# Patient Record
Sex: FEMALE | Race: BLACK OR AFRICAN AMERICAN | NOT HISPANIC OR LATINO
[De-identification: names, ages, dates, MRNs, and addresses within clinical notes are randomized per-mention and may not be internally consistent; named-entity substitution may affect disease eponyms.]

---

## 2017-04-10 ENCOUNTER — ASOB RESULT (OUTPATIENT)
Age: 27
End: 2017-04-10

## 2017-04-10 ENCOUNTER — APPOINTMENT (OUTPATIENT)
Dept: ANTEPARTUM | Facility: CLINIC | Age: 27
End: 2017-04-10

## 2017-04-10 PROBLEM — Z00.00 ENCOUNTER FOR PREVENTIVE HEALTH EXAMINATION: Status: ACTIVE | Noted: 2017-04-10

## 2017-05-22 ENCOUNTER — APPOINTMENT (OUTPATIENT)
Dept: ANTEPARTUM | Facility: CLINIC | Age: 27
End: 2017-05-22

## 2017-05-22 ENCOUNTER — ASOB RESULT (OUTPATIENT)
Age: 27
End: 2017-05-22

## 2017-05-24 ENCOUNTER — EMERGENCY (EMERGENCY)
Facility: HOSPITAL | Age: 27
LOS: 1 days | Discharge: HOME | End: 2017-05-24
Attending: EMERGENCY MEDICINE | Admitting: EMERGENCY MEDICINE
Payer: COMMERCIAL

## 2017-05-24 VITALS
OXYGEN SATURATION: 100 % | TEMPERATURE: 99 F | HEART RATE: 129 BPM | DIASTOLIC BLOOD PRESSURE: 64 MMHG | SYSTOLIC BLOOD PRESSURE: 112 MMHG | RESPIRATION RATE: 16 BRPM

## 2017-05-24 VITALS
OXYGEN SATURATION: 100 % | RESPIRATION RATE: 16 BRPM | DIASTOLIC BLOOD PRESSURE: 77 MMHG | HEART RATE: 106 BPM | SYSTOLIC BLOOD PRESSURE: 111 MMHG

## 2017-05-24 LAB
ALBUMIN SERPL ELPH-MCNC: 3.9 G/DL — SIGNIFICANT CHANGE UP (ref 3.3–5)
ALP SERPL-CCNC: 94 U/L — SIGNIFICANT CHANGE UP (ref 40–120)
ALT FLD-CCNC: 13 U/L — SIGNIFICANT CHANGE UP (ref 4–33)
APPEARANCE UR: SIGNIFICANT CHANGE UP
AST SERPL-CCNC: 17 U/L — SIGNIFICANT CHANGE UP (ref 4–32)
BACTERIA # UR AUTO: SIGNIFICANT CHANGE UP
BASOPHILS # BLD AUTO: 0.01 K/UL — SIGNIFICANT CHANGE UP (ref 0–0.2)
BASOPHILS NFR BLD AUTO: 0.1 % — SIGNIFICANT CHANGE UP (ref 0–2)
BILIRUB SERPL-MCNC: 0.7 MG/DL — SIGNIFICANT CHANGE UP (ref 0.2–1.2)
BILIRUB UR-MCNC: NEGATIVE — SIGNIFICANT CHANGE UP
BLOOD UR QL VISUAL: NEGATIVE — SIGNIFICANT CHANGE UP
BUN SERPL-MCNC: 5 MG/DL — LOW (ref 7–23)
CALCIUM SERPL-MCNC: 8.8 MG/DL — SIGNIFICANT CHANGE UP (ref 8.4–10.5)
CHLORIDE SERPL-SCNC: 105 MMOL/L — SIGNIFICANT CHANGE UP (ref 98–107)
CO2 SERPL-SCNC: 23 MMOL/L — SIGNIFICANT CHANGE UP (ref 22–31)
COLOR SPEC: YELLOW — SIGNIFICANT CHANGE UP
CREAT SERPL-MCNC: 0.61 MG/DL — SIGNIFICANT CHANGE UP (ref 0.5–1.3)
EOSINOPHIL # BLD AUTO: 0.26 K/UL — SIGNIFICANT CHANGE UP (ref 0–0.5)
EOSINOPHIL NFR BLD AUTO: 2.4 % — SIGNIFICANT CHANGE UP (ref 0–6)
GLUCOSE SERPL-MCNC: 82 MG/DL — SIGNIFICANT CHANGE UP (ref 70–99)
GLUCOSE UR-MCNC: NEGATIVE — SIGNIFICANT CHANGE UP
HCG SERPL-ACNC: 7688 MIU/ML — SIGNIFICANT CHANGE UP
HCT VFR BLD CALC: 30.4 % — LOW (ref 34.5–45)
HGB BLD-MCNC: 10.1 G/DL — LOW (ref 11.5–15.5)
IMM GRANULOCYTES NFR BLD AUTO: 0.6 % — SIGNIFICANT CHANGE UP (ref 0–1.5)
KETONES UR-MCNC: NEGATIVE — SIGNIFICANT CHANGE UP
LEUKOCYTE ESTERASE UR-ACNC: HIGH
LYMPHOCYTES # BLD AUTO: 1.16 K/UL — SIGNIFICANT CHANGE UP (ref 1–3.3)
LYMPHOCYTES # BLD AUTO: 10.7 % — LOW (ref 13–44)
MCHC RBC-ENTMCNC: 27.9 PG — SIGNIFICANT CHANGE UP (ref 27–34)
MCHC RBC-ENTMCNC: 33.2 % — SIGNIFICANT CHANGE UP (ref 32–36)
MCV RBC AUTO: 84 FL — SIGNIFICANT CHANGE UP (ref 80–100)
MONOCYTES # BLD AUTO: 1.13 K/UL — HIGH (ref 0–0.9)
MONOCYTES NFR BLD AUTO: 10.4 % — SIGNIFICANT CHANGE UP (ref 2–14)
MUCOUS THREADS # UR AUTO: SIGNIFICANT CHANGE UP
NEUTROPHILS # BLD AUTO: 8.2 K/UL — HIGH (ref 1.8–7.4)
NEUTROPHILS NFR BLD AUTO: 75.8 % — SIGNIFICANT CHANGE UP (ref 43–77)
NITRITE UR-MCNC: NEGATIVE — SIGNIFICANT CHANGE UP
PH UR: 7 — SIGNIFICANT CHANGE UP (ref 4.6–8)
PLATELET # BLD AUTO: 245 K/UL — SIGNIFICANT CHANGE UP (ref 150–400)
PMV BLD: 11.2 FL — SIGNIFICANT CHANGE UP (ref 7–13)
POTASSIUM SERPL-MCNC: 3.5 MMOL/L — SIGNIFICANT CHANGE UP (ref 3.5–5.3)
POTASSIUM SERPL-SCNC: 3.5 MMOL/L — SIGNIFICANT CHANGE UP (ref 3.5–5.3)
PROT SERPL-MCNC: 7.4 G/DL — SIGNIFICANT CHANGE UP (ref 6–8.3)
PROT UR-MCNC: 20 — SIGNIFICANT CHANGE UP
RBC # BLD: 3.62 M/UL — LOW (ref 3.8–5.2)
RBC # FLD: 13.8 % — SIGNIFICANT CHANGE UP (ref 10.3–14.5)
RBC CASTS # UR COMP ASSIST: SIGNIFICANT CHANGE UP (ref 0–?)
SODIUM SERPL-SCNC: 136 MMOL/L — SIGNIFICANT CHANGE UP (ref 135–145)
SP GR SPEC: 1.01 — SIGNIFICANT CHANGE UP (ref 1–1.03)
SQUAMOUS # UR AUTO: SIGNIFICANT CHANGE UP
UROBILINOGEN FLD QL: 1 E.U. — SIGNIFICANT CHANGE UP (ref 0.1–0.2)
WBC # BLD: 10.82 K/UL — HIGH (ref 3.8–10.5)
WBC # FLD AUTO: 10.82 K/UL — HIGH (ref 3.8–10.5)
WBC UR QL: SIGNIFICANT CHANGE UP (ref 0–?)

## 2017-05-24 PROCEDURE — 99284 EMERGENCY DEPT VISIT MOD MDM: CPT

## 2017-05-24 RX ORDER — NITROFURANTOIN MACROCRYSTAL 50 MG
100 CAPSULE ORAL
Qty: 0 | Refills: 0 | Status: DISCONTINUED | OUTPATIENT
Start: 2017-05-24 | End: 2017-05-24

## 2017-05-24 RX ORDER — SODIUM CHLORIDE 0.65 %
1 AEROSOL, SPRAY (ML) NASAL ONCE
Qty: 0 | Refills: 0 | Status: COMPLETED | OUTPATIENT
Start: 2017-05-24 | End: 2017-05-24

## 2017-05-24 RX ORDER — ACETAMINOPHEN 500 MG
650 TABLET ORAL ONCE
Qty: 0 | Refills: 0 | Status: COMPLETED | OUTPATIENT
Start: 2017-05-24 | End: 2017-05-24

## 2017-05-24 RX ORDER — AZITHROMYCIN 500 MG/1
1000 TABLET, FILM COATED ORAL ONCE
Qty: 0 | Refills: 0 | Status: COMPLETED | OUTPATIENT
Start: 2017-05-24 | End: 2017-05-24

## 2017-05-24 RX ORDER — SODIUM CHLORIDE 9 MG/ML
1000 INJECTION INTRAMUSCULAR; INTRAVENOUS; SUBCUTANEOUS ONCE
Qty: 0 | Refills: 0 | Status: COMPLETED | OUTPATIENT
Start: 2017-05-24 | End: 2017-05-24

## 2017-05-24 RX ADMIN — Medication 1 SPRAY(S): at 13:33

## 2017-05-24 RX ADMIN — Medication 650 MILLIGRAM(S): at 13:43

## 2017-05-24 RX ADMIN — AZITHROMYCIN 1000 MILLIGRAM(S): 500 TABLET, FILM COATED ORAL at 13:43

## 2017-05-24 RX ADMIN — SODIUM CHLORIDE 1000 MILLILITER(S): 9 INJECTION INTRAMUSCULAR; INTRAVENOUS; SUBCUTANEOUS at 13:43

## 2017-05-24 NOTE — ED ADULT NURSE NOTE - OBJECTIVE STATEMENT
Pt rec'd in 26 from intake, labs sent in intake, 20 weeks pregnant, c/o SOB x 4 days, denies chest pain, denies palpitations, noted tachycardic on cardiac monitor. Pt states she has a urinary infection as well. C/o abd cramping, denies vaginal bleeding, c/o subjective fever at home.

## 2017-05-24 NOTE — ED PROVIDER NOTE - MEDICAL DECISION MAKING DETAILS
sinus congestion, rhinorrhea, lowgrade fever, partner recently treated for gc/ct and pt requesting treatment  -ocean spray for congestion  -tylenol and fluids for tachycardia likely 2/2 viral uri   -azithromycin ppx in case of chlamydia.

## 2017-05-24 NOTE — ED PROVIDER NOTE - ATTENDING CONTRIBUTION TO CARE
26yF , 20wks pregnant hx asthma p/w sob, cough, tactile fever, rhinorrhea, sinus congestion since last night. Pt cannot breath through nose due to congestion but feels that sob may be more than just that related to congestion. No CP. SOB worse when lying flat. No recent hx prolonged immobilization. Pt also presents b/c partner recently seen in ED for white penile discharge and treated  for GC/CT. Pt requesting to also be tested for these infections. Exam: scant rhonchi light upper base, no kiya wheezing, transmitted upper resp sounds sat 100 percent; no CP; 2nd issue regarding r/o STD; checked significant others cultures at [facility]; no chlamydia culture sent GC negative. Would treat with azithromycin after GYN exam and culture. B2 agonist and IV hydration for URI

## 2017-05-24 NOTE — ED PROVIDER NOTE - PROGRESS NOTE DETAILS
. Called GYN service. They will inform pt's primary GYN doctor of her visit to ED and state that only a documented FHR is needed. Pt does not need to be seen by GYN in ED today. pt received call that both gc and ct tests are negative. On computer confirmed that gc test was negative.

## 2017-05-24 NOTE — ED PROVIDER NOTE - CARE PLAN
Principal Discharge DX:	Congestion of paranasal sinus  Instructions for follow-up, activity and diet:	The patient was given verbal and written discharge instructions. Specifically, instructions when to return to the ED and when to seek follow-up from their pcp was discussed. Any specialty follow-up was discussed, including how to make an appointment.  Instructions were discussed in simple, plain language and was understood by the patient. The patient understands that should their symptoms worsen or any new symptoms arise, they should return to the ED immediately for further evaluation.

## 2017-05-24 NOTE — ED PROVIDER NOTE - OBJECTIVE STATEMENT
26yF , 20wks pregnant hx asthma p/w sob, cough, tactile fever, rhinorrhea, sinus congestion since last night. Pt cannot breath through nose due to congestion but feels that sob may be more than just that related to congestion. No CP. SOB worse when lying flat. No recent hx prolonged immobilization. Pt also presents b/c partner recently seen in ED for white penile discharge and treated  for GC/CT. Pt requesting to also be tested for these infections.

## 2017-05-24 NOTE — ED ADULT NURSE NOTE - CHIEF COMPLAINT QUOTE
20 weeks pregnant with c/o SOB since Sunday, patient also states significant other treated for Urethritis and was told she needed to be treated also. (+) lower abdominal cramping.  Denies any vaginal bleeding. GIRMA: 10/13/17. Spoke with ROBIN Parrish in L&D and patient to be seen in ED.  PMH: asthma, sickle cell trait,

## 2017-05-24 NOTE — ED ADULT TRIAGE NOTE - CHIEF COMPLAINT QUOTE
20 weeks pregnant with c/o SOB since Sunday, patient also states significant other treated for Urethritis and was told she needed to be treated also. (+) lower abdominal cramping.  Denies any vaginal bleeding. GIRMA: 10/13/17.  PMH: asthma, sickle cell trait, 20 weeks pregnant with c/o SOB since Sunday, patient also states significant other treated for Urethritis and was told she needed to be treated also. (+) lower abdominal cramping.  Denies any vaginal bleeding. GIRMA: 10/13/17. Spoke with ROBIN Parrish in L&D and patient to be seen in ED.  PMH: asthma, sickle cell trait,

## 2017-05-25 LAB
C TRACH RRNA SPEC QL NAA+PROBE: SIGNIFICANT CHANGE UP
N GONORRHOEA RRNA SPEC QL NAA+PROBE: SIGNIFICANT CHANGE UP
SPECIMEN SOURCE: SIGNIFICANT CHANGE UP
SPECIMEN SOURCE: SIGNIFICANT CHANGE UP

## 2017-05-26 LAB — BACTERIA UR CULT: SIGNIFICANT CHANGE UP

## 2017-07-23 ENCOUNTER — EMERGENCY (EMERGENCY)
Facility: HOSPITAL | Age: 27
LOS: 1 days | Discharge: HOME | End: 2017-07-23
Admitting: EMERGENCY MEDICINE
Payer: MEDICAID

## 2017-07-23 VITALS
DIASTOLIC BLOOD PRESSURE: 63 MMHG | OXYGEN SATURATION: 94 % | SYSTOLIC BLOOD PRESSURE: 109 MMHG | HEART RATE: 50 BPM | RESPIRATION RATE: 16 BRPM | TEMPERATURE: 98 F

## 2017-07-23 VITALS
OXYGEN SATURATION: 100 % | RESPIRATION RATE: 16 BRPM | SYSTOLIC BLOOD PRESSURE: 110 MMHG | HEART RATE: 61 BPM | DIASTOLIC BLOOD PRESSURE: 69 MMHG

## 2017-07-23 LAB
APPEARANCE UR: SIGNIFICANT CHANGE UP
BACTERIA # UR AUTO: SIGNIFICANT CHANGE UP
BILIRUB UR-MCNC: NEGATIVE — SIGNIFICANT CHANGE UP
BLOOD UR QL VISUAL: NEGATIVE — SIGNIFICANT CHANGE UP
COLOR SPEC: YELLOW — SIGNIFICANT CHANGE UP
GLUCOSE UR-MCNC: NEGATIVE — SIGNIFICANT CHANGE UP
KETONES UR-MCNC: SIGNIFICANT CHANGE UP
LEUKOCYTE ESTERASE UR-ACNC: HIGH
MUCOUS THREADS # UR AUTO: SIGNIFICANT CHANGE UP
NITRITE UR-MCNC: NEGATIVE — SIGNIFICANT CHANGE UP
PH UR: 6 — SIGNIFICANT CHANGE UP (ref 4.6–8)
PROT UR-MCNC: 20 — SIGNIFICANT CHANGE UP
RBC CASTS # UR COMP ASSIST: SIGNIFICANT CHANGE UP (ref 0–?)
RENAL EPI CELLS # UR COMP ASSIST: <1 — SIGNIFICANT CHANGE UP
SP GR SPEC: 1.02 — SIGNIFICANT CHANGE UP (ref 1–1.03)
SQUAMOUS # UR AUTO: SIGNIFICANT CHANGE UP
TRANS CELLS #/AREA URNS HPF: 1 — SIGNIFICANT CHANGE UP
UROBILINOGEN FLD QL: 1 E.U. — SIGNIFICANT CHANGE UP (ref 0.1–0.2)
WBC UR QL: SIGNIFICANT CHANGE UP (ref 0–?)

## 2017-07-23 PROCEDURE — 99283 EMERGENCY DEPT VISIT LOW MDM: CPT

## 2017-07-23 RX ORDER — CEPHALEXIN 500 MG
500 CAPSULE ORAL ONCE
Qty: 0 | Refills: 0 | Status: COMPLETED | OUTPATIENT
Start: 2017-07-23 | End: 2017-07-23

## 2017-07-23 RX ORDER — CEPHALEXIN 500 MG
1 CAPSULE ORAL
Qty: 21 | Refills: 0 | OUTPATIENT
Start: 2017-07-23 | End: 2017-07-30

## 2017-07-23 RX ADMIN — Medication 500 MILLIGRAM(S): at 16:15

## 2017-07-23 NOTE — ED PROVIDER NOTE - MEDICAL DECISION MAKING DETAILS
vag itching/discharge in 2nd trimester, only bimanual exam performed, can visualize white clumpy dc, consistent with monilisis - will check ua, cx, test for gc/chlamydia, bedisde us for fetal hr, L&D called to protocol - since pt has no vag bleeding or discharge, also denies abd pain, no need to send upstairs.

## 2017-07-23 NOTE — ED PROVIDER NOTE - PLAN OF CARE
PLEASE MAKE SURE THAT YOU TAKE THE MEDICATIONS AS PRESCRIBED - YOU WILL BE TAKING THE ANTIBIOTICS AS WELL AS THE CREAM AS IT LOOKS LIKE YOU HAVE URINARY TRACT INFECTION. PLEASE FOLLOW UP WITH [facility] OB/GYN DOCTOR WITHIN NEXT 48HRS, PLEASE CALL US [phone] AND ASK FOR ADMINISTRATIVE PA (BETWEEN 9AM-3PM EVERY DAY) FOR TEST RESULTS of urine culture and other testes. RETURN TO ER FOR VAGINAL BLEEDING, ABDOMINAL PAIN, FEVER.

## 2017-07-23 NOTE — ED PROVIDER NOTE - CARE PLAN
Principal Discharge DX:	Vaginosis Principal Discharge DX:	Vaginosis  Instructions for follow-up, activity and diet:	PLEASE MAKE SURE THAT YOU TAKE THE MEDICATIONS AS PRESCRIBED - YOU WILL BE TAKING THE ANTIBIOTICS AS WELL AS THE CREAM AS IT LOOKS LIKE YOU HAVE URINARY TRACT INFECTION. PLEASE FOLLOW UP WITH [facility] OB/GYN DOCTOR WITHIN NEXT 48HRS, PLEASE CALL US [phone] AND ASK FOR ADMINISTRATIVE PA (BETWEEN 9AM-3PM EVERY DAY) FOR TEST RESULTS of urine culture and other testes. RETURN TO ER FOR VAGINAL BLEEDING, ABDOMINAL PAIN, FEVER. Principal Discharge DX:	Vaginosis  Instructions for follow-up, activity and diet:	PLEASE MAKE SURE THAT YOU TAKE THE MEDICATIONS AS PRESCRIBED - YOU WILL BE TAKING THE ANTIBIOTICS AS WELL AS THE CREAM AS IT LOOKS LIKE YOU HAVE URINARY TRACT INFECTION. PLEASE FOLLOW UP WITH [facility] OB/GYN DOCTOR WITHIN NEXT 48HRS, PLEASE CALL US [phone] AND ASK FOR ADMINISTRATIVE PA (BETWEEN 9AM-3PM EVERY DAY) FOR TEST RESULTS of urine culture and other testes. RETURN TO ER FOR VAGINAL BLEEDING, ABDOMINAL PAIN, FEVER. Principal Discharge DX:	Vaginosis  Instructions for follow-up, activity and diet:	PLEASE MAKE SURE THAT YOU TAKE THE MEDICATIONS AS PRESCRIBED - YOU WILL BE TAKING THE ANTIBIOTICS AS WELL AS THE CREAM AS IT LOOKS LIKE YOU HAVE URINARY TRACT INFECTION. PLEASE FOLLOW UP WITH [facility] OB/GYN DOCTOR WITHIN NEXT 48HRS, PLEASE CALL US [phone] AND ASK FOR ADMINISTRATIVE PA (BETWEEN 9AM-3PM EVERY DAY) FOR TEST RESULTS of urine culture and other testes. RETURN TO ER FOR VAGINAL BLEEDING, ABDOMINAL PAIN, FEVER.

## 2017-07-23 NOTE — ED PROVIDER NOTE - OBJECTIVE STATEMENT
Pt is 27 y/o female, G [id number], Pt is 27 y/o female, G [id number], 27+ weeks into gestation (LMP 1/11/2017) here for eval of vaginal itching and clumpy, white vaginal  discharge x 3 days. Pt denies any abd pain, vaginal bleeding, denies pelvic pain, nausea, vomiting, fever, cp, sob, denies dysuria, urinary urgency or frequency, denies hematuria, admits to feeling baby moving frequently. ob/gyn - [place], had multiple us for this pregnancy. sexually active in monogamous relationship, denies hx of STDs (tested negative in may of 2017); take prenatal vitamins as prescribed.

## 2017-07-23 NOTE — ED ADULT TRIAGE NOTE - CHIEF COMPLAINT QUOTE
Pt 28 weeks pregnant. c/o of vaginal itching. Pt has been taking Monistat. Denies any pregnancy issues.

## 2017-07-24 LAB
C TRACH RRNA SPEC QL NAA+PROBE: SIGNIFICANT CHANGE UP
N GONORRHOEA RRNA SPEC QL NAA+PROBE: SIGNIFICANT CHANGE UP
SPECIMEN SOURCE: SIGNIFICANT CHANGE UP

## 2017-07-25 LAB
BACTERIA UR CULT: SIGNIFICANT CHANGE UP
SPECIMEN SOURCE: SIGNIFICANT CHANGE UP

## 2017-07-25 NOTE — ED PROCEDURE NOTE - PROCEDURE ADDITIONAL DETAILS
fetal movement observed      est age 27w5d    fluid appears grossly adequate         cervical length 4.8cm

## 2017-10-20 ENCOUNTER — RESULT REVIEW (OUTPATIENT)
Age: 27
End: 2017-10-20

## 2017-10-21 ENCOUNTER — TRANSCRIPTION ENCOUNTER (OUTPATIENT)
Age: 27
End: 2017-10-21

## 2017-10-21 ENCOUNTER — INPATIENT (INPATIENT)
Facility: HOSPITAL | Age: 27
LOS: 2 days | Discharge: HOME | End: 2017-10-24
Attending: OBSTETRICS & GYNECOLOGY | Admitting: OBSTETRICS & GYNECOLOGY
Payer: COMMERCIAL

## 2017-10-21 VITALS
RESPIRATION RATE: 19 BRPM | HEART RATE: 96 BPM | TEMPERATURE: 98 F | DIASTOLIC BLOOD PRESSURE: 58 MMHG | SYSTOLIC BLOOD PRESSURE: 129 MMHG

## 2017-10-21 DIAGNOSIS — O26.899 OTHER SPECIFIED PREGNANCY RELATED CONDITIONS, UNSPECIFIED TRIMESTER: ICD-10-CM

## 2017-10-21 LAB
BASOPHILS # BLD AUTO: 0.02 K/UL — SIGNIFICANT CHANGE UP (ref 0–0.2)
BASOPHILS # BLD AUTO: 0.04 K/UL — SIGNIFICANT CHANGE UP (ref 0–0.2)
BASOPHILS NFR BLD AUTO: 0.2 % — SIGNIFICANT CHANGE UP (ref 0–2)
BASOPHILS NFR BLD AUTO: 0.3 % — SIGNIFICANT CHANGE UP (ref 0–2)
BLD GP AB SCN SERPL QL: NEGATIVE — SIGNIFICANT CHANGE UP
EOSINOPHIL # BLD AUTO: 0.04 K/UL — SIGNIFICANT CHANGE UP (ref 0–0.5)
EOSINOPHIL # BLD AUTO: 0.09 K/UL — SIGNIFICANT CHANGE UP (ref 0–0.5)
EOSINOPHIL NFR BLD AUTO: 0.3 % — SIGNIFICANT CHANGE UP (ref 0–6)
EOSINOPHIL NFR BLD AUTO: 0.7 % — SIGNIFICANT CHANGE UP (ref 0–6)
HCT VFR BLD CALC: 27.6 % — LOW (ref 34.5–45)
HCT VFR BLD CALC: 37.4 % — SIGNIFICANT CHANGE UP (ref 34.5–45)
HGB BLD-MCNC: 12.9 G/DL — SIGNIFICANT CHANGE UP (ref 11.5–15.5)
HGB BLD-MCNC: 9.7 G/DL — LOW (ref 11.5–15.5)
IMM GRANULOCYTES # BLD AUTO: 0.13 # — SIGNIFICANT CHANGE UP
IMM GRANULOCYTES # BLD AUTO: 0.25 # — SIGNIFICANT CHANGE UP
IMM GRANULOCYTES NFR BLD AUTO: 1 % — SIGNIFICANT CHANGE UP (ref 0–1.5)
IMM GRANULOCYTES NFR BLD AUTO: 1.9 % — HIGH (ref 0–1.5)
LYMPHOCYTES # BLD AUTO: 1.18 K/UL — SIGNIFICANT CHANGE UP (ref 1–3.3)
LYMPHOCYTES # BLD AUTO: 16.9 % — SIGNIFICANT CHANGE UP (ref 13–44)
LYMPHOCYTES # BLD AUTO: 2.21 K/UL — SIGNIFICANT CHANGE UP (ref 1–3.3)
LYMPHOCYTES # BLD AUTO: 9.3 % — LOW (ref 13–44)
MCHC RBC-ENTMCNC: 29.7 PG — SIGNIFICANT CHANGE UP (ref 27–34)
MCHC RBC-ENTMCNC: 30.6 PG — SIGNIFICANT CHANGE UP (ref 27–34)
MCHC RBC-ENTMCNC: 34.5 % — SIGNIFICANT CHANGE UP (ref 32–36)
MCHC RBC-ENTMCNC: 35.1 % — SIGNIFICANT CHANGE UP (ref 32–36)
MCV RBC AUTO: 86 FL — SIGNIFICANT CHANGE UP (ref 80–100)
MCV RBC AUTO: 87.1 FL — SIGNIFICANT CHANGE UP (ref 80–100)
MONOCYTES # BLD AUTO: 1.27 K/UL — HIGH (ref 0–0.9)
MONOCYTES # BLD AUTO: 1.4 K/UL — HIGH (ref 0–0.9)
MONOCYTES NFR BLD AUTO: 11.1 % — SIGNIFICANT CHANGE UP (ref 2–14)
MONOCYTES NFR BLD AUTO: 9.7 % — SIGNIFICANT CHANGE UP (ref 2–14)
NEUTROPHILS # BLD AUTO: 9.19 K/UL — HIGH (ref 1.8–7.4)
NEUTROPHILS # BLD AUTO: 9.89 K/UL — HIGH (ref 1.8–7.4)
NEUTROPHILS NFR BLD AUTO: 70.5 % — SIGNIFICANT CHANGE UP (ref 43–77)
NEUTROPHILS NFR BLD AUTO: 78.1 % — HIGH (ref 43–77)
NRBC # FLD: 0 — SIGNIFICANT CHANGE UP
NRBC # FLD: 0 — SIGNIFICANT CHANGE UP
PLATELET # BLD AUTO: 146 K/UL — LOW (ref 150–400)
PLATELET # BLD AUTO: 195 K/UL — SIGNIFICANT CHANGE UP (ref 150–400)
PMV BLD: 10.9 FL — SIGNIFICANT CHANGE UP (ref 7–13)
PMV BLD: 11.8 FL — SIGNIFICANT CHANGE UP (ref 7–13)
RBC # BLD: 3.17 M/UL — LOW (ref 3.8–5.2)
RBC # BLD: 4.35 M/UL — SIGNIFICANT CHANGE UP (ref 3.8–5.2)
RBC # FLD: 15.4 % — HIGH (ref 10.3–14.5)
RBC # FLD: 15.5 % — HIGH (ref 10.3–14.5)
RH IG SCN BLD-IMP: POSITIVE — SIGNIFICANT CHANGE UP
RH IG SCN BLD-IMP: POSITIVE — SIGNIFICANT CHANGE UP
WBC # BLD: 12.66 K/UL — HIGH (ref 3.8–10.5)
WBC # BLD: 13.05 K/UL — HIGH (ref 3.8–10.5)
WBC # FLD AUTO: 12.66 K/UL — HIGH (ref 3.8–10.5)
WBC # FLD AUTO: 13.05 K/UL — HIGH (ref 3.8–10.5)

## 2017-10-21 PROCEDURE — 59514 CESAREAN DELIVERY ONLY: CPT | Mod: U9,UB,GC

## 2017-10-21 PROCEDURE — 88307 TISSUE EXAM BY PATHOLOGIST: CPT | Mod: 26

## 2017-10-21 RX ORDER — OXYTOCIN 10 UNIT/ML
41.67 VIAL (ML) INJECTION
Qty: 20 | Refills: 0 | Status: DISCONTINUED | OUTPATIENT
Start: 2017-10-21 | End: 2017-10-21

## 2017-10-21 RX ORDER — CITRIC ACID/SODIUM CITRATE 300-500 MG
15 SOLUTION, ORAL ORAL EVERY 4 HOURS
Qty: 0 | Refills: 0 | Status: DISCONTINUED | OUTPATIENT
Start: 2017-10-21 | End: 2017-10-21

## 2017-10-21 RX ORDER — GLYCERIN ADULT
1 SUPPOSITORY, RECTAL RECTAL AT BEDTIME
Qty: 0 | Refills: 0 | Status: DISCONTINUED | OUTPATIENT
Start: 2017-10-21 | End: 2017-10-24

## 2017-10-21 RX ORDER — OXYCODONE HYDROCHLORIDE 5 MG/1
5 TABLET ORAL EVERY 4 HOURS
Qty: 0 | Refills: 0 | Status: DISCONTINUED | OUTPATIENT
Start: 2017-10-22 | End: 2017-10-24

## 2017-10-21 RX ORDER — HYDROMORPHONE HYDROCHLORIDE 2 MG/ML
0.5 INJECTION INTRAMUSCULAR; INTRAVENOUS; SUBCUTANEOUS
Qty: 0 | Refills: 0 | Status: DISCONTINUED | OUTPATIENT
Start: 2017-10-21 | End: 2017-10-23

## 2017-10-21 RX ORDER — FERROUS SULFATE 325(65) MG
325 TABLET ORAL DAILY
Qty: 0 | Refills: 0 | Status: DISCONTINUED | OUTPATIENT
Start: 2017-10-21 | End: 2017-10-21

## 2017-10-21 RX ORDER — OXYTOCIN 10 UNIT/ML
333.33 VIAL (ML) INJECTION
Qty: 20 | Refills: 0 | Status: DISCONTINUED | OUTPATIENT
Start: 2017-10-21 | End: 2017-10-21

## 2017-10-21 RX ORDER — SIMETHICONE 80 MG/1
80 TABLET, CHEWABLE ORAL EVERY 4 HOURS
Qty: 0 | Refills: 0 | Status: DISCONTINUED | OUTPATIENT
Start: 2017-10-21 | End: 2017-10-24

## 2017-10-21 RX ORDER — DIPHENHYDRAMINE HCL 50 MG
25 CAPSULE ORAL EVERY 6 HOURS
Qty: 0 | Refills: 0 | Status: DISCONTINUED | OUTPATIENT
Start: 2017-10-21 | End: 2017-10-24

## 2017-10-21 RX ORDER — OXYCODONE HYDROCHLORIDE 5 MG/1
10 TABLET ORAL
Qty: 0 | Refills: 0 | Status: DISCONTINUED | OUTPATIENT
Start: 2017-10-21 | End: 2017-10-23

## 2017-10-21 RX ORDER — DOCUSATE SODIUM 100 MG
100 CAPSULE ORAL
Qty: 0 | Refills: 0 | Status: DISCONTINUED | OUTPATIENT
Start: 2017-10-21 | End: 2017-10-24

## 2017-10-21 RX ORDER — HYDROMORPHONE HYDROCHLORIDE 2 MG/ML
1 INJECTION INTRAMUSCULAR; INTRAVENOUS; SUBCUTANEOUS
Qty: 0 | Refills: 0 | Status: DISCONTINUED | OUTPATIENT
Start: 2017-10-21 | End: 2017-10-23

## 2017-10-21 RX ORDER — GLYCERIN ADULT
1 SUPPOSITORY, RECTAL RECTAL AT BEDTIME
Qty: 0 | Refills: 0 | Status: DISCONTINUED | OUTPATIENT
Start: 2017-10-21 | End: 2017-10-21

## 2017-10-21 RX ORDER — METOCLOPRAMIDE HCL 10 MG
10 TABLET ORAL ONCE
Qty: 0 | Refills: 0 | Status: DISCONTINUED | OUTPATIENT
Start: 2017-10-21 | End: 2017-10-21

## 2017-10-21 RX ORDER — IBUPROFEN 200 MG
600 TABLET ORAL EVERY 6 HOURS
Qty: 0 | Refills: 0 | Status: DISCONTINUED | OUTPATIENT
Start: 2017-10-22 | End: 2017-10-23

## 2017-10-21 RX ORDER — DEXAMETHASONE 0.5 MG/5ML
4 ELIXIR ORAL EVERY 6 HOURS
Qty: 0 | Refills: 0 | Status: DISCONTINUED | OUTPATIENT
Start: 2017-10-21 | End: 2017-10-23

## 2017-10-21 RX ORDER — SODIUM CHLORIDE 9 MG/ML
1000 INJECTION, SOLUTION INTRAVENOUS
Qty: 0 | Refills: 0 | Status: DISCONTINUED | OUTPATIENT
Start: 2017-10-21 | End: 2017-10-21

## 2017-10-21 RX ORDER — OXYCODONE HYDROCHLORIDE 5 MG/1
5 TABLET ORAL EVERY 4 HOURS
Qty: 0 | Refills: 0 | Status: DISCONTINUED | OUTPATIENT
Start: 2017-10-21 | End: 2017-10-23

## 2017-10-21 RX ORDER — INFLUENZA VIRUS VACCINE 15; 15; 15; 15 UG/.5ML; UG/.5ML; UG/.5ML; UG/.5ML
0.5 SUSPENSION INTRAMUSCULAR ONCE
Qty: 0 | Refills: 0 | Status: COMPLETED | OUTPATIENT
Start: 2017-10-21 | End: 2017-10-21

## 2017-10-21 RX ORDER — TETANUS TOXOID, REDUCED DIPHTHERIA TOXOID AND ACELLULAR PERTUSSIS VACCINE, ADSORBED 5; 2.5; 8; 8; 2.5 [IU]/.5ML; [IU]/.5ML; UG/.5ML; UG/.5ML; UG/.5ML
0.5 SUSPENSION INTRAMUSCULAR ONCE
Qty: 0 | Refills: 0 | Status: DISCONTINUED | OUTPATIENT
Start: 2017-10-21 | End: 2017-10-21

## 2017-10-21 RX ORDER — OXYCODONE HYDROCHLORIDE 5 MG/1
5 TABLET ORAL
Qty: 0 | Refills: 0 | Status: DISCONTINUED | OUTPATIENT
Start: 2017-10-21 | End: 2017-10-23

## 2017-10-21 RX ORDER — DOCUSATE SODIUM 100 MG
100 CAPSULE ORAL
Qty: 0 | Refills: 0 | Status: DISCONTINUED | OUTPATIENT
Start: 2017-10-21 | End: 2017-10-21

## 2017-10-21 RX ORDER — SODIUM CHLORIDE 9 MG/ML
1000 INJECTION, SOLUTION INTRAVENOUS
Qty: 0 | Refills: 0 | Status: DISCONTINUED | OUTPATIENT
Start: 2017-10-21 | End: 2017-10-24

## 2017-10-21 RX ORDER — SODIUM CHLORIDE 9 MG/ML
1000 INJECTION, SOLUTION INTRAVENOUS ONCE
Qty: 0 | Refills: 0 | Status: DISCONTINUED | OUTPATIENT
Start: 2017-10-21 | End: 2017-10-21

## 2017-10-21 RX ORDER — NALOXONE HYDROCHLORIDE 4 MG/.1ML
0.1 SPRAY NASAL
Qty: 0 | Refills: 0 | Status: DISCONTINUED | OUTPATIENT
Start: 2017-10-21 | End: 2017-10-23

## 2017-10-21 RX ORDER — HEPARIN SODIUM 5000 [USP'U]/ML
5000 INJECTION INTRAVENOUS; SUBCUTANEOUS EVERY 12 HOURS
Qty: 0 | Refills: 0 | Status: DISCONTINUED | OUTPATIENT
Start: 2017-10-21 | End: 2017-10-24

## 2017-10-21 RX ORDER — LANOLIN
1 OINTMENT (GRAM) TOPICAL
Qty: 0 | Refills: 0 | Status: DISCONTINUED | OUTPATIENT
Start: 2017-10-21 | End: 2017-10-24

## 2017-10-21 RX ORDER — ACETAMINOPHEN 500 MG
975 TABLET ORAL EVERY 6 HOURS
Qty: 0 | Refills: 0 | Status: DISCONTINUED | OUTPATIENT
Start: 2017-10-21 | End: 2017-10-23

## 2017-10-21 RX ORDER — DIPHENHYDRAMINE HCL 50 MG
25 CAPSULE ORAL EVERY 6 HOURS
Qty: 0 | Refills: 0 | Status: DISCONTINUED | OUTPATIENT
Start: 2017-10-21 | End: 2017-10-21

## 2017-10-21 RX ORDER — FAMOTIDINE 10 MG/ML
20 INJECTION INTRAVENOUS ONCE
Qty: 0 | Refills: 0 | Status: COMPLETED | OUTPATIENT
Start: 2017-10-21 | End: 2017-10-21

## 2017-10-21 RX ORDER — TETANUS TOXOID, REDUCED DIPHTHERIA TOXOID AND ACELLULAR PERTUSSIS VACCINE, ADSORBED 5; 2.5; 8; 8; 2.5 [IU]/.5ML; [IU]/.5ML; UG/.5ML; UG/.5ML; UG/.5ML
0.5 SUSPENSION INTRAMUSCULAR ONCE
Qty: 0 | Refills: 0 | Status: COMPLETED | OUTPATIENT
Start: 2017-10-21

## 2017-10-21 RX ORDER — SIMETHICONE 80 MG/1
80 TABLET, CHEWABLE ORAL EVERY 4 HOURS
Qty: 0 | Refills: 0 | Status: DISCONTINUED | OUTPATIENT
Start: 2017-10-21 | End: 2017-10-21

## 2017-10-21 RX ORDER — IBUPROFEN 200 MG
600 TABLET ORAL EVERY 6 HOURS
Qty: 0 | Refills: 0 | Status: COMPLETED | OUTPATIENT
Start: 2017-10-21 | End: 2018-09-19

## 2017-10-21 RX ORDER — KETOROLAC TROMETHAMINE 30 MG/ML
30 SYRINGE (ML) INJECTION EVERY 6 HOURS
Qty: 0 | Refills: 0 | Status: DISCONTINUED | OUTPATIENT
Start: 2017-10-21 | End: 2017-10-23

## 2017-10-21 RX ORDER — HEPARIN SODIUM 5000 [USP'U]/ML
5000 INJECTION INTRAVENOUS; SUBCUTANEOUS EVERY 12 HOURS
Qty: 0 | Refills: 0 | Status: DISCONTINUED | OUTPATIENT
Start: 2017-10-21 | End: 2017-10-21

## 2017-10-21 RX ORDER — LANOLIN
1 OINTMENT (GRAM) TOPICAL
Qty: 0 | Refills: 0 | Status: DISCONTINUED | OUTPATIENT
Start: 2017-10-21 | End: 2017-10-21

## 2017-10-21 RX ORDER — OXYCODONE HYDROCHLORIDE 5 MG/1
5 TABLET ORAL
Qty: 0 | Refills: 0 | Status: DISCONTINUED | OUTPATIENT
Start: 2017-10-22 | End: 2017-10-24

## 2017-10-21 RX ORDER — KETOROLAC TROMETHAMINE 30 MG/ML
30 SYRINGE (ML) INJECTION EVERY 6 HOURS
Qty: 0 | Refills: 0 | Status: DISCONTINUED | OUTPATIENT
Start: 2017-10-22 | End: 2017-10-23

## 2017-10-21 RX ORDER — MORPHINE SULFATE 50 MG/1
0.2 CAPSULE, EXTENDED RELEASE ORAL ONCE
Qty: 0 | Refills: 0 | Status: DISCONTINUED | OUTPATIENT
Start: 2017-10-21 | End: 2017-10-23

## 2017-10-21 RX ORDER — DIPHENHYDRAMINE HCL 50 MG
12.5 CAPSULE ORAL EVERY 4 HOURS
Qty: 0 | Refills: 0 | Status: DISCONTINUED | OUTPATIENT
Start: 2017-10-21 | End: 2017-10-23

## 2017-10-21 RX ORDER — SODIUM CHLORIDE 9 MG/ML
1000 INJECTION, SOLUTION INTRAVENOUS ONCE
Qty: 0 | Refills: 0 | Status: COMPLETED | OUTPATIENT
Start: 2017-10-21 | End: 2017-10-21

## 2017-10-21 RX ORDER — ONDANSETRON 8 MG/1
4 TABLET, FILM COATED ORAL EVERY 6 HOURS
Qty: 0 | Refills: 0 | Status: DISCONTINUED | OUTPATIENT
Start: 2017-10-21 | End: 2017-10-23

## 2017-10-21 RX ORDER — ACETAMINOPHEN 500 MG
975 TABLET ORAL EVERY 6 HOURS
Qty: 0 | Refills: 0 | Status: DISCONTINUED | OUTPATIENT
Start: 2017-10-22 | End: 2017-10-24

## 2017-10-21 RX ADMIN — Medication 30 MILLIGRAM(S): at 21:10

## 2017-10-21 RX ADMIN — Medication 975 MILLIGRAM(S): at 21:10

## 2017-10-21 RX ADMIN — Medication 30 MILLIGRAM(S): at 11:35

## 2017-10-21 RX ADMIN — FAMOTIDINE 20 MILLIGRAM(S): 10 INJECTION INTRAVENOUS at 01:55

## 2017-10-21 RX ADMIN — ONDANSETRON 4 MILLIGRAM(S): 8 TABLET, FILM COATED ORAL at 11:35

## 2017-10-21 RX ADMIN — Medication 975 MILLIGRAM(S): at 21:40

## 2017-10-21 RX ADMIN — Medication 125 MILLIUNIT(S)/MIN: at 11:30

## 2017-10-21 RX ADMIN — HEPARIN SODIUM 5000 UNIT(S): 5000 INJECTION INTRAVENOUS; SUBCUTANEOUS at 21:10

## 2017-10-21 RX ADMIN — HEPARIN SODIUM 5000 UNIT(S): 5000 INJECTION INTRAVENOUS; SUBCUTANEOUS at 09:20

## 2017-10-21 RX ADMIN — Medication 30 MILLIGRAM(S): at 11:45

## 2017-10-21 RX ADMIN — SODIUM CHLORIDE 2000 MILLILITER(S): 9 INJECTION, SOLUTION INTRAVENOUS at 04:05

## 2017-10-21 RX ADMIN — Medication 30 MILLIGRAM(S): at 21:40

## 2017-10-21 RX ADMIN — Medication 15 MILLILITER(S): at 04:05

## 2017-10-21 RX ADMIN — Medication 125 MILLIUNIT(S)/MIN: at 05:37

## 2017-10-22 LAB — T PALLIDUM AB TITR SER: NEGATIVE — SIGNIFICANT CHANGE UP

## 2017-10-22 RX ORDER — IBUPROFEN 200 MG
600 TABLET ORAL EVERY 6 HOURS
Qty: 0 | Refills: 0 | Status: DISCONTINUED | OUTPATIENT
Start: 2017-10-22 | End: 2017-10-24

## 2017-10-22 RX ORDER — DOCUSATE SODIUM 100 MG
100 CAPSULE ORAL
Qty: 0 | Refills: 0 | Status: DISCONTINUED | OUTPATIENT
Start: 2017-10-22 | End: 2017-10-24

## 2017-10-22 RX ORDER — ZINC OXIDE 200 MG/G
1 OINTMENT TOPICAL THREE TIMES A DAY
Qty: 0 | Refills: 0 | Status: DISCONTINUED | OUTPATIENT
Start: 2017-10-22 | End: 2017-10-24

## 2017-10-22 RX ORDER — ASCORBIC ACID 60 MG
500 TABLET,CHEWABLE ORAL DAILY
Qty: 0 | Refills: 0 | Status: DISCONTINUED | OUTPATIENT
Start: 2017-10-22 | End: 2017-10-24

## 2017-10-22 RX ORDER — ZINC OXIDE 200 MG/G
1 OINTMENT TOPICAL THREE TIMES A DAY
Qty: 0 | Refills: 0 | Status: DISCONTINUED | OUTPATIENT
Start: 2017-10-22 | End: 2017-10-22

## 2017-10-22 RX ORDER — FERROUS SULFATE 325(65) MG
325 TABLET ORAL
Qty: 0 | Refills: 0 | Status: DISCONTINUED | OUTPATIENT
Start: 2017-10-22 | End: 2017-10-24

## 2017-10-22 RX ADMIN — Medication 600 MILLIGRAM(S): at 14:31

## 2017-10-22 RX ADMIN — Medication 600 MILLIGRAM(S): at 05:45

## 2017-10-22 RX ADMIN — Medication 600 MILLIGRAM(S): at 20:50

## 2017-10-22 RX ADMIN — Medication 975 MILLIGRAM(S): at 05:10

## 2017-10-22 RX ADMIN — Medication 600 MILLIGRAM(S): at 20:13

## 2017-10-22 RX ADMIN — ZINC OXIDE 1 APPLICATION(S): 200 OINTMENT TOPICAL at 16:32

## 2017-10-22 RX ADMIN — HEPARIN SODIUM 5000 UNIT(S): 5000 INJECTION INTRAVENOUS; SUBCUTANEOUS at 08:59

## 2017-10-22 RX ADMIN — ZINC OXIDE 1 APPLICATION(S): 200 OINTMENT TOPICAL at 21:37

## 2017-10-22 RX ADMIN — Medication 975 MILLIGRAM(S): at 14:30

## 2017-10-22 RX ADMIN — HEPARIN SODIUM 5000 UNIT(S): 5000 INJECTION INTRAVENOUS; SUBCUTANEOUS at 20:13

## 2017-10-22 RX ADMIN — SIMETHICONE 80 MILLIGRAM(S): 80 TABLET, CHEWABLE ORAL at 20:13

## 2017-10-22 RX ADMIN — Medication 975 MILLIGRAM(S): at 13:50

## 2017-10-22 RX ADMIN — Medication 975 MILLIGRAM(S): at 20:50

## 2017-10-22 RX ADMIN — OXYCODONE HYDROCHLORIDE 5 MILLIGRAM(S): 5 TABLET ORAL at 01:35

## 2017-10-22 RX ADMIN — Medication 1 TABLET(S): at 08:59

## 2017-10-22 RX ADMIN — Medication 325 MILLIGRAM(S): at 08:59

## 2017-10-22 RX ADMIN — Medication 100 MILLIGRAM(S): at 17:15

## 2017-10-22 RX ADMIN — Medication 975 MILLIGRAM(S): at 05:45

## 2017-10-22 RX ADMIN — Medication 100 MILLIGRAM(S): at 05:10

## 2017-10-22 RX ADMIN — OXYCODONE HYDROCHLORIDE 5 MILLIGRAM(S): 5 TABLET ORAL at 02:10

## 2017-10-22 RX ADMIN — Medication 500 MILLIGRAM(S): at 08:59

## 2017-10-22 RX ADMIN — Medication 600 MILLIGRAM(S): at 13:50

## 2017-10-22 RX ADMIN — Medication 975 MILLIGRAM(S): at 20:12

## 2017-10-22 RX ADMIN — SIMETHICONE 80 MILLIGRAM(S): 80 TABLET, CHEWABLE ORAL at 13:51

## 2017-10-22 RX ADMIN — Medication 600 MILLIGRAM(S): at 05:10

## 2017-10-22 NOTE — PROGRESS NOTE ADULT - PROBLEM SELECTOR PLAN 1
- Continue with po analgesia  - Increase ambulation  - Continue regular diet  - d/c IV fluids  - Check CBC  - D/c Burger  - Incision dressing removed  - Contraception: Patient thinks she would like nexplanon, however, desires this postpartum.     FARHEEN Philip, PGY-1

## 2017-10-22 NOTE — PROGRESS NOTE ADULT - SUBJECTIVE AND OBJECTIVE BOX
Postop Day  __1_ s/p   C- Section    THERAPY:  [x  ] Spinal morphine   [  ] Epidural morphine   [  ] IV PCA Hydromorphone 1 mg/ml      Sedation Score:	  [x  ] Alert	    [  ] Drowsy        [  ] Arousable	[  ] Asleep	[  ] Unresponsive    Side Effects:	  [ x ] None	     [  ] Nausea        [  ] Pruritus        [  ] Weakness   [  ] Numbness        ASSESSMENT/ PLAN   [   ] Discontinue         [  ] Continue  [ x ]Documentation and Verification of current medications     Comments:       Patient is doing well.  OOBAA. Tolerating clears.  Pain is tolerable.  No residual anesthetic issues or complications noted.

## 2017-10-22 NOTE — PROGRESS NOTE ADULT - SUBJECTIVE AND OBJECTIVE BOX
Patient seen and examined at bedside, no acute overnight events. No acute complaints, pain well controlled. Denies any HA, blurry vision, dizziness, CP/SOB, N/V. Patient is ambulating and tolerating regular diet. Has not yet passed flatus. Burger is still in place. Pt is breast and bottle feeding her baby.    Vital Signs Last 24 Hours  T(C): 36.6 (10-22-17 @ 06:54), Max: 37.5 (10-21-17 @ 11:03)  HR: 100 (10-22-17 @ 06:54) (52 - 100)  BP: 102/63 (10-22-17 @ 06:54) (93/60 - 117/74)  RR: 18 (10-22-17 @ 06:54) (18 - 19)  SpO2: 100% (10-22-17 @ 06:54) (97% - 100%)    I&O's Summary    21 Oct 2017 07:01  -  22 Oct 2017 07:00  --------------------------------------------------------  IN: 0 mL / OUT: 2160 mL / NET: -2160 mL        Physical Exam:  General: NAD  CV: RRR  Pulm: CTAB  Abdomen: Soft, non-tender, non-distended  Incision: Pfannenstiel incision CDI, subcuticular suture closure with steri strips   Pelvic: Lochia wnl; fundus firm and non-tender   Extremities: no calf tenderness noted on exam    Labs:    Blood Type: A Positive  Antibody Screen: --  RPR: Negative               9.7    12.66 )-----------( 146      ( 10-21 @ 18:16 )             27.6                12.9   13.05 )-----------( 195      ( 10-21 @ 01:50 )             37.4         MEDICATIONS  (STANDING):  acetaminophen   Tablet. 975 milliGRAM(s) Oral every 6 hours  acetaminophen   Tablet. 975 milliGRAM(s) Oral every 6 hours  ascorbic acid 500 milliGRAM(s) Oral daily  diphtheria/tetanus/pertussis (acellular) Vaccine (ADAcel) 0.5 milliLiter(s) IntraMuscular once  docusate sodium 100 milliGRAM(s) Oral two times a day  ferrous    sulfate 325 milliGRAM(s) Oral three times a day with meals  heparin  Injectable 5000 Unit(s) SubCutaneous every 12 hours  ibuprofen  Tablet 600 milliGRAM(s) Oral every 6 hours  ibuprofen  Tablet 600 milliGRAM(s) Oral every 6 hours  ketorolac   Injectable 30 milliGRAM(s) IV Push every 6 hours  ketorolac   Injectable 30 milliGRAM(s) IV Push every 6 hours  lactated ringers. 1000 milliLiter(s) (125 mL/Hr) IV Continuous <Continuous>  morphine PF Spinal 0.2 milliGRAM(s) IntraThecal. once  oxyCODONE    IR 5 milliGRAM(s) Oral every 3 hours  oxyCODONE    IR 5 milliGRAM(s) Oral every 3 hours  prenatal multivitamin 1 Tablet(s) Oral daily    MEDICATIONS  (PRN):  dexamethasone  Injectable 4 milliGRAM(s) IV Push every 6 hours PRN Nausea, IF ondansetron is ineffective after 30 - 60 minutes  diphenhydrAMINE   Capsule 25 milliGRAM(s) Oral every 6 hours PRN Itching  diphenhydrAMINE   Injectable 12.5 milliGRAM(s) IV Push every 4 hours PRN Pruritus  docusate sodium 100 milliGRAM(s) Oral two times a day PRN Stool Softening  glycerin Suppository - Adult 1 Suppository(s) Rectal at bedtime PRN Constipation  HYDROmorphone  Injectable 0.5 milliGRAM(s) IV Push every 10 minutes PRN Moderate Pain (4 - 6)  HYDROmorphone  Injectable 1 milliGRAM(s) IV Push every 3 hours PRN Severe Pain  lanolin Ointment 1 Application(s) Topical every 3 hours PRN Sore Nipples  naloxone Injectable 0.1 milliGRAM(s) IV Push every 3 minutes PRN For ANY of the following changes in patient status:  A. RR LESS THAN 10 breaths per minute, B. Oxygen saturation LESS THAN 90%, C. Sedation score of 6  ondansetron Injectable 4 milliGRAM(s) IV Push every 6 hours PRN Nausea  oxyCODONE    IR 5 milliGRAM(s) Oral every 3 hours PRN Mild Pain  oxyCODONE    IR 10 milliGRAM(s) Oral every 3 hours PRN Moderate Pain  oxyCODONE    IR 5 milliGRAM(s) Oral every 4 hours PRN Severe Pain (7 - 10)  oxyCODONE    IR 5 milliGRAM(s) Oral every 4 hours PRN Severe Pain (7 - 10)  simethicone 80 milliGRAM(s) Chew every 4 hours PRN Gas

## 2017-10-23 ENCOUNTER — TRANSCRIPTION ENCOUNTER (OUTPATIENT)
Age: 27
End: 2017-10-23

## 2017-10-23 RX ORDER — IBUPROFEN 200 MG
1 TABLET ORAL
Qty: 0 | Refills: 0 | COMMUNITY
Start: 2017-10-23

## 2017-10-23 RX ORDER — TETANUS TOXOID, REDUCED DIPHTHERIA TOXOID AND ACELLULAR PERTUSSIS VACCINE, ADSORBED 5; 2.5; 8; 8; 2.5 [IU]/.5ML; [IU]/.5ML; UG/.5ML; UG/.5ML; UG/.5ML
0.5 SUSPENSION INTRAMUSCULAR ONCE
Qty: 0 | Refills: 0 | Status: COMPLETED | OUTPATIENT
Start: 2017-10-23 | End: 2017-10-23

## 2017-10-23 RX ORDER — ACETAMINOPHEN 500 MG
3 TABLET ORAL
Qty: 0 | Refills: 0 | COMMUNITY
Start: 2017-10-23

## 2017-10-23 RX ADMIN — ZINC OXIDE 1 APPLICATION(S): 200 OINTMENT TOPICAL at 06:38

## 2017-10-23 RX ADMIN — Medication 975 MILLIGRAM(S): at 09:00

## 2017-10-23 RX ADMIN — TETANUS TOXOID, REDUCED DIPHTHERIA TOXOID AND ACELLULAR PERTUSSIS VACCINE, ADSORBED 0.5 MILLILITER(S): 5; 2.5; 8; 8; 2.5 SUSPENSION INTRAMUSCULAR at 20:30

## 2017-10-23 RX ADMIN — Medication 600 MILLIGRAM(S): at 08:59

## 2017-10-23 RX ADMIN — Medication 975 MILLIGRAM(S): at 20:30

## 2017-10-23 RX ADMIN — Medication 975 MILLIGRAM(S): at 02:35

## 2017-10-23 RX ADMIN — Medication 1 TABLET(S): at 08:59

## 2017-10-23 RX ADMIN — ZINC OXIDE 1 APPLICATION(S): 200 OINTMENT TOPICAL at 22:10

## 2017-10-23 RX ADMIN — Medication 600 MILLIGRAM(S): at 09:00

## 2017-10-23 RX ADMIN — ZINC OXIDE 1 APPLICATION(S): 200 OINTMENT TOPICAL at 15:20

## 2017-10-23 RX ADMIN — Medication 100 MILLIGRAM(S): at 06:00

## 2017-10-23 RX ADMIN — Medication 500 MILLIGRAM(S): at 08:59

## 2017-10-23 RX ADMIN — Medication 600 MILLIGRAM(S): at 03:10

## 2017-10-23 RX ADMIN — SIMETHICONE 80 MILLIGRAM(S): 80 TABLET, CHEWABLE ORAL at 06:00

## 2017-10-23 RX ADMIN — Medication 600 MILLIGRAM(S): at 02:35

## 2017-10-23 RX ADMIN — HEPARIN SODIUM 5000 UNIT(S): 5000 INJECTION INTRAVENOUS; SUBCUTANEOUS at 20:30

## 2017-10-23 RX ADMIN — Medication 975 MILLIGRAM(S): at 21:00

## 2017-10-23 RX ADMIN — HEPARIN SODIUM 5000 UNIT(S): 5000 INJECTION INTRAVENOUS; SUBCUTANEOUS at 09:00

## 2017-10-23 RX ADMIN — Medication 600 MILLIGRAM(S): at 20:30

## 2017-10-23 RX ADMIN — Medication 975 MILLIGRAM(S): at 08:59

## 2017-10-23 RX ADMIN — Medication 600 MILLIGRAM(S): at 21:00

## 2017-10-23 RX ADMIN — Medication 975 MILLIGRAM(S): at 03:10

## 2017-10-23 NOTE — DISCHARGE NOTE OB - PLAN OF CARE
Recovery Follow-up in 6 weeks at [facility] clinic. Please call for appointment: [phone]   Regular diet.  Resume normal activity as tolerated. Follow up at clinic in 6 weeks.  No heavy lifting, driving, or strenuous activity for 6 weeks.  Nothing per vagina such as tampons, intercourse, douches or tub baths for 6 weeks or until you see your doctor.  Call your doctor with any signs and symptoms of infection such as fever, chills, nausea or vomiting.  Call your doctor if you're unable to tolerate food, increase in vaginal bleeding or have difficulty urinating.  Call your doctor if you have pain that is not relieved by your prescribed medications.  Notify your doctor with any other concerns.

## 2017-10-23 NOTE — DISCHARGE NOTE OB - CARE PLAN
Principal Discharge DX:	 delivery delivered  Goal:	Recovery  Instructions for follow-up, activity and diet:	Follow-up in 6 weeks at NS clinic. Please call for appointment: [phone]   Regular diet.  Resume normal activity as tolerated. Follow up at clinic in 6 weeks.  No heavy lifting, driving, or strenuous activity for 6 weeks.  Nothing per vagina such as tampons, intercourse, douches or tub baths for 6 weeks or until you see your doctor.  Call your doctor with any signs and symptoms of infection such as fever, chills, nausea or vomiting.  Call your doctor if you're unable to tolerate food, increase in vaginal bleeding or have difficulty urinating.  Call your doctor if you have pain that is not relieved by your prescribed medications.  Notify your doctor with any other concerns.

## 2017-10-23 NOTE — DISCHARGE NOTE OB - MATERIALS PROVIDED
Tdap Vaccination (VIS Pub Date: 2012)/[facility] Hearing Screen Program/Shaken Baby Prevention Handout/Birth Certificate Instructions/MMR Vaccination (VIS Pub Date: 2012)/Guide to Postpartum Care/Vaccinations/[facility] [place] Screening Program/  Immunization Record/Breastfeeding Log

## 2017-10-23 NOTE — DISCHARGE NOTE OB - HOSPITAL COURSE
Patient had an uncomplicated  delivery on 10/21/17. Patient had uncomplicated psotpartum course and was discharged in stable condition with normal vital signs on 10/24/17. Patient declined contraception and will follow up in 4-6 weeks.

## 2017-10-23 NOTE — PROGRESS NOTE ADULT - PROBLEM SELECTOR PLAN 1
- Continue with po analgesia  - Increase ambulation  - Continue regular diet  - IV lock  - No labs  - Contraception: Patient thinks she would like nexplanon, patient counseled and offered prior to discharge. Patient declined and would like to follow up with her OBGYN.     FARHEEN Philip, PGY-1

## 2017-10-23 NOTE — PROGRESS NOTE ADULT - SUBJECTIVE AND OBJECTIVE BOX
Patient seen and examined at bedside, no acute overnight events. No acute complaints, pain well controlled. Denies any HA, blurry vision, lightheadedness, CP/SOB, N/V. Patient is ambulating, voiding spontaneously, passing flatus, and tolerating regular diet. Pt is bottle feeding her baby.    Vital Signs Last 24 Hours  T(C): 37 (10-23-17 @ 05:59), Max: 37 (10-23-17 @ 05:59)  HR: 89 (10-23-17 @ 05:59) (89 - 93)  BP: 101/68 (10-23-17 @ 05:59) (90/54 - 101/68)  RR: 17 (10-23-17 @ 05:59) (17 - 18)  SpO2: 98% (10-23-17 @ 05:59) (98% - 100%)    Physical Exam:  General: NAD  CV: RRR  Pulm: CTAB  Abdomen: Soft, non-tender, non-distended  Incision: Pfannenstiel incision CDI, subcuticular suture closure with steri strips  Pelvic: Lochia wnl; fundus firm and non-tender   Extremities: no calf tenderness noted on exam    Labs:    Blood Type: A Positive  Antibody Screen: --  RPR: Negative               9.7    12.66 )-----------( 146      ( 10-21 @ 18:16 )             27.6                12.9   13.05 )-----------( 195      ( 10-21 @ 01:50 )             37.4         MEDICATIONS  (STANDING):  acetaminophen   Tablet. 975 milliGRAM(s) Oral every 6 hours  ascorbic acid 500 milliGRAM(s) Oral daily  diphtheria/tetanus/pertussis (acellular) Vaccine (ADAcel) 0.5 milliLiter(s) IntraMuscular once  docusate sodium 100 milliGRAM(s) Oral two times a day  ferrous    sulfate 325 milliGRAM(s) Oral three times a day with meals  heparin  Injectable 5000 Unit(s) SubCutaneous every 12 hours  ibuprofen  Tablet 600 milliGRAM(s) Oral every 6 hours  ibuprofen  Tablet 600 milliGRAM(s) Oral every 6 hours  lactated ringers. 1000 milliLiter(s) (125 mL/Hr) IV Continuous <Continuous>  morphine PF Spinal 0.2 milliGRAM(s) IntraThecal. once  oxyCODONE    IR 5 milliGRAM(s) Oral every 3 hours  oxyCODONE    IR 5 milliGRAM(s) Oral every 3 hours  prenatal multivitamin 1 Tablet(s) Oral daily  zinc oxide 20% Ointment 1 Application(s) Topical three times a day    MEDICATIONS  (PRN):  dexamethasone  Injectable 4 milliGRAM(s) IV Push every 6 hours PRN Nausea, IF ondansetron is ineffective after 30 - 60 minutes  diphenhydrAMINE   Capsule 25 milliGRAM(s) Oral every 6 hours PRN Itching  diphenhydrAMINE   Injectable 12.5 milliGRAM(s) IV Push every 4 hours PRN Pruritus  docusate sodium 100 milliGRAM(s) Oral two times a day PRN Stool Softening  glycerin Suppository - Adult 1 Suppository(s) Rectal at bedtime PRN Constipation  HYDROmorphone  Injectable 0.5 milliGRAM(s) IV Push every 10 minutes PRN Moderate Pain (4 - 6)  HYDROmorphone  Injectable 1 milliGRAM(s) IV Push every 3 hours PRN Severe Pain  lanolin Ointment 1 Application(s) Topical every 3 hours PRN Sore Nipples  naloxone Injectable 0.1 milliGRAM(s) IV Push every 3 minutes PRN For ANY of the following changes in patient status:  A. RR LESS THAN 10 breaths per minute, B. Oxygen saturation LESS THAN 90%, C. Sedation score of 6  ondansetron Injectable 4 milliGRAM(s) IV Push every 6 hours PRN Nausea  oxyCODONE    IR 5 milliGRAM(s) Oral every 3 hours PRN Mild Pain  oxyCODONE    IR 10 milliGRAM(s) Oral every 3 hours PRN Moderate Pain  oxyCODONE    IR 5 milliGRAM(s) Oral every 4 hours PRN Severe Pain (7 - 10)  oxyCODONE    IR 5 milliGRAM(s) Oral every 4 hours PRN Severe Pain (7 - 10)  simethicone 80 milliGRAM(s) Chew every 4 hours PRN Gas

## 2017-10-23 NOTE — DISCHARGE NOTE OB - MEDICATION SUMMARY - MEDICATIONS TO TAKE
I will START or STAY ON the medications listed below when I get home from the hospital:    acetaminophen 325 mg oral tablet  -- 3 tab(s) by mouth every 6 hours, As Needed  -- Indication: For Pain    ibuprofen 600 mg oral tablet  -- 1 tab(s) by mouth every 6 hours, As Needed  -- Indication: For Pain    Prenatal Multivitamins with Folic Acid 1 mg oral tablet  -- 1 tab(s) by mouth once a day  -- Indication: For Vitamin

## 2017-10-23 NOTE — DISCHARGE NOTE OB - PATIENT PORTAL LINK FT
“You can access the [facility] Patient Portal, offered by [facility], by registering with the following website: [email or web address]”

## 2017-10-24 VITALS
OXYGEN SATURATION: 100 % | RESPIRATION RATE: 18 BRPM | HEART RATE: 80 BPM | DIASTOLIC BLOOD PRESSURE: 67 MMHG | TEMPERATURE: 99 F | SYSTOLIC BLOOD PRESSURE: 105 MMHG

## 2017-10-24 RX ADMIN — ZINC OXIDE 1 APPLICATION(S): 200 OINTMENT TOPICAL at 06:27

## 2017-10-24 RX ADMIN — Medication 975 MILLIGRAM(S): at 02:29

## 2017-10-24 RX ADMIN — Medication 975 MILLIGRAM(S): at 03:00

## 2017-10-24 RX ADMIN — Medication 600 MILLIGRAM(S): at 02:30

## 2017-10-24 RX ADMIN — Medication 600 MILLIGRAM(S): at 03:00

## 2017-10-24 NOTE — PROGRESS NOTE ADULT - PROBLEM SELECTOR PLAN 1
- Continue with po analgesia  - Increase ambulation  - Continue regular diet  - IV lock  - No labs  - Discharge planning. Patient counseled and declined contraception at this time. All questions have been answered and patient feels safe to go home and has adequate support.    FARHEEN Philip, PGY-1

## 2017-10-24 NOTE — PROGRESS NOTE ADULT - SUBJECTIVE AND OBJECTIVE BOX
Patient seen and examined at bedside, no acute overnight events. No acute complaints, pain well controlled. Denies any HA, blurry vision, lightheadedness, CP/SOB, N/V. Patient is ambulating, voiding spontaneously, passing flatus, and tolerating regular diet. Pt is bottle feeding her baby.    Vital Signs Last 24 Hours  T(C): 37.1 (10-24-17 @ 06:42), Max: 37.1 (10-23-17 @ 14:00)  HR: 80 (10-24-17 @ 06:42) (80 - 99)  BP: 105/67 (10-24-17 @ 06:42) (102/68 - 105/67)  RR: 18 (10-24-17 @ 06:42) (18 - 19)  SpO2: 100% (10-24-17 @ 06:42) (99% - 100%)    Physical Exam:  General: NAD  CV: RRR  Pulm: CTAB  Abdomen: Soft, non-tender, non-distended  Incision: Pfannenstiel incision CDI, subcuticular suture closure with steri strips   Pelvic: Lochia wnl; fundus firm and non-tender   Extremities: no calf tenderness noted on exam    Labs:    Blood Type: A Positive  Antibody Screen: --  RPR: Negative               9.7    12.66 )-----------( 146      ( 10-21 @ 18:16 )             27.6                12.9   13.05 )-----------( 195      ( 10-21 @ 01:50 )             37.4         MEDICATIONS  (STANDING):  acetaminophen   Tablet. 975 milliGRAM(s) Oral every 6 hours  ascorbic acid 500 milliGRAM(s) Oral daily  docusate sodium 100 milliGRAM(s) Oral two times a day  ferrous    sulfate 325 milliGRAM(s) Oral three times a day with meals  heparin  Injectable 5000 Unit(s) SubCutaneous every 12 hours  ibuprofen  Tablet 600 milliGRAM(s) Oral every 6 hours  lactated ringers. 1000 milliLiter(s) (125 mL/Hr) IV Continuous <Continuous>  oxyCODONE    IR 5 milliGRAM(s) Oral every 3 hours  prenatal multivitamin 1 Tablet(s) Oral daily  zinc oxide 20% Ointment 1 Application(s) Topical three times a day    MEDICATIONS  (PRN):  diphenhydrAMINE   Capsule 25 milliGRAM(s) Oral every 6 hours PRN Itching  docusate sodium 100 milliGRAM(s) Oral two times a day PRN Stool Softening  glycerin Suppository - Adult 1 Suppository(s) Rectal at bedtime PRN Constipation  lanolin Ointment 1 Application(s) Topical every 3 hours PRN Sore Nipples  oxyCODONE    IR 5 milliGRAM(s) Oral every 4 hours PRN Severe Pain (7 - 10)  simethicone 80 milliGRAM(s) Chew every 4 hours PRN Gas

## 2017-11-01 LAB — SURGICAL PATHOLOGY STUDY: SIGNIFICANT CHANGE UP

## 2017-11-09 PROCEDURE — 59514 CESAREAN DELIVERY ONLY: CPT | Mod: U9,UB,GC

## 2018-01-23 ENCOUNTER — INPATIENT (INPATIENT)
Facility: HOSPITAL | Age: 28
LOS: 1 days | Discharge: HOME | End: 2018-01-25
Attending: INTERNAL MEDICINE | Admitting: INTERNAL MEDICINE
Payer: MEDICAID

## 2018-01-23 VITALS
WEIGHT: 134.92 LBS | HEIGHT: 62 IN | HEART RATE: 90 BPM | RESPIRATION RATE: 20 BRPM | TEMPERATURE: 99 F | OXYGEN SATURATION: 98 % | DIASTOLIC BLOOD PRESSURE: 72 MMHG | SYSTOLIC BLOOD PRESSURE: 130 MMHG

## 2018-01-23 DIAGNOSIS — H49.9 UNSPECIFIED PARALYTIC STRABISMUS: ICD-10-CM

## 2018-01-23 LAB
ALBUMIN SERPL ELPH-MCNC: 4.2 G/DL — SIGNIFICANT CHANGE UP (ref 3.3–5)
ALP SERPL-CCNC: 124 U/L — HIGH (ref 40–120)
ALT FLD-CCNC: 23 U/L — SIGNIFICANT CHANGE UP (ref 12–78)
ANION GAP SERPL CALC-SCNC: 9 MMOL/L — SIGNIFICANT CHANGE UP (ref 5–17)
APTT BLD: 31.2 SEC — SIGNIFICANT CHANGE UP (ref 27.5–37.4)
AST SERPL-CCNC: 30 U/L — SIGNIFICANT CHANGE UP (ref 15–37)
BASOPHILS # BLD AUTO: 0.03 K/UL — SIGNIFICANT CHANGE UP (ref 0–0.2)
BASOPHILS NFR BLD AUTO: 0.7 % — SIGNIFICANT CHANGE UP (ref 0–2)
BILIRUB SERPL-MCNC: 1.4 MG/DL — HIGH (ref 0.2–1.2)
BUN SERPL-MCNC: 10 MG/DL — SIGNIFICANT CHANGE UP (ref 7–23)
CALCIUM SERPL-MCNC: 8.7 MG/DL — SIGNIFICANT CHANGE UP (ref 8.5–10.1)
CHLORIDE SERPL-SCNC: 105 MMOL/L — SIGNIFICANT CHANGE UP (ref 96–108)
CO2 SERPL-SCNC: 25 MMOL/L — SIGNIFICANT CHANGE UP (ref 22–31)
CREAT SERPL-MCNC: 0.74 MG/DL — SIGNIFICANT CHANGE UP (ref 0.5–1.3)
EOSINOPHIL # BLD AUTO: 0.11 K/UL — SIGNIFICANT CHANGE UP (ref 0–0.5)
EOSINOPHIL NFR BLD AUTO: 2.7 % — SIGNIFICANT CHANGE UP (ref 0–6)
GLUCOSE SERPL-MCNC: 77 MG/DL — SIGNIFICANT CHANGE UP (ref 70–99)
HCG SERPL-ACNC: <1 MIU/ML — SIGNIFICANT CHANGE UP
HCT VFR BLD CALC: 39.6 % — SIGNIFICANT CHANGE UP (ref 34.5–45)
HGB BLD-MCNC: 13.4 G/DL — SIGNIFICANT CHANGE UP (ref 11.5–15.5)
IMM GRANULOCYTES NFR BLD AUTO: 0.2 % — SIGNIFICANT CHANGE UP (ref 0–1.5)
INR BLD: 1.05 RATIO — SIGNIFICANT CHANGE UP (ref 0.88–1.16)
LYMPHOCYTES # BLD AUTO: 1.99 K/UL — SIGNIFICANT CHANGE UP (ref 1–3.3)
LYMPHOCYTES # BLD AUTO: 48.7 % — HIGH (ref 13–44)
MCHC RBC-ENTMCNC: 26.9 PG — LOW (ref 27–34)
MCHC RBC-ENTMCNC: 33.8 GM/DL — SIGNIFICANT CHANGE UP (ref 32–36)
MCV RBC AUTO: 79.4 FL — LOW (ref 80–100)
MONOCYTES # BLD AUTO: 0.39 K/UL — SIGNIFICANT CHANGE UP (ref 0–0.9)
MONOCYTES NFR BLD AUTO: 9.5 % — SIGNIFICANT CHANGE UP (ref 2–14)
NEUTROPHILS # BLD AUTO: 1.56 K/UL — LOW (ref 1.8–7.4)
NEUTROPHILS NFR BLD AUTO: 38.2 % — LOW (ref 43–77)
NRBC # BLD: 0 /100 WBCS — SIGNIFICANT CHANGE UP (ref 0–0)
PLATELET # BLD AUTO: 308 K/UL — SIGNIFICANT CHANGE UP (ref 150–400)
POTASSIUM SERPL-MCNC: 4.5 MMOL/L — SIGNIFICANT CHANGE UP (ref 3.5–5.3)
POTASSIUM SERPL-SCNC: 4.5 MMOL/L — SIGNIFICANT CHANGE UP (ref 3.5–5.3)
PROT SERPL-MCNC: 8.9 GM/DL — HIGH (ref 6–8.3)
PROTHROM AB SERPL-ACNC: 11.5 SEC — SIGNIFICANT CHANGE UP (ref 9.8–12.7)
RBC # BLD: 4.99 M/UL — SIGNIFICANT CHANGE UP (ref 3.8–5.2)
RBC # FLD: 14.7 % — HIGH (ref 10.3–14.5)
SODIUM SERPL-SCNC: 139 MMOL/L — SIGNIFICANT CHANGE UP (ref 135–145)
WBC # BLD: 4.09 K/UL — SIGNIFICANT CHANGE UP (ref 3.8–10.5)
WBC # FLD AUTO: 4.09 K/UL — SIGNIFICANT CHANGE UP (ref 3.8–10.5)

## 2018-01-23 PROCEDURE — 99223 1ST HOSP IP/OBS HIGH 75: CPT

## 2018-01-23 PROCEDURE — 70544 MR ANGIOGRAPHY HEAD W/O DYE: CPT | Mod: 26,59

## 2018-01-23 PROCEDURE — 70551 MRI BRAIN STEM W/O DYE: CPT | Mod: 26

## 2018-01-23 PROCEDURE — 99285 EMERGENCY DEPT VISIT HI MDM: CPT

## 2018-01-23 RX ADMIN — Medication 80 MILLIGRAM(S): at 19:37

## 2018-01-23 NOTE — H&P ADULT - NSHPLABSRESULTS_GEN_ALL_CORE
13.4   4.09  )-----------( 308      ( 23 Jan 2018 17:28 )             39.01-23    139  |  105  |  10  ----------------------------<  77  4.5   |  25  |  0.74    Ca    8.7      23 Jan 2018 17:28    TPro  8.9<H>  /  Alb  4.2  /  TBili  1.4<H>  /  DBili  x   /  AST  30  /  ALT  23  /  AlkPhos  124<H>  01-23  6 13.4   4.09  )-----------( 308      ( 23 Jan 2018 17:28 )             39.01-23    139  |  105  |  10  ----------------------------<  77  4.5   |  25  |  0.74    Ca    8.7      23 Jan 2018 17:28    TPro  8.9<H>  /  Alb  4.2  /  TBili  1.4<H>  /  DBili  x   /  AST  30  /  ALT  23  /  AlkPhos  124<H>  01-23  6    < from: MR Head No Cont (01.23.18 @ 15:03) >    IMPRESSION:      1)  unremarkable MRI study of the brain and orbits..  2)  sinuses are clear. No central skull base and cavernous sinus   abnormality suggested..

## 2018-01-23 NOTE — ED PROVIDER NOTE - OBJECTIVE STATEMENT
26yo female with no pertinent pmh, no OCP, psh: CS x 1, presents with 1 week of left eye ptosis, weakness and blurry vision. Pt reports redness improved, but complete ptosis since yesterday. No headache, eye pain. no h/o MS in family.     ROS: No fever/chills. No photophobia/eye pain/+changes in vision, No ear pain/sore throat/dysphagia, No chest pain/palpitations. No SOB/cough/stridor. No abdominal pain, N/V/D, no black/bloody bm. No dysuria/frequency/discharge, No headache. No Dizziness.  No rash.  No numbness/tingling/weakness.

## 2018-01-23 NOTE — ED PROVIDER NOTE - PHYSICAL EXAMINATION
Gen: Alert, Well appearing. NAD    Head: NC, AT,   ENT: Bilateral TM WNL, normal hearing, patent oropharynx without erythema/exudate, uvula midline  Neck: supple, no tenderness/meningismus/JVD   Pulm: Bilateral clear BS, normal resp effort, no wheeze/stridor/retractions  CV: RRR, no M/R/G, +dist pulses   Abd: soft, NT/ND, +BS, no guarding/rebound tenderness  Mskel: no edema/erythema/cyanosis   Skin: no rash   Neuro: AAOx3, no sensory/motor deficits,    left eye: held slightly lateral and downward, + palsy in moving to rt

## 2018-01-23 NOTE — ED PROVIDER NOTE - MEDICAL DECISION MAKING DETAILS
d/w dr. hill, aware, states for admission for IV steroids for possible cavenour sinus inflammation. dr cordova aware for admission.

## 2018-01-23 NOTE — H&P ADULT - HISTORY OF PRESENT ILLNESS
28yo female with no pertinent pmh, no OCP, psh: CS x 1, presents with 1 week of left eye ptosis, weakness and blurry vision. Pt reports redness improved, but complete ptosis since yesterday. No headache, eye pain. no h/o MS in family.

## 2018-01-23 NOTE — ED ADULT TRIAGE NOTE - CHIEF COMPLAINT QUOTE
pt states, " My eye was red last Monday , now my eyelid is droopy and closing down too, I see things blurry on and off"

## 2018-01-23 NOTE — H&P ADULT - ASSESSMENT
27f with no past medical history with ptosis       IMPROVE VTE Individual Risk Assessment        RISK                                                          Points  [  ] Previous VTE                                                3  [  ] Thrombophilia                                             2  [  ] Lower limb paralysis                                   2        (unable to hold up >15 seconds)    [  ] Current Cancer                                            2         (within 6 months)  [  ] Immobilization > 24 hrs                              1  [  ] ICU/CCU stay > 24 hours                            1  [  ] Age > 60                                                    1  IMPROVE VTE Score ____0_____

## 2018-01-24 LAB
HCT VFR BLD CALC: 38.8 % — SIGNIFICANT CHANGE UP (ref 34.5–45)
HGB BLD-MCNC: 13 G/DL — SIGNIFICANT CHANGE UP (ref 11.5–15.5)
MCHC RBC-ENTMCNC: 26.3 PG — LOW (ref 27–34)
MCHC RBC-ENTMCNC: 33.5 GM/DL — SIGNIFICANT CHANGE UP (ref 32–36)
MCV RBC AUTO: 78.5 FL — LOW (ref 80–100)
NRBC # BLD: 0 /100 WBCS — SIGNIFICANT CHANGE UP (ref 0–0)
PLATELET # BLD AUTO: 324 K/UL — SIGNIFICANT CHANGE UP (ref 150–400)
RBC # BLD: 4.94 M/UL — SIGNIFICANT CHANGE UP (ref 3.8–5.2)
RBC # FLD: 14.6 % — HIGH (ref 10.3–14.5)
WBC # BLD: 3.98 K/UL — SIGNIFICANT CHANGE UP (ref 3.8–10.5)
WBC # FLD AUTO: 3.98 K/UL — SIGNIFICANT CHANGE UP (ref 3.8–10.5)

## 2018-01-24 PROCEDURE — 99233 SBSQ HOSP IP/OBS HIGH 50: CPT

## 2018-01-24 RX ORDER — IBUPROFEN 200 MG
200 TABLET ORAL EVERY 8 HOURS
Qty: 0 | Refills: 0 | Status: DISCONTINUED | OUTPATIENT
Start: 2018-01-24 | End: 2018-01-25

## 2018-01-24 RX ADMIN — Medication 200 MILLIGRAM(S): at 14:57

## 2018-01-24 RX ADMIN — Medication 80 MILLIGRAM(S): at 19:43

## 2018-01-24 RX ADMIN — Medication 200 MILLIGRAM(S): at 15:50

## 2018-01-24 NOTE — CONSULT NOTE ADULT - SUBJECTIVE AND OBJECTIVE BOX
Subjective Complaints:  Historian:       Consult requested by ER doctor:                  Attending:     HPI:  26yo female with no pertinent pmh, no OCP, psh: CS x 1, presents with 1 week of left eye ptosis, weakness and blurry vision. Pt reports redness improved, but complete ptosis since yesterday. No headache, eye pain. no h/o MS in family. (23 Jan 2018 18:21)    ANTONIO POON    PAST MEDICAL & SURGICAL HISTORY:  Asthma  No significant past surgical history  27yFemale    MEDICATIONS  (STANDING):  predniSONE   Tablet 80 milliGRAM(s) Oral daily    MEDICATIONS  (PRN):  ibuprofen  Tablet 200 milliGRAM(s) Oral every 8 hours PRN cramps      Allergies    No Known Drug Allergies  shellfish (Anaphylaxis)    Intolerances      FAMILY HISTORY:  No pertinent family history in first degree relatives      REVIEW OF SYSTEMS:  General:  No wt loss, fevers, chills, night sweats  Eyes:  Good vision, no reported pain  ENT:  No sore throat, pain, runny nose, dysphagia  CV:  No pain, palpitatioins, hypo/hypertension  Resp:  No dyspnea, cough, tachypnea, wheezing  GI:  No pain, nausea, vomiting, diarrhea, constipatiion  :  No pain, bleeding, incontinence, nocturia  Muscle:  No pain, weakness  Breast:  No pain, abscess, mass, discharge  Neuro:  No weakness, tingling, memory problems  Psych:  No fatigue, insomnia, mood problems, depression  Endocrine:  No polyuria, polydypsia, cold/heat intolerance  Heme:  No petechiae, ecchymosis, easy bruisability  Skin:  No rash, tattoos, scars, edema      Vital Signs Last 24 Hrs  T(C): 37.2 (24 Jan 2018 17:41), Max: 37.4 (23 Jan 2018 23:27)  T(F): 98.9 (24 Jan 2018 17:41), Max: 99.4 (23 Jan 2018 23:27)  HR: 77 (24 Jan 2018 17:41) (59 - 79)  BP: 108/59 (24 Jan 2018 17:41) (99/58 - 120/54)  BP(mean): --  RR: 16 (24 Jan 2018 17:41) (16 - 17)  SpO2: 100% (24 Jan 2018 17:41) (97% - 100%)    GENERAL PHYSICAL EXAM:  General:  Appears stated age, well-groomed, well-nourished, no distress  HEENT:  NC/AT, patent nares w/ pink mucosa, OP clear w/o lesions, PERRL, EOMI, conjunctivae clear, no thyromegaly, nodules, adenopathy, no JVD  Chest:  Full & symmetric excursion, no increased effort, breath sounds clear  Cardiovascular:  Regular rhythm, S1, S2, no murmur/rub/S3/S4, no carotid/femoral/abdominal bruit, radial/pedal pulses 2+, no edema  Abdomen:  Soft, non-tender, non-distended, normoactive bowel sounds, no HSM  Extremities:  Gait & station:   Digits:   Nails:   Joints, Bones, Muscles:   ROM:   Stability:  Skin:  No rash/erythema/ecchymoses/petechiae/wounds/abscess/warm/dry  Musculoskeletal:  Full ROM in all joints w/o swelling/tenderness/effusion    NEUROLOGICAL EXAM:  HENT:  Normocephalic head; atraumatic head.  Neck supple.  ENT: normal looking.  Mental State:    Alert.  Fully oriented to person, place and date.  Coherent.  Speech clear and intact.  Cooperative.  Responds appropriately.    Cranial Nerves:  II-XII:   Pupils round and reactive to light and accommodation.  Extraocular movements full on the right left eye ptosis incomplete movements on the left for internal and external movements   Visual fields full (no homonymous hemianopsia).  Visual acuity wnl.  Facial symmetry intact.  Tongue midline.  Motor Functions:  Moves all extremities.  No pronator drift of UE.  Claps hand well.  Hand  intact bilaterally.  Ambulatory.    Sensory Functions:   Intact to touch and pinprick to face and extremities.    Reflexes:  Deep tendon reflexes normoactive to biceps, knees and ankles.  Babinski absent (present).  Cerebellar Testing:    Finger to nose intact.  Nystagmus absent.  Neurovascular: Carotid auscultation full without bruits.      LABS:                        13.0   3.98  )-----------( 324      ( 24 Jan 2018 07:21 )             38.8     01-23    139  |  105  |  10  ----------------------------<  77  4.5   |  25  |  0.74    Ca    8.7      23 Jan 2018 17:28    TPro  8.9<H>  /  Alb  4.2  /  TBili  1.4<H>  /  DBili  x   /  AST  30  /  ALT  23  /  AlkPhos  124<H>  01-23    PT/INR - ( 23 Jan 2018 17:28 )   PT: 11.5 sec;   INR: 1.05 ratio         PTT - ( 23 Jan 2018 17:28 )  PTT:31.2 sec        RADIOLOGY & ADDITIONAL STUDIES:    ibuprofen  Tablet: [Ordered as MOTRIN]  200 milliGRAM(s), Oral, every 8 hours, PRN for cramps, Stop After 2 Days  Provider's Contact #: [phone] (01-24 @ 14:10)      Assessment & Opinion: 27 y o woman with left optalmoparesis may have cavernous sinus inflamation cavernous sinus thrombosis was r/o by [facility] AND MRI ,SINCE PTOSIS PREDOMINATE CEREBRAL ANEURISM TO BE r/o     DX OPHTALMOPARESIS     PLAN CONTINUE STEROID CEREBRAL MRA       Medications:

## 2018-01-24 NOTE — PROGRESS NOTE ADULT - SUBJECTIVE AND OBJECTIVE BOX
Patient is a 27y old  Female who presents with a chief complaint of left eye ptosis (23 Jan 2018 18:21)      INTERVAL HPI/OVERNIGHT EVENTS: no events feels the same    MEDICATIONS  (STANDING):  predniSONE   Tablet 80 milliGRAM(s) Oral daily    MEDICATIONS  (PRN):  ibuprofen  Tablet 200 milliGRAM(s) Oral every 8 hours PRN cramps      Allergies    No Known Drug Allergies  shellfish (Anaphylaxis)    Intolerances        Vital Signs Last 24 Hrs  T(C): 36.7 (24 Jan 2018 12:30), Max: 37.4 (23 Jan 2018 23:27)  T(F): 98 (24 Jan 2018 12:30), Max: 99.4 (23 Jan 2018 23:27)  HR: 79 (24 Jan 2018 12:30) (59 - 79)  BP: 99/58 (24 Jan 2018 12:30) (99/58 - 120/54)  BP(mean): --  RR: 17 (24 Jan 2018 12:30) (16 - 17)  SpO2: 98% (24 Jan 2018 12:30) (97% - 99%)    PHYSICAL EXAM:  GENERAL: NAD, well-groomed, well-developed  HEAD:  Atraumatic, Normocephalic  EYES: EOMI, PERRLA, conjunctiva and sclera clear, ptosis of L eye  ENMT: No tonsillar erythema, exudates, or enlargement; Moist mucous membranes, Good dentition, No lesions  NECK: Supple, No JVD, Normal thyroid  NERVOUS SYSTEM:  Alert & Oriented X3, Good concentration; Motor Strength 5/5 B/L upper and lower extremities; DTRs 2+ intact and symmetric  CHEST/LUNG: Clear to percussion bilaterally; No rales, rhonchi, wheezing, or rubs  HEART: Regular rate and rhythm; No murmurs, rubs, or gallops  ABDOMEN: Soft, Nontender, Nondistended; Bowel sounds present  EXTREMITIES:  2+ Peripheral Pulses, No clubbing, cyanosis, or edema  LYMPH: No lymphadenopathy noted  SKIN: No rashes or lesions    LABS:                        13.0   3.98  )-----------( 324      ( 24 Jan 2018 07:21 )             38.8     01-23    139  |  105  |  10  ----------------------------<  77  4.5   |  25  |  0.74    Ca    8.7      23 Jan 2018 17:28    TPro  8.9<H>  /  Alb  4.2  /  TBili  1.4<H>  /  DBili  x   /  AST  30  /  ALT  23  /  AlkPhos  124<H>  01-23    PT/INR - ( 23 Jan 2018 17:28 )   PT: 11.5 sec;   INR: 1.05 ratio         PTT - ( 23 Jan 2018 17:28 )  PTT:31.2 sec    CAPILLARY BLOOD GLUCOSE          RADIOLOGY & ADDITIONAL TESTS:    Imaging Personally Reviewed:  [ ] YES  [ ] NO    Consultant(s) Notes Reviewed:  [ ] YES  [ ] NO    Care Discussed with Consultants/Other Providers [ ] YES  [ ] NO

## 2018-01-25 ENCOUNTER — TRANSCRIPTION ENCOUNTER (OUTPATIENT)
Age: 28
End: 2018-01-25

## 2018-01-25 VITALS
RESPIRATION RATE: 16 BRPM | HEART RATE: 81 BPM | SYSTOLIC BLOOD PRESSURE: 113 MMHG | TEMPERATURE: 97 F | OXYGEN SATURATION: 98 % | DIASTOLIC BLOOD PRESSURE: 68 MMHG

## 2018-01-25 PROCEDURE — 99239 HOSP IP/OBS DSCHRG MGMT >30: CPT

## 2018-01-25 PROCEDURE — 70544 MR ANGIOGRAPHY HEAD W/O DYE: CPT | Mod: 26

## 2018-01-25 NOTE — DISCHARGE NOTE ADULT - MEDICATION SUMMARY - MEDICATIONS TO TAKE
I will START or STAY ON the medications listed below when I get home from the hospital:    Deltasone 20 mg oral tablet  -- 2 tab(s) by mouth once a day   -- It is very important that you take or use this exactly as directed.  Do not skip doses or discontinue unless directed by your doctor.  Obtain medical advice before taking any non-prescription drugs as some may affect the action of this medication.  Take with food or milk.    -- Indication: For Ophthalmoplegia

## 2018-01-25 NOTE — DISCHARGE NOTE ADULT - CARE PLAN
Principal Discharge DX:	Ophthalmoplegia  Goal:	Follow up with neurologist for continued workup as outpt  Assessment and plan of treatment:	Follow up with neurologist  Secondary Diagnosis:	Asthma  Assessment and plan of treatment:	Continue home meds Principal Discharge DX:	Ophthalmoplegia  Goal:	Follow up with neurologist for continued workup as outpt  Assessment and plan of treatment:	Follow up with neurologist, prednisone  Secondary Diagnosis:	Asthma  Assessment and plan of treatment:	Continue home meds

## 2018-01-25 NOTE — DISCHARGE NOTE ADULT - PLAN OF CARE
Follow up with neurologist for continued workup as outpt Follow up with neurologist Continue home meds Follow up with neurologist, prednisone

## 2018-01-25 NOTE — DISCHARGE NOTE ADULT - HOSPITAL COURSE
27 Fwith ptosis     Pt stable for discharge home w/ outpt follow up with neurologist for continued workup.    Problem/Plan - 1:  ·  Problem: Ophthalmoplegia.  Plan: C/w Steroids  To be seen by  for discharge plan.

## 2018-01-25 NOTE — DISCHARGE NOTE ADULT - PATIENT PORTAL LINK FT
“You can access the [facility] Patient Portal, offered by [facility], by registering with the following website: [email or web address]”

## 2018-01-25 NOTE — DISCHARGE NOTE ADULT - CARE PROVIDER_API CALL
Dr. natividad cade,   Phone: (   )    -  Fax: (   )    -    Saji Matias), Neurology  [address] [place] [address]  Phone: [phone]  Fax: [phone]

## 2018-01-29 ENCOUNTER — OUTPATIENT (OUTPATIENT)
Dept: OUTPATIENT SERVICES | Facility: HOSPITAL | Age: 28
LOS: 1 days | Discharge: HOME | End: 2018-01-29

## 2018-01-29 DIAGNOSIS — H49.9 UNSPECIFIED PARALYTIC STRABISMUS: ICD-10-CM

## 2018-01-29 DIAGNOSIS — C73 MALIGNANT NEOPLASM OF THYROID GLAND: ICD-10-CM

## 2018-01-29 DIAGNOSIS — H02.402 UNSPECIFIED PTOSIS OF LEFT EYELID: ICD-10-CM

## 2018-01-29 DIAGNOSIS — J45.909 UNSPECIFIED ASTHMA, UNCOMPLICATED: ICD-10-CM

## 2018-01-29 DIAGNOSIS — Z91.013 ALLERGY TO SEAFOOD: ICD-10-CM

## 2018-01-29 LAB
T3 SERPL-MCNC: 67 NG/DL — LOW (ref 80–200)
T4 AB SER-ACNC: 7.1 UG/DL — SIGNIFICANT CHANGE UP (ref 4.6–12)
TSH SERPL-MCNC: 0.74 UU/ML — SIGNIFICANT CHANGE UP (ref 0.36–3.74)

## 2018-02-07 LAB — ACRM MODULATING ANTIBODY: 0.11 NMOL/L — HIGH

## 2018-02-14 NOTE — ED PROVIDER NOTE - CONSTITUTIONAL APPEARANCE HYGIENE, MLM
Azithromycin Counseling:  I discussed with the patient the risks of azithromycin including but not limited to GI upset, allergic reaction, drug rash, diarrhea, and yeast infections. Erythromycin Counseling:  I discussed with the patient the risks of erythromycin including but not limited to GI upset, allergic reaction, drug rash, diarrhea, increase in liver enzymes, and yeast infections. Spironolactone Counseling: Patient advised regarding risks of diarrhea, abdominal pain, hyperkalemia, birth defects (for female patients), liver toxicity and renal toxicity. The patient may need blood work to monitor liver and kidney function and potassium levels while on therapy. The patient verbalized understanding of the proper use and possible adverse effects of spironolactone.  All of the patient's questions and concerns were addressed. Spironolactone Pregnancy And Lactation Text: This medication can cause feminization of the male fetus and should be avoided during pregnancy. The active metabolite is also found in breast milk. Detail Level: Zone High Dose Vitamin A Counseling: Side effects reviewed, pt to contact office should one occur. Benzoyl Peroxide Pregnancy And Lactation Text: This medication is Pregnancy Category C. It is unknown if benzoyl peroxide is excreted in breast milk. Doxycycline Counseling:  Patient counseled regarding possible photosensitivity and increased risk for sunburn.  Patient instructed to avoid sunlight, if possible.  When exposed to sunlight, patients should wear protective clothing, sunglasses, and sunscreen.  The patient was instructed to call the office immediately if the following severe adverse effects occur:  hearing changes, easy bruising/bleeding, severe headache, or vision changes.  The patient verbalized understanding of the proper use and possible adverse effects of doxycycline.  All of the patient's questions and concerns were addressed. Dapsone Counseling: I discussed with the patient the risks of dapsone including but not limited to hemolytic anemia, agranulocytosis, rashes, methemoglobinemia, kidney failure, peripheral neuropathy, headaches, GI upset, and liver toxicity.  Patients who start dapsone require monitoring including baseline LFTs and weekly CBCs for the first month, then every month thereafter.  The patient verbalized understanding of the proper use and possible adverse effects of dapsone.  All of the patient's questions and concerns were addressed. Tetracycline Counseling: Patient counseled regarding possible photosensitivity and increased risk for sunburn.  Patient instructed to avoid sunlight, if possible.  When exposed to sunlight, patients should wear protective clothing, sunglasses, and sunscreen.  The patient was instructed to call the office immediately if the following severe adverse effects occur:  hearing changes, easy bruising/bleeding, severe headache, or vision changes.  The patient verbalized understanding of the proper use and possible adverse effects of tetracycline.  All of the patient's questions and concerns were addressed. Patient understands to avoid pregnancy while on therapy due to potential birth defects. Topical Retinoid Pregnancy And Lactation Text: This medication is Pregnancy Category C. It is unknown if this medication is excreted in breast milk. Topical Clindamycin Counseling: Patient counseled that this medication may cause skin irritation or allergic reactions.  In the event of skin irritation, the patient was advised to reduce the amount of the drug applied or use it less frequently.   The patient verbalized understanding of the proper use and possible adverse effects of clindamycin.  All of the patient's questions and concerns were addressed. well appearing Tazorac Pregnancy And Lactation Text: This medication is not safe during pregnancy. It is unknown if this medication is excreted in breast milk. Benzoyl Peroxide Counseling: Patient counseled that medicine may cause skin irritation and bleach clothing.  In the event of skin irritation, the patient was advised to reduce the amount of the drug applied or use it less frequently.   The patient verbalized understanding of the proper use and possible adverse effects of benzoyl peroxide.  All of the patient's questions and concerns were addressed. High Dose Vitamin A Pregnancy And Lactation Text: High dose vitamin A therapy is contraindicated during pregnancy and breast feeding. Topical Sulfur Applications Counseling: Topical Sulfur Counseling: Patient counseled that this medication may cause skin irritation or allergic reactions.  In the event of skin irritation, the patient was advised to reduce the amount of the drug applied or use it less frequently.   The patient verbalized understanding of the proper use and possible adverse effects of topical sulfur application.  All of the patient's questions and concerns were addressed. Topical Sulfur Applications Pregnancy And Lactation Text: This medication is Pregnancy Category C and has an unknown safety profile during pregnancy. It is unknown if this topical medication is excreted in breast milk. Doxycycline Pregnancy And Lactation Text: This medication is Pregnancy Category D and not consider safe during pregnancy. It is also excreted in breast milk but is considered safe for shorter treatment courses. Bactrim Pregnancy And Lactation Text: This medication is Pregnancy Category D and is known to cause fetal risk.  It is also excreted in breast milk. Birth Control Pills Counseling: Birth Control Pill Counseling: I discussed with the patient the potential side effects of OCPs including but not limited to increased risk of stroke, heart attack, thrombophlebitis, deep venous thrombosis, hepatic adenomas, breast changes, GI upset, headaches, and depression.  The patient verbalized understanding of the proper use and possible adverse effects of OCPs. All of the patient's questions and concerns were addressed. Use Enhanced Medication Counseling?: No Bactrim Counseling:  I discussed with the patient the risks of sulfa antibiotics including but not limited to GI upset, allergic reaction, drug rash, diarrhea, dizziness, photosensitivity, and yeast infections.  Rarely, more serious reactions can occur including but not limited to aplastic anemia, agranulocytosis, methemoglobinemia, blood dyscrasias, liver or kidney failure, lung infiltrates or desquamative/blistering drug rashes. Tazorac Counseling:  Patient advised that medication is irritating and drying.  Patient may need to apply sparingly and wash off after an hour before eventually leaving it on overnight.  The patient verbalized understanding of the proper use and possible adverse effects of tazorac.  All of the patient's questions and concerns were addressed. Birth Control Pills Pregnancy And Lactation Text: This medication should be avoided if pregnant and for the first 30 days post-partum. Azithromycin Pregnancy And Lactation Text: This medication is considered safe during pregnancy and is also secreted in breast milk. Minocycline Pregnancy And Lactation Text: This medication is Pregnancy Category D and not consider safe during pregnancy. It is also excreted in breast milk. Erythromycin Pregnancy And Lactation Text: This medication is Pregnancy Category B and is considered safe during pregnancy. It is also excreted in breast milk. Topical Clindamycin Pregnancy And Lactation Text: This medication is Pregnancy Category B and is considered safe during pregnancy. It is unknown if it is excreted in breast milk. Topical Retinoid counseling:  Patient advised to apply a pea-sized amount only at bedtime and wait 30 minutes after washing their face before applying.  If too drying, patient may add a non-comedogenic moisturizer. The patient verbalized understanding of the proper use and possible adverse effects of retinoids.  All of the patient's questions and concerns were addressed. Isotretinoin Counseling: Patient should get monthly blood tests, not donate blood, not drive at night if vision affected, not share medication, and not undergo elective surgery for 6 months after tx completed. Side effects reviewed, pt to contact office should one occur. Isotretinoin Pregnancy And Lactation Text: This medication is Pregnancy Category X and is considered extremely dangerous during pregnancy. It is unknown if it is excreted in breast milk. Minocycline Counseling: Patient advised regarding possible photosensitivity and discoloration of the teeth, skin, lips, tongue and gums.  Patient instructed to avoid sunlight, if possible.  When exposed to sunlight, patients should wear protective clothing, sunglasses, and sunscreen.  The patient was instructed to call the office immediately if the following severe adverse effects occur:  hearing changes, easy bruising/bleeding, severe headache, or vision changes.  The patient verbalized understanding of the proper use and possible adverse effects of minocycline.  All of the patient's questions and concerns were addressed. Dapsone Pregnancy And Lactation Text: This medication is Pregnancy Category C and is not considered safe during pregnancy or breast feeding.

## 2018-05-09 ENCOUNTER — INPATIENT (INPATIENT)
Facility: HOSPITAL | Age: 28
LOS: 14 days | Discharge: HOME | End: 2018-05-24
Attending: STUDENT IN AN ORGANIZED HEALTH CARE EDUCATION/TRAINING PROGRAM | Admitting: STUDENT IN AN ORGANIZED HEALTH CARE EDUCATION/TRAINING PROGRAM
Payer: MEDICAID

## 2018-05-09 VITALS — HEIGHT: 62 IN | WEIGHT: 175.93 LBS

## 2018-05-09 LAB
APTT BLD: 28.2 SEC — SIGNIFICANT CHANGE UP (ref 27.5–37.4)
BASOPHILS # BLD AUTO: 0.03 K/UL — SIGNIFICANT CHANGE UP (ref 0–0.2)
BASOPHILS NFR BLD AUTO: 0.3 % — SIGNIFICANT CHANGE UP (ref 0–2)
EOSINOPHIL # BLD AUTO: 0.25 K/UL — SIGNIFICANT CHANGE UP (ref 0–0.5)
EOSINOPHIL NFR BLD AUTO: 2.6 % — SIGNIFICANT CHANGE UP (ref 0–6)
GLUCOSE BLDC GLUCOMTR-MCNC: 111 MG/DL — HIGH (ref 70–99)
HCT VFR BLD CALC: 41 % — SIGNIFICANT CHANGE UP (ref 34.5–45)
HGB BLD-MCNC: 13.4 G/DL — SIGNIFICANT CHANGE UP (ref 11.5–15.5)
IMM GRANULOCYTES NFR BLD AUTO: 0.5 % — SIGNIFICANT CHANGE UP (ref 0–1.5)
INR BLD: 1.1 RATIO — SIGNIFICANT CHANGE UP (ref 0.88–1.16)
LYMPHOCYTES # BLD AUTO: 5.13 K/UL — HIGH (ref 1–3.3)
LYMPHOCYTES # BLD AUTO: 54.3 % — HIGH (ref 13–44)
MCHC RBC-ENTMCNC: 26.3 PG — LOW (ref 27–34)
MCHC RBC-ENTMCNC: 32.7 GM/DL — SIGNIFICANT CHANGE UP (ref 32–36)
MCV RBC AUTO: 80.6 FL — SIGNIFICANT CHANGE UP (ref 80–100)
MONOCYTES # BLD AUTO: 0.89 K/UL — SIGNIFICANT CHANGE UP (ref 0–0.9)
MONOCYTES NFR BLD AUTO: 9.4 % — SIGNIFICANT CHANGE UP (ref 2–14)
NEUTROPHILS # BLD AUTO: 3.09 K/UL — SIGNIFICANT CHANGE UP (ref 1.8–7.4)
NEUTROPHILS NFR BLD AUTO: 32.9 % — LOW (ref 43–77)
NRBC # BLD: 0 /100 WBCS — SIGNIFICANT CHANGE UP (ref 0–0)
PLATELET # BLD AUTO: 361 K/UL — SIGNIFICANT CHANGE UP (ref 150–400)
PROTHROM AB SERPL-ACNC: 12 SEC — SIGNIFICANT CHANGE UP (ref 9.8–12.7)
RBC # BLD: 5.09 M/UL — SIGNIFICANT CHANGE UP (ref 3.8–5.2)
RBC # FLD: 14.2 % — SIGNIFICANT CHANGE UP (ref 10.3–14.5)
WBC # BLD: 9.44 K/UL — SIGNIFICANT CHANGE UP (ref 3.8–10.5)
WBC # FLD AUTO: 9.44 K/UL — SIGNIFICANT CHANGE UP (ref 3.8–10.5)

## 2018-05-09 PROCEDURE — 99291 CRITICAL CARE FIRST HOUR: CPT | Mod: 25

## 2018-05-09 PROCEDURE — 31500 INSERT EMERGENCY AIRWAY: CPT

## 2018-05-09 RX ORDER — PROPOFOL 10 MG/ML
30 INJECTION, EMULSION INTRAVENOUS
Qty: 500 | Refills: 0 | Status: DISCONTINUED | OUTPATIENT
Start: 2018-05-09 | End: 2018-05-09

## 2018-05-09 RX ORDER — SODIUM CHLORIDE 9 MG/ML
1000 INJECTION INTRAMUSCULAR; INTRAVENOUS; SUBCUTANEOUS ONCE
Qty: 0 | Refills: 0 | Status: COMPLETED | OUTPATIENT
Start: 2018-05-09 | End: 2018-05-09

## 2018-05-09 RX ORDER — PROPOFOL 10 MG/ML
20 INJECTION, EMULSION INTRAVENOUS
Qty: 1000 | Refills: 0 | Status: DISCONTINUED | OUTPATIENT
Start: 2018-05-09 | End: 2018-05-09

## 2018-05-09 RX ORDER — PROPOFOL 10 MG/ML
30 INJECTION, EMULSION INTRAVENOUS
Qty: 1000 | Refills: 0 | Status: DISCONTINUED | OUTPATIENT
Start: 2018-05-09 | End: 2018-05-10

## 2018-05-09 RX ORDER — ETOMIDATE 2 MG/ML
20 INJECTION INTRAVENOUS ONCE
Qty: 0 | Refills: 0 | Status: COMPLETED | OUTPATIENT
Start: 2018-05-09 | End: 2018-05-09

## 2018-05-09 RX ORDER — VECURONIUM BROMIDE 20 MG/1
7 INJECTION, POWDER, FOR SOLUTION INTRAVENOUS ONCE
Qty: 0 | Refills: 0 | Status: COMPLETED | OUTPATIENT
Start: 2018-05-09 | End: 2018-05-09

## 2018-05-09 RX ORDER — SODIUM CHLORIDE 9 MG/ML
2000 INJECTION INTRAMUSCULAR; INTRAVENOUS; SUBCUTANEOUS ONCE
Qty: 0 | Refills: 0 | Status: COMPLETED | OUTPATIENT
Start: 2018-05-09 | End: 2018-05-09

## 2018-05-09 RX ADMIN — VECURONIUM BROMIDE 7 MILLIGRAM(S): 20 INJECTION, POWDER, FOR SOLUTION INTRAVENOUS at 22:56

## 2018-05-09 RX ADMIN — SODIUM CHLORIDE 1000 MILLILITER(S): 9 INJECTION INTRAMUSCULAR; INTRAVENOUS; SUBCUTANEOUS at 23:25

## 2018-05-09 RX ADMIN — PROPOFOL 14.36 MICROGRAM(S)/KG/MIN: 10 INJECTION, EMULSION INTRAVENOUS at 23:58

## 2018-05-09 RX ADMIN — ETOMIDATE 20 MILLIGRAM(S): 2 INJECTION INTRAVENOUS at 22:55

## 2018-05-09 RX ADMIN — SODIUM CHLORIDE 1000 MILLILITER(S): 9 INJECTION INTRAMUSCULAR; INTRAVENOUS; SUBCUTANEOUS at 23:57

## 2018-05-09 RX ADMIN — PROPOFOL 9.58 MICROGRAM(S)/KG/MIN: 10 INJECTION, EMULSION INTRAVENOUS at 23:26

## 2018-05-09 NOTE — ED ADULT NURSE NOTE - OBJECTIVE STATEMENT
Pt is 26 y/o female pmh Myasthenia Gravis presents to ED in respiratory distress Pt is 26 y/o female pmh Myasthenia Gravis presents to ED lethargic, fatigue, weakness in respiratory failure. Pt intubated.

## 2018-05-09 NOTE — ED PROVIDER NOTE - OBJECTIVE STATEMENT
Pertinent PMH/PSH/FHx/SHx and Review of Systems contained within:  27F hx of mysasthenia gravis on physostigmine pw respiratory failure. Patient developed sob which she told her father beginning approx 1 hr pta. patient was reportedly not having cough or cp. EMS arrived on the scene and found patient with decreased responsiveness. They began to bag but she did not tolerate so they switched to NRB.   Fh and Sh not otherwise contributory  ROS otherwise negative

## 2018-05-09 NOTE — ED PROVIDER NOTE - CARE PLAN
Principal Discharge DX:	Myasthenic crisis  Secondary Diagnosis:	Acute respiratory failure with hypoxia and hypercapnia

## 2018-05-09 NOTE — ED PROVIDER NOTE - PHYSICAL EXAMINATION
Gen: not responsives  Head: NC, AT   Eyes: PERRL, EOMI, normal lids/conjunctiva  ENT: normal hearing, patent oropharynx without erythema/exudate, uvula midline  Neck: supple, no tenderness, Trachea midline  Pulm: very shallow, rapid breaths   CV: RRR, no M/R/G, 2+ radial and dp pulses bl, no edema  Abd: soft, NT/ND, +BS, no hepatosplenomegaly  Mskel: extremities x4 with normal ROM and no joint effusions. no ctl spine ttp.   Skin: no rash, no bruising   Neuro: not responsive verbally but occasionally reaches up with both hands.

## 2018-05-09 NOTE — ED PROVIDER NOTE - MEDICAL DECISION MAKING DETAILS
patient pw respiratory failure, likely myasthenic crisis. intubated for airway protection. will transfer for plasmapheresis. patient pw respiratory failure, likely myasthenic crisis. intubated for airway protection. will transfer for plasmapheresis. I called to [facility] MICU - they do not have capacity to accept patient at this time. Will admit to ICU here and reassess for plasmapharesis in the morning.

## 2018-05-10 DIAGNOSIS — J96.01 ACUTE RESPIRATORY FAILURE WITH HYPOXIA: ICD-10-CM

## 2018-05-10 DIAGNOSIS — G70.01 MYASTHENIA GRAVIS WITH (ACUTE) EXACERBATION: ICD-10-CM

## 2018-05-10 LAB
ALBUMIN SERPL ELPH-MCNC: 3.3 G/DL — SIGNIFICANT CHANGE UP (ref 3.3–5)
ALBUMIN SERPL ELPH-MCNC: 4.2 G/DL — SIGNIFICANT CHANGE UP (ref 3.3–5)
ALP SERPL-CCNC: 117 U/L — SIGNIFICANT CHANGE UP (ref 40–120)
ALP SERPL-CCNC: 89 U/L — SIGNIFICANT CHANGE UP (ref 40–120)
ALT FLD-CCNC: 19 U/L — SIGNIFICANT CHANGE UP (ref 12–78)
ALT FLD-CCNC: 20 U/L — SIGNIFICANT CHANGE UP (ref 12–78)
ANION GAP SERPL CALC-SCNC: 6 MMOL/L — SIGNIFICANT CHANGE UP (ref 5–17)
ANION GAP SERPL CALC-SCNC: 7 MMOL/L — SIGNIFICANT CHANGE UP (ref 5–17)
APPEARANCE UR: CLEAR — SIGNIFICANT CHANGE UP
AST SERPL-CCNC: 15 U/L — SIGNIFICANT CHANGE UP (ref 15–37)
AST SERPL-CCNC: 15 U/L — SIGNIFICANT CHANGE UP (ref 15–37)
BASE EXCESS BLDA CALC-SCNC: -3.1 MMOL/L — LOW (ref -2–2)
BASE EXCESS BLDA CALC-SCNC: -3.4 MMOL/L — LOW (ref -2–2)
BASOPHILS # BLD AUTO: 0.01 K/UL — SIGNIFICANT CHANGE UP (ref 0–0.2)
BASOPHILS NFR BLD AUTO: 0.1 % — SIGNIFICANT CHANGE UP (ref 0–2)
BILIRUB SERPL-MCNC: 0.9 MG/DL — SIGNIFICANT CHANGE UP (ref 0.2–1.2)
BILIRUB SERPL-MCNC: 1.2 MG/DL — SIGNIFICANT CHANGE UP (ref 0.2–1.2)
BILIRUB UR-MCNC: NEGATIVE — SIGNIFICANT CHANGE UP
BLD GP AB SCN SERPL QL: SIGNIFICANT CHANGE UP
BLOOD GAS COMMENTS: SIGNIFICANT CHANGE UP
BLOOD GAS SOURCE: SIGNIFICANT CHANGE UP
BLOOD GAS SOURCE: SIGNIFICANT CHANGE UP
BUN SERPL-MCNC: 11 MG/DL — SIGNIFICANT CHANGE UP (ref 7–23)
BUN SERPL-MCNC: 7 MG/DL — SIGNIFICANT CHANGE UP (ref 7–23)
CALCIUM SERPL-MCNC: 7.4 MG/DL — LOW (ref 8.5–10.1)
CALCIUM SERPL-MCNC: 9.3 MG/DL — SIGNIFICANT CHANGE UP (ref 8.5–10.1)
CHLORIDE SERPL-SCNC: 108 MMOL/L — SIGNIFICANT CHANGE UP (ref 96–108)
CHLORIDE SERPL-SCNC: 115 MMOL/L — HIGH (ref 96–108)
CO2 SERPL-SCNC: 23 MMOL/L — SIGNIFICANT CHANGE UP (ref 22–31)
CO2 SERPL-SCNC: 27 MMOL/L — SIGNIFICANT CHANGE UP (ref 22–31)
COLOR SPEC: YELLOW — SIGNIFICANT CHANGE UP
CREAT SERPL-MCNC: 0.56 MG/DL — SIGNIFICANT CHANGE UP (ref 0.5–1.3)
CREAT SERPL-MCNC: 0.85 MG/DL — SIGNIFICANT CHANGE UP (ref 0.5–1.3)
DIFF PNL FLD: NEGATIVE — SIGNIFICANT CHANGE UP
EOSINOPHIL # BLD AUTO: 0.01 K/UL — SIGNIFICANT CHANGE UP (ref 0–0.5)
EOSINOPHIL NFR BLD AUTO: 0.1 % — SIGNIFICANT CHANGE UP (ref 0–6)
FLUAV SPEC QL CULT: NEGATIVE — SIGNIFICANT CHANGE UP
FLUBV AG SPEC QL IA: NEGATIVE — SIGNIFICANT CHANGE UP
GLUCOSE SERPL-MCNC: 104 MG/DL — HIGH (ref 70–99)
GLUCOSE SERPL-MCNC: 120 MG/DL — HIGH (ref 70–99)
GLUCOSE UR QL: NEGATIVE MG/DL — SIGNIFICANT CHANGE UP
HCG SERPL-ACNC: <1 MIU/ML — SIGNIFICANT CHANGE UP
HCO3 BLDA-SCNC: 22 MMOL/L — SIGNIFICANT CHANGE UP (ref 21–29)
HCO3 BLDA-SCNC: 24 MMOL/L — SIGNIFICANT CHANGE UP (ref 21–29)
HCT VFR BLD CALC: 33.2 % — LOW (ref 34.5–45)
HGB BLD-MCNC: 10.7 G/DL — LOW (ref 11.5–15.5)
HOROWITZ INDEX BLDA+IHG-RTO: 60 — SIGNIFICANT CHANGE UP
HOROWITZ INDEX BLDA+IHG-RTO: 60 — SIGNIFICANT CHANGE UP
IMM GRANULOCYTES NFR BLD AUTO: 0.1 % — SIGNIFICANT CHANGE UP (ref 0–1.5)
KETONES UR-MCNC: ABNORMAL
LEUKOCYTE ESTERASE UR-ACNC: NEGATIVE — SIGNIFICANT CHANGE UP
LYMPHOCYTES # BLD AUTO: 0.88 K/UL — LOW (ref 1–3.3)
LYMPHOCYTES # BLD AUTO: 11 % — LOW (ref 13–44)
MAGNESIUM SERPL-MCNC: 1.8 MG/DL — SIGNIFICANT CHANGE UP (ref 1.6–2.6)
MAGNESIUM SERPL-MCNC: 2.2 MG/DL — SIGNIFICANT CHANGE UP (ref 1.6–2.6)
MCHC RBC-ENTMCNC: 26.1 PG — LOW (ref 27–34)
MCHC RBC-ENTMCNC: 32.2 GM/DL — SIGNIFICANT CHANGE UP (ref 32–36)
MCV RBC AUTO: 81 FL — SIGNIFICANT CHANGE UP (ref 80–100)
MONOCYTES # BLD AUTO: 0.54 K/UL — SIGNIFICANT CHANGE UP (ref 0–0.9)
MONOCYTES NFR BLD AUTO: 6.8 % — SIGNIFICANT CHANGE UP (ref 2–14)
NEUTROPHILS # BLD AUTO: 6.53 K/UL — SIGNIFICANT CHANGE UP (ref 1.8–7.4)
NEUTROPHILS NFR BLD AUTO: 81.9 % — HIGH (ref 43–77)
NITRITE UR-MCNC: NEGATIVE — SIGNIFICANT CHANGE UP
NRBC # BLD: 0 /100 WBCS — SIGNIFICANT CHANGE UP (ref 0–0)
PCO2 BLDA: 43 MMHG — SIGNIFICANT CHANGE UP (ref 32–46)
PCO2 BLDA: 53 MMHG — HIGH (ref 32–46)
PH BLD: 7.27 — LOW (ref 7.35–7.45)
PH BLD: 7.33 — LOW (ref 7.35–7.45)
PH UR: 7 — SIGNIFICANT CHANGE UP (ref 5–8)
PHOSPHATE SERPL-MCNC: 3.4 MG/DL — SIGNIFICANT CHANGE UP (ref 2.5–4.5)
PLATELET # BLD AUTO: 272 K/UL — SIGNIFICANT CHANGE UP (ref 150–400)
PO2 BLDA: 193 MMHG — HIGH (ref 74–108)
PO2 BLDA: 343 MMHG — HIGH (ref 74–108)
POTASSIUM SERPL-MCNC: 3.2 MMOL/L — LOW (ref 3.5–5.3)
POTASSIUM SERPL-MCNC: 4.1 MMOL/L — SIGNIFICANT CHANGE UP (ref 3.5–5.3)
POTASSIUM SERPL-SCNC: 3.2 MMOL/L — LOW (ref 3.5–5.3)
POTASSIUM SERPL-SCNC: 4.1 MMOL/L — SIGNIFICANT CHANGE UP (ref 3.5–5.3)
PROCALCITONIN SERPL-MCNC: <0.05 NG/ML — SIGNIFICANT CHANGE UP (ref 0–0.04)
PROT SERPL-MCNC: 7 GM/DL — SIGNIFICANT CHANGE UP (ref 6–8.3)
PROT SERPL-MCNC: 8.9 GM/DL — HIGH (ref 6–8.3)
PROT UR-MCNC: NEGATIVE MG/DL — SIGNIFICANT CHANGE UP
RBC # BLD: 4.1 M/UL — SIGNIFICANT CHANGE UP (ref 3.8–5.2)
RBC # FLD: 14.2 % — SIGNIFICANT CHANGE UP (ref 10.3–14.5)
SAO2 % BLDA: 98 % — HIGH (ref 92–96)
SAO2 % BLDA: 99 % — HIGH (ref 92–96)
SODIUM SERPL-SCNC: 142 MMOL/L — SIGNIFICANT CHANGE UP (ref 135–145)
SODIUM SERPL-SCNC: 144 MMOL/L — SIGNIFICANT CHANGE UP (ref 135–145)
SP GR SPEC: 1 — LOW (ref 1.01–1.02)
UROBILINOGEN FLD QL: NEGATIVE MG/DL — SIGNIFICANT CHANGE UP
WBC # BLD: 7.98 K/UL — SIGNIFICANT CHANGE UP (ref 3.8–10.5)
WBC # FLD AUTO: 7.98 K/UL — SIGNIFICANT CHANGE UP (ref 3.8–10.5)

## 2018-05-10 PROCEDURE — 71275 CT ANGIOGRAPHY CHEST: CPT | Mod: 26

## 2018-05-10 PROCEDURE — 70450 CT HEAD/BRAIN W/O DYE: CPT | Mod: 26

## 2018-05-10 PROCEDURE — 71045 X-RAY EXAM CHEST 1 VIEW: CPT | Mod: 26

## 2018-05-10 RX ORDER — IMMUNE GLOBULIN,GAMMA(IGG) 5 %
65 VIAL (ML) INTRAVENOUS ONCE
Qty: 0 | Refills: 0 | Status: COMPLETED | OUTPATIENT
Start: 2018-05-10 | End: 2018-05-10

## 2018-05-10 RX ORDER — DEXMEDETOMIDINE HYDROCHLORIDE IN 0.9% SODIUM CHLORIDE 4 UG/ML
0.7 INJECTION INTRAVENOUS
Qty: 200 | Refills: 0 | Status: DISCONTINUED | OUTPATIENT
Start: 2018-05-10 | End: 2018-05-20

## 2018-05-10 RX ORDER — PROPOFOL 10 MG/ML
104.43 INJECTION, EMULSION INTRAVENOUS
Qty: 1000 | Refills: 0 | Status: DISCONTINUED | OUTPATIENT
Start: 2018-05-10 | End: 2018-05-10

## 2018-05-10 RX ORDER — SODIUM CHLORIDE 9 MG/ML
1000 INJECTION INTRAMUSCULAR; INTRAVENOUS; SUBCUTANEOUS ONCE
Qty: 0 | Refills: 0 | Status: COMPLETED | OUTPATIENT
Start: 2018-05-10 | End: 2018-05-10

## 2018-05-10 RX ORDER — PROPOFOL 10 MG/ML
5 INJECTION, EMULSION INTRAVENOUS
Qty: 1000 | Refills: 0 | Status: DISCONTINUED | OUTPATIENT
Start: 2018-05-10 | End: 2018-05-20

## 2018-05-10 RX ORDER — PANTOPRAZOLE SODIUM 20 MG/1
40 TABLET, DELAYED RELEASE ORAL DAILY
Qty: 0 | Refills: 0 | Status: DISCONTINUED | OUTPATIENT
Start: 2018-05-10 | End: 2018-05-12

## 2018-05-10 RX ORDER — HEPARIN SODIUM 5000 [USP'U]/ML
5000 INJECTION INTRAVENOUS; SUBCUTANEOUS EVERY 12 HOURS
Qty: 0 | Refills: 0 | Status: DISCONTINUED | OUTPATIENT
Start: 2018-05-10 | End: 2018-05-24

## 2018-05-10 RX ORDER — CHLORHEXIDINE GLUCONATE 213 G/1000ML
15 SOLUTION TOPICAL
Qty: 0 | Refills: 0 | Status: DISCONTINUED | OUTPATIENT
Start: 2018-05-10 | End: 2018-05-20

## 2018-05-10 RX ORDER — POTASSIUM CHLORIDE 20 MEQ
10 PACKET (EA) ORAL ONCE
Qty: 0 | Refills: 0 | Status: COMPLETED | OUTPATIENT
Start: 2018-05-10 | End: 2018-05-10

## 2018-05-10 RX ADMIN — Medication 162.5 GRAM(S): at 14:39

## 2018-05-10 RX ADMIN — DEXMEDETOMIDINE HYDROCHLORIDE IN 0.9% SODIUM CHLORIDE 13.96 MICROGRAM(S)/KG/HR: 4 INJECTION INTRAVENOUS at 03:18

## 2018-05-10 RX ADMIN — PROPOFOL 2.39 MICROGRAM(S)/KG/MIN: 10 INJECTION, EMULSION INTRAVENOUS at 07:01

## 2018-05-10 RX ADMIN — PANTOPRAZOLE SODIUM 40 MILLIGRAM(S): 20 TABLET, DELAYED RELEASE ORAL at 12:43

## 2018-05-10 RX ADMIN — PROPOFOL 2.39 MICROGRAM(S)/KG/MIN: 10 INJECTION, EMULSION INTRAVENOUS at 03:58

## 2018-05-10 RX ADMIN — SODIUM CHLORIDE 1000 MILLILITER(S): 9 INJECTION INTRAMUSCULAR; INTRAVENOUS; SUBCUTANEOUS at 03:16

## 2018-05-10 RX ADMIN — Medication 100 MILLIEQUIVALENT(S): at 01:22

## 2018-05-10 RX ADMIN — CHLORHEXIDINE GLUCONATE 15 MILLILITER(S): 213 SOLUTION TOPICAL at 17:35

## 2018-05-10 RX ADMIN — HEPARIN SODIUM 5000 UNIT(S): 5000 INJECTION INTRAVENOUS; SUBCUTANEOUS at 17:36

## 2018-05-10 RX ADMIN — CHLORHEXIDINE GLUCONATE 15 MILLILITER(S): 213 SOLUTION TOPICAL at 06:02

## 2018-05-10 RX ADMIN — SODIUM CHLORIDE 1000 MILLILITER(S): 9 INJECTION INTRAMUSCULAR; INTRAVENOUS; SUBCUTANEOUS at 05:12

## 2018-05-10 RX ADMIN — HEPARIN SODIUM 5000 UNIT(S): 5000 INJECTION INTRAVENOUS; SUBCUTANEOUS at 06:02

## 2018-05-10 NOTE — H&P ADULT - PROBLEM SELECTOR PLAN 2
1. CXR, ABG, make vent changes accordingly  2. daily sedation vacation  3. wean as tolerated  4. duonebs as needed  5. chest PT as needed

## 2018-05-10 NOTE — H&P ADULT - ATTENDING COMMENTS
Agree with above.  27F myasthenia gravis (never intubated / no H/O crisis) with worsening weakness / respiratory failure, intubated. ABG 7.27/53/343/24 BE -3  IBW 50kg, intubated 12/450/60%/+5, decrease VT to 350. RVP negative.  On exam profoundly weak, could not tolerate SBT.  Will give IVIg and continue to monitor.  F/U with neuro re: any additional recommendations.    Total attending critical care time spent: 40 minutes

## 2018-05-10 NOTE — H&P ADULT - NSHPPHYSICALEXAM_GEN_ALL_CORE
PHYSICAL EXAM:    GENERAL: NAD, well-groomed, well-developed  HEAD:  Atraumatic, Normocephalic  EYES: EOMI, PERRLA, conjunctiva and sclera clear  NECK: Supple, No JVD, Normal thyroid  NERVOUS SYSTEM:  intubated, sedated  CHEST/LUNG: + breath sounds bilaterally; No rales, rhonchi, wheezing  HEART: Regular rate and rhythm; No murmurs, rubs  ABDOMEN: Soft, Nondistended; Bowel sounds present  EXTREMITIES:  2+ Peripheral Pulses, No clubbing, cyanosis, or edema

## 2018-05-10 NOTE — H&P ADULT - PROBLEM SELECTOR PLAN 1
1. Admit to CCU under Dr. Domonique santiago/ hospitalist service for medicine  2. continue home meds 1. Admit to CCU under Dr. Domonique santiago/ hospitalist service for medicine  2. continue home meds  3. repeat labs, replace electrolytes as needed

## 2018-05-10 NOTE — H&P ADULT - ASSESSMENT
26 Y/O female w/ PMHx of mysasthenia gravis on physostigmine BIBEMS w/ SOB and weakness 26 Y/O female w/ PMHx of mysasthenia gravis on physostigmine BIBEMS w/ SOB and weakness, intubated in ED for hypoxic respiratory failure.

## 2018-05-10 NOTE — H&P ADULT - HISTORY OF PRESENT ILLNESS
26 Y/O female w/ PMHx of mysasthenia gravis on physostigmine BIBEMS w/ SOB and weakness. As per significant other, pt c/o increasing weakness over 2 weeks. Today, pt c/o SOB, and EMS was notified. EMS found the pt w/ decreased responsiveness, was bagged, and placed on non rebreather. Upon arrival to ED, pt was found unresponsive, shallow breathing, and was intubated by ED attending for hypoxic respiratory failure. ICU called for further evaluation.

## 2018-05-11 LAB
ALBUMIN SERPL ELPH-MCNC: 3 G/DL — LOW (ref 3.3–5)
ALP SERPL-CCNC: 87 U/L — SIGNIFICANT CHANGE UP (ref 40–120)
ALT FLD-CCNC: 18 U/L — SIGNIFICANT CHANGE UP (ref 12–78)
ANION GAP SERPL CALC-SCNC: 7 MMOL/L — SIGNIFICANT CHANGE UP (ref 5–17)
AST SERPL-CCNC: 15 U/L — SIGNIFICANT CHANGE UP (ref 15–37)
BASOPHILS # BLD AUTO: 0.01 K/UL — SIGNIFICANT CHANGE UP (ref 0–0.2)
BASOPHILS NFR BLD AUTO: 0.1 % — SIGNIFICANT CHANGE UP (ref 0–2)
BILIRUB SERPL-MCNC: 1.1 MG/DL — SIGNIFICANT CHANGE UP (ref 0.2–1.2)
BUN SERPL-MCNC: 6 MG/DL — LOW (ref 7–23)
CALCIUM SERPL-MCNC: 8.4 MG/DL — LOW (ref 8.5–10.1)
CHLORIDE SERPL-SCNC: 110 MMOL/L — HIGH (ref 96–108)
CO2 SERPL-SCNC: 24 MMOL/L — SIGNIFICANT CHANGE UP (ref 22–31)
CREAT SERPL-MCNC: 0.62 MG/DL — SIGNIFICANT CHANGE UP (ref 0.5–1.3)
CULTURE RESULTS: NO GROWTH — SIGNIFICANT CHANGE UP
EOSINOPHIL # BLD AUTO: 0.02 K/UL — SIGNIFICANT CHANGE UP (ref 0–0.5)
EOSINOPHIL NFR BLD AUTO: 0.3 % — SIGNIFICANT CHANGE UP (ref 0–6)
GLUCOSE BLDC GLUCOMTR-MCNC: 101 MG/DL — HIGH (ref 70–99)
GLUCOSE SERPL-MCNC: 68 MG/DL — LOW (ref 70–99)
HCT VFR BLD CALC: 34.2 % — LOW (ref 34.5–45)
HGB BLD-MCNC: 11 G/DL — LOW (ref 11.5–15.5)
IGA FLD-MCNC: 158 MG/DL — SIGNIFICANT CHANGE UP (ref 68–378)
IGG FLD-MCNC: 1520 MG/DL — SIGNIFICANT CHANGE UP (ref 694–1618)
IGM SERPL-MCNC: 128 MG/DL — SIGNIFICANT CHANGE UP (ref 40–230)
IMM GRANULOCYTES NFR BLD AUTO: 0.1 % — SIGNIFICANT CHANGE UP (ref 0–1.5)
KAPPA LC SER QL IFE: 1.52 MG/DL — SIGNIFICANT CHANGE UP (ref 0.33–1.94)
KAPPA/LAMBDA FREE LIGHT CHAIN RATIO, SERUM: 1.02 RATIO — SIGNIFICANT CHANGE UP (ref 0.26–1.65)
LAMBDA LC SER QL IFE: 1.49 MG/DL — SIGNIFICANT CHANGE UP (ref 0.57–2.63)
LYMPHOCYTES # BLD AUTO: 1.2 K/UL — SIGNIFICANT CHANGE UP (ref 1–3.3)
LYMPHOCYTES # BLD AUTO: 16.5 % — SIGNIFICANT CHANGE UP (ref 13–44)
MAGNESIUM SERPL-MCNC: 2 MG/DL — SIGNIFICANT CHANGE UP (ref 1.6–2.6)
MCHC RBC-ENTMCNC: 26.6 PG — LOW (ref 27–34)
MCHC RBC-ENTMCNC: 32.2 GM/DL — SIGNIFICANT CHANGE UP (ref 32–36)
MCV RBC AUTO: 82.8 FL — SIGNIFICANT CHANGE UP (ref 80–100)
MONOCYTES # BLD AUTO: 0.82 K/UL — SIGNIFICANT CHANGE UP (ref 0–0.9)
MONOCYTES NFR BLD AUTO: 11.2 % — SIGNIFICANT CHANGE UP (ref 2–14)
NEUTROPHILS # BLD AUTO: 5.23 K/UL — SIGNIFICANT CHANGE UP (ref 1.8–7.4)
NEUTROPHILS NFR BLD AUTO: 71.8 % — SIGNIFICANT CHANGE UP (ref 43–77)
NRBC # BLD: 0 /100 WBCS — SIGNIFICANT CHANGE UP (ref 0–0)
PHOSPHATE SERPL-MCNC: 3.2 MG/DL — SIGNIFICANT CHANGE UP (ref 2.5–4.5)
PLATELET # BLD AUTO: 249 K/UL — SIGNIFICANT CHANGE UP (ref 150–400)
POTASSIUM SERPL-MCNC: 3.4 MMOL/L — LOW (ref 3.5–5.3)
POTASSIUM SERPL-SCNC: 3.4 MMOL/L — LOW (ref 3.5–5.3)
PROT SERPL-MCNC: 8.9 GM/DL — HIGH (ref 6–8.3)
RBC # BLD: 4.13 M/UL — SIGNIFICANT CHANGE UP (ref 3.8–5.2)
RBC # FLD: 14.7 % — HIGH (ref 10.3–14.5)
SODIUM SERPL-SCNC: 141 MMOL/L — SIGNIFICANT CHANGE UP (ref 135–145)
SPECIMEN SOURCE: SIGNIFICANT CHANGE UP
WBC # BLD: 7.29 K/UL — SIGNIFICANT CHANGE UP (ref 3.8–10.5)
WBC # FLD AUTO: 7.29 K/UL — SIGNIFICANT CHANGE UP (ref 3.8–10.5)

## 2018-05-11 RX ORDER — ACETAMINOPHEN 500 MG
650 TABLET ORAL ONCE
Qty: 0 | Refills: 0 | Status: COMPLETED | OUTPATIENT
Start: 2018-05-11 | End: 2018-05-11

## 2018-05-11 RX ORDER — IMMUNE GLOBULIN,GAMMA(IGG) 5 %
65 VIAL (ML) INTRAVENOUS ONCE
Qty: 0 | Refills: 0 | Status: COMPLETED | OUTPATIENT
Start: 2018-05-11 | End: 2018-05-11

## 2018-05-11 RX ORDER — POTASSIUM CHLORIDE 20 MEQ
40 PACKET (EA) ORAL ONCE
Qty: 0 | Refills: 0 | Status: COMPLETED | OUTPATIENT
Start: 2018-05-11 | End: 2018-05-11

## 2018-05-11 RX ORDER — CHLORHEXIDINE GLUCONATE 213 G/1000ML
1 SOLUTION TOPICAL
Qty: 0 | Refills: 0 | Status: DISCONTINUED | OUTPATIENT
Start: 2018-05-11 | End: 2018-05-24

## 2018-05-11 RX ORDER — SODIUM CHLORIDE 9 MG/ML
1000 INJECTION, SOLUTION INTRAVENOUS
Qty: 0 | Refills: 0 | Status: DISCONTINUED | OUTPATIENT
Start: 2018-05-11 | End: 2018-05-12

## 2018-05-11 RX ADMIN — DEXMEDETOMIDINE HYDROCHLORIDE IN 0.9% SODIUM CHLORIDE 13.96 MICROGRAM(S)/KG/HR: 4 INJECTION INTRAVENOUS at 08:57

## 2018-05-11 RX ADMIN — HEPARIN SODIUM 5000 UNIT(S): 5000 INJECTION INTRAVENOUS; SUBCUTANEOUS at 17:35

## 2018-05-11 RX ADMIN — HEPARIN SODIUM 5000 UNIT(S): 5000 INJECTION INTRAVENOUS; SUBCUTANEOUS at 05:30

## 2018-05-11 RX ADMIN — DEXMEDETOMIDINE HYDROCHLORIDE IN 0.9% SODIUM CHLORIDE 13.96 MICROGRAM(S)/KG/HR: 4 INJECTION INTRAVENOUS at 17:35

## 2018-05-11 RX ADMIN — Medication 650 MILLIGRAM(S): at 05:30

## 2018-05-11 RX ADMIN — PANTOPRAZOLE SODIUM 40 MILLIGRAM(S): 20 TABLET, DELAYED RELEASE ORAL at 11:42

## 2018-05-11 RX ADMIN — CHLORHEXIDINE GLUCONATE 15 MILLILITER(S): 213 SOLUTION TOPICAL at 06:36

## 2018-05-11 RX ADMIN — Medication 40 MILLIEQUIVALENT(S): at 05:31

## 2018-05-11 RX ADMIN — SODIUM CHLORIDE 75 MILLILITER(S): 9 INJECTION, SOLUTION INTRAVENOUS at 22:14

## 2018-05-11 RX ADMIN — CHLORHEXIDINE GLUCONATE 15 MILLILITER(S): 213 SOLUTION TOPICAL at 17:36

## 2018-05-11 RX ADMIN — PROPOFOL 2.39 MICROGRAM(S)/KG/MIN: 10 INJECTION, EMULSION INTRAVENOUS at 08:57

## 2018-05-11 RX ADMIN — Medication 162.5 GRAM(S): at 10:43

## 2018-05-11 RX ADMIN — Medication 60 MILLIGRAM(S): at 05:31

## 2018-05-11 RX ADMIN — PROPOFOL 2.39 MICROGRAM(S)/KG/MIN: 10 INJECTION, EMULSION INTRAVENOUS at 05:31

## 2018-05-11 NOTE — DIETITIAN INITIAL EVALUATION ADULT. - PERTINENT LABORATORY DATA
05-11 Na141 mmol/L Glu 68 mg/dL<L> K+ 3.4 mmol/L<L> Cr  0.62 mg/dL BUN 6 mg/dL<L> Phos 3.2 mg/dL Alb 3.0 g/dL<L> PAB n/a

## 2018-05-11 NOTE — DIETITIAN INITIAL EVALUATION ADULT. - PROBLEM SELECTOR PLAN 1
1. Admit to CCU under Dr. Domonique santiago/ hospitalist service for medicine  2. continue home meds  3. repeat labs, replace electrolytes as needed

## 2018-05-11 NOTE — DIETITIAN INITIAL EVALUATION ADULT. - OTHER INFO
pt seen due to CCU admission. pt admit c myasthenic crisis; intubated in ED for resp failure. no other pmh. TF rx just put in. no family at bedside; unable to obtain diet, wt hx.

## 2018-05-11 NOTE — DIETITIAN INITIAL EVALUATION ADULT. - NS AS NUTRI INTERV ENTERAL NUTRITION
Concentration/Rate/Volume/Composition/recommend increasing Jevity 1.2 to goal rate of 60mL/hr to provide 1728 kcals, 80g pro

## 2018-05-11 NOTE — CONSULT NOTE ADULT - SUBJECTIVE AND OBJECTIVE BOX
Subjective Complaints:  Historian:       Consult requested by ER doctor:                  Attending:     HPI:  28 Y/O female w/ PMHx of mysasthenia gravis on physostigmine BIBEMS w/ SOB and weakness. As per significant other, pt c/o increasing weakness over 2 weeks. Today, pt c/o SOB, and EMS was notified. EMS found the pt w/ decreased responsiveness, was bagged, and placed on non rebreather. Upon arrival to ED, pt was found unresponsive, shallow breathing, and was intubated by ED attending for hypoxic respiratory failure. ICU called for further evaluation. (10 May 2018 03:15)Patient was last  seen in the office about one month ago c/o weakness and diplopia was advised to go to the ER for evaluation and possible admission but she refused .     JUAN PABLO POON    PAST MEDICAL & SURGICAL HISTORY:  27yFemale    MEDICATIONS  (STANDING):  chlorhexidine 0.12% Liquid 15 milliLiter(s) Swish and Spit two times a day  dexmedetomidine Infusion 0.7 MICROgram(s)/kG/Hr (13.965 mL/Hr) IV Continuous <Continuous>  heparin  Injectable 5000 Unit(s) SubCutaneous every 12 hours  pantoprazole  Injectable 40 milliGRAM(s) IV Push daily  predniSONE   Tablet 60 milliGRAM(s) Oral daily  propofol Infusion 5 MICROgram(s)/kG/Min (2.394 mL/Hr) IV Continuous <Continuous>    MEDICATIONS  (PRN):      Allergies    No Known Allergies    Intolerances      FAMILY HISTORY:      REVIEW OF SYSTEMS:  General:  No wt loss, fevers, chills, night sweats  Eyes:  Good vision, no reported pain  ENT:  No sore throat, pain, runny nose, dysphagia  CV:  No pain, palpitatioins, hypo/hypertension  Resp:  No dyspnea, cough, tachypnea, wheezing  GI:  No pain, nausea, vomiting, diarrhea, constipatiion  :  No pain, bleeding, incontinence, nocturia  Muscle:  No pain, weakness  Breast:  No pain, abscess, mass, discharge  Neuro:  No weakness, tingling, memory problems  Psych:  No fatigue, insomnia, mood problems, depression  Endocrine:  No polyuria, polydypsia, cold/heat intolerance  Heme:  No petechiae, ecchymosis, easy bruisability  Skin:  No rash, tattoos, scars, edema      Vital Signs Last 24 Hrs  T(C): 37.1 (11 May 2018 15:00), Max: 38 (11 May 2018 04:00)  T(F): 98.8 (11 May 2018 15:00), Max: 100.4 (11 May 2018 04:00)  HR: 56 (11 May 2018 16:00) (56 - 138)  BP: 106/65 (11 May 2018 16:00) (96/55 - 135/92)  BP(mean): 74 (11 May 2018 16:00) (63 - 100)  RR: 16 (11 May 2018 16:00) (14 - 20)  SpO2: 100% (11 May 2018 16:00) (97% - 100%)    GENERAL PHYSICAL EXAM:  General:  Appears stated age, well-groomed, well-nourished, no distress  HEENT:  NC/AT, patent nares w/ pink mucosa, OP clear w/o lesions, PERRL, EOMI, conjunctivae clear, no thyromegaly, nodules, adenopathy, no JVD  Chest:  Full & symmetric excursion, no increased effort, breath sounds clear  Cardiovascular:  Regular rhythm, S1, S2, no murmur/rub/S3/S4, no carotid/femoral/abdominal bruit, radial/pedal pulses 2+, no edema  Abdomen:  Soft, non-tender, non-distended, normoactive bowel sounds, no HSM  Extremities:  Gait & station:   Digits:   Nails:   Joints, Bones, Muscles:   ROM:   Stability:  Skin:  No rash/erythema/ecchymoses/petechiae/wounds/abscess/warm/dry  Musculoskeletal:  Full ROM in all joints w/o swelling/tenderness/effusion    NEUROLOGICAL EXAM:  HENT:  Normocephalic head; atraumatic head.  Neck supple.  ENT: normal looking.  Mental State:   lethargic but arousable able to follow commands   oriented to person, place  Coherent. intubated ,no speech production  Cooperative.  Responds appropriately.    Cranial Nerves:  II-XII:   Pupils round and reactive to light and accommodation.  Extraocular movements are limited ,left ptosis  Visual fields full (no homonymous hemianopsia).  Visual acuity wnl.  Facial symmetry and weak   Tongue midline.  Motor Functions:  Moves all extremities.  No pronator drift of UE.  Claps hand well.  Hand  intact but fatigued easily  motor strength is 3-4/5  Sensory Functions:   Intact to touch and pinprick to face and extremities.    Reflexes:  Deep tendon reflexes normoactive to biceps, knees and ankles. plantar responses are flexor   Cerebellar Testing:    Finger to nose intact.  Nystagmus absent.  Gait : difficulty ambulating due to generalized weakness    LABS:                        11.0   7.29  )-----------( 249      ( 11 May 2018 03:42 )             34.2         141  |  110<H>  |  6<L>  ----------------------------<  68<L>  3.4<L>   |  24  |  0.62    Ca    8.4<L>      11 May 2018 03:42  Phos  3.2       Mg     2.0         TPro  8.9<H>  /  Alb  3.0<L>  /  TBili  1.1  /  DBili  x   /  AST  15  /  ALT  18  /  AlkPhos  87      PT/INR - ( 09 May 2018 23:30 )   PT: 12.0 sec;   INR: 1.10 ratio         PTT - ( 09 May 2018 23:30 )  PTT:28.2 sec    Urinalysis Basic - ( 10 May 2018 01:53 )    Color: Yellow / Appearance: Clear / S.005 / pH: x  Gluc: x / Ketone: Trace  / Bili: Negative / Urobili: Negative mg/dL   Blood: x / Protein: Negative mg/dL / Nitrite: Negative   Leuk Esterase: Negative / RBC: x / WBC x   Sq Epi: x / Non Sq Epi: x / Bacteria: x        RADIOLOGY & ADDITIONAL STUDIES:    predniSONE  Solution:   60 milliGRAM(s), Oral via Nasogastric tube, daily  Indication: myasthenia gravis  Administration Instructions: Dispose unused medication in BLACK bin.  This is a Look-alike/Sound-alike Medication  Provider's Contact #: [phone] (05-10 @ 20:12)  predniSONE   Tablet: [Known as DELTASONE]  60 milliGRAM(s), Oral via Nasogastric tube, daily  Indication: myesthenis gravis  Administration Instructions: This is a Look-alike/Sound-alike Medication  Provider's Contact #: [phone] (05-10 @ 20:57)  Nucleated RBC: 03:00 ( @ 03:42)  acetaminophen    Suspension: [Ordered as TYLENOL Suspension]  650 milliGRAM(s), Oral, once, Stop After 1 Doses  Indication: temperature greater than 100  Administration Instructions: SHAKE WELL  MAX DAILY DOSE:  ADULT = 4,000 mG/Day  Provider's Contact #: [phone] ( @ 04:31)  potassium chloride   Powder: [Known as KLOR-CON POWDER]  40 milliEquivalent(s), Oral via Nasogastric tube, once, Stop After 1 Doses  Administration Instructions: Dissolve contents of 1 packet in at least 4 oz. of water or other beverage.  Provider's Contact #: [phone] ( @ 04:32)  Chart Check ( @ 07:14)  Complete Blood Count + Automated Diff: AM Sched. Collection: 12-May-2018 04:00 ( @ 08:29)  Comprehensive Metabolic Panel: AM Sched. Collection: 12-May-2018 04:00 ( @ 08:29)  Magnesium, Serum: AM Sched. Collection: 12-May-2018 04:00 ( @ 08:29)  Phosphorus Level, Serum: AM Sched. Collection: 12-May-2018 04:00 ( @ 08:29)  immune globulin gamma IVPB:   65 Gram(s), IV Intermittent, once, infuse over 4 Hour(s), Stop After 1 Doses  Special Instructions: 150cc/hr  Provider's Contact #: [phone] ( @ 10:08)  Diet, NPO with Tube Feed:   Tube Feeding Modality: Orogastric  Jevity 1.2 Sanju  Total Volume for 24 Hours (mL): 1200  Continuous  Starting Tube Feed Rate mL per Hour: 20  Increase Tube Feed Rate by (mL): 10     Every 8 hours  Until Goal Tube Feed Rate (mL per Hour): 50  Tube Feed Duration (in Hours): 24  Tube Feed Start Time: 11:00 ( @ 10:11)  Provider to RN:       may use NGT ( 10:11)  Diet, NPO with Tube Feed:   Tube Feeding Modality: Nasogastric  Jevity 1.2 Sanju  Total Volume for 24 Hours (mL): 1440  Continuous  Starting Tube Feed Rate mL per Hour: 30  Increase Tube Feed Rate by (mL): 10     Every 8 hours  Until Goal Tube Feed Rate (mL per Hour): 60  Tube Feed Duration (in Hours): 24  Tube Feed Start Time: 11:00 ( @ 10:54)  Xray Chest 1 View AP/PA.: AM   Indication: follow up  Transport: Portable,  w/ Ventilator  Provider's Contact #: [phone] ( @ 14:42)  Indwelling Urethral Catheter:     Connect To:  Straight Drainage/Gravity    Indication:  Urine Output Monitoring in Critically Ill ( @ 15:00)      Assessment & Opinion:27 y o woman with h/o myasthenia gravis is admitted for myasthenia crisis   DX Myasthenia Crisis   Recommendations :Continue prednisone,,IVIG TSH ,CT OF CHEST TO R/O THYMOMA   DVT prophylaxis as ordered.  Medications:  IVIG ,PREDNISONE ,MESTINON ,CONTINUE DVT PROPHYLAXIS

## 2018-05-11 NOTE — PHARMACY COMMUNICATION NOTE - COMMENTS
Spoke with MD regarding treatment dose of 1g/kg for 2 days, patient has already received 2 doses. MD cancelled future doses of IVIG and will reevaluate the patient.

## 2018-05-11 NOTE — DIETITIAN INITIAL EVALUATION ADULT. - ENERGY NEEDS
Height (cm): 157.48 (05-09)  Weight (kg): 65.2 (05-10)  BMI (kg/m2): 26.3 (05-10)  Ideal Body Weight: 50 kg +/- 10%  % Ideal Body Weight: 130%

## 2018-05-11 NOTE — PROGRESS NOTE ADULT - SUBJECTIVE AND OBJECTIVE BOX
INTERVAL HPI/OVERNIGHT EVENTS:      26 Y/O female w/ PMHx of mysasthenia gravis on physostigmine BIBEMS w/ SOB and weakness. As per significant other, pt c/o increasing weakness over 2 weeks. Today, pt c/o SOB, and EMS was notified. EMS found the pt w/ decreased responsiveness, was bagged, and placed on non rebreather. Upon arrival to ED, pt was found unresponsive, shallow breathing, and was intubated by ED attending for hypoxic respiratory failure. ICU called for further evaluation.     24 hour events:     CENTRAL LINE: [ ] YES [x ] NO  LOCATION:   DATE INSERTED:  REMOVE: [ ] YES [ ] NO  EXPLAIN:    SRIVASTAVA: [ ] YES [x ] NO    DATE INSERTED:  REMOVE:  [ ] YES [ ] NO  EXPLAIN:    A-LINE:  [ ] YES [ x] NO  LOCATION:   DATE INSERTED:  REMOVE:  [ ] YES [ ] NO  EXPLAIN:    REVIEW OF SYSTEMS: [x] Unable to obtain because:    ICU Vital Signs Last 24 Hrs  T(C): 37.1 (11 May 2018 15:00), Max: 38 (11 May 2018 04:00)  T(F): 98.8 (11 May 2018 15:00), Max: 100.4 (11 May 2018 04:00)  HR: 68 (11 May 2018 15:00) (65 - 138)  BP: 117/75 (11 May 2018 15:00) (96/55 - 135/92)  BP(mean): 84 (11 May 2018 15:00) (63 - 100)  ABP: --  ABP(mean): --  RR: 16 (11 May 2018 15:00) (14 - 20)  SpO2: 100% (11 May 2018 15:00) (97% - 100%)      ABG - ( 10 May 2018 08:56 )  pH, Arterial: x     pH, Blood: 7.33  /  pCO2: 43    /  pO2: 193   / HCO3: 22    / Base Excess: -3.1  /  SaO2: 98                  I&O's Detail    10 May 2018 07:01  -  11 May 2018 07:00  --------------------------------------------------------  IN:    propofol Infusion: 305 mL    propofol Infusion: 52.7 mL    Solution: 650 mL  Total IN: 1007.7 mL    OUT:    Indwelling Catheter - Urethral: 2835 mL  Total OUT: 2835 mL    Total NET: -1827.3 mL      11 May 2018 07:01  -  11 May 2018 16:06  --------------------------------------------------------  IN:    dexmedetomidine Infusion: 57.4 mL    Jevity: 120 mL    propofol Infusion: 29.4 mL    Solution: 650 mL  Total IN: 856.8 mL    OUT:    Indwelling Catheter - Urethral: 305 mL  Total OUT: 305 mL    Total NET: 551.8 mL          Mode: AC/ CMV (Assist Control/ Continuous Mandatory Ventilation)  RR (machine): 12  TV (machine): 350  FiO2: 30  PEEP: 5  ITime: 0.9  MAP: 8  PIP: 17    CAPILLARY BLOOD GLUCOSE          MEDICATIONS  NEURO  Meds: dexmedetomidine Infusion 0.7 MICROgram(s)/kG/Hr (13.965 mL/Hr) IV Continuous <Continuous>  propofol Infusion 5 MICROgram(s)/kG/Min (2.394 mL/Hr) IV Continuous <Continuous>    RESPIRATORY  ABG - ( 10 May 2018 08:56 )  pH: x     /  pCO2: 43    /  pO2: 193   / HCO3: 22    / Base Excess: -3.1  /  SaO2: 98      Lactate: x                Meds:   CARDIOVASCULAR  Meds:   GI/NUTRITION  Meds: pantoprazole  Injectable 40 milliGRAM(s) IV Push daily    GENITOURINARY  Meds:   HEMATOLOGIC  Meds: heparin  Injectable 5000 Unit(s) SubCutaneous every 12 hours    [x] VTE Prophylaxis  INFECTIOUS DISEASES  Meds:   ENDOCRINE  CAPILLARY BLOOD GLUCOSE        Meds: predniSONE   Tablet 60 milliGRAM(s) Oral daily    OTHER MEDICATIONS:  chlorhexidine 0.12% Liquid 15 milliLiter(s) Swish and Spit two times a day  :    PHYSICAL EXAM:    GENERAL: diffusely weak.    NECK: Supple, No JVD, Normal thyroid  CHEST/LUNG: Clear to auscultation bilaterally poor respiratory effort  HEART: Regular rate and rhythm; No murmurs, rubs, or gallops  ABDOMEN: Soft, Nontender, Nondistended; Bowel sounds present  EXTREMITIES:  2+ Peripheral Pulses, No clubbing, cyanosis, or edema  NERVOUS SYSTEM:  Alert & Oriented responds to commands bilaterally; diffusely weak.      LABS:                        11.0   7.29  )-----------( 249      ( 11 May 2018 03:42 )             34.2          141  |  110<H>  |  6<L>  ----------------------------<  68<L>  3.4<L>   |  24  |  0.62    Ca    8.4<L>      11 May 2018 03:42  Phos  3.2       Mg     2.0         TPro  8.9<H>  /  Alb  3.0<L>  /  TBili  1.1  /  DBili  x   /  AST  15  /  ALT  18  /  AlkPhos  87      PT/INR - ( 09 May 2018 23:30 )   PT: 12.0 sec;   INR: 1.10 ratio         PTT - ( 09 May 2018 23:30 )  PTT:28.2 sec  Urinalysis Basic - ( 10 May 2018 01:53 )    Color: Yellow / Appearance: Clear / S.005 / pH: x  Gluc: x / Ketone: Trace  / Bili: Negative / Urobili: Negative mg/dL   Blood: x / Protein: Negative mg/dL / Nitrite: Negative   Leuk Esterase: Negative / RBC: x / WBC x   Sq Epi: x / Non Sq Epi: x / Bacteria: x      Culture Results:   No growth to date. (05-10 @ 08:50)  Culture Results:   No growth (05-10 @ 08:37)      RADIOLOGY & ADDITIONAL STUDIES:    CULTURES

## 2018-05-12 LAB
ALBUMIN SERPL ELPH-MCNC: 2.7 G/DL — LOW (ref 3.3–5)
ALP SERPL-CCNC: 75 U/L — SIGNIFICANT CHANGE UP (ref 40–120)
ALT FLD-CCNC: 16 U/L — SIGNIFICANT CHANGE UP (ref 12–78)
ANION GAP SERPL CALC-SCNC: 5 MMOL/L — SIGNIFICANT CHANGE UP (ref 5–17)
AST SERPL-CCNC: 23 U/L — SIGNIFICANT CHANGE UP (ref 15–37)
BASOPHILS # BLD AUTO: 0.02 K/UL — SIGNIFICANT CHANGE UP (ref 0–0.2)
BASOPHILS NFR BLD AUTO: 0.3 % — SIGNIFICANT CHANGE UP (ref 0–2)
BILIRUB SERPL-MCNC: 1.2 MG/DL — SIGNIFICANT CHANGE UP (ref 0.2–1.2)
BUN SERPL-MCNC: 15 MG/DL — SIGNIFICANT CHANGE UP (ref 7–23)
CALCIUM SERPL-MCNC: 8.3 MG/DL — LOW (ref 8.5–10.1)
CHLORIDE SERPL-SCNC: 109 MMOL/L — HIGH (ref 96–108)
CO2 SERPL-SCNC: 27 MMOL/L — SIGNIFICANT CHANGE UP (ref 22–31)
CREAT SERPL-MCNC: 0.64 MG/DL — SIGNIFICANT CHANGE UP (ref 0.5–1.3)
EOSINOPHIL # BLD AUTO: 0.01 K/UL — SIGNIFICANT CHANGE UP (ref 0–0.5)
EOSINOPHIL NFR BLD AUTO: 0.1 % — SIGNIFICANT CHANGE UP (ref 0–6)
GLUCOSE SERPL-MCNC: 110 MG/DL — HIGH (ref 70–99)
HCT VFR BLD CALC: 29.4 % — LOW (ref 34.5–45)
HGB BLD-MCNC: 9.7 G/DL — LOW (ref 11.5–15.5)
IMM GRANULOCYTES NFR BLD AUTO: 0.3 % — SIGNIFICANT CHANGE UP (ref 0–1.5)
LYMPHOCYTES # BLD AUTO: 1.25 K/UL — SIGNIFICANT CHANGE UP (ref 1–3.3)
LYMPHOCYTES # BLD AUTO: 16.1 % — SIGNIFICANT CHANGE UP (ref 13–44)
MAGNESIUM SERPL-MCNC: 2.3 MG/DL — SIGNIFICANT CHANGE UP (ref 1.6–2.6)
MCHC RBC-ENTMCNC: 27.1 PG — SIGNIFICANT CHANGE UP (ref 27–34)
MCHC RBC-ENTMCNC: 33 GM/DL — SIGNIFICANT CHANGE UP (ref 32–36)
MCV RBC AUTO: 82.1 FL — SIGNIFICANT CHANGE UP (ref 80–100)
MONOCYTES # BLD AUTO: 0.75 K/UL — SIGNIFICANT CHANGE UP (ref 0–0.9)
MONOCYTES NFR BLD AUTO: 9.7 % — SIGNIFICANT CHANGE UP (ref 2–14)
NEUTROPHILS # BLD AUTO: 5.7 K/UL — SIGNIFICANT CHANGE UP (ref 1.8–7.4)
NEUTROPHILS NFR BLD AUTO: 73.5 % — SIGNIFICANT CHANGE UP (ref 43–77)
NRBC # BLD: 0 /100 WBCS — SIGNIFICANT CHANGE UP (ref 0–0)
PHOSPHATE SERPL-MCNC: 2.7 MG/DL — SIGNIFICANT CHANGE UP (ref 2.5–4.5)
PLATELET # BLD AUTO: 265 K/UL — SIGNIFICANT CHANGE UP (ref 150–400)
POTASSIUM SERPL-MCNC: 3.7 MMOL/L — SIGNIFICANT CHANGE UP (ref 3.5–5.3)
POTASSIUM SERPL-SCNC: 3.7 MMOL/L — SIGNIFICANT CHANGE UP (ref 3.5–5.3)
PROT SERPL-MCNC: 10 GM/DL — HIGH (ref 6–8.3)
RBC # BLD: 3.58 M/UL — LOW (ref 3.8–5.2)
RBC # FLD: 14.6 % — HIGH (ref 10.3–14.5)
SODIUM SERPL-SCNC: 141 MMOL/L — SIGNIFICANT CHANGE UP (ref 135–145)
WBC # BLD: 7.75 K/UL — SIGNIFICANT CHANGE UP (ref 3.8–10.5)
WBC # FLD AUTO: 7.75 K/UL — SIGNIFICANT CHANGE UP (ref 3.8–10.5)

## 2018-05-12 PROCEDURE — 99291 CRITICAL CARE FIRST HOUR: CPT

## 2018-05-12 PROCEDURE — 71045 X-RAY EXAM CHEST 1 VIEW: CPT | Mod: 26

## 2018-05-12 PROCEDURE — 71045 X-RAY EXAM CHEST 1 VIEW: CPT | Mod: 26,77

## 2018-05-12 RX ORDER — PYRIDOSTIGMINE BROMIDE 60 MG/5ML
60 SOLUTION ORAL EVERY 6 HOURS
Qty: 0 | Refills: 0 | Status: DISCONTINUED | OUTPATIENT
Start: 2018-05-12 | End: 2018-05-17

## 2018-05-12 RX ORDER — PANTOPRAZOLE SODIUM 20 MG/1
40 TABLET, DELAYED RELEASE ORAL DAILY
Qty: 0 | Refills: 0 | Status: DISCONTINUED | OUTPATIENT
Start: 2018-05-12 | End: 2018-05-21

## 2018-05-12 RX ORDER — IPRATROPIUM/ALBUTEROL SULFATE 18-103MCG
3 AEROSOL WITH ADAPTER (GRAM) INHALATION EVERY 4 HOURS
Qty: 0 | Refills: 0 | Status: DISCONTINUED | OUTPATIENT
Start: 2018-05-12 | End: 2018-05-19

## 2018-05-12 RX ORDER — ACETYLCYSTEINE 200 MG/ML
2 VIAL (ML) MISCELLANEOUS EVERY 8 HOURS
Qty: 0 | Refills: 0 | Status: DISCONTINUED | OUTPATIENT
Start: 2018-05-12 | End: 2018-05-16

## 2018-05-12 RX ORDER — IMMUNE GLOBULIN,GAMMA(IGG) 5 %
65 VIAL (ML) INTRAVENOUS ONCE
Qty: 0 | Refills: 0 | Status: DISCONTINUED | OUTPATIENT
Start: 2018-05-12 | End: 2018-05-12

## 2018-05-12 RX ADMIN — Medication 2 MILLILITER(S): at 14:54

## 2018-05-12 RX ADMIN — CHLORHEXIDINE GLUCONATE 15 MILLILITER(S): 213 SOLUTION TOPICAL at 06:37

## 2018-05-12 RX ADMIN — DEXMEDETOMIDINE HYDROCHLORIDE IN 0.9% SODIUM CHLORIDE 13.96 MICROGRAM(S)/KG/HR: 4 INJECTION INTRAVENOUS at 21:36

## 2018-05-12 RX ADMIN — Medication 3 MILLILITER(S): at 09:44

## 2018-05-12 RX ADMIN — PANTOPRAZOLE SODIUM 40 MILLIGRAM(S): 20 TABLET, DELAYED RELEASE ORAL at 12:38

## 2018-05-12 RX ADMIN — CHLORHEXIDINE GLUCONATE 1 APPLICATION(S): 213 SOLUTION TOPICAL at 06:37

## 2018-05-12 RX ADMIN — CHLORHEXIDINE GLUCONATE 15 MILLILITER(S): 213 SOLUTION TOPICAL at 17:37

## 2018-05-12 RX ADMIN — HEPARIN SODIUM 5000 UNIT(S): 5000 INJECTION INTRAVENOUS; SUBCUTANEOUS at 17:37

## 2018-05-12 RX ADMIN — Medication 60 MILLIGRAM(S): at 06:38

## 2018-05-12 RX ADMIN — Medication 2 MILLILITER(S): at 21:00

## 2018-05-12 RX ADMIN — DEXMEDETOMIDINE HYDROCHLORIDE IN 0.9% SODIUM CHLORIDE 13.96 MICROGRAM(S)/KG/HR: 4 INJECTION INTRAVENOUS at 07:00

## 2018-05-12 RX ADMIN — DEXMEDETOMIDINE HYDROCHLORIDE IN 0.9% SODIUM CHLORIDE 13.96 MICROGRAM(S)/KG/HR: 4 INJECTION INTRAVENOUS at 12:40

## 2018-05-12 RX ADMIN — Medication 3 MILLILITER(S): at 14:53

## 2018-05-12 RX ADMIN — HEPARIN SODIUM 5000 UNIT(S): 5000 INJECTION INTRAVENOUS; SUBCUTANEOUS at 06:38

## 2018-05-12 RX ADMIN — Medication 3 MILLILITER(S): at 17:44

## 2018-05-12 RX ADMIN — PYRIDOSTIGMINE BROMIDE 60 MILLIGRAM(S): 60 SOLUTION ORAL at 23:06

## 2018-05-12 RX ADMIN — PROPOFOL 2.39 MICROGRAM(S)/KG/MIN: 10 INJECTION, EMULSION INTRAVENOUS at 22:50

## 2018-05-12 RX ADMIN — Medication 3 MILLILITER(S): at 21:00

## 2018-05-12 NOTE — PROGRESS NOTE ADULT - SUBJECTIVE AND OBJECTIVE BOX
HPI:  Pt is a 26 yo BF with h/o myasthenia gravis on Physostigmine BIBEMS 2 to SOB and weakness. As per significant other, pt with increasing weakness over 2 weeks and EMS was called 2 to SOB. EMS found the pt with decreased responsiveness; pt was bagged, and placed on non rebreather. In the ER, pt was found unresponsive, hypoxic with shallow breathing; s/p intubation. This am pt was hypoxic and CXR revealed L lung atelectasis    ## Labs:  CBC:                        9.7    7.75  )-----------( 265      ( 12 May 2018 04:36 )             29.4     Chem:  -    141  |  109<H>  |  15  ----------------------------<  110<H>  3.7   |  27  |  0.64    Ca    8.3<L>      12 May 2018 04:36  Phos  2.7     05  Mg     2.3         TPro  10.0<H>  /  Alb  2.7<L>  /  TBili  1.2  /  DBili  x   /  AST  23  /  ALT  16  /  AlkPhos  75  05-12    Coags:          ## Imaging:    ## Medications:      acetylcysteine 20% Inhalation 2 milliLiter(s) Inhalation every 8 hours  ALBUTerol/ipratropium for Nebulization 3 milliLiter(s) Nebulizer every 4 hours    predniSONE   Tablet 60 milliGRAM(s) Oral daily    heparin  Injectable 5000 Unit(s) SubCutaneous every 12 hours    pantoprazole   Suspension 40 milliGRAM(s) Oral daily    dexmedetomidine Infusion 0.7 MICROgram(s)/kG/Hr IV Continuous <Continuous>  propofol Infusion 5 MICROgram(s)/kG/Min IV Continuous <Continuous>      ## Vitals:  T(C): 37.6 (18 @ 19:53), Max: 37.7 (18 @ 16:00)  HR: 118 (18 @ 19:00) (61 - 129)  BP: 112/74 (18 @ 19:00) (98/55 - 138/96)  BP(mean): 81 (18 @ 19:00) (65 - 105)  RR: 21 (18 @ 19:00) (12 - 31)  SpO2: 100% (18 @ 19:00) (88% - 100%)  Wt(kg): --  Vent: Mode: AC/ CMV (Assist Control/ Continuous Mandatory Ventilation), RR (machine): 12, RR (patient): 20, TV (machine): 350, FiO2: 50, PEEP: 5, PIP: 20  AB-11 @ 07:01  -   @ 07:00  --------------------------------------------------------  IN: 2349.7 mL / OUT: 975 mL / NET: 1374.7 mL     @ 07:01  -   @ 20:40  --------------------------------------------------------  IN: 740.2 mL / OUT: 395 mL / NET: 345.2 mL          ## P/E:  Gen: lying comfortably in bed in no apparent distress  Mouth: (+) ETT  Lungs: Rhonchi R>>L  Heart: RRR  Abd: Soft/+BS  Ext: No sign edema  Neuro: Awake, alert with weak grasp    CENTRAL LINE: [ ] YES [ ] NO  LOCATION:   DATE INSERTED:  REMOVE: [ ] YES [ ] NO      SRIVASTAVA: [ x] YES [ ] NO    DATE INSERTED:  REMOVE:  [ ] YES [ ] NO      A-LINE:  [ ] YES [ ] NO  LOCATION:   DATE INSERTED:  REMOVE:  [ ] YES [ ] NO  EXPLAIN:    CODE STATUS: [x ] full code  [ ] DNR  [ ] DNI  [ ] [facility]  Goals of care discussion: [ ] yes

## 2018-05-13 LAB
ANION GAP SERPL CALC-SCNC: 7 MMOL/L — SIGNIFICANT CHANGE UP (ref 5–17)
BUN SERPL-MCNC: 17 MG/DL — SIGNIFICANT CHANGE UP (ref 7–23)
CALCIUM SERPL-MCNC: 8.2 MG/DL — LOW (ref 8.5–10.1)
CHLORIDE SERPL-SCNC: 109 MMOL/L — HIGH (ref 96–108)
CO2 SERPL-SCNC: 27 MMOL/L — SIGNIFICANT CHANGE UP (ref 22–31)
CREAT SERPL-MCNC: 0.65 MG/DL — SIGNIFICANT CHANGE UP (ref 0.5–1.3)
GLUCOSE SERPL-MCNC: 119 MG/DL — HIGH (ref 70–99)
HCT VFR BLD CALC: 29.9 % — LOW (ref 34.5–45)
HGB BLD-MCNC: 9.8 G/DL — LOW (ref 11.5–15.5)
MAGNESIUM SERPL-MCNC: 2.4 MG/DL — SIGNIFICANT CHANGE UP (ref 1.6–2.6)
MCHC RBC-ENTMCNC: 26.9 PG — LOW (ref 27–34)
MCHC RBC-ENTMCNC: 32.8 GM/DL — SIGNIFICANT CHANGE UP (ref 32–36)
MCV RBC AUTO: 82.1 FL — SIGNIFICANT CHANGE UP (ref 80–100)
NRBC # BLD: 0 /100 WBCS — SIGNIFICANT CHANGE UP (ref 0–0)
PHOSPHATE SERPL-MCNC: 2.9 MG/DL — SIGNIFICANT CHANGE UP (ref 2.5–4.5)
PLATELET # BLD AUTO: 282 K/UL — SIGNIFICANT CHANGE UP (ref 150–400)
POTASSIUM SERPL-MCNC: 3.4 MMOL/L — LOW (ref 3.5–5.3)
POTASSIUM SERPL-SCNC: 3.4 MMOL/L — LOW (ref 3.5–5.3)
RBC # BLD: 3.64 M/UL — LOW (ref 3.8–5.2)
RBC # FLD: 14.3 % — SIGNIFICANT CHANGE UP (ref 10.3–14.5)
SODIUM SERPL-SCNC: 143 MMOL/L — SIGNIFICANT CHANGE UP (ref 135–145)
WBC # BLD: 9.6 K/UL — SIGNIFICANT CHANGE UP (ref 3.8–10.5)
WBC # FLD AUTO: 9.6 K/UL — SIGNIFICANT CHANGE UP (ref 3.8–10.5)

## 2018-05-13 PROCEDURE — 71045 X-RAY EXAM CHEST 1 VIEW: CPT | Mod: 26

## 2018-05-13 PROCEDURE — 99291 CRITICAL CARE FIRST HOUR: CPT

## 2018-05-13 RX ORDER — IMMUNE GLOBULIN,GAMMA(IGG) 5 %
65 VIAL (ML) INTRAVENOUS ONCE
Qty: 0 | Refills: 0 | Status: COMPLETED | OUTPATIENT
Start: 2018-05-13 | End: 2018-05-13

## 2018-05-13 RX ORDER — POTASSIUM CHLORIDE 20 MEQ
10 PACKET (EA) ORAL ONCE
Qty: 0 | Refills: 0 | Status: COMPLETED | OUTPATIENT
Start: 2018-05-13 | End: 2018-05-13

## 2018-05-13 RX ORDER — ACETAMINOPHEN 500 MG
650 TABLET ORAL EVERY 6 HOURS
Qty: 0 | Refills: 0 | Status: DISCONTINUED | OUTPATIENT
Start: 2018-05-13 | End: 2018-05-24

## 2018-05-13 RX ADMIN — PROPOFOL 2.39 MICROGRAM(S)/KG/MIN: 10 INJECTION, EMULSION INTRAVENOUS at 18:19

## 2018-05-13 RX ADMIN — Medication 2 MILLILITER(S): at 14:19

## 2018-05-13 RX ADMIN — Medication 3 MILLILITER(S): at 14:20

## 2018-05-13 RX ADMIN — Medication 650 MILLIGRAM(S): at 21:04

## 2018-05-13 RX ADMIN — Medication 2 MILLILITER(S): at 05:55

## 2018-05-13 RX ADMIN — PYRIDOSTIGMINE BROMIDE 60 MILLIGRAM(S): 60 SOLUTION ORAL at 11:56

## 2018-05-13 RX ADMIN — Medication 3 MILLILITER(S): at 10:27

## 2018-05-13 RX ADMIN — CHLORHEXIDINE GLUCONATE 15 MILLILITER(S): 213 SOLUTION TOPICAL at 06:12

## 2018-05-13 RX ADMIN — PROPOFOL 2.39 MICROGRAM(S)/KG/MIN: 10 INJECTION, EMULSION INTRAVENOUS at 10:20

## 2018-05-13 RX ADMIN — PYRIDOSTIGMINE BROMIDE 60 MILLIGRAM(S): 60 SOLUTION ORAL at 23:20

## 2018-05-13 RX ADMIN — Medication 3 MILLILITER(S): at 01:09

## 2018-05-13 RX ADMIN — PYRIDOSTIGMINE BROMIDE 60 MILLIGRAM(S): 60 SOLUTION ORAL at 06:09

## 2018-05-13 RX ADMIN — CHLORHEXIDINE GLUCONATE 1 APPLICATION(S): 213 SOLUTION TOPICAL at 06:09

## 2018-05-13 RX ADMIN — Medication 100 MILLIEQUIVALENT(S): at 06:22

## 2018-05-13 RX ADMIN — CHLORHEXIDINE GLUCONATE 15 MILLILITER(S): 213 SOLUTION TOPICAL at 17:37

## 2018-05-13 RX ADMIN — PYRIDOSTIGMINE BROMIDE 60 MILLIGRAM(S): 60 SOLUTION ORAL at 17:41

## 2018-05-13 RX ADMIN — HEPARIN SODIUM 5000 UNIT(S): 5000 INJECTION INTRAVENOUS; SUBCUTANEOUS at 17:35

## 2018-05-13 RX ADMIN — Medication 3 MILLILITER(S): at 05:55

## 2018-05-13 RX ADMIN — Medication 60 MILLIGRAM(S): at 06:08

## 2018-05-13 RX ADMIN — DEXMEDETOMIDINE HYDROCHLORIDE IN 0.9% SODIUM CHLORIDE 13.96 MICROGRAM(S)/KG/HR: 4 INJECTION INTRAVENOUS at 06:00

## 2018-05-13 RX ADMIN — PANTOPRAZOLE SODIUM 40 MILLIGRAM(S): 20 TABLET, DELAYED RELEASE ORAL at 11:56

## 2018-05-13 RX ADMIN — Medication 150 GRAM(S): at 20:20

## 2018-05-13 RX ADMIN — Medication 3 MILLILITER(S): at 17:30

## 2018-05-13 RX ADMIN — Medication 650 MILLIGRAM(S): at 21:47

## 2018-05-13 RX ADMIN — DEXMEDETOMIDINE HYDROCHLORIDE IN 0.9% SODIUM CHLORIDE 13.96 MICROGRAM(S)/KG/HR: 4 INJECTION INTRAVENOUS at 18:18

## 2018-05-13 RX ADMIN — HEPARIN SODIUM 5000 UNIT(S): 5000 INJECTION INTRAVENOUS; SUBCUTANEOUS at 06:08

## 2018-05-13 RX ADMIN — DEXMEDETOMIDINE HYDROCHLORIDE IN 0.9% SODIUM CHLORIDE 13.96 MICROGRAM(S)/KG/HR: 4 INJECTION INTRAVENOUS at 11:27

## 2018-05-13 NOTE — PROGRESS NOTE ADULT - SUBJECTIVE AND OBJECTIVE BOX
HPI:  Pt is a 28 yo BF with h/o myasthenia gravis on Physostigmine BIBEMS 2 to SOB and weakness. As per significant other, pt with increasing weakness over 2 weeks and EMS was called 2 to SOB. EMS found the pt with decreased responsiveness; pt was bagged, and placed on non rebreather. In the ER, pt was found unresponsive, hypoxic with shallow breathing; s/p intubation. Admitting dx: Hypoxic resp failure 2 to MG crisis.  am pt was hypoxic and CXR revealed L lung atelectasis; re-expanded (with CPT + Mucomyst) but not completely. Today pt is clearly stronger.      ## Labs:  CBC:                        9.8    9.60  )-----------( 282      ( 13 May 2018 04:15 )             29.9     Chem:      143  |  109<H>  |  17  ----------------------------<  119<H>  3.4<L>   |  27  |  0.65    Ca    8.2<L>      13 May 2018 04:15  Phos  2.9       Mg     2.4         TPro  10.0<H>  /  Alb  2.7<L>  /  TBili  1.2  /  DBili  x   /  AST  23  /  ALT  16  /  AlkPhos  75      Coags:          ## Imaging:    ## Medications:      acetylcysteine 20% Inhalation 2 milliLiter(s) Inhalation every 8 hours  ALBUTerol/ipratropium for Nebulization 3 milliLiter(s) Nebulizer every 4 hours    predniSONE   Tablet 60 milliGRAM(s) Oral daily    heparin  Injectable 5000 Unit(s) SubCutaneous every 12 hours    pantoprazole   Suspension 40 milliGRAM(s) Oral daily    dexmedetomidine Infusion 0.7 MICROgram(s)/kG/Hr IV Continuous <Continuous>  propofol Infusion 5 MICROgram(s)/kG/Min IV Continuous <Continuous>      ## Vitals:  T(C): 37.4 (18 @ 16:12), Max: 37.7 (18 @ 06:00)  HR: 87 (18 @ 16:00) (65 - 147)  BP: 93/59 (18 @ 16:00) (91/63 - 152/110)  BP(mean): 67 (18 @ 16:00) (66 - 119)  RR: 19 (18 @ 16:00) (12 - 25)  SpO2: 100% (18 @ 16:00) (94% - 100%)  Wt(kg): --  Vent: Mode: AC/ CMV (Assist Control/ Continuous Mandatory Ventilation), RR (machine): 12, RR (patient): 29, TV (machine): 350, FiO2: 40, PEEP: 5, PIP: 21  AB-12 @ 07:01  -   @ 07:00  --------------------------------------------------------  IN: 1822.4 mL / OUT: 775 mL / NET: 1047.4 mL     @ 07:01  -   @ 17:30  --------------------------------------------------------  IN: 685.5 mL / OUT: 245 mL / NET: 440.5 mL          ## P/E:  Gen: lying comfortably in bed in no apparent distress  Mouth: (+) ETT  Lungs: R CTA/ L BS decreased but pt lying toward L side  Heart: RRR  Abd: Soft/+BS  Ext: No sign edema  Neuro: Awake, alert with much strong grasp and lifting arms    CENTRAL LINE: [ ] YES [ ] NO  LOCATION:   DATE INSERTED:  REMOVE: [ ] YES [ ] NO      SRIVASTAVA: [ ] YES [ ] NO    DATE INSERTED:  REMOVE:  [ ] YES [ ] NO      A-LINE:  [ ] YES [ ] NO  LOCATION:   DATE INSERTED:  REMOVE:  [ ] YES [ ] NO  EXPLAIN:    CODE STATUS: [x ] full code  [ ] DNR  [ ] DNI  [ ] [facility]  Goals of care discussion: [ ] yes

## 2018-05-14 LAB
ANION GAP SERPL CALC-SCNC: 4 MMOL/L — LOW (ref 5–17)
APPEARANCE UR: CLEAR — SIGNIFICANT CHANGE UP
BILIRUB UR-MCNC: NEGATIVE — SIGNIFICANT CHANGE UP
BUN SERPL-MCNC: 20 MG/DL — SIGNIFICANT CHANGE UP (ref 7–23)
CALCIUM SERPL-MCNC: 8.1 MG/DL — LOW (ref 8.5–10.1)
CHLORIDE SERPL-SCNC: 110 MMOL/L — HIGH (ref 96–108)
CO2 SERPL-SCNC: 28 MMOL/L — SIGNIFICANT CHANGE UP (ref 22–31)
COLOR SPEC: YELLOW — SIGNIFICANT CHANGE UP
CREAT SERPL-MCNC: 0.78 MG/DL — SIGNIFICANT CHANGE UP (ref 0.5–1.3)
DIFF PNL FLD: NEGATIVE — SIGNIFICANT CHANGE UP
EPI CELLS # UR: SIGNIFICANT CHANGE UP
GLUCOSE SERPL-MCNC: 121 MG/DL — HIGH (ref 70–99)
GLUCOSE UR QL: NEGATIVE MG/DL — SIGNIFICANT CHANGE UP
HCT VFR BLD CALC: 26.4 % — LOW (ref 34.5–45)
HGB BLD-MCNC: 8.6 G/DL — LOW (ref 11.5–15.5)
KETONES UR-MCNC: NEGATIVE — SIGNIFICANT CHANGE UP
LEUKOCYTE ESTERASE UR-ACNC: ABNORMAL
MAGNESIUM SERPL-MCNC: 2.5 MG/DL — SIGNIFICANT CHANGE UP (ref 1.6–2.6)
MCHC RBC-ENTMCNC: 27.8 PG — SIGNIFICANT CHANGE UP (ref 27–34)
MCHC RBC-ENTMCNC: 32.6 GM/DL — SIGNIFICANT CHANGE UP (ref 32–36)
MCV RBC AUTO: 85.4 FL — SIGNIFICANT CHANGE UP (ref 80–100)
NITRITE UR-MCNC: NEGATIVE — SIGNIFICANT CHANGE UP
NRBC # BLD: 0 /100 WBCS — SIGNIFICANT CHANGE UP (ref 0–0)
PH UR: 6 — SIGNIFICANT CHANGE UP (ref 5–8)
PHOSPHATE SERPL-MCNC: 2.8 MG/DL — SIGNIFICANT CHANGE UP (ref 2.5–4.5)
PLATELET # BLD AUTO: 295 K/UL — SIGNIFICANT CHANGE UP (ref 150–400)
POTASSIUM SERPL-MCNC: 3.1 MMOL/L — LOW (ref 3.5–5.3)
POTASSIUM SERPL-SCNC: 3.1 MMOL/L — LOW (ref 3.5–5.3)
PROT UR-MCNC: 100 MG/DL
RBC # BLD: 3.09 M/UL — LOW (ref 3.8–5.2)
RBC # FLD: 14.6 % — HIGH (ref 10.3–14.5)
SODIUM SERPL-SCNC: 142 MMOL/L — SIGNIFICANT CHANGE UP (ref 135–145)
SP GR SPEC: 1.01 — SIGNIFICANT CHANGE UP (ref 1.01–1.02)
UROBILINOGEN FLD QL: 8 MG/DL
WBC # BLD: 13.81 K/UL — HIGH (ref 3.8–10.5)
WBC # FLD AUTO: 13.81 K/UL — HIGH (ref 3.8–10.5)
WBC UR QL: SIGNIFICANT CHANGE UP

## 2018-05-14 PROCEDURE — 71045 X-RAY EXAM CHEST 1 VIEW: CPT | Mod: 26

## 2018-05-14 PROCEDURE — 99291 CRITICAL CARE FIRST HOUR: CPT | Mod: 25

## 2018-05-14 PROCEDURE — 31624 DX BRONCHOSCOPE/LAVAGE: CPT

## 2018-05-14 RX ORDER — FENTANYL CITRATE 50 UG/ML
50 INJECTION INTRAVENOUS ONCE
Qty: 0 | Refills: 0 | Status: DISCONTINUED | OUTPATIENT
Start: 2018-05-14 | End: 2018-05-14

## 2018-05-14 RX ORDER — POTASSIUM CHLORIDE 20 MEQ
10 PACKET (EA) ORAL
Qty: 0 | Refills: 0 | Status: COMPLETED | OUTPATIENT
Start: 2018-05-14 | End: 2018-05-14

## 2018-05-14 RX ORDER — POTASSIUM CHLORIDE 20 MEQ
40 PACKET (EA) ORAL ONCE
Qty: 0 | Refills: 0 | Status: COMPLETED | OUTPATIENT
Start: 2018-05-14 | End: 2018-05-14

## 2018-05-14 RX ADMIN — PYRIDOSTIGMINE BROMIDE 60 MILLIGRAM(S): 60 SOLUTION ORAL at 05:31

## 2018-05-14 RX ADMIN — FENTANYL CITRATE 50 MICROGRAM(S): 50 INJECTION INTRAVENOUS at 18:05

## 2018-05-14 RX ADMIN — Medication 2 MILLILITER(S): at 05:20

## 2018-05-14 RX ADMIN — Medication 100 MILLIEQUIVALENT(S): at 07:47

## 2018-05-14 RX ADMIN — Medication 650 MILLIGRAM(S): at 13:30

## 2018-05-14 RX ADMIN — PROPOFOL 2.39 MICROGRAM(S)/KG/MIN: 10 INJECTION, EMULSION INTRAVENOUS at 15:57

## 2018-05-14 RX ADMIN — CHLORHEXIDINE GLUCONATE 1 APPLICATION(S): 213 SOLUTION TOPICAL at 05:35

## 2018-05-14 RX ADMIN — PROPOFOL 2.39 MICROGRAM(S)/KG/MIN: 10 INJECTION, EMULSION INTRAVENOUS at 11:05

## 2018-05-14 RX ADMIN — Medication 100 MILLIEQUIVALENT(S): at 10:23

## 2018-05-14 RX ADMIN — PYRIDOSTIGMINE BROMIDE 60 MILLIGRAM(S): 60 SOLUTION ORAL at 17:00

## 2018-05-14 RX ADMIN — Medication 100 MILLIEQUIVALENT(S): at 08:59

## 2018-05-14 RX ADMIN — Medication 3 MILLILITER(S): at 01:40

## 2018-05-14 RX ADMIN — PYRIDOSTIGMINE BROMIDE 60 MILLIGRAM(S): 60 SOLUTION ORAL at 11:47

## 2018-05-14 RX ADMIN — CHLORHEXIDINE GLUCONATE 15 MILLILITER(S): 213 SOLUTION TOPICAL at 05:33

## 2018-05-14 RX ADMIN — DEXMEDETOMIDINE HYDROCHLORIDE IN 0.9% SODIUM CHLORIDE 13.96 MICROGRAM(S)/KG/HR: 4 INJECTION INTRAVENOUS at 12:42

## 2018-05-14 RX ADMIN — Medication 3 MILLILITER(S): at 13:21

## 2018-05-14 RX ADMIN — Medication 3 MILLILITER(S): at 05:22

## 2018-05-14 RX ADMIN — Medication 2 MILLILITER(S): at 22:11

## 2018-05-14 RX ADMIN — PANTOPRAZOLE SODIUM 40 MILLIGRAM(S): 20 TABLET, DELAYED RELEASE ORAL at 11:47

## 2018-05-14 RX ADMIN — Medication 3 MILLILITER(S): at 09:07

## 2018-05-14 RX ADMIN — CHLORHEXIDINE GLUCONATE 15 MILLILITER(S): 213 SOLUTION TOPICAL at 17:00

## 2018-05-14 RX ADMIN — Medication 3 MILLILITER(S): at 17:18

## 2018-05-14 RX ADMIN — HEPARIN SODIUM 5000 UNIT(S): 5000 INJECTION INTRAVENOUS; SUBCUTANEOUS at 17:00

## 2018-05-14 RX ADMIN — Medication 40 MILLIEQUIVALENT(S): at 10:24

## 2018-05-14 RX ADMIN — Medication 650 MILLIGRAM(S): at 13:03

## 2018-05-14 RX ADMIN — FENTANYL CITRATE 50 MICROGRAM(S): 50 INJECTION INTRAVENOUS at 17:50

## 2018-05-14 RX ADMIN — Medication 60 MILLIGRAM(S): at 05:31

## 2018-05-14 RX ADMIN — Medication 2 MILLILITER(S): at 13:21

## 2018-05-14 RX ADMIN — HEPARIN SODIUM 5000 UNIT(S): 5000 INJECTION INTRAVENOUS; SUBCUTANEOUS at 05:30

## 2018-05-14 RX ADMIN — Medication 3 MILLILITER(S): at 22:11

## 2018-05-14 NOTE — PROGRESS NOTE ADULT - SUBJECTIVE AND OBJECTIVE BOX
HPI:  26 Y/O female PMHx of mysasthenia gravis on physostigmine BIBEMS w/ SOB and weakness. As per significant other, pt c/o increasing weakness over 2 weeks. EMS found the pt w/ decreased responsiveness, was bagged, and placed on non rebreather. Upon arrival to ED, pt was found unresponsive, shallow breathing, and was intubated by ED attending for hypoxic respiratory failure. 5/12 with hypoxia on mechanical ventilation. CXR with left hemithorax opacification from mucus plugging.     24 hr events:  became hypoxic on wean today  thick yellow secretions from ETT  able to follow commands on light sedation, moving all extremities  febrile  WBC slightly elevated   s/p IVIG 5/10, 5/11, 5/13    ## ROS:  [x] limited due to intubation  no chills  + fever  no pain  + menses  no SOB      ## Labs:  CBC:                        8.6    13.81 )-----------( 295      ( 14 May 2018 04:41 )             26.4     Chem:  05-14    142  |  110<H>  |  20  ----------------------------<  121<H>  3.1<L>   |  28  |  0.78    Ca    8.1<L>      14 May 2018 04:41  Phos  2.8     05-14  Mg     2.5     05-14    Culture - Blood (05.10.18 @ 18:26)    Specimen Source: .Blood Blood    Culture Results: No growth to date.    Culture - Blood (05.10.18 @ 08:50)    Specimen Source: .Blood Blood    Culture Results: No growth to date.    Culture - Urine (05.10.18 @ 08:37)    Specimen Source: .Urine Clean Catch (Midstream)    Culture Results: No growth      ## Imaging:  CXR < from: Xray Chest 1 View- PORTABLE-Urgent (05.14.18 @ 09:04) >  There is an atelectatic process involving the left lower lobe with some   mild loss of volume of the left chest.      ## Medications:  levoFLOXacin IVPB        acetylcysteine 20% Inhalation 2 milliLiter(s) Inhalation every 8 hours  ALBUTerol/ipratropium for Nebulization 3 milliLiter(s) Nebulizer every 4 hours    predniSONE   Tablet 60 milliGRAM(s) Oral daily    heparin  Injectable 5000 Unit(s) SubCutaneous every 12 hours    pantoprazole   Suspension 40 milliGRAM(s) Oral daily    acetaminophen    Suspension. 650 milliGRAM(s) Oral every 6 hours PRN  dexmedetomidine Infusion 0.7 MICROgram(s)/kG/Hr IV Continuous <Continuous>  propofol Infusion 5 MICROgram(s)/kG/Min IV Continuous <Continuous>      ## Vitals:  T(C): 38 (05-14-18 @ 15:51), Max: 38.1 (05-14-18 @ 12:30)  HR: 104 (05-14-18 @ 18:30) (78 - 137)  BP: 101/56 (05-14-18 @ 18:30) (99/63 - 145/102)  BP(mean): 67 (05-14-18 @ 18:30) (66 - 115)  RR: 30 (05-14-18 @ 18:30) (15 - 33)  SpO2: 97% (05-14-18 @ 18:30) (93% - 100%)  Vent: Mode: AC/ CMV (Assist Control/ Continuous Mandatory Ventilation), RR (machine): 12, RR (patient): 22, TV (machine): 350, FiO2: 40, PEEP: 5, PIP: 18        05-13 @ 07:01  -  05-14 @ 07:00  --------------------------------------------------------  IN: 2494.2 mL / OUT: 840 mL / NET: 1654.2 mL    05-14 @ 07:01  -  05-14 @ 19:33  --------------------------------------------------------  IN: 1313.1 mL / OUT: 150 mL / NET: 1163.1 mL        ## P/E:  Gen: lying comfortably in bed in no apparent distress  HEENT: left ptosis, copious oral secretions  Resp: scattered rhonchi bilaterally, no wheeze, no rhonchi  CVS: S1S2 RRR  Abd: soft NT/ND +BS  Ext: no c/c/e  Neuro: follows commands, writing on paper making needs known, extremity strength improving, still with left eye ptosis    CENTRAL LINE: [ ] YES [x] NO  LOCATION:   DATE INSERTED:  REMOVE: [ ] YES [ ] NO      SRIVASTAVA: [ ] YES [x] NO    DATE INSERTED:  REMOVE:  [ ] YES [ ] NO      A-LINE:  [ ] YES [x] NO  LOCATION:   DATE INSERTED:  REMOVE:  [ ] YES [ ] NO  EXPLAIN:    GLOBAL ISSUE/BEST PRACTICE:  Analgesia: n/a  Sedation: precedex, propofol  HOB elevation: yes  Stress ulcer prophylaxis: protonix  VTE prophylaxis: heparin sc  Oral Care: chlorhexidine   Glycemic control: n/a  Nutrition: jevity    CODE STATUS: [x] full code  [ ] DNR  [ ] DNI  [ ] [facility]  Goals of care discussion: [ ] yes

## 2018-05-14 NOTE — PROCEDURE NOTE - ADDITIONAL PROCEDURE DETAILS
Findings:  Bronchoscope inserted through ETT. ETT noted to be in good position. Airway evaluation revealed Sharp Jana. ISAURA and LLL evaluation revealed copious secretions noted in the left main bronchus.  BAL was obtained with 20cc normal saline flush. ISAURA and LLL were noted to have normal mucosa with thick secretions.  RUL, RML, RLL revealed some secretions in the RLL, otherwise normal mucosa was observed.  Bronchoscope then withdrawn from ETT. Minimal bleeding noted.  BAL samples were sent for culture and patient was started on antibiotics.      [facility] [place] [facility]    Lot [id number] REF [id number]    Specimens: BAL x 2 Findings:  Bronchoscope inserted through ETT. ETT noted to be in good position above devan. Airway evaluation revealed Sharp Devan. Airway examined to subsegmental levels. ISAURA and LLL evaluation showed copious thick secretions noted in the left main bronchus.  BAL was obtained with 20cc normal saline flush. ISAURA and LLL were noted to have normal mucosa with thick secretions.  RUL, RML, RLL revealed some secretions in the RLL, otherwise normal mucosa was observed.  Bronchoscope then withdrawn from ETT. Minimal bleeding noted.  BAL samples were sent for culture and patient was started on antibiotics.      [facility] [place]    Lot [id number] REF [id number]    Specimens: BAL x 2

## 2018-05-14 NOTE — PROGRESS NOTE ADULT - SUBJECTIVE AND OBJECTIVE BOX
Subjective Complaints:  Historian:  27 y o woman with h/o myasthenia gravis admitted for myasthenia crisis ,received ivig treatment with improvement     REVIEW OF SYSTEMS:  Eyes:  Good vision, no reported pain  ENT:  No sore throat, pain, runny nose, dysphagia  CV:  No pain, palpitatioins, hypo/hypertension  Resp:  No dyspnea, cough, tachypnea, wheezing  GI:  No pain, nausea, vomiting, diarrhea, constipatiion  Muscle:  No pain, weakness  Neuro:  No weakness, tingling, memory problems  Psych:  No fatigue, insomnia, mood problems, depression  Endocrine:  No polyuria, polydypsia, cold/heat intolerance    Vital Signs Last 24 Hrs  T(C): 38 (14 May 2018 07:07), Max: 38 (13 May 2018 19:59)  T(F): 100.4 (14 May 2018 07:07), Max: 100.4 (13 May 2018 19:59)  HR: 101 (14 May 2018 11:44) (75 - 141)  BP: 119/80 (14 May 2018 09:00) (91/63 - 145/102)  BP(mean): 88 (14 May 2018 09:00) (66 - 115)  RR: 21 (14 May 2018 09:00) (16 - 33)  SpO2: 100% (14 May 2018 11:44) (93% - 100%)    GENERAL PHYSICAL EXAM:  General:  Appears stated age, well-groomed, well-nourished, no distress  HEENT:  NC/AT, patent nares w/ pink mucosa, OP clear w/o lesions, PERRL, EOMI, conjunctivae clear, no thyromegaly, nodules, adenopathy, no JVD  Chest:  Full & symmetric excursion, no increased effort, breath sounds clear  Cardiovascular:  Regular rhythm, S1, S2, no murmur/rub/S3/S4, no carotid/femoral/abdominal bruit, radial/pedal pulses 2+, no edema  Abdomen:  Soft, non-tender, non-distended, normoactive bowel sounds, no HSM  Extremities:  Gait & station:   Digits:   Nails:   Joints, Bones, Muscles:    Skin:  No rash/erythema/ecchymoses/petechiae/wounds/abscess/warm/dry  Musculoskeletal: generalized weakness    NEUROLOGICAL EXAM:  HENT:  Normocephalic head; atraumatic head.  Neck supple.  ENT: normal looking.  Mental State:    lethargic ,intubated   oriented to person, place and  Coherent.no  Speech production but able to follow commands  Cooperative.  Responds appropriately.    Cranial Nerves:  II-XII:   Pupils round and reactive to light and accommodation.  Extraocular movements are limited  Visual fields full (no homonymous hemianopsia).  Visual acuity wnl.  Facial symmetry intact.  Tongue midline.Bifacial weakness  Motor Functions:  Motor strength is 4/5 bot motor strength declined on effort   Sensory Functions:   Intact to touch and pinprick to face and extremities.    Reflexes:  Deep tendon reflexes normoactive to biceps, knees and ankles.  Babinski absent (present).  Cerebellar Testing:    Finger to nose intact.  Nystagmus absent.  Neurovascular: Carotid auscultation full without bruits.      LABS:                        8.6    13.81 )-----------( 295      ( 14 May 2018 04:41 )             26.4     05-14    142  |  110<H>  |  20  ----------------------------<  121<H>  3.1<L>   |  28  |  0.78    Ca    8.1<L>      14 May 2018 04:41  Phos  2.8     -  Mg     2.5     -14          Urinalysis Basic - ( 14 May 2018 11:32 )    Color: Yellow / Appearance: Clear / S.015 / pH: x  Gluc: x / Ketone: Negative  / Bili: Negative / Urobili: 8 mg/dL   Blood: x / Protein: 100 mg/dL / Nitrite: Negative   Leuk Esterase: Trace / RBC: x / WBC 3-5   Sq Epi: x / Non Sq Epi: Few / Bacteria: x        RADIOLOGY & ADDITIONAL STUDIES:    immune globulin gamma IVPB:   65 Gram(s), IV Intermittent, once, infuse over 4.33 Hour(s), Stop After 1 Doses  Indication: mystina gravis  Special Instructions: 150cc/hr  Provider's Contact #: [phone] ( @ 18:37)  Basic Metabolic Panel: AM Sched. Collection: 14-May-2018 04:00 ( @ 19:15)  Magnesium, Serum: AM Sched. Collection: 14-May-2018 04:00 ( @ 19:15)  Phosphorus Level, Serum: AM Sched. Collection: 14-May-2018 04:00 ( @ 19:15)  Complete Blood Count: AM Sched. Collection: 14-May-2018 04:00 ( @ 19:15)  Chart Check ( @ 19:46)  acetaminophen    Suspension.: [Known as TYLENOL Suspension.]  650 milliGRAM(s), Oral, every 6 hours, PRN for Mild Pain (1 - 3)  Administration Instructions: SHAKE WELL  MAX DAILY DOSE:  ADULT = 4,000 mG/Day     (Calc Info: 650 milliGRAM(s)/DOSE x 1  = 650 milliGRAM(s)/Dose     (Daily Total is 2,600 milliGRAM(s)) ( @ 20:09)  potassium chloride  10 mEq/100 mL IVPB:   10 milliEquivalent(s) in IV Solution 100 milliLiter(s), IV Intermittent, every 1 hour, infuse over 60 Minute(s), Stop After 3 Doses  Provider's Contact #: [phone] ( @ 06:25)  Xray Chest 1 View- PORTABLE-Urgent: Urgent   Indication: sob  Transport: Portable,  w/ Ventilator  Exam Completed  Provider's Contact #: [phone] ( @ 08:45)  potassium chloride   Powder: [Known as KLOR-CON POWDER]  40 milliEquivalent(s), Oral, once, Stop After 1 Doses  Administration Instructions: Dissolve contents of 1 packet in at least 4 oz. of water or other beverage.  Provider's Contact #: [phone] ( @ 09:46)  Culture - Blood: Routine  Specimen Source: Blood ( @ 09:53)  Urinalysis: Routine ( @ 09:53)  Basic Metabolic Panel: AM Sched. Collection: 15-May-2018 04:00 ( @ 09:54)  Magnesium, Serum: AM Sched. Collection: 15-May-2018 04:00 ( @ 09:54)  Phosphorus Level, Serum: AM Sched. Collection: 15-May-2018 04:00 ( @ 09:54)  Complete Blood Count: AM Sched. Collection: 15-May-2018 04:00 ( @ 09:54)  Xray Chest 1 View- PORTABLE-Routine: AM   Indication: left hemiopacification  Transport: Portable,  w/ Ventilator  Provider's Contact #: [phone] ( @ 09:54)  Urine Microscopic-Add On (NC): 11:15 ( @ 11:34)  DX Myasthenia crisis     Recommendations: Plasmapheresis to be done by hematologist   Medications:

## 2018-05-15 LAB
ANION GAP SERPL CALC-SCNC: 6 MMOL/L — SIGNIFICANT CHANGE UP (ref 5–17)
BUN SERPL-MCNC: 17 MG/DL — SIGNIFICANT CHANGE UP (ref 7–23)
CALCIUM SERPL-MCNC: 8.9 MG/DL — SIGNIFICANT CHANGE UP (ref 8.5–10.1)
CHLORIDE SERPL-SCNC: 108 MMOL/L — SIGNIFICANT CHANGE UP (ref 96–108)
CO2 SERPL-SCNC: 30 MMOL/L — SIGNIFICANT CHANGE UP (ref 22–31)
CREAT SERPL-MCNC: 0.77 MG/DL — SIGNIFICANT CHANGE UP (ref 0.5–1.3)
CULTURE RESULTS: SIGNIFICANT CHANGE UP
CULTURE RESULTS: SIGNIFICANT CHANGE UP
GLUCOSE SERPL-MCNC: 93 MG/DL — SIGNIFICANT CHANGE UP (ref 70–99)
GRAM STN FLD: SIGNIFICANT CHANGE UP
GRAM STN FLD: SIGNIFICANT CHANGE UP
HCT VFR BLD CALC: 27.1 % — LOW (ref 34.5–45)
HGB BLD-MCNC: 8.5 G/DL — LOW (ref 11.5–15.5)
MAGNESIUM SERPL-MCNC: 2.9 MG/DL — HIGH (ref 1.6–2.6)
MCHC RBC-ENTMCNC: 26.6 PG — LOW (ref 27–34)
MCHC RBC-ENTMCNC: 31.4 GM/DL — LOW (ref 32–36)
MCV RBC AUTO: 84.7 FL — SIGNIFICANT CHANGE UP (ref 80–100)
NRBC # BLD: 0 /100 WBCS — SIGNIFICANT CHANGE UP (ref 0–0)
PHOSPHATE SERPL-MCNC: 3.2 MG/DL — SIGNIFICANT CHANGE UP (ref 2.5–4.5)
PLATELET # BLD AUTO: 274 K/UL — SIGNIFICANT CHANGE UP (ref 150–400)
POTASSIUM SERPL-MCNC: 4 MMOL/L — SIGNIFICANT CHANGE UP (ref 3.5–5.3)
POTASSIUM SERPL-SCNC: 4 MMOL/L — SIGNIFICANT CHANGE UP (ref 3.5–5.3)
RBC # BLD: 3.2 M/UL — LOW (ref 3.8–5.2)
RBC # FLD: 14.7 % — HIGH (ref 10.3–14.5)
SODIUM SERPL-SCNC: 144 MMOL/L — SIGNIFICANT CHANGE UP (ref 135–145)
SPECIMEN SOURCE: SIGNIFICANT CHANGE UP
WBC # BLD: 14.43 K/UL — HIGH (ref 3.8–10.5)
WBC # FLD AUTO: 14.43 K/UL — HIGH (ref 3.8–10.5)

## 2018-05-15 PROCEDURE — 71045 X-RAY EXAM CHEST 1 VIEW: CPT | Mod: 26

## 2018-05-15 PROCEDURE — 99291 CRITICAL CARE FIRST HOUR: CPT

## 2018-05-15 RX ADMIN — PYRIDOSTIGMINE BROMIDE 60 MILLIGRAM(S): 60 SOLUTION ORAL at 13:20

## 2018-05-15 RX ADMIN — Medication 3 MILLILITER(S): at 17:38

## 2018-05-15 RX ADMIN — PANTOPRAZOLE SODIUM 40 MILLIGRAM(S): 20 TABLET, DELAYED RELEASE ORAL at 13:20

## 2018-05-15 RX ADMIN — PYRIDOSTIGMINE BROMIDE 60 MILLIGRAM(S): 60 SOLUTION ORAL at 05:33

## 2018-05-15 RX ADMIN — PYRIDOSTIGMINE BROMIDE 60 MILLIGRAM(S): 60 SOLUTION ORAL at 18:52

## 2018-05-15 RX ADMIN — CHLORHEXIDINE GLUCONATE 15 MILLILITER(S): 213 SOLUTION TOPICAL at 18:51

## 2018-05-15 RX ADMIN — Medication 60 MILLIGRAM(S): at 05:31

## 2018-05-15 RX ADMIN — HEPARIN SODIUM 5000 UNIT(S): 5000 INJECTION INTRAVENOUS; SUBCUTANEOUS at 05:31

## 2018-05-15 RX ADMIN — CHLORHEXIDINE GLUCONATE 15 MILLILITER(S): 213 SOLUTION TOPICAL at 05:31

## 2018-05-15 RX ADMIN — Medication 3 MILLILITER(S): at 10:00

## 2018-05-15 RX ADMIN — HEPARIN SODIUM 5000 UNIT(S): 5000 INJECTION INTRAVENOUS; SUBCUTANEOUS at 18:51

## 2018-05-15 RX ADMIN — Medication 2 MILLILITER(S): at 21:58

## 2018-05-15 RX ADMIN — Medication 3 MILLILITER(S): at 13:02

## 2018-05-15 RX ADMIN — Medication 3 MILLILITER(S): at 21:58

## 2018-05-15 RX ADMIN — Medication 2 MILLILITER(S): at 13:02

## 2018-05-15 RX ADMIN — PYRIDOSTIGMINE BROMIDE 60 MILLIGRAM(S): 60 SOLUTION ORAL at 00:00

## 2018-05-15 RX ADMIN — CHLORHEXIDINE GLUCONATE 1 APPLICATION(S): 213 SOLUTION TOPICAL at 05:33

## 2018-05-15 RX ADMIN — Medication 3 MILLILITER(S): at 01:14

## 2018-05-15 RX ADMIN — Medication 3 MILLILITER(S): at 05:50

## 2018-05-15 RX ADMIN — Medication 2 MILLILITER(S): at 05:50

## 2018-05-15 NOTE — PROGRESS NOTE ADULT - SUBJECTIVE AND OBJECTIVE BOX
HPI:  28 Y/O female w/ PMHx of mysasthenia gravis on physostigmine BIBEMS w/ SOB and weakness. As per significant other, pt c/o increasing weakness over 2 weeks. Today, pt c/o SOB, and EMS was notified. EMS found the pt w/ decreased responsiveness, was bagged, and placed on non rebreather. Upon arrival to ED, pt was found unresponsive, shallow breathing, and was intubated by ED attending for hypoxic respiratory failure. ICU called for further evaluation. (10 May 2018 03:15)      24 hr events:      ## ROS:  [ ] unable to obtain  CONSTITUTIONAL: No fever, weight loss, or fatigue  EYES: No eye pain, visual disturbances, or discharge  ENMT:  No difficulty hearing, tinnitus, vertigo; No sinus or throat pain  NECK: No pain or stiffness  RESPIRATORY: No cough, wheezing, chills or hemoptysis; No shortness of breath  CARDIOVASCULAR: No chest pain, palpitations, dizziness, or leg swelling  GASTROINTESTINAL: No abdominal or epigastric pain. No nausea, vomiting, or hematemesis; No diarrhea or constipation. No melena or hematochezia.  GENITOURINARY: No dysuria, frequency, hematuria, or incontinence  NEUROLOGICAL: No headaches, memory loss, loss of strength, numbness, or tremors  SKIN: No itching, burning, rashes, or lesions   LYMPH NODES: No enlarged glands  ENDOCRINE: No heat or cold intolerance; No hair loss  MUSCULOSKELETAL: No joint pain or swelling; No muscle, back, or extremity pain  PSYCHIATRIC: No depression, anxiety, mood swings, or difficulty sleeping  HEME/LYMPH: No easy bruising, or bleeding gums  ALLERGY AND IMMUNOLOGIC: No hives or eczema    ## Labs:  CBC:                        8.5    14.43 )-----------( 274      ( 15 May 2018 06:19 )             27.1     Chem:  05-15    144  |  108  |  17  ----------------------------<  93  4.0   |  30  |  0.77    Ca    8.9      15 May 2018 06:19  Phos  3.2     05-15  Mg     2.9     05-15      Coags:          ## Imaging:    ## Medications:  levoFLOXacin IVPB      levoFLOXacin IVPB 750 milliGRAM(s) IV Intermittent every 24 hours      acetylcysteine 20% Inhalation 2 milliLiter(s) Inhalation every 8 hours  ALBUTerol/ipratropium for Nebulization 3 milliLiter(s) Nebulizer every 4 hours    predniSONE   Tablet 60 milliGRAM(s) Oral daily    heparin  Injectable 5000 Unit(s) SubCutaneous every 12 hours    pantoprazole   Suspension 40 milliGRAM(s) Oral daily    acetaminophen    Suspension. 650 milliGRAM(s) Oral every 6 hours PRN  dexmedetomidine Infusion 0.7 MICROgram(s)/kG/Hr IV Continuous <Continuous>  propofol Infusion 5 MICROgram(s)/kG/Min IV Continuous <Continuous>      ## Vitals:  T(C): 37.6 (05-15-18 @ 23:16), Max: 38.2 (05-15-18 @ 07:30)  HR: 119 (05-15-18 @ :00) (70 - 132)  BP: 118/77 (05-15-18 @ :00) (93/68 - 153/105)  BP(mean): 86 (05-15-18 @ :00) (72 - 124)  RR: 23 (05-15-18 @ :00) (14 - 37)  SpO2: 100% (05-15-18 @ :00) (90% - 100%)  Wt(kg): --  Vent: Mode: AC/ CMV (Assist Control/ Continuous Mandatory Ventilation), RR (machine): 12, RR (patient): 29, TV (machine): 350, FiO2: 50, PEEP: 5, PIP: 20  AB-14 @ 07:  -  05-15 @ 07:00  --------------------------------------------------------  IN: 2150.1 mL / OUT: 150 mL / NET: 2000.1 mL    05-15 @ 07:  -  05-15 @ 23:47  --------------------------------------------------------  IN: 1405.1 mL / OUT: 200 mL / NET: 1205.1 mL          ## P/E:  Gen: lying comfortably in bed in no apparent distress  HEENT: PERRL, EOMI  Resp: CTA B/L no c/r/w  CVS: S1S2 no m/r/g  Abd: soft NT/ND +BS  Ext: no c/c/e  Neuro: A&Ox3    CENTRAL LINE: [ ] YES [ ] NO  LOCATION:   DATE INSERTED:  REMOVE: [ ] YES [ ] NO      SRIVASTAVA: [ ] YES [ ] NO    DATE INSERTED:  REMOVE:  [ ] YES [ ] NO      A-LINE:  [ ] YES [ ] NO  LOCATION:   DATE INSERTED:  REMOVE:  [ ] YES [ ] NO  EXPLAIN:    GLOBAL ISSUE/BEST PRACTICE:  Analgesia:  Sedation:  HOB elevation: yes  Stress ulcer prophylaxis:  VTE prophylaxis:  Oral Care:  Glycemic control:  Nutrition:    CODE STATUS: [ ] full code  [ ] DNR  [ ] DNI  [ ] MOLST  Goals of care discussion: [ ] yes HPI:  26 Y/O female PMHx of mysasthenia gravis on physostigmine BIBEMS w/ SOB and weakness. As per significant other, pt c/o increasing weakness over 2 weeks. EMS found the pt w/ decreased responsiveness, was bagged, and placed on non rebreather. Upon arrival to ED, pt was found unresponsive, shallow breathing, and was intubated by ED attending for hypoxic respiratory failure. 5/12 with hypoxia on mechanical ventilation. CXR with left hemithorax opacification from mucus plugging.     24 hr events:  weaned today but became tachypneic with oxygen desaturation to the 80s  still with copious drooling and oral secretions  s/p bronch yesterday 5/14 for hypoxia with mucus plugging  still with left ptosis  motor strength overall improved s/p IvIG    ## ROS:  [x] limited due to intubation  no fever  no chills  no CP  no SOB  no abdominal pain    ## Labs:  CBC:                        8.5    14.43 )-----------( 274      ( 15 May 2018 06:19 )             27.1     Chem:  05-15    144  |  108  |  17  ----------------------------<  93  4.0   |  30  |  0.77    Ca    8.9      15 May 2018 06:19  Phos  3.2     05-15  Mg     2.9     05-15    Culture - Bronchial (05.15.18 @ 00:40)    Specimen Source: Bronch Wash Bronchoalveolar Lavage    Culture Results:  Numerous Staphylococcus aureus    Culture - Blood (05.14.18 @ 15:33)    Specimen Source: .Blood Blood    Culture Results: No growth to date.    Culture - Blood (05.10.18 @ 18:26)    Specimen Source: .Blood Blood    Culture Results: No growth at 5 days.      ## Imaging:  CXR < from: Xray Chest 1 View- PORTABLE-Routine (05.15.18 @ 08:38) >  trace left pleural effusion with associated atelectasis and/or pneumonia. Trace fluid in the right horizontal fissure.        ## Medications:  levoFLOXacin IVPB 750 milliGRAM(s) IV Intermittent every 24 hours      acetylcysteine 20% Inhalation 2 milliLiter(s) Inhalation every 8 hours  ALBUTerol/ipratropium for Nebulization 3 milliLiter(s) Nebulizer every 4 hours    predniSONE   Tablet 60 milliGRAM(s) Oral daily    heparin  Injectable 5000 Unit(s) SubCutaneous every 12 hours    pantoprazole   Suspension 40 milliGRAM(s) Oral daily    acetaminophen    Suspension. 650 milliGRAM(s) Oral every 6 hours PRN  dexmedetomidine Infusion 0.7 MICROgram(s)/kG/Hr IV Continuous <Continuous>  propofol Infusion 5 MICROgram(s)/kG/Min IV Continuous <Continuous>      ## Vitals:  T(C): 37.6 (05-15-18 @ 23:16), Max: 38.2 (05-15-18 @ 07:30)  HR: 119 (05-15-18 @ 23:00) (70 - 132)  BP: 118/77 (05-15-18 @ 23:00) (93/68 - 153/105)  BP(mean): 86 (05-15-18 @ 23:00) (72 - 124)  RR: 23 (05-15-18 @ 23:00) (14 - 37)  SpO2: 100% (05-15-18 @ 23:00) (90% - 100%)  Vent: Mode: AC/ CMV (Assist Control/ Continuous Mandatory Ventilation), RR (machine): 12, RR (patient): 29, TV (machine): 350, FiO2: 50, PEEP: 5, PIP: 20        05-14 @ 07:01  -  05-15 @ 07:00  --------------------------------------------------------  IN: 2150.1 mL / OUT: 150 mL / NET: 2000.1 mL    05-15 @ 07:01  -  05-15 @ 23:47  --------------------------------------------------------  IN: 1405.1 mL / OUT: 200 mL / NET: 1205.1 mL          ## P/E:  Gen: lying comfortably in bed in no apparent distress, responsive  HEENT: left ptosis  Resp: bilateral rhonchi  CVS: S1S2 RRR  Abd: soft NT/ND +BS  Ext: no c/c/e  Neuro: A&Ox3, writing on paper, moving all extremities, motor strength significantly improved 4-5/5 all extremities    CENTRAL LINE: [ ] YES [x] NO  LOCATION:   DATE INSERTED:  REMOVE: [ ] YES [ ] NO      SRIVASTAVA: [ ] YES [x] NO    DATE INSERTED:  REMOVE:  [ ] YES [ ] NO      A-LINE:  [ ] YES [x] NO  LOCATION:   DATE INSERTED:  REMOVE:  [ ] YES [ ] NO  EXPLAIN:    GLOBAL ISSUE/BEST PRACTICE:  Analgesia: n/a  Sedation: precedex  HOB elevation: yes  Stress ulcer prophylaxis: protonix  VTE prophylaxis: heparin sc  Oral Care: chlorhexidine   Glycemic control: n/a  Nutrition: jevity    CODE STATUS: [x] full code  [ ] DNR  [ ] DNI  [ ] MOLST  Goals of care discussion: [x] yes

## 2018-05-16 LAB
-  AMPICILLIN/SULBACTAM: SIGNIFICANT CHANGE UP
-  AMPICILLIN/SULBACTAM: SIGNIFICANT CHANGE UP
-  CEFAZOLIN: SIGNIFICANT CHANGE UP
-  CEFAZOLIN: SIGNIFICANT CHANGE UP
-  CIPROFLOXACIN: SIGNIFICANT CHANGE UP
-  CIPROFLOXACIN: SIGNIFICANT CHANGE UP
-  CLINDAMYCIN: SIGNIFICANT CHANGE UP
-  CLINDAMYCIN: SIGNIFICANT CHANGE UP
-  ERYTHROMYCIN: SIGNIFICANT CHANGE UP
-  ERYTHROMYCIN: SIGNIFICANT CHANGE UP
-  GENTAMICIN: SIGNIFICANT CHANGE UP
-  GENTAMICIN: SIGNIFICANT CHANGE UP
-  LEVOFLOXACIN: SIGNIFICANT CHANGE UP
-  LEVOFLOXACIN: SIGNIFICANT CHANGE UP
-  LINEZOLID: SIGNIFICANT CHANGE UP
-  LINEZOLID: SIGNIFICANT CHANGE UP
-  MOXIFLOXACIN(AEROBIC): SIGNIFICANT CHANGE UP
-  MOXIFLOXACIN(AEROBIC): SIGNIFICANT CHANGE UP
-  OXACILLIN: SIGNIFICANT CHANGE UP
-  OXACILLIN: SIGNIFICANT CHANGE UP
-  PENICILLIN: SIGNIFICANT CHANGE UP
-  PENICILLIN: SIGNIFICANT CHANGE UP
-  RIFAMPIN: SIGNIFICANT CHANGE UP
-  RIFAMPIN: SIGNIFICANT CHANGE UP
-  TETRACYCLINE: SIGNIFICANT CHANGE UP
-  TETRACYCLINE: SIGNIFICANT CHANGE UP
-  TRIMETHOPRIM/SULFAMETHOXAZOLE: SIGNIFICANT CHANGE UP
-  TRIMETHOPRIM/SULFAMETHOXAZOLE: SIGNIFICANT CHANGE UP
-  VANCOMYCIN: SIGNIFICANT CHANGE UP
-  VANCOMYCIN: SIGNIFICANT CHANGE UP
ANION GAP SERPL CALC-SCNC: 6 MMOL/L — SIGNIFICANT CHANGE UP (ref 5–17)
BUN SERPL-MCNC: 19 MG/DL — SIGNIFICANT CHANGE UP (ref 7–23)
CALCIUM SERPL-MCNC: 8.4 MG/DL — LOW (ref 8.5–10.1)
CHLORIDE SERPL-SCNC: 111 MMOL/L — HIGH (ref 96–108)
CO2 SERPL-SCNC: 29 MMOL/L — SIGNIFICANT CHANGE UP (ref 22–31)
CREAT SERPL-MCNC: 0.61 MG/DL — SIGNIFICANT CHANGE UP (ref 0.5–1.3)
CULTURE RESULTS: SIGNIFICANT CHANGE UP
FIBRINOGEN PPP-MCNC: 1007 MG/DL — HIGH (ref 310–510)
GLUCOSE SERPL-MCNC: 103 MG/DL — HIGH (ref 70–99)
GRAM STN FLD: SIGNIFICANT CHANGE UP
GRAM STN FLD: SIGNIFICANT CHANGE UP
HCT VFR BLD CALC: 24 % — LOW (ref 34.5–45)
HGB BLD-MCNC: 7.6 G/DL — LOW (ref 11.5–15.5)
MAGNESIUM SERPL-MCNC: 2.7 MG/DL — HIGH (ref 1.6–2.6)
MCHC RBC-ENTMCNC: 26.9 PG — LOW (ref 27–34)
MCHC RBC-ENTMCNC: 31.7 GM/DL — LOW (ref 32–36)
MCV RBC AUTO: 84.8 FL — SIGNIFICANT CHANGE UP (ref 80–100)
METHOD TYPE: SIGNIFICANT CHANGE UP
METHOD TYPE: SIGNIFICANT CHANGE UP
NRBC # BLD: 0 /100 WBCS — SIGNIFICANT CHANGE UP (ref 0–0)
ORGANISM # SPEC MICROSCOPIC CNT: SIGNIFICANT CHANGE UP
PHOSPHATE SERPL-MCNC: 4.2 MG/DL — SIGNIFICANT CHANGE UP (ref 2.5–4.5)
PLATELET # BLD AUTO: 323 K/UL — SIGNIFICANT CHANGE UP (ref 150–400)
POTASSIUM SERPL-MCNC: 3.7 MMOL/L — SIGNIFICANT CHANGE UP (ref 3.5–5.3)
POTASSIUM SERPL-SCNC: 3.7 MMOL/L — SIGNIFICANT CHANGE UP (ref 3.5–5.3)
RBC # BLD: 2.83 M/UL — LOW (ref 3.8–5.2)
RBC # FLD: 14.7 % — HIGH (ref 10.3–14.5)
SODIUM SERPL-SCNC: 146 MMOL/L — HIGH (ref 135–145)
SPECIMEN SOURCE: SIGNIFICANT CHANGE UP
SPECIMEN SOURCE: SIGNIFICANT CHANGE UP
WBC # BLD: 14.82 K/UL — HIGH (ref 3.8–10.5)
WBC # FLD AUTO: 14.82 K/UL — HIGH (ref 3.8–10.5)

## 2018-05-16 PROCEDURE — 36800 INSERTION OF CANNULA: CPT

## 2018-05-16 PROCEDURE — 99291 CRITICAL CARE FIRST HOUR: CPT | Mod: 25

## 2018-05-16 PROCEDURE — 71045 X-RAY EXAM CHEST 1 VIEW: CPT | Mod: 26

## 2018-05-16 RX ORDER — CALCIUM GLUCONATE 100 MG/ML
1 VIAL (ML) INTRAVENOUS
Qty: 0 | Refills: 0 | Status: DISCONTINUED | OUTPATIENT
Start: 2018-05-16 | End: 2018-05-16

## 2018-05-16 RX ORDER — DIPHENHYDRAMINE HCL 50 MG
50 CAPSULE ORAL ONCE
Qty: 0 | Refills: 0 | Status: DISCONTINUED | OUTPATIENT
Start: 2018-05-16 | End: 2018-05-17

## 2018-05-16 RX ORDER — HEPARIN SODIUM 5000 [USP'U]/ML
5000 INJECTION INTRAVENOUS; SUBCUTANEOUS ONCE
Qty: 0 | Refills: 0 | Status: DISCONTINUED | OUTPATIENT
Start: 2018-05-16 | End: 2018-05-24

## 2018-05-16 RX ORDER — FENTANYL CITRATE 50 UG/ML
100 INJECTION INTRAVENOUS ONCE
Qty: 0 | Refills: 0 | Status: DISCONTINUED | OUTPATIENT
Start: 2018-05-16 | End: 2018-05-16

## 2018-05-16 RX ORDER — SODIUM CHLORIDE 9 MG/ML
2 INJECTION INTRAMUSCULAR; INTRAVENOUS; SUBCUTANEOUS EVERY 8 HOURS
Qty: 0 | Refills: 0 | Status: DISCONTINUED | OUTPATIENT
Start: 2018-05-16 | End: 2018-05-21

## 2018-05-16 RX ORDER — CALCIUM GLUCONATE 100 MG/ML
1 VIAL (ML) INTRAVENOUS
Qty: 0 | Refills: 0 | Status: DISCONTINUED | OUTPATIENT
Start: 2018-05-16 | End: 2018-05-17

## 2018-05-16 RX ORDER — ALBUMIN HUMAN 25 %
3000 VIAL (ML) INTRAVENOUS ONCE
Qty: 0 | Refills: 0 | Status: COMPLETED | OUTPATIENT
Start: 2018-05-16 | End: 2018-05-16

## 2018-05-16 RX ADMIN — HEPARIN SODIUM 5000 UNIT(S): 5000 INJECTION INTRAVENOUS; SUBCUTANEOUS at 05:19

## 2018-05-16 RX ADMIN — PYRIDOSTIGMINE BROMIDE 60 MILLIGRAM(S): 60 SOLUTION ORAL at 11:43

## 2018-05-16 RX ADMIN — PYRIDOSTIGMINE BROMIDE 60 MILLIGRAM(S): 60 SOLUTION ORAL at 00:04

## 2018-05-16 RX ADMIN — FENTANYL CITRATE 100 MICROGRAM(S): 50 INJECTION INTRAVENOUS at 15:30

## 2018-05-16 RX ADMIN — CHLORHEXIDINE GLUCONATE 15 MILLILITER(S): 213 SOLUTION TOPICAL at 05:18

## 2018-05-16 RX ADMIN — Medication 3 MILLILITER(S): at 06:06

## 2018-05-16 RX ADMIN — PYRIDOSTIGMINE BROMIDE 60 MILLIGRAM(S): 60 SOLUTION ORAL at 05:19

## 2018-05-16 RX ADMIN — FENTANYL CITRATE 100 MICROGRAM(S): 50 INJECTION INTRAVENOUS at 15:45

## 2018-05-16 RX ADMIN — Medication 1500 MILLILITER(S): at 21:02

## 2018-05-16 RX ADMIN — CHLORHEXIDINE GLUCONATE 15 MILLILITER(S): 213 SOLUTION TOPICAL at 17:21

## 2018-05-16 RX ADMIN — PANTOPRAZOLE SODIUM 40 MILLIGRAM(S): 20 TABLET, DELAYED RELEASE ORAL at 11:43

## 2018-05-16 RX ADMIN — Medication 2 MILLILITER(S): at 06:08

## 2018-05-16 RX ADMIN — PYRIDOSTIGMINE BROMIDE 60 MILLIGRAM(S): 60 SOLUTION ORAL at 23:56

## 2018-05-16 RX ADMIN — Medication 3 MILLILITER(S): at 02:19

## 2018-05-16 RX ADMIN — Medication 60 MILLIGRAM(S): at 05:19

## 2018-05-16 RX ADMIN — PYRIDOSTIGMINE BROMIDE 60 MILLIGRAM(S): 60 SOLUTION ORAL at 19:41

## 2018-05-16 RX ADMIN — DEXMEDETOMIDINE HYDROCHLORIDE IN 0.9% SODIUM CHLORIDE 13.96 MICROGRAM(S)/KG/HR: 4 INJECTION INTRAVENOUS at 18:21

## 2018-05-16 RX ADMIN — Medication 3 MILLILITER(S): at 18:13

## 2018-05-16 RX ADMIN — PROPOFOL 2.39 MICROGRAM(S)/KG/MIN: 10 INJECTION, EMULSION INTRAVENOUS at 14:29

## 2018-05-16 RX ADMIN — DEXMEDETOMIDINE HYDROCHLORIDE IN 0.9% SODIUM CHLORIDE 13.96 MICROGRAM(S)/KG/HR: 4 INJECTION INTRAVENOUS at 12:00

## 2018-05-16 RX ADMIN — PROPOFOL 2.39 MICROGRAM(S)/KG/MIN: 10 INJECTION, EMULSION INTRAVENOUS at 00:04

## 2018-05-16 RX ADMIN — Medication 3 MILLILITER(S): at 13:47

## 2018-05-16 RX ADMIN — Medication 3 MILLILITER(S): at 21:41

## 2018-05-16 RX ADMIN — CHLORHEXIDINE GLUCONATE 1 APPLICATION(S): 213 SOLUTION TOPICAL at 05:20

## 2018-05-16 RX ADMIN — Medication 3 MILLILITER(S): at 10:45

## 2018-05-16 RX ADMIN — DEXMEDETOMIDINE HYDROCHLORIDE IN 0.9% SODIUM CHLORIDE 13.96 MICROGRAM(S)/KG/HR: 4 INJECTION INTRAVENOUS at 22:52

## 2018-05-16 RX ADMIN — SODIUM CHLORIDE 2 MILLILITER(S): 9 INJECTION INTRAMUSCULAR; INTRAVENOUS; SUBCUTANEOUS at 21:41

## 2018-05-16 RX ADMIN — HEPARIN SODIUM 5000 UNIT(S): 5000 INJECTION INTRAVENOUS; SUBCUTANEOUS at 17:21

## 2018-05-16 RX ADMIN — SODIUM CHLORIDE 2 MILLILITER(S): 9 INJECTION INTRAMUSCULAR; INTRAVENOUS; SUBCUTANEOUS at 13:47

## 2018-05-16 NOTE — PROCEDURE NOTE - NSPROCDETAILS_GEN_ALL_CORE
sterile dressing applied/ultrasound guidance/sterile technique, catheter placed/guidewire recovered/lumen(s) aspirated and flushed

## 2018-05-16 NOTE — PROGRESS NOTE ADULT - SUBJECTIVE AND OBJECTIVE BOX
Subjective Complaints:  Historian:     27 y o woman with h/o myasthenia gravis was admitted for myasthenia crisis ,intubated on a vent     REVIEW OF SYSTEMS:  Eyes:  Good vision, no reported pain  ENT:  No sore throat, pain, runny nose, dysphagia  CV:  No pain, palpitatioins, hypo/hypertension  Resp:  No dyspnea, cough, tachypnea, wheezing  GI:  No pain, nausea, vomiting, diarrhea, constipatiion  Muscle:  No pain, weakness  Neuro:  No weakness, tingling, memory problems  Psych:  No fatigue, insomnia, mood problems, depression  Endocrine:  No polyuria, polydypsia, cold/heat intolerance    Vital Signs Last 24 Hrs  T(C): 36.9 (16 May 2018 08:00), Max: 37.6 (15 May 2018 23:16)  T(F): 98.5 (16 May 2018 08:00), Max: 99.7 (15 May 2018 23:16)  HR: 93 (16 May 2018 09:44) (70 - 132)  BP: 109/66 (16 May 2018 09:00) (107/63 - 145/116)  BP(mean): 75 (16 May 2018 09:00) (72 - 124)  RR: 21 (16 May 2018 09:00) (15 - 31)  SpO2: 100% (16 May 2018 09:44) (94% - 100%)    GENERAL PHYSICAL EXAM:  General:  Appears stated age, well-groomed, well-nourished, no distress  HEENT:  NC/AT, patent nares w/ pink mucosa, OP clear w/o lesions, PERRL, EOMI, conjunctivae clear, no thyromegaly, nodules, adenopathy, no JVD  Chest:  Full & symmetric excursion, no increased effort, breath sounds clear  Cardiovascular:  Regular rhythm, S1, S2, no murmur/rub/S3/S4, no carotid/femoral/abdominal bruit, radial/pedal pulses 2+, no edema  Abdomen:  Soft, non-tender, non-distended, normoactive bowel sounds, no HSM  Extremities:  Gait & station:   Digits:   Nails:   Joints, Bones, Muscles:   ROM:   Stability:  Skin:  No rash/erythema/ecchymoses/petechiae/wounds/abscess/warm/dry  Musculoskeletal:  Full ROM in all joints w/o swelling/tenderness/effusion    NEUROLOGICAL EXAM:  HENT:  Normocephalic head; atraumatic head.  Neck supple.  ENT: normal looking.  Mental State:    lethargic but arousablr    oriented to person, place able to follow commands ,intubated ,no speech production    Cranial Nerves:  II-XII:   Pupils round and reactive to light and accommodation.  Extraocular movements full.  Visual fields full (no homonymous hemianopsia).  Visual acuity wnl.  Facial symmetry intact.  Tongue midline.  Motor Functions:  Motor strength is 4/5 ,fatigued on repetitive movements   Sensory Functions: unreliable    Reflexes:  Deep tendon reflexes normoactive to biceps, knees and ankles. plantar responses are flexor   Cerebellar Testing:    Finger to nose intact.  Nystagmus absent.  Gait :unable to stand      LABS:                        7.6    14.82 )-----------( 323      ( 16 May 2018 02:55 )             24.0     05-16    146<H>  |  111<H>  |  19  ----------------------------<  103<H>  3.7   |  29  |  0.61    Ca    8.4<L>      16 May 2018 02:55  Phos  4.2     -  Mg     2.7     -          Urinalysis Basic - ( 14 May 2018 11:32 )    Color: Yellow / Appearance: Clear / S.015 / pH: x  Gluc: x / Ketone: Negative  / Bili: Negative / Urobili: 8 mg/dL   Blood: x / Protein: 100 mg/dL / Nitrite: Negative   Leuk Esterase: Trace / RBC: x / WBC 3-5   Sq Epi: x / Non Sq Epi: Few / Bacteria: x        RADIOLOGY & ADDITIONAL STUDIES:    Consult- PT Evaluate and Treat:   *Reason for Consult - Must select at least one choice*     Neuro Event  Weight Bearing Restrictions: No  Additional Information: early mobilization (05-15 @ 17:22)  Basic Metabolic Panel: AM Sched. Collection: 16-May-2018 04:00 (05-15 @ 17:23)  Complete Blood Count: AM Sched. Collection: 16-May-2018 04:00 (05-15 @ 17:23)  Magnesium, Serum: AM Sched. Collection: 16-May-2018 04:00 (05-15 @ 17:23)  Phosphorus Level, Serum: AM Sched. Collection: 16-May-2018 04:00 (05-15 @ 17:23)  Provider to RN:       volume based TF (05-15 @ 19:01)  ICU Early Ambulation Protocol (05-15 @ 19:01)  Chart Check (05-15 @ 19:41)  Chart Check ( @ 07:15)  Vital Capacity:   Frequency: once ( @ 10:08)  DX Myasthenia crisis   PLAN : VITAL CAPACITY   PLASMAPHERESIS     Recommendations  DVT prophylaxis as ordered.  Medications:

## 2018-05-16 NOTE — PROGRESS NOTE ADULT - ATTENDING COMMENTS
27F PMH myasthenia gravis on Physostigmine presents for SOB and weakness or upper and lower extremities. In ER, pt  unresponsive and hypoxic with shallow breathing intubated and admitted to ICU for acute hypoxic resp failure, myasthenia crisis. Course with left lung atelectasias with hypoxia due to mucus plugging, fevers, post obstructive staph PNA.     1. PULM  - unable to extubate due to copious oral secretions and also with thick ETT secretions  - s/p therapeutic and diagnostic bronchoscopy 5/14  - BAL culture with staph aureus  - cont with levaquin for pneumonia, follow up BAL culture sensitivities   - cont to wean as tolerates  - cont chest PT  - cont duonebs, d/c mucomyst, start hypertonic saline q8 hours to help with airway clearance and mucus plugging from thick secretions  - vital capacity 800     2. NEURO  - MG crisis: cont with prednisone,   - per recommendations of neurology d/c pyridostigmine with excessive oral secretions  - s/p IVIG 5/10, 5/11, 5/13: motor strength improved  - neurology recommending plasmapheresis  - called [place] [facility] and plasmapheresis set up for tonight, shiley catheter inserted for plasmapheresis         3. ID  - pt with post obstructive pneumonia due to mucus plugging  - cont levaquin which should cover staph   - follow up BAL culture results  - blood cultures negative      4. GEN  - physical therapy for early mobilization       Critical Care Time: 40 min

## 2018-05-16 NOTE — PHARMACY COMMUNICATION NOTE - COMMENTS
Spoke with PA, 2 doses of heparin 500units are to be given during plasmapheresis. Spoke with PA, 2 doses of heparin 5000units are to be given during plasmapheresis.

## 2018-05-16 NOTE — PROCEDURE NOTE - NSINFORMCONSENT_GEN_A_CORE
Benefits, risks, and possible complications of procedure explained to patient/caregiver who verbalized understanding and gave written consent.
This was an emergent procedure.

## 2018-05-17 LAB
ANION GAP SERPL CALC-SCNC: 7 MMOL/L — SIGNIFICANT CHANGE UP (ref 5–17)
APTT BLD: 62.6 SEC — HIGH (ref 27.5–37.4)
BUN SERPL-MCNC: 17 MG/DL — SIGNIFICANT CHANGE UP (ref 7–23)
CALCIUM SERPL-MCNC: 7.8 MG/DL — LOW (ref 8.5–10.1)
CHLORIDE SERPL-SCNC: 116 MMOL/L — HIGH (ref 96–108)
CO2 SERPL-SCNC: 24 MMOL/L — SIGNIFICANT CHANGE UP (ref 22–31)
CREAT SERPL-MCNC: 0.5 MG/DL — SIGNIFICANT CHANGE UP (ref 0.5–1.3)
GLUCOSE SERPL-MCNC: 122 MG/DL — HIGH (ref 70–99)
HCT VFR BLD CALC: 26.5 % — LOW (ref 34.5–45)
HGB BLD-MCNC: 8.2 G/DL — LOW (ref 11.5–15.5)
INR BLD: 1.25 RATIO — HIGH (ref 0.88–1.16)
MAGNESIUM SERPL-MCNC: 2.3 MG/DL — SIGNIFICANT CHANGE UP (ref 1.6–2.6)
MCHC RBC-ENTMCNC: 26.4 PG — LOW (ref 27–34)
MCHC RBC-ENTMCNC: 30.9 GM/DL — LOW (ref 32–36)
MCV RBC AUTO: 85.2 FL — SIGNIFICANT CHANGE UP (ref 80–100)
NRBC # BLD: 0 /100 WBCS — SIGNIFICANT CHANGE UP (ref 0–0)
PHOSPHATE SERPL-MCNC: 3.1 MG/DL — SIGNIFICANT CHANGE UP (ref 2.5–4.5)
PLATELET # BLD AUTO: 255 K/UL — SIGNIFICANT CHANGE UP (ref 150–400)
POTASSIUM SERPL-MCNC: 3.7 MMOL/L — SIGNIFICANT CHANGE UP (ref 3.5–5.3)
POTASSIUM SERPL-SCNC: 3.7 MMOL/L — SIGNIFICANT CHANGE UP (ref 3.5–5.3)
PROTHROM AB SERPL-ACNC: 13.7 SEC — HIGH (ref 9.8–12.7)
RBC # BLD: 3.11 M/UL — LOW (ref 3.8–5.2)
RBC # FLD: 14.8 % — HIGH (ref 10.3–14.5)
SODIUM SERPL-SCNC: 147 MMOL/L — HIGH (ref 135–145)
WBC # BLD: 13.08 K/UL — HIGH (ref 3.8–10.5)
WBC # FLD AUTO: 13.08 K/UL — HIGH (ref 3.8–10.5)

## 2018-05-17 PROCEDURE — 99291 CRITICAL CARE FIRST HOUR: CPT

## 2018-05-17 PROCEDURE — 71045 X-RAY EXAM CHEST 1 VIEW: CPT | Mod: 26

## 2018-05-17 RX ORDER — PYRIDOSTIGMINE BROMIDE 60 MG/5ML
30 SOLUTION ORAL EVERY 6 HOURS
Qty: 0 | Refills: 0 | Status: DISCONTINUED | OUTPATIENT
Start: 2018-05-17 | End: 2018-05-24

## 2018-05-17 RX ORDER — ALBUMIN HUMAN 25 %
3000 VIAL (ML) INTRAVENOUS ONCE
Qty: 0 | Refills: 0 | Status: COMPLETED | OUTPATIENT
Start: 2018-05-18 | End: 2018-05-18

## 2018-05-17 RX ORDER — VANCOMYCIN HCL 1 G
1000 VIAL (EA) INTRAVENOUS EVERY 12 HOURS
Qty: 0 | Refills: 0 | Status: DISCONTINUED | OUTPATIENT
Start: 2018-05-17 | End: 2018-05-17

## 2018-05-17 RX ORDER — DIPHENHYDRAMINE HCL 50 MG
50 CAPSULE ORAL ONCE
Qty: 0 | Refills: 0 | Status: DISCONTINUED | OUTPATIENT
Start: 2018-05-18 | End: 2018-05-24

## 2018-05-17 RX ORDER — CALCIUM GLUCONATE 100 MG/ML
1 VIAL (ML) INTRAVENOUS
Qty: 0 | Refills: 0 | Status: COMPLETED | OUTPATIENT
Start: 2018-05-18 | End: 2018-05-23

## 2018-05-17 RX ORDER — HEPARIN SODIUM 5000 [USP'U]/ML
5000 INJECTION INTRAVENOUS; SUBCUTANEOUS ONCE
Qty: 0 | Refills: 0 | Status: DISCONTINUED | OUTPATIENT
Start: 2018-05-18 | End: 2018-05-24

## 2018-05-17 RX ORDER — HEPARIN SODIUM 5000 [USP'U]/ML
5000 INJECTION INTRAVENOUS; SUBCUTANEOUS ONCE
Qty: 0 | Refills: 0 | Status: COMPLETED | OUTPATIENT
Start: 2018-05-18 | End: 2018-05-18

## 2018-05-17 RX ADMIN — HEPARIN SODIUM 5000 UNIT(S): 5000 INJECTION INTRAVENOUS; SUBCUTANEOUS at 05:21

## 2018-05-17 RX ADMIN — PYRIDOSTIGMINE BROMIDE 60 MILLIGRAM(S): 60 SOLUTION ORAL at 05:21

## 2018-05-17 RX ADMIN — DEXMEDETOMIDINE HYDROCHLORIDE IN 0.9% SODIUM CHLORIDE 13.96 MICROGRAM(S)/KG/HR: 4 INJECTION INTRAVENOUS at 23:05

## 2018-05-17 RX ADMIN — HEPARIN SODIUM 5000 UNIT(S): 5000 INJECTION INTRAVENOUS; SUBCUTANEOUS at 18:25

## 2018-05-17 RX ADMIN — CHLORHEXIDINE GLUCONATE 15 MILLILITER(S): 213 SOLUTION TOPICAL at 18:22

## 2018-05-17 RX ADMIN — PANTOPRAZOLE SODIUM 40 MILLIGRAM(S): 20 TABLET, DELAYED RELEASE ORAL at 12:02

## 2018-05-17 RX ADMIN — SODIUM CHLORIDE 2 MILLILITER(S): 9 INJECTION INTRAMUSCULAR; INTRAVENOUS; SUBCUTANEOUS at 06:11

## 2018-05-17 RX ADMIN — Medication 3 MILLILITER(S): at 10:13

## 2018-05-17 RX ADMIN — Medication 3 MILLILITER(S): at 01:14

## 2018-05-17 RX ADMIN — SODIUM CHLORIDE 2 MILLILITER(S): 9 INJECTION INTRAMUSCULAR; INTRAVENOUS; SUBCUTANEOUS at 14:59

## 2018-05-17 RX ADMIN — Medication 60 MILLIGRAM(S): at 05:21

## 2018-05-17 RX ADMIN — SODIUM CHLORIDE 2 MILLILITER(S): 9 INJECTION INTRAMUSCULAR; INTRAVENOUS; SUBCUTANEOUS at 21:29

## 2018-05-17 RX ADMIN — PYRIDOSTIGMINE BROMIDE 60 MILLIGRAM(S): 60 SOLUTION ORAL at 12:00

## 2018-05-17 RX ADMIN — PYRIDOSTIGMINE BROMIDE 30 MILLIGRAM(S): 60 SOLUTION ORAL at 18:25

## 2018-05-17 RX ADMIN — Medication 3 MILLILITER(S): at 21:29

## 2018-05-17 RX ADMIN — Medication 3 MILLILITER(S): at 17:38

## 2018-05-17 RX ADMIN — Medication 650 MILLIGRAM(S): at 18:40

## 2018-05-17 RX ADMIN — CHLORHEXIDINE GLUCONATE 1 APPLICATION(S): 213 SOLUTION TOPICAL at 05:21

## 2018-05-17 RX ADMIN — Medication 650 MILLIGRAM(S): at 18:25

## 2018-05-17 RX ADMIN — CHLORHEXIDINE GLUCONATE 15 MILLILITER(S): 213 SOLUTION TOPICAL at 05:21

## 2018-05-17 RX ADMIN — Medication 3 MILLILITER(S): at 15:00

## 2018-05-17 RX ADMIN — PYRIDOSTIGMINE BROMIDE 30 MILLIGRAM(S): 60 SOLUTION ORAL at 23:04

## 2018-05-17 RX ADMIN — Medication 3 MILLILITER(S): at 06:11

## 2018-05-17 NOTE — PROGRESS NOTE ADULT - ATTENDING COMMENTS
Pt seen and examined on rounds with team 5/17:      27F [facility] myasthenia gravis on Physostigmine presents for SOB and weakness or upper and lower extremities. In ER, pt  unresponsive and hypoxic with shallow breathing intubated and admitted to ICU for acute hypoxic resp failure, myasthenia crisis. Course with left lung atelectasias with hypoxia due to mucus plugging, fevers, post obstructive MRSA and H. influenzae PNA.     1. PULM  - unable to extubate due to copious oral secretions and also with thick ETT secretions  - s/p therapeutic and diagnostic bronchoscopy 5/14  - BAL culture with MRSA and H flu sensitive to levaquin  - cont to wean as tolerates, pt does tire out after a few hours of wean  - cont chest PT  - cont duonebs, cont hypertonic saline q8 hours to help with airway clearance and mucus plugging from thick secretions  - pyridostigmine decreased to 30mg to help with copious oral secretions     2. NEURO  - MG crisis: cont with prednisone   - s/p IVIG 5/10, 5/11, 5/13: motor strength improved  - neurology recommending plasmapheresis  - s/p plasmapheresis 5/16  - plan for every other day treatment        3. ID  - pt with post obstructive pneumonia due to mucus plugging  - cont levaquin   - blood cultures negative      4. GEN  - physical therapy for early mobilization       Critical Care Time: 40 min

## 2018-05-17 NOTE — PROVIDER CONTACT NOTE (CRITICAL VALUE NOTIFICATION) - TEST AND RESULT REPORTED:
Broncheal lavage collected on 5/14/18.  Amended report, Numerous MRSA,  Numerous Hemophelia Influenza and normal resp sadie.  Second collection finalized, numerous MRSA, numerous hemophelia influenza and normal resp sadie.

## 2018-05-17 NOTE — PHARMACY COMMUNICATION NOTE - COMMENTS
spoke to MOLLY Underwood 5/17/18 530pm, verified albumin dose was administered yesterday 5/16/18 and next dose should be on 5/18/18 for every other day dosing

## 2018-05-17 NOTE — PROGRESS NOTE ADULT - SUBJECTIVE AND OBJECTIVE BOX
INTERVAL HPI/OVERNIGHT EVENTS:      26 Y/O female PMHx of mysasthenia gravis on physostigmine BIBEMS w/ SOB and weakness. As per significant other, pt c/o increasing weakness over 2 weeks. EMS found the pt w/ decreased responsiveness, was bagged, and placed on non rebreather. Upon arrival to ED, pt was found unresponsive, shallow breathing, and was intubated by ED attending for hypoxic respiratory failure. 5/12 with hypoxia on mechanical ventilation. CXR with left hemithorax opacification from mucus plugging.     24 hour events: Excessive oral secretions, lowered Physostigmine.  Plasmapharesis tomorrow.  Weans well however tires out after 4-5 hours.     CENTRAL LINE: [x ] YES [ ] NO  LOCATION: [facility] HD Catheter  DATE INSERTED: 5/16/18  REMOVE: [ ] YES [ ] NO  EXPLAIN:        REVIEW OF SYSTEMS:   CONSTITUTIONAL: No fever, weight loss, or fatigue  EYES: No eye pain, visual disturbances, or discharge  RESPIRATORY: + Cough + SOB No wheezing, chills or hemoptysis;   CARDIOVASCULAR: No chest pain, palpitations, dizziness, or leg swelling  GASTROINTESTINAL: No abdominal or epigastric pain. No nausea, vomiting, or hematemesis; No diarrhea or constipation. No melena or hematochezia.  NEUROLOGICAL: No headaches, memory loss, loss of strength, numbness, or tremors  SKIN: No itching, burning, rashes, or lesions     ICU Vital Signs Last 24 Hrs  T(C): 38.1 (17 May 2018 17:11), Max: 38.1 (17 May 2018 08:33)  T(F): 100.6 (17 May 2018 17:11), Max: 100.6 (17 May 2018 08:33)  HR: 88 (17 May 2018 19:00) (65 - 143)  BP: 95/57 (17 May 2018 19:00) (94/57 - 135/82)  BP(mean): 65 (17 May 2018 19:00) (60 - 100)  ABP: --  ABP(mean): --  RR: 21 (17 May 2018 19:00) (18 - 33)  SpO2: 100% (17 May 2018 19:00) (88% - 100%)          I&O's Detail    16 May 2018 07:01  -  17 May 2018 07:00  --------------------------------------------------------  IN:    dexmedetomidine Infusion: 212.8 mL    Jevity: 1440 mL    propofol Infusion: 320 mL  Total IN: 1972.8 mL    OUT:    Voided: 350 mL  Total OUT: 350 mL    Total NET: 1622.8 mL      17 May 2018 07:01  -  17 May 2018 19:21  --------------------------------------------------------  IN:    dexmedetomidine Infusion: 88.2 mL    Jevity: 540 mL    propofol Infusion: 140.4 mL  Total IN: 768.6 mL    OUT:  Total OUT: 0 mL    Total NET: 768.6 mL          Mode: AC/ CMV (Assist Control/ Continuous Mandatory Ventilation)  RR (machine): 12  TV (machine): 350  FiO2: 40  PEEP: 5  ITime: 0.9  MAP: 8  PIP: 22    CAPILLARY BLOOD GLUCOSE          MEDICATIONS  NEURO  Meds: acetaminophen    Suspension. 650 milliGRAM(s) Oral every 6 hours PRN Mild Pain (1 - 3)  dexmedetomidine Infusion 0.7 MICROgram(s)/kG/Hr (13.965 mL/Hr) IV Continuous <Continuous>  propofol Infusion 5 MICROgram(s)/kG/Min (2.394 mL/Hr) IV Continuous <Continuous>    RESPIRATORY    Meds: ALBUTerol/ipratropium for Nebulization 3 milliLiter(s) Nebulizer every 4 hours  sodium chloride 3%  Inhalation 2 milliLiter(s) Inhalation every 8 hours    CARDIOVASCULAR  Meds:   GI/NUTRITION  Meds: pantoprazole   Suspension 40 milliGRAM(s) Oral daily    GENITOURINARY  Meds: albumin human  5% IVPB 3000 milliLiter(s) IV Intermittent once  albumin human  5% IVPB 3000 milliLiter(s) IV Intermittent once    HEMATOLOGIC  Meds: heparin  Injectable 5000 Unit(s) IV Push once  heparin  Injectable 5000 Unit(s) IV Push once  heparin  Injectable 5000 Unit(s) SubCutaneous every 12 hours    [x] VTE Prophylaxis  INFECTIOUS DISEASES  Meds: levoFLOXacin IVPB      levoFLOXacin IVPB 750 milliGRAM(s) IV Intermittent every 24 hours    ENDOCRINE  CAPILLARY BLOOD GLUCOSE        Meds: predniSONE   Tablet 60 milliGRAM(s) Oral daily    OTHER MEDICATIONS:  chlorhexidine 0.12% Liquid 15 milliLiter(s) Swish and Spit two times a day  chlorhexidine 4% Liquid 1 Application(s) Topical <User Schedule>  pyridostigmine 30 milliGRAM(s) Oral every 6 hours  :    PHYSICAL EXAM:  CONSTITUTIONAL: + fatigue No fever, weight loss  HEENT: drooling, with increased secretions.  EYES: + L eye droop.  No eye pain, visual disturbances, or discharge  RESPIRATORY: No cough, wheezing, chills or hemoptysis; No shortness of breath  CARDIOVASCULAR: No chest pain, palpitations, dizziness, or leg swelling  GASTROINTESTINAL: No abdominal or epigastric pain. No nausea, vomiting, or hematemesis; No diarrhea or constipation. No melena or hematochezia.  NEUROLOGICAL: No headaches, memory loss, loss of strength, numbness, or tremors  SKIN: No itching, burning, rashes, or lesions     LABS:                        8.2    13.08 )-----------( 255      ( 17 May 2018 04:49 )             26.5      05-17    147<H>  |  116<H>  |  17  ----------------------------<  122<H>  3.7   |  24  |  0.50    Ca    7.8<L>      17 May 2018 04:49  Phos  3.1     05-17  Mg     2.3     05-17      PT/INR - ( 17 May 2018 04:49 )   PT: 13.7 sec;   INR: 1.25 ratio         PTT - ( 17 May 2018 04:49 )  PTT:62.6 sec    Culture Results:   Numerous Methicillin resistant Staphylococcus aureus  Numerous Haemophilus influenzae  Normal Respiratory Olga present (05-15 @ 00:40)  Culture Results:   Numerous Methicillin resistant Staphylococcus aureus  Numerous Haemophilus influenzae  Normal Respiratory Olga present (05-15 @ 00:40)  Culture Results:   Testing in progress (05-15 @ 00:40)  Culture Results:   Testing in progress (05-15 @ 00:40)      RADIOLOGY & ADDITIONAL STUDIES:    CULTURES

## 2018-05-17 NOTE — CHART NOTE - NSCHARTNOTEFT_GEN_A_CORE
Pt c myasthenia gravis, respiratory failure.  Pt intubated, respiratory status not improving.    Factors impacting intake: [ ] none [ ] nausea  [ ] vomiting [ ] diarrhea [ ] constipation  []chewing problems [ ] swallowing issues  [X ] other: intubated    Nutrition focused physical exam conducted:  Subcutaneous fat loss: [wnl ] Orbital fat pads region, [unable ]Buccal fat region, [wnl ]Triceps region,  [unable ]Ribs region.  Muscle wasting: [ wnl]Temples region, [wnl ]Clavicle region, [wnl ]Shoulder region, [unable ]Scapula region, [ wnl]Interosseous region,  [unable ]thigh region, [unable- pneumatic teds ]Calf region    Diet Prescription: Diet, NPO with Tube Feed:   Tube Feeding Modality: Nasogastric  Jevity 1.2 Sanju  Total Volume for 24 Hours (mL): 1440  Continuous  Starting Tube Feed Rate {mL per Hour}: 30  Increase Tube Feed Rate by (mL): 10     Every 8 hours  Until Goal Tube Feed Rate (mL per Hour): 60  Tube Feed Duration (in Hours): 24  Tube Feed Start Time: 11:00 (05-11-18 @ 10:54)    Intake: Tolerating Jevity 1.2 @60 ml/hr. 10-40 ml residuals noted.   60 ml/hr goal rate provides 1440 ml, 1728 kcal, 80 g protein    Current Weight: 61.6 kg (05-17), 65.2 kg (05-10) noted c 1+ generalized edema  % Weight Change 5.49% wt loss of 7.9# likely due to fluid loss    Pertinent Medications: MEDICATIONS  (STANDING):  albumin human  5% IVPB 3000 milliLiter(s) IV Intermittent once  ALBUTerol/ipratropium for Nebulization 3 milliLiter(s) Nebulizer every 4 hours  chlorhexidine 0.12% Liquid 15 milliLiter(s) Swish and Spit two times a day  chlorhexidine 4% Liquid 1 Application(s) Topical <User Schedule>  dexmedetomidine Infusion 0.7 MICROgram(s)/kG/Hr (13.965 mL/Hr) IV Continuous <Continuous>  diphenhydrAMINE   Injectable 50 milliGRAM(s) IV Push once  heparin  Injectable 5000 Unit(s) IV Push once  heparin  Injectable 5000 Unit(s) IV Push once  heparin  Injectable 5000 Unit(s) SubCutaneous every 12 hours  levoFLOXacin IVPB      levoFLOXacin IVPB 750 milliGRAM(s) IV Intermittent every 24 hours  pantoprazole   Suspension 40 milliGRAM(s) Oral daily  predniSONE   Tablet 60 milliGRAM(s) Oral daily  propofol Infusion 5 MICROgram(s)/kG/Min (2.394 mL/Hr) IV Continuous <Continuous>  pyridostigmine 60 milliGRAM(s) Oral every 6 hours  sodium chloride 3%  Inhalation 2 milliLiter(s) Inhalation every 8 hours    MEDICATIONS  (PRN):  acetaminophen    Suspension. 650 milliGRAM(s) Oral every 6 hours PRN Mild Pain (1 - 3)  calcium gluconate IVPB 1 Gram(s) IV Intermittent every 1 hour PRN Signs of Citrate Toxicity    Pertinent Labs: 05-17 Na147 mmol/L<H> Glu 122 mg/dL<H> K+ 3.7 mmol/L Cr  0.50 mg/dL BUN 17 mg/dL 05-17 Phos 3.1 mg/dL 05-12 Alb 2.7 g/dL<L>     CAPILLARY BLOOD GLUCOSE    Skin: WDL    Estimated Needs:   [X ] no change since previous assessment  [ ] recalculated:     Previous Nutrition Diagnosis:   [ ] Inadequate Energy Intake [X ]Inadequate enteral nutrition infusion [ ] Excessive Energy Intake   [ ] Underweight [ ] Increased Nutrient Needs [ ] Overweight/Obesity   [ ] Altered GI Function [ ] Unintended Weight Loss [ ] Food & Nutrition Related Knowledge Deficit [ ] Malnutrition     Nutrition Diagnosis is [ ] ongoing  [X ] resolved [ ] not applicable       New Nutrition Diagnosis: [X ] not applicable      Interventions:   Recommend  [ ] Change Diet To:  [ ] Nutrition Supplement  [ ] Nutrition Support  [ X] Other: Continue current plan of care    Monitoring and Evaluation:   [ ] PO intake [ x ] Tolerance to diet prescription [ x ] weights [ x ] labs[ x ] follow up per protocol  [ ] other:

## 2018-05-17 NOTE — PHYSICAL THERAPY INITIAL EVALUATION ADULT - ADDITIONAL COMMENTS
Pt lives in a house with positive stairs. Pt answered using yes or no questions for PLOF pt lives with 9 year old and 7 month old child

## 2018-05-17 NOTE — PHYSICAL THERAPY INITIAL EVALUATION ADULT - GAIT DEVIATIONS NOTED, PT EVAL
increased time in double stance/decreased juan/decreased weight-shifting ability/decreased step length/decreased stride length

## 2018-05-18 LAB
ANION GAP SERPL CALC-SCNC: 6 MMOL/L — SIGNIFICANT CHANGE UP (ref 5–17)
APTT BLD: 39.7 SEC — HIGH (ref 27.5–37.4)
BLD GP AB SCN SERPL QL: SIGNIFICANT CHANGE UP
BUN SERPL-MCNC: 16 MG/DL — SIGNIFICANT CHANGE UP (ref 7–23)
CALCIUM SERPL-MCNC: 8.6 MG/DL — SIGNIFICANT CHANGE UP (ref 8.5–10.1)
CHLORIDE SERPL-SCNC: 110 MMOL/L — HIGH (ref 96–108)
CO2 SERPL-SCNC: 28 MMOL/L — SIGNIFICANT CHANGE UP (ref 22–31)
CREAT SERPL-MCNC: 0.44 MG/DL — LOW (ref 0.5–1.3)
FIBRINOGEN PPP-MCNC: 616 MG/DL — HIGH (ref 310–510)
GLUCOSE SERPL-MCNC: 91 MG/DL — SIGNIFICANT CHANGE UP (ref 70–99)
HCT VFR BLD CALC: 26 % — LOW (ref 34.5–45)
HGB BLD-MCNC: 8.3 G/DL — LOW (ref 11.5–15.5)
INR BLD: 1.13 RATIO — SIGNIFICANT CHANGE UP (ref 0.88–1.16)
MAGNESIUM SERPL-MCNC: 2.5 MG/DL — SIGNIFICANT CHANGE UP (ref 1.6–2.6)
MCHC RBC-ENTMCNC: 26.9 PG — LOW (ref 27–34)
MCHC RBC-ENTMCNC: 31.9 GM/DL — LOW (ref 32–36)
MCV RBC AUTO: 84.4 FL — SIGNIFICANT CHANGE UP (ref 80–100)
NRBC # BLD: 0 /100 WBCS — SIGNIFICANT CHANGE UP (ref 0–0)
PHOSPHATE SERPL-MCNC: 3.4 MG/DL — SIGNIFICANT CHANGE UP (ref 2.5–4.5)
PLATELET # BLD AUTO: 269 K/UL — SIGNIFICANT CHANGE UP (ref 150–400)
POTASSIUM SERPL-MCNC: 3.5 MMOL/L — SIGNIFICANT CHANGE UP (ref 3.5–5.3)
POTASSIUM SERPL-SCNC: 3.5 MMOL/L — SIGNIFICANT CHANGE UP (ref 3.5–5.3)
PROTHROM AB SERPL-ACNC: 12.4 SEC — SIGNIFICANT CHANGE UP (ref 9.8–12.7)
RBC # BLD: 3.08 M/UL — LOW (ref 3.8–5.2)
RBC # FLD: 14.7 % — HIGH (ref 10.3–14.5)
SODIUM SERPL-SCNC: 144 MMOL/L — SIGNIFICANT CHANGE UP (ref 135–145)
WBC # BLD: 13.85 K/UL — HIGH (ref 3.8–10.5)
WBC # FLD AUTO: 13.85 K/UL — HIGH (ref 3.8–10.5)

## 2018-05-18 PROCEDURE — 99291 CRITICAL CARE FIRST HOUR: CPT

## 2018-05-18 RX ORDER — ALBUMIN HUMAN 25 %
3000 VIAL (ML) INTRAVENOUS ONCE
Qty: 0 | Refills: 0 | Status: COMPLETED | OUTPATIENT
Start: 2018-05-20 | End: 2018-05-20

## 2018-05-18 RX ADMIN — Medication 3 MILLILITER(S): at 01:15

## 2018-05-18 RX ADMIN — CHLORHEXIDINE GLUCONATE 15 MILLILITER(S): 213 SOLUTION TOPICAL at 17:59

## 2018-05-18 RX ADMIN — Medication 3 MILLILITER(S): at 14:38

## 2018-05-18 RX ADMIN — CHLORHEXIDINE GLUCONATE 1 APPLICATION(S): 213 SOLUTION TOPICAL at 05:28

## 2018-05-18 RX ADMIN — Medication 3 MILLILITER(S): at 21:18

## 2018-05-18 RX ADMIN — Medication 1500 MILLILITER(S): at 16:04

## 2018-05-18 RX ADMIN — SODIUM CHLORIDE 2 MILLILITER(S): 9 INJECTION INTRAMUSCULAR; INTRAVENOUS; SUBCUTANEOUS at 14:50

## 2018-05-18 RX ADMIN — SODIUM CHLORIDE 2 MILLILITER(S): 9 INJECTION INTRAMUSCULAR; INTRAVENOUS; SUBCUTANEOUS at 06:05

## 2018-05-18 RX ADMIN — DEXMEDETOMIDINE HYDROCHLORIDE IN 0.9% SODIUM CHLORIDE 13.96 MICROGRAM(S)/KG/HR: 4 INJECTION INTRAVENOUS at 18:03

## 2018-05-18 RX ADMIN — CHLORHEXIDINE GLUCONATE 15 MILLILITER(S): 213 SOLUTION TOPICAL at 05:27

## 2018-05-18 RX ADMIN — SODIUM CHLORIDE 2 MILLILITER(S): 9 INJECTION INTRAMUSCULAR; INTRAVENOUS; SUBCUTANEOUS at 21:18

## 2018-05-18 RX ADMIN — HEPARIN SODIUM 5000 UNIT(S): 5000 INJECTION INTRAVENOUS; SUBCUTANEOUS at 16:53

## 2018-05-18 RX ADMIN — HEPARIN SODIUM 5000 UNIT(S): 5000 INJECTION INTRAVENOUS; SUBCUTANEOUS at 18:03

## 2018-05-18 RX ADMIN — Medication 3 MILLILITER(S): at 17:10

## 2018-05-18 RX ADMIN — Medication 3 MILLILITER(S): at 06:04

## 2018-05-18 RX ADMIN — PANTOPRAZOLE SODIUM 40 MILLIGRAM(S): 20 TABLET, DELAYED RELEASE ORAL at 13:45

## 2018-05-18 RX ADMIN — PYRIDOSTIGMINE BROMIDE 30 MILLIGRAM(S): 60 SOLUTION ORAL at 13:45

## 2018-05-18 RX ADMIN — Medication 3 MILLILITER(S): at 10:31

## 2018-05-18 RX ADMIN — PYRIDOSTIGMINE BROMIDE 30 MILLIGRAM(S): 60 SOLUTION ORAL at 18:03

## 2018-05-18 RX ADMIN — HEPARIN SODIUM 5000 UNIT(S): 5000 INJECTION INTRAVENOUS; SUBCUTANEOUS at 05:27

## 2018-05-18 RX ADMIN — PYRIDOSTIGMINE BROMIDE 30 MILLIGRAM(S): 60 SOLUTION ORAL at 05:26

## 2018-05-18 RX ADMIN — Medication 200 GRAM(S): at 16:00

## 2018-05-18 RX ADMIN — Medication 60 MILLIGRAM(S): at 05:27

## 2018-05-18 NOTE — OCCUPATIONAL THERAPY INITIAL EVALUATION ADULT - PERTINENT HX OF CURRENT PROBLEM, REHAB EVAL
Patient admitted to [facility] due to Myasthenic crisis. Patient had x-ray of chest done on 5/10/18 which revealed  Increased reticular opacities in the right upper lobe and patchy opacities at left lung base compatible with atelectasis and/or pneumonia

## 2018-05-18 NOTE — OCCUPATIONAL THERAPY INITIAL EVALUATION ADULT - GENERAL OBSERVATIONS, REHAB EVAL
Pt encountered semi-supine in bed + telemetry, BP cuff RUE, IV lock, ETT to vent, continuous pulse ox, OGT (feed off), RN Hardik, PT Magdalena, & RT Mir present for entire session.

## 2018-05-18 NOTE — OCCUPATIONAL THERAPY INITIAL EVALUATION ADULT - PLANNED THERAPY INTERVENTIONS, OT EVAL
strengthening/ADL retraining/bed mobility training/transfer training/balance training/ROM/endurance training

## 2018-05-18 NOTE — OCCUPATIONAL THERAPY INITIAL EVALUATION ADULT - ADDITIONAL COMMENTS
According to EMR, Pt lives in a house with positive stairs. Pt answered using yes or no questions for PLOF pt lives with 9 year old and 7 month old child.

## 2018-05-18 NOTE — OCCUPATIONAL THERAPY INITIAL EVALUATION ADULT - LEVEL OF INDEPENDENCE: SIT/STAND, REHAB EVAL
[address] 
[facility] EMERGENCY DEPT 
[address] [id number] 
[phone] Work/School Note Date: 3/22/2017 To Whom It May concern: 
 
Rosi Cullen was seen and treated today in the emergency room by the following provider(s): 
Attending Provider: Larry Townsend DO Physician Assistant: JONE Field. Rosi Cullen may return to school on [phone]. Sincerely, JONE Field 
 
 
 
 independent

## 2018-05-18 NOTE — OCCUPATIONAL THERAPY INITIAL EVALUATION ADULT - TRANSFER SAFETY CONCERNS NOTED: BED/CHAIR, REHAB EVAL
decreased weight-shifting ability/losing balance/decreased balance during turns/decreased step length/decreased sequencing ability

## 2018-05-18 NOTE — PROGRESS NOTE ADULT - SUBJECTIVE AND OBJECTIVE BOX
Subjective Complaints:  Historian:       Consult requested by ER doctor:                  Attending:     HPI:  26 Y/O female w/ PMHx of mysasthenia gravis on physostigmine BIBEMS w/ SOB and weakness. As per significant other, pt c/o increasing weakness over 2 weeks. Today, pt c/o SOB, and EMS was notified. EMS found the pt w/ decreased responsiveness, was bagged, and placed on non rebreather. Upon arrival to ED, pt was found unresponsive, shallow breathing, and was intubated by ED attending for hypoxic respiratory failure. ICU called for further evaluation. (10 May 2018 03:15)    JUAN PABLO POON    PAST MEDICAL & SURGICAL HISTORY:  27yFemale    MEDICATIONS  (STANDING):  albumin human  5% IVPB 3000 milliLiter(s) IV Intermittent once  ALBUTerol/ipratropium for Nebulization 3 milliLiter(s) Nebulizer every 4 hours  chlorhexidine 0.12% Liquid 15 milliLiter(s) Swish and Spit two times a day  chlorhexidine 4% Liquid 1 Application(s) Topical <User Schedule>  dexmedetomidine Infusion 0.7 MICROgram(s)/kG/Hr (13.965 mL/Hr) IV Continuous <Continuous>  diphenhydrAMINE   Injectable 50 milliGRAM(s) IV Push once  heparin  Injectable 5000 Unit(s) IV Push once  heparin  Injectable 5000 Unit(s) IV Push once  heparin  Injectable 5000 Unit(s) IV Push once  heparin  Injectable 5000 Unit(s) IV Push once  heparin  Injectable 5000 Unit(s) SubCutaneous every 12 hours  levoFLOXacin IVPB      levoFLOXacin IVPB 750 milliGRAM(s) IV Intermittent every 24 hours  pantoprazole   Suspension 40 milliGRAM(s) Oral daily  predniSONE   Tablet 60 milliGRAM(s) Oral daily  propofol Infusion 5 MICROgram(s)/kG/Min (2.394 mL/Hr) IV Continuous <Continuous>  pyridostigmine 30 milliGRAM(s) Oral every 6 hours  sodium chloride 3%  Inhalation 2 milliLiter(s) Inhalation every 8 hours    MEDICATIONS  (PRN):  acetaminophen    Suspension. 650 milliGRAM(s) Oral every 6 hours PRN Mild Pain (1 - 3)  calcium gluconate IVPB 1 Gram(s) IV Intermittent every 1 hour PRN Signs of Citrate Toxicity      Allergies    No Known Allergies    Intolerances      FAMILY HISTORY:      REVIEW OF SYSTEMS:  General:  No wt loss, fevers, chills, night sweats  Eyes:  Good vision, no reported pain  ENT:  No sore throat, pain, runny nose, dysphagia  CV:  No pain, palpitatioins, hypo/hypertension  Resp:  No dyspnea, cough, tachypnea, wheezing  GI:  No pain, nausea, vomiting, diarrhea, constipatiion  :  No pain, bleeding, incontinence, nocturia  Muscle:  No pain, weakness  Breast:  No pain, abscess, mass, discharge  Neuro:  No weakness, tingling, memory problems  Psych:  No fatigue, insomnia, mood problems, depression  Endocrine:  No polyuria, polydypsia, cold/heat intolerance  Heme:  No petechiae, ecchymosis, easy bruisability  Skin:  No rash, tattoos, scars, edema      Vital Signs Last 24 Hrs  T(C): 37.7 (18 May 2018 07:47), Max: 38.1 (17 May 2018 16:00)  T(F): 99.8 (18 May 2018 07:47), Max: 100.6 (17 May 2018 16:00)  HR: 125 (18 May 2018 14:53) (75 - 143)  BP: 137/99 (18 May 2018 14:53) (84/52 - 137/99)  BP(mean): 79 (18 May 2018 13:00) (59 - 87)  RR: 29 (18 May 2018 14:53) (13 - 34)  SpO2: 97% (18 May 2018 14:53) (88% - 100%)    GENERAL PHYSICAL EXAM:  General:  Appears stated age, well-groomed, well-nourished, no distress  HEENT:  NC/AT, patent nares w/ pink mucosa, OP clear w/o lesions, PERRL, EOMI, conjunctivae clear, no thyromegaly, nodules, adenopathy, no JVD  Chest:  Full & symmetric excursion, no increased effort, breath sounds clear  Cardiovascular:  Regular rhythm, S1, S2, no murmur/rub/S3/S4, no carotid/femoral/abdominal bruit, radial/pedal pulses 2+, no edema  Abdomen:  Soft, non-tender, non-distended, normoactive bowel sounds, no HSM  Extremities:  Gait & station:   Digits:   Nails:   Joints, Bones, Muscles:   ROM:   Stability:  Skin:  No rash/erythema/ecchymoses/petechiae/wounds/abscess/warm/dry  Musculoskeletal:  Full ROM in all joints w/o swelling/tenderness/effusion    NEUROLOGICAL EXAM:  HENT:  Normocephalic head; atraumatic head.  Neck supple.  ENT: normal looking.  Mental State:    Alert.  Fully oriented to person, place and date.  Coherent.  Speech clear and intact.  Cooperative.  Responds appropriately.    Cranial Nerves:  II-XII:   Pupils round and reactive to light and accommodation.  Extraocular movements full.  Visual fields full (no homonymous hemianopsia).  Visual acuity wnl.  Facial symmetry intact.  Tongue midline.left facial weakmess   Motor Functions:  Moves with a strength of 3/5   Sensory Functions:  unreliable   Reflexes:  Deep tendon reflexes normoactive to biceps, knees and ankles.  Babinski absent (present).  Cerebellar Testing:    Finger to nose intact.  Nystagmus absent.  Gait: stand with assistance     LABS:                        8.3    13.85 )-----------( 269      ( 18 May 2018 04:14 )             26.0     05-18    144  |  110<H>  |  16  ----------------------------<  91  3.5   |  28  |  0.44<L>    Ca    8.6      18 May 2018 04:14  Phos  3.4     05-18  Mg     2.5     05-18      PT/INR - ( 18 May 2018 04:14 )   PT: 12.4 sec;   INR: 1.13 ratio         PTT - ( 18 May 2018 04:14 )  PTT:39.7 sec        RADIOLOGY & ADDITIONAL STUDIES:    albumin human  5% IVPB:   3000 milliLiter(s), IV Intermittent, once, infuse over 120 Minute(s), Stop After 1 Doses  Special Instructions: for plasmaphoresis  Provider's Contact #: [phone] (05-17 @ 17:04)  diphenhydrAMINE   Injectable: [Ordered as BENADRYL Injectable]  50 milliGRAM(s), IV Push, once, Stop After 1 Doses  Special Instructions: during plasmaphoresis  Administration Instructions: Max IV Push dose 50 milliGRAM(s)  IF IV PUSH, ADMINISTER 25 mG PER MIN  This is a Look-alike/Sound-alike Medication  Provider's Contact #: [phone] (05-17 @ 17:05)  calcium gluconate IVPB:   1 Gram(s) in dextrose 5% 100 milliLiter(s), IV Intermittent, every 1 hour, PRN for Signs of Citrate Toxicity, infuse over 30 Minute(s), Stop After 2 Doses  Special Instructions: 1 Gram = 4.65 milliEquivalents elemental calcium  Provider's Contact #: [phone] (05-17 @ 17:05)  heparin  Injectable:   5000 Unit(s), IV Push, once, Stop After 1 Doses  Special Instructions: for plasmapheresis  Provider's Contact #: [phone] (05-17 @ 17:06)  heparin  Injectable:   5000 Unit(s), IV Push, once, Stop After 1 Doses  Special Instructions: for plasmapheresis  Provider's Contact #: [phone] (05-17 @ 17:06)  Chart Check (05-17 @ 19:02)  Consult- OT Evaluate and Treat:   *Reason for Consult - Must select at least one choice*     Neuro Event  Weight Bearing Restrictions: No  Additional Information: Myasthenia Crisis (05-17 @ 19:20)  Activated Partial Thromboplastin Time:  Start Date:18-May-2018. AM Sched. Collection:18-May-2018 04:00 (05-17 @ 20:19)  Prothrombin Time and INR, Plasma:  Start Date:18-May-2018. AM Sched. Collection:18-May-2018 04:00 (05-17 @ 20:19)  Activated Partial Thromboplastin Time:  Start Date:18-May-2018. AM Sched. Collection:19-May-2018 04:00 (05-18 @ 08:17)  Fibrinogen Assay:  Start Date:18-May-2018. AM Sched. Collection:19-May-2018 04:00 (05-18 @ 08:17)  Basic Metabolic Panel: AM Sched. Collection: 19-May-2018 04:00 (05-18 @ 08:17)  Complete Blood Count: AM Sched. Collection: 19-May-2018 04:00 (05-18 @ 08:17)  Magnesium, Serum: AM Sched. Collection: 19-May-2018 04:00 (05-18 @ 08:17)  Phosphorus Level, Serum: AM Sched. Collection: 19-May-2018 04:00 (05-18 @ 08:17)  Activated Partial Thromboplastin Time:  Start Date:18-May-2018. AM Sched. Collection:19-May-2018 04:00 (05-18 @ 08:17)  Prothrombin Time and INR, Plasma:  Start Date:18-May-2018. AM Sched. Collection:19-May-2018 04:00 (05-18 @ 08:17)  Type + Screen: Routine (05-18 @ 13:00)  albumin human  5% IVPB:   3000 milliLiter(s), IV Intermittent, once, infuse over 120 Minute(s), Stop After 1 Doses  Indication: plasmaphoresis  Provider's Contact #: [phone] (05-18 @ 13:20)  Antibody Screen: 13:07 (05-18 @ 13:37)      DX Myasthenia crisis     Recommendations:  Fpr plasmapheresis   DVT prophylaxis as ordered.  Medications:  d/c mestinon

## 2018-05-18 NOTE — PROGRESS NOTE ADULT - SUBJECTIVE AND OBJECTIVE BOX
HPI:  28 Y/O female w/ PMHx of mysasthenia gravis on physostigmine BIBEMS w/ SOB and weakness. As per significant other, pt c/o increasing weakness over 2 weeks. Today, pt c/o SOB, and EMS was notified. EMS found the pt w/ decreased responsiveness, was bagged, and placed on non rebreather. Upon arrival to ED, pt was found unresponsive, shallow breathing, and was intubated by ED attending for hypoxic respiratory failure. ICU called for further evaluation. (10 May 2018 03:15)      24 hr events:      ## ROS:  [ ] unable to obtain  CONSTITUTIONAL: No fever, weight loss, or fatigue  EYES: No eye pain, visual disturbances, or discharge  ENMT:  No difficulty hearing, tinnitus, vertigo; No sinus or throat pain  NECK: No pain or stiffness  RESPIRATORY: No cough, wheezing, chills or hemoptysis; No shortness of breath  CARDIOVASCULAR: No chest pain, palpitations, dizziness, or leg swelling  GASTROINTESTINAL: No abdominal or epigastric pain. No nausea, vomiting, or hematemesis; No diarrhea or constipation. No melena or hematochezia.  GENITOURINARY: No dysuria, frequency, hematuria, or incontinence  NEUROLOGICAL: No headaches, memory loss, loss of strength, numbness, or tremors  SKIN: No itching, burning, rashes, or lesions   LYMPH NODES: No enlarged glands  ENDOCRINE: No heat or cold intolerance; No hair loss  MUSCULOSKELETAL: No joint pain or swelling; No muscle, back, or extremity pain  PSYCHIATRIC: No depression, anxiety, mood swings, or difficulty sleeping  HEME/LYMPH: No easy bruising, or bleeding gums  ALLERGY AND IMMUNOLOGIC: No hives or eczema    ## Labs:  CBC:                        8.3    13.85 )-----------( 269      ( 18 May 2018 04:14 )             26.0     Chem:  05-18    144  |  110<H>  |  16  ----------------------------<  91  3.5   |  28  |  0.44<L>    Ca    8.6      18 May 2018 04:14  Phos  3.4     05-18  Mg     2.5     05-18      Coags:  PT/INR - ( 18 May 2018 04:14 )   PT: 12.4 sec;   INR: 1.13 ratio         PTT - ( 18 May 2018 04:14 )  PTT:39.7 sec        ## Imaging:    ## Medications:  levoFLOXacin IVPB      levoFLOXacin IVPB 750 milliGRAM(s) IV Intermittent every 24 hours      ALBUTerol/ipratropium for Nebulization 3 milliLiter(s) Nebulizer every 4 hours  diphenhydrAMINE   Injectable 50 milliGRAM(s) IV Push once  sodium chloride 3%  Inhalation 2 milliLiter(s) Inhalation every 8 hours    predniSONE   Tablet 60 milliGRAM(s) Oral daily    heparin  Injectable 5000 Unit(s) IV Push once  heparin  Injectable 5000 Unit(s) IV Push once  heparin  Injectable 5000 Unit(s) IV Push once  heparin  Injectable 5000 Unit(s) SubCutaneous every 12 hours    pantoprazole   Suspension 40 milliGRAM(s) Oral daily    acetaminophen    Suspension. 650 milliGRAM(s) Oral every 6 hours PRN  dexmedetomidine Infusion 0.7 MICROgram(s)/kG/Hr IV Continuous <Continuous>  propofol Infusion 5 MICROgram(s)/kG/Min IV Continuous <Continuous>      ## Vitals:  T(C): 37.9 (18 @ 20:00), Max: 37.9 (18 @ 20:00)  HR: 110 (18 @ 21:18) (75 - 139)  BP: 93/59 (18 @ 21:00) (84/52 - 137/99)  BP(mean): 66 (18 @ 21:00) (48 - 107)  RR: 29 (18 @ 21:00) (14 - 34)  SpO2: 100% (18 @ 21:18) (95% - 100%)  Wt(kg): --  Vent: Mode: AC/ CMV (Assist Control/ Continuous Mandatory Ventilation), RR (machine): 12, RR (patient): 22, TV (machine): 350, FiO2: 40, PEEP: 5, PIP: 18  AB-17 @ 07:01  -   @ 07:00  --------------------------------------------------------  IN: 1993.7 mL / OUT: 650 mL / NET: 1343.7 mL     @ 07:01   @ 22:13  --------------------------------------------------------  IN: 1029.4 mL / OUT: 0 mL / NET: 1029.4 mL          ## P/E:  Gen: lying comfortably in bed in no apparent distress  HEENT: PERRL, EOMI  Resp: CTA B/L no c/r/w  CVS: S1S2 no m/r/g  Abd: soft NT/ND +BS  Ext: no c/c/e  Neuro: A&Ox3    CENTRAL LINE: [ ] YES [ ] NO  LOCATION:   DATE INSERTED:  REMOVE: [ ] YES [ ] NO      ATIF: [ ] YES [ ] NO    DATE INSERTED:  REMOVE:  [ ] YES [ ] NO      A-LINE:  [ ] YES [ ] NO  LOCATION:   DATE INSERTED:  REMOVE:  [ ] YES [ ] NO  EXPLAIN:    GLOBAL ISSUE/BEST PRACTICE:  Analgesia:  Sedation:  HOB elevation: yes  Stress ulcer prophylaxis:  VTE prophylaxis:  Oral Care:  Glycemic control:  Nutrition:    CODE STATUS: [ ] full code  [ ] DNR  [ ] DNI  [ ] [facility]  Goals of care discussion: [ ] yes HPI:  28 Y/O female PMHx of mysasthenia gravis on physostigmine BIBEMS w/ SOB and weakness. As per significant other, pt c/o increasing weakness over 2 weeks. EMS found the pt w/ decreased responsiveness, was bagged, and placed on non rebreather. Upon arrival to ED, pt was found unresponsive, shallow breathing, and was intubated by ED attending for hypoxic respiratory failure. 5/12 with hypoxia on mechanical ventilation. Course with left hemithorax opacification from mucus plugging.       24 hr events:  s/p 2nd plasmapheresis session today  participating in early mobilization, sat in chair today  still with copious oral secretions  tolerating wean better  asking to be extubated    ## ROS:  CONSTITUTIONAL: No fever, no chills  EYES: No visual disturbances, or discharge  ENMT:  No difficulty hearing, No sinus or throat pain  RESPIRATORY: No shortness of breath  CARDIOVASCULAR: No chest pain  GASTROINTESTINAL: No abdominal or epigastric pain.  NEUROLOGICAL: motor weakness improved, no HA  MUSCULOSKELETAL: No joint pain or swelling; No muscle, back, or extremity pain  PSYCHIATRIC: gets anxious at times      ## Labs:  CBC:                        8.3    13.85 )-----------( 269      ( 18 May 2018 04:14 )             26.0     Chem:  05-18    144  |  110<H>  |  16  ----------------------------<  91  3.5   |  28  |  0.44<L>    Ca    8.6      18 May 2018 04:14  Phos  3.4     05-18  Mg     2.5     05-18      Coags:  PT/INR - ( 18 May 2018 04:14 )   PT: 12.4 sec;   INR: 1.13 ratio    PTT - ( 18 May 2018 04:14 )  PTT:39.7 sec    Culture - Bronchial (05.15.18 @ 00:40)     Specimen Source: Bronch Wash Bronchoalveolar Lavage    Culture Results:     Numerous Methicillin resistant Staphylococcus aureus    Numerous Haemophilus influenzae      ## Imaging:  CXR < from: Xray Chest 1 View- PORTABLE-Routine (05.17.18 @ 08:03) >  Left lower lobe atelectasis      ## Medications:  levoFLOXacin IVPB 750 milliGRAM(s) IV Intermittent every 24 hours      ALBUTerol/ipratropium for Nebulization 3 milliLiter(s) Nebulizer every 4 hours  diphenhydrAMINE   Injectable 50 milliGRAM(s) IV Push once  sodium chloride 3%  Inhalation 2 milliLiter(s) Inhalation every 8 hours    predniSONE   Tablet 60 milliGRAM(s) Oral daily    heparin  Injectable 5000 Unit(s) IV Push once  heparin  Injectable 5000 Unit(s) IV Push once  heparin  Injectable 5000 Unit(s) IV Push once  heparin  Injectable 5000 Unit(s) SubCutaneous every 12 hours    pantoprazole   Suspension 40 milliGRAM(s) Oral daily    acetaminophen    Suspension. 650 milliGRAM(s) Oral every 6 hours PRN  dexmedetomidine Infusion 0.7 MICROgram(s)/kG/Hr IV Continuous <Continuous>  propofol Infusion 5 MICROgram(s)/kG/Min IV Continuous <Continuous>      ## Vitals:  T(C): 37.9 (05-18-18 @ 20:00), Max: 37.9 (05-18-18 @ 20:00)  HR: 110 (05-18-18 @ 21:18) (75 - 139)  BP: 93/59 (05-18-18 @ 21:00) (84/52 - 137/99)  BP(mean): 66 (05-18-18 @ 21:00) (48 - 107)  RR: 29 (05-18-18 @ 21:00) (14 - 34)  SpO2: 100% (05-18-18 @ 21:18) (95% - 100%)  Vent: Mode: AC/ CMV (Assist Control/ Continuous Mandatory Ventilation), RR (machine): 12, RR (patient): 22, TV (machine): 350, FiO2: 40, PEEP: 5, PIP: 18        05-17 @ 07:01  -  05-18 @ 07:00  --------------------------------------------------------  IN: 1993.7 mL / OUT: 650 mL / NET: 1343.7 mL    05-18 @ 07:01  -  05-18 @ 22:13  --------------------------------------------------------  IN: 1029.4 mL / OUT: 0 mL / NET: 1029.4 mL          ## P/E:  Gen: lying comfortably in bed in no apparent distress, awake and repsonsive  HEENT: left ptosis (pt states is chronic), ETT and OGT in place, still with oral secretions and drooling  Resp: CTA B/L no wheeze, no rhonchi  CVS: S1S2 RRR  Abd: soft NT/ND +BS  Ext: no c/c/e  Neuro: A&Ox3, motor 5/5 bilateral extremities, writing on paper    CENTRAL LINE: [x] YES [ ] NO  LOCATION: [facility]  DATE INSERTED: 5/16  REMOVE: [ ] YES [x] NO      ATIF: [ ] YES [x] NO    DATE INSERTED:  REMOVE:  [ ] YES [ ] NO      A-LINE:  [ ] YES [x] NO  LOCATION:   DATE INSERTED:  REMOVE:  [ ] YES [ ] NO  EXPLAIN:    GLOBAL ISSUE/BEST PRACTICE:  Analgesia: n/a  Sedation: precedex, propofol   HOB elevation: yes  Stress ulcer prophylaxis: protonix  VTE prophylaxis: heparin sc  Oral Care: chlorhexidine   Glycemic control: n/a  Nutrition: jevity    CODE STATUS: [x] full code  [ ] DNR  [ ] DNI  [ ] [facility]  Goals of care discussion: [ ] yes

## 2018-05-19 LAB
ANION GAP SERPL CALC-SCNC: 7 MMOL/L — SIGNIFICANT CHANGE UP (ref 5–17)
APTT BLD: 45.7 SEC — HIGH (ref 27.5–37.4)
BUN SERPL-MCNC: 14 MG/DL — SIGNIFICANT CHANGE UP (ref 7–23)
CALCIUM SERPL-MCNC: 8.2 MG/DL — LOW (ref 8.5–10.1)
CHLORIDE SERPL-SCNC: 113 MMOL/L — HIGH (ref 96–108)
CO2 SERPL-SCNC: 27 MMOL/L — SIGNIFICANT CHANGE UP (ref 22–31)
CREAT SERPL-MCNC: 0.44 MG/DL — LOW (ref 0.5–1.3)
CULTURE RESULTS: SIGNIFICANT CHANGE UP
FIBRINOGEN PPP-MCNC: 324 MG/DL — SIGNIFICANT CHANGE UP (ref 310–510)
GLUCOSE SERPL-MCNC: 113 MG/DL — HIGH (ref 70–99)
HCT VFR BLD CALC: 28.9 % — LOW (ref 34.5–45)
HGB BLD-MCNC: 9.2 G/DL — LOW (ref 11.5–15.5)
INR BLD: 1.25 RATIO — HIGH (ref 0.88–1.16)
MAGNESIUM SERPL-MCNC: 2.3 MG/DL — SIGNIFICANT CHANGE UP (ref 1.6–2.6)
MCHC RBC-ENTMCNC: 26.5 PG — LOW (ref 27–34)
MCHC RBC-ENTMCNC: 31.8 GM/DL — LOW (ref 32–36)
MCV RBC AUTO: 83.3 FL — SIGNIFICANT CHANGE UP (ref 80–100)
NRBC # BLD: 0 /100 WBCS — SIGNIFICANT CHANGE UP (ref 0–0)
PHOSPHATE SERPL-MCNC: 3.9 MG/DL — SIGNIFICANT CHANGE UP (ref 2.5–4.5)
PLATELET # BLD AUTO: 290 K/UL — SIGNIFICANT CHANGE UP (ref 150–400)
POTASSIUM SERPL-MCNC: 3.4 MMOL/L — LOW (ref 3.5–5.3)
POTASSIUM SERPL-SCNC: 3.4 MMOL/L — LOW (ref 3.5–5.3)
PROTHROM AB SERPL-ACNC: 13.7 SEC — HIGH (ref 9.8–12.7)
RBC # BLD: 3.47 M/UL — LOW (ref 3.8–5.2)
RBC # FLD: 14.6 % — HIGH (ref 10.3–14.5)
SODIUM SERPL-SCNC: 147 MMOL/L — HIGH (ref 135–145)
SPECIMEN SOURCE: SIGNIFICANT CHANGE UP
WBC # BLD: 20.07 K/UL — HIGH (ref 3.8–10.5)
WBC # FLD AUTO: 20.07 K/UL — HIGH (ref 3.8–10.5)

## 2018-05-19 PROCEDURE — 71045 X-RAY EXAM CHEST 1 VIEW: CPT | Mod: 26

## 2018-05-19 RX ORDER — ALBUTEROL 90 UG/1
2.5 AEROSOL, METERED ORAL EVERY 4 HOURS
Qty: 0 | Refills: 0 | Status: DISCONTINUED | OUTPATIENT
Start: 2018-05-19 | End: 2018-05-24

## 2018-05-19 RX ORDER — FENTANYL CITRATE 50 UG/ML
50 INJECTION INTRAVENOUS EVERY 4 HOURS
Qty: 0 | Refills: 0 | Status: DISCONTINUED | OUTPATIENT
Start: 2018-05-19 | End: 2018-05-24

## 2018-05-19 RX ORDER — IPRATROPIUM/ALBUTEROL SULFATE 18-103MCG
3 AEROSOL WITH ADAPTER (GRAM) INHALATION EVERY 8 HOURS
Qty: 0 | Refills: 0 | Status: DISCONTINUED | OUTPATIENT
Start: 2018-05-19 | End: 2018-05-21

## 2018-05-19 RX ORDER — POTASSIUM CHLORIDE 20 MEQ
20 PACKET (EA) ORAL ONCE
Qty: 0 | Refills: 0 | Status: COMPLETED | OUTPATIENT
Start: 2018-05-19 | End: 2018-05-19

## 2018-05-19 RX ORDER — ALBUTEROL 90 UG/1
2.5 AEROSOL, METERED ORAL EVERY 4 HOURS
Qty: 0 | Refills: 0 | Status: DISCONTINUED | OUTPATIENT
Start: 2018-05-19 | End: 2018-05-19

## 2018-05-19 RX ORDER — LINEZOLID 600 MG/300ML
600 INJECTION, SOLUTION INTRAVENOUS EVERY 12 HOURS
Qty: 0 | Refills: 0 | Status: DISCONTINUED | OUTPATIENT
Start: 2018-05-19 | End: 2018-05-21

## 2018-05-19 RX ADMIN — LINEZOLID 600 MILLIGRAM(S): 600 INJECTION, SOLUTION INTRAVENOUS at 17:14

## 2018-05-19 RX ADMIN — CHLORHEXIDINE GLUCONATE 1 APPLICATION(S): 213 SOLUTION TOPICAL at 06:07

## 2018-05-19 RX ADMIN — PYRIDOSTIGMINE BROMIDE 30 MILLIGRAM(S): 60 SOLUTION ORAL at 00:30

## 2018-05-19 RX ADMIN — Medication 3 MILLILITER(S): at 11:13

## 2018-05-19 RX ADMIN — PYRIDOSTIGMINE BROMIDE 30 MILLIGRAM(S): 60 SOLUTION ORAL at 17:14

## 2018-05-19 RX ADMIN — HEPARIN SODIUM 5000 UNIT(S): 5000 INJECTION INTRAVENOUS; SUBCUTANEOUS at 06:07

## 2018-05-19 RX ADMIN — PANTOPRAZOLE SODIUM 40 MILLIGRAM(S): 20 TABLET, DELAYED RELEASE ORAL at 11:44

## 2018-05-19 RX ADMIN — PYRIDOSTIGMINE BROMIDE 30 MILLIGRAM(S): 60 SOLUTION ORAL at 06:07

## 2018-05-19 RX ADMIN — SODIUM CHLORIDE 2 MILLILITER(S): 9 INJECTION INTRAMUSCULAR; INTRAVENOUS; SUBCUTANEOUS at 05:27

## 2018-05-19 RX ADMIN — PYRIDOSTIGMINE BROMIDE 30 MILLIGRAM(S): 60 SOLUTION ORAL at 23:05

## 2018-05-19 RX ADMIN — Medication 3 MILLILITER(S): at 05:27

## 2018-05-19 RX ADMIN — PYRIDOSTIGMINE BROMIDE 30 MILLIGRAM(S): 60 SOLUTION ORAL at 11:44

## 2018-05-19 RX ADMIN — SODIUM CHLORIDE 2 MILLILITER(S): 9 INJECTION INTRAMUSCULAR; INTRAVENOUS; SUBCUTANEOUS at 14:02

## 2018-05-19 RX ADMIN — HEPARIN SODIUM 5000 UNIT(S): 5000 INJECTION INTRAVENOUS; SUBCUTANEOUS at 17:14

## 2018-05-19 RX ADMIN — SODIUM CHLORIDE 2 MILLILITER(S): 9 INJECTION INTRAMUSCULAR; INTRAVENOUS; SUBCUTANEOUS at 22:16

## 2018-05-19 RX ADMIN — CHLORHEXIDINE GLUCONATE 15 MILLILITER(S): 213 SOLUTION TOPICAL at 06:07

## 2018-05-19 RX ADMIN — Medication 3 MILLILITER(S): at 21:24

## 2018-05-19 RX ADMIN — DEXMEDETOMIDINE HYDROCHLORIDE IN 0.9% SODIUM CHLORIDE 13.96 MICROGRAM(S)/KG/HR: 4 INJECTION INTRAVENOUS at 17:20

## 2018-05-19 RX ADMIN — Medication 20 MILLIEQUIVALENT(S): at 08:27

## 2018-05-19 RX ADMIN — Medication 60 MILLIGRAM(S): at 06:07

## 2018-05-19 RX ADMIN — Medication 3 MILLILITER(S): at 01:07

## 2018-05-19 RX ADMIN — Medication 3 MILLILITER(S): at 14:16

## 2018-05-19 RX ADMIN — FENTANYL CITRATE 50 MICROGRAM(S): 50 INJECTION INTRAVENOUS at 21:30

## 2018-05-19 RX ADMIN — CHLORHEXIDINE GLUCONATE 15 MILLILITER(S): 213 SOLUTION TOPICAL at 17:15

## 2018-05-19 RX ADMIN — FENTANYL CITRATE 50 MICROGRAM(S): 50 INJECTION INTRAVENOUS at 21:10

## 2018-05-19 NOTE — PROGRESS NOTE ADULT - SUBJECTIVE AND OBJECTIVE BOX
# CC: Progressive weakness x 2 weeks and Respiratory distress    ## HPI:  27F PMH mysasthenia gravis on physostigmine BIBEMS w/ SOB and weakness. As per significant other, pt c/o increasing weakness over 2 weeks. EMS found the pt w/ decreased responsiveness, was bagged, and placed on non rebreather. Upon arrival to ED, pt was found unresponsive, shallow breathing, and was intubated by ED attending for hypoxic respiratory failure. 5/12 with hypoxia on mechanical ventilation. Course with left hemithorax opacification from mucus plugging.     **O/N:**  Remains intubated, day 9  Pressure supporting well.    ## ROS:  No significant complaints.  Motions to throat when asked if anything hurts.    ## Labs:             9.2    20.07 )-----------( 290      ( 19 May 2018 04:12 )             28.9     147<H>  |  113<H>  |  14  ----------------------------<  113<H>  3.4<L>   |  27  |  0.44<L>    Ca    8.2<L>      19 May 2018 04:12  Phos  3.9     05-19  Mg     2.3     05-19    PT/INR - ( 19 May 2018 04:12 )   PT: 13.7 sec;   INR: 1.25 ratio    PTT - ( 19 May 2018 04:12 )  PTT:45.7 sec    Culture - Fungal, Bronchial (collected 15 May 2018 00:40):  Testing in progress  Culture - Fungal, Bronchial (collected 15 May 2018 00:40)  Testing in progress  Culture - Bronchial (collected 15 May 2018 00:40)    Numerous Methicillin resistant Staphylococcus aureus    Numerous Haemophilus influenzae    Normal Respiratory Olga present  Organism: Methicillin resistant Staphylococcus aureus (16 May 2018 21:15)      -  Ampicillin/Sulbactam: R <=8/4      -  Cefazolin: R <=4      -  Ciprofloxacin: S <=1      -  Clindamycin: S 0.5      -  Erythromycin: S 0.5      -  Gentamicin: S <=1      -  Levofloxacin: S <=0.5      -  Linezolid: S 4      -  Moxifloxacin(Aerobic): S <=0.5      -  Oxacillin: R >2      -  Penicillin: R >8      -  RIF- Rifampin: S <=1      -  Tetra/Doxy: S <=1      -  Trimethoprim/Sulfamethoxazole: S <=0.5/9.5      -  Vancomycin: S 2      Method Type: GAYLE    Culture - Bronchial (collected 15 May 2018 00:40)    Numerous Methicillin resistant Staphylococcus aureus    Numerous Haemophilus influenzae    Normal Respiratory Olga present  Organism: Methicillin resistant Staphylococcus aureus (16 May 2018 21:13)      -  Ampicillin/Sulbactam: R <=8/4      -  Cefazolin: R <=4      -  Ciprofloxacin: S <=1      -  Clindamycin: I 1      -  Erythromycin: S 0.5      -  Gentamicin: S <=1      -  Levofloxacin: S <=0.5      -  Linezolid: S 4      -  Moxifloxacin(Aerobic): S <=0.5      -  Oxacillin: R >2      -  Penicillin: R >8      -  RIF- Rifampin: S <=1      -  Tetra/Doxy: S <=1      -  Trimethoprim/Sulfamethoxazole: S <=0.5/9.5      -  Vancomycin: S 2      Method Type: GAYLE    Culture - Blood (collected 14 May 2018 15:33)  No growth at 5 days.  Culture - Blood (collected 10 May 2018 18:26)  No growth at 5 days.  Culture - Blood (collected 10 May 2018 08:50)  No growth at 5 days.  Culture - Urine (collected 10 May 2018 08:37)  No growth    ## Imaging:  <CXR>  Persistent left lower lobe atelectasis. Small atelectatic   infiltrate developing off right hilum.  </CXR>    ## Medications:  levoFLOXacin IVPB 750 milliGRAM(s) IV Intermittent every 24 hours  linezolid    Tablet 600 milliGRAM(s) Oral every 12 hours  predniSONE   Tablet 60 milliGRAM(s) Oral daily  pyridostigmine 30 milliGRAM(s) Oral every 6 hours    ALBUTerol    0.083% 2.5 milliGRAM(s) Nebulizer every 4 hours PRN  ALBUTerol/ipratropium for Nebulization 3 milliLiter(s) Nebulizer every 8 hours  diphenhydrAMINE   Injectable 50 milliGRAM(s) IV Push once  sodium chloride 3%  Inhalation 2 milliLiter(s) Inhalation every 8 hours    acetaminophen    Suspension. 650 milliGRAM(s) Oral every 6 hours PRN  dexmedetomidine Infusion 0.7 MICROgram(s)/kG/Hr IV Continuous <Continuous>  fentaNYL    Injectable 50 MICROGram(s) IV Push every 4 hours PRN  propofol Infusion 5 MICROgram(s)/kG/Min IV Continuous <Continuous>    heparin  Injectable 5000 Unit(s) IV Push once  heparin  Injectable 5000 Unit(s) SubCutaneous every 12 hours    pantoprazole   Suspension 40 milliGRAM(s) Oral daily    ## O/E:  T(C): 37.6 (19 May 2018 17:00), Max: 37.9 (18 May 2018 20:00)  HR: 82 (19 May 2018 17:00) (82 - 136)  BP: 105/62 (19 May 2018 17:00) (84/54 - 956/73)  BP(mean): 72 (19 May 2018 17:00) (48 - 78)  RR: 19 (19 May 2018 17:00) (17 - 32)  SpO2: 100% (19 May 2018 17:00) (95% - 100%)  IN: 1853.3 mL / OUT: ???? mL    Gen: lying comfortably in bed in no apparent distress, orotracheally intubated  HEENT: PERRL, EOMI  Resp: mechanical breath sounds B/L  CVS: S1S2 no m/r/g  Abd: soft NT/ND +BS  Ext: no c/c/e  Neuro: alert and responsive, moving all extremities against gravity, motions with hands to answer questions appropriately.    ## Daily Issues  - Analgesia: N/A  - Sedation: N/A  - HOB elevation: Y  - GI ppx (ulcers): PPI  - DVT ppx: Hep SC  - Nutrition: PO diet    ## Assessment / Plan:  27F with Myasthenia gravis / exacerbation c/b respiratory failure  - Remains intubated, still thick secretions    * Appear to be improving in quantity today    * Sputum positive MRSA and H. flu. c/w Levaquin, add linezolid.    * All blood cultures remain negative    * Hypersal and Duonebs Q8h ATC, albuterol Q4h PRN    * Pressure supporting well    * If patient has continued improvement in secretions and pressure supports well tomorrow can consider extubation  - c/w plasmapheresis, third run tomorrow    * c/w prednisone / pyridostigmine    * INR continues to be normal / mildly elevated    * Will give 2U FFP    * Continue to trend CBC / coags  - minimize sedation to Precedex. Can give fentanyl PRN throat pain / discomfort    ## Code status:  Goals of care discussion: [x] yes [ ] no  [x] full code  [ ] DNR  [ ] DNI  [ ] [facility]    Total attending critical care time spent: 40 minutes

## 2018-05-20 LAB
ANION GAP SERPL CALC-SCNC: 8 MMOL/L — SIGNIFICANT CHANGE UP (ref 5–17)
APTT BLD: 32.7 SEC — SIGNIFICANT CHANGE UP (ref 27.5–37.4)
BLD GP AB SCN SERPL QL: SIGNIFICANT CHANGE UP
BUN SERPL-MCNC: 15 MG/DL — SIGNIFICANT CHANGE UP (ref 7–23)
CALCIUM SERPL-MCNC: 8.9 MG/DL — SIGNIFICANT CHANGE UP (ref 8.5–10.1)
CHLORIDE SERPL-SCNC: 111 MMOL/L — HIGH (ref 96–108)
CO2 SERPL-SCNC: 26 MMOL/L — SIGNIFICANT CHANGE UP (ref 22–31)
CREAT SERPL-MCNC: 0.5 MG/DL — SIGNIFICANT CHANGE UP (ref 0.5–1.3)
FIBRINOGEN PPP-MCNC: 545 MG/DL — HIGH (ref 310–510)
GLUCOSE SERPL-MCNC: 122 MG/DL — HIGH (ref 70–99)
HCT VFR BLD CALC: 24.4 % — LOW (ref 34.5–45)
HGB BLD-MCNC: 7.9 G/DL — LOW (ref 11.5–15.5)
INR BLD: 1.16 RATIO — SIGNIFICANT CHANGE UP (ref 0.88–1.16)
MAGNESIUM SERPL-MCNC: 2.2 MG/DL — SIGNIFICANT CHANGE UP (ref 1.6–2.6)
MCHC RBC-ENTMCNC: 27.1 PG — SIGNIFICANT CHANGE UP (ref 27–34)
MCHC RBC-ENTMCNC: 32.4 GM/DL — SIGNIFICANT CHANGE UP (ref 32–36)
MCV RBC AUTO: 83.8 FL — SIGNIFICANT CHANGE UP (ref 80–100)
NRBC # BLD: 0 /100 WBCS — SIGNIFICANT CHANGE UP (ref 0–0)
PHOSPHATE SERPL-MCNC: 3.3 MG/DL — SIGNIFICANT CHANGE UP (ref 2.5–4.5)
PLATELET # BLD AUTO: 271 K/UL — SIGNIFICANT CHANGE UP (ref 150–400)
POTASSIUM SERPL-MCNC: 3.4 MMOL/L — LOW (ref 3.5–5.3)
POTASSIUM SERPL-SCNC: 3.4 MMOL/L — LOW (ref 3.5–5.3)
PROTHROM AB SERPL-ACNC: 12.7 SEC — SIGNIFICANT CHANGE UP (ref 9.8–12.7)
RBC # BLD: 2.91 M/UL — LOW (ref 3.8–5.2)
RBC # FLD: 14.6 % — HIGH (ref 10.3–14.5)
SODIUM SERPL-SCNC: 145 MMOL/L — SIGNIFICANT CHANGE UP (ref 135–145)
WBC # BLD: 17.92 K/UL — HIGH (ref 3.8–10.5)
WBC # FLD AUTO: 17.92 K/UL — HIGH (ref 3.8–10.5)

## 2018-05-20 PROCEDURE — 71045 X-RAY EXAM CHEST 1 VIEW: CPT | Mod: 26

## 2018-05-20 RX ORDER — POTASSIUM CHLORIDE 20 MEQ
40 PACKET (EA) ORAL ONCE
Qty: 0 | Refills: 0 | Status: COMPLETED | OUTPATIENT
Start: 2018-05-20 | End: 2018-05-20

## 2018-05-20 RX ORDER — ALBUMIN HUMAN 25 %
3000 VIAL (ML) INTRAVENOUS ONCE
Qty: 0 | Refills: 0 | Status: COMPLETED | OUTPATIENT
Start: 2018-05-22 | End: 2018-05-22

## 2018-05-20 RX ORDER — POTASSIUM CHLORIDE 20 MEQ
10 PACKET (EA) ORAL
Qty: 0 | Refills: 0 | Status: COMPLETED | OUTPATIENT
Start: 2018-05-20 | End: 2018-05-20

## 2018-05-20 RX ADMIN — PANTOPRAZOLE SODIUM 40 MILLIGRAM(S): 20 TABLET, DELAYED RELEASE ORAL at 11:28

## 2018-05-20 RX ADMIN — Medication 3 MILLILITER(S): at 05:50

## 2018-05-20 RX ADMIN — CHLORHEXIDINE GLUCONATE 15 MILLILITER(S): 213 SOLUTION TOPICAL at 05:21

## 2018-05-20 RX ADMIN — Medication 3 MILLILITER(S): at 21:32

## 2018-05-20 RX ADMIN — Medication 60 MILLIGRAM(S): at 05:22

## 2018-05-20 RX ADMIN — SODIUM CHLORIDE 2 MILLILITER(S): 9 INJECTION INTRAMUSCULAR; INTRAVENOUS; SUBCUTANEOUS at 05:51

## 2018-05-20 RX ADMIN — SODIUM CHLORIDE 2 MILLILITER(S): 9 INJECTION INTRAMUSCULAR; INTRAVENOUS; SUBCUTANEOUS at 21:32

## 2018-05-20 RX ADMIN — Medication 40 MILLIEQUIVALENT(S): at 05:27

## 2018-05-20 RX ADMIN — PYRIDOSTIGMINE BROMIDE 30 MILLIGRAM(S): 60 SOLUTION ORAL at 17:30

## 2018-05-20 RX ADMIN — HEPARIN SODIUM 5000 UNIT(S): 5000 INJECTION INTRAVENOUS; SUBCUTANEOUS at 17:30

## 2018-05-20 RX ADMIN — Medication 100 MILLIEQUIVALENT(S): at 05:27

## 2018-05-20 RX ADMIN — PYRIDOSTIGMINE BROMIDE 30 MILLIGRAM(S): 60 SOLUTION ORAL at 11:28

## 2018-05-20 RX ADMIN — Medication 100 MILLIEQUIVALENT(S): at 08:05

## 2018-05-20 RX ADMIN — Medication 650 MILLIGRAM(S): at 23:27

## 2018-05-20 RX ADMIN — LINEZOLID 600 MILLIGRAM(S): 600 INJECTION, SOLUTION INTRAVENOUS at 17:30

## 2018-05-20 RX ADMIN — Medication 3 MILLILITER(S): at 13:33

## 2018-05-20 RX ADMIN — SODIUM CHLORIDE 2 MILLILITER(S): 9 INJECTION INTRAMUSCULAR; INTRAVENOUS; SUBCUTANEOUS at 13:33

## 2018-05-20 RX ADMIN — PYRIDOSTIGMINE BROMIDE 30 MILLIGRAM(S): 60 SOLUTION ORAL at 23:29

## 2018-05-20 RX ADMIN — CHLORHEXIDINE GLUCONATE 1 APPLICATION(S): 213 SOLUTION TOPICAL at 05:22

## 2018-05-20 RX ADMIN — LINEZOLID 600 MILLIGRAM(S): 600 INJECTION, SOLUTION INTRAVENOUS at 06:33

## 2018-05-20 RX ADMIN — HEPARIN SODIUM 5000 UNIT(S): 5000 INJECTION INTRAVENOUS; SUBCUTANEOUS at 05:21

## 2018-05-20 RX ADMIN — Medication 1500 MILLILITER(S): at 11:33

## 2018-05-20 RX ADMIN — Medication 650 MILLIGRAM(S): at 22:53

## 2018-05-20 RX ADMIN — PYRIDOSTIGMINE BROMIDE 30 MILLIGRAM(S): 60 SOLUTION ORAL at 05:21

## 2018-05-20 RX ADMIN — DEXMEDETOMIDINE HYDROCHLORIDE IN 0.9% SODIUM CHLORIDE 13.96 MICROGRAM(S)/KG/HR: 4 INJECTION INTRAVENOUS at 08:08

## 2018-05-20 RX ADMIN — Medication 100 MILLIEQUIVALENT(S): at 06:33

## 2018-05-20 NOTE — PROGRESS NOTE ADULT - SUBJECTIVE AND OBJECTIVE BOX
# CC: Progressive weakness x 2 weeks and Respiratory distress    ## HPI:  27F PMH mysasthenia gravis on physostigmine BIBEMS w/ SOB and weakness. As per significant other, pt c/o increasing weakness over 2 weeks. EMS found the pt w/ decreased responsiveness, was bagged, and placed on non rebreather. Upon arrival to ED, pt was found unresponsive, shallow breathing, and was intubated by ED attending for hypoxic respiratory failure. 5/12 with hypoxia on mechanical ventilation. Course with left hemithorax opacification from mucus plugging.     **O/N:**  Remains intubated, day 10  Pressure supporting well.    ## ROS:  No significant complaints.  Motions to throat when asked if anything hurts.    ## Labs:             7.9    17.92 )-----------( 271      ( 20 May 2018 04:00 )             24.4     Chem:  05-20    145  |  111<H>  |  15  ----------------------------<  122<H>  3.4<L>   |  26  |  0.50    Ca    8.9      20 May 2018 04:00  Phos  3.3     05-20  Mg     2.2     05-20    Coags:  PT/INR - ( 20 May 2018 04:00 )   PT: 12.7 sec;   INR: 1.16 ratio    PTT - ( 20 May 2018 04:00 )  PTT:32.7 sec    Culture - Fungal, Bronchial (collected 15 May 2018 00:40):  Testing in progress  Culture - Fungal, Bronchial (collected 15 May 2018 00:40)  Testing in progress  Culture - Bronchial (collected 15 May 2018 00:40)    Numerous Methicillin resistant Staphylococcus aureus    Numerous Haemophilus influenzae    Normal Respiratory Olga present  Organism: Methicillin resistant Staphylococcus aureus (16 May 2018 21:15)      -  Ampicillin/Sulbactam: R <=8/4      -  Cefazolin: R <=4      -  Ciprofloxacin: S <=1      -  Clindamycin: S 0.5      -  Erythromycin: S 0.5      -  Gentamicin: S <=1      -  Levofloxacin: S <=0.5      -  Linezolid: S 4      -  Moxifloxacin(Aerobic): S <=0.5      -  Oxacillin: R >2      -  Penicillin: R >8      -  RIF- Rifampin: S <=1      -  Tetra/Doxy: S <=1      -  Trimethoprim/Sulfamethoxazole: S <=0.5/9.5      -  Vancomycin: S 2      Method Type: GAYLE  Culture - Bronchial (collected 15 May 2018 00:40)    Numerous Methicillin resistant Staphylococcus aureus    Numerous Haemophilus influenzae    Normal Respiratory Olga present  Organism: Methicillin resistant Staphylococcus aureus (16 May 2018 21:13)      -  Ampicillin/Sulbactam: R <=8/4      -  Cefazolin: R <=4      -  Ciprofloxacin: S <=1      -  Clindamycin: I 1      -  Erythromycin: S 0.5      -  Gentamicin: S <=1      -  Levofloxacin: S <=0.5      -  Linezolid: S 4      -  Moxifloxacin(Aerobic): S <=0.5      -  Oxacillin: R >2      -  Penicillin: R >8      -  RIF- Rifampin: S <=1      -  Tetra/Doxy: S <=1      -  Trimethoprim/Sulfamethoxazole: S <=0.5/9.5      -  Vancomycin: S 2      Method Type: GAYLE  Culture - Blood (collected 14 May 2018 15:33)  No growth at 5 days.  Culture - Blood (collected 10 May 2018 18:26)  No growth at 5 days.  Culture - Blood (collected 10 May 2018 08:50)  No growth at 5 days.  Culture - Urine (collected 10 May 2018 08:37)  No growth    ## Imaging:  <CXR>   Persistent left base findings with loss of volume of the left   chest. Small area of right lung atelectasis has cleared.  </CXR>    ## Medications:  levoFLOXacin IVPB 750 milliGRAM(s) IV Intermittent every 24 hours  linezolid    Tablet 600 milliGRAM(s) Oral every 12 hours    ALBUTerol    0.083% 2.5 milliGRAM(s) Nebulizer every 4 hours PRN  ALBUTerol/ipratropium for Nebulization 3 milliLiter(s) Nebulizer every 8 hours  diphenhydrAMINE   Injectable 50 milliGRAM(s) IV Push once  sodium chloride 3%  Inhalation 2 milliLiter(s) Inhalation every 8 hours  predniSONE   Tablet 60 milliGRAM(s) Oral daily  pyridostigmine 30 milliGRAM(s) Oral every 6 hours    acetaminophen    Suspension. 650 milliGRAM(s) Oral every 6 hours PRN  dexmedetomidine Infusion 0.7 MICROgram(s)/kG/Hr IV Continuous <Continuous>  fentaNYL    Injectable 50 MICROGram(s) IV Push every 4 hours PRN  propofol Infusion 5 MICROgram(s)/kG/Min IV Continuous <Continuous>    heparin  Injectable 5000 Unit(s) SubCutaneous every 12 hours  pantoprazole   Suspension 40 milliGRAM(s) Oral daily    ## O/E:  T(F): 99.8 (05-20-18 @ 12:00), Max: 99.8 (05-20-18 @ 12:00)  HR: 129 (05-20-18 @ 15:00) (81 - 138)  BP: 113/74 (05-20-18 @ 15:00) (96/59 - 140/76)  RR: 19 (05-20-18 @ 15:00) (12 - 29)  SpO2: 100% (05-20-18 @ 15:00) (96% - 100%)  IN: 2593 mL / OUT: 700 mL / NET: +1893 mL    Gen: lying comfortably in bed in no apparent distress, orotracheally intubated  HEENT: PERRL, EOMI, OS eyelid droop  Resp: mechanical breath sounds B/L  CVS: S1S2 no m/r/g  Abd: soft NT/ND +BS  Ext: no c/c/e  Neuro: alert and responsive, moving all extremities against gravity, motions with hands to answer questions appropriately.    ## Daily Issues  - Analgesia: N/A  - Sedation: N/A  - HOB elevation: Y  - GI ppx (ulcers): PPI  - DVT ppx: Hep SC  - Nutrition: PO diet    ## Assessment / Plan:  27F with Myasthenia gravis / exacerbation c/b respiratory failure  - Secretions much better today  - Tolerated SBT during AM  - completed plasmapheresis, INR baseline s/p 2U FFP  - Extubated after plasmapheresis with improvement in symptoms. Has cough post-extubation, but states she "feels so much better."  - Sputum positive MRSA and H. flu. c/w Levaquin, linezolid.    * All blood cultures remain negative    * Duonebs Q8h ATC, albuterol Q4h PRN    ## Code status:  Goals of care discussion: [x] yes [ ] no  [x] full code  [ ] DNR  [ ] DNI  [ ] [facility]    Total attending critical care time spent: 40 minutes

## 2018-05-21 LAB
ANION GAP SERPL CALC-SCNC: 8 MMOL/L — SIGNIFICANT CHANGE UP (ref 5–17)
APTT BLD: 45.7 SEC — HIGH (ref 27.5–37.4)
BUN SERPL-MCNC: 12 MG/DL — SIGNIFICANT CHANGE UP (ref 7–23)
CALCIUM SERPL-MCNC: 8.7 MG/DL — SIGNIFICANT CHANGE UP (ref 8.5–10.1)
CHLORIDE SERPL-SCNC: 107 MMOL/L — SIGNIFICANT CHANGE UP (ref 96–108)
CO2 SERPL-SCNC: 26 MMOL/L — SIGNIFICANT CHANGE UP (ref 22–31)
CREAT SERPL-MCNC: 0.49 MG/DL — LOW (ref 0.5–1.3)
GLUCOSE SERPL-MCNC: 89 MG/DL — SIGNIFICANT CHANGE UP (ref 70–99)
HCT VFR BLD CALC: 28.3 % — LOW (ref 34.5–45)
HGB BLD-MCNC: 9.3 G/DL — LOW (ref 11.5–15.5)
INR BLD: 1.25 RATIO — HIGH (ref 0.88–1.16)
MAGNESIUM SERPL-MCNC: 2.2 MG/DL — SIGNIFICANT CHANGE UP (ref 1.6–2.6)
MCHC RBC-ENTMCNC: 27.3 PG — SIGNIFICANT CHANGE UP (ref 27–34)
MCHC RBC-ENTMCNC: 32.9 GM/DL — SIGNIFICANT CHANGE UP (ref 32–36)
MCV RBC AUTO: 83 FL — SIGNIFICANT CHANGE UP (ref 80–100)
NRBC # BLD: 0 /100 WBCS — SIGNIFICANT CHANGE UP (ref 0–0)
PHOSPHATE SERPL-MCNC: 4.5 MG/DL — SIGNIFICANT CHANGE UP (ref 2.5–4.5)
PLATELET # BLD AUTO: 308 K/UL — SIGNIFICANT CHANGE UP (ref 150–400)
POTASSIUM SERPL-MCNC: 3.3 MMOL/L — LOW (ref 3.5–5.3)
POTASSIUM SERPL-SCNC: 3.3 MMOL/L — LOW (ref 3.5–5.3)
PROTHROM AB SERPL-ACNC: 13.7 SEC — HIGH (ref 9.8–12.7)
RBC # BLD: 3.41 M/UL — LOW (ref 3.8–5.2)
RBC # FLD: 14.8 % — HIGH (ref 10.3–14.5)
SODIUM SERPL-SCNC: 141 MMOL/L — SIGNIFICANT CHANGE UP (ref 135–145)
WBC # BLD: 24.63 K/UL — HIGH (ref 3.8–10.5)
WBC # FLD AUTO: 24.63 K/UL — HIGH (ref 3.8–10.5)

## 2018-05-21 PROCEDURE — 71045 X-RAY EXAM CHEST 1 VIEW: CPT | Mod: 26

## 2018-05-21 PROCEDURE — 99291 CRITICAL CARE FIRST HOUR: CPT

## 2018-05-21 RX ORDER — IPRATROPIUM BROMIDE 0.2 MG/ML
500 SOLUTION, NON-ORAL INHALATION EVERY 6 HOURS
Qty: 0 | Refills: 0 | Status: DISCONTINUED | OUTPATIENT
Start: 2018-05-21 | End: 2018-05-24

## 2018-05-21 RX ORDER — LINEZOLID 600 MG/300ML
600 INJECTION, SOLUTION INTRAVENOUS EVERY 12 HOURS
Qty: 0 | Refills: 0 | Status: DISCONTINUED | OUTPATIENT
Start: 2018-05-21 | End: 2018-05-24

## 2018-05-21 RX ORDER — POTASSIUM CHLORIDE 20 MEQ
40 PACKET (EA) ORAL EVERY 4 HOURS
Qty: 0 | Refills: 0 | Status: DISCONTINUED | OUTPATIENT
Start: 2018-05-21 | End: 2018-05-21

## 2018-05-21 RX ORDER — POTASSIUM CHLORIDE 20 MEQ
40 PACKET (EA) ORAL ONCE
Qty: 0 | Refills: 0 | Status: COMPLETED | OUTPATIENT
Start: 2018-05-21 | End: 2018-05-21

## 2018-05-21 RX ORDER — IPRATROPIUM BROMIDE 0.2 MG/ML
1 SOLUTION, NON-ORAL INHALATION EVERY 6 HOURS
Qty: 0 | Refills: 0 | Status: DISCONTINUED | OUTPATIENT
Start: 2018-05-21 | End: 2018-05-21

## 2018-05-21 RX ADMIN — PYRIDOSTIGMINE BROMIDE 30 MILLIGRAM(S): 60 SOLUTION ORAL at 23:51

## 2018-05-21 RX ADMIN — Medication 60 MILLIGRAM(S): at 09:20

## 2018-05-21 RX ADMIN — CHLORHEXIDINE GLUCONATE 1 APPLICATION(S): 213 SOLUTION TOPICAL at 12:48

## 2018-05-21 RX ADMIN — HEPARIN SODIUM 5000 UNIT(S): 5000 INJECTION INTRAVENOUS; SUBCUTANEOUS at 18:33

## 2018-05-21 RX ADMIN — LINEZOLID 600 MILLIGRAM(S): 600 INJECTION, SOLUTION INTRAVENOUS at 18:33

## 2018-05-21 RX ADMIN — Medication 40 MILLIEQUIVALENT(S): at 09:20

## 2018-05-21 RX ADMIN — Medication 3 MILLILITER(S): at 13:05

## 2018-05-21 RX ADMIN — PYRIDOSTIGMINE BROMIDE 30 MILLIGRAM(S): 60 SOLUTION ORAL at 06:38

## 2018-05-21 RX ADMIN — PYRIDOSTIGMINE BROMIDE 30 MILLIGRAM(S): 60 SOLUTION ORAL at 18:33

## 2018-05-21 RX ADMIN — HEPARIN SODIUM 5000 UNIT(S): 5000 INJECTION INTRAVENOUS; SUBCUTANEOUS at 06:39

## 2018-05-21 RX ADMIN — Medication 3 MILLILITER(S): at 21:31

## 2018-05-21 RX ADMIN — PYRIDOSTIGMINE BROMIDE 30 MILLIGRAM(S): 60 SOLUTION ORAL at 12:48

## 2018-05-21 RX ADMIN — SODIUM CHLORIDE 2 MILLILITER(S): 9 INJECTION INTRAMUSCULAR; INTRAVENOUS; SUBCUTANEOUS at 06:57

## 2018-05-21 RX ADMIN — Medication 3 MILLILITER(S): at 06:56

## 2018-05-21 RX ADMIN — LINEZOLID 600 MILLIGRAM(S): 600 INJECTION, SOLUTION INTRAVENOUS at 06:39

## 2018-05-21 NOTE — PROGRESS NOTE ADULT - SUBJECTIVE AND OBJECTIVE BOX
# CC: Progressive weakness x 2 weeks and Respiratory distress    ## HPI:  27F PMH mysasthenia gravis on physostigmine BIBEMS w/ SOB and weakness. As per significant other, pt c/o increasing weakness over 2 weeks. EMS found the pt w/ decreased responsiveness, was bagged, and placed on non rebreather. Upon arrival to ED, pt was found unresponsive, shallow breathing, and was intubated by ED attending for hypoxic respiratory failure. 5/12 with hypoxia on mechanical ventilation. Course with left hemithorax opacification from mucus plugging. Now extubated s/p 3rd tx of plasmapharesis    **O/N:**  extubated, doing well.      ## ROS:  CONSTITUTIONAL:  no weight loss or night sweats  HEENT:  Eyes:  left sided ptosis   ENT:  + sore throat and inner cheek pain  CARDIOVASCULAR:  No pressure, squeezing, tightness, heaviness or aching about the chest, neck, axilla or epigastrium.  RESPIRATORY:  No cough, shortness of breath, PND or orthopnea.  GASTROINTESTINAL:  No nausea, vomiting or diarrhea.  GENITOURINARY:  No dysuria, frequency or urgency.  MUSCULOSKELETAL:  No joint pain  SKIN:  No change in skin, hair or nails.  NEUROLOGIC:  No paresthesias, fasciculations, seizures or weakness.  PSYCHIATRIC:  No disorder of thought or mood.  HEMATOLOGICAL:  No easy bruising or bleeding.     ## Labs:             7.9    17.92 )-----------( 271      ( 20 May 2018 04:00 )             24.4     Chem:  Lab Results:  CBC  CBC Full  -  ( 21 May 2018 06:03 )  WBC Count : 24.63 K/uL  Hemoglobin : 9.3 g/dL  Hematocrit : 28.3 %  Platelet Count - Automated : 308 K/uL  Mean Cell Volume : 83.0 fl  Mean Cell Hemoglobin : 27.3 pg  Mean Cell Hemoglobin Concentration : 32.9 gm/dL  Auto Neutrophil # : x  Auto Lymphocyte # : x  Auto Monocyte # : x  Auto Eosinophil # : x  Auto Basophil # : x  Auto Neutrophil % : x  Auto Lymphocyte % : x  Auto Monocyte % : x  Auto Eosinophil % : x  Auto Basophil % : x    .		Differential:	[] Automated		[] Manual  Chemistry                        9.3    24.63 )-----------( 308      ( 21 May 2018 06:03 )             28.3     05-21    141  |  107  |  12  ----------------------------<  89  3.3<L>   |  26  |  0.49<L>    Ca    8.7      21 May 2018 06:33  Phos  4.5     05-21  Mg     2.2     05-21      PT/INR - ( 21 May 2018 09:59 )   PT: 13.7 sec;   INR: 1.25 ratio         PTT - ( 21 May 2018 09:59 )  PTT:45.7 sec        MICROBIOLOGY/CULTURES:  Culture Results:   Numerous Methicillin resistant Staphylococcus aureus  Numerous Haemophilus influenzae  Normal Respiratory Olga present (05-15 @ 00:40)  Culture Results:   Numerous Methicillin resistant Staphylococcus aureus  Numerous Haemophilus influenzae  Normal Respiratory Olga present (05-15 @ 00:40)  Culture Results:   Testing in progress (05-15 @ 00:40)  Culture Results:   Testing in progress (05-15 @ 00:40)  Culture Results:   No growth at 5 days. (05-14 @ 15:33)      RADIOLOGY RESULTS:      Culture - Fungal, Bronchial (collected 15 May 2018 00:40):  Testing in progress  Culture - Fungal, Bronchial (collected 15 May 2018 00:40)  Testing in progress  Culture - Bronchial (collected 15 May 2018 00:40)    Numerous Methicillin resistant Staphylococcus aureus    Numerous Haemophilus influenzae    Normal Respiratory Olga present  Organism: Methicillin resistant Staphylococcus aureus (16 May 2018 21:15)      -  Ampicillin/Sulbactam: R <=8/4      -  Cefazolin: R <=4      -  Ciprofloxacin: S <=1      -  Clindamycin: S 0.5      -  Erythromycin: S 0.5      -  Gentamicin: S <=1      -  Levofloxacin: S <=0.5      -  Linezolid: S 4      -  Moxifloxacin(Aerobic): S <=0.5      -  Oxacillin: R >2      -  Penicillin: R >8      -  RIF- Rifampin: S <=1      -  Tetra/Doxy: S <=1      -  Trimethoprim/Sulfamethoxazole: S <=0.5/9.5      -  Vancomycin: S 2      Method Type: GAYLE  Culture - Bronchial (collected 15 May 2018 00:40)    Numerous Methicillin resistant Staphylococcus aureus    Numerous Haemophilus influenzae    Normal Respiratory Olga present  Organism: Methicillin resistant Staphylococcus aureus (16 May 2018 21:13)      -  Ampicillin/Sulbactam: R <=8/4      -  Cefazolin: R <=4      -  Ciprofloxacin: S <=1      -  Clindamycin: I 1      -  Erythromycin: S 0.5      -  Gentamicin: S <=1      -  Levofloxacin: S <=0.5      -  Linezolid: S 4      -  Moxifloxacin(Aerobic): S <=0.5      -  Oxacillin: R >2      -  Penicillin: R >8      -  RIF- Rifampin: S <=1      -  Tetra/Doxy: S <=1      -  Trimethoprim/Sulfamethoxazole: S <=0.5/9.5      -  Vancomycin: S 2      Method Type: GAYLE  Culture - Blood (collected 14 May 2018 15:33)  No growth at 5 days.  Culture - Blood (collected 10 May 2018 18:26)  No growth at 5 days.  Culture - Blood (collected 10 May 2018 08:50)  No growth at 5 days.  Culture - Urine (collected 10 May 2018 08:37)  No growth    ## Imaging:    cxr - no interval change, line in place as read by me    ## Medications:  MEDICATIONS  (STANDING):  ALBUTerol/ipratropium for Nebulization 3 milliLiter(s) Nebulizer every 8 hours  chlorhexidine 4% Liquid 1 Application(s) Topical <User Schedule>  diphenhydrAMINE   Injectable 50 milliGRAM(s) IV Push once  heparin  Injectable 5000 Unit(s) IV Push once  heparin  Injectable 5000 Unit(s) IV Push once  heparin  Injectable 5000 Unit(s) IV Push once  heparin  Injectable 5000 Unit(s) SubCutaneous every 12 hours  levoFLOXacin IVPB      levoFLOXacin IVPB 750 milliGRAM(s) IV Intermittent every 24 hours  linezolid    Tablet 600 milliGRAM(s) Oral every 12 hours  pantoprazole   Suspension 40 milliGRAM(s) Oral daily  predniSONE   Tablet 60 milliGRAM(s) Oral daily  pyridostigmine 30 milliGRAM(s) Oral every 6 hours  sodium chloride 3%  Inhalation 2 milliLiter(s) Inhalation every 8 hours    MEDICATIONS  (PRN):  acetaminophen    Suspension. 650 milliGRAM(s) Oral every 6 hours PRN Mild Pain (1 - 3)  ALBUTerol    0.083% 2.5 milliGRAM(s) Nebulizer every 4 hours PRN Wheezing  calcium gluconate IVPB 1 Gram(s) IV Intermittent every 1 hour PRN Signs of Citrate Toxicity  fentaNYL    Injectable 50 MICROGram(s) IV Push every 4 hours PRN Moderate Pain (4 - 6)      ## O/E:  ICU Vital Signs Last 24 Hrs  T(C): 37.3 (21 May 2018 07:20), Max: 38.1 (20 May 2018 22:50)  T(F): 99.2 (21 May 2018 07:20), Max: 100.6 (20 May 2018 22:50)  HR: 115 (21 May 2018 09:43) (85 - 141)  BP: 119/69 (21 May 2018 09:00) (96/65 - 126/88)  BP(mean): 79 (21 May 2018 09:00) (67 - 94)  ABP: --  ABP(mean): --  RR: 24 (21 May 2018 09:43) (14 - 31)  SpO2: 99% (21 May 2018 09:43) (58% - 100%)  I&O's Summary    20 May 2018 07:01  -  21 May 2018 07:00  --------------------------------------------------------  IN: 1464.6 mL / OUT: 1350 mL / NET: 114.6 mL        Gen: lying comfortably in bed in no apparent distress, orotracheally intubated  HEENT: PERRL, EOMI, OS eyelid droop  Resp: CTAB  CVS: S1S2 no m/r/g  Abd: soft NT/ND +BS  Ext: no c/c/e  Neuro: alert and responsive, moving all extremities against gravity,aaox3    ## Daily Issues  - Analgesia: N/A  - Sedation: N/A  - HOB elevation: Y  - GI ppx (ulcers): PPI  - DVT ppx: Hep SC  - Nutrition: PO diet    ## Assessment / Plan:  27F with Myasthenia gravis / exacerbation c/b respiratory failure  - Secretions much better today  - completed plasmapheresis X 3, will undergo plasmapharesis tomorrow, INR baseline s/p 2U FFP  - Extubated after plasmapheresis with improvement in symptoms. sore throat after extubation to be expected  - Sputum positive MRSA and H. flu. c/w Levaquin, linezolid.    * All blood cultures remain negative    * Duonebs Q8h ATC, albuterol Q4h PRN    * WBC elevation likely in setting of steroids  ## Code status:  Goals of care discussion: [x] yes [ ] no  [x] full code  [ ] DNR  [ ] DNI  [ ] MOLST    Total attending critical care time spent: 35 minutes

## 2018-05-22 LAB
ANION GAP SERPL CALC-SCNC: 9 MMOL/L — SIGNIFICANT CHANGE UP (ref 5–17)
APTT BLD: 31.5 SEC — SIGNIFICANT CHANGE UP (ref 27.5–37.4)
BUN SERPL-MCNC: 10 MG/DL — SIGNIFICANT CHANGE UP (ref 7–23)
CALCIUM SERPL-MCNC: 8.7 MG/DL — SIGNIFICANT CHANGE UP (ref 8.5–10.1)
CHLORIDE SERPL-SCNC: 103 MMOL/L — SIGNIFICANT CHANGE UP (ref 96–108)
CO2 SERPL-SCNC: 26 MMOL/L — SIGNIFICANT CHANGE UP (ref 22–31)
CREAT SERPL-MCNC: 0.49 MG/DL — LOW (ref 0.5–1.3)
GLUCOSE SERPL-MCNC: 81 MG/DL — SIGNIFICANT CHANGE UP (ref 70–99)
HCT VFR BLD CALC: 27.9 % — LOW (ref 34.5–45)
HGB BLD-MCNC: 9.1 G/DL — LOW (ref 11.5–15.5)
INR BLD: 1.19 RATIO — HIGH (ref 0.88–1.16)
MAGNESIUM SERPL-MCNC: 2.2 MG/DL — SIGNIFICANT CHANGE UP (ref 1.6–2.6)
MCHC RBC-ENTMCNC: 26.8 PG — LOW (ref 27–34)
MCHC RBC-ENTMCNC: 32.6 GM/DL — SIGNIFICANT CHANGE UP (ref 32–36)
MCV RBC AUTO: 82.1 FL — SIGNIFICANT CHANGE UP (ref 80–100)
NRBC # BLD: 0 /100 WBCS — SIGNIFICANT CHANGE UP (ref 0–0)
PHOSPHATE SERPL-MCNC: 3.6 MG/DL — SIGNIFICANT CHANGE UP (ref 2.5–4.5)
PLATELET # BLD AUTO: 358 K/UL — SIGNIFICANT CHANGE UP (ref 150–400)
POTASSIUM SERPL-MCNC: 3.4 MMOL/L — LOW (ref 3.5–5.3)
POTASSIUM SERPL-SCNC: 3.4 MMOL/L — LOW (ref 3.5–5.3)
PROTHROM AB SERPL-ACNC: 13 SEC — HIGH (ref 9.8–12.7)
RBC # BLD: 3.4 M/UL — LOW (ref 3.8–5.2)
RBC # FLD: 14.9 % — HIGH (ref 10.3–14.5)
SODIUM SERPL-SCNC: 138 MMOL/L — SIGNIFICANT CHANGE UP (ref 135–145)
WBC # BLD: 25.01 K/UL — HIGH (ref 3.8–10.5)
WBC # FLD AUTO: 25.01 K/UL — HIGH (ref 3.8–10.5)

## 2018-05-22 PROCEDURE — 99291 CRITICAL CARE FIRST HOUR: CPT

## 2018-05-22 RX ORDER — HEPARIN SODIUM 5000 [USP'U]/ML
5000 INJECTION INTRAVENOUS; SUBCUTANEOUS ONCE
Qty: 0 | Refills: 0 | Status: COMPLETED | OUTPATIENT
Start: 2018-05-22 | End: 2018-05-22

## 2018-05-22 RX ORDER — POTASSIUM CHLORIDE 20 MEQ
40 PACKET (EA) ORAL ONCE
Qty: 0 | Refills: 0 | Status: COMPLETED | OUTPATIENT
Start: 2018-05-22 | End: 2018-05-22

## 2018-05-22 RX ORDER — POTASSIUM CHLORIDE 20 MEQ
10 PACKET (EA) ORAL ONCE
Qty: 0 | Refills: 0 | Status: COMPLETED | OUTPATIENT
Start: 2018-05-22 | End: 2018-05-22

## 2018-05-22 RX ORDER — POTASSIUM CHLORIDE 20 MEQ
10 PACKET (EA) ORAL ONCE
Qty: 0 | Refills: 0 | Status: DISCONTINUED | OUTPATIENT
Start: 2018-05-22 | End: 2018-05-22

## 2018-05-22 RX ADMIN — Medication 40 MILLIEQUIVALENT(S): at 12:17

## 2018-05-22 RX ADMIN — Medication 500 MICROGRAM(S): at 00:47

## 2018-05-22 RX ADMIN — Medication 500 MICROGRAM(S): at 06:04

## 2018-05-22 RX ADMIN — Medication 500 MICROGRAM(S): at 19:14

## 2018-05-22 RX ADMIN — LINEZOLID 600 MILLIGRAM(S): 600 INJECTION, SOLUTION INTRAVENOUS at 17:43

## 2018-05-22 RX ADMIN — CHLORHEXIDINE GLUCONATE 1 APPLICATION(S): 213 SOLUTION TOPICAL at 12:18

## 2018-05-22 RX ADMIN — PYRIDOSTIGMINE BROMIDE 30 MILLIGRAM(S): 60 SOLUTION ORAL at 12:18

## 2018-05-22 RX ADMIN — PYRIDOSTIGMINE BROMIDE 30 MILLIGRAM(S): 60 SOLUTION ORAL at 17:43

## 2018-05-22 RX ADMIN — HEPARIN SODIUM 5000 UNIT(S): 5000 INJECTION INTRAVENOUS; SUBCUTANEOUS at 14:10

## 2018-05-22 RX ADMIN — HEPARIN SODIUM 5000 UNIT(S): 5000 INJECTION INTRAVENOUS; SUBCUTANEOUS at 17:43

## 2018-05-22 RX ADMIN — Medication 60 MILLIGRAM(S): at 05:41

## 2018-05-22 RX ADMIN — Medication 100 MILLIEQUIVALENT(S): at 09:50

## 2018-05-22 RX ADMIN — PYRIDOSTIGMINE BROMIDE 30 MILLIGRAM(S): 60 SOLUTION ORAL at 23:32

## 2018-05-22 RX ADMIN — PYRIDOSTIGMINE BROMIDE 30 MILLIGRAM(S): 60 SOLUTION ORAL at 05:41

## 2018-05-22 RX ADMIN — HEPARIN SODIUM 5000 UNIT(S): 5000 INJECTION INTRAVENOUS; SUBCUTANEOUS at 05:41

## 2018-05-22 RX ADMIN — Medication 1500 MILLILITER(S): at 12:59

## 2018-05-22 RX ADMIN — LINEZOLID 600 MILLIGRAM(S): 600 INJECTION, SOLUTION INTRAVENOUS at 05:41

## 2018-05-22 RX ADMIN — Medication 500 MICROGRAM(S): at 11:30

## 2018-05-22 NOTE — PROGRESS NOTE ADULT - SUBJECTIVE AND OBJECTIVE BOX
HPI:  Pt is a 26 yo BF with h/o myasthenia gravis on Physostigmine BIBEMS 2 to SOB and weakness. As per significant other, pt with increasing weakness over 2 weeks and EMS was called 2 to SOB. EMS found the pt with decreased responsiveness; pt was bagged, and placed on non rebreather. In the ER, pt was found unresponsive, hypoxic with shallow breathing; s/p intubation. Admitting dx: Hypoxic resp failure 2 to MG crisis.  am pt was hypoxic and CXR revealed L lung atelectasis; re-expanded (with CPT + Mucomyst) but not completely. s/p bronch on 5/15 with (+) MRSA and H. influenzae. Pt started on plasma exchange and extubated . Today pt scheduled to receive 4th session of plasma exchange      ## Labs:  CBC:                        9.1    25.01 )-----------( 358      ( 22 May 2018 04:19 )             27.9     Chem:      138  |  103  |  10  ----------------------------<  81  3.4<L>   |  26  |  0.49<L>    Ca    8.7      22 May 2018 04:19  Phos  3.6     -  Mg     2.2     -      Coags:  PT/INR - ( 22 May 2018 04:19 )   PT: 13.0 sec;   INR: 1.19 ratio         PTT - ( 22 May 2018 04:19 )  PTT:31.5 sec        ## Imaging:    ## Medications:  levoFLOXacin IVPB      levoFLOXacin IVPB 750 milliGRAM(s) IV Intermittent every 24 hours  linezolid    Tablet 600 milliGRAM(s) Oral every 12 hours      ALBUTerol    0.083% 2.5 milliGRAM(s) Nebulizer every 4 hours PRN  diphenhydrAMINE   Injectable 50 milliGRAM(s) IV Push once  ipratropium    for Nebulization 500 MICROGram(s) Nebulizer every 6 hours    predniSONE   Tablet 60 milliGRAM(s) Oral daily    heparin  Injectable 5000 Unit(s) IV Push once  heparin  Injectable 5000 Unit(s) IV Push once  heparin  Injectable 5000 Unit(s) IV Push once  heparin  Injectable 5000 Unit(s) SubCutaneous every 12 hours      acetaminophen    Suspension. 650 milliGRAM(s) Oral every 6 hours PRN  fentaNYL    Injectable 50 MICROGram(s) IV Push every 4 hours PRN      ## Vitals:  T(C): 37.3 (18 @ 12:58), Max: 37.6 (18 @ 19:30)  HR: 120 (18 @ 12:00) (93 - 145)  BP: 96/69 (18 @ 12:00) (86/63 - 137/123)  BP(mean): 75 (18 @ 12:00) (68 - 126)  RR: 17 (18 @ 12:00) (17 - 28)  SpO2: 99% (18 @ 12:00) (95% - 100%)  Wt(kg): --  Vent:   AB-21 @ 07:01  -   @ 07:00  --------------------------------------------------------  IN: 150 mL / OUT: 0 mL / NET: 150 mL      ## P/E:  Gen: Pt in no apparent distress  Lungs: CTA  Heart: RRR  Abd: Soft/+BS  Ext: No edema  Neuro: Awake/alert/ ambulating    CENTRAL LINE: [ ] YES [ ] NO  LOCATION:   DATE INSERTED:  REMOVE: [ ] YES [ ] NO      SRIVASTAVA: [ ] YES [ ] NO    DATE INSERTED:  REMOVE:  [ ] YES [ ] NO      A-LINE:  [ ] YES [ ] NO  LOCATION:   DATE INSERTED:  REMOVE:  [ ] YES [ ] NO  EXPLAIN:    CODE STATUS: [x ] full code  [ ] DNR  [ ] DNI  [ ] [facility]  Goals of care discussion: [ ] yes

## 2018-05-23 LAB
ALBUMIN SERPL ELPH-MCNC: 3.8 G/DL — SIGNIFICANT CHANGE UP (ref 3.3–5)
ALP SERPL-CCNC: 28 U/L — LOW (ref 40–120)
ALT FLD-CCNC: 23 U/L — SIGNIFICANT CHANGE UP (ref 12–78)
ANION GAP SERPL CALC-SCNC: 8 MMOL/L — SIGNIFICANT CHANGE UP (ref 5–17)
ANISOCYTOSIS BLD QL: SLIGHT — SIGNIFICANT CHANGE UP
APTT BLD: 75.1 SEC — HIGH (ref 27.5–37.4)
AST SERPL-CCNC: 11 U/L — LOW (ref 15–37)
BASOPHILS # BLD AUTO: 0 K/UL — SIGNIFICANT CHANGE UP (ref 0–0.2)
BASOPHILS NFR BLD AUTO: 0 % — SIGNIFICANT CHANGE UP (ref 0–2)
BILIRUB SERPL-MCNC: 0.9 MG/DL — SIGNIFICANT CHANGE UP (ref 0.2–1.2)
BUN SERPL-MCNC: 9 MG/DL — SIGNIFICANT CHANGE UP (ref 7–23)
CALCIUM SERPL-MCNC: 7.3 MG/DL — LOW (ref 8.5–10.1)
CHLORIDE SERPL-SCNC: 110 MMOL/L — HIGH (ref 96–108)
CO2 SERPL-SCNC: 22 MMOL/L — SIGNIFICANT CHANGE UP (ref 22–31)
CREAT SERPL-MCNC: 0.47 MG/DL — LOW (ref 0.5–1.3)
DOHLE BOD BLD QL SMEAR: PRESENT — SIGNIFICANT CHANGE UP
EOSINOPHIL # BLD AUTO: 0 K/UL — SIGNIFICANT CHANGE UP (ref 0–0.5)
EOSINOPHIL NFR BLD AUTO: 0 % — SIGNIFICANT CHANGE UP (ref 0–6)
GLUCOSE SERPL-MCNC: 75 MG/DL — SIGNIFICANT CHANGE UP (ref 70–99)
HCT VFR BLD CALC: 26.3 % — LOW (ref 34.5–45)
HGB BLD-MCNC: 8.5 G/DL — LOW (ref 11.5–15.5)
HYPOCHROMIA BLD QL: SLIGHT — SIGNIFICANT CHANGE UP
INR BLD: 1.38 RATIO — HIGH (ref 0.88–1.16)
LYMPHOCYTES # BLD AUTO: 16 % — SIGNIFICANT CHANGE UP (ref 13–44)
LYMPHOCYTES # BLD AUTO: 4.04 K/UL — HIGH (ref 1–3.3)
MACROCYTES BLD QL: SLIGHT — SIGNIFICANT CHANGE UP
MAGNESIUM SERPL-MCNC: 1.9 MG/DL — SIGNIFICANT CHANGE UP (ref 1.6–2.6)
MANUAL SMEAR VERIFICATION: SIGNIFICANT CHANGE UP
MCHC RBC-ENTMCNC: 27.1 PG — SIGNIFICANT CHANGE UP (ref 27–34)
MCHC RBC-ENTMCNC: 32.3 GM/DL — SIGNIFICANT CHANGE UP (ref 32–36)
MCV RBC AUTO: 83.8 FL — SIGNIFICANT CHANGE UP (ref 80–100)
METAMYELOCYTES # FLD: 4 % — HIGH (ref 0–0)
MICROCYTES BLD QL: SLIGHT — SIGNIFICANT CHANGE UP
MONOCYTES # BLD AUTO: 1.26 K/UL — HIGH (ref 0–0.9)
MONOCYTES NFR BLD AUTO: 5 % — SIGNIFICANT CHANGE UP (ref 2–14)
NEUTROPHILS # BLD AUTO: 18.95 K/UL — HIGH (ref 1.8–7.4)
NEUTROPHILS NFR BLD AUTO: 75 % — SIGNIFICANT CHANGE UP (ref 43–77)
NRBC # BLD: 0 /100 — SIGNIFICANT CHANGE UP (ref 0–0)
NRBC # BLD: SIGNIFICANT CHANGE UP /100 WBCS (ref 0–0)
PHOSPHATE SERPL-MCNC: 2.8 MG/DL — SIGNIFICANT CHANGE UP (ref 2.5–4.5)
PLAT MORPH BLD: NORMAL — SIGNIFICANT CHANGE UP
PLATELET # BLD AUTO: 289 K/UL — SIGNIFICANT CHANGE UP (ref 150–400)
PLATELET CLUMP BLD QL SMEAR: ABNORMAL
POLYCHROMASIA BLD QL SMEAR: SIGNIFICANT CHANGE UP
POTASSIUM SERPL-MCNC: 3.4 MMOL/L — LOW (ref 3.5–5.3)
POTASSIUM SERPL-SCNC: 3.4 MMOL/L — LOW (ref 3.5–5.3)
PROT SERPL-MCNC: 5.2 GM/DL — LOW (ref 6–8.3)
PROTHROM AB SERPL-ACNC: 15.1 SEC — HIGH (ref 9.8–12.7)
RBC # BLD: 3.14 M/UL — LOW (ref 3.8–5.2)
RBC # FLD: 15.4 % — HIGH (ref 10.3–14.5)
RBC BLD AUTO: ABNORMAL
SMUDGE CELLS # BLD: PRESENT — SIGNIFICANT CHANGE UP
SODIUM SERPL-SCNC: 140 MMOL/L — SIGNIFICANT CHANGE UP (ref 135–145)
WBC # BLD: 25.26 K/UL — HIGH (ref 3.8–10.5)
WBC # FLD AUTO: 25.26 K/UL — HIGH (ref 3.8–10.5)

## 2018-05-23 PROCEDURE — 99233 SBSQ HOSP IP/OBS HIGH 50: CPT

## 2018-05-23 RX ORDER — HEPARIN SODIUM 5000 [USP'U]/ML
5000 INJECTION INTRAVENOUS; SUBCUTANEOUS ONCE
Qty: 0 | Refills: 0 | Status: COMPLETED | OUTPATIENT
Start: 2018-05-23 | End: 2018-05-24

## 2018-05-23 RX ORDER — POTASSIUM CHLORIDE 20 MEQ
10 PACKET (EA) ORAL
Qty: 0 | Refills: 0 | Status: DISCONTINUED | OUTPATIENT
Start: 2018-05-23 | End: 2018-05-23

## 2018-05-23 RX ORDER — ALBUMIN HUMAN 25 %
3000 VIAL (ML) INTRAVENOUS ONCE
Qty: 0 | Refills: 0 | Status: COMPLETED | OUTPATIENT
Start: 2018-05-23 | End: 2018-05-24

## 2018-05-23 RX ORDER — POTASSIUM CHLORIDE 20 MEQ
40 PACKET (EA) ORAL ONCE
Qty: 0 | Refills: 0 | Status: COMPLETED | OUTPATIENT
Start: 2018-05-23 | End: 2018-05-23

## 2018-05-23 RX ORDER — SODIUM CHLORIDE 9 MG/ML
1000 INJECTION INTRAMUSCULAR; INTRAVENOUS; SUBCUTANEOUS ONCE
Qty: 0 | Refills: 0 | Status: COMPLETED | OUTPATIENT
Start: 2018-05-23 | End: 2018-05-23

## 2018-05-23 RX ADMIN — PYRIDOSTIGMINE BROMIDE 30 MILLIGRAM(S): 60 SOLUTION ORAL at 11:42

## 2018-05-23 RX ADMIN — Medication 500 MICROGRAM(S): at 17:21

## 2018-05-23 RX ADMIN — LINEZOLID 600 MILLIGRAM(S): 600 INJECTION, SOLUTION INTRAVENOUS at 06:16

## 2018-05-23 RX ADMIN — PYRIDOSTIGMINE BROMIDE 30 MILLIGRAM(S): 60 SOLUTION ORAL at 18:00

## 2018-05-23 RX ADMIN — LINEZOLID 600 MILLIGRAM(S): 600 INJECTION, SOLUTION INTRAVENOUS at 18:00

## 2018-05-23 RX ADMIN — Medication 500 MICROGRAM(S): at 23:54

## 2018-05-23 RX ADMIN — Medication 500 MICROGRAM(S): at 05:44

## 2018-05-23 RX ADMIN — Medication 40 MILLIEQUIVALENT(S): at 08:21

## 2018-05-23 RX ADMIN — Medication 500 MICROGRAM(S): at 11:06

## 2018-05-23 RX ADMIN — SODIUM CHLORIDE 2000 MILLILITER(S): 9 INJECTION INTRAMUSCULAR; INTRAVENOUS; SUBCUTANEOUS at 00:17

## 2018-05-23 RX ADMIN — Medication 60 MILLIGRAM(S): at 06:16

## 2018-05-23 RX ADMIN — HEPARIN SODIUM 5000 UNIT(S): 5000 INJECTION INTRAVENOUS; SUBCUTANEOUS at 18:00

## 2018-05-23 RX ADMIN — Medication 500 MICROGRAM(S): at 00:42

## 2018-05-23 RX ADMIN — HEPARIN SODIUM 5000 UNIT(S): 5000 INJECTION INTRAVENOUS; SUBCUTANEOUS at 06:16

## 2018-05-23 RX ADMIN — PYRIDOSTIGMINE BROMIDE 30 MILLIGRAM(S): 60 SOLUTION ORAL at 06:16

## 2018-05-23 RX ADMIN — CHLORHEXIDINE GLUCONATE 1 APPLICATION(S): 213 SOLUTION TOPICAL at 11:41

## 2018-05-23 RX ADMIN — Medication 200 GRAM(S): at 17:59

## 2018-05-23 NOTE — PROGRESS NOTE ADULT - SUBJECTIVE AND OBJECTIVE BOX
HPI:  Pt is a 28 yo BF with h/o myasthenia gravis on Physostigmine BIBEMS 2 to SOB and weakness. As per significant other, pt with increasing weakness over 2 weeks and EMS was called 2 to SOB. EMS found the pt with decreased responsiveness; pt was bagged, and placed on non rebreather. In the ER, pt was found unresponsive, hypoxic with shallow breathing; s/p intubation. Admitting dx: Hypoxic resp failure 2 to MG crisis.  am pt was hypoxic and CXR revealed L lung atelectasis; re-expanded (with CPT + Mucomyst) but not completely. s/p bronch on 5/15 with (+) MRSA and H. influenzae. Pt started on plasma exchange and extubated . Pt scheduled to receive 5th session of plasma exchange on Thursday      24 hr events: Uneventful      ## Labs:  CBC:                        8.5    25.26 )-----------( 289      ( 23 May 2018 04:15 )             26.3     Chem:      140  |  110<H>  |  9   ----------------------------<  75  3.4<L>   |  22  |  0.47<L>    Ca    7.3<L>      23 May 2018 04:15  Phos  2.8       Mg     1.9         TPro  5.2<L>  /  Alb  3.8  /  TBili  0.9  /  DBili  x   /  AST  11<L>  /  ALT  23  /  AlkPhos  28<L>  23    Coags:  PT/INR - ( 23 May 2018 04:15 )   PT: 15.1 sec;   INR: 1.38 ratio         PTT - ( 23 May 2018 04:15 )  PTT:75.1 sec        ## Imaging:    ## Medications:  levoFLOXacin IVPB      levoFLOXacin IVPB 750 milliGRAM(s) IV Intermittent every 24 hours  linezolid    Tablet 600 milliGRAM(s) Oral every 12 hours      ALBUTerol    0.083% 2.5 milliGRAM(s) Nebulizer every 4 hours PRN  diphenhydrAMINE   Injectable 50 milliGRAM(s) IV Push once  ipratropium    for Nebulization 500 MICROGram(s) Nebulizer every 6 hours    predniSONE   Tablet 60 milliGRAM(s) Oral daily    heparin  Injectable 5000 Unit(s) IV Push once  heparin  Injectable 5000 Unit(s) IV Push once  heparin  Injectable 5000 Unit(s) IV Push once  heparin  Injectable 5000 Unit(s) SubCutaneous every 12 hours      acetaminophen    Suspension. 650 milliGRAM(s) Oral every 6 hours PRN  fentaNYL    Injectable 50 MICROGram(s) IV Push every 4 hours PRN      ## Vitals:  T(C): 37.6 (18 @ 11:00), Max: 37.6 (18 @ 11:00)  HR: 143 (18 @ 12:09) (92 - 143)  BP: 96/57 (18 @ 12:09) (80/46 - 121/106)  BP(mean): 66 (18 @ 12:09) (54 - 110)  RR: 26 (18 @ 12:09) (16 - 29)  SpO2: 94% (18 @ 11:32) (94% - 100%)  Wt(kg): --  Vent:   AB-22 @ 07:01  -   @ 07:00  --------------------------------------------------------  IN: 1150 mL / OUT: 300 mL / NET: 850 mL     @ 07:01  -   @ 12:40  --------------------------------------------------------  IN: 360 mL / OUT: 0 mL / NET: 360 mL      ## P/E:  Gen: Pt comfortable in no apparent distress  Neck: R IJ cath  Lungs: CTA  Heart: RRR  Abd: Soft/+BS  Ext: No edema  Neuro: L eye outward gaze/ L eyelid weakness      CENTRAL LINE: [x ] YES [] NO  LOCATION: R IJ cath   DATE INSERTED:  REMOVE: [ ] YES [ ] NO      SRIVASTAVA: [ ] YES [x] NO    DATE INSERTED:  REMOVE:  [ ] YES [ ] NO      A-LINE:  [ ] YES [x ] NO  LOCATION:   DATE INSERTED:  REMOVE:  [ ] YES [ ] NO  EXPLAIN:    CODE STATUS: [x ] full code  [ ] DNR  [ ] DNI  [ ] MOLST  Goals of care discussion: [ ] yes

## 2018-05-23 NOTE — PROGRESS NOTE ADULT - SUBJECTIVE AND OBJECTIVE BOX
Subjective Complaints:  Historian:    27 y o woman with h/o myasthenia gravis was admitted for myasthenia crisis she is extubated      REVIEW OF SYSTEMS:  Eyes:  Good vision, no reported pain  ENT:  No sore throat, pain, runny nose, dysphagia  CV:  No pain, palpitatioins, hypo/hypertension  Resp:  No dyspnea, cough, tachypnea, wheezing  GI:  No pain, nausea, vomiting, diarrhea, constipatiion  Muscle:  No pain, weakness  Neuro:  No weakness, tingling, memory problems  Psych:  No fatigue, insomnia, mood problems, depression  Endocrine:  No polyuria, polydypsia, cold/heat intolerance    Vital Signs Last 24 Hrs  T(C): 37.3 (23 May 2018 19:08), Max: 37.9 (23 May 2018 15:41)  T(F): 99.1 (23 May 2018 19:08), Max: 100.2 (23 May 2018 15:41)  HR: 141 (23 May 2018 19:00) (92 - 144)  BP: 110/66 (23 May 2018 19:00) (84/57 - 121/106)  BP(mean): 72 (23 May 2018 19:00) (56 - 110)  RR: 29 (23 May 2018 19:00) (16 - 29)  SpO2: 98% (23 May 2018 19:00) (94% - 100%)    GENERAL PHYSICAL EXAM:  General:  Appears stated age, well-groomed, well-nourished, no distress  HEENT:  NC/AT, patent nares w/ pink mucosa, OP clear w/o lesions, PERRL, EOMI, conjunctivae clear, no thyromegaly, nodules, adenopathy, no JVD  Chest:  Full & symmetric excursion, no increased effort, breath sounds clear  Cardiovascular:  Regular rhythm, S1, S2, no murmur/rub/S3/S4, no carotid/femoral/abdominal bruit, radial/pedal pulses 2+, no edema  Abdomen:  Soft, non-tender, non-distended, normoactive bowel sounds, no HSM  Extremities:  Gait & station:   Digits:   Nails:   Joints, Bones, Muscles:   ROM:   Stability:  Skin:  No rash/erythema/ecchymoses/petechiae/wounds/abscess/warm/dry  Musculoskeletal:  Full ROM in all joints w/o swelling/tenderness/effusion    NEUROLOGICAL EXAM:  HENT:  Normocephalic head; atraumatic head.  Neck supple.  ENT: normal looking.  Mental State:    Alert.  Fully oriented to person, place and date.  Coherent.  Speech clear and intact.  Cooperative.  Responds appropriately.  left eye ptosis   Cranial Nerves:  II-XII:   Pupils round and reactive to light and accommodation.  Extraocular movements full.  Visual fields full (no homonymous hemianopsia).  Visual acuity wnl.  Facial symmetry intact.  Tongue midline.  Motor Functions:  Motor strength is 4/5   Sensory Functions:   Intact to touch and pinprick to face and extremities.    Reflexes:  Deep tendon reflexes normoactive to biceps, knees and ankles.  Babinski absent (present).  Cerebellar Testing:    Finger to nose intact.  Nystagmus absent.  Difficulty standing     LABS:                        8.5    25.26 )-----------( 289      ( 23 May 2018 04:15 )             26.3     05-23    140  |  110<H>  |  9   ----------------------------<  75  3.4<L>   |  22  |  0.47<L>    Ca    7.3<L>      23 May 2018 04:15  Phos  2.8     05-23  Mg     1.9     05-23    TPro  5.2<L>  /  Alb  3.8  /  TBili  0.9  /  DBili  x   /  AST  11<L>  /  ALT  23  /  AlkPhos  28<L>  05-23    PT/INR - ( 23 May 2018 04:15 )   PT: 15.1 sec;   INR: 1.38 ratio         PTT - ( 23 May 2018 04:15 )  PTT:75.1 sec        RADIOLOGY & ADDITIONAL STUDIES:    sodium chloride 0.9% Bolus:   1000 milliLiter(s), IV Bolus, once, infuse over 30 Minute(s), Stop After 1 Doses  Provider's Contact #: [phone] (05-23 @ 00:09)  Nucleated RBC: 04:00 (05-23 @ 04:17)  Manual Differential: 04:00 (05-23 @ 06:34)  potassium chloride  10 mEq/100 mL IVPB:   10 milliEquivalent(s) in IV Solution 100 milliLiter(s), IV Intermittent, every 1 hour, infuse over 60 Minute(s), Stop After 3 Doses  Provider's Contact #: [phone] (05-23 @ 07:03)  Chart Check (05-23 @ 07:25)  potassium chloride    Tablet ER:   40 milliEquivalent(s), Oral, once, Stop After 1 Doses  Administration Instructions: swallow whole * don't crush/chew  Provider's Contact #: [phone] (05-23 @ 07:56)  Thyroid Stimulating Hormone, Serum: AM Sched. Collection: 24-May-2018 04:00 (05-23 @ 14:44)  Free Triiodothyronine, Serum: AM Sched. Collection: 24-May-2018 04:00 (05-23 @ 14:44)  Free Thyroxine, Serum: AM Sched. Collection: 24-May-2018 04:00 (05-23 @ 14:44)  albumin human  5% IVPB:   3000 milliLiter(s), IV Intermittent, once, infuse over 120 Minute(s), Stop After 1 Doses  Special Instructions: plasmaphoresis  Provider's Contact #: [phone] (05-23 @ 16:08)  heparin  Injectable:   5000 Unit(s), IV Push, once, Stop After 1 Doses  Provider's Contact #: [phone] (05-23 @ 16:08)  Activated Partial Thromboplastin Time:  Start Date:23-May-2018. AM Sched. Collection:24-May-2018 04:00 (05-23 @ 16:14)  Basic Metabolic Panel: AM Sched. Collection: 24-May-2018 04:00 (05-23 @ 16:14)  Complete Blood Count: AM Sched. Collection: 24-May-2018 04:00 (05-23 @ 16:14)  Magnesium, Serum: AM Sched. Collection: 24-May-2018 04:00 (05-23 @ 16:14)  Phosphorus Level, Serum: AM Sched. Collection: 24-May-2018 04:00 (05-23 @ 16:14)  Prothrombin Time and INR, Plasma:  Start Date:23-May-2018. AM Sched. Collection:24-May-2018 04:00 (05-23 @ 16:14)  DX Myasthenia crisis --improving     Recommendations:   Medications:  Start mestynon

## 2018-05-24 ENCOUNTER — TRANSCRIPTION ENCOUNTER (OUTPATIENT)
Age: 28
End: 2018-05-24

## 2018-05-24 VITALS — RESPIRATION RATE: 24 BRPM | HEART RATE: 110 BPM

## 2018-05-24 LAB
ANION GAP SERPL CALC-SCNC: 8 MMOL/L — SIGNIFICANT CHANGE UP (ref 5–17)
APTT BLD: 40.7 SEC — HIGH (ref 27.5–37.4)
BUN SERPL-MCNC: 7 MG/DL — SIGNIFICANT CHANGE UP (ref 7–23)
CALCIUM SERPL-MCNC: 8.1 MG/DL — LOW (ref 8.5–10.1)
CHLORIDE SERPL-SCNC: 105 MMOL/L — SIGNIFICANT CHANGE UP (ref 96–108)
CO2 SERPL-SCNC: 27 MMOL/L — SIGNIFICANT CHANGE UP (ref 22–31)
CREAT SERPL-MCNC: 0.74 MG/DL — SIGNIFICANT CHANGE UP (ref 0.5–1.3)
GLUCOSE SERPL-MCNC: 94 MG/DL — SIGNIFICANT CHANGE UP (ref 70–99)
HCT VFR BLD CALC: 25.6 % — LOW (ref 34.5–45)
HGB BLD-MCNC: 8.5 G/DL — LOW (ref 11.5–15.5)
INR BLD: 1.13 RATIO — SIGNIFICANT CHANGE UP (ref 0.88–1.16)
MAGNESIUM SERPL-MCNC: 2 MG/DL — SIGNIFICANT CHANGE UP (ref 1.6–2.6)
MCHC RBC-ENTMCNC: 27.4 PG — SIGNIFICANT CHANGE UP (ref 27–34)
MCHC RBC-ENTMCNC: 33.2 GM/DL — SIGNIFICANT CHANGE UP (ref 32–36)
MCV RBC AUTO: 82.6 FL — SIGNIFICANT CHANGE UP (ref 80–100)
NRBC # BLD: 0 /100 WBCS — SIGNIFICANT CHANGE UP (ref 0–0)
PHOSPHATE SERPL-MCNC: 3.6 MG/DL — SIGNIFICANT CHANGE UP (ref 2.5–4.5)
PLATELET # BLD AUTO: 351 K/UL — SIGNIFICANT CHANGE UP (ref 150–400)
POTASSIUM SERPL-MCNC: 3.5 MMOL/L — SIGNIFICANT CHANGE UP (ref 3.5–5.3)
POTASSIUM SERPL-SCNC: 3.5 MMOL/L — SIGNIFICANT CHANGE UP (ref 3.5–5.3)
PROTHROM AB SERPL-ACNC: 12.3 SEC — SIGNIFICANT CHANGE UP (ref 9.8–12.7)
RBC # BLD: 3.1 M/UL — LOW (ref 3.8–5.2)
RBC # FLD: 15.9 % — HIGH (ref 10.3–14.5)
SODIUM SERPL-SCNC: 140 MMOL/L — SIGNIFICANT CHANGE UP (ref 135–145)
T3FREE SERPL-MCNC: 3.07 PG/ML — SIGNIFICANT CHANGE UP (ref 1.8–4.6)
T4 FREE SERPL-MCNC: 1.8 NG/DL — SIGNIFICANT CHANGE UP (ref 0.9–1.8)
TSH SERPL-MCNC: 2.76 UIU/ML — SIGNIFICANT CHANGE UP (ref 0.36–3.74)
WBC # BLD: 20.68 K/UL — HIGH (ref 3.8–10.5)
WBC # FLD AUTO: 20.68 K/UL — HIGH (ref 3.8–10.5)

## 2018-05-24 PROCEDURE — 99232 SBSQ HOSP IP/OBS MODERATE 35: CPT

## 2018-05-24 RX ORDER — SODIUM CHLORIDE 9 MG/ML
500 INJECTION INTRAMUSCULAR; INTRAVENOUS; SUBCUTANEOUS ONCE
Qty: 0 | Refills: 0 | Status: COMPLETED | OUTPATIENT
Start: 2018-05-24 | End: 2018-05-24

## 2018-05-24 RX ORDER — PYRIDOSTIGMINE BROMIDE 60 MG/5ML
60 SOLUTION ORAL
Qty: 2520 | Refills: 0 | OUTPATIENT
Start: 2018-05-24 | End: 2018-06-06

## 2018-05-24 RX ORDER — PYRIDOSTIGMINE BROMIDE 60 MG/5ML
5 SOLUTION ORAL
Qty: 210 | Refills: 0 | OUTPATIENT
Start: 2018-05-24 | End: 2018-06-06

## 2018-05-24 RX ADMIN — Medication 60 MILLIGRAM(S): at 06:23

## 2018-05-24 RX ADMIN — Medication 1500 MILLILITER(S): at 17:26

## 2018-05-24 RX ADMIN — PYRIDOSTIGMINE BROMIDE 30 MILLIGRAM(S): 60 SOLUTION ORAL at 00:00

## 2018-05-24 RX ADMIN — CHLORHEXIDINE GLUCONATE 1 APPLICATION(S): 213 SOLUTION TOPICAL at 06:22

## 2018-05-24 RX ADMIN — LINEZOLID 600 MILLIGRAM(S): 600 INJECTION, SOLUTION INTRAVENOUS at 06:23

## 2018-05-24 RX ADMIN — SODIUM CHLORIDE 1000 MILLILITER(S): 9 INJECTION INTRAMUSCULAR; INTRAVENOUS; SUBCUTANEOUS at 15:50

## 2018-05-24 RX ADMIN — PYRIDOSTIGMINE BROMIDE 30 MILLIGRAM(S): 60 SOLUTION ORAL at 18:41

## 2018-05-24 RX ADMIN — PYRIDOSTIGMINE BROMIDE 30 MILLIGRAM(S): 60 SOLUTION ORAL at 06:23

## 2018-05-24 RX ADMIN — PYRIDOSTIGMINE BROMIDE 30 MILLIGRAM(S): 60 SOLUTION ORAL at 12:06

## 2018-05-24 RX ADMIN — Medication 500 MICROGRAM(S): at 05:12

## 2018-05-24 RX ADMIN — HEPARIN SODIUM 5000 UNIT(S): 5000 INJECTION INTRAVENOUS; SUBCUTANEOUS at 18:15

## 2018-05-24 RX ADMIN — HEPARIN SODIUM 5000 UNIT(S): 5000 INJECTION INTRAVENOUS; SUBCUTANEOUS at 06:22

## 2018-05-24 RX ADMIN — HEPARIN SODIUM 5000 UNIT(S): 5000 INJECTION INTRAVENOUS; SUBCUTANEOUS at 18:41

## 2018-05-24 NOTE — DISCHARGE NOTE ADULT - HOSPITAL COURSE
HPI:  Pt is a 28 yo BF with h/o myasthenia gravis on Physostigmine BIBEMS 2 to SOB and weakness. As per significant other, pt with increasing weakness over 2 weeks and EMS was called 2 to SOB. EMS found the pt with decreased responsiveness; pt was bagged, and placed on non rebreather. In the ER, pt was found unresponsive, hypoxic with shallow breathing; s/p intubation. Admitting dx: Hypoxic resp failure 2 to MG crisis. 5/12 am pt was hypoxic and CXR revealed L lung atelectasis; re-expanded (with CPT + Mucomyst) but not completely. Today pt is clearly stronger.

## 2018-05-24 NOTE — DISCHARGE NOTE ADULT - MEDICATION SUMMARY - MEDICATIONS TO TAKE
I will START or STAY ON the medications listed below when I get home from the hospital:    predniSONE 50 mg oral tablet  -- 1 tab(s) by mouth once a day   -- It is very important that you take or use this exactly as directed.  Do not skip doses or discontinue unless directed by your doctor.  Obtain medical advice before taking any non-prescription drugs as some may affect the action of this medication.  Take with food or milk.    -- Indication: For Myasthenic crisis    predniSONE 10 mg oral tablet  -- 1 tab(s) by mouth once a day   -- It is very important that you take or use this exactly as directed.  Do not skip doses or discontinue unless directed by your doctor.  Obtain medical advice before taking any non-prescription drugs as some may affect the action of this medication.  Take with food or milk.    -- Indication: For Myasthenic crisis    Mestinon 60 mg/5 mL oral syrup  -- 60 milliliter(s) by mouth every 8 hours   -- It is very important that you take or use this exactly as directed.  Do not skip doses or discontinue unless directed by your doctor.  Take with food or milk.    -- Indication: For Myasthenic crisis

## 2018-05-24 NOTE — DISCHARGE NOTE ADULT - PLAN OF CARE
s/p fall; multiple fx Pt d/c home, take medications as prescribed Pt understands treatment plan Pt to call PMD or return to hospital if symptoms reoccur Pt understands instructions Pt to follow up with neurologist Pt states that she understands and will call office

## 2018-05-24 NOTE — PROGRESS NOTE ADULT - SUBJECTIVE AND OBJECTIVE BOX
HPI:  Pt is a 26 yo BF with h/o myasthenia gravis on Physostigmine BIBEMS 2 to SOB and weakness. As per significant other, pt with increasing weakness over 2 weeks and EMS was called 2 to SOB. EMS found the pt with decreased responsiveness; pt was bagged, and placed on non rebreather. In the ER, pt was found unresponsive, hypoxic with shallow breathing; s/p intubation. Admitting dx: Hypoxic resp failure 2 to MG crisis.  am pt was hypoxic and CXR revealed L lung atelectasis; re-expanded (with CPT + Mucomyst) but not completely. s/p bronch on 5/15 with (+) MRSA and H. influenzae. Pt started on plasma exchange and extubated . Pt scheduled to receive 5th session of plasma exchange today      24 hr events: Uneventful      ## Labs:  CBC:                        8.5    20.68 )-----------( 351      ( 24 May 2018 03:55 )             25.6     Chem:  -    140  |  105  |  7   ----------------------------<  94  3.5   |  27  |  0.74    Ca    8.1<L>      24 May 2018 03:55  Phos  3.6     05-24  Mg     2.0     -24    TPro  5.2<L>  /  Alb  3.8  /  TBili  0.9  /  DBili  x   /  AST  11<L>  /  ALT  23  /  AlkPhos  28<L>  05-23    Coags:  PT/INR - ( 24 May 2018 03:55 )   PT: 12.3 sec;   INR: 1.13 ratio         PTT - ( 24 May 2018 03:55 )  PTT:40.7 sec        ## Imaging:    ## Medications:      diphenhydrAMINE   Injectable 50 milliGRAM(s) IV Push once    predniSONE   Tablet 60 milliGRAM(s) Oral daily    heparin  Injectable 5000 Unit(s) IV Push once  heparin  Injectable 5000 Unit(s) IV Push once  heparin  Injectable 5000 Unit(s) IV Push once  heparin  Injectable 5000 Unit(s) SubCutaneous every 12 hours  heparin  Injectable 5000 Unit(s) IV Push once      acetaminophen    Suspension. 650 milliGRAM(s) Oral every 6 hours PRN  fentaNYL    Injectable 50 MICROGram(s) IV Push every 4 hours PRN      ## Vitals:  T(C): 37.3 (18 @ 08:00), Max: 37.9 (18 @ 15:41)  HR: 120 (18 @ 12:08) (87 - 144)  BP: 96/61 (18 @ 12:08) (81/43 - 115/79)  BP(mean): 68 (18 @ 12:08) (51 - 85)  RR: 22 (18 @ 12:08) (16 - 33)  SpO2: 100% (18 @ 12:08) (95% - 100%)  Wt(kg): --  Vent:   AB-23 @ 07:01  -   @ 07:00  --------------------------------------------------------  IN: 360 mL / OUT: 300 mL / NET: 60 mL     @ 07:01  -   @ 13:08  --------------------------------------------------------  IN: 0 mL / OUT: 300 mL / NET: -300 mL          ## P/E:  Gen: Sitting comfortably in no apparent distress  Lungs: CTA  Heart: Tachy  Abd: Soft/+BS  Ext: No edema  Neuro: Slight L eyelid ptosis/ L eye lateral gaze    CENTRAL LINE: [ ] YES [x ] NO  LOCATION:   DATE INSERTED:  REMOVE: [ ] YES [ ] NO      SRIVASTAVA: [ ] YES [x ] NO    DATE INSERTED:  REMOVE:  [ ] YES [ ] NO      A-LINE:  [ ] YES [x ] NO  LOCATION:   DATE INSERTED:  REMOVE:  [ ] YES [ ] NO  EXPLAIN:    CODE STATUS: [x ] full code  [ ] DNR  [ ] DNI  [ ] MOLST  Goals of care discussion: [ ] yes

## 2018-05-24 NOTE — CHART NOTE - NSCHARTNOTEFT_GEN_A_CORE
Assessment: Pt with myasthenia gravis extubated 5/20. Pt resents with increased weakness & presents with poor po intake since diet advanced 5/20. Pt c/o sore gums & pain when chewing solid foods. Pt able to tolerate thin liquids & likes CJ & water & requests pudding daily. Pt reports no difficulty swallowing @ this time.  Last BM x 1(5/23)    Factors impacting intake: [ ] none [ ] nausea  [ ] vomiting [ ] diarrhea [ ] constipation  [x ]chewing problems [ ] swallowing issues  [ ] other:     Diet Prescription: Diet, Regular (05-21-18 @ 08:33)    Intake: po intake <25% meals x 3 days since diet initiated 5/21. Noted <50% nutritional needs x 5 days hospitalization    Current Weight: Wt=61.7kg(5/20); +1 gen edema, Wt=65.2kg(5/10)   % Weight Change sign wt loss 3.5kg(5%) x 10 days      Nutrition focused physical exam conducted; Subcutaneous fat loss: [ WNL] Orbital fat pads region, [WNL ]Buccal fat region, [WNL ]Triceps region,  [WNL ]Ribs region.  Muscle wasting: [WNL ]Temples region,   [WNL ]Clavicle region, [WNL ]Shoulder region, [unable ]Scapula region, [WNL ]Interosseous region,  [WNL ]thigh region, [WNL ]Calf region    Pertinent Medications: MEDICATIONS  (STANDING):  albumin human  5% IVPB 3000 milliLiter(s) IV Intermittent once  chlorhexidine 4% Liquid 1 Application(s) Topical <User Schedule>  diphenhydrAMINE   Injectable 50 milliGRAM(s) IV Push once  heparin  Injectable 5000 Unit(s) IV Push once  heparin  Injectable 5000 Unit(s) IV Push once  heparin  Injectable 5000 Unit(s) IV Push once  heparin  Injectable 5000 Unit(s) SubCutaneous every 12 hours  heparin  Injectable 5000 Unit(s) IV Push once  predniSONE   Tablet 60 milliGRAM(s) Oral daily  pyridostigmine 30 milliGRAM(s) Oral every 6 hours    MEDICATIONS  (PRN):  acetaminophen    Suspension. 650 milliGRAM(s) Oral every 6 hours PRN Mild Pain (1 - 3)  fentaNYL    Injectable 50 MICROGram(s) IV Push every 4 hours PRN Moderate Pain (4 - 6)    Pertinent Labs: 05-24 Na140 mmol/L Glu 94 mg/dL K+ 3.5 mmol/L Cr  0.74 mg/dL BUN 7 mg/dL 05-24 Phos 3.6 mg/dL 05-23 Alb 3.8 g/dL     CAPILLARY BLOOD GLUCOSE        Skin: intact     Estimated Needs:   [x ] no change since previous assessment  [ ] recalculated:     Previous Nutrition Diagnosis: [x] no nutrition dx  [ ] Inadequate Energy Intake [ ]Inadequate Oral Intake [ ] Excessive Energy Intake   [ ] Underweight [ ] Increased Nutrient Needs [ ] Overweight/Obesity   [ ] Altered GI Function [ ] Unintended Weight Loss [ ] Food & Nutrition Related Knowledge Deficit [ ] severe Malnutrition [ ] moderate malnutrition    Nutrition Diagnosis is [ ] ongoing  [ ] resolved  [ ] improved  [x ] not applicable     New Nutrition Diagnosis: [ ] Inadequate Energy Intake [ ]Inadequate Oral Intake [ ] Excessive Energy Intake   [ ] Underweight [ ] Increased Nutrient Needs [ ] Overweight/Obesity   [ ] Altered GI Function [ ] Unintended Weight Loss [ ] Food & Nutrition Related Knowledge Deficit [x ] Acute severe Malnutrition     Related to: inadequate energy & protein intake related to Myasthenia gravis with s/p hypoxia & extubated 5/20    As evidenced by:  sign wt loss 5% x 10 days & <50% nutritional needs x 5 days      Interventions:   Recommend  [ ] Continue:  [x ] Change Diet To: Soft diet  [x ] Nutrition Supplement: Ensure Enlive 2 x day(700kcal & 40gm protein) & provide pudding 2 x day  [ ] Nutrition Support:  [ ] Other:     Monitoring and Evaluation:   [x ] PO intake [ x ] Tolerance to diet prescription [ x ] weights [ x ] labs[ x ] follow up per protocol  [ ] other: Assessment: Pt with myasthenia gravis extubated 5/20. Pt resents with increased weakness & presents with poor po intake since diet advanced 5/20. Pt c/o sore gums & pain when chewing solid foods. Pt able to tolerate thin liquids & likes CJ & water & requests pudding daily. Pt reports no difficulty swallowing @ this time.  Last BM x 1(5/23)    Factors impacting intake: [ ] none [ ] nausea  [ ] vomiting [ ] diarrhea [ ] constipation  [x ]chewing problems [ ] swallowing issues  [ ] other:     Diet Prescription: Diet, Regular (05-21-18 @ 08:33)    Intake: po intake <25% meals x 3 days since diet initiated 5/21. Noted <50% nutritional needs x 5 days hospitalization    Current Weight: Wt=61.7kg(5/20); +1 gen edema, Wt=65.2kg(5/10)   % Weight Change   sign wt loss 3.5kg(5%) x 10 days      Nutrition focused physical exam conducted; Subcutaneous fat loss: [ WNL] Orbital fat pads region, [WNL ]Buccal fat region, [WNL ]Triceps region,  [WNL ]Ribs region.  Muscle wasting: [WNL ]Temples region,   [WNL ]Clavicle region, [WNL ]Shoulder region, [unable ]Scapula region, [WNL ]Interosseous region,  [WNL ]thigh region, [WNL ]Calf region    Pertinent Medications: MEDICATIONS  (STANDING):  albumin human  5% IVPB 3000 milliLiter(s) IV Intermittent once  chlorhexidine 4% Liquid 1 Application(s) Topical <User Schedule>  diphenhydrAMINE   Injectable 50 milliGRAM(s) IV Push once  heparin  Injectable 5000 Unit(s) IV Push once  heparin  Injectable 5000 Unit(s) IV Push once  heparin  Injectable 5000 Unit(s) IV Push once  heparin  Injectable 5000 Unit(s) SubCutaneous every 12 hours  heparin  Injectable 5000 Unit(s) IV Push once  predniSONE   Tablet 60 milliGRAM(s) Oral daily  pyridostigmine 30 milliGRAM(s) Oral every 6 hours    MEDICATIONS  (PRN):  acetaminophen    Suspension. 650 milliGRAM(s) Oral every 6 hours PRN Mild Pain (1 - 3)  fentaNYL    Injectable 50 MICROGram(s) IV Push every 4 hours PRN Moderate Pain (4 - 6)    Pertinent Labs: 05-24 Na140 mmol/L Glu 94 mg/dL K+ 3.5 mmol/L Cr  0.74 mg/dL BUN 7 mg/dL 05-24 Phos 3.6 mg/dL 05-23 Alb 3.8 g/dL     CAPILLARY BLOOD GLUCOSE        Skin: intact     Estimated Needs:   [x ] no change since previous assessment  [ ] recalculated:     Previous Nutrition Diagnosis: [x] no nutrition dx  [ ] Inadequate Energy Intake [ ]Inadequate Oral Intake [ ] Excessive Energy Intake   [ ] Underweight [ ] Increased Nutrient Needs [ ] Overweight/Obesity   [ ] Altered GI Function [ ] Unintended Weight Loss [ ] Food & Nutrition Related Knowledge Deficit [ ] severe Malnutrition [ ] moderate malnutrition    Nutrition Diagnosis is [ ] ongoing  [ ] resolved  [ ] improved  [x ] not applicable     New Nutrition Diagnosis: [ ] Inadequate Energy Intake [ ]Inadequate Oral Intake [ ] Excessive Energy Intake   [ ] Underweight [ ] Increased Nutrient Needs [ ] Overweight/Obesity   [ ] Altered GI Function [ ] Unintended Weight Loss [ ] Food & Nutrition Related Knowledge Deficit [x ] Acute severe Malnutrition     Related to: inadequate energy & protein intake related to Myasthenia gravis with s/p hypoxia & extubated 5/20    As evidenced by:  sign wt loss 5% x 10 days & <50% nutritional needs x 5 days      Interventions:   Recommend  [ ] Continue:  [x ] Change Diet To: Soft diet  [x ] Nutrition Supplement: Ensure Enlive 2 x day(700kcal & 40gm protein) & provide pudding 2 x day  [ ] Nutrition Support:  [ ] Other:     Monitoring and Evaluation:   [x ] PO intake [ x ] Tolerance to diet prescription [ x ] weights [ x ] labs[ x ] follow up per protocol  [ ] other: Assessment: Pt with myasthenia gravis extubated 5/20. Pt resents with increased weakness & presents with poor po intake since diet advanced 5/20. Pt c/o sore gums & pain when chewing solid foods. Pt able to tolerate thin liquids & likes CJ & water & requests pudding daily. Pt reports no difficulty swallowing @ this time.  Last BM x 1(5/23)    Factors impacting intake: [ ] none [ ] nausea  [ ] vomiting [ ] diarrhea [ ] constipation  [x ]chewing problems [ ] swallowing issues  [ ] other:     Diet Prescription: Diet, Regular (05-21-18 @ 08:33)    Intake: po intake <25% meals x 3 days since diet initiated 5/21. Noted <50% nutritional needs x 5 days hospitalization    Current Weight: Wt=61.7kg(5/20); +1 gen edema, Wt=65.2kg(5/10)   % Weight Change   sign wt loss 3.5kg(5%) x 10 days      Nutrition focused physical exam conducted; Subcutaneous fat loss: [ WNL] Orbital fat pads region, [WNL ]Buccal fat region, [WNL ]Triceps region,  [WNL ]Ribs region.  Muscle wasting: [WNL ]Temples region,   [WNL ]Clavicle region, [WNL ]Shoulder region, [unable ]Scapula region, [WNL ]Interosseous region,  [WNL ]thigh region, [WNL ]Calf region    Pertinent Medications: MEDICATIONS  (STANDING):  albumin human  5% IVPB 3000 milliLiter(s) IV Intermittent once  chlorhexidine 4% Liquid 1 Application(s) Topical <User Schedule>  diphenhydrAMINE   Injectable 50 milliGRAM(s) IV Push once  heparin  Injectable 5000 Unit(s) IV Push once  heparin  Injectable 5000 Unit(s) IV Push once  heparin  Injectable 5000 Unit(s) IV Push once  heparin  Injectable 5000 Unit(s) SubCutaneous every 12 hours  heparin  Injectable 5000 Unit(s) IV Push once  predniSONE   Tablet 60 milliGRAM(s) Oral daily  pyridostigmine 30 milliGRAM(s) Oral every 6 hours    MEDICATIONS  (PRN):  acetaminophen    Suspension. 650 milliGRAM(s) Oral every 6 hours PRN Mild Pain (1 - 3)  fentaNYL    Injectable 50 MICROGram(s) IV Push every 4 hours PRN Moderate Pain (4 - 6)    Pertinent Labs: 05-24 Na140 mmol/L Glu 94 mg/dL K+ 3.5 mmol/L Cr  0.74 mg/dL BUN 7 mg/dL 05-24 Phos 3.6 mg/dL 05-23 Alb 3.8 g/dL, WBC=20.68(5/24), H/H=3.10/8.5(5/24)     CAPILLARY BLOOD GLUCOSE        Skin: intact     Estimated Needs:   [x ] no change since previous assessment  [ ] recalculated:     Previous Nutrition Diagnosis: [x] no nutrition dx  [ ] Inadequate Energy Intake [ ]Inadequate Oral Intake [ ] Excessive Energy Intake   [ ] Underweight [ ] Increased Nutrient Needs [ ] Overweight/Obesity   [ ] Altered GI Function [ ] Unintended Weight Loss [ ] Food & Nutrition Related Knowledge Deficit [ ] severe Malnutrition [ ] moderate malnutrition    Nutrition Diagnosis is [ ] ongoing  [ ] resolved  [ ] improved  [x ] not applicable     New Nutrition Diagnosis: [ ] Inadequate Energy Intake [ ]Inadequate Oral Intake [ ] Excessive Energy Intake   [ ] Underweight [ ] Increased Nutrient Needs [ ] Overweight/Obesity   [ ] Altered GI Function [ ] Unintended Weight Loss [ ] Food & Nutrition Related Knowledge Deficit [x ] Acute severe Malnutrition     Related to: inadequate energy & protein intake related to Myasthenia gravis with s/p hypoxia & extubated 5/20    As evidenced by:  sign wt loss 5% x 10 days & <50% nutritional needs x 5 days      Interventions:   Recommend  [ ] Continue:  [x ] Change Diet To: Soft diet  [x ] Nutrition Supplement: Ensure Enlive 2 x day(700kcal & 40gm protein) & provide pudding 2 x day  [ ] Nutrition Support:  [ ] Other:     Monitoring and Evaluation:   [x ] PO intake [ x ] Tolerance to diet prescription [ x ] weights [ x ] labs[ x ] follow up per protocol  [ ] other:

## 2018-05-24 NOTE — DISCHARGE NOTE ADULT - CARE PLAN
Principal Discharge DX:	Myasthenic crisis  Goal:	Pt d/c home, take medications as prescribed  Assessment and plan of treatment:	Pt understands treatment plan  Goal:	Pt to call PMD or return to hospital if symptoms reoccur  Assessment and plan of treatment:	Pt understands instructions  Goal:	Pt to follow up with neurologist  Assessment and plan of treatment:	Pt states that she understands and will call office

## 2018-05-24 NOTE — CHART NOTE - NSCHARTNOTEFT_GEN_A_CORE
26 yo F with h/o myasthenia gravis on Physostigmine BIBEMS 2 to SOB and weakness. As per significant other, pt with increasing weakness over 2 weeks and EMS was called 2 to SOB. EMS found the pt with decreased responsiveness; pt was bagged, and placed on non rebreather. In the ER, pt was found unresponsive, hypoxic with shallow breathing; s/p intubation. Admitting dx: Hypoxic resp failure 2 to MG crisis. 5/12 am pt was hypoxic and CXR revealed L lung atelectasis; re-expanded (with CPT + Mucomyst) but not completely. s/p bronch on 5/15 with (+) MRSA and H. influenzae. Pt started on plasma exchange and extubated 5/20. Pt scheduled to receive 5th session of plasma exchange today. 28 yo F with h/o myasthenia gravis on Physostigmine BIBEMS 2 to SOB and weakness. As per significant other, pt with increasing weakness over 2 weeks and EMS was called 2 to SOB. EMS found the pt with decreased responsiveness; pt was bagged, and placed on non rebreather. In the ER, pt was found unresponsive, hypoxic with shallow breathing; s/p intubation. Admitting dx: Hypoxic resp failure 2 to MG crisis. 5/12 am pt was hypoxic and CXR revealed L lung atelectasis; re-expanded (with CPT + Mucomyst) but not completely. s/p bronch on 5/15 with (+) MRSA and H. influenzae. Pt started on plasma exchange and extubated 5/20. Pt scheduled to receive 5th session of plasma exchange today, will remove HD catheter. Pt HD stable, extubated since 5/20, VSS, pt can be admitted to the medical/surgical floor at this time.

## 2018-05-24 NOTE — DISCHARGE NOTE ADULT - MEDICATION SUMMARY - MEDICATIONS TO CHANGE
I will SWITCH the dose or number of times a day I take the medications listed below when I get home from the hospital:    Mestinon 60 mg/5 mL oral syrup  -- 60 milliliter(s) by mouth every 8 hours   -- It is very important that you take or use this exactly as directed.  Do not skip doses or discontinue unless directed by your doctor.  Take with food or milk.    predniSONE 50 mg oral tablet  -- 1 tab(s) by mouth once a day   -- It is very important that you take or use this exactly as directed.  Do not skip doses or discontinue unless directed by your doctor.  Obtain medical advice before taking any non-prescription drugs as some may affect the action of this medication.  Take with food or milk.    predniSONE 10 mg oral tablet  -- 1 tab(s) by mouth once a day   -- It is very important that you take or use this exactly as directed.  Do not skip doses or discontinue unless directed by your doctor.  Obtain medical advice before taking any non-prescription drugs as some may affect the action of this medication.  Take with food or milk.

## 2018-05-24 NOTE — DISCHARGE NOTE ADULT - PATIENT PORTAL LINK FT
You can access the [facility] Patient Portal, offered by [facility], by registering with the following website: [email or web address]

## 2018-05-24 NOTE — PROGRESS NOTE ADULT - PROVIDER SPECIALTY LIST ADULT
Critical Care
Neurology
Critical Care
Neurology
Critical Care

## 2018-05-24 NOTE — CHART NOTE - NSCHARTNOTEFT_GEN_A_CORE
Upon Nutritional Assessment by the Registered Dietitian your patient was determined to meet criteria / has evidence of the following diagnosis/diagnoses:          [ ]  Mild Protein Calorie Malnutrition        [ ]  Moderate Protein Calorie Malnutrition        [x ] Severe Protein Calorie Malnutrition        [ ] Unspecified Protein Calorie Malnutrition        [ ] Underweight / BMI <19        [ ] Morbid Obesity / BMI > 40      Findings as based on:  •  Comprehensive nutrition assessment and consultation  •  Calorie counts (nutrient intake analysis)  •  Food acceptance and intake status from observations by staff  •  Follow up  •  Patient education  •  Intervention secondary to interdisciplinary rounds  •   concerns      Treatment:    The following diet has been recommended:  Soft/Ensure Enlive 2 x day(700kcal & 40gm protein)    PROVIDER Section:     By signing this assessment you are acknowledging and agree with the diagnosis/diagnoses assigned by the Registered Dietitian    Comments:

## 2018-05-24 NOTE — PROGRESS NOTE ADULT - ASSESSMENT
27F PMH myasthenia gravis on Physostigmine presents for SOB and weakness or upper and lower extremities. In ER, pt  unresponsive and hypoxic with shallow breathing intubated and admitted to ICU for acute Hypoxic resp failure, myasthenia crisis. Course with left lung atelectasias with hypoxia due to mucus plugging, fevers, post obstructive PNA.     1. PULM  - failed wean today due to hypoxia  - s/p urgent therapeutic and diagnostic flexible bronchoscopy for hypoxia and mucus plugging: left main stem bronchus noted to be filled with thick mucus secretions which was suctioned.   - follow up BAL for culture results  - start levaquin for post obstructive pneumonia   - cont to wean as tolerates  - wean down FIO2 as tolerates to maintain O2 sat > 90%; fio2 temporarily increased to 60% during episode of hypoxia, now at 40% FIO2  - cont chest PT  - cont duonebs and mucomyst for airway clearance     2. NEURO  - MG crisis: cont with prednisone, pyridostigmine   - s/p IVIG 5/10, 5/11, 5/13  - will d/w neuro further plans for IVIG vs possible plasmapheresis     3. ID  - pt with post obstructive pneumonia due to mucus plugging  - start levaquin  - follow up BAL culture results  - follow up blood culture  - tylenol for fever    4. GEN  - physical therapy  - supplement hypokalemia    Critical Care Time: 45 min not including procedures
27F PMH myasthenia gravis on Physostigmine presents for SOB and weakness or upper and lower extremities. In ER, pt  unresponsive and hypoxic with shallow breathing intubated and admitted to ICU for acute hypoxic resp failure, myasthenia crisis. Course with left lung atelectasias with hypoxia due to mucus plugging, fevers, post obstructive staph PNA.     Neuro: Acute myasthenia crisis, received 4 rounds of IVIg, improved in extremities, however, respiratory status remains tenuous.  HD catheter placed for plasmapharesis (5/16/18).  Decrease physostigmine to 300mg.    CV: No acute issues  Pulm: failed wean 2/2 tachypnea, s/p therapeutica and diagnostic bronch on 5/14/18 notable for MRSA and haemophilus .  Continue with vent and weaning trials, early mobilization; continue with levaquin 750mg daily.  Continue duonebs and start hypersal.    GI: Feeds at goal.  Protonix  Dispo: Lovenox; Early mobilization; receiving Day 2 plasmapharesis tomorrow. Daily wean.      Maria Del Carmen PGY 6
27F PMH myasthenia gravis on Physostigmine presents for SOB and weakness or upper and lower extremities. In ER, pt  unresponsive and hypoxic with shallow breathing intubated and admitted to ICU for acute hypoxic resp failure, myasthenia crisis. Course with left lung atelectasias with hypoxia due to mucus plugging, fevers, post obstructive staph PNA.     Neuro: Acute myasthenia crisis, received 4 rounds of IVIg, improved in extremities, however, respiratory status remains tenuous.  HD catheter placed for plasmapharesis.  D/c pyridostigmine.    CV: No acute issues  Pulm: failed wean 2/2 tachypnea, s/p therapeutica and diagnostic bronch on 5/14/18 notable for staph aureus.  Continue with vent and weaning trials, early mobilization.  Continue duonebs and start hypersal.    GI: Feeds at goal.  Protonix  Dispo: Lovenox; Early mobilization; receiving plasmapharesis today.   Maria Del Carmen PGY 6
28 Y/O female w/ PMHx of mysasthenia gravis on physostigmine now intubated for hypoxic resp failure.    Myasthenia Crisis with hypoxic respiratory failure  - s/p 1 dose IVIG and prednisone; given second dose today.  - Intubated 12/450/60%/+5, decrease VT to 350. RVP negative; blood cultures negative.    - Remains profoundly weak requiring full vent support.  - C/w precedex/propofol for sedation.    - appreciate neuro recommendations.    - Cont to monitor cultures.   - HSQ and Protonix    JGong
Pt is a 26 yo BF with h/o myasthenia gravis on Physostigmine BIBEMS 2 to SOB and weakness. As per significant other, pt with increasing weakness over 2 weeks and EMS was called 2 to SOB. EMS found the pt with decreased responsiveness; pt was bagged, and placed on non rebreather. In the ER, pt was found unresponsive, hypoxic with shallow breathing; s/p intubation. Admitting dx: Hypoxic resp failure 2 to MG crisis. 5/12 am pt was hypoxic and CXR revealed L lung atelectasis; re-expanded (with CPT + Mucomyst) but not completely. Today pt is clearly stronger.    Resp: Cont pulm toilet + Mucomyst/ Weaned on CPAP + PS but then became tired  FEN: Cont enteral feeds and replace K  Neuro: F/u recommendations as per Neuro: For now cont Prednisone + Mestinon/ ? another dose of IVIG + plasma exchange    CCT: 35 min
Pt is a 26 yo BF with h/o myasthenia gravis on Physostigmine BIBEMS 2 to SOB and weakness. As per significant other, pt with increasing weakness over 2 weeks and EMS was called 2 to SOB. EMS found the pt with decreased responsiveness; pt was bagged, and placed on non rebreather. In the ER, pt was found unresponsive, hypoxic with shallow breathing; s/p intubation. Admitting dx: Hypoxic resp failure 2 to MG crisis. 5/12 am pt was hypoxic and CXR revealed L lung atelectasis; re-expanded (with CPT + Mucomyst) but not completely. s/p bronch on 5/15 with (+) MRSA and H. influenzae. Pt started on plasma exchange and extubated 5/20. Pt scheduled to receive 5th session of plasma exchange Thursday      Resp: RA  ID: Dc Abx after today's dose  CVS: Pt with episodic tachy  FEN: Po diet  Neuro: Cont Prednisone + Mestinon/ Last plasma exchange Thursday/ Neuro f/u prn  PT/OT  Social: Possible dc to home tomorrow post plasma exchange
Pt is a 26 yo BF with h/o myasthenia gravis on Physostigmine BIBEMS 2 to SOB and weakness. As per significant other, pt with increasing weakness over 2 weeks and EMS was called 2 to SOB. EMS found the pt with decreased responsiveness; pt was bagged, and placed on non rebreather. In the ER, pt was found unresponsive, hypoxic with shallow breathing; s/p intubation. Admitting dx: Hypoxic resp failure 2 to MG crisis. 5/12 am pt was hypoxic and CXR revealed L lung atelectasis; re-expanded (with CPT + Mucomyst) but not completely. s/p bronch on 5/15 with (+) MRSA and H. influenzae. Pt started on plasma exchange and extubated 5/20. Today pt scheduled to receive 4th session of plasma exchange      Resp: LINDA  ID: Will Review Abx and Cx  CVS: Pt with episodic tachy  FEN: Po diet  Neuro: Cont Prednisone + Mestinon/ Plasma exchange today and last one Thursday/ Neuro f/u prn  PT/OT    CCT: 31 min
Pt is a 28 yo BF with h/o myasthenia gravis on Physostigmine BIBEMS 2 to SOB and weakness. As per significant other, pt with increasing weakness over 2 weeks and EMS was called 2 to SOB. EMS found the pt with decreased responsiveness; pt was bagged, and placed on non rebreather. In the ER, pt was found unresponsive, hypoxic with shallow breathing; s/p intubation. Admitting dx: Hypoxic resp failure 2 to MG crisis. 5/12 am pt was hypoxic and CXR revealed L lung atelectasis; re-expanded (with CPT + Mucomyst) but not completely. s/p bronch on 5/15 with (+) MRSA and H. influenzae. Pt started on plasma exchange and extubated 5/20. Pt scheduled to receive 5th session of plasma exchange today      Resp: RA  ID: Off Abx  CVS: Pt with episodic tachy; TSH WNL  FEN: Po diet  Neuro: Cont Prednisone + Mestinon/ Last plasma exchange today/ Neuro f/u prn  PT/OT  Social: Possible dc to home today post plasma exchange
Pt is a 28 yo BF with h/o myasthenia gravis on Physostigmine BIBEMS 2 to SOB and weakness. As per significant other, pt with increasing weakness over 2 weeks and EMS was called 2 to SOB. EMS found the pt with decreased responsiveness; pt was bagged, and placed on non rebreather. In the ER, pt was found unresponsive, hypoxic with shallow breathing; s/p intubation. Admitting dx: Hypoxic resp failure 2 to MG crisis. This am pt was hypoxic and CXR revealed L lung atelectasis    Resp: CPT, Mucomyst and L sided suctioning; repeat CXR  FEN: Cont enteral feeds  Neuro: Current recommendations as per Neuro: IVIG + Prednisone + Mestinon/ Cont light sedation    CCT: 35 min
27F PMH myasthenia gravis on Physostigmine presents for SOB and weakness or upper and lower extremities. In ER, pt  unresponsive and hypoxic with shallow breathing intubated and admitted to ICU for acute hypoxic resp failure, myasthenia crisis. Course with left lung atelectasias with hypoxia due to mucus plugging, fevers, post obstructive MRSA and H. influenzae PNA.     1. PULM  - unable to extubate due to copious oral secretions and also with thick ETT secretions  - s/p therapeutic and diagnostic bronchoscopy 5/14  - BAL culture with MRSA and H flu sensitive to levaquin  - cont to wean as tolerates, pt improving with duration of wean  - cont chest PT  - cont duonebs, cont hypertonic saline q8 hours to help with airway clearance and mucus plugging from thick secretions  - NIF today -23, VC 800cc - 1L       2. NEURO  - MG crisis: cont with prednisone   - neuro recommending d/c pyridostigmine   - s/p IVIG 5/10, 5/11, 5/13: motor strength improved  - cont with plasmapheresis s/p session 5/16 and today 5/18  - plan for every other day treatment  over next 7 to 14 days pending response  - will follow up with neuro regarding steroid course      3. ID  - pt with post obstructive pneumonia due to mucus plugging  - cont levaquin for MRSA and H flu  - blood cultures negative      4. GEN  - physical therapy for early mobilization: OOB to chair   - OT eval      Critical Care Time: 40 min .
27F PMH myasthenia gravis on Physostigmine presents for SOB and weakness or upper and lower extremities. In ER, pt  unresponsive and hypoxic with shallow breathing intubated and admitted to ICU for acute hypoxic resp failure, myasthenia crisis. Course with left lung atelectasias with hypoxia due to mucus plugging, fevers, post obstructive staph PNA.     1. PULM  - failed wean today due to hypoxia and tachypnea  - s/p therapeutic and diagnostic bronchoscopy yesterday  - BAL culture with staph aureus  - cont with levaquin for pneumonia, follow up BAL culture sensitivities   - cont to wean as tolerates  - cont chest PT  - cont duonebs and mucomyst for airway clearance     2. NEURO  - MG crisis: cont with prednisone, pyridostigmine   - s/p IVIG 5/10, 5/11, 5/13: motor strength improving  - neurology follow up regarding MG treatment: ?plasmapharesis if motor strength seems to be improving     3. ID  - pt with post obstructive pneumonia due to mucus plugging  - cont levaquin which should cover staph as long as staph is sensitive  - follow up BAL culture results  - blood cultures negative  - tylenol for fever, fevers improving and breaking s/p bronch and airway clearance    4. GEN  - physical therapy for early mobilization       Critical Care Time: 35 min

## 2018-05-27 DIAGNOSIS — R06.02 SHORTNESS OF BREATH: ICD-10-CM

## 2018-05-27 DIAGNOSIS — J98.11 ATELECTASIS: ICD-10-CM

## 2018-05-27 DIAGNOSIS — T17.590A OTHER FOREIGN OBJECT IN BRONCHUS CAUSING ASPHYXIATION, INITIAL ENCOUNTER: ICD-10-CM

## 2018-05-27 DIAGNOSIS — G70.01 MYASTHENIA GRAVIS WITH (ACUTE) EXACERBATION: ICD-10-CM

## 2018-05-27 DIAGNOSIS — Z78.1 PHYSICAL RESTRAINT STATUS: ICD-10-CM

## 2018-05-27 DIAGNOSIS — J96.02 ACUTE RESPIRATORY FAILURE WITH HYPERCAPNIA: ICD-10-CM

## 2018-05-27 DIAGNOSIS — B96.3 HEMOPHILUS INFLUENZAE [H. INFLUENZAE] AS THE CAUSE OF DISEASES CLASSIFIED ELSEWHERE: ICD-10-CM

## 2018-05-27 DIAGNOSIS — E43 UNSPECIFIED SEVERE PROTEIN-CALORIE MALNUTRITION: ICD-10-CM

## 2018-05-27 DIAGNOSIS — J96.01 ACUTE RESPIRATORY FAILURE WITH HYPOXIA: ICD-10-CM

## 2018-05-27 DIAGNOSIS — B95.62 METHICILLIN RESISTANT STAPHYLOCOCCUS AUREUS INFECTION AS THE CAUSE OF DISEASES CLASSIFIED ELSEWHERE: ICD-10-CM

## 2018-06-13 LAB
CULTURE RESULTS: SIGNIFICANT CHANGE UP
CULTURE RESULTS: SIGNIFICANT CHANGE UP
SPECIMEN SOURCE: SIGNIFICANT CHANGE UP
SPECIMEN SOURCE: SIGNIFICANT CHANGE UP

## 2018-07-18 ENCOUNTER — INPATIENT (INPATIENT)
Facility: HOSPITAL | Age: 28
LOS: 8 days | Discharge: HOME | DRG: 56 | End: 2018-07-27
Attending: INTERNAL MEDICINE | Admitting: PSYCHIATRY & NEUROLOGY
Payer: COMMERCIAL

## 2018-07-18 VITALS
HEART RATE: 84 BPM | RESPIRATION RATE: 18 BRPM | OXYGEN SATURATION: 100 % | SYSTOLIC BLOOD PRESSURE: 124 MMHG | DIASTOLIC BLOOD PRESSURE: 83 MMHG | TEMPERATURE: 99 F

## 2018-07-18 DIAGNOSIS — G70.01 MYASTHENIA GRAVIS WITH (ACUTE) EXACERBATION: ICD-10-CM

## 2018-07-18 DIAGNOSIS — G70.00 MYASTHENIA GRAVIS WITHOUT (ACUTE) EXACERBATION: ICD-10-CM

## 2018-07-18 LAB
ALBUMIN SERPL ELPH-MCNC: 4.6 G/DL — SIGNIFICANT CHANGE UP (ref 3.3–5)
ALP SERPL-CCNC: 108 U/L — SIGNIFICANT CHANGE UP (ref 40–120)
ALT FLD-CCNC: 11 U/L — SIGNIFICANT CHANGE UP (ref 10–45)
ANION GAP SERPL CALC-SCNC: 11 MMOL/L — SIGNIFICANT CHANGE UP (ref 5–17)
AST SERPL-CCNC: 22 U/L — SIGNIFICANT CHANGE UP (ref 10–40)
BASOPHILS # BLD AUTO: 0.1 K/UL — SIGNIFICANT CHANGE UP (ref 0–0.2)
BASOPHILS NFR BLD AUTO: 1.5 % — SIGNIFICANT CHANGE UP (ref 0–2)
BILIRUB SERPL-MCNC: 1.1 MG/DL — SIGNIFICANT CHANGE UP (ref 0.2–1.2)
BUN SERPL-MCNC: 8 MG/DL — SIGNIFICANT CHANGE UP (ref 7–23)
CALCIUM SERPL-MCNC: 9.8 MG/DL — SIGNIFICANT CHANGE UP (ref 8.4–10.5)
CHLORIDE SERPL-SCNC: 105 MMOL/L — SIGNIFICANT CHANGE UP (ref 96–108)
CO2 SERPL-SCNC: 20 MMOL/L — LOW (ref 22–31)
CREAT SERPL-MCNC: 0.68 MG/DL — SIGNIFICANT CHANGE UP (ref 0.5–1.3)
EOSINOPHIL # BLD AUTO: 0.1 K/UL — SIGNIFICANT CHANGE UP (ref 0–0.5)
EOSINOPHIL NFR BLD AUTO: 1.9 % — SIGNIFICANT CHANGE UP (ref 0–6)
GAS PNL BLDV: SIGNIFICANT CHANGE UP
GLUCOSE SERPL-MCNC: 82 MG/DL — SIGNIFICANT CHANGE UP (ref 70–99)
HCT VFR BLD CALC: 35.4 % — SIGNIFICANT CHANGE UP (ref 34.5–45)
HGB BLD-MCNC: 11.7 G/DL — SIGNIFICANT CHANGE UP (ref 11.5–15.5)
LYMPHOCYTES # BLD AUTO: 3 K/UL — SIGNIFICANT CHANGE UP (ref 1–3.3)
LYMPHOCYTES # BLD AUTO: 41.4 % — SIGNIFICANT CHANGE UP (ref 13–44)
MCHC RBC-ENTMCNC: 24.5 PG — LOW (ref 27–34)
MCHC RBC-ENTMCNC: 33 GM/DL — SIGNIFICANT CHANGE UP (ref 32–36)
MCV RBC AUTO: 74.5 FL — LOW (ref 80–100)
MONOCYTES # BLD AUTO: 0.7 K/UL — SIGNIFICANT CHANGE UP (ref 0–0.9)
MONOCYTES NFR BLD AUTO: 10 % — SIGNIFICANT CHANGE UP (ref 2–14)
NEUTROPHILS # BLD AUTO: 3.3 K/UL — SIGNIFICANT CHANGE UP (ref 1.8–7.4)
NEUTROPHILS NFR BLD AUTO: 45.2 % — SIGNIFICANT CHANGE UP (ref 43–77)
PLATELET # BLD AUTO: 357 K/UL — SIGNIFICANT CHANGE UP (ref 150–400)
POTASSIUM SERPL-MCNC: 4.2 MMOL/L — SIGNIFICANT CHANGE UP (ref 3.5–5.3)
POTASSIUM SERPL-SCNC: 4.2 MMOL/L — SIGNIFICANT CHANGE UP (ref 3.5–5.3)
PROT SERPL-MCNC: 8.7 G/DL — HIGH (ref 6–8.3)
RBC # BLD: 4.75 M/UL — SIGNIFICANT CHANGE UP (ref 3.8–5.2)
RBC # FLD: 18 % — HIGH (ref 10.3–14.5)
SODIUM SERPL-SCNC: 136 MMOL/L — SIGNIFICANT CHANGE UP (ref 135–145)
WBC # BLD: 7.2 K/UL — SIGNIFICANT CHANGE UP (ref 3.8–10.5)
WBC # FLD AUTO: 7.2 K/UL — SIGNIFICANT CHANGE UP (ref 3.8–10.5)

## 2018-07-18 PROCEDURE — 99285 EMERGENCY DEPT VISIT HI MDM: CPT

## 2018-07-18 PROCEDURE — 99223 1ST HOSP IP/OBS HIGH 75: CPT | Mod: GC

## 2018-07-18 RX ORDER — ENOXAPARIN SODIUM 100 MG/ML
40 INJECTION SUBCUTANEOUS EVERY 24 HOURS
Qty: 0 | Refills: 0 | Status: DISCONTINUED | OUTPATIENT
Start: 2018-07-18 | End: 2018-07-27

## 2018-07-18 RX ORDER — IMMUNE GLOBULIN,GAMMA(IGG) 5 %
20 VIAL (ML) INTRAVENOUS DAILY
Qty: 0 | Refills: 0 | Status: DISCONTINUED | OUTPATIENT
Start: 2018-07-18 | End: 2018-07-19

## 2018-07-18 RX ORDER — ACETAMINOPHEN 500 MG
325 TABLET ORAL DAILY
Qty: 0 | Refills: 0 | Status: DISCONTINUED | OUTPATIENT
Start: 2018-07-18 | End: 2018-07-19

## 2018-07-18 RX ORDER — DIPHENHYDRAMINE HCL 50 MG
25 CAPSULE ORAL DAILY
Qty: 0 | Refills: 0 | Status: DISCONTINUED | OUTPATIENT
Start: 2018-07-18 | End: 2018-07-19

## 2018-07-18 RX ADMIN — Medication 325 MILLIGRAM(S): at 22:02

## 2018-07-18 RX ADMIN — ENOXAPARIN SODIUM 40 MILLIGRAM(S): 100 INJECTION SUBCUTANEOUS at 22:02

## 2018-07-18 RX ADMIN — Medication 25 MILLIGRAM(S): at 22:12

## 2018-07-18 NOTE — H&P ADULT - HISTORY OF PRESENT ILLNESS
26yo F with PMH MG (diagnosed 2/2018) presenting with worsening ptosis and weakness x 2 months. Pt reports she underwent 5 rounds of plasmapheresis in 3/2018 and continued PO pyridostigmine. Pt reports she felt well for about 1 month and symptoms have been gradually worsening. Symptoms include ptosis, extremity weakness x 4 extremities, and blurred vision. Pt reports she has been on life support in past prior to plasmapheresis treatment. Denies any difficulty breathing. Able to ambulate. Pt saw new neurologist today and sent to ED for IVIG treatment. Denies any CP, SOB, n/v, numbness/tingling.   Neuro Linda Sanchez

## 2018-07-18 NOTE — ED ADULT NURSE NOTE - OBJECTIVE STATEMENT
pt has hx of mg was on plasmapheresis in March and on po meds and felt better for 1 month on the po meds.  Pt has been increasingly getting more weakness generalized and eye drooping and blurry vison sent in for admission IVL placed and bloods sent Josh

## 2018-07-18 NOTE — ED PROVIDER NOTE - OBJECTIVE STATEMENT
28yo F with PMH MG (diagnosed 2/2018) presenting with worsening ptosis and weakness x 2 months. Pt reports she underwent 5 rounds of plasmapheresis in 3/2018 and continued PO pyridostigmine. Pt reports she felt well for about 1 month and symptoms have been gradually worsening. Symptoms include ptosis, extremity weakness x 4 extremities, and blurred vision. Pt reports she has been on life support in past prior to plasmapheresis treatment. Denies any difficulty breathing. Able to ambulate. Pt saw new neurologist today and sent to ED for IVIG treatment. Denies any CP, SOB, n/v, numbness/tingling.   Neuro Linda Sanchez

## 2018-07-18 NOTE — CONSULT NOTE ADULT - ASSESSMENT
28yo F with PMH MG (diagnosed 2/2018) presenting with worsening ptosis and weakness x 2 months. Pt reports she underwent 5 rounds of plasmapheresis in 3/2018 and continued PO pyridostigmine. Pt reports she felt well for about 1 month and symptoms have been gradually worsening. Symptoms include ptosis, extremity weakness x 4 extremities, and blurred vision. Pt reports she has been on life support in past prior to plasmapheresis treatment. Denies any difficulty breathing. Able to ambulate. Pt saw new neurologist today and sent to ED for IVIG treatment.    Neuro Linda Sanchez  Plan:  [] IVIG 2 g divided doses for 4 days

## 2018-07-18 NOTE — ED ADULT TRIAGE NOTE - CHIEF COMPLAINT QUOTE
Patient presents for IVIG with hx of myasthenia gravis. Patient reports weakness, eye drooping, double vision, bilateral lower extremity weakness, dysphagia, and blurred vision.

## 2018-07-18 NOTE — ED PROVIDER NOTE - ATTENDING CONTRIBUTION TO CARE
****ATTENDING**** 26yo f w recent diagnosis of Myasthenia Gravis Feb 2018 was recently treated w plasmapheresis now w persistent symptoms of weakness and ptosis and sent for IVIG. Denies any difficulty breathing at this time. Tolerating secretions. Denies any other recent illness or infection.  On exam, Patient is awake,alert,oriented x 3. Patient is well appearing and in no acute distress. B/L eye ptosis. Patient's chest is clear to ausculation, +s1s2. Abdomen is soft nd/nt +BS. Extremity with no swelling or calf tenderness. B/L UE and LE 4+/5.  NIF and VC -100/1.7L.   Check labs, neuro consult appreciated for admission for IVIG.

## 2018-07-18 NOTE — PATIENT PROFILE ADULT. - AS SC BRADEN FRICTION
Diarrhea, Adult  Diarrhea is frequent loose and watery bowel movements. Diarrhea can make you feel weak and cause you to become dehydrated. Dehydration can make you tired and thirsty, cause you to have a dry mouth, and decrease how often you urinate. Diarrhea typically lasts 2-3 days. However, it can last longer if it is a sign of something more serious. It is important to treat your diarrhea as told by your health care provider.  Follow these instructions at home:  Eating and drinking     Follow these recommendations as told by your health care provider:  · Take an oral rehydration solution (ORS). This is a drink that is sold at pharmacies and retail stores.  · Drink clear fluids, such as water, ice chips, diluted fruit juice, and low-calorie sports drinks.  · Eat bland, easy-to-digest foods in small amounts as you are able. These foods include bananas, applesauce, rice, lean meats, toast, and crackers.  · Avoid drinking fluids that contain a lot of sugar or caffeine, such as energy drinks, sports drinks, and soda.  · Avoid alcohol.  · Avoid spicy or fatty foods.  General instructions   · Drink enough fluid to keep your urine clear or pale yellow.  · Wash your hands often. If soap and water are not available, use hand .  · Make sure that all people in your household wash their hands well and often.  · Take over-the-counter and prescription medicines only as told by your health care provider.  · Rest at home while you recover.  · Watch your condition for any changes.  · Take a warm bath to relieve any burning or pain from frequent diarrhea episodes.  · Keep all follow-up visits as told by your health care provider. This is important.  Contact a health care provider if:  · You have a fever.  · Your diarrhea gets worse.  · You have new symptoms.  · You cannot keep fluids down.  · You feel light-headed or dizzy.  · You have a headache  · You have muscle cramps.  Get help right away if:  · You have chest  pain.  · You feel extremely weak or you faint.  · You have bloody or black stools or stools that look like tar.  · You have severe pain, cramping, or bloating in your abdomen.  · You have trouble breathing or you are breathing very quickly.  · Your heart is beating very quickly.  · Your skin feels cold and clammy.  · You feel confused.  · You have signs of dehydration, such as:  ¨ Dark urine, very little urine, or no urine.  ¨ Cracked lips.  ¨ Dry mouth.  ¨ Sunken eyes.  ¨ Sleepiness.  ¨ Weakness.  This information is not intended to replace advice given to you by your health care provider. Make sure you discuss any questions you have with your health care provider.  Document Released: 12/08/2003 Document Revised: 04/27/2017 Document Reviewed: 08/23/2016  [employer] Interactive Patient Education © 2017 [employer]      [place] Diet  A bland diet consists of foods that do not have a lot of fat or fiber. Foods without fat or fiber are easier for the body to digest. They are also less likely to irritate your mouth, throat, stomach, and other parts of your gastrointestinal tract. A bland diet is sometimes called a BRAT diet.  What is my plan?  Your health care provider or dietitian may recommend specific changes to your diet to prevent and treat your symptoms, such as:  · Eating small meals often.  · Cooking food until it is soft enough to chew easily.  · Chewing your food well.  · Drinking fluids slowly.  · Not eating foods that are very spicy, sour, or fatty.  · Not eating citrus fruits, such as oranges and grapefruit.  What do I need to know about this diet?  · Eat a variety of foods from the bland diet food list.  · Do not follow a bland diet longer than you have to.  · Ask your health care provider whether you should take vitamins.  What foods can I eat?  Grains     Hot cereals, such as cream of wheat. Bread, crackers, or tortillas made from refined white flour. Rice.  Vegetables   Canned or cooked vegetables. Mashed  or boiled potatoes.  Fruits   Bananas. Applesauce. Other types of cooked or canned fruit with the skin and seeds removed, such as canned peaches or pears.  Meats and Other Protein Sources   Scrambled eggs. Creamy peanut butter or other nut butters. Lean, well-cooked meats, such as chicken or fish. Tofu. Soups or broths.  Dairy   Low-fat dairy products, such as milk, cottage cheese, or yogurt.  Beverages   Water. Herbal tea. Apple juice.  Sweets and Desserts   Pudding. Custard. Fruit gelatin. Ice cream.  Fats and Oils   Mild salad dressings. Canola or olive oil.  The items listed above may not be a complete list of allowed foods or beverages. Contact your dietitian for more options.   What foods are not recommended?  Foods and ingredients that are often not recommended include:  · Spicy foods, such as hot sauce or salsa.  · Fried foods.  · Sour foods, such as pickled or fermented foods.  · Raw vegetables or fruits, especially citrus or berries.  · Caffeinated drinks.  · Alcohol.  · Strongly flavored seasonings or condiments.  The items listed above may not be a complete list of foods and beverages that are not allowed. Contact your dietitian for more information.   This information is not intended to replace advice given to you by your health care provider. Make sure you discuss any questions you have with your health care provider.  Document Released: 04/10/2017 Document Revised: 05/25/2017 Document Reviewed: 12/30/2015  [employer] Interactive Patient Education © 2017 [employer]     (3) no apparent problem

## 2018-07-18 NOTE — H&P ADULT - NSHPLABSRESULTS_GEN_ALL_CORE
Other:    07-18    136  |  105  |  8   ----------------------------<  82  4.2   |  20<L>  |  0.68    Ca    9.8      18 Jul 2018 18:43    TPro  8.7<H>  /  Alb  4.6  /  TBili  1.1  /  DBili  x   /  AST  22  /  ALT  11  /  AlkPhos  108  07-18                          11.7   7.2   )-----------( 357      ( 18 Jul 2018 18:43 )             35.4

## 2018-07-18 NOTE — H&P ADULT - NSHPREVIEWOFSYSTEMS_GEN_ALL_CORE
CONSTITUTIONAL:  No weight loss, fever, chills, weakness or fatigue.  HEENT:  Eyes:  No visual loss, blurred vision, double vision or yellow sclerae. Ears, Nose, Throat:  No hearing loss, sneezing, congestion, runny nose or sore throat.  CARDIOVASCULAR:  No chest pain, chest pressure or chest discomfort. No palpitations or edema.  GASTROINTESTINAL:  No anorexia, nausea, vomiting or diarrhea. No abdominal pain or blood.  NEUROLOGICAL: See HPI  MUSCULOSKELETAL:  No muscle, back pain, joint pain or stiffness.  PSYCHIATRIC:  No history of depression or anxiety.

## 2018-07-18 NOTE — CONSULT NOTE ADULT - SUBJECTIVE AND OBJECTIVE BOX
HPI:   28yo F with PMH MG (diagnosed 2/2018) presenting with worsening ptosis and weakness x 2 months. Pt reports she underwent 5 rounds of plasmapheresis in 3/2018 and continued PO pyridostigmine. Pt reports she felt well for about 1 month and symptoms have been gradually worsening. Symptoms include ptosis, extremity weakness x 4 extremities, and blurred vision. Pt reports she has been on life support in past prior to plasmapheresis treatment. Denies any difficulty breathing. Able to ambulate. Pt saw new neurologist today and sent to ED for IVIG treatment. Denies any CP, SOB, n/v, numbness/tingling.   Neuro [facility]    MEDICATIONS  (STANDING):    MEDICATIONS  (PRN):    PAST MEDICAL & SURGICAL HISTORY:  Myasthenia gravis    FAMILY HISTORY:    Allergies    Allergy Status Unknown    Intolerances    SHx - No smoking, No ETOH, No drug abuse    Review of Systems:  CONSTITUTIONAL:  No weight loss, fever, chills, weakness or fatigue.  HEENT:  Eyes:  No visual loss, blurred vision, double vision or yellow sclerae. Ears, Nose, Throat:  No hearing loss, sneezing, congestion, runny nose or sore throat.  CARDIOVASCULAR:  No chest pain, chest pressure or chest discomfort. No palpitations or edema.  GASTROINTESTINAL:  No anorexia, nausea, vomiting or diarrhea. No abdominal pain or blood.  NEUROLOGICAL: See HPI  MUSCULOSKELETAL:  No muscle, back pain, joint pain or stiffness.  PSYCHIATRIC:  No history of depression or anxiety.      Vital Signs Last 24 Hrs  T(C): 37.1 (18 Jul 2018 17:51), Max: 37.1 (18 Jul 2018 17:51)  T(F): 98.8 (18 Jul 2018 17:51), Max: 98.8 (18 Jul 2018 17:51)  HR: 84 (18 Jul 2018 17:51) (84 - 84)  BP: 124/83 (18 Jul 2018 17:51) (124/83 - 124/83)  BP(mean): --  RR: 18 (18 Jul 2018 17:51) (18 - 18)  SpO2: 100% (18 Jul 2018 17:51) (100% - 100%)    General Exam:   General appearance: No acute distress                   Neurological Exam:  Mental Status: Orientated to self, date and place.    No dysarthria, aphasia or neglect.      Cranial Nerves: BL Ptosis, EOMs limited BL, orthophoric in neutral gaze. Can count up to 20    Motor:   Tone: normal.                  Strength:     [] Upper extremity                      Delt       Bicep    Tricep                                                  R         5/5        5/5        5/5       5/5                                               L          5/5        5/5        5/5       5/5  [] Lower extremity                       HF          KE          KF        DF         PF                                               R        4+/5        5/5        5/5       5/5       5/5                                               L         4+/5        5/5       5/5       5/5        5/5  Pronator drift: none                 Dysmetria: BL NL FTN  No truncal ataxia.    Tremor: No resting, postural or action tremor.  No myoclonus.    Sensation: intact to light touch, pinprick, vibration and proprioception    Deep Tendon Reflexes:   Right 3+ : BC, TC, BRD   Left 3+ : BC, TC, BRD  Right 3+ Knee, 2+ ankle  Left 3+ Knee, 2+ ankle    Toes flexor bilaterally    Other:    07-18    136  |  105  |  8   ----------------------------<  82  4.2   |  20<L>  |  0.68    Ca    9.8      18 Jul 2018 18:43    TPro  8.7<H>  /  Alb  4.6  /  TBili  1.1  /  DBili  x   /  AST  22  /  ALT  11  /  AlkPhos  108  07-18                          11.7   7.2   )-----------( 357      ( 18 Jul 2018 18:43 )             35.4       Radiology    CT  MRI  EKG:  tele:  TTE:  EEG: HPI:   26yo F with PMH MG (diagnosed 2/2018) presenting with worsening ptosis and weakness x 2 months. Pt reports she underwent 5 rounds of plasmapheresis in 3/2018 and continued PO pyridostigmine. Pt reports she felt well for about 1 month and symptoms have been gradually worsening. Symptoms include ptosis, extremity weakness x 4 extremities, and blurred vision. Pt reports she has been on life support in past prior to plasmapheresis treatment. Denies any difficulty breathing. Able to ambulate. Pt saw new neurologist today and sent to ED for IVIG treatment. Denies any CP, SOB, n/v, numbness/tingling.   Neuro [facility]    MEDICATIONS  (STANDING):    MEDICATIONS  (PRN):    PAST MEDICAL & SURGICAL HISTORY:  Myasthenia gravis    FAMILY HISTORY:    Allergies    Allergy Status Unknown    Intolerances    SHx - No smoking, No ETOH, No drug abuse    Review of Systems:  CONSTITUTIONAL:  No weight loss, fever, chills, weakness or fatigue.  HEENT:  Eyes:  No visual loss, blurred vision, double vision or yellow sclerae. Ears, Nose, Throat:  No hearing loss, sneezing, congestion, runny nose or sore throat.  CARDIOVASCULAR:  No chest pain, chest pressure or chest discomfort. No palpitations or edema.  GASTROINTESTINAL:  No anorexia, nausea, vomiting or diarrhea. No abdominal pain or blood.  NEUROLOGICAL: See HPI  MUSCULOSKELETAL:  No muscle, back pain, joint pain or stiffness.  PSYCHIATRIC:  No history of depression or anxiety.      Vital Signs Last 24 Hrs  T(C): 37.1 (18 Jul 2018 17:51), Max: 37.1 (18 Jul 2018 17:51)  T(F): 98.8 (18 Jul 2018 17:51), Max: 98.8 (18 Jul 2018 17:51)  HR: 84 (18 Jul 2018 17:51) (84 - 84)  BP: 124/83 (18 Jul 2018 17:51) (124/83 - 124/83)  BP(mean): --  RR: 18 (18 Jul 2018 17:51) (18 - 18)  SpO2: 100% (18 Jul 2018 17:51) (100% - 100%)    General Exam:   General appearance: No acute distress                   Neurological Exam:  Mental Status: Orientated to self, date and place.    No dysarthria, aphasia or neglect.      Cranial Nerves: BL Ptosis, EOMs limited BL, orthophoric in neutral gaze. Can count up to 20    Motor:   Tone: normal.                  Strength:     [] Upper extremity                             Delt       Bicep    Tricep                                                  R         4-/5        4/5        4/5       5/5                                               L          4-/5        4/5        4/5       5/5  [] Lower extremity                             HF          KE          KF        DF         PF                                               R        4+/5        5/5        5/5       5/5       5/5                                               L         4+/5        5/5       5/5       5/5        5/5  Pronator drift: none                   Tremor: No resting, postural or action tremor.  No myoclonus.    Sensation: intact to light touch, pinprick, vibration and proprioception    Deep Tendon Reflexes:   Right 3+ : BC, TC, BRD   Left 3+ : BC, TC, BRD  Right 3+ Knee, 2+ ankle  Left 3+ Knee, 2+ ankle    Toes flexor bilaterally    Other:    07-18    136  |  105  |  8   ----------------------------<  82  4.2   |  20<L>  |  0.68    Ca    9.8      18 Jul 2018 18:43    TPro  8.7<H>  /  Alb  4.6  /  TBili  1.1  /  DBili  x   /  AST  22  /  ALT  11  /  AlkPhos  108  07-18                          11.7   7.2   )-----------( 357      ( 18 Jul 2018 18:43 )             35.4       Radiology    CT  MRI  EKG:  tele:  TTE:  EEG:

## 2018-07-18 NOTE — H&P ADULT - NSHPPHYSICALEXAM_GEN_ALL_CORE
General Exam:   General appearance: No acute distress                   Neurological Exam:  Mental Status: Orientated to self, date and place.    No dysarthria, aphasia or neglect.      Cranial Nerves: BL Ptosis, EOMs limited BL, orthophoric in neutral gaze. Can count up to 20    Motor:   Tone: normal.                  Strength:     [] Upper extremity                      Delt       Bicep    Tricep                                                  R         5/5        5/5        5/5       5/5                                               L          5/5        5/5        5/5       5/5  [] Lower extremity                       HF          KE          KF        DF         PF                                               R        4+/5        5/5        5/5       5/5       5/5                                               L         4+/5        5/5       5/5       5/5        5/5  Pronator drift: none                 Dysmetria: BL NL FTN  No truncal ataxia.    Tremor: No resting, postural or action tremor.  No myoclonus.    Sensation: intact to light touch, pinprick, vibration and proprioception    Deep Tendon Reflexes:   Right 3+ : BC, TC, BRD   Left 3+ : BC, TC, BRD  Right 3+ Knee, 2+ ankle  Left 3+ Knee, 2+ ankle    Toes flexor bilaterally

## 2018-07-19 LAB
ALBUMIN SERPL ELPH-MCNC: 4.1 G/DL — SIGNIFICANT CHANGE UP (ref 3.3–5)
ALP SERPL-CCNC: 98 U/L — SIGNIFICANT CHANGE UP (ref 40–120)
ALT FLD-CCNC: 10 U/L — SIGNIFICANT CHANGE UP (ref 10–45)
ANION GAP SERPL CALC-SCNC: 12 MMOL/L — SIGNIFICANT CHANGE UP (ref 5–17)
ANION GAP SERPL CALC-SCNC: 13 MMOL/L — SIGNIFICANT CHANGE UP (ref 5–17)
ANISOCYTOSIS BLD QL: SLIGHT — SIGNIFICANT CHANGE UP
APTT BLD: 30.2 SEC — SIGNIFICANT CHANGE UP (ref 27.5–37.4)
AST SERPL-CCNC: 16 U/L — SIGNIFICANT CHANGE UP (ref 10–40)
BASE EXCESS BLDV CALC-SCNC: -2.4 MMOL/L — LOW (ref -2–2)
BASOPHILS # BLD AUTO: 0.1 K/UL — SIGNIFICANT CHANGE UP (ref 0–0.2)
BASOPHILS NFR BLD AUTO: 1.1 % — SIGNIFICANT CHANGE UP (ref 0–2)
BILIRUB SERPL-MCNC: 1.2 MG/DL — SIGNIFICANT CHANGE UP (ref 0.2–1.2)
BUN SERPL-MCNC: 6 MG/DL — LOW (ref 7–23)
BUN SERPL-MCNC: 6 MG/DL — LOW (ref 7–23)
CA-I SERPL-SCNC: 1.22 MMOL/L — SIGNIFICANT CHANGE UP (ref 1.12–1.3)
CALCIUM SERPL-MCNC: 8.8 MG/DL — SIGNIFICANT CHANGE UP (ref 8.4–10.5)
CALCIUM SERPL-MCNC: 9 MG/DL — SIGNIFICANT CHANGE UP (ref 8.4–10.5)
CHLORIDE BLDV-SCNC: 111 MMOL/L — HIGH (ref 96–108)
CHLORIDE SERPL-SCNC: 102 MMOL/L — SIGNIFICANT CHANGE UP (ref 96–108)
CHLORIDE SERPL-SCNC: 103 MMOL/L — SIGNIFICANT CHANGE UP (ref 96–108)
CK MB CFR SERPL CALC: <1 NG/ML — SIGNIFICANT CHANGE UP (ref 0–3.8)
CK SERPL-CCNC: 48 U/L — SIGNIFICANT CHANGE UP (ref 25–170)
CO2 BLDV-SCNC: 25 MMOL/L — SIGNIFICANT CHANGE UP (ref 22–30)
CO2 SERPL-SCNC: 22 MMOL/L — SIGNIFICANT CHANGE UP (ref 22–31)
CO2 SERPL-SCNC: 22 MMOL/L — SIGNIFICANT CHANGE UP (ref 22–31)
CREAT SERPL-MCNC: 0.6 MG/DL — SIGNIFICANT CHANGE UP (ref 0.5–1.3)
CREAT SERPL-MCNC: 0.62 MG/DL — SIGNIFICANT CHANGE UP (ref 0.5–1.3)
EOSINOPHIL # BLD AUTO: 0 K/UL — SIGNIFICANT CHANGE UP (ref 0–0.5)
EOSINOPHIL NFR BLD AUTO: 0.6 % — SIGNIFICANT CHANGE UP (ref 0–6)
FIBRINOGEN PPP-MCNC: 299 MG/DL — LOW (ref 310–510)
GAS PNL BLDA: SIGNIFICANT CHANGE UP
GAS PNL BLDV: 137 MMOL/L — SIGNIFICANT CHANGE UP (ref 136–145)
GAS PNL BLDV: SIGNIFICANT CHANGE UP
GLUCOSE BLDV-MCNC: 79 MG/DL — SIGNIFICANT CHANGE UP (ref 70–99)
GLUCOSE SERPL-MCNC: 82 MG/DL — SIGNIFICANT CHANGE UP (ref 70–99)
GLUCOSE SERPL-MCNC: 87 MG/DL — SIGNIFICANT CHANGE UP (ref 70–99)
HCO3 BLDV-SCNC: 23 MMOL/L — SIGNIFICANT CHANGE UP (ref 21–29)
HCT VFR BLD CALC: 32.9 % — LOW (ref 34.5–45)
HCT VFR BLDA CALC: 32 % — LOW (ref 39–50)
HGB BLD CALC-MCNC: 10.2 G/DL — LOW (ref 11.5–15.5)
HGB BLD-MCNC: 10.9 G/DL — LOW (ref 11.5–15.5)
HYPOCHROMIA BLD QL: SLIGHT — SIGNIFICANT CHANGE UP
INR BLD: 1.14 RATIO — SIGNIFICANT CHANGE UP (ref 0.88–1.16)
INR BLD: 1.16 RATIO — SIGNIFICANT CHANGE UP (ref 0.88–1.16)
LACTATE BLDV-MCNC: 1.3 MMOL/L — SIGNIFICANT CHANGE UP (ref 0.7–2)
LYMPHOCYTES # BLD AUTO: 1.5 K/UL — SIGNIFICANT CHANGE UP (ref 1–3.3)
LYMPHOCYTES # BLD AUTO: 22.6 % — SIGNIFICANT CHANGE UP (ref 13–44)
MAGNESIUM SERPL-MCNC: 2 MG/DL — SIGNIFICANT CHANGE UP (ref 1.6–2.6)
MAGNESIUM SERPL-MCNC: 2 MG/DL — SIGNIFICANT CHANGE UP (ref 1.6–2.6)
MCHC RBC-ENTMCNC: 24.5 PG — LOW (ref 27–34)
MCHC RBC-ENTMCNC: 33.1 GM/DL — SIGNIFICANT CHANGE UP (ref 32–36)
MCV RBC AUTO: 73.9 FL — LOW (ref 80–100)
MICROCYTES BLD QL: SIGNIFICANT CHANGE UP
MONOCYTES # BLD AUTO: 0.4 K/UL — SIGNIFICANT CHANGE UP (ref 0–0.9)
MONOCYTES NFR BLD AUTO: 6.5 % — SIGNIFICANT CHANGE UP (ref 2–14)
NEUTROPHILS # BLD AUTO: 4.5 K/UL — SIGNIFICANT CHANGE UP (ref 1.8–7.4)
NEUTROPHILS NFR BLD AUTO: 69.2 % — SIGNIFICANT CHANGE UP (ref 43–77)
OTHER CELLS CSF MANUAL: 11 ML/DL — LOW (ref 18–22)
OVALOCYTES BLD QL SMEAR: SLIGHT — SIGNIFICANT CHANGE UP
PCO2 BLDV: 46 MMHG — SIGNIFICANT CHANGE UP (ref 35–50)
PH BLDV: 7.32 — LOW (ref 7.35–7.45)
PHOSPHATE SERPL-MCNC: 3.7 MG/DL — SIGNIFICANT CHANGE UP (ref 2.5–4.5)
PHOSPHATE SERPL-MCNC: 3.8 MG/DL — SIGNIFICANT CHANGE UP (ref 2.5–4.5)
PLAT MORPH BLD: NORMAL — SIGNIFICANT CHANGE UP
PLATELET # BLD AUTO: 119 K/UL — LOW (ref 150–400)
PO2 BLDV: 49 MMHG — HIGH (ref 25–45)
POIKILOCYTOSIS BLD QL AUTO: SLIGHT — SIGNIFICANT CHANGE UP
POTASSIUM BLDV-SCNC: 3.9 MMOL/L — SIGNIFICANT CHANGE UP (ref 3.5–5.3)
POTASSIUM SERPL-MCNC: 3.8 MMOL/L — SIGNIFICANT CHANGE UP (ref 3.5–5.3)
POTASSIUM SERPL-MCNC: 4 MMOL/L — SIGNIFICANT CHANGE UP (ref 3.5–5.3)
POTASSIUM SERPL-SCNC: 3.8 MMOL/L — SIGNIFICANT CHANGE UP (ref 3.5–5.3)
POTASSIUM SERPL-SCNC: 4 MMOL/L — SIGNIFICANT CHANGE UP (ref 3.5–5.3)
PROT SERPL-MCNC: 8 G/DL — SIGNIFICANT CHANGE UP (ref 6–8.3)
PROTHROM AB SERPL-ACNC: 12.3 SEC — SIGNIFICANT CHANGE UP (ref 9.8–12.7)
PROTHROM AB SERPL-ACNC: 12.6 SEC — SIGNIFICANT CHANGE UP (ref 9.8–12.7)
RBC # BLD: 4.46 M/UL — SIGNIFICANT CHANGE UP (ref 3.8–5.2)
RBC # FLD: 17.8 % — HIGH (ref 10.3–14.5)
RBC BLD AUTO: ABNORMAL
SAO2 % BLDV: 78 % — SIGNIFICANT CHANGE UP (ref 67–88)
SODIUM SERPL-SCNC: 136 MMOL/L — SIGNIFICANT CHANGE UP (ref 135–145)
SODIUM SERPL-SCNC: 138 MMOL/L — SIGNIFICANT CHANGE UP (ref 135–145)
TROPONIN T, HIGH SENSITIVITY RESULT: <6 NG/L — SIGNIFICANT CHANGE UP (ref 0–51)
WBC # BLD: 6.5 K/UL — SIGNIFICANT CHANGE UP (ref 3.8–10.5)
WBC # FLD AUTO: 6.5 K/UL — SIGNIFICANT CHANGE UP (ref 3.8–10.5)

## 2018-07-19 PROCEDURE — 99253 IP/OBS CNSLTJ NEW/EST LOW 45: CPT | Mod: 25

## 2018-07-19 PROCEDURE — 93010 ELECTROCARDIOGRAM REPORT: CPT

## 2018-07-19 PROCEDURE — 36514 APHERESIS PLASMA: CPT

## 2018-07-19 PROCEDURE — 99291 CRITICAL CARE FIRST HOUR: CPT | Mod: 25

## 2018-07-19 PROCEDURE — 36800 INSERTION OF CANNULA: CPT

## 2018-07-19 PROCEDURE — 71045 X-RAY EXAM CHEST 1 VIEW: CPT | Mod: 26

## 2018-07-19 PROCEDURE — 99233 SBSQ HOSP IP/OBS HIGH 50: CPT | Mod: GC

## 2018-07-19 RX ORDER — SODIUM CHLORIDE 9 MG/ML
1000 INJECTION, SOLUTION INTRAVENOUS
Qty: 0 | Refills: 0 | Status: DISCONTINUED | OUTPATIENT
Start: 2018-07-19 | End: 2018-07-20

## 2018-07-19 RX ORDER — SODIUM CHLORIDE 9 MG/ML
1000 INJECTION INTRAMUSCULAR; INTRAVENOUS; SUBCUTANEOUS
Qty: 0 | Refills: 0 | Status: DISCONTINUED | OUTPATIENT
Start: 2018-07-19 | End: 2018-07-19

## 2018-07-19 RX ADMIN — ENOXAPARIN SODIUM 40 MILLIGRAM(S): 100 INJECTION SUBCUTANEOUS at 21:27

## 2018-07-19 RX ADMIN — SODIUM CHLORIDE 100 MILLILITER(S): 9 INJECTION INTRAMUSCULAR; INTRAVENOUS; SUBCUTANEOUS at 02:34

## 2018-07-19 NOTE — PROVIDER CONTACT NOTE (CHANGE IN STATUS NOTIFICATION) - ACTION/TREATMENT ORDERED:
oxygen nasal canula placed,vs done,pt remain on continuous pox, pt will be transferred to micu for plasmaphoresis

## 2018-07-19 NOTE — CONSULT NOTE ADULT - SUBJECTIVE AND OBJECTIVE BOX
Patient is a 27y old  Female who presents with a chief complaint of MG (18 Jul 2018 19:29)      HPI:  28yo F with PMH MG (diagnosed 2/2018) presenting with worsening ptosis and weakness x 2 months. Pt reports she underwent 5 rounds of plasmapheresis in 3/2018 and continued PO pyridostigmine. Pt reports she felt well for about 1 month and symptoms have been gradually worsening. Symptoms include ptosis, extremity weakness x 4 extremities, and blurred vision. Pt reports she has been on life support in past prior to plasmapheresis treatment. Denies any difficulty breathing. Able to ambulate. Pt saw new neurologist today and sent to ED for IVIG treatment. Denies any CP, SOB, n/v, numbness/tingling.   Neuro Linda Laura (18 Jul 2018 19:29)      Patient was managed on neuro floor and given 1x IVIG overnight between 7/18-7/19. Patient was noted to be doing well this morning with NIF at -120 and VC ~ 1L. This afternoon, patient is reporting shortness of breath and increased work of breathing and transferred to MICU for further management.    PAST MEDICAL & SURGICAL HISTORY:  Myasthenia gravis    MEDICATIONS  (STANDING):  acetaminophen   Tablet. 325 milliGRAM(s) Oral daily  enoxaparin Injectable 40 milliGRAM(s) SubCutaneous every 24 hours  lactated ringers. 1000 milliLiter(s) (60 mL/Hr) IV Continuous <Continuous>    MEDICATIONS  (PRN):      Social History:  Single, Denies smoking, no ETHO,    Allergies: Shellfish      REVIEW OF SYSTEMS:    CONSTITUTIONAL: Generalized weakness, No fevers or chills  EYES/ENT: B/l ptosis and double vision; No swallowing difficulty, no speech difficulty   RESPIRATORY: worsening shortness of breath, No cough, wheezing, hemoptysis;   CARDIOVASCULAR: No chest pain or palpitations  GASTROINTESTINAL: No nausea, vomiting, diarrhea or pain abdomen.  MUSCULOSKELETAL: Full range of motion  NEUROLOGICAL: see HPI, No headache no numbness  HEMATOLOGY: No anemia, no bleeding  SKIN: No itching, burning, rashes, petechia, or lesions  All other review of systems is negative unless indicated above.    Vital Signs Last 24 Hrs  T(C): 37.4 (19 Jul 2018 17:00), Max: 37.8 (18 Jul 2018 23:29)  T(F): 99.4 (19 Jul 2018 17:00), Max: 100 (18 Jul 2018 23:29)  HR: 80 (19 Jul 2018 18:00) (59 - 92)  BP: 118/64 (19 Jul 2018 18:00) (98/64 - 118/64)  BP(mean): 86 (19 Jul 2018 18:00) (83 - 86)  RR: 20 (19 Jul 2018 18:00) (16 - 22)  SpO2: 100% (19 Jul 2018 18:00) (85% - 100%)      PHYSICAL EXAM:  General: young female, well developed and nourished  HEENT: marked ptosis; EOMI  Respiratory: b/l CTA, accessory muscle use, tachypnea  Cardiovascular: S1 S2 +,  tachycardic  Abdomen: soft, nontender, nondistended, bowel sounds +  Extremities: warm to touch, no pitting edema  Skin: No rash, no petechiae  Neurological: 5/5 strength in all extremities   Psychiatry: Appropriate affect                          11.7   7.2   )-----------( 357      ( 18 Jul 2018 18:43 )             35.4       Hematocrit: 35.4 % (07-18 @ 18:43)    07-18    136  |  105  |  8   ----------------------------<  82  4.2   |  20<L>  |  0.68    Ca    9.8      18 Jul 2018 18:43    TPro  8.7<H>  /  Alb  4.6  /  TBili  1.1  /  DBili  x   /  AST  22  /  ALT  11  /  AlkPhos  108  07-18

## 2018-07-19 NOTE — CONSULT NOTE ADULT - ASSESSMENT
27F with PMHX of myasthenia gravis presents with worsening  worsening ptosis and weakness x 2 months, admitted for MG exacerbation, unresponsive to IVIG, transferred to MICU for worsening SOB and initiation of plasmapheresis.     #Neuro:  Myasthenia gravis  - s/p 1x IVIG, now with worsening symptomology  - will place Shiley and consult blood bank for plasmapheresis likely x5 sessions)   - neuro recs appreciated  - AxOx3    # Resp;  Shortness of breath  - worsening SOB with tachypnea, likely in setting MG exacerbation  - VC decreased to .64L from 1L this morning  - c/w NC at this time, can escalate to BiPAP  - at this time, does not require intubation  - NIF/VC q4 hours    # CV:  - tachycardia likely in setting of increased work of breathing  - continue to monitor     #ID:  - no issues    # Renal:  - Cr WNL- no issues at this time    #GI:  - regular diet    #DVT ppx:  - lovenox subq

## 2018-07-19 NOTE — CONSULT NOTE ADULT - ATTENDING COMMENTS
27F Hx of Myasthenia Gravis on IVIG p/w worsening ptosis and generalized weakness x 2 months with VC =< 0.6 and NIF = ?  - Admit to ICU for for MG exacerbation, unresponsive to IVIG to initiate emergent plasmapheresis.   - Airway assessment and close cardiopulmonary monitoring   - Placement of Hemodialytic Catheter upon arrival with Plasmapheresis consent   - Neuro sign check per protocol   - NIF/VC check per shift and close monitor respiratory status for possible semielective intubation     Critical Care Time = 45 Min

## 2018-07-19 NOTE — PROGRESS NOTE ADULT - ASSESSMENT
26yo F with PMH MG (diagnosed 2/2018) presenting with worsening ptosis and weakness x 2 months. Pt reports she underwent 5 rounds of plasmapheresis in 3/2018 and continued PO pyridostigmine. Pt reports she felt well for about 1 month and symptoms have been gradually worsening. Symptoms include ptosis, extremity weakness x 4 extremities, and blurred vision. Pt reports she has been on life support in past prior to plasmapheresis treatment. Denies any difficulty breathing. Able to ambulate. Pt saw new neurologist today and sent to ED for IVIG treatment.    Neuro Linda Sanchez      Plan:  [] IVIG 2 g divided doses for 4 days  [] pretreat with benadryl and tylenol  [] NiF and VC q6hrs  [] neuro checks q4hrs  [] Call MICU if any sign of respiratory compromise. 26yo F with PMH MG (diagnosed 2/2018) presenting with worsening ptosis and weakness x 2 months. Pt reports she underwent 5 rounds of plasmapheresis in 3/2018 and continued PO pyridostigmine. Pt reports she felt well for about 1 month and symptoms have been gradually worsening. Symptoms include ptosis, extremity weakness x 4 extremities, and blurred vision. Pt reports she has been on life support in past prior to plasmapheresis treatment. Denies any difficulty breathing. Able to ambulate. Pt saw new neurologist today and sent to ED for IVIG treatment.    Neuro Linda Sanchez      Plan:  [] IVIG 2 g divided doses for 4 days  [] pretreat with benadryl and tylenol  [] NiF and VC q6hrs  [] neuro checks q4hrs    Addendum: Pt started to desat to the 80's , c/o SOB. On exam pt 26yo F with PMH MG (diagnosed 2/2018) presenting with worsening ptosis and weakness x 2 months. Pt reports she underwent 5 rounds of plasmapheresis in 3/2018 and continued PO pyridostigmine. Pt reports she felt well for about 1 month and symptoms have been gradually worsening. Symptoms include ptosis, extremity weakness x 4 extremities, and blurred vision. Pt reports she has been on life support in past prior to plasmapheresis treatment. Denies any difficulty breathing. Able to ambulate. Pt saw new neurologist today and sent to ED for IVIG treatment.    Neuro Linda Sanchez      Plan:  [] IVIG 2 g divided doses for 4 days  [] pretreat with benadryl and tylenol  [] NiF and VC q6hrs  [] neuro checks q4hrs    Addendum: around 1 pm Pt started to desat to the 80's , c/o SOB. On exam pt lethargic, neck flex 2/5, repeat vital capacity decreased to 650cc, NIF unable to be done. MICU consulted for rapid decline in respiratory function.   They agreed to transfer the patient. Blood bank also called for initiation of plasmapheresis.

## 2018-07-19 NOTE — CHART NOTE - NSCHARTNOTEFT_GEN_A_CORE
Right IJ shiley placed via sterile technique. Placement confirmed w/ chest xay and okay for use. · Procedure Name	Central Line Insertion  · Informed Consent	Benefits, risks, and possible complications of procedure explained to patient/caregiver who verbalized understanding and gave verbal consent.  · Procedure Performed By	Rosanne Guerra MD   · Attending Provider	Jackie TELLO  · Indications	Plasmepheresis  · Site Prep	chlorhexidine  · Attempted Anatomic Location	right  internal jugular  · Actual Anatomic Location	right  internal jugular  · Patient Position	Trendelenburg  · Procedural Sedation Used	No  · Local Anesthesia	1% lidocaine  · Procedure Details	guidewire recovered; lumen(s) aspirated and flushed; sterile dressing applied; sterile technique, catheter placed; ultrasound guidance  · Catheter Type	Dialysis  · Number of Attempts	1  · Post-Procedure Radiography	central line located in the superior vena cava, no pneumothorax  · Tolerance	Patient tolerated procedure well.  · Estimated Blood Loss	minimal  · Post-Procedure Care Guidelines	Care for catheter as per unit/ICU protocols    Placement confirmed w/ chest xay and okay for use.

## 2018-07-19 NOTE — CONSULT NOTE ADULT - SUBJECTIVE AND OBJECTIVE BOX
CHIEF COMPLAINT: weakness    HPI:  27F with PMHx of Myasthenia Gravis ( dx on 2018) presents with worsening worsening ptosis and weakness x 2 months, acutely worsening over the past few days. She had an exacerbation in March of 2018 at which time she was intubated and underwent 5 rounds of plasmapheresis. She was continued on PO pyridostigmine. Pt reports she felt well for about 1 month and symptoms have been gradually worsening. Symptoms include ptosis, extremity weakness x 4 extremities, and blurred vision. Pt saw yesterday and sent to ED for IVIG treatment. Denies any CP, SOB, n/v, numbness/tingling.     Patient managed on neuro floor and given 1x IVIG overnight between 7/18-7/19. Patient was noted to be doing well this morning with NIF at -120 and VC ~ 1L. This afternoon, patient is reporting shortness of breath and increased work of breathing, and MICU consulted for possible transfer. Patient placed on 2L NC.    PAST MEDICAL & SURGICAL HISTORY:  Myasthenia gravis    SOCIAL HISTORY:  Smoking: [ X] Never Smoked [ ] Former Smoker (__ packs x ___ years) [ ] Current Smoker  (__ packs x ___ years)  Substance Use: X[ ] Never Used [ ] Used ____  Allergies    Allergy Status Unknown    Intolerances        HOME MEDICATIONS:    REVIEW OF SYSTEMS:  Constitutional: [ ] negative [ ] fevers [ ] chills [ ] weight loss [ ] weight gain  HEENT: [ ] negative [ ] dry eyes [ ] eye irritation [ ] postnasal drip [ ] nasal congestion  CV: [ ] negative  [ ] chest pain [ ] orthopnea [ ] palpitations [ ] murmur  Resp: [ ] negative [ ] cough [ ] shortness of breath [ ] dyspnea [ ] wheezing [ ] sputum [ ] hemoptysis  GI: [ ] negative [ ] nausea [ ] vomiting [ ] diarrhea [ ] constipation [ ] abd pain [ ] dysphagia   : [ ] negative [ ] dysuria [ ] nocturia [ ] hematuria [ ] increased urinary frequency  Musculoskeletal: [ ] negative [ ] back pain [ ] myalgias [ ] arthralgias [ ] fracture  Skin: [ ] negative [ ] rash [ ] itch  Neurological: [ ] negative [ ] headache [ ] dizziness [ ] syncope [ ] weakness [ ] numbness  Psychiatric: [ ] negative [ ] anxiety [ ] depression  Endocrine: [ ] negative [ ] diabetes [ ] thyroid problem  Hematologic/Lymphatic: [ ] negative [ ] anemia [ ] bleeding problem  Allergic/Immunologic: [ ] negative [ ] itchy eyes [ ] nasal discharge [ ] hives [ ] angioedema  [ ] All other systems negative  [ ] Unable to assess ROS because ________    OBJECTIVE:  ICU Vital Signs Last 24 Hrs  T(C): 36.8 (19 Jul 2018 13:31), Max: 37.8 (18 Jul 2018 23:29)  T(F): 98.3 (19 Jul 2018 13:31), Max: 100 (18 Jul 2018 23:29)  HR: 92 (19 Jul 2018 13:31) (59 - 92)  BP: 110/76 (19 Jul 2018 13:31) (98/64 - 124/83)  BP(mean): --  ABP: --  ABP(mean): --  RR: 20 (19 Jul 2018 13:31) (16 - 20)  SpO2: 100% (19 Jul 2018 13:31) (85% - 100%)        07-18 @ 07:01  -  07-19 @ 07:00  --------------------------------------------------------  IN: 750 mL / OUT: 0 mL / NET: 750 mL      CAPILLARY BLOOD GLUCOSE          PHYSICAL EXAM:  General: young female, tachypneic, laying in bed  HEENT: marked ptosis; PERRL; nasal flaring  Respiratory:  clear to auscultation bilaterally; accessory muscle use  Cardiovascular: s1/s2; tachycardic, no murmurs/rubs  Abdomen: soft, nontender, nondistended  Extremities: warm to touch, no pitting edema  Skin: warm to touch  Neurological: 5/5 strength in upper extremity; 4+/5 strength in lower extremity   Psychiatry: normal affect and mood    LINES:     HOSPITAL MEDICATIONS:  enoxaparin Injectable 40 milliGRAM(s) SubCutaneous every 24 hours            acetaminophen   Tablet. 325 milliGRAM(s) Oral daily  diphenhydrAMINE   Capsule 25 milliGRAM(s) Oral daily          sodium chloride 0.9%. 1000 milliLiter(s) IV Continuous <Continuous>    immune globulin gamma IVPB 20 Gram(s) IV Intermittent daily          LABS:                        11.7   7.2   )-----------( 357      ( 18 Jul 2018 18:43 )             35.4     Hgb Trend: 11.7<--  07-18    136  |  105  |  8   ----------------------------<  82  4.2   |  20<L>  |  0.68    Ca    9.8      18 Jul 2018 18:43    TPro  8.7<H>  /  Alb  4.6  /  TBili  1.1  /  DBili  x   /  AST  22  /  ALT  11  /  AlkPhos  108  07-18    Creatinine Trend: 0.68<--        Venous Blood Gas:  07-18 @ 18:43  7.32/51/30/25/45  VBG Lactate: 1.8      MICROBIOLOGY:     RADIOLOGY:  [ ] Reviewed and interpreted by me    EKG:

## 2018-07-19 NOTE — PROGRESS NOTE ADULT - SUBJECTIVE AND OBJECTIVE BOX
Neurology Follow up note    Patient is a 27y old  Female who presents with a chief complaint of MG (18 Jul 2018 19:29)      Subjective:Interval History - Pt had some diplopia. Not c/o SOB at time of interview. Pt failed initial dysphagia screen.     Objective:   Vital Signs Last 24 Hrs  T(C): 37.4 (19 Jul 2018 15:54), Max: 37.8 (18 Jul 2018 23:29)  T(F): 99.4 (19 Jul 2018 15:54), Max: 100 (18 Jul 2018 23:29)  HR: 81 (19 Jul 2018 15:54) (59 - 92)  BP: 117/73 (19 Jul 2018 15:54) (98/64 - 124/83)  BP(mean): --  RR: 20 (19 Jul 2018 15:54) (16 - 20)  SpO2: 100% (19 Jul 2018 15:54) (85% - 100%)        General Exam:   General appearance: No acute distress                   Neurological Exam:  Mental Status: Orientated to self, date and place.  Attention intact.  No dysarthria, speech fluent. Follows simple and complex commands.    Cranial Nerves:  PERRL, significant ophthalmoplegia bilat, significant ptosis, VFF, no nystagmus.  No APD.    CN V1-3 intact to light touch.  Right facial weakness.  Hearing intact bilaterally.  Tongue midline.  Shoulder shrug intact bilaterally.    Motor: Normal tone.  deltoids 2/5, 3/5 hand grp bilat, 4-/5 bilat hip flexors, R dorsiflex 4/5, L dorsiflex 5/5             Coord: No dysmetria on finger-nose-finger     Tremor: No resting, postural or action tremor.  No myoclonus.    Sensation: intact to light touch, pinprick, vibration and proprioception    Deep Tendon Reflexes: 1+ bilateral biceps, triceps, brachioradialis, knee and ankle. No Babinski present.    Gait: deferred.      Other:    07-18    136  |  105  |  8   ----------------------------<  82  4.2   |  20<L>  |  0.68    Ca    9.8      18 Jul 2018 18:43    TPro  8.7<H>  /  Alb  4.6  /  TBili  1.1  /  DBili  x   /  AST  22  /  ALT  11  /  AlkPhos  108  07-18 07-18    136  |  105  |  8   ----------------------------<  82  4.2   |  20<L>  |  0.68    Ca    9.8      18 Jul 2018 18:43    TPro  8.7<H>  /  Alb  4.6  /  TBili  1.1  /  DBili  x   /  AST  22  /  ALT  11  /  AlkPhos  108 07-18    LIVER FUNCTIONS - ( 18 Jul 2018 18:43 )  Alb: 4.6 g/dL / Pro: 8.7 g/dL / ALK PHOS: 108 U/L / ALT: 11 U/L / AST: 22 U/L / GGT: x                                 11.7   7.2   )-----------( 357      ( 18 Jul 2018 18:43 )             35.4     Radiology          MEDICATIONS  (STANDING):  acetaminophen   Tablet. 325 milliGRAM(s) Oral daily  enoxaparin Injectable 40 milliGRAM(s) SubCutaneous every 24 hours  sodium chloride 0.9%. 1000 milliLiter(s) (100 mL/Hr) IV Continuous <Continuous>    MEDICATIONS  (PRN):

## 2018-07-19 NOTE — CONSULT NOTE ADULT - ASSESSMENT
27F with PMHX of myasthenia gravis presented with worsening weakness and ptosis x 2 months. Managed by neurology for MG symptoms, failed to respond to overnight IVIG and since this afternoon has worsening SOB and weakness. Currently on nasal canula O2. She was transferred to MICU for further management. The risks and benefits of TPE including but not limited to citrate toxicity, allergic reaction and blood pressure changes as well also using plasma as alternative replacement fluid and risks associated with it were explained to the patient. Consent was obtained. Agree with neurology to initiate TPE today using right IJ Shiley catheter. One plasma volume with 5% albumin as replacement fluid. Current plan is to further assess and administer additional 4 TPE-procedures, every other day.

## 2018-07-20 LAB
ALBUMIN SERPL ELPH-MCNC: 4.5 G/DL — SIGNIFICANT CHANGE UP (ref 3.3–5)
ALBUMIN SERPL ELPH-MCNC: 4.7 G/DL — SIGNIFICANT CHANGE UP (ref 3.3–5)
ALP SERPL-CCNC: 33 U/L — LOW (ref 40–120)
ALP SERPL-CCNC: 44 U/L — SIGNIFICANT CHANGE UP (ref 40–120)
ALT FLD-CCNC: 5 U/L — LOW (ref 10–45)
ALT FLD-CCNC: <5 U/L — LOW (ref 10–45)
ANION GAP SERPL CALC-SCNC: 11 MMOL/L — SIGNIFICANT CHANGE UP (ref 5–17)
ANION GAP SERPL CALC-SCNC: 12 MMOL/L — SIGNIFICANT CHANGE UP (ref 5–17)
APPEARANCE UR: CLEAR — SIGNIFICANT CHANGE UP
APTT BLD: 32.8 SEC — SIGNIFICANT CHANGE UP (ref 27.5–37.4)
APTT BLD: 48.5 SEC — HIGH (ref 27.5–37.4)
AST SERPL-CCNC: 10 U/L — SIGNIFICANT CHANGE UP (ref 10–40)
AST SERPL-CCNC: 9 U/L — LOW (ref 10–40)
BASE EXCESS BLDV CALC-SCNC: -0.7 MMOL/L — SIGNIFICANT CHANGE UP (ref -2–2)
BASE EXCESS BLDV CALC-SCNC: -3.5 MMOL/L — LOW (ref -2–2)
BASOPHILS # BLD AUTO: 0 K/UL — SIGNIFICANT CHANGE UP (ref 0–0.2)
BASOPHILS NFR BLD AUTO: 0.4 % — SIGNIFICANT CHANGE UP (ref 0–2)
BILIRUB SERPL-MCNC: 1.4 MG/DL — HIGH (ref 0.2–1.2)
BILIRUB SERPL-MCNC: 1.9 MG/DL — HIGH (ref 0.2–1.2)
BILIRUB UR-MCNC: NEGATIVE — SIGNIFICANT CHANGE UP
BUN SERPL-MCNC: 6 MG/DL — LOW (ref 7–23)
BUN SERPL-MCNC: 6 MG/DL — LOW (ref 7–23)
CA-I SERPL-SCNC: 1.09 MMOL/L — LOW (ref 1.12–1.3)
CALCIUM SERPL-MCNC: 8 MG/DL — LOW (ref 8.4–10.5)
CALCIUM SERPL-MCNC: 8.6 MG/DL — SIGNIFICANT CHANGE UP (ref 8.4–10.5)
CHLORIDE BLDV-SCNC: 110 MMOL/L — HIGH (ref 96–108)
CHLORIDE SERPL-SCNC: 103 MMOL/L — SIGNIFICANT CHANGE UP (ref 96–108)
CHLORIDE SERPL-SCNC: 107 MMOL/L — SIGNIFICANT CHANGE UP (ref 96–108)
CO2 BLDV-SCNC: 24 MMOL/L — SIGNIFICANT CHANGE UP (ref 22–30)
CO2 BLDV-SCNC: 27 MMOL/L — SIGNIFICANT CHANGE UP (ref 22–30)
CO2 SERPL-SCNC: 19 MMOL/L — LOW (ref 22–31)
CO2 SERPL-SCNC: 25 MMOL/L — SIGNIFICANT CHANGE UP (ref 22–31)
COLOR SPEC: SIGNIFICANT CHANGE UP
CREAT SERPL-MCNC: 0.57 MG/DL — SIGNIFICANT CHANGE UP (ref 0.5–1.3)
CREAT SERPL-MCNC: 0.67 MG/DL — SIGNIFICANT CHANGE UP (ref 0.5–1.3)
DIFF PNL FLD: NEGATIVE — SIGNIFICANT CHANGE UP
EOSINOPHIL # BLD AUTO: 0 K/UL — SIGNIFICANT CHANGE UP (ref 0–0.5)
EOSINOPHIL NFR BLD AUTO: 0.6 % — SIGNIFICANT CHANGE UP (ref 0–6)
GAS PNL BLDA: SIGNIFICANT CHANGE UP
GAS PNL BLDV: 133 MMOL/L — LOW (ref 136–145)
GAS PNL BLDV: SIGNIFICANT CHANGE UP
GLUCOSE BLDC GLUCOMTR-MCNC: 100 MG/DL — HIGH (ref 70–99)
GLUCOSE BLDC GLUCOMTR-MCNC: 197 MG/DL — HIGH (ref 70–99)
GLUCOSE BLDV-MCNC: 79 MG/DL — SIGNIFICANT CHANGE UP (ref 70–99)
GLUCOSE SERPL-MCNC: 123 MG/DL — HIGH (ref 70–99)
GLUCOSE SERPL-MCNC: 84 MG/DL — SIGNIFICANT CHANGE UP (ref 70–99)
GLUCOSE UR QL: NEGATIVE — SIGNIFICANT CHANGE UP
HCG SERPL-ACNC: <2 MIU/ML — SIGNIFICANT CHANGE UP
HCO3 BLDV-SCNC: 22 MMOL/L — SIGNIFICANT CHANGE UP (ref 21–29)
HCO3 BLDV-SCNC: 26 MMOL/L — SIGNIFICANT CHANGE UP (ref 21–29)
HCT VFR BLD CALC: 31.6 % — LOW (ref 34.5–45)
HCT VFR BLD CALC: 32.7 % — LOW (ref 34.5–45)
HCT VFR BLDA CALC: 32 % — LOW (ref 39–50)
HGB BLD CALC-MCNC: 10.2 G/DL — LOW (ref 11.5–15.5)
HGB BLD-MCNC: 10.5 G/DL — LOW (ref 11.5–15.5)
HGB BLD-MCNC: 10.9 G/DL — LOW (ref 11.5–15.5)
HOROWITZ INDEX BLDV+IHG-RTO: 36 — SIGNIFICANT CHANGE UP
HOROWITZ INDEX BLDV+IHG-RTO: SIGNIFICANT CHANGE UP
INR BLD: 1.3 RATIO — HIGH (ref 0.88–1.16)
INR BLD: 1.57 RATIO — HIGH (ref 0.88–1.16)
KETONES UR-MCNC: ABNORMAL
LACTATE BLDV-MCNC: 0.7 MMOL/L — SIGNIFICANT CHANGE UP (ref 0.7–2)
LEUKOCYTE ESTERASE UR-ACNC: NEGATIVE — SIGNIFICANT CHANGE UP
LYMPHOCYTES # BLD AUTO: 0.5 K/UL — LOW (ref 1–3.3)
LYMPHOCYTES # BLD AUTO: 6.6 % — LOW (ref 13–44)
MAGNESIUM SERPL-MCNC: 1.7 MG/DL — SIGNIFICANT CHANGE UP (ref 1.6–2.6)
MAGNESIUM SERPL-MCNC: 1.9 MG/DL — SIGNIFICANT CHANGE UP (ref 1.6–2.6)
MCHC RBC-ENTMCNC: 24.5 PG — LOW (ref 27–34)
MCHC RBC-ENTMCNC: 24.6 PG — LOW (ref 27–34)
MCHC RBC-ENTMCNC: 33.2 GM/DL — SIGNIFICANT CHANGE UP (ref 32–36)
MCHC RBC-ENTMCNC: 33.2 GM/DL — SIGNIFICANT CHANGE UP (ref 32–36)
MCV RBC AUTO: 73.8 FL — LOW (ref 80–100)
MCV RBC AUTO: 74.1 FL — LOW (ref 80–100)
MONOCYTES # BLD AUTO: 0.3 K/UL — SIGNIFICANT CHANGE UP (ref 0–0.9)
MONOCYTES NFR BLD AUTO: 3.7 % — SIGNIFICANT CHANGE UP (ref 2–14)
NEUTROPHILS # BLD AUTO: 7.3 K/UL — SIGNIFICANT CHANGE UP (ref 1.8–7.4)
NEUTROPHILS NFR BLD AUTO: 88.7 % — HIGH (ref 43–77)
NITRITE UR-MCNC: NEGATIVE — SIGNIFICANT CHANGE UP
OTHER CELLS CSF MANUAL: 14 ML/DL — LOW (ref 18–22)
PCO2 BLDV: 47 MMHG — SIGNIFICANT CHANGE UP (ref 35–50)
PCO2 BLDV: 53 MMHG — HIGH (ref 35–50)
PH BLDV: 7.3 — LOW (ref 7.35–7.45)
PH BLDV: 7.3 — LOW (ref 7.35–7.45)
PH UR: 8.5 — HIGH (ref 5–8)
PHOSPHATE SERPL-MCNC: 1.9 MG/DL — LOW (ref 2.5–4.5)
PHOSPHATE SERPL-MCNC: 4.4 MG/DL — SIGNIFICANT CHANGE UP (ref 2.5–4.5)
PLATELET # BLD AUTO: 259 K/UL — SIGNIFICANT CHANGE UP (ref 150–400)
PLATELET # BLD AUTO: 272 K/UL — SIGNIFICANT CHANGE UP (ref 150–400)
PO2 BLDV: 61 MMHG — HIGH (ref 25–45)
PO2 BLDV: 81 MMHG — HIGH (ref 25–45)
POTASSIUM BLDV-SCNC: 3.6 MMOL/L — SIGNIFICANT CHANGE UP (ref 3.5–5.3)
POTASSIUM SERPL-MCNC: 4 MMOL/L — SIGNIFICANT CHANGE UP (ref 3.5–5.3)
POTASSIUM SERPL-MCNC: 4 MMOL/L — SIGNIFICANT CHANGE UP (ref 3.5–5.3)
POTASSIUM SERPL-SCNC: 4 MMOL/L — SIGNIFICANT CHANGE UP (ref 3.5–5.3)
POTASSIUM SERPL-SCNC: 4 MMOL/L — SIGNIFICANT CHANGE UP (ref 3.5–5.3)
PROT SERPL-MCNC: 5.9 G/DL — LOW (ref 6–8.3)
PROT SERPL-MCNC: 6.3 G/DL — SIGNIFICANT CHANGE UP (ref 6–8.3)
PROT UR-MCNC: NEGATIVE — SIGNIFICANT CHANGE UP
PROTHROM AB SERPL-ACNC: 14.2 SEC — HIGH (ref 9.8–12.7)
PROTHROM AB SERPL-ACNC: 17.1 SEC — HIGH (ref 9.8–12.7)
RBC # BLD: 4.27 M/UL — SIGNIFICANT CHANGE UP (ref 3.8–5.2)
RBC # BLD: 4.43 M/UL — SIGNIFICANT CHANGE UP (ref 3.8–5.2)
RBC # FLD: 17.7 % — HIGH (ref 10.3–14.5)
RBC # FLD: 17.8 % — HIGH (ref 10.3–14.5)
SAO2 % BLDV: 89 % — HIGH (ref 67–88)
SAO2 % BLDV: 96 % — HIGH (ref 67–88)
SODIUM SERPL-SCNC: 137 MMOL/L — SIGNIFICANT CHANGE UP (ref 135–145)
SODIUM SERPL-SCNC: 140 MMOL/L — SIGNIFICANT CHANGE UP (ref 135–145)
SP GR SPEC: 1.01 — SIGNIFICANT CHANGE UP (ref 1.01–1.02)
UROBILINOGEN FLD QL: NEGATIVE — SIGNIFICANT CHANGE UP
WBC # BLD: 7.7 K/UL — SIGNIFICANT CHANGE UP (ref 3.8–10.5)
WBC # BLD: 8.2 K/UL — SIGNIFICANT CHANGE UP (ref 3.8–10.5)
WBC # FLD AUTO: 7.7 K/UL — SIGNIFICANT CHANGE UP (ref 3.8–10.5)
WBC # FLD AUTO: 8.2 K/UL — SIGNIFICANT CHANGE UP (ref 3.8–10.5)

## 2018-07-20 PROCEDURE — 71045 X-RAY EXAM CHEST 1 VIEW: CPT | Mod: 26

## 2018-07-20 PROCEDURE — 31500 INSERT EMERGENCY AIRWAY: CPT | Mod: GC

## 2018-07-20 PROCEDURE — 76604 US EXAM CHEST: CPT | Mod: 26,GC

## 2018-07-20 PROCEDURE — 99291 CRITICAL CARE FIRST HOUR: CPT | Mod: 25

## 2018-07-20 PROCEDURE — 93308 TTE F-UP OR LMTD: CPT | Mod: 26,GC

## 2018-07-20 PROCEDURE — 99233 SBSQ HOSP IP/OBS HIGH 50: CPT | Mod: GC

## 2018-07-20 PROCEDURE — 36556 INSERT NON-TUNNEL CV CATH: CPT | Mod: GC

## 2018-07-20 RX ORDER — FLUCONAZOLE 150 MG/1
150 TABLET ORAL ONCE
Qty: 0 | Refills: 0 | Status: COMPLETED | OUTPATIENT
Start: 2018-07-20 | End: 2018-07-20

## 2018-07-20 RX ORDER — SODIUM CHLORIDE 9 MG/ML
1000 INJECTION, SOLUTION INTRAVENOUS
Qty: 0 | Refills: 0 | Status: DISCONTINUED | OUTPATIENT
Start: 2018-07-20 | End: 2018-07-21

## 2018-07-20 RX ORDER — FENTANYL CITRATE 50 UG/ML
100 INJECTION INTRAVENOUS ONCE
Qty: 0 | Refills: 0 | Status: DISCONTINUED | OUTPATIENT
Start: 2018-07-20 | End: 2018-07-20

## 2018-07-20 RX ORDER — SODIUM CHLORIDE 9 MG/ML
500 INJECTION, SOLUTION INTRAVENOUS ONCE
Qty: 0 | Refills: 0 | Status: COMPLETED | OUTPATIENT
Start: 2018-07-20 | End: 2018-07-20

## 2018-07-20 RX ORDER — DEXMEDETOMIDINE HYDROCHLORIDE IN 0.9% SODIUM CHLORIDE 4 UG/ML
0.2 INJECTION INTRAVENOUS
Qty: 200 | Refills: 0 | Status: DISCONTINUED | OUTPATIENT
Start: 2018-07-20 | End: 2018-07-20

## 2018-07-20 RX ORDER — SODIUM CHLORIDE 9 MG/ML
1000 INJECTION, SOLUTION INTRAVENOUS ONCE
Qty: 0 | Refills: 0 | Status: COMPLETED | OUTPATIENT
Start: 2018-07-20 | End: 2018-07-20

## 2018-07-20 RX ORDER — PROPOFOL 10 MG/ML
10 INJECTION, EMULSION INTRAVENOUS ONCE
Qty: 0 | Refills: 0 | Status: COMPLETED | OUTPATIENT
Start: 2018-07-20 | End: 2018-07-20

## 2018-07-20 RX ORDER — FENTANYL CITRATE 50 UG/ML
50 INJECTION INTRAVENOUS ONCE
Qty: 0 | Refills: 0 | Status: DISCONTINUED | OUTPATIENT
Start: 2018-07-20 | End: 2018-07-20

## 2018-07-20 RX ORDER — PROPOFOL 10 MG/ML
10 INJECTION, EMULSION INTRAVENOUS
Qty: 1000 | Refills: 0 | Status: DISCONTINUED | OUTPATIENT
Start: 2018-07-20 | End: 2018-07-21

## 2018-07-20 RX ORDER — MAGNESIUM SULFATE 500 MG/ML
1 VIAL (ML) INJECTION ONCE
Qty: 0 | Refills: 0 | Status: COMPLETED | OUTPATIENT
Start: 2018-07-20 | End: 2018-07-20

## 2018-07-20 RX ORDER — INSULIN LISPRO 100/ML
VIAL (ML) SUBCUTANEOUS EVERY 4 HOURS
Qty: 0 | Refills: 0 | Status: DISCONTINUED | OUTPATIENT
Start: 2018-07-20 | End: 2018-07-22

## 2018-07-20 RX ORDER — POTASSIUM CHLORIDE 20 MEQ
40 PACKET (EA) ORAL ONCE
Qty: 0 | Refills: 0 | Status: COMPLETED | OUTPATIENT
Start: 2018-07-20 | End: 2018-07-20

## 2018-07-20 RX ORDER — MIDAZOLAM HYDROCHLORIDE 1 MG/ML
4 INJECTION, SOLUTION INTRAMUSCULAR; INTRAVENOUS ONCE
Qty: 0 | Refills: 0 | Status: DISCONTINUED | OUTPATIENT
Start: 2018-07-20 | End: 2018-07-20

## 2018-07-20 RX ADMIN — Medication 100 GRAM(S): at 03:37

## 2018-07-20 RX ADMIN — FENTANYL CITRATE 50 MICROGRAM(S): 50 INJECTION INTRAVENOUS at 06:50

## 2018-07-20 RX ADMIN — Medication 83.33 MILLIMOLE(S): at 17:03

## 2018-07-20 RX ADMIN — FENTANYL CITRATE 100 MICROGRAM(S): 50 INJECTION INTRAVENOUS at 05:10

## 2018-07-20 RX ADMIN — SODIUM CHLORIDE 1000 MILLILITER(S): 9 INJECTION, SOLUTION INTRAVENOUS at 10:36

## 2018-07-20 RX ADMIN — MIDAZOLAM HYDROCHLORIDE 4 MILLIGRAM(S): 1 INJECTION, SOLUTION INTRAMUSCULAR; INTRAVENOUS at 06:22

## 2018-07-20 RX ADMIN — ENOXAPARIN SODIUM 40 MILLIGRAM(S): 100 INJECTION SUBCUTANEOUS at 22:38

## 2018-07-20 RX ADMIN — Medication 40 MILLIEQUIVALENT(S): at 09:00

## 2018-07-20 RX ADMIN — FENTANYL CITRATE 100 MICROGRAM(S): 50 INJECTION INTRAVENOUS at 05:00

## 2018-07-20 RX ADMIN — Medication 50 MILLIGRAM(S): at 12:46

## 2018-07-20 RX ADMIN — FLUCONAZOLE 150 MILLIGRAM(S): 150 TABLET ORAL at 10:35

## 2018-07-20 RX ADMIN — SODIUM CHLORIDE 1000 MILLILITER(S): 9 INJECTION, SOLUTION INTRAVENOUS at 07:30

## 2018-07-20 RX ADMIN — PROPOFOL 10 MILLIGRAM(S): 10 INJECTION, EMULSION INTRAVENOUS at 05:05

## 2018-07-20 RX ADMIN — FENTANYL CITRATE 50 MICROGRAM(S): 50 INJECTION INTRAVENOUS at 05:45

## 2018-07-20 NOTE — PROGRESS NOTE ADULT - SUBJECTIVE AND OBJECTIVE BOX
S:    O:  ICU Vital Signs Last 24 Hrs  T(C): 37.4 (20 Jul 2018 04:00), Max: 37.8 (19 Jul 2018 20:00)  T(F): 99.4 (20 Jul 2018 04:00), Max: 100 (19 Jul 2018 20:00)  HR: 117 (20 Jul 2018 07:00) (63 - 140)  BP: 70/43 (20 Jul 2018 07:00) (70/43 - 159/81)  BP(mean): 51 (20 Jul 2018 07:00) (51 - 109)  ABP: --  ABP(mean): --  RR: 18 (20 Jul 2018 07:00) (14 - 33)  SpO2: 100% (20 Jul 2018 07:00) (85% - 100%)        MEDICATIONS  (STANDING):  dexmedetomidine Infusion 0.2 MICROgram(s)/kG/Hr (3.075 mL/Hr) IV Continuous <Continuous>  dextrose 5% + lactated ringers. 1000 milliLiter(s) (60 mL/Hr) IV Continuous <Continuous>  enoxaparin Injectable 40 milliGRAM(s) SubCutaneous every 24 hours  fluconAZOLE   Tablet 150 milliGRAM(s) Oral once  propofol Infusion 10 MICROgram(s)/kG/Min (3.69 mL/Hr) IV Continuous <Continuous>    Exam: S: visited at the bedside, intubated. Awake, follows commands    O:  ICU Vital Signs Last 24 Hrs  T(C): 37.4 (20 Jul 2018 04:00), Max: 37.8 (19 Jul 2018 20:00)  T(F): 99.4 (20 Jul 2018 04:00), Max: 100 (19 Jul 2018 20:00)  HR: 117 (20 Jul 2018 07:00) (63 - 140)  BP: 70/43 (20 Jul 2018 07:00) (70/43 - 159/81)  BP(mean): 51 (20 Jul 2018 07:00) (51 - 109)  ABP: --  ABP(mean): --  RR: 18 (20 Jul 2018 07:00) (14 - 33)  SpO2: 100% (20 Jul 2018 07:00) (85% - 100%)        MEDICATIONS  (STANDING):  dexmedetomidine Infusion 0.2 MICROgram(s)/kG/Hr (3.075 mL/Hr) IV Continuous <Continuous>  dextrose 5% + lactated ringers. 1000 milliLiter(s) (60 mL/Hr) IV Continuous <Continuous>  enoxaparin Injectable 40 milliGRAM(s) SubCutaneous every 24 hours  fluconAZOLE   Tablet 150 milliGRAM(s) Oral once  propofol Infusion 10 MICROgram(s)/kG/Min (3.69 mL/Hr) IV Continuous <Continuous>    Exam:  Alert, awake  exam stable cont' to have ophtholmoplesia, ptosis right worse than left  on ventilator  UE BL 4/5  LE BL 4+/5

## 2018-07-20 NOTE — PROCEDURE NOTE - NSINFORMCONSENT_GEN_A_CORE
This was an emergent procedure.
This was an emergent procedure.
Benefits, risks, and possible complications of procedure explained to patient/caregiver who verbalized understanding and gave verbal consent.

## 2018-07-20 NOTE — PROGRESS NOTE ADULT - ASSESSMENT
Plan:  #neuro:  -neuro checks q 2 hrs and prn for changes  -as pt is intubated - propofol gtt titrate to RASS of 0 to -2  -f/u with phoresis team regarding subsequent TPE therapy  -pain control with fentanyl 25 mcq IV q 2 hrs prn   -will discuss with neuro regarding  steroid institution    #Pulm:  -mechanical vent therapy - titrate to maintain ph 7.35-7.34; PCO2 35-45; SPO2 > 92%  -duobneb therapy q 6 hrs prn  -HOB >/= 30 degree angle  -chest pt q 2 hrs    #CV:  -ecg now and q am  -maintain MAP >/=65 mmHg  -phenylephrine gtt as needed     #GI/:  -nutrition consult  -start tube feeds  -strict I & O's - keep even  -valente placement    #I.D.;  -vaginal fungal infection noted  -diflucan 150 mg OGT x1  -obtain urinalysis    #FEN/HEME/ENDO:  -lactated ringers at 75 cc/hr  -trend cmp/mg++/po--4/cbc/coags qd and prn  -maintain K+ 4.0-4.5; Mg+ 1.5-2.5 27 yr old female with PMHx of M.G. who presents with crisis requiring TPE, now with hypercapnic resp failure requiring intubation and mechanical vent therapy    Plan:  #neuro:  -neuro checks q 2 hrs and prn for changes  -as pt is intubated - propofol gtt titrate to RASS of 0 to -2  -f/u with phoresis team regarding subsequent TPE therapy  -pain control with fentanyl 25 mcq IV q 2 hrs prn   -will discuss with neuro regarding  steroid institution    #Pulm:  -mechanical vent therapy - titrate to maintain ph 7.35-7.34; PCO2 35-45; SPO2 > 92%  -duobneb therapy q 6 hrs prn  -HOB >/= 30 degree angle  -chest pt q 2 hrs    #CV:  -ecg now and q am  -maintain MAP >/=65 mmHg  -phenylephrine gtt as needed     #GI/:  -nutrition consult  -start tube feeds  -strict I & O's - keep even  -valente placement    #I.D.;  -vaginal fungal infection noted  -diflucan 150 mg OGT x1  -obtain urinalysis    #FEN/HEME/ENDO:  -lactated ringers at 75 cc/hr  -trend cmp/mg++/po--4/cbc/coags qd and prn  -maintain K+ 4.0-4.5; Mg+ 1.5-2.5

## 2018-07-20 NOTE — CHART NOTE - NSCHARTNOTEFT_GEN_A_CORE
: Charlie    INDICATION: respiratory failure, hypotension    PROCEDURE:  [x ] LIMITED ECHO  [x ] LIMITED CHEST  [ ] LIMITED RETROPERITONEAL  [ ] LIMITED ABDOMINAL  [ ] LIMITED DVT  [ ] NEEDLE GUIDANCE VASCULAR  [ ] NEEDLE GUIDANCE THORACENTESIS  [ ] NEEDLE GUIDANCE PARACENTESIS  [ ] NEEDLE GUIDANCE PERICARDIOCENTESIS  [ ] OTHER    FINDINGS:  Lung- A lines anteriorly, no consolidation or pleural effusions; poor excursion of diaphragm  Echo- poor windows- hyperdynamic LV, IVC small    INTERPRETATION:  COntinue w/ mechanical ventilation for respiratory failure due to myasthenia gravis  Hypotension likely related to underresuscitation- will give 1L LR and bolus as necessary

## 2018-07-20 NOTE — PROGRESS NOTE ADULT - SUBJECTIVE AND OBJECTIVE BOX
CHIEF COMPLAINT:  progressive weakness    HPI:  27 yr old PMHx Myasthenia Gravis dx 2/2018 with exacerbation in march requiring intubation and 5 rounds of plasmapheresis, placed on pyridostigmine. Presented to [facility] 7/18/18 with progressive ptosis and weakness and blurred vision over 2 month period with acute worsening over past few days. Admitted initially to neuro service for IVIG therapy where she received one treatment. This short hospital course complicated by development of shortness of breath with increased wob, decreased vital capacity from 1 liter to 650cc's, initial NIF -120 with eventual inability to perform NIF. Pt transferred to MICU 7/19/18 for further management and therapeutic plasma exchange therapy.       Interval Events:  received TPE overnight, developed progressive decline WOB requiring intubation (0500)    REVIEW OF SYSTEMS:  Constitutional: [ ] fevers [ ] chills [ ] weight loss [ ] weight gain  HEENT: [ ] dry eyes [ ] eye irritation [ ] postnasal drip [ ] nasal congestion  CV: [ ] chest pain [ ] orthopnea [ ] palpitations [ ] murmur  Resp: [ ] cough [ ] shortness of breath [ ] dyspnea [ ] wheezing [ ] sputum [ ] hemoptysis  GI: [ ] nausea [ ] vomiting [ ] diarrhea [ ] constipation [ ] abd pain [ ] dysphagia   : [ ] dysuria [ ] nocturia [ ] hematuria [ ] increased urinary frequency  Musculoskeletal: [ ] back pain [ ] myalgias [ ] arthralgias [ ] fracture  Skin: [ ] rash [ ] itch  Neurological: [ ] headache [ ] dizziness [ ] syncope [ ] weakness [ ] numbness  Psychiatric: [ ] anxiety [ ] depression  Endocrine: [ ] diabetes [ ] thyroid problem  Hematologic/Lymphatic: [ ] anemia [ ] bleeding problem  Allergic/Immunologic: [ ] itchy eyes [ ] nasal discharge [ ] hives [ ] angioedema  [ ] All other systems negative  [ ] Unable to assess ROS because ________    OBJECTIVE:  ICU Vital Signs Last 24 Hrs  T(C): 37.4 (20 Jul 2018 04:00), Max: 37.8 (19 Jul 2018 20:00)  T(F): 99.4 (20 Jul 2018 04:00), Max: 100 (19 Jul 2018 20:00)  HR: 66 (20 Jul 2018 04:00) (63 - 114)  BP: 99/54 (20 Jul 2018 04:00) (92/50 - 159/81)  BP(mean): 71 (20 Jul 2018 04:00) (68 - 109)  ABP: --  ABP(mean): --  RR: 17 (20 Jul 2018 04:00) (14 - 33)  SpO2: 100% (20 Jul 2018 04:00) (85% - 100%)        07-18 @ 07:01 - 07-19 @ 07:00  --------------------------------------------------------  IN: 750 mL / OUT: 0 mL / NET: 750 mL    07-19 @ 07:01 - 07-20 @ 04:56  --------------------------------------------------------  IN: 960 mL / OUT: 600 mL / NET: 360 mL      CAPILLARY BLOOD GLUCOSE          PHYSICAL EXAM:  Neuro:    Pulm:    C/V:    GI/:    Lymph:    Neck:    LINES:    HOSPITAL MEDICATIONS:  MEDICATIONS  (STANDING):  dextrose 5% + lactated ringers. 1000 milliLiter(s) (60 mL/Hr) IV Continuous <Continuous>  enoxaparin Injectable 40 milliGRAM(s) SubCutaneous every 24 hours    MEDICATIONS  (PRN):      LABS:                        10.9   7.7   )-----------( 259      ( 20 Jul 2018 00:34 )             32.7     Hgb Trend: 10.9<--, 10.9<--, 11.7<--  07-20    140  |  103  |  6<L>  ----------------------------<  84  4.0   |  25  |  0.67    Ca    8.0<L>      20 Jul 2018 00:34  Phos  4.4     07-20  Mg     1.7     07-20    TPro  5.9<L>  /  Alb  4.5  /  TBili  1.4<H>  /  DBili  x   /  AST  9<L>  /  ALT  <5<L>  /  AlkPhos  33<L>  07-20    LIVER FUNCTIONS - ( 20 Jul 2018 00:34 )  Alb: 4.5 g/dL / Pro: 5.9 g/dL / ALK PHOS: 33 U/L / ALT: <5 U/L / AST: 9 U/L / GGT: x           Creatinine Trend: 0.67<--, 0.60<--, 0.62<--, 0.68<--  PT/INR - ( 20 Jul 2018 00:34 )   PT: 17.1 sec;   INR: 1.57 ratio         PTT - ( 20 Jul 2018 00:34 )  PTT:48.5 sec    Arterial Blood Gas:  07-19 @ 18:40  7.40/36/156/22/100/-2.2  ABG lactate: --    Venous Blood Gas:  07-20 @ 01:25  7.30/53/81/26/96  VBG Lactate: 0.7  Venous Blood Gas:  07-19 @ 18:29  7.32/46/49/23/78  VBG Lactate: 1.3  Venous Blood Gas:  07-18 @ 18:43  7.32/51/30/25/45  VBG Lactate: 1.8      MICROBIOLOGY:     RADIOLOGY:  [ ] Reviewed and interpreted by me    EKG:      Yesika ANP-BC (ext [id number]) CHIEF COMPLAINT:  progressive weakness    HPI:  27 yr old PMHx Myasthenia Gravis dx 2/2018 with exacerbation in march requiring intubation and 5 rounds of plasmapheresis, placed on pyridostigmine. Presented to [facility] 7/18/18 with progressive ptosis and weakness and blurred vision over 2 month period with acute worsening over past few days. Admitted initially to neuro service for IVIG therapy where she received one treatment. This short hospital course complicated by development of shortness of breath with increased wob, decreased vital capacity from 1 liter to 650cc's, initial NIF -120 with eventual inability to perform NIF. Pt transferred to MICU 7/19/18 for further management and therapeutic plasma exchange therapy.       Interval Events:  received TPE overnight, developed progressive decline WOB requiring intubation (0500)    REVIEW OF SYSTEMS:  Resp: [ ] cough [xxxx ] shortness of breath [ ] dyspnea [ ] wheezing [ ] sputum [ ] hemoptysis  Neurological: [ ] headache [ ] dizziness [ ] syncope [xxxx ] weakness [ ] numbness  [xxxx ] All other systems negative    OBJECTIVE:  ICU Vital Signs Last 24 Hrs  T(C): 37.4 (20 Jul 2018 04:00), Max: 37.8 (19 Jul 2018 20:00)  T(F): 99.4 (20 Jul 2018 04:00), Max: 100 (19 Jul 2018 20:00)  HR: 66 (20 Jul 2018 04:00) (63 - 114)  BP: 99/54 (20 Jul 2018 04:00) (92/50 - 159/81)  BP(mean): 71 (20 Jul 2018 04:00) (68 - 109)  ABP: --  ABP(mean): --  RR: 17 (20 Jul 2018 04:00) (14 - 33)  SpO2: 100% (20 Jul 2018 04:00) (85% - 100%)        07-18 @ 07:01  -  07-19 @ 07:00  --------------------------------------------------------  IN: 750 mL / OUT: 0 mL / NET: 750 mL    07-19 @ 07:01  -  07-20 @ 04:56  --------------------------------------------------------  IN: 960 mL / OUT: 600 mL / NET: 360 mL      CAPILLARY BLOOD GLUCOSE          PHYSICAL EXAM:  Neuro:  receiving diprivan gtt however responsive to verbal stimuli, + ptosis, answering simple questions yes and not by shaking head and mouthing words. has spontaneous purposeful movement of all 4 extremities upper 3/5, lower 2 to 3/5. pupils 3 mm reactive equal to light    Pulm:  orally intubated to mechanical vent, breath sounds bilat diminished in lower lung fields with few bibasilar crackles, POCUS with A line predominance anteriorly few focal b lines in bases. small Rt pleural effusion noted. Pip 24, Ppl 17    C/V:  cardiac monitor nsr to sinus tack without ectopy, s1/s2 without adventitious sounds noted. without peripheral edema appreciated peripheral pulses palpable with radials 2+ bilat, dp/pt 1+/1+ bilat, digits warm to touch with good cap refill < 3 sec's. POCUS with slight decreased LVFx VTI 14, IVC 1.75.     GI/:  abd slight distended, soft non tender + bowel sounds. vaginal fungal infection noted, valente to bsd draining clear yellow urine, bladder non distended, non palpable    Lymph:  non palpable     Neck:  supple, without JVD noted     LINES:  Rt IJ double lumen dialysis catheter, Lf IJ TLC. sites without redness, infiltration,drainage, swelling, tenderness or pain, dressings are dry and itnact    HOSPITAL MEDICATIONS:  MEDICATIONS  (STANDING):  dextrose 5% + lactated ringers. 1000 milliLiter(s) (60 mL/Hr) IV Continuous <Continuous>  enoxaparin Injectable 40 milliGRAM(s) SubCutaneous every 24 hours    MEDICATIONS  (PRN):      LABS:                        10.9   7.7   )-----------( 259      ( 20 Jul 2018 00:34 )             32.7     Hgb Trend: 10.9<--, 10.9<--, 11.7<--  07-20    140  |  103  |  6<L>  ----------------------------<  84  4.0   |  25  |  0.67    Ca    8.0<L>      20 Jul 2018 00:34  Phos  4.4     07-20  Mg     1.7     07-20    TPro  5.9<L>  /  Alb  4.5  /  TBili  1.4<H>  /  DBili  x   /  AST  9<L>  /  ALT  <5<L>  /  AlkPhos  33<L>  07-20    LIVER FUNCTIONS - ( 20 Jul 2018 00:34 )  Alb: 4.5 g/dL / Pro: 5.9 g/dL / ALK PHOS: 33 U/L / ALT: <5 U/L / AST: 9 U/L / GGT: x           Creatinine Trend: 0.67<--, 0.60<--, 0.62<--, 0.68<--  PT/INR - ( 20 Jul 2018 00:34 )   PT: 17.1 sec;   INR: 1.57 ratio         PTT - ( 20 Jul 2018 00:34 )  PTT:48.5 sec    Arterial Blood Gas:  07-19 @ 18:40  7.40/36/156/22/100/-2.2  ABG lactate: --    Venous Blood Gas:  07-20 @ 01:25  7.30/53/81/26/96  VBG Lactate: 0.7  Venous Blood Gas:  07-19 @ 18:29  7.32/46/49/23/78  VBG Lactate: 1.3  Venous Blood Gas:  07-18 @ 18:43  7.32/51/30/25/45  VBG Lactate: 1.8      MICROBIOLOGY:     RADIOLOGY:  [ ] Reviewed and interpreted by me    EKG:      Yesika Banner (ext [id number])

## 2018-07-20 NOTE — DIETITIAN INITIAL EVALUATION ADULT. - NS AS NUTRI INTERV ENTERAL NUTRITION
Recommend Vital 1.2  at 70cc/hr x 18 hrs provides 1512 kcals, 95 gm protein, 1022cc free water for 25 kcals/kg, 1.6gm protein/kg current wt 59.5 kg

## 2018-07-20 NOTE — PROCEDURE NOTE - NSPOSTPRCRAD_GEN_A_CORE
central line located in the superior vena cava/no pneumothorax
central line located in the superior vena cava

## 2018-07-20 NOTE — DIETITIAN INITIAL EVALUATION ADULT. - OTHER INFO
Pt seen for: MICU Length Of Stay   Adm dx:  IVIG for MG became SOB, intubated this am  GI issues: no N/V/D   Last BM: none since adm   Food allergies: NKFA   Vit/supplement PTA: none noted

## 2018-07-20 NOTE — DIETITIAN INITIAL EVALUATION ADULT. - ENERGY NEEDS
Estimated calorie needs for intubated pt per [employer] ([employer]U) 2003 1575 pan/day (26kcals/kg)   Ht: 63"  Wt: 131  BMI: 23.3 kg/m2   IBW: 115 (+/-10%)    114% IBW  Edema: none   Skin: no pressure injuries

## 2018-07-20 NOTE — SWALLOW BEDSIDE ASSESSMENT ADULT - SWALLOW EVAL: DIAGNOSIS
Consult for bedside swallow evaluation received and appreciated. Pt intubated this AM for respiratory distress. Please reconsult when medically appropriate.

## 2018-07-20 NOTE — PROCEDURE NOTE - NSPROCDETAILS_GEN_ALL_CORE
guidewire recovered/lumen(s) aspirated and flushed/sterile dressing applied/sterile technique, catheter placed/ultrasound guidance
patient pre-oxygenated, tube inserted, placement confirmed
guidewire recovered

## 2018-07-20 NOTE — PROGRESS NOTE ADULT - ASSESSMENT
Incomplete   28yo F with PMH MG (diagnosed 2/2018) presenting with worsening ptosis and weakness x 2 months. P/W ophthalmoplegia and BL ptosis and muscle weakness, she was admitted and IVIG initiated, regardless, 24 hours after admission, NIF and VC were worsening, she was finally intubated and in MICU now.    impression: MG exacerbation  Plan:  [] PLEX vs IVIG  [] Solumedrol 500 daily for 3-5 days 26yo F with PMH MG (diagnosed 2/2018) presenting with worsening ptosis and weakness x 2 months. P/W ophthalmoplegia and BL ptosis and muscle weakness, she was admitted and IVIG initiated, regardless, 24 hours after admission, NIF and VC were worsening, she was finally intubated and in MICU now.    impression: MG exacerbation  Plan:  [] PLEX x 3 consecutive days, one day rest and x2 consecutive days  [] Solumedrol 500 daily for 3 days

## 2018-07-21 LAB
ALBUMIN SERPL ELPH-MCNC: 4.6 G/DL — SIGNIFICANT CHANGE UP (ref 3.3–5)
ALP SERPL-CCNC: 49 U/L — SIGNIFICANT CHANGE UP (ref 40–120)
ALT FLD-CCNC: 5 U/L — LOW (ref 10–45)
ANION GAP SERPL CALC-SCNC: 12 MMOL/L — SIGNIFICANT CHANGE UP (ref 5–17)
APTT BLD: 29 SEC — SIGNIFICANT CHANGE UP (ref 27.5–37.4)
AST SERPL-CCNC: 11 U/L — SIGNIFICANT CHANGE UP (ref 10–40)
BILIRUB SERPL-MCNC: 1 MG/DL — SIGNIFICANT CHANGE UP (ref 0.2–1.2)
BUN SERPL-MCNC: 9 MG/DL — SIGNIFICANT CHANGE UP (ref 7–23)
CALCIUM SERPL-MCNC: 9.1 MG/DL — SIGNIFICANT CHANGE UP (ref 8.4–10.5)
CHLORIDE SERPL-SCNC: 106 MMOL/L — SIGNIFICANT CHANGE UP (ref 96–108)
CO2 SERPL-SCNC: 23 MMOL/L — SIGNIFICANT CHANGE UP (ref 22–31)
CREAT SERPL-MCNC: 0.6 MG/DL — SIGNIFICANT CHANGE UP (ref 0.5–1.3)
FIBRINOGEN PPP-MCNC: 364 MG/DL — SIGNIFICANT CHANGE UP (ref 310–510)
GAS PNL BLDA: SIGNIFICANT CHANGE UP
GLUCOSE BLDC GLUCOMTR-MCNC: 153 MG/DL — HIGH (ref 70–99)
GLUCOSE BLDC GLUCOMTR-MCNC: 156 MG/DL — HIGH (ref 70–99)
GLUCOSE BLDC GLUCOMTR-MCNC: 178 MG/DL — HIGH (ref 70–99)
GLUCOSE BLDC GLUCOMTR-MCNC: 180 MG/DL — HIGH (ref 70–99)
GLUCOSE BLDC GLUCOMTR-MCNC: 193 MG/DL — HIGH (ref 70–99)
GLUCOSE BLDC GLUCOMTR-MCNC: 198 MG/DL — HIGH (ref 70–99)
GLUCOSE BLDC GLUCOMTR-MCNC: 201 MG/DL — HIGH (ref 70–99)
GLUCOSE SERPL-MCNC: 190 MG/DL — HIGH (ref 70–99)
HCT VFR BLD CALC: 31.7 % — LOW (ref 34.5–45)
HGB BLD-MCNC: 10.4 G/DL — LOW (ref 11.5–15.5)
INR BLD: 1.23 RATIO — HIGH (ref 0.88–1.16)
MAGNESIUM SERPL-MCNC: 2.2 MG/DL — SIGNIFICANT CHANGE UP (ref 1.6–2.6)
MCHC RBC-ENTMCNC: 24.3 PG — LOW (ref 27–34)
MCHC RBC-ENTMCNC: 32.7 GM/DL — SIGNIFICANT CHANGE UP (ref 32–36)
MCV RBC AUTO: 74.1 FL — LOW (ref 80–100)
PHOSPHATE SERPL-MCNC: 3.7 MG/DL — SIGNIFICANT CHANGE UP (ref 2.5–4.5)
PLATELET # BLD AUTO: 284 K/UL — SIGNIFICANT CHANGE UP (ref 150–400)
POTASSIUM SERPL-MCNC: 3.7 MMOL/L — SIGNIFICANT CHANGE UP (ref 3.5–5.3)
POTASSIUM SERPL-SCNC: 3.7 MMOL/L — SIGNIFICANT CHANGE UP (ref 3.5–5.3)
PROT SERPL-MCNC: 6.5 G/DL — SIGNIFICANT CHANGE UP (ref 6–8.3)
PROTHROM AB SERPL-ACNC: 13.5 SEC — HIGH (ref 9.8–12.7)
RBC # BLD: 4.28 M/UL — SIGNIFICANT CHANGE UP (ref 3.8–5.2)
RBC # FLD: 17.8 % — HIGH (ref 10.3–14.5)
SODIUM SERPL-SCNC: 141 MMOL/L — SIGNIFICANT CHANGE UP (ref 135–145)
WBC # BLD: 9.3 K/UL — SIGNIFICANT CHANGE UP (ref 3.8–10.5)
WBC # FLD AUTO: 9.3 K/UL — SIGNIFICANT CHANGE UP (ref 3.8–10.5)

## 2018-07-21 PROCEDURE — 99233 SBSQ HOSP IP/OBS HIGH 50: CPT | Mod: 25

## 2018-07-21 PROCEDURE — 93010 ELECTROCARDIOGRAM REPORT: CPT

## 2018-07-21 PROCEDURE — 99291 CRITICAL CARE FIRST HOUR: CPT

## 2018-07-21 PROCEDURE — 71045 X-RAY EXAM CHEST 1 VIEW: CPT | Mod: 26

## 2018-07-21 PROCEDURE — 36514 APHERESIS PLASMA: CPT

## 2018-07-21 RX ORDER — ALPRAZOLAM 0.25 MG
0.25 TABLET ORAL EVERY 12 HOURS
Qty: 0 | Refills: 0 | Status: DISCONTINUED | OUTPATIENT
Start: 2018-07-21 | End: 2018-07-21

## 2018-07-21 RX ORDER — POTASSIUM CHLORIDE 20 MEQ
20 PACKET (EA) ORAL
Qty: 0 | Refills: 0 | Status: DISCONTINUED | OUTPATIENT
Start: 2018-07-21 | End: 2018-07-21

## 2018-07-21 RX ORDER — SENNA PLUS 8.6 MG/1
10 TABLET ORAL DAILY
Qty: 0 | Refills: 0 | Status: DISCONTINUED | OUTPATIENT
Start: 2018-07-21 | End: 2018-07-24

## 2018-07-21 RX ORDER — VANCOMYCIN HCL 1 G
1000 VIAL (EA) INTRAVENOUS EVERY 12 HOURS
Qty: 0 | Refills: 0 | Status: DISCONTINUED | OUTPATIENT
Start: 2018-07-21 | End: 2018-07-23

## 2018-07-21 RX ORDER — POTASSIUM CHLORIDE 20 MEQ
20 PACKET (EA) ORAL ONCE
Qty: 0 | Refills: 0 | Status: COMPLETED | OUTPATIENT
Start: 2018-07-21 | End: 2018-07-21

## 2018-07-21 RX ORDER — PIPERACILLIN AND TAZOBACTAM 4; .5 G/20ML; G/20ML
3.38 INJECTION, POWDER, LYOPHILIZED, FOR SOLUTION INTRAVENOUS EVERY 8 HOURS
Qty: 0 | Refills: 0 | Status: COMPLETED | OUTPATIENT
Start: 2018-07-21 | End: 2018-07-25

## 2018-07-21 RX ORDER — ALPRAZOLAM 0.25 MG
0.25 TABLET ORAL EVERY 12 HOURS
Qty: 0 | Refills: 0 | Status: DISCONTINUED | OUTPATIENT
Start: 2018-07-21 | End: 2018-07-27

## 2018-07-21 RX ORDER — SIMETHICONE 80 MG/1
80 TABLET, CHEWABLE ORAL DAILY
Qty: 0 | Refills: 0 | Status: COMPLETED | OUTPATIENT
Start: 2018-07-21 | End: 2018-07-24

## 2018-07-21 RX ORDER — PROPOFOL 10 MG/ML
10 INJECTION, EMULSION INTRAVENOUS
Qty: 500 | Refills: 0 | Status: DISCONTINUED | OUTPATIENT
Start: 2018-07-21 | End: 2018-07-23

## 2018-07-21 RX ORDER — PIPERACILLIN AND TAZOBACTAM 4; .5 G/20ML; G/20ML
3.38 INJECTION, POWDER, LYOPHILIZED, FOR SOLUTION INTRAVENOUS EVERY 12 HOURS
Qty: 0 | Refills: 0 | Status: DISCONTINUED | OUTPATIENT
Start: 2018-07-21 | End: 2018-07-21

## 2018-07-21 RX ORDER — POLYETHYLENE GLYCOL 3350 17 G/17G
17 POWDER, FOR SOLUTION ORAL DAILY
Qty: 0 | Refills: 0 | Status: DISCONTINUED | OUTPATIENT
Start: 2018-07-21 | End: 2018-07-27

## 2018-07-21 RX ORDER — IPRATROPIUM/ALBUTEROL SULFATE 18-103MCG
3 AEROSOL WITH ADAPTER (GRAM) INHALATION EVERY 6 HOURS
Qty: 0 | Refills: 0 | Status: DISCONTINUED | OUTPATIENT
Start: 2018-07-21 | End: 2018-07-27

## 2018-07-21 RX ORDER — SODIUM CHLORIDE 9 MG/ML
1000 INJECTION, SOLUTION INTRAVENOUS ONCE
Qty: 0 | Refills: 0 | Status: COMPLETED | OUTPATIENT
Start: 2018-07-21 | End: 2018-07-21

## 2018-07-21 RX ORDER — VANCOMYCIN HCL 1 G
1000 VIAL (EA) INTRAVENOUS ONCE
Qty: 0 | Refills: 0 | Status: COMPLETED | OUTPATIENT
Start: 2018-07-21 | End: 2018-07-21

## 2018-07-21 RX ORDER — PIPERACILLIN AND TAZOBACTAM 4; .5 G/20ML; G/20ML
3.38 INJECTION, POWDER, LYOPHILIZED, FOR SOLUTION INTRAVENOUS ONCE
Qty: 0 | Refills: 0 | Status: COMPLETED | OUTPATIENT
Start: 2018-07-21 | End: 2018-07-21

## 2018-07-21 RX ADMIN — SIMETHICONE 80 MILLIGRAM(S): 80 TABLET, CHEWABLE ORAL at 11:13

## 2018-07-21 RX ADMIN — ENOXAPARIN SODIUM 40 MILLIGRAM(S): 100 INJECTION SUBCUTANEOUS at 21:06

## 2018-07-21 RX ADMIN — Medication 1 TABLET(S): at 11:58

## 2018-07-21 RX ADMIN — SODIUM CHLORIDE 2000 MILLILITER(S): 9 INJECTION, SOLUTION INTRAVENOUS at 14:00

## 2018-07-21 RX ADMIN — Medication 2: at 15:57

## 2018-07-21 RX ADMIN — Medication 3 MILLILITER(S): at 17:07

## 2018-07-21 RX ADMIN — Medication 3 MILLILITER(S): at 23:37

## 2018-07-21 RX ADMIN — Medication 2: at 08:08

## 2018-07-21 RX ADMIN — Medication 2: at 05:59

## 2018-07-21 RX ADMIN — PIPERACILLIN AND TAZOBACTAM 25 GRAM(S): 4; .5 INJECTION, POWDER, LYOPHILIZED, FOR SOLUTION INTRAVENOUS at 21:07

## 2018-07-21 RX ADMIN — PIPERACILLIN AND TAZOBACTAM 200 GRAM(S): 4; .5 INJECTION, POWDER, LYOPHILIZED, FOR SOLUTION INTRAVENOUS at 15:54

## 2018-07-21 RX ADMIN — Medication 0.25 MILLIGRAM(S): at 13:17

## 2018-07-21 RX ADMIN — Medication 2: at 23:09

## 2018-07-21 RX ADMIN — Medication 50 MILLIGRAM(S): at 05:40

## 2018-07-21 RX ADMIN — Medication 2: at 20:54

## 2018-07-21 RX ADMIN — Medication 250 MILLIGRAM(S): at 17:06

## 2018-07-21 RX ADMIN — Medication 4: at 02:11

## 2018-07-21 RX ADMIN — Medication 20 MILLIEQUIVALENT(S): at 15:54

## 2018-07-21 RX ADMIN — SENNA PLUS 10 MILLILITER(S): 8.6 TABLET ORAL at 11:14

## 2018-07-21 RX ADMIN — SODIUM CHLORIDE 40 MILLILITER(S): 9 INJECTION, SOLUTION INTRAVENOUS at 00:40

## 2018-07-21 RX ADMIN — Medication 20 MILLIEQUIVALENT(S): at 18:07

## 2018-07-21 RX ADMIN — Medication 2: at 11:18

## 2018-07-21 RX ADMIN — Medication 3 MILLILITER(S): at 11:16

## 2018-07-21 NOTE — PROGRESS NOTE ADULT - ASSESSMENT
27 yr old female with PMHx of M.G. who presents with crisis requiring TPE, now with hypercapnic resp failure requiring intubation and mechanical vent therapy    Plan:  #neuro:  -neuro checks q 2 hrs and prn for changes  -as pt is intubated - propofol gtt titrate to RASS of 0 to -2  -f/u with phoresis team regarding subsequent TPE therapy  -pain control with fentanyl 25 mcq IV q 2 hrs prn   -will discuss with neuro regarding  steroid institution    #Pulm:  -mechanical vent therapy - titrate to maintain ph 7.35-7.34; PCO2 35-45; SPO2 > 92%  -duobneb therapy q 6 hrs prn  -HOB >/= 30 degree angle  -chest pt q 2 hrs    #CV:  -ecg now and q am  -maintain MAP >/=65 mmHg  -phenylephrine gtt as needed     #GI/:  -nutrition consult  -start tube feeds  -strict I & O's - keep even  -valente placement    #I.D.;  -vaginal fungal infection noted  -diflucan 150 mg OGT x1  -obtain urinalysis    #FEN/HEME/ENDO:  -lactated ringers at 75 cc/hr  -trend cmp/mg++/po--4/cbc/coags qd and prn  -maintain K+ 4.0-4.5; Mg+ 1.5-2.5 27 yr old female with PMHx of M.G. who presents with crisis requiring TPE, now with hypercapnic resp failure requiring intubation and mechanical vent therapy    Plan:  #neuro:  -neuro checks q 2 hrs and prn for changes  -will receive TPE today and every other day x 5 treatmenst (today 7/21 is day 2/5)  -pain control with fentanyl 25 mcq IV q 2 hrs prn   -physical therapy consult    #Pulm:  -mechanical vent therapy - titrate to maintain ph 7.35-7.34; PCO2 35-45; SPO2 > 92%  -PSV trial as tolerated  -Pt with end inspiratory wheezes will start duobneb therapy q 6 hrs   -HOB >/= 30 degree angle  -chest pt q 2 hrs    #CV:  -ecg now and q am  -vital signs stable - continue to monitor    #GI/:  -started tube feed - tolerating feed  -strict I & O's - keep even  -valente placement    #I.D.;  -on stress dose steroids- methylprednisolone 500 mg IV qd x 3 days (today is 2/3)  -PJP prophylaxis started yesterday with bactrim T.I.W.    #FEN/HEME/ENDO:  -lactated ringers at 75 cc/hr  -trend cmp/mg++/po--4/cbc/coags qd and prn  -maintain K+ 4.0-4.5; Mg+ 1.5-2.5  -POC glucose q 6 hrs with ISS - maintain glucose 140 - 160

## 2018-07-21 NOTE — CONSULT NOTE ADULT - ASSESSMENT
27 year old female with MG crisis, currently intubated due to respiratory distress, s/p 1# of 5 TPE procedures, every other day, 1.0 plasma volume, with 5% albumin as replacement solution.   We will proceed with TPE #2 of 5, 1.0 plasma volume, with 5% albumin as replacement solution. We have discussed the treatment details with patient and her .  TPE #3 is scheduled on Monday, July 23, 2018. We will monitor the patient's coagulation profile for the selection of replacement fluids.

## 2018-07-21 NOTE — PROGRESS NOTE ADULT - SUBJECTIVE AND OBJECTIVE BOX
CHIEF COMPLAINT:  Patient is a 27y old  Female who presents with a chief complaint of MG (2018 19:29)    HPI:  27 yr old PMHx Myasthenia Gravis dx 2018 with exacerbation in march requiring intubation and 5 rounds of plasmapheresis, placed on pyridostigmine. Presented to [facility] 18 with progressive ptosis and weakness and blurred vision over 2 month period with acute worsening over past few days. Admitted initially to neuro service for IVIG therapy where she received one treatment. This short hospital course complicated by development of shortness of breath with increased wob, decreased vital capacity from 1 liter to 650cc's, initial NIF -120 with eventual inability to perform NIF. Pt transferred to MICU 18 for further management and therapeutic plasma exchange therapy. MICU course c/b hypercapnic resp failure requiring intubation and mechanical vent therapy (18).       Interval Events:  Pt started on pulse dose steroid therapy , bactrim prophylaxis (PJP), found to have fungal vaginal infection received diflucan        REVIEW OF SYSTEMS:  Constitutional: [ ] fevers [ ] chills [ ] weight loss [ ] weight gain  HEENT: [ ] dry eyes [ ] eye irritation [ ] postnasal drip [ ] nasal congestion  CV: [ ] chest pain [ ] orthopnea [ ] palpitations [ ] murmur  Resp: [ ] cough [ ] shortness of breath [ ] dyspnea [ ] wheezing [ ] sputum [ ] hemoptysis  GI: [ ] nausea [ ] vomiting [ ] diarrhea [ ] constipation [ ] abd pain [ ] dysphagia   : [ ] dysuria [ ] nocturia [ ] hematuria [ ] increased urinary frequency  Musculoskeletal: [ ] back pain [ ] myalgias [ ] arthralgias [ ] fracture  Skin: [ ] rash [ ] itch  Neurological: [ ] headache [ ] dizziness [ ] syncope [ ] weakness [ ] numbness  Psychiatric: [ ] anxiety [ ] depression  Endocrine: [ ] diabetes [ ] thyroid problem  Hematologic/Lymphatic: [ ] anemia [ ] bleeding problem  Allergic/Immunologic: [ ] itchy eyes [ ] nasal discharge [ ] hives [ ] angioedema  [ ] All other systems negative  [ ] Unable to assess ROS because ________    OBJECTIVE:  ICU Vital Signs Last 24 Hrs  T(C): 36.7 (2018 04:00), Max: 37.6 (2018 08:00)  T(F): 98 (2018 04:00), Max: 99.7 (2018 08:00)  HR: 125 (2018 05:00) (78 - 143)  BP: 124/72 (2018 05:00) (70/43 - 131/77)  BP(mean): 90 (2018 05:00) (51 - 99)  ABP: --  ABP(mean): --  RR: 22 (2018 05:00) (14 - 29)  SpO2: 98% (2018 05:00) (88% - 100%)    Mode: AC/ CMV (Assist Control/ Continuous Mandatory Ventilation), RR (machine): 18, TV (machine): 400, FiO2: 30, PEEP: 5, ITime: 1, MAP: 11, PIP: 28    07-19 @ 07:01  -  20 @ 07:00  --------------------------------------------------------  IN: 1187.8 mL / OUT: 660 mL / NET: 527.8 mL    07-20 @ 07:  -  21 @ 05:47  --------------------------------------------------------  IN: 4187.7 mL / OUT: 2055 mL / NET: 2132.7 mL      CAPILLARY BLOOD GLUCOSE      POCT Blood Glucose.: 201 mg/dL (2018 02:10)      PHYSICAL EXAM:  Neuro:  receiving diprivan gtt however responsive to verbal stimuli, + ptosis, answering simple questions yes and not by shaking head and mouthing words. has spontaneous purposeful movement of all 4 extremities upper 3/5, lower 2 to 3/5. pupils 3 mm reactive equal to light    Pulm:  orally intubated to mechanical vent, breath sounds bilat diminished in lower lung fields with few bibasilar crackles, POCUS with A line predominance anteriorly few focal b lines in bases. small Rt pleural effusion noted. Pip 24, Ppl 17    C/V:  cardiac monitor nsr to sinus tack without ectopy, s1/s2 without adventitious sounds noted. without peripheral edema appreciated peripheral pulses palpable with radials 2+ bilat, dp/pt 1+/1+ bilat, digits warm to touch with good cap refill < 3 sec's. POCUS with slight decreased LVFx VTI 14, IVC 1.75.     GI/:  abd slight distended, soft non tender + bowel sounds. vaginal fungal infection noted, valente to bsd draining clear yellow urine, bladder non distended, non palpable    Lymph:  non palpable     Neck:  supple, without JVD noted     LINES:  Rt IJ double lumen dialysis catheter, Lf IJ TLC. sites without redness, infiltration,drainage, swelling, tenderness or pain, dressings are dry and itnact    HOSPITAL MEDICATIONS:  MEDICATIONS  (STANDING):  enoxaparin Injectable 40 milliGRAM(s) SubCutaneous every 24 hours  insulin lispro (HumaLOG) corrective regimen sliding scale   SubCutaneous every 4 hours  methylPREDNISolone sodium succinate IVPB 500 milliGRAM(s) IV Intermittent daily  propofol Infusion 10 MICROgram(s)/kG/Min (3.69 mL/Hr) IV Continuous <Continuous>  senna Syrup 10 milliLiter(s) Oral daily  trimethoprim  160 mG/sulfamethoxazole 800 mG 1 Tablet(s) Oral <User Schedule>    MEDICATIONS  (PRN):  polyethylene glycol 3350 17 Gram(s) Oral daily PRN Constipation      LABS:                        10.4   9.3   )-----------( 284      ( 2018 02:19 )             31.7     Hgb Trend: 10.4<--, 10.5<--, 10.9<--, 10.9<--, 11.7<--      141  |  106  |  9   ----------------------------<  190<H>  3.7   |  23  |  0.60    Ca    9.1      2018 02:19  Phos  3.7       Mg     2.2         TPro  6.5  /  Alb  4.6  /  TBili  1.0  /  DBili  x   /  AST  11  /  ALT  5<L>  /  AlkPhos  49      LIVER FUNCTIONS - ( 2018 02:19 )  Alb: 4.6 g/dL / Pro: 6.5 g/dL / ALK PHOS: 49 U/L / ALT: 5 U/L / AST: 11 U/L / GGT: x           Creatinine Trend: 0.60<--, 0.57<--, 0.67<--, 0.60<--, 0.62<--, 0.68<--  PT/INR - ( 2018 02:19 )   PT: 13.5 sec;   INR: 1.23 ratio         PTT - ( 2018 02:19 )  PTT:29.0 sec  Urinalysis Basic - ( 2018 09:20 )    Color: PL Yellow / Appearance: Clear / S.014 / pH: x  Gluc: x / Ketone: Moderate  / Bili: Negative / Urobili: Negative   Blood: x / Protein: Negative / Nitrite: Negative   Leuk Esterase: Negative / RBC: x / WBC x   Sq Epi: x / Non Sq Epi: x / Bacteria: x      Arterial Blood Gas:   @ 15:24  7.47/30/82/22/97/-1.1  ABG lactate: --  Arterial Blood Gas:   @ 07:35  7.33/48/177/25/100/-.9  ABG lactate: --  Arterial Blood Gas:   @ 05:48  7.29/54/196/25/100/-1.8  ABG lactate: --  Arterial Blood Gas:   @ 04:53  7.26/59/206/25/100/-1.9  ABG lactate: --  Arterial Blood Gas:   @ 18:40  7.40/36/156/22/100/-2.2  ABG lactate: --    Venous Blood Gas:   @ 05:34  7.30/47/61/22/89  VBG Lactate: --  Venous Blood Gas:   @ 01:25  7.30/53/81/26/96  VBG Lactate: 0.7  Venous Blood Gas:   @ 18:29  7.32/46/49/23/78  VBG Lactate: 1.3      MICROBIOLOGY:     RADIOLOGY:  [ ] Reviewed and interpreted by me    EKG:      Yesika [facility] (ext [id number]) CHIEF COMPLAINT:  Patient is a 27y old  Female who presents with a chief complaint of MG (2018 19:29)    HPI:  27 yr old PMHx Myasthenia Gravis dx 2018 with exacerbation in march requiring intubation and 5 rounds of plasmapheresis, placed on pyridostigmine. Presented to [facility] 18 with progressive ptosis and weakness and blurred vision over 2 month period with acute worsening over past few days. Admitted initially to neuro service for IVIG therapy where she received one treatment. This short hospital course complicated by development of shortness of breath with increased wob, decreased vital capacity from 1 liter to 650cc's, initial NIF -120 with eventual inability to perform NIF. Pt transferred to MICU 18 for further management and therapeutic plasma exchange therapy. MICU course c/b hypercapnic resp failure requiring intubation and mechanical vent therapy (18).       Interval Events:  Pt started on pulse dose steroid therapy , bactrim prophylaxis (PJP), found to have fungal vaginal infection received diflucan        REVIEW OF SYSTEMS:  [xx ] All other systems negative      OBJECTIVE:  ICU Vital Signs Last 24 Hrs  T(C): 36.7 (2018 04:00), Max: 37.6 (2018 08:00)  T(F): 98 (2018 04:00), Max: 99.7 (2018 08:00)  HR: 125 (2018 05:00) (78 - 143)  BP: 124/72 (2018 05:00) (70/43 - 131/77)  BP(mean): 90 (2018 05:00) (51 - 99)  ABP: --  ABP(mean): --  RR: 22 (2018 05:00) (14 - 29)  SpO2: 98% (2018 05:00) (88% - 100%)    Mode: AC/ CMV (Assist Control/ Continuous Mandatory Ventilation), RR (machine): 18, TV (machine): 400, FiO2: 30, PEEP: 5, ITime: 1, MAP: 11, PIP: 28    07-19 @ 07:01  -  07-20 @ 07:00  --------------------------------------------------------  IN: 1187.8 mL / OUT: 660 mL / NET: 527.8 mL    07 @ 07:01   @ 05:47  --------------------------------------------------------  IN: 4187.7 mL / OUT: 2055 mL / NET: 2132.7 mL      CAPILLARY BLOOD GLUCOSE      POCT Blood Glucose.: 201 mg/dL (2018 02:10)      PHYSICAL EXAM:  Neuro:  awake alert, orally intubated, + ptosis with improvement from previous day, focuses upon examiner, answering simple questions yes and not by shaking head and mouthing words and writing. has spontaneous purposeful movement of all 4 extremities upper 3/5, lower 2 to 3/5 has proximal upper arm weakness however able to raise from shoulders approx 4 inches off bed. pupils 3 mm reactive equal to light    Pulm:  orally intubated to mechanical vent, breath sounds bilat diminished in lower lung fields with few bibasilar crackles, end inspiratory wheezes noted in upper lung fields, POCUS with A line predominance anteriorly few focal b lines in bases. small Rt pleural effusion noted. Pip 30, Ppl 21    C/V:  cardiac monitor nsr to sinus tack without ectopy, s1/s2 without adventitious sounds noted. without peripheral edema appreciated peripheral pulses palpable with radials 2+ bilat, dp/pt 1+/1+ bilat, digits warm to touch with good cap refill < 3 sec's. POCUS with good LVFx VTI 18.9, IVC 1.75. slight effacement of LV. RV<LV     GI/:  abd slight distended, soft non tender + bowel sounds. valente to bsd draining clear yellow urine, bladder non distended, non palpable    Lymph:  non palpable     Neck:  supple, without JVD noted     LINES:  Rt IJ double lumen dialysis catheter, Lf IJ TLC. sites without redness, infiltration,drainage, swelling, tenderness or pain, dressings are dry and itnact    HOSPITAL MEDICATIONS:  MEDICATIONS  (STANDING):  enoxaparin Injectable 40 milliGRAM(s) SubCutaneous every 24 hours  insulin lispro (HumaLOG) corrective regimen sliding scale   SubCutaneous every 4 hours  methylPREDNISolone sodium succinate IVPB 500 milliGRAM(s) IV Intermittent daily  propofol Infusion 10 MICROgram(s)/kG/Min (3.69 mL/Hr) IV Continuous <Continuous>  senna Syrup 10 milliLiter(s) Oral daily  trimethoprim  160 mG/sulfamethoxazole 800 mG 1 Tablet(s) Oral <User Schedule>    MEDICATIONS  (PRN):  polyethylene glycol 3350 17 Gram(s) Oral daily PRN Constipation      LABS:                        10.4   9.3   )-----------( 284      ( 2018 02:19 )             31.7     Hgb Trend: 10.4<--, 10.5<--, 10.9<--, 10.9<--, 11.7<--  0721    141  |  106  |  9   ----------------------------<  190<H>  3.7   |  23  |  0.60    Ca    9.1      2018 02:19  Phos  3.7       Mg     2.2         TPro  6.5  /  Alb  4.6  /  TBili  1.0  /  DBili  x   /  AST  11  /  ALT  5<L>  /  AlkPhos  49      LIVER FUNCTIONS - ( 2018 02:19 )  Alb: 4.6 g/dL / Pro: 6.5 g/dL / ALK PHOS: 49 U/L / ALT: 5 U/L / AST: 11 U/L / GGT: x           Creatinine Trend: 0.60<--, 0.57<--, 0.67<--, 0.60<--, 0.62<--, 0.68<--  PT/INR - ( 2018 02:19 )   PT: 13.5 sec;   INR: 1.23 ratio         PTT - ( 2018 02:19 )  PTT:29.0 sec  Urinalysis Basic - ( 2018 09:20 )    Color: PL Yellow / Appearance: Clear / S.014 / pH: x  Gluc: x / Ketone: Moderate  / Bili: Negative / Urobili: Negative   Blood: x / Protein: Negative / Nitrite: Negative   Leuk Esterase: Negative / RBC: x / WBC x   Sq Epi: x / Non Sq Epi: x / Bacteria: x      Arterial Blood Gas:   @ 15:24  7.47/30/82/22/97/-1.1  ABG lactate: --  Arterial Blood Gas:   @ 07:35  7.33/48/177/25/100/-.9  ABG lactate: --  Arterial Blood Gas:   @ 05:48  7.29/54/196/25/100/-1.8  ABG lactate: --  Arterial Blood Gas:   @ 04:53  7.26/59/206/25/100/-1.9  ABG lactate: --  Arterial Blood Gas:   @ 18:40  7.40/36/156/22/100/-2.2  ABG lactate: --    Venous Blood Gas:   @ 05:34  7.30/47/61/22/89  VBG Lactate: --  Venous Blood Gas:   @ 01:25  7.30/53/81/26/96  VBG Lactate: 0.7  Venous Blood Gas:   @ 18:29  7.32/46/49/23/78  VBG Lactate: 1.3      MICROBIOLOGY:     RADIOLOGY:  [ ] Reviewed and interpreted by me    EKG:      [facility] (ext [phone])

## 2018-07-21 NOTE — CONSULT NOTE ADULT - SUBJECTIVE AND OBJECTIVE BOX
Patient is a 27y old  Female who presents on 2018 with a chief complaint of worsening bilateral eyelid ptosis and weakness.    HPI: 27 year old female with past medical history of myasthenia gravis (diagnosed 2018) who presents with worsening fatigue, generalized weakness, bilateral ptosis, and blurred vision. She was last hospitalized for MG crisis in 3/2018 at outside hospital requiring intubation and therapeutic plasma exchange (TPE) x5 before her symptoms improved and was discharged home on M[place]on. She felt well after that hospitalization, but noted continued ptosis, until 1 month ago when she developed progressive weakness in all extremities, worsening ptosis, and blurred vision. She saw her Neurologist and was sent to [facility] ED on 2018 for IVIG therapy, and was later admitted to Neurology floor. She received IVIG but her symptoms progressed, and she developed respiratory distress on 2018 which progressed, resulting in a transfer to the medical intensive care unit the same day and Blood Bank was consulted for TPE. After transfer to MICU she received 1 procedure of TPE, 1.0 plasma volume, with 5% albumin as the replacement fluid. She tolerated her procedure well, but later that night she progressed to respiratory failure and was intubated at 0500 on 2018. Her next scheduled TPE is 2018.  Today the patient is intubated, but not sedated, and is able to nod "yes" or "no" to questions, follow commands, and communicate by writing. She is eager to extubate and complains of sharp, intermittent epigastric "gas pain". Her vision is still blurry and she still has generalized weakness that has slightly improved.  PAST MEDICAL & SURGICAL HISTORY:  Myasthenia gravis  Asthma  No significant past surgical history      MEDICATIONS  (STANDING):  ALBUTerol/ipratropium for Nebulization 3 milliLiter(s) Nebulizer every 6 hours  enoxaparin Injectable 40 milliGRAM(s) SubCutaneous every 24 hours  insulin lispro (HumaLOG) corrective regimen sliding scale   SubCutaneous every 4 hours  methylPREDNISolone sodium succinate IVPB 500 milliGRAM(s) IV Intermittent daily  propofol Infusion 10 MICROgram(s)/kG/Min (3.69 mL/Hr) IV Continuous <Continuous>  senna Syrup 10 milliLiter(s) Oral daily  trimethoprim  160 mG/sulfamethoxazole 800 mG 1 Tablet(s) Oral <User Schedule>    MEDICATIONS  (PRN):  polyethylene glycol 3350 17 Gram(s) Oral daily PRN Constipation    FAMILY HISTORY:  No pertinent family history in first degree relatives    ALLERGIES  No Known Drug Allergies  shellfish (Anaphylaxis)    REVIEW OF SYSTEMS:    CONSTITUTIONAL: Generalized weakness, No fevers or chills  EYES/ENT: B/l ptosis and double vision; No swallowing difficulty, no speech difficulty   RESPIRATORY: Intubated. shortness of breath is improving. No cough, wheezing, hemoptysis;   CARDIOVASCULAR: No chest pain or palpitations  GASTROINTESTINAL: No nausea, vomiting, diarrhea. Sharp, intermitted epigastric pain  MUSCULOSKELETAL: Weakness, full range of motion preserved, able to self-suction  NEUROLOGICAL: see HPI, No headache no numbness  HEMATOLOGY: No anemia, no bleeding  SKIN: No itching, burning, rashes, petechia, or lesions  All other review of systems is negative unless indicated above.    Vital Signs Last 24 Hrs  T(C): 37.1 (2018 08:00), Max: 37.3 (2018 16:00)  T(F): 98.8 (2018 08:00), Max: 99.2 (2018 16:00)  HR: 122 (2018 08:07) (78 - 143)  BP: 120/81 (2018 08:00) (93/54 - 138/84)  BP(mean): 94 (2018 08:00) (68 - 105)  RR: 27 (2018 08:00) (16 - 29)  SpO2: 98% (2018 08:07) (88% - 100%)    Daily     Daily Weight in k (2018 04:00)    PHYSICAL EXAM:  Vitals – afebrile, normotensive, tachycardia, ventilator settings at 18 RR  General:  well developed, well nourished, no apparent distress, intubated, not sedated, follows commands well, nods "yes/no" to questions, can communicate by writing, accompanied by .  HEENT: Marked bilateral ptosis, EOMI reduced and sluggis, increased secretions requiring suction  Respiratory:  Occasional bibasilar crackles and rhonchi  Cardiovascular:  S1, S2, regular rhythm, tachycardic  Abdomen:  Soft, nontender, nondistended, + bowel sounds  Extremities: Warm, nontender, no pedal edema  Skin:  No visible rash  Neurological: 4/5 strength bilateral upper extremities, 4-5/5 strength bilateral lower extremities  Psychiatry: Appropriate affect  Rt IJ double lumen catheter, without redness, infiltration, drainage, swelling, tenderness or pain. Dressings are dry and intact.                        10.4   9.3   )-----------( 284      ( 2018 02:19 )             31.7       Hematocrit: 31.7 % ( @ 02:19)  Hematocrit: 31.6 % ( @ 15:25)  Hematocrit: 32.7 % ( @ 00:34)  Hematocrit: 32.9 % ( @ 18:34)  Hematocrit: 35.4 % ( @ 18:43)        141  |  106  |  9   ----------------------------<  190<H>  3.7   |  23  |  0.60    Ca    9.1      2018 02:19  Ionized Calcium – 1.23 mmol/L  Phos  3.7       Mg     2.2         TPro  6.5  /  Alb  4.6  /  TBili  1.0  /  DBili  x   /  AST  11  /  ALT  5<L>  /  AlkPhos  49      PT/INR - ( 2018 02:19 )   PT: 13.5 sec;   INR: 1.23 ratio       PTT - ( 2018 02:19 )  PTT:29.0 sec  Fibrinogen Assay: 364 mg/dL ( @ 06:50)  Fibrinogen Assay: 299 mg/dL ( @ 18:34)

## 2018-07-21 NOTE — CONSULT NOTE ADULT - ATTENDING COMMENTS
I have seen and examined Ms. Scarlet Blackburn with Dr. Kiko Her. I agree with the notes and statements as written, and concur with proceeding with therapeutic plasma exchange today. The procedure technical details and risks have been discussed with the patient and her . I also discussed with the patient nurse the fact that we will be removing approximately 70% of the patients plasma during each procedure, including medication in plasma.

## 2018-07-22 LAB
ALBUMIN SERPL ELPH-MCNC: 4.3 G/DL — SIGNIFICANT CHANGE UP (ref 3.3–5)
ALP SERPL-CCNC: 22 U/L — LOW (ref 40–120)
ALT FLD-CCNC: <5 U/L — LOW (ref 10–45)
ANION GAP SERPL CALC-SCNC: 12 MMOL/L — SIGNIFICANT CHANGE UP (ref 5–17)
APTT BLD: 41.2 SEC — HIGH (ref 27.5–37.4)
AST SERPL-CCNC: 8 U/L — LOW (ref 10–40)
BILIRUB SERPL-MCNC: 0.5 MG/DL — SIGNIFICANT CHANGE UP (ref 0.2–1.2)
BUN SERPL-MCNC: 14 MG/DL — SIGNIFICANT CHANGE UP (ref 7–23)
CALCIUM SERPL-MCNC: 8.3 MG/DL — LOW (ref 8.4–10.5)
CHLORIDE SERPL-SCNC: 114 MMOL/L — HIGH (ref 96–108)
CO2 SERPL-SCNC: 23 MMOL/L — SIGNIFICANT CHANGE UP (ref 22–31)
CREAT SERPL-MCNC: 0.71 MG/DL — SIGNIFICANT CHANGE UP (ref 0.5–1.3)
GLUCOSE BLDC GLUCOMTR-MCNC: 128 MG/DL — HIGH (ref 70–99)
GLUCOSE BLDC GLUCOMTR-MCNC: 143 MG/DL — HIGH (ref 70–99)
GLUCOSE BLDC GLUCOMTR-MCNC: 165 MG/DL — HIGH (ref 70–99)
GLUCOSE BLDC GLUCOMTR-MCNC: 175 MG/DL — HIGH (ref 70–99)
GLUCOSE SERPL-MCNC: 169 MG/DL — HIGH (ref 70–99)
GRAM STN FLD: SIGNIFICANT CHANGE UP
HCT VFR BLD CALC: 31.5 % — LOW (ref 34.5–45)
HGB BLD-MCNC: 10.3 G/DL — LOW (ref 11.5–15.5)
INR BLD: 1.53 RATIO — HIGH (ref 0.88–1.16)
MAGNESIUM SERPL-MCNC: 2.5 MG/DL — SIGNIFICANT CHANGE UP (ref 1.6–2.6)
MCHC RBC-ENTMCNC: 24.5 PG — LOW (ref 27–34)
MCHC RBC-ENTMCNC: 32.8 GM/DL — SIGNIFICANT CHANGE UP (ref 32–36)
MCV RBC AUTO: 74.6 FL — LOW (ref 80–100)
PHOSPHATE SERPL-MCNC: 2.3 MG/DL — LOW (ref 2.5–4.5)
PLATELET # BLD AUTO: 230 K/UL — SIGNIFICANT CHANGE UP (ref 150–400)
POTASSIUM SERPL-MCNC: 3.7 MMOL/L — SIGNIFICANT CHANGE UP (ref 3.5–5.3)
POTASSIUM SERPL-SCNC: 3.7 MMOL/L — SIGNIFICANT CHANGE UP (ref 3.5–5.3)
PROT SERPL-MCNC: 5.5 G/DL — LOW (ref 6–8.3)
PROTHROM AB SERPL-ACNC: 16.7 SEC — HIGH (ref 9.8–12.7)
RBC # BLD: 4.22 M/UL — SIGNIFICANT CHANGE UP (ref 3.8–5.2)
RBC # FLD: 17.9 % — HIGH (ref 10.3–14.5)
SODIUM SERPL-SCNC: 149 MMOL/L — HIGH (ref 135–145)
SPECIMEN SOURCE: SIGNIFICANT CHANGE UP
WBC # BLD: 23.7 K/UL — HIGH (ref 3.8–10.5)
WBC # FLD AUTO: 23.7 K/UL — HIGH (ref 3.8–10.5)

## 2018-07-22 PROCEDURE — 99291 CRITICAL CARE FIRST HOUR: CPT

## 2018-07-22 PROCEDURE — 71045 X-RAY EXAM CHEST 1 VIEW: CPT | Mod: 26

## 2018-07-22 RX ORDER — PANTOPRAZOLE SODIUM 20 MG/1
40 TABLET, DELAYED RELEASE ORAL DAILY
Qty: 0 | Refills: 0 | Status: DISCONTINUED | OUTPATIENT
Start: 2018-07-22 | End: 2018-07-25

## 2018-07-22 RX ORDER — PANTOPRAZOLE SODIUM 20 MG/1
40 TABLET, DELAYED RELEASE ORAL ONCE
Qty: 0 | Refills: 0 | Status: DISCONTINUED | OUTPATIENT
Start: 2018-07-22 | End: 2018-07-22

## 2018-07-22 RX ORDER — INSULIN LISPRO 100/ML
VIAL (ML) SUBCUTANEOUS EVERY 6 HOURS
Qty: 0 | Refills: 0 | Status: DISCONTINUED | OUTPATIENT
Start: 2018-07-22 | End: 2018-07-26

## 2018-07-22 RX ORDER — POTASSIUM PHOSPHATE, MONOBASIC POTASSIUM PHOSPHATE, DIBASIC 236; 224 MG/ML; MG/ML
15 INJECTION, SOLUTION INTRAVENOUS ONCE
Qty: 0 | Refills: 0 | Status: COMPLETED | OUTPATIENT
Start: 2018-07-22 | End: 2018-07-22

## 2018-07-22 RX ADMIN — POTASSIUM PHOSPHATE, MONOBASIC POTASSIUM PHOSPHATE, DIBASIC 62.5 MILLIMOLE(S): 236; 224 INJECTION, SOLUTION INTRAVENOUS at 03:00

## 2018-07-22 RX ADMIN — Medication 2: at 12:27

## 2018-07-22 RX ADMIN — PANTOPRAZOLE SODIUM 40 MILLIGRAM(S): 20 TABLET, DELAYED RELEASE ORAL at 12:27

## 2018-07-22 RX ADMIN — PIPERACILLIN AND TAZOBACTAM 25 GRAM(S): 4; .5 INJECTION, POWDER, LYOPHILIZED, FOR SOLUTION INTRAVENOUS at 13:54

## 2018-07-22 RX ADMIN — PIPERACILLIN AND TAZOBACTAM 25 GRAM(S): 4; .5 INJECTION, POWDER, LYOPHILIZED, FOR SOLUTION INTRAVENOUS at 06:46

## 2018-07-22 RX ADMIN — Medication 3 MILLILITER(S): at 05:35

## 2018-07-22 RX ADMIN — Medication 250 MILLIGRAM(S): at 05:11

## 2018-07-22 RX ADMIN — PIPERACILLIN AND TAZOBACTAM 25 GRAM(S): 4; .5 INJECTION, POWDER, LYOPHILIZED, FOR SOLUTION INTRAVENOUS at 21:39

## 2018-07-22 RX ADMIN — Medication 3 MILLILITER(S): at 17:18

## 2018-07-22 RX ADMIN — Medication 250 MILLIGRAM(S): at 18:29

## 2018-07-22 RX ADMIN — Medication 3 MILLILITER(S): at 11:34

## 2018-07-22 RX ADMIN — Medication 2: at 02:04

## 2018-07-22 RX ADMIN — Medication 50 MILLIGRAM(S): at 06:06

## 2018-07-22 RX ADMIN — Medication 3 MILLILITER(S): at 23:13

## 2018-07-22 RX ADMIN — ENOXAPARIN SODIUM 40 MILLIGRAM(S): 100 INJECTION SUBCUTANEOUS at 21:40

## 2018-07-22 NOTE — PROGRESS NOTE ADULT - SUBJECTIVE AND OBJECTIVE BOX
CHIEF COMPLAINT: MG     Interval Events: 27yr old PMHx Myasthenia Gravis dx 2/2018 with exacerbation in march requiring intubation and 5 rounds of plasmapheresis, placed on pyridostigmine. Now re admitted for recurrent MG. S/p 1 dose of IVIG, however the patients symptoms worsened and was then transfered to the MICU for further management. While in the MICU the patient developed acute respiratory failure and was intubated at this time no acute issues over night.        REVIEW OF SYSTEMS:  Constitutional: [ ] fevers [ ] chills [ ] weight loss [ ] weight gain  HEENT: [ ] dry eyes [ ] eye irritation [ ] postnasal drip [ ] nasal congestion  CV: [ ] chest pain [ ] orthopnea [ ] palpitations [ ] murmur  Resp: [ ] cough [ ] shortness of breath [ ] dyspnea [ ] wheezing [ ] sputum [ ] hemoptysis  GI: [ ] nausea [ ] vomiting [ ] diarrhea [ ] constipation [ ] abd pain [ ] dysphagia   : [ ] dysuria [ ] nocturia [ ] hematuria [ ] increased urinary frequency  Musculoskeletal: [ ] back pain [ ] myalgias [ ] arthralgias [ ] fracture  Skin: [ ] rash [ ] itch  Neurological: [ ] headache [ ] dizziness [ ] syncope [ ] weakness [ ] numbness  Psychiatric: [ ] anxiety [ ] depression  Endocrine: [ ] diabetes [ ] thyroid problem  Hematologic/Lymphatic: [ ] anemia [ ] bleeding problem  Allergic/Immunologic: [ ] itchy eyes [ ] nasal discharge [ ] hives [ ] angioedema  [ ] All other systems negative  [x ] Unable to assess ROS because pt is intubated     OBJECTIVE:  ICU Vital Signs Last 24 Hrs  T(C): 37.2 (22 Jul 2018 08:00), Max: 37.2 (22 Jul 2018 08:00)  T(F): 99 (22 Jul 2018 08:00), Max: 99 (22 Jul 2018 08:00)  HR: 74 (22 Jul 2018 08:16) (72 - 139)  BP: 97/52 (22 Jul 2018 08:00) (95/51 - 140/69)  BP(mean): 69 (22 Jul 2018 08:00) (67 - 102)  ABP: --  ABP(mean): --  RR: 18 (22 Jul 2018 08:00) (18 - 28)  SpO2: 100% (22 Jul 2018 08:16) (89% - 100%)    Mode: AC/ CMV (Assist Control/ Continuous Mandatory Ventilation), RR (machine): 18, TV (machine): 400, FiO2: 70, PEEP: 8, ITime: 0.64, MAP: 12, PIP: 26    07-21 @ 07:01  - 07-22 @ 07:00  --------------------------------------------------------  IN: 3911.8 mL / OUT: 1940 mL / NET: 1971.8 mL    07-22 @ 07:01 - 07-22 @ 09:50  --------------------------------------------------------  IN: 29.4 mL / OUT: 0 mL / NET: 29.4 mL      CAPILLARY BLOOD GLUCOSE      POCT Blood Glucose.: 143 mg/dL (22 Jul 2018 08:35)      PHYSICAL EXAM:   General: no acute distress   HEENT:   Lymph Nodes:  Neck:   Respiratory:   Cardiovascular:   Abdomen:   Extremities:   Skin:   Neurological:  Psychiatry:    LINES:    HOSPITAL MEDICATIONS:  MEDICATIONS  (STANDING):  ALBUTerol/ipratropium for Nebulization 3 milliLiter(s) Nebulizer every 6 hours  enoxaparin Injectable 40 milliGRAM(s) SubCutaneous every 24 hours  insulin lispro (HumaLOG) corrective regimen sliding scale   SubCutaneous every 4 hours  pantoprazole  Injectable 40 milliGRAM(s) IV Push daily  piperacillin/tazobactam IVPB. 3.375 Gram(s) IV Intermittent every 8 hours  propofol Infusion 10 MICROgram(s)/kG/Min (3.69 mL/Hr) IV Continuous <Continuous>  senna Syrup 10 milliLiter(s) Oral daily  simethicone 80 milliGRAM(s) Chew daily  trimethoprim  160 mG/sulfamethoxazole 800 mG 1 Tablet(s) Oral <User Schedule>  vancomycin  IVPB 1000 milliGRAM(s) IV Intermittent every 12 hours    MEDICATIONS  (PRN):  ALPRAZolam 0.25 milliGRAM(s) Oral every 12 hours PRN Anxiety  polyethylene glycol 3350 17 Gram(s) Oral daily PRN Constipation      LABS:                        10.3   23.7  )-----------( 230      ( 22 Jul 2018 01:19 )             31.5     Hgb Trend: 10.3<--, 10.4<--, 10.5<--, 10.9<--, 10.9<--  07-22    149<H>  |  114<H>  |  14  ----------------------------<  169<H>  3.7   |  23  |  0.71    Ca    8.3<L>      22 Jul 2018 01:19  Phos  2.3     07-22  Mg     2.5     07-22    TPro  5.5<L>  /  Alb  4.3  /  TBili  0.5  /  DBili  x   /  AST  8<L>  /  ALT  <5<L>  /  AlkPhos  22<L>  07-22    Creatinine Trend: 0.71<--, 0.60<--, 0.57<--, 0.67<--, 0.60<--, 0.62<--  PT/INR - ( 22 Jul 2018 01:19 )   PT: 16.7 sec;   INR: 1.53 ratio         PTT - ( 22 Jul 2018 01:19 )  PTT:41.2 sec    Arterial Blood Gas:  07-21 @ 16:51  7.40/34/66/21/93/-2.6  ABG lactate: --  Arterial Blood Gas:  07-21 @ 15:26  7.39/36/66/21/91/-2.8  ABG lactate: --  Arterial Blood Gas:  07-21 @ 06:26  7.44/32/88/22/98/-1.4  ABG lactate: --  Arterial Blood Gas:  07-20 @ 15:24  7.47/30/82/22/97/-1.1  ABG lactate: --        MICROBIOLOGY:     RADIOLOGY:  [ ] Reviewed and interpreted by me    EKG:      Shital Funez ACN - [place]  ex [id number] CHIEF COMPLAINT: MG     Interval Events: 27yr old PMHx Myasthenia Gravis dx 2/2018 with exacerbation in march requiring intubation and 5 rounds of plasmapheresis, placed on pyridostigmine. Now re admitted for recurrent MG. S/p 1 dose of IVIG, however the patients symptoms worsened and was then transfered to the MICU for further management. While in the MICU the patient developed acute respiratory failure and was intubated at this time no acute issues over night.        REVIEW OF SYSTEMS:  Constitutional: [ ] fevers [ ] chills [ ] weight loss [ ] weight gain  HEENT: [ ] dry eyes [ ] eye irritation [ ] postnasal drip [ ] nasal congestion  CV: [ ] chest pain [ ] orthopnea [ ] palpitations [ ] murmur  Resp: [ ] cough [ ] shortness of breath [ ] dyspnea [ ] wheezing [ ] sputum [ ] hemoptysis  GI: [ ] nausea [ ] vomiting [ ] diarrhea [ ] constipation [ ] abd pain [ ] dysphagia   : [ ] dysuria [ ] nocturia [ ] hematuria [ ] increased urinary frequency  Musculoskeletal: [ ] back pain [ ] myalgias [ ] arthralgias [ ] fracture  Skin: [ ] rash [ ] itch  Neurological: [ ] headache [ ] dizziness [ ] syncope [ ] weakness [ ] numbness  Psychiatric: [ ] anxiety [ ] depression  Endocrine: [ ] diabetes [ ] thyroid problem  Hematologic/Lymphatic: [ ] anemia [ ] bleeding problem  Allergic/Immunologic: [ ] itchy eyes [ ] nasal discharge [ ] hives [ ] angioedema  [ ] All other systems negative  [x ] Unable to assess ROS because pt is intubated     OBJECTIVE:  ICU Vital Signs Last 24 Hrs  T(C): 37.2 (22 Jul 2018 08:00), Max: 37.2 (22 Jul 2018 08:00)  T(F): 99 (22 Jul 2018 08:00), Max: 99 (22 Jul 2018 08:00)  HR: 74 (22 Jul 2018 08:16) (72 - 139)  BP: 97/52 (22 Jul 2018 08:00) (95/51 - 140/69)  BP(mean): 69 (22 Jul 2018 08:00) (67 - 102)  ABP: --  ABP(mean): --  RR: 18 (22 Jul 2018 08:00) (18 - 28)  SpO2: 100% (22 Jul 2018 08:16) (89% - 100%)    Mode: AC/ CMV (Assist Control/ Continuous Mandatory Ventilation), RR (machine): 18, TV (machine): 400, FiO2: 70, PEEP: 8, ITime: 0.64, MAP: 12, PIP: 26    07-21 @ 07:01  - 07-22 @ 07:00  --------------------------------------------------------  IN: 3911.8 mL / OUT: 1940 mL / NET: 1971.8 mL    07-22 @ 07:01 - 07-22 @ 09:50  --------------------------------------------------------  IN: 29.4 mL / OUT: 0 mL / NET: 29.4 mL      CAPILLARY BLOOD GLUCOSE      POCT Blood Glucose.: 143 mg/dL (22 Jul 2018 08:35)      PHYSICAL EXAM:   General: no acute distress   HEENT: PERRLA   Lymph Nodes: No palpable Lymph adenopathy   Neck:  Supple bilateral IJ central lines   Respiratory: intubated (+) right sided rhonchi left side clear   Cardiovascular: s1 s2 no M/C/R/G   Abdomen: soft (+) non tender   Extremities: Warm (+) peripheral pulses to bilateral DP PT and radials   Skin: warm normal skin tone mucus membranes are pink   Neurological: Sedated   Psychiatry: Unable to assess at this ti,e     LINES: Right IJ Shiley Left IJ TLC     HOSPITAL MEDICATIONS:  MEDICATIONS  (STANDING):  ALBUTerol/ipratropium for Nebulization 3 milliLiter(s) Nebulizer every 6 hours  enoxaparin Injectable 40 milliGRAM(s) SubCutaneous every 24 hours  insulin lispro (HumaLOG) corrective regimen sliding scale   SubCutaneous every 4 hours  pantoprazole  Injectable 40 milliGRAM(s) IV Push daily  piperacillin/tazobactam IVPB. 3.375 Gram(s) IV Intermittent every 8 hours  propofol Infusion 10 MICROgram(s)/kG/Min (3.69 mL/Hr) IV Continuous <Continuous>  senna Syrup 10 milliLiter(s) Oral daily  simethicone 80 milliGRAM(s) Chew daily  trimethoprim  160 mG/sulfamethoxazole 800 mG 1 Tablet(s) Oral <User Schedule>  vancomycin  IVPB 1000 milliGRAM(s) IV Intermittent every 12 hours    MEDICATIONS  (PRN):  ALPRAZolam 0.25 milliGRAM(s) Oral every 12 hours PRN Anxiety  polyethylene glycol 3350 17 Gram(s) Oral daily PRN Constipation      LABS:                        10.3   23.7  )-----------( 230      ( 22 Jul 2018 01:19 )             31.5     Hgb Trend: 10.3<--, 10.4<--, 10.5<--, 10.9<--, 10.9<--  07-22    149<H>  |  114<H>  |  14  ----------------------------<  169<H>  3.7   |  23  |  0.71    Ca    8.3<L>      22 Jul 2018 01:19  Phos  2.3     07-22  Mg     2.5     07-22    TPro  5.5<L>  /  Alb  4.3  /  TBili  0.5  /  DBili  x   /  AST  8<L>  /  ALT  <5<L>  /  AlkPhos  22<L>  07-22    Creatinine Trend: 0.71<--, 0.60<--, 0.57<--, 0.67<--, 0.60<--, 0.62<--  PT/INR - ( 22 Jul 2018 01:19 )   PT: 16.7 sec;   INR: 1.53 ratio         PTT - ( 22 Jul 2018 01:19 )  PTT:41.2 sec    Arterial Blood Gas:  07-21 @ 16:51  7.40/34/66/21/93/-2.6  ABG lactate: --  Arterial Blood Gas:  07-21 @ 15:26  7.39/36/66/21/91/-2.8  ABG lactate: --  Arterial Blood Gas:  07-21 @ 06:26  7.44/32/88/22/98/-1.4  ABG lactate: --  Arterial Blood Gas:  07-20 @ 15:24  7.47/30/82/22/97/-1.1  ABG lactate: --        MICROBIOLOGY: Culture - Bronchial (05.15.18 @ 00:40)    -  Ampicillin/Sulbactam: R <=8/4    Gram Stain:   Numerous polymorphonuclear leukocytes per low power field  No Squamous epithelial cells seen per low power field  Numerous Gram Negative Coccobacilli per oil power field  Moderate Gram positive cocci in pairs per oil power field    -  Moxifloxacin(Aerobic): S <=0.5    -  Linezolid: S 4    -  Levofloxacin: S <=0.5    -  Gentamicin: S <=1    -  Erythromycin: S 0.5    -  Clindamycin: S 0.5    -  Ciprofloxacin: S <=1    -  Cefazolin: R <=4    -  Tetra/Doxy: S <=1    -  RIF- Rifampin: S <=1    -  Penicillin: R >8    -  Oxacillin: R >2    -  Vancomycin: S 2    -  Trimethoprim/Sulfamethoxazole: S <=0.5/9.5    Specimen Source: Bronch Wash Bronchoalveolar Lavage    Culture Results:   Numerous Methicillin resistant Staphylococcus aureus  Numerous Haemophilus influenzae  Normal Respiratory Olga present    Organism Identification: Methicillin resistant Staphylococcus aureus    Organism: Methicillin resistant Staphylococcus aureus    Method Type: GAYLE   CUltures are Pending       RADIOLOGY:  < from: Xray Chest 1 View- PORTABLE-Urgent (07.21.18 @ 16:31) >  NTERPRETATION:    DATE OF STUDY: 7/21/18.    PRIOR: 7/20/18.    CLINICAL INDICATION: Hypoxia.    TECHNIQUE: portable chest.    FINDINGS:   ET tubetip at the level of the clavicles, above the devan. Right-sided   and left-sided central lines with tips in the SVC.   NG tube in stomach.  The cardiomediastinal silhouette is unremarkable.   No right lung focal infiltrate.  New left retrocardiac haziness - may be due to atelectasis - underlying   pneumonia not excluded in the right clinical setting.  No pneumothorax.  No acute bony abnormality.    IMPRESSION:   Lines and tubes in satisfactory position.  Right lung clear.  New left retrocardiac haziness due to atelectasis and/or pneumonia.                    < end of copied text >      EKG:  < from: 12 Lead ECG (07.19.18 @ 19:23) >  Ventricular Rate 88 BPM    Atrial Rate 88 BPM    P-R Interval 146 ms    QRS Duration 96 ms     ms    QTc 464 ms    P Axis 38 degrees    R Axis 13 degrees    T Axis 18 degrees    Diagnosis Line NORMAL SINUS RHYTHM  INCOMPLETE RIGHT BUNDLE BRANCH BLOCK  BORDERLINE ECG    < end of copied text >      Shital Funez [facility] - [place]  ex [id number]

## 2018-07-22 NOTE — PROGRESS NOTE ADULT - ASSESSMENT
27yr old PMHx Myasthenia Gravis dx 2/2018 now admitted for exacerbation of MG s/p 27yr old PMHx Myasthenia Gravis dx 2/2018 now admitted for exacerbation of MG s/p intubation     Neuro/ MSK: MG exacerbation - S/P 2 Plasma exchanges Monday will be 3rd dose                      will continue with Neuro checks per ICU protocol                      sedation vacation as tolerated     CV: no active issues at this time will continue telemetry monitoring     Pulm: Respiratory failure - continue vent support            will continue with qd NIF/ VC            PS trials after 3rd dose of Plasma            will check chest x ray today for course Rhonchi noted on Right chest wall            follow up with Blood gas     GI: Nutrition- continue TF as tolerated appreciate Nutritional services for caloric needs       GI PPX with PPI/bowel regimen    : continue with strict I/O with external femal cath - would discontinue the valente cath today          ID: (+) leukocytosis low threshold for culture (+) MRSA hx f/u with bronchial cx        continue with IV hydration   DVT PPX cont with lovenox 27yr old PMHx Myasthenia Gravis dx 2/2018 now admitted for exacerbation of MG s/p intubation     Neuro/ MSK: MG exacerbation - S/P 2 Plasma exchanges Monday will be 3rd dose                      will continue with Neuro checks per ICU protocol                      sedation vacation as tolerated     CV: no active issues at this time will continue telemetry monitoring     Pulm: Respiratory failure - continue vent support            will continue with qd NIF/ VC            PS trials after 3rd dose of Plasma            will check chest x ray today for course Rhonchi noted on Right chest wall            follow up with Blood gas     GI: Nutrition- continue TF as tolerated appreciate Nutritional services for caloric needs       GI PPX with PPI/bowel regimen    : continue with strict I/O with external femal cath - would discontinue the valente cath today          hypernatremia - will add free water 250 q 4 as tolerated          ID: (+) leukocytosis low threshold for culture (+) MRSA hx f/u with bronchial cx          DVT PPX cont with lovenox 27yr old PMHx Myasthenia Gravis dx 2/2018 now admitted for exacerbation of MG s/p intubation     Neuro/ MSK: MG exacerbation - S/P 2 Plasma exchanges Monday will be 3rd dose                      will continue with Neuro checks per ICU protocol                      sedation vacation as tolerated     CV: no active issues at this time will continue telemetry monitoring     Pulm: Respiratory failure - continue vent support            will continue with qd NIF/ VC            PS trials after 3rd dose of Plasma            will check chest x ray today for course Rhonchi noted on Right chest wall            follow up with Blood gas     GI: Nutrition- continue TF as tolerated appreciate Nutritional services for caloric needs       GI PPX with PPI/bowel regimen    : continue with strict I/O with external femal cath - would discontinue the valente cath today          hypernatremia - will add free water 250 q 4 as tolerated          ID: (+) leukocytosis low threshold for culture (+) MRSA/ H influenza hx f/u with bronchial cx   continue with Broad spectrum ABT a febrile at this time Bactrim PPX   Leukocytosis may also be secondary to pulse dose steroids - will continue with Prednisone 60 mg daily in the am          DVT PPX cont with lovenox

## 2018-07-23 LAB
ALBUMIN SERPL ELPH-MCNC: 4.1 G/DL — SIGNIFICANT CHANGE UP (ref 3.3–5)
ALP SERPL-CCNC: 30 U/L — LOW (ref 40–120)
ALT FLD-CCNC: <5 U/L — LOW (ref 10–45)
ANION GAP SERPL CALC-SCNC: 13 MMOL/L — SIGNIFICANT CHANGE UP (ref 5–17)
ANISOCYTOSIS BLD QL: SIGNIFICANT CHANGE UP
APTT BLD: 35.7 SEC — SIGNIFICANT CHANGE UP (ref 27.5–37.4)
APTT BLD: 65.1 SEC — HIGH (ref 27.5–37.4)
AST SERPL-CCNC: 10 U/L — SIGNIFICANT CHANGE UP (ref 10–40)
BASOPHILS # BLD AUTO: 0 K/UL — SIGNIFICANT CHANGE UP (ref 0–0.2)
BASOPHILS NFR BLD AUTO: 0 % — SIGNIFICANT CHANGE UP (ref 0–2)
BILIRUB SERPL-MCNC: 0.6 MG/DL — SIGNIFICANT CHANGE UP (ref 0.2–1.2)
BLD GP AB SCN SERPL QL: NEGATIVE — SIGNIFICANT CHANGE UP
BUN SERPL-MCNC: 18 MG/DL — SIGNIFICANT CHANGE UP (ref 7–23)
CALCIUM SERPL-MCNC: 8.5 MG/DL — SIGNIFICANT CHANGE UP (ref 8.4–10.5)
CHLORIDE SERPL-SCNC: 109 MMOL/L — HIGH (ref 96–108)
CO2 SERPL-SCNC: 25 MMOL/L — SIGNIFICANT CHANGE UP (ref 22–31)
CREAT SERPL-MCNC: 0.61 MG/DL — SIGNIFICANT CHANGE UP (ref 0.5–1.3)
EOSINOPHIL # BLD AUTO: 0 K/UL — SIGNIFICANT CHANGE UP (ref 0–0.5)
EOSINOPHIL NFR BLD AUTO: 0.1 % — SIGNIFICANT CHANGE UP (ref 0–6)
FIBRINOGEN PPP-MCNC: <50 (ref 242–442)
FIBRINOGEN PPP-MCNC: SIGNIFICANT CHANGE UP (ref 310–510)
GAS PNL BLDA: SIGNIFICANT CHANGE UP
GLUCOSE BLDC GLUCOMTR-MCNC: 106 MG/DL — HIGH (ref 70–99)
GLUCOSE BLDC GLUCOMTR-MCNC: 106 MG/DL — HIGH (ref 70–99)
GLUCOSE BLDC GLUCOMTR-MCNC: 113 MG/DL — HIGH (ref 70–99)
GLUCOSE BLDC GLUCOMTR-MCNC: 128 MG/DL — HIGH (ref 70–99)
GLUCOSE BLDC GLUCOMTR-MCNC: 149 MG/DL — HIGH (ref 70–99)
GLUCOSE SERPL-MCNC: 147 MG/DL — HIGH (ref 70–99)
HCT VFR BLD CALC: 25.2 % — LOW (ref 34.5–45)
HCT VFR BLD CALC: 28.1 % — LOW (ref 34.5–45)
HCT VFR BLD CALC: 30.1 % — LOW (ref 34.5–45)
HGB BLD-MCNC: 8.1 G/DL — LOW (ref 11.5–15.5)
HGB BLD-MCNC: 9 G/DL — LOW (ref 11.5–15.5)
HGB BLD-MCNC: 9.7 G/DL — LOW (ref 11.5–15.5)
HYPOCHROMIA BLD QL: SIGNIFICANT CHANGE UP
INR BLD: 1.24 RATIO — HIGH (ref 0.88–1.16)
INR BLD: ABNORMAL RATIO (ref 0.88–1.16)
LYMPHOCYTES # BLD AUTO: 15.4 % — SIGNIFICANT CHANGE UP (ref 13–44)
LYMPHOCYTES # BLD AUTO: 3 K/UL — SIGNIFICANT CHANGE UP (ref 1–3.3)
MAGNESIUM SERPL-MCNC: 2.7 MG/DL — HIGH (ref 1.6–2.6)
MCHC RBC-ENTMCNC: 23.3 PG — LOW (ref 27–34)
MCHC RBC-ENTMCNC: 23.5 PG — LOW (ref 27–34)
MCHC RBC-ENTMCNC: 23.5 PG — LOW (ref 27–34)
MCHC RBC-ENTMCNC: 32.1 GM/DL — SIGNIFICANT CHANGE UP (ref 32–36)
MCHC RBC-ENTMCNC: 32.2 GM/DL — SIGNIFICANT CHANGE UP (ref 32–36)
MCHC RBC-ENTMCNC: 32.2 GM/DL — SIGNIFICANT CHANGE UP (ref 32–36)
MCV RBC AUTO: 72.7 FL — LOW (ref 80–100)
MCV RBC AUTO: 73.1 FL — LOW (ref 80–100)
MCV RBC AUTO: 73.2 FL — LOW (ref 80–100)
MICROCYTES BLD QL: SIGNIFICANT CHANGE UP
MONOCYTES # BLD AUTO: 1.6 K/UL — HIGH (ref 0–0.9)
MONOCYTES NFR BLD AUTO: 8.3 % — SIGNIFICANT CHANGE UP (ref 2–14)
NEUTROPHILS # BLD AUTO: 14.6 K/UL — HIGH (ref 1.8–7.4)
NEUTROPHILS NFR BLD AUTO: 76.2 % — SIGNIFICANT CHANGE UP (ref 43–77)
PHOSPHATE SERPL-MCNC: 2.7 MG/DL — SIGNIFICANT CHANGE UP (ref 2.5–4.5)
PLAT MORPH BLD: NORMAL — SIGNIFICANT CHANGE UP
PLATELET # BLD AUTO: 167 K/UL — SIGNIFICANT CHANGE UP (ref 150–400)
PLATELET # BLD AUTO: 186 K/UL — SIGNIFICANT CHANGE UP (ref 150–400)
PLATELET # BLD AUTO: 233 K/UL — SIGNIFICANT CHANGE UP (ref 150–400)
POIKILOCYTOSIS BLD QL AUTO: SLIGHT — SIGNIFICANT CHANGE UP
POLYCHROMASIA BLD QL SMEAR: SLIGHT — SIGNIFICANT CHANGE UP
POTASSIUM SERPL-MCNC: 3.3 MMOL/L — LOW (ref 3.5–5.3)
POTASSIUM SERPL-SCNC: 3.3 MMOL/L — LOW (ref 3.5–5.3)
PROT SERPL-MCNC: 5.8 G/DL — LOW (ref 6–8.3)
PROTHROM AB SERPL-ACNC: 13.6 SEC — HIGH (ref 9.8–12.7)
PROTHROM AB SERPL-ACNC: 15.9 SEC — HIGH (ref 9.8–12.7)
RBC # BLD: 3.47 M/UL — LOW (ref 3.8–5.2)
RBC # BLD: 3.85 M/UL — SIGNIFICANT CHANGE UP (ref 3.8–5.2)
RBC # BLD: 4.11 M/UL — SIGNIFICANT CHANGE UP (ref 3.8–5.2)
RBC # FLD: 18.2 % — HIGH (ref 10.3–14.5)
RBC # FLD: 18.5 % — HIGH (ref 10.3–14.5)
RBC # FLD: 18.5 % — HIGH (ref 10.3–14.5)
RBC BLD AUTO: ABNORMAL
RH IG SCN BLD-IMP: POSITIVE — SIGNIFICANT CHANGE UP
SODIUM SERPL-SCNC: 147 MMOL/L — HIGH (ref 135–145)
VANCOMYCIN TROUGH SERPL-MCNC: 5 UG/ML — LOW (ref 10–20)
WBC # BLD: 16.4 K/UL — HIGH (ref 3.8–10.5)
WBC # BLD: 19.2 K/UL — HIGH (ref 3.8–10.5)
WBC # BLD: 21.3 K/UL — HIGH (ref 3.8–10.5)
WBC # FLD AUTO: 16.4 K/UL — HIGH (ref 3.8–10.5)
WBC # FLD AUTO: 19.2 K/UL — HIGH (ref 3.8–10.5)
WBC # FLD AUTO: 21.3 K/UL — HIGH (ref 3.8–10.5)

## 2018-07-23 PROCEDURE — 93010 ELECTROCARDIOGRAM REPORT: CPT

## 2018-07-23 PROCEDURE — 99291 CRITICAL CARE FIRST HOUR: CPT

## 2018-07-23 PROCEDURE — 99233 SBSQ HOSP IP/OBS HIGH 50: CPT | Mod: 25

## 2018-07-23 PROCEDURE — 99232 SBSQ HOSP IP/OBS MODERATE 35: CPT

## 2018-07-23 PROCEDURE — 36514 APHERESIS PLASMA: CPT

## 2018-07-23 RX ORDER — PYRIDOSTIGMINE BROMIDE 60 MG/5ML
60 SOLUTION ORAL
Qty: 2520 | Refills: 0 | OUTPATIENT
Start: 2018-07-23 | End: 2018-08-05

## 2018-07-23 RX ORDER — SODIUM CHLORIDE 9 MG/ML
1000 INJECTION, SOLUTION INTRAVENOUS ONCE
Qty: 0 | Refills: 0 | Status: COMPLETED | OUTPATIENT
Start: 2018-07-23 | End: 2018-07-23

## 2018-07-23 RX ORDER — SODIUM CHLORIDE 9 MG/ML
500 INJECTION INTRAMUSCULAR; INTRAVENOUS; SUBCUTANEOUS ONCE
Qty: 0 | Refills: 0 | Status: COMPLETED | OUTPATIENT
Start: 2018-07-23 | End: 2018-07-23

## 2018-07-23 RX ORDER — FENTANYL CITRATE 50 UG/ML
50 INJECTION INTRAVENOUS ONCE
Qty: 0 | Refills: 0 | Status: DISCONTINUED | OUTPATIENT
Start: 2018-07-23 | End: 2018-07-23

## 2018-07-23 RX ORDER — POTASSIUM CHLORIDE 20 MEQ
20 PACKET (EA) ORAL
Qty: 0 | Refills: 0 | Status: COMPLETED | OUTPATIENT
Start: 2018-07-23 | End: 2018-07-23

## 2018-07-23 RX ORDER — VANCOMYCIN HCL 1 G
1500 VIAL (EA) INTRAVENOUS EVERY 8 HOURS
Qty: 0 | Refills: 0 | Status: DISCONTINUED | OUTPATIENT
Start: 2018-07-23 | End: 2018-07-24

## 2018-07-23 RX ORDER — VANCOMYCIN HCL 1 G
1500 VIAL (EA) INTRAVENOUS EVERY 12 HOURS
Qty: 0 | Refills: 0 | Status: DISCONTINUED | OUTPATIENT
Start: 2018-07-23 | End: 2018-07-23

## 2018-07-23 RX ORDER — PYRIDOSTIGMINE BROMIDE 60 MG/5ML
60 SOLUTION ORAL EVERY 8 HOURS
Qty: 0 | Refills: 0 | Status: DISCONTINUED | OUTPATIENT
Start: 2018-07-23 | End: 2018-07-25

## 2018-07-23 RX ADMIN — FENTANYL CITRATE 50 MICROGRAM(S): 50 INJECTION INTRAVENOUS at 01:48

## 2018-07-23 RX ADMIN — PIPERACILLIN AND TAZOBACTAM 25 GRAM(S): 4; .5 INJECTION, POWDER, LYOPHILIZED, FOR SOLUTION INTRAVENOUS at 13:07

## 2018-07-23 RX ADMIN — Medication 50 MILLIEQUIVALENT(S): at 03:28

## 2018-07-23 RX ADMIN — SODIUM CHLORIDE 2000 MILLILITER(S): 9 INJECTION INTRAMUSCULAR; INTRAVENOUS; SUBCUTANEOUS at 01:49

## 2018-07-23 RX ADMIN — PIPERACILLIN AND TAZOBACTAM 25 GRAM(S): 4; .5 INJECTION, POWDER, LYOPHILIZED, FOR SOLUTION INTRAVENOUS at 21:09

## 2018-07-23 RX ADMIN — Medication 300 MILLIGRAM(S): at 21:09

## 2018-07-23 RX ADMIN — Medication 1 TABLET(S): at 10:53

## 2018-07-23 RX ADMIN — Medication 50 MILLIEQUIVALENT(S): at 02:18

## 2018-07-23 RX ADMIN — Medication 3 MILLILITER(S): at 17:22

## 2018-07-23 RX ADMIN — Medication 50 MILLIEQUIVALENT(S): at 05:07

## 2018-07-23 RX ADMIN — Medication 300 MILLIGRAM(S): at 14:54

## 2018-07-23 RX ADMIN — PIPERACILLIN AND TAZOBACTAM 25 GRAM(S): 4; .5 INJECTION, POWDER, LYOPHILIZED, FOR SOLUTION INTRAVENOUS at 05:06

## 2018-07-23 RX ADMIN — PANTOPRAZOLE SODIUM 40 MILLIGRAM(S): 20 TABLET, DELAYED RELEASE ORAL at 11:08

## 2018-07-23 RX ADMIN — SODIUM CHLORIDE 250 MILLILITER(S): 9 INJECTION, SOLUTION INTRAVENOUS at 10:53

## 2018-07-23 RX ADMIN — FENTANYL CITRATE 50 MICROGRAM(S): 50 INJECTION INTRAVENOUS at 02:00

## 2018-07-23 RX ADMIN — Medication 3 MILLILITER(S): at 05:31

## 2018-07-23 RX ADMIN — Medication 60 MILLIGRAM(S): at 05:06

## 2018-07-23 RX ADMIN — ENOXAPARIN SODIUM 40 MILLIGRAM(S): 100 INJECTION SUBCUTANEOUS at 21:09

## 2018-07-23 RX ADMIN — PYRIDOSTIGMINE BROMIDE 60 MILLIGRAM(S): 60 SOLUTION ORAL at 23:36

## 2018-07-23 RX ADMIN — Medication 300 MILLIGRAM(S): at 06:52

## 2018-07-23 RX ADMIN — Medication 3 MILLILITER(S): at 11:14

## 2018-07-23 RX ADMIN — PROPOFOL 3.69 MICROGRAM(S)/KG/MIN: 10 INJECTION, EMULSION INTRAVENOUS at 02:17

## 2018-07-23 NOTE — PHYSICAL THERAPY INITIAL EVALUATION ADULT - ADDITIONAL COMMENTS
As per care coordination notes, pt lives with her fiance in a private house. Pt is independent with all ADLs and ambulation. As per care coordination notes, pt lives with her fiance in a private house with 4 steps to enter, +HR and no steps inside. Pt is independent with all ADLs and ambulation.

## 2018-07-23 NOTE — PROGRESS NOTE ADULT - SUBJECTIVE AND OBJECTIVE BOX
*******************************  Kirby Venessa, PGY-1  Pager [phone]  *******************************    INTERVAL HPI/OVERNIGHT EVENTS:    SUBJECTIVE: Patient seen and examined at bedside. Overnight, patient was tachy 150s. Patient was given Fent 25mcg IVx 1 and 500cc NS bolus with subsequent improvement of HR. Patient this AM awake and alert, following commands.     CONSTITUTIONAL:   ROS could not be assessed    OBJECTIVE:    VITAL SIGNS:  ICU Vital Signs Last 24 Hrs  T(C): 36.7 (23 Jul 2018 04:00), Max: 37.4 (22 Jul 2018 20:00)  T(F): 98 (23 Jul 2018 04:00), Max: 99.4 (22 Jul 2018 20:00)  HR: 86 (23 Jul 2018 07:00) (62 - 146)  BP: 116/69 (23 Jul 2018 07:00) (94/50 - 146/80)  BP(mean): 87 (23 Jul 2018 07:00) (66 - 104)  ABP: --  ABP(mean): --  RR: 14 (23 Jul 2018 07:00) (14 - 34)  SpO2: 99% (23 Jul 2018 07:00) (94% - 100%)    Mode: CPAP with PS, FiO2: 30, PEEP: 5, PS: 5, MAP: 8, PIP: 11    07-22 @ 07:01  -  07-23 @ 07:00  --------------------------------------------------------  IN: 3525.1 mL / OUT: 270 mL / NET: 3255.1 mL      CAPILLARY BLOOD GLUCOSE      POCT Blood Glucose.: 128 mg/dL (23 Jul 2018 05:10)        PHYSICAL EXAM:  GENERAL: intubated and sedated  HEAD:  Atraumatic, Normocephalic  EYES: EOMI, PERRLA, conjunctiva and sclera clear  NECK: Supple, No JVD  CHEST/LUNG: Clear to auscultation bilaterally anteriorly; No wheeze  HEART: Regular rate and rhythm; No murmurs, rubs, or gallops  ABDOMEN: Soft, Nontender, Nondistended; Bowel sounds present  EXTREMITIES:  2+ Peripheral Pulses, No clubbing, cyanosis, or edema  SKIN: No rashes or lesions      MEDICATIONS:  MEDICATIONS  (STANDING):  ALBUTerol/ipratropium for Nebulization 3 milliLiter(s) Nebulizer every 6 hours  enoxaparin Injectable 40 milliGRAM(s) SubCutaneous every 24 hours  insulin lispro (HumaLOG) corrective regimen sliding scale   SubCutaneous every 6 hours  pantoprazole  Injectable 40 milliGRAM(s) IV Push daily  piperacillin/tazobactam IVPB. 3.375 Gram(s) IV Intermittent every 8 hours  predniSONE   Tablet 60 milliGRAM(s) Oral daily  propofol Infusion 10 MICROgram(s)/kG/Min (3.69 mL/Hr) IV Continuous <Continuous>  senna Syrup 10 milliLiter(s) Oral daily  simethicone 80 milliGRAM(s) Chew daily  trimethoprim  160 mG/sulfamethoxazole 800 mG 1 Tablet(s) Oral <User Schedule>  vancomycin  IVPB 1500 milliGRAM(s) IV Intermittent every 12 hours    MEDICATIONS  (PRN):  ALPRAZolam 0.25 milliGRAM(s) Oral every 12 hours PRN Anxiety  polyethylene glycol 3350 17 Gram(s) Oral daily PRN Constipation      ALLERGIES:  Allergies    No Known Drug Allergies  shellfish (Anaphylaxis)    Intolerances        LABS:                        9.7    21.3  )-----------( 233      ( 23 Jul 2018 01:18 )             30.1     07-23    147<H>  |  109<H>  |  18  ----------------------------<  147<H>  3.3<L>   |  25  |  0.61    Ca    8.5      23 Jul 2018 01:18  Phos  2.7     07-23  Mg     2.7     07-23    TPro  5.8<L>  /  Alb  4.1  /  TBili  0.6  /  DBili  x   /  AST  10  /  ALT  <5<L>  /  AlkPhos  30<L>  07-23    PT/INR - ( 23 Jul 2018 01:18 )   PT: 13.6 sec;   INR: 1.24 ratio         PTT - ( 23 Jul 2018 01:18 )  PTT:35.7 sec      RADIOLOGY & ADDITIONAL TESTS: Reviewed.

## 2018-07-23 NOTE — PROGRESS NOTE ADULT - SUBJECTIVE AND OBJECTIVE BOX
HPI: 27 year old female with past medical history of myasthenia gravis (diagnosed 2018) who presents with worsening fatigue, generalized weakness, bilateral ptosis, and blurred vision. She was last hospitalized for MG crisis in 3/2018 at outside hospital requiring intubation and therapeutic plasma exchange (TPE) x5 before her symptoms improved and was discharged home on M[place]inon. She felt well after that hospitalization, but noted continued ptosis, until 1 month ago when she developed progressive weakness in all extremities, worsening ptosis, and blurred vision. She saw her Neurologist and was sent to [facility] ED on 2018 for IVIG therapy, and was later admitted to Neurology floor. Post one dose of IVIG he symptoms progressed and was transferred to the MICU followed by consultation with blood bank for initiation of TPE on . She continued to have progressive SOB and respiratory failure requiring intubation on . She completed the #2 TPE on , procedure was well tolerated.      She remains intubated. Overnight, patient was tachy 150s and was given Fent 25mcg IVx 1 and 500cc NS bolus with subsequent improvement of HR. Patient seen and examined at bedside. Follows commands and nods yes or no to questions. Her vision is improving and states she feels stronger than  before. She wants to be extubated. Continues to have b/l ptosis, stable. Denies fever, SOB, chest pain.       PAST MEDICAL & SURGICAL HISTORY:  Myasthenia gravis  Asthma  No significant past surgical history      MEDICATIONS  (STANDING):  ALBUTerol/ipratropium for Nebulization 3 milliLiter(s) Nebulizer every 6 hours  enoxaparin Injectable 40 milliGRAM(s) SubCutaneous every 24 hours  insulin lispro (HumaLOG) corrective regimen sliding scale   SubCutaneous every 6 hours  pantoprazole  Injectable 40 milliGRAM(s) IV Push daily  piperacillin/tazobactam IVPB. 3.375 Gram(s) IV Intermittent every 8 hours  predniSONE   Tablet 60 milliGRAM(s) Oral daily  propofol Infusion 10 MICROgram(s)/kG/Min (3.69 mL/Hr) IV Continuous <Continuous>  senna Syrup 10 milliLiter(s) Oral daily  simethicone 80 milliGRAM(s) Chew daily  trimethoprim  160 mG/sulfamethoxazole 800 mG 1 Tablet(s) Oral <User Schedule>  vancomycin  IVPB 1500 milliGRAM(s) IV Intermittent every 8 hours    MEDICATIONS  (PRN):  ALPRAZolam 0.25 milliGRAM(s) Oral every 12 hours PRN Anxiety  polyethylene glycol 3350 17 Gram(s) Oral daily PRN Constipation      FAMILY HISTORY:  No pertinent family history in first degree relatives      Allergies    No Known Drug Allergies  shellfish (Anaphylaxis)      REVIEW OF SYSTEMS:    CONSTITUTIONAL: Generalized weakness, No fevers or chills  EYES/ENT: B/l ptosis and improvement in double vision; No swallowing difficulty, no speech difficulty   RESPIRATORY: Intubated, denies SOB. No cough, wheezing, hemoptysis   CARDIOVASCULAR: No chest pain or palpitations  GASTROINTESTINAL: No nausea, vomiting, no abdominal pain.   MUSCULOSKELETAL: Weakness, full range of motion preserved, able to self-suction.   NEUROLOGICAL: see HPI, No headache no numbness  HEMATOLOGY: No anemia, no bleeding, no bruising  SKIN: No itching, burning, rashes, petechia, or lesions      Vital Signs Last 24 Hrs  T(C): 36.7 (2018 04:00), Max: 37.4 (2018 20:00)  T(F): 98 (2018 04:00), Max: 99.4 (2018 20:00)  HR: 83 (2018 09:42) (62 - 146)  BP: 108/67 (2018 09:42) (94/50 - 146/80)  BP(mean): 69 (2018 08:00) (66 - 104)  RR: 24 (2018 09:42) (14 - 34)  SpO2: 97% (2018 09:42) (94% - 100%)    Daily Weight in k.3 (2018 04:00)    PHYSICAL EXAM:  General: Well developed, well nourished, no apparent distress, intubated follows commands.  HEENT:  Marked bilateral ptosis, increased secretions requiring suction. Right IJ double lumen catheter, dressing dry and intact without tenderness.   Respiratory:  B/L CTA anteriorly  Cardiovascular:  S1, S2 + regular rhythm  Abdomen: Soft, nontender, nondistended, + bowel sounds  Extremities: Warm, nontender, no pedal edema  Skin:  No visible rash  Neurological: AAOx3   b/l upper extremities 4/5, and 5/5 strength bilateral lower extremities  Psychiatry: Appropriate affect                          9.7    21.3  )-----------( 233      ( 2018 01:18 )             30.1       Hematocrit: 30.1 % ( @ 01:18)  Hematocrit: 31.5 % ( @ 01:19)  Hematocrit: 31.7 % ( @ 02:19)          147<H>  |  109<H>  |  18  ----------------------------<  147<H>  3.3<L>   |  25  |  0.61    Ca    8.5      2018 01:18  Phos  2.7       Mg     2.7         TPro  5.8<L>  /  Alb  4.1  /  TBili  0.6  /  DBili  x   /  AST  10  /  ALT  <5<L>  /  AlkPhos  30<L>        PT/INR - ( 2018 01:18 )   PT: 13.6 sec;   INR: 1.24 ratio    PTT - ( 2018 01:18 )  PTT:35.7 sec  Fibrinogen Assay: 364 mg/dL ( @ 06:50)  Fibrinogen Assay: 299 mg/dL ( @ 18:34)

## 2018-07-23 NOTE — PHYSICAL THERAPY INITIAL EVALUATION ADULT - DIAGNOSIS, PT EVAL
PT ACUTE  Treatment Session          Pt seen on [facility] nursing unit.                                                Frequency Comments: Sun (missed sessions); M-F    RECOMMENDATIONS FOR DISCHARGE:  Recommendation for Discharge: PT: Sub-acute nursing home (pt refusing SU[facility]HA; Insurance denied IRP) (12/02/17 1251)                                                                                                                 Admitting complaint: INFECTED LEG WOUND  N/A                                     Precautions  Weight Bearing Status: Non-weight bearing left lower extremity (12/02/17 1251)  Weight Bearing Status Comments: NWB LLE TMA and I&D 11/25 (12/02/17 1251)  Other Precautions: Fall precuations, hx of MS (12/02/17 1251)  Precautions Comments: S/p L TMA and I&D 11/25 (12/02/17 1251)    SUBJECTIVE: Patient's Personal Goal: return home (12/02/17 1251)  Subjective: pt agreeable to therapy (12/02/17 1251)  Subjective/Objective Comments: chart reviewed, session cleared with RNNatasha, prior to start and updated following session (12/02/17 1251)    OBJECTIVE:  Basic Lines: Capped IV (12/02/17 1251)  Safety Measures: Other (comment) (call light in reach) (12/02/17 1251)    RN reported Cruz Fall Scale Score: 100    ASSESSMENT:   Patient continues to present below baseline for functional mobility. Patient currently requires supervision assist for bed mobility, Mod assist for transfers and Min assist for short distance ambulation. Focus of today's session included trial stairs, education on NWB status and progression of ambulation. Patient would benefit from skilled therapy services during hospital stay to further address poor adherence to NWB status. Patient would benefit from SUJATHA prior to discharge home to maximize independence and safety but given pt refusing placement, pt is likely to transfer home with home therapy.          Other Therapeutic Intervention: increased time spent educating pt on importance of NWB status  for healing of wound (12/02/17 1251)     EDUCATION:   On this date, the patient was educated on progression of activity, safety with mobility, importance of NWB status and maintaining for improved healing, and benefits of SUJATHA vs home with home therapy.    The response to education was: Needs reinforcement    PT Identified Barriers to Discharge: basement bedroom, hx of MS, poor adherence to NWB LLE     PLAN:   Continue skilled PT, including the following Treatment/Interventions: Functional transfer training;Strengthening;Endurance training;Patient/Family training;Equipment eval/education;Bed mobility;Gait training;Compensatory technique education;Stairs retraining;Safety Education;Neuromuscular re-education (12/02/17 1251)   Frequency Comments: Sun (missed sessions); M-F (12/02/17 1251)    Treatment Plan for Next Session: transfers with WW with LLE NWB (have pt start with LUE on WW and RUE on bed/chair/etc), ambulation and pivot transfers with WW with LLE NWB, standing balance with LLE NWB, LE strengthening          RECOMMENDATIONS FOR DISCHARGE:  Recommendation for Discharge: PT: Sub-acute nursing home (pt refusing SU[facility]HA; Insurance denied IRP) (12/02/17 1251)    PT/OT Mobility Equipment for Discharge: pt owns canes, 2WW, 4WW, electric scooter; continue to assess (12/02/17 1251)  PT/OT ADL Equipment for Discharge: continue to assess (12/02/17 1251)    ICU Mobility Assesment (PERME):       Last 24 hours of Functional Data  Bed Mobility   Bed Mobility  Bed Mobility Comments: not observed, pt sitting EOB pre/post session (12/02/17 1251)    Transfers  Transfers  Sit to Stand: Moderate Assist (Mod) (12/02/17 1251)  Stand to Sit: Minimal Assist (Min) (12/02/17 1251)  Assistive Device/: 2-wheeled walker;1 Person;Gait Belt (12/02/17 1251)  Transfer Comments 1: Min-Mod A for safety and support with sit>stand, increased assist for lower surface and maintaining NWB LLE, unsteadiness demonstrated with posterior LOB  with partial stand, requiring return to sitting, increased time needed, verbal cues for hand placement and technique, overall poor adherence to NWB status of LLE (12/02/17 1251)      Gait  Gait  Gait Assistance: Minimal Assist (Min) (12/02/17 1251)  Assistive Device/: 2-wheeled walker;1 Person;Gait Belt (12/02/17 1251)  Ambulation Distance (Feet): 5 Feet (x3) (12/02/17 1251)  Pattern:  (step to on R with NWB LLE) (12/02/17 1251)  Ambulation Surface: Tile (12/02/17 1251)  Gait Comments 1: for safety and support due to unsteadiness, constant verbal cues for NWB LLE and sequencing/ positioning, pt able to maintain NWB status for short distance ambulation/ Pivot transfers, decreased UE strength and balance limiting tolerance of ambulation (12/02/17 1251),      Stairs  Stairs Mobility  Number of Stairs: 2 (12/02/17 1251)  Stair Management Assistance: Minimal Assist (Min) (12/02/17 1251)  Stair Management Technique:  (Scooting up and down stairs on buttock) (12/02/17 1251)  Stairs Mobility Comments: for safety and support, pt negotiated up and down 2 steps (x2 trials) using rails and pushing through RLE, pt with poor adherence to NWB LLE during stairs trial with max verbal cues and manual cues (12/02/17 1251)       Balance  Balance  Sitting - Static: Modified Independent (12/02/17 1251)  Sitting - Dynamic: Modified Independent (12/02/17 1251)  Standing - Static: Minimal Assist (Min) (12/02/17 1251)  Standing - Dynamic (eyes open): Minimal Assist (Min) (12/02/17 1251)  Balance Comments #1: for safety and support with 2WW in standing (12/02/17 1251)  Balance Comments #2: unsteadiness and LOB demonstrated during sit<>stand trials (12/02/17 1251)      Patient's Personal Goal: return home (12/02/17 1251)    Therapy Goals:    Goals  Short Term Goals to Be Reviewed On: 12/04/17 (12/02/17 1251)  Short Term Goals = Discharge Goals: No (11/27/17 8939)  Goal Agreement: Patient agrees with goals and treatment plan  (11/27/17 1350)  Bed Mobility Discharge Goal: modified independent with HOB flat and no rails (11/27/17 1350)  Bed Mobility Discharge Goal Progress: Outcome not met, continue to monitor (11/27/17 1350)  Transfer Short Term Goal: transfers with WW with LLE NWB with supervision (11/27/17 1350)  Transfer Discharge Goal: modified independent with transfers with WW with LLE NWB from various surfaces (11/27/17 1350)  Transfer Discharge Goal Progress: Outcome not met, plan adjusted (11/27/17 1350)  Ambulation Short Term Goal: ambulate 30 ft with supervision with WW with LLE NWB (11/27/17 1350)  Ambulation Discharge Goal: ambulate 50 ft with WW with modified independence with LLE NWB (11/27/17 1350)  Ambulation Discharge Goal Progress: Outcome not met, plan adjusted (11/27/17 1350)  Stairs Short Term Goal: participate in assessment of stair navigation when appropriate (11/27/17 1350)  Stairs Discharge Goal: modified independent for two steps to enter home (11/27/17 1350)  Stairs Discharge Goal Progress: Outcome not met, continue to monitor (11/27/17 1350)  Other Short Term Goal 1: participate in assessment of stair navigation when appropriate (11/27/17 1350)  Other Discharge Goal 1: navigate one flight of stairs on buttocks with modified independence (11/27/17 1350)  Other Discharge Goal 1 Progress: Outcome not met, plan adjusted (11/27/17 1350)        PT Time Spent: 70 minutes (12/02/17 1251)    See PT flowsheet for full details regarding the PT therapy provided.         Increase strength. Increase balance.

## 2018-07-23 NOTE — PROVIDER CONTACT NOTE (OTHER) - ASSESSMENT
Started oozing late morning, surgicell applied. Plasmapheresis took place. Post plasmapheresis continuous oozing took place. Approached MICU team several times.
No drift LUE and BLLE  RUE proximal weakness, able to flex elbow and has moderate hand grasp strength

## 2018-07-23 NOTE — AIRWAY REMOVAL NOTE  ADULT & PEDS - ARTIFICAL AIRWAY REMOVAL COMMENTS
Written order for extubation verified. The patient was identified by full name and birth date compared to the identification band. Present during the procedure was Rosanne Guerra

## 2018-07-23 NOTE — PROGRESS NOTE ADULT - ASSESSMENT
27F with PMHX of myasthenia gravis presents with worsening  worsening ptosis and weakness x 2 months, admitted for MG exacerbation, unresponsive to IVIG, transferred to MICU for worsening SOB and initiation of plasmapheresis.     #Neuro:  Myasthenia gravis  - s/p 1x IVIG, and plasmapheresis x2.   - planned for total of 5 sessions of plasmapheresis  - neuro recs appreciated  - AAOx3    # Resp;  Shortness of breath  - s/p intubation for respiratory failure in the setting of MG  - patient more alert and orientated, CPAPing appropriately this AM  - will plan to extubate following 3rd session of plasmapheresis today    # CV:  - tachycardia likely in setting of pain and dehydration  - improved following 500cc NS bolus and fentanyl  - continue to monitor     #ID:  - no issues    # Renal:  - Cr WNL- no issues at this time    #GI:  - regular diet    #DVT ppx:  - lovenox subq 27F with PMHX of myasthenia gravis presents with worsening  worsening ptosis and weakness x 2 months, admitted for MG exacerbation, unresponsive to IVIG, transferred to MICU for worsening SOB and initiation of plasmapheresis.     #Neuro:  Myasthenia gravis  - s/p 1x IVIG, and plasmapheresis x2.   - planned for total of 5 sessions of plasmapheresis  - neuro recs appreciated  - AAOx3    # Resp;  Shortness of breath  - s/p intubation for respiratory failure in the setting of MG  - patient more alert and orientated, CPAPing appropriately this AM  - will plan to extubate following 3rd session of plasmapheresis today    # CV:  - tachycardia likely in setting of pain and dehydration  - improved following 500cc NS bolus and fentanyl  - continue to monitor     #ID:  Possible PNA  - patient with possible PNA seen on CXR  - Vanc trough of 5 this AM. will increase Vanc to 1500 q8  - continue with Zosyn and bactim    # Renal:  - Cr WNL- no issues at this time    #GI:  - tube feeds on hold for planned extubation    #DVT ppx:  - lovenox subq

## 2018-07-23 NOTE — PHYSICAL THERAPY INITIAL EVALUATION ADULT - PERTINENT HX OF CURRENT PROBLEM, REHAB EVAL
Pt is a 27 y.o. female with PMHX of myasthenia gravis presents with worsening ptosis and weakness x 2 months, admitted for MG exacerbation, unresponsive to IVIG, transferred to MICU for worsening SOB and initiation of plasmapheresis. +CXR 7/22/18: Left retrocardiac haziness, due to atelectasis and/or pneumonia.

## 2018-07-23 NOTE — PROGRESS NOTE ADULT - SUBJECTIVE AND OBJECTIVE BOX
s: The patient is in MICU, appears to be stable, intubated, follows commands    O:  ICU Vital Signs Last 24 Hrs  T(C): 36.7 (23 Jul 2018 04:00), Max: 37.4 (22 Jul 2018 20:00)  T(F): 98 (23 Jul 2018 04:00), Max: 99.4 (22 Jul 2018 20:00)  HR: 67 (23 Jul 2018 08:13) (62 - 146)  BP: 96/51 (23 Jul 2018 08:00) (94/50 - 146/80)  BP(mean): 69 (23 Jul 2018 08:00) (66 - 104)  ABP: --  ABP(mean): --  RR: 15 (23 Jul 2018 08:00) (14 - 34)  SpO2: 99% (23 Jul 2018 08:13) (94% - 100%)    MEDICATIONS  (STANDING):  ALBUTerol/ipratropium for Nebulization 3 milliLiter(s) Nebulizer every 6 hours  enoxaparin Injectable 40 milliGRAM(s) SubCutaneous every 24 hours  insulin lispro (HumaLOG) corrective regimen sliding scale   SubCutaneous every 6 hours  pantoprazole  Injectable 40 milliGRAM(s) IV Push daily  piperacillin/tazobactam IVPB. 3.375 Gram(s) IV Intermittent every 8 hours  predniSONE   Tablet 60 milliGRAM(s) Oral daily  propofol Infusion 10 MICROgram(s)/kG/Min (3.69 mL/Hr) IV Continuous <Continuous>  senna Syrup 10 milliLiter(s) Oral daily  simethicone 80 milliGRAM(s) Chew daily  trimethoprim  160 mG/sulfamethoxazole 800 mG 1 Tablet(s) Oral <User Schedule>  vancomycin  IVPB 1500 milliGRAM(s) IV Intermittent every 12 hours    MEDICATIONS  (PRN):  ALPRAZolam 0.25 milliGRAM(s) Oral every 12 hours PRN Anxiety  polyethylene glycol 3350 17 Gram(s) Oral daily PRN Constipation    Exam:  Alert, awake  exam stable cont' to have ophtholmoplesia, ptosis right worse than left  on ventilator  UE BL 4/5  LE BL 4+/5

## 2018-07-23 NOTE — PROVIDER CONTACT NOTE (OTHER) - ACTION/TREATMENT ORDERED:
blood sent, type and screen sent, plasma ordered, MICU team to follow up
BERRY Mayen at bedside. Likely related to myasthenia. Pending plasmapheresis treatment.

## 2018-07-23 NOTE — PHYSICAL THERAPY INITIAL EVALUATION ADULT - MANUAL MUSCLE TESTING RESULTS, REHAB EVAL
Strength is grossly at least 4/5 throughout on winsome LEs/ and 3+ on winsome UEs./grossly assessed due to

## 2018-07-23 NOTE — PROGRESS NOTE ADULT - ASSESSMENT
28yo F with PMH MG (diagnosed 2/2018) presenting with worsening ptosis and weakness x 2 months. P/W ophthalmoplegia and BL ptosis and muscle weakness, she was admitted and IVIG initiated, regardless, 24 hours after admission, NIF and VC were worsening, intubated receiving PLEX and steroids  impression: MG exacerbation  Plan:  [] S/P Solumedrol 500 x 3 d  [] C/W PLEX  [] Restart Mestinon home dose  [] CXR possible PNA on Vancomycin, zosyn, & bactrim

## 2018-07-23 NOTE — PROGRESS NOTE ADULT - ASSESSMENT
27 year old female with MG crisis, currently intubated due to respiratory distress, s/p 2 TPE with 5% albumin as replacement fluid. Patient has well tolerated previous two TPE procedures. She is pleased with the procedure and improvement in symptoms of SOB and weakness. Her serum K+ was 3.3 and she received potassium early this morning, pending repeat K+ labs discussed the same with MICU. Will proceed with TPE #3/5 today, one plasma volume, with 5% albumin as replacement solution. Plan discussed with the patient and MICU team and currently scheduled for next TPE on Wednesday.

## 2018-07-23 NOTE — PHYSICAL THERAPY INITIAL EVALUATION ADULT - PLANNED THERAPY INTERVENTIONS, PT EVAL
transfer training/gait training/strengthening/GOAL: Stair Negotiation Training: Patient will be able to negotiate up & down 1 flight of stairs with bilateral rails, step to gait pattern, in 2 weeks./bed mobility training/balance training

## 2018-07-24 LAB
ALBUMIN SERPL ELPH-MCNC: 4.3 G/DL — SIGNIFICANT CHANGE UP (ref 3.3–5)
ALP SERPL-CCNC: 16 U/L — LOW (ref 40–120)
ALT FLD-CCNC: 13 U/L — SIGNIFICANT CHANGE UP (ref 10–45)
ANION GAP SERPL CALC-SCNC: 10 MMOL/L — SIGNIFICANT CHANGE UP (ref 5–17)
APTT BLD: 34.6 SEC — SIGNIFICANT CHANGE UP (ref 27.5–37.4)
AST SERPL-CCNC: 16 U/L — SIGNIFICANT CHANGE UP (ref 10–40)
BILIRUB SERPL-MCNC: 1 MG/DL — SIGNIFICANT CHANGE UP (ref 0.2–1.2)
BUN SERPL-MCNC: 9 MG/DL — SIGNIFICANT CHANGE UP (ref 7–23)
CALCIUM SERPL-MCNC: 8 MG/DL — LOW (ref 8.4–10.5)
CHLORIDE SERPL-SCNC: 106 MMOL/L — SIGNIFICANT CHANGE UP (ref 96–108)
CO2 SERPL-SCNC: 25 MMOL/L — SIGNIFICANT CHANGE UP (ref 22–31)
CREAT SERPL-MCNC: 0.55 MG/DL — SIGNIFICANT CHANGE UP (ref 0.5–1.3)
CULTURE RESULTS: NO GROWTH — SIGNIFICANT CHANGE UP
FIBRINOGEN PPP-MCNC: 214 MG/DL — LOW (ref 242–442)
FIBRINOGEN PPP-MCNC: 333 MG/DL — SIGNIFICANT CHANGE UP (ref 310–510)
FIBRINOGEN PPP-MCNC: 94 MG/DL — CRITICAL LOW (ref 310–510)
FIBRINOGEN PPP-MCNC: 98 MG/DL — CRITICAL LOW (ref 242–442)
GLUCOSE BLDC GLUCOMTR-MCNC: 113 MG/DL — HIGH (ref 70–99)
GLUCOSE BLDC GLUCOMTR-MCNC: 120 MG/DL — HIGH (ref 70–99)
GLUCOSE BLDC GLUCOMTR-MCNC: 97 MG/DL — SIGNIFICANT CHANGE UP (ref 70–99)
GLUCOSE SERPL-MCNC: 101 MG/DL — HIGH (ref 70–99)
HCT VFR BLD CALC: 22.5 % — LOW (ref 34.5–45)
HCT VFR BLD CALC: 30.1 % — LOW (ref 34.5–45)
HGB BLD-MCNC: 7.5 G/DL — LOW (ref 11.5–15.5)
HGB BLD-MCNC: 9.8 G/DL — LOW (ref 11.5–15.5)
INR BLD: 1.29 RATIO — HIGH (ref 0.88–1.16)
INR BLD: 1.51 RATIO — HIGH (ref 0.88–1.16)
MAGNESIUM SERPL-MCNC: 2.1 MG/DL — SIGNIFICANT CHANGE UP (ref 1.6–2.6)
MCHC RBC-ENTMCNC: 24.1 PG — LOW (ref 27–34)
MCHC RBC-ENTMCNC: 24.3 PG — LOW (ref 27–34)
MCHC RBC-ENTMCNC: 32.6 GM/DL — SIGNIFICANT CHANGE UP (ref 32–36)
MCHC RBC-ENTMCNC: 33.5 GM/DL — SIGNIFICANT CHANGE UP (ref 32–36)
MCV RBC AUTO: 71.9 FL — LOW (ref 80–100)
MCV RBC AUTO: 74.5 FL — LOW (ref 80–100)
PHOSPHATE SERPL-MCNC: 3 MG/DL — SIGNIFICANT CHANGE UP (ref 2.5–4.5)
PLATELET # BLD AUTO: 140 K/UL — LOW (ref 150–400)
PLATELET # BLD AUTO: 175 K/UL — SIGNIFICANT CHANGE UP (ref 150–400)
POTASSIUM SERPL-MCNC: 3.3 MMOL/L — LOW (ref 3.5–5.3)
POTASSIUM SERPL-SCNC: 3.3 MMOL/L — LOW (ref 3.5–5.3)
PROT SERPL-MCNC: 5.2 G/DL — LOW (ref 6–8.3)
PROTHROM AB SERPL-ACNC: 14 SEC — HIGH (ref 9.8–12.7)
PROTHROM AB SERPL-ACNC: 16.5 SEC — HIGH (ref 9.8–12.7)
RBC # BLD: 3.13 M/UL — LOW (ref 3.8–5.2)
RBC # BLD: 4.04 M/UL — SIGNIFICANT CHANGE UP (ref 3.8–5.2)
RBC # FLD: 18.1 % — HIGH (ref 10.3–14.5)
RBC # FLD: 18.6 % — HIGH (ref 10.3–14.5)
SODIUM SERPL-SCNC: 141 MMOL/L — SIGNIFICANT CHANGE UP (ref 135–145)
SPECIMEN SOURCE: SIGNIFICANT CHANGE UP
VANCOMYCIN TROUGH SERPL-MCNC: 13.6 UG/ML — SIGNIFICANT CHANGE UP (ref 10–20)
WBC # BLD: 12.4 K/UL — HIGH (ref 3.8–10.5)
WBC # BLD: 12.6 K/UL — HIGH (ref 3.8–10.5)
WBC # FLD AUTO: 12.4 K/UL — HIGH (ref 3.8–10.5)
WBC # FLD AUTO: 12.6 K/UL — HIGH (ref 3.8–10.5)

## 2018-07-24 PROCEDURE — 99232 SBSQ HOSP IP/OBS MODERATE 35: CPT

## 2018-07-24 PROCEDURE — 99291 CRITICAL CARE FIRST HOUR: CPT

## 2018-07-24 RX ORDER — POTASSIUM CHLORIDE 20 MEQ
20 PACKET (EA) ORAL
Qty: 0 | Refills: 0 | Status: COMPLETED | OUTPATIENT
Start: 2018-07-24 | End: 2018-07-24

## 2018-07-24 RX ADMIN — Medication 50 MILLIEQUIVALENT(S): at 01:20

## 2018-07-24 RX ADMIN — Medication 300 MILLIGRAM(S): at 06:01

## 2018-07-24 RX ADMIN — PYRIDOSTIGMINE BROMIDE 60 MILLIGRAM(S): 60 SOLUTION ORAL at 15:30

## 2018-07-24 RX ADMIN — Medication 50 MILLIEQUIVALENT(S): at 02:22

## 2018-07-24 RX ADMIN — Medication 3 MILLILITER(S): at 06:17

## 2018-07-24 RX ADMIN — Medication 3 MILLILITER(S): at 12:45

## 2018-07-24 RX ADMIN — PIPERACILLIN AND TAZOBACTAM 25 GRAM(S): 4; .5 INJECTION, POWDER, LYOPHILIZED, FOR SOLUTION INTRAVENOUS at 22:57

## 2018-07-24 RX ADMIN — Medication 50 MILLIGRAM(S): at 05:12

## 2018-07-24 RX ADMIN — PYRIDOSTIGMINE BROMIDE 60 MILLIGRAM(S): 60 SOLUTION ORAL at 05:11

## 2018-07-24 RX ADMIN — PIPERACILLIN AND TAZOBACTAM 25 GRAM(S): 4; .5 INJECTION, POWDER, LYOPHILIZED, FOR SOLUTION INTRAVENOUS at 05:11

## 2018-07-24 RX ADMIN — PIPERACILLIN AND TAZOBACTAM 25 GRAM(S): 4; .5 INJECTION, POWDER, LYOPHILIZED, FOR SOLUTION INTRAVENOUS at 13:38

## 2018-07-24 RX ADMIN — Medication 50 MILLIEQUIVALENT(S): at 03:44

## 2018-07-24 RX ADMIN — ENOXAPARIN SODIUM 40 MILLIGRAM(S): 100 INJECTION SUBCUTANEOUS at 22:56

## 2018-07-24 RX ADMIN — PYRIDOSTIGMINE BROMIDE 60 MILLIGRAM(S): 60 SOLUTION ORAL at 22:56

## 2018-07-24 NOTE — PROGRESS NOTE ADULT - ASSESSMENT
26yo F with PMH MG (diagnosed 2/2018) presenting with worsening ptosis and weakness x 2 months. P/W ophthalmoplegia and BL ptosis and muscle weakness, she was admitted and IVIG initiated, regardless, 24 hours after admission, NIF and VC were worsening, intubated receiving PLEX and steroids now extubated and tolerating well, exam shows improvement.  impression: MG exacerbation  Plan:  [x] S/P Solumedrol 500 x 3 d  [] C/W PLEX  [] Restart Mestinon home dose 61-79-  [] CT Chest to r/o thymoma  [] CXR possible PNA on c/w zosyn, & bactrim

## 2018-07-24 NOTE — PROGRESS NOTE ADULT - ASSESSMENT
27F with PMHX of myasthenia gravis presents with worsening  worsening ptosis and weakness x 2 months, admitted for MG exacerbation, unresponsive to IVIG, transferred to MICU for worsening SOB and initiation of plasmapheresis.     #Neuro:  Myasthenia gravis  - s/p 1x IVIG, and plasmapheresis x3  - planned for total of 5 sessions of plasmapheresis  - neuro recs appreciated. continue with Solumedrol 50mg qd  - patients secretions much improved. c/w home pyridostigmine 60mg q8  - AAOx3    # Resp;  Shortness of breath  - s/p intubation for respiratory failure in the setting of MG, now s/p extubation  - resolved     # CV:  - tachycardia resolved  - otherwise stable    #ID:  Possible PNA  - patient with possible PNA seen on CXR  - Vanc trough of 13.6  this AM. c/w Vanc to 1500 q8  - continue with Zosyn and bactim    # Renal:  - Cr WNL- no issues at this time    #GI:  - patient passed bedside s/s eval     #DVT ppx:  - lovenox subq

## 2018-07-24 NOTE — PROGRESS NOTE ADULT - SUBJECTIVE AND OBJECTIVE BOX
s: The patient is in MICU, appears to be stable, intubated, follows commands    O:  ICU Vital Signs Last 24 Hrs  T(C): 37.1 (24 Jul 2018 07:00), Max: 37.7 (23 Jul 2018 20:00)  T(F): 98.7 (24 Jul 2018 07:00), Max: 99.8 (23 Jul 2018 20:00)  HR: 63 (24 Jul 2018 12:46) (63 - 127)  BP: 114/66 (24 Jul 2018 08:00) (91/51 - 121/70)  BP(mean): 85 (24 Jul 2018 08:00) (65 - 91)  ABP: --  ABP(mean): --  RR: 27 (24 Jul 2018 08:00) (15 - 29)  SpO2: 100% (24 Jul 2018 12:46) (99% - 100%)    MEDICATIONS  (STANDING):  ALBUTerol/ipratropium for Nebulization 3 milliLiter(s) Nebulizer every 6 hours  enoxaparin Injectable 40 milliGRAM(s) SubCutaneous every 24 hours  insulin lispro (HumaLOG) corrective regimen sliding scale   SubCutaneous every 6 hours  methylPREDNISolone sodium succinate Injectable 50 milliGRAM(s) IV Push daily  pantoprazole  Injectable 40 milliGRAM(s) IV Push daily  piperacillin/tazobactam IVPB. 3.375 Gram(s) IV Intermittent every 8 hours  pyridostigmine 60 milliGRAM(s) Oral every 8 hours  trimethoprim  160 mG/sulfamethoxazole 800 mG 1 Tablet(s) Oral <User Schedule>    MEDICATIONS  (PRN):  ALPRAZolam 0.25 milliGRAM(s) Oral every 12 hours PRN Anxiety  polyethylene glycol 3350 17 Gram(s) Oral daily PRN Constipation    Exam:  Alert, awake  exam shows improving cont' to have ophtholmoplesia, ptosis right worse than left but improving  on ventilator  UE BL 5/5  LE BL 5/5

## 2018-07-24 NOTE — PROGRESS NOTE ADULT - SUBJECTIVE AND OBJECTIVE BOX
*******************************  Kirby VargasrosannasAngela, PGY-1  Pager [phone]  *******************************    INTERVAL HPI/OVERNIGHT EVENTS:    SUBJECTIVE: Patient seen and examined at bedside. Overnight, patient with significant bleeding around her central lines. Received 1u each of FFP, cryoprecipitate, and PRBCs. Patient reports that the oozing is improving. Patient reports feeling overall stronger compared to yesterday. Now has minimal secretions.     CONSTITUTIONAL: Mod weakness, no fevers or chills  EYES/ENT: No visual changes;  No vertigo or throat pain   NECK: No pain or stiffness  RESPIRATORY: No cough, wheezing, hemoptysis; No shortness of breath  CARDIOVASCULAR: No chest pain or palpitations  GASTROINTESTINAL: No abdominal or epigastric pain. No nausea, vomiting, or hematemesis; No diarrhea or constipation. No melena or hematochezia.  GENITOURINARY: No dysuria, frequency or hematuria  NEUROLOGICAL: No numbness, mod weakness  SKIN: No itching, rashes    OBJECTIVE:    VITAL SIGNS:  ICU Vital Signs Last 24 Hrs  T(C): 37.1 (24 Jul 2018 07:00), Max: 37.7 (23 Jul 2018 20:00)  T(F): 98.7 (24 Jul 2018 07:00), Max: 99.8 (23 Jul 2018 20:00)  HR: 69 (24 Jul 2018 07:00) (63 - 100)  BP: 101/56 (24 Jul 2018 07:00) (91/51 - 123/71)  BP(mean): 73 (24 Jul 2018 07:00) (65 - 93)  ABP: --  ABP(mean): --  RR: 17 (24 Jul 2018 07:00) (15 - 29)  SpO2: 100% (24 Jul 2018 07:00) (97% - 100%)    Mode: CPAP with PS, FiO2: 30, PEEP: 5, PS: 5, MAP: 8    07-23 @ 07:01  -  07-24 @ 07:00  --------------------------------------------------------  IN: 2137 mL / OUT: 700 mL / NET: 1437 mL      CAPILLARY BLOOD GLUCOSE      POCT Blood Glucose.: 113 mg/dL (24 Jul 2018 05:16)      PHYSICAL EXAM:  GENERAL: NAD, improving strength  HEAD:  Atraumatic, Normocephalic  EYES: EOMI, PERRLA, conjunctiva and sclera clear  NECK: Supple, No JVD  CHEST/LUNG: Clear to auscultation bilaterally anteriorly; No wheeze  HEART: Regular rate and rhythm; No murmurs, rubs, or gallops  ABDOMEN: Soft, Nontender, Nondistended; Bowel sounds present  EXTREMITIES:  2+ Peripheral Pulses, No clubbing, cyanosis, or edema  NEURO: CNII-XII intact. 5/5 strength in all 4 extremities. mild-mod b/l ptosis, improving.   SKIN: No rashes or lesions    MEDICATIONS:  MEDICATIONS  (STANDING):  ALBUTerol/ipratropium for Nebulization 3 milliLiter(s) Nebulizer every 6 hours  enoxaparin Injectable 40 milliGRAM(s) SubCutaneous every 24 hours  insulin lispro (HumaLOG) corrective regimen sliding scale   SubCutaneous every 6 hours  methylPREDNISolone sodium succinate Injectable 50 milliGRAM(s) IV Push daily  pantoprazole  Injectable 40 milliGRAM(s) IV Push daily  piperacillin/tazobactam IVPB. 3.375 Gram(s) IV Intermittent every 8 hours  pyridostigmine 60 milliGRAM(s) Oral every 8 hours  senna Syrup 10 milliLiter(s) Oral daily  simethicone 80 milliGRAM(s) Chew daily  trimethoprim  160 mG/sulfamethoxazole 800 mG 1 Tablet(s) Oral <User Schedule>  vancomycin  IVPB 1500 milliGRAM(s) IV Intermittent every 8 hours    MEDICATIONS  (PRN):  ALPRAZolam 0.25 milliGRAM(s) Oral every 12 hours PRN Anxiety  polyethylene glycol 3350 17 Gram(s) Oral daily PRN Constipation      ALLERGIES:  Allergies    No Known Drug Allergies  shellfish (Anaphylaxis)    Intolerances        LABS:                        7.5    12.6  )-----------( 140      ( 24 Jul 2018 03:57 )             22.5     07-23    141  |  106  |  9   ----------------------------<  101<H>  3.3<L>   |  25  |  0.55    Ca    8.0<L>      23 Jul 2018 23:37  Phos  3.0     07-23  Mg     2.1     07-23    TPro  5.2<L>  /  Alb  4.3  /  TBili  1.0  /  DBili  x   /  AST  16  /  ALT  13  /  AlkPhos  16<L>  07-23    PT/INR - ( 24 Jul 2018 03:57 )   PT: 14.0 sec;   INR: 1.29 ratio         PTT - ( 24 Jul 2018 03:57 )  PTT:34.6 sec      RADIOLOGY & ADDITIONAL TESTS: Reviewed.

## 2018-07-25 LAB
ALBUMIN SERPL ELPH-MCNC: 4.3 G/DL — SIGNIFICANT CHANGE UP (ref 3.3–5)
ALP SERPL-CCNC: 34 U/L — LOW (ref 40–120)
ALT FLD-CCNC: 21 U/L — SIGNIFICANT CHANGE UP (ref 10–45)
ANION GAP SERPL CALC-SCNC: 11 MMOL/L — SIGNIFICANT CHANGE UP (ref 5–17)
APTT BLD: 30.5 SEC — SIGNIFICANT CHANGE UP (ref 27.5–37.4)
AST SERPL-CCNC: 17 U/L — SIGNIFICANT CHANGE UP (ref 10–40)
BILIRUB SERPL-MCNC: 0.6 MG/DL — SIGNIFICANT CHANGE UP (ref 0.2–1.2)
BUN SERPL-MCNC: 14 MG/DL — SIGNIFICANT CHANGE UP (ref 7–23)
CALCIUM SERPL-MCNC: 8.9 MG/DL — SIGNIFICANT CHANGE UP (ref 8.4–10.5)
CHLORIDE SERPL-SCNC: 102 MMOL/L — SIGNIFICANT CHANGE UP (ref 96–108)
CO2 SERPL-SCNC: 26 MMOL/L — SIGNIFICANT CHANGE UP (ref 22–31)
CREAT SERPL-MCNC: 0.64 MG/DL — SIGNIFICANT CHANGE UP (ref 0.5–1.3)
FIBRINOGEN PPP-MCNC: 452 MG/DL — SIGNIFICANT CHANGE UP (ref 310–510)
GLUCOSE BLDC GLUCOMTR-MCNC: 103 MG/DL — HIGH (ref 70–99)
GLUCOSE BLDC GLUCOMTR-MCNC: 106 MG/DL — HIGH (ref 70–99)
GLUCOSE BLDC GLUCOMTR-MCNC: 112 MG/DL — HIGH (ref 70–99)
GLUCOSE BLDC GLUCOMTR-MCNC: 166 MG/DL — HIGH (ref 70–99)
GLUCOSE SERPL-MCNC: 91 MG/DL — SIGNIFICANT CHANGE UP (ref 70–99)
HCT VFR BLD CALC: 29.4 % — LOW (ref 34.5–45)
HGB BLD-MCNC: 9.6 G/DL — LOW (ref 11.5–15.5)
INR BLD: 1.12 RATIO — SIGNIFICANT CHANGE UP (ref 0.88–1.16)
MAGNESIUM SERPL-MCNC: 2.1 MG/DL — SIGNIFICANT CHANGE UP (ref 1.6–2.6)
MCHC RBC-ENTMCNC: 24 PG — LOW (ref 27–34)
MCHC RBC-ENTMCNC: 32.5 GM/DL — SIGNIFICANT CHANGE UP (ref 32–36)
MCV RBC AUTO: 73.7 FL — LOW (ref 80–100)
PHOSPHATE SERPL-MCNC: 4.1 MG/DL — SIGNIFICANT CHANGE UP (ref 2.5–4.5)
PLATELET # BLD AUTO: 157 K/UL — SIGNIFICANT CHANGE UP (ref 150–400)
POTASSIUM SERPL-MCNC: 3.5 MMOL/L — SIGNIFICANT CHANGE UP (ref 3.5–5.3)
POTASSIUM SERPL-SCNC: 3.5 MMOL/L — SIGNIFICANT CHANGE UP (ref 3.5–5.3)
PROT SERPL-MCNC: 6.1 G/DL — SIGNIFICANT CHANGE UP (ref 6–8.3)
PROTHROM AB SERPL-ACNC: 12.2 SEC — SIGNIFICANT CHANGE UP (ref 9.8–12.7)
RBC # BLD: 3.99 M/UL — SIGNIFICANT CHANGE UP (ref 3.8–5.2)
RBC # FLD: 19.5 % — HIGH (ref 10.3–14.5)
SODIUM SERPL-SCNC: 139 MMOL/L — SIGNIFICANT CHANGE UP (ref 135–145)
WBC # BLD: 14.3 K/UL — HIGH (ref 3.8–10.5)
WBC # FLD AUTO: 14.3 K/UL — HIGH (ref 3.8–10.5)

## 2018-07-25 PROCEDURE — 99291 CRITICAL CARE FIRST HOUR: CPT

## 2018-07-25 PROCEDURE — 36514 APHERESIS PLASMA: CPT

## 2018-07-25 PROCEDURE — 99232 SBSQ HOSP IP/OBS MODERATE 35: CPT | Mod: 25

## 2018-07-25 PROCEDURE — 99232 SBSQ HOSP IP/OBS MODERATE 35: CPT

## 2018-07-25 RX ORDER — SODIUM CHLORIDE 9 MG/ML
500 INJECTION, SOLUTION INTRAVENOUS ONCE
Qty: 0 | Refills: 0 | Status: DISCONTINUED | OUTPATIENT
Start: 2018-07-25 | End: 2018-07-25

## 2018-07-25 RX ORDER — PYRIDOSTIGMINE BROMIDE 60 MG/5ML
180 SOLUTION ORAL
Qty: 0 | Refills: 0 | Status: DISCONTINUED | OUTPATIENT
Start: 2018-07-25 | End: 2018-07-27

## 2018-07-25 RX ORDER — SODIUM CHLORIDE 9 MG/ML
500 INJECTION INTRAMUSCULAR; INTRAVENOUS; SUBCUTANEOUS ONCE
Qty: 0 | Refills: 0 | Status: COMPLETED | OUTPATIENT
Start: 2018-07-25 | End: 2018-07-25

## 2018-07-25 RX ORDER — PYRIDOSTIGMINE BROMIDE 60 MG/5ML
60 SOLUTION ORAL
Qty: 0 | Refills: 0 | Status: DISCONTINUED | OUTPATIENT
Start: 2018-07-25 | End: 2018-07-27

## 2018-07-25 RX ORDER — POTASSIUM CHLORIDE 20 MEQ
20 PACKET (EA) ORAL
Qty: 0 | Refills: 0 | Status: COMPLETED | OUTPATIENT
Start: 2018-07-25 | End: 2018-07-25

## 2018-07-25 RX ADMIN — SODIUM CHLORIDE 1500 MILLILITER(S): 9 INJECTION INTRAMUSCULAR; INTRAVENOUS; SUBCUTANEOUS at 03:40

## 2018-07-25 RX ADMIN — Medication 3 MILLILITER(S): at 12:26

## 2018-07-25 RX ADMIN — Medication 2: at 17:48

## 2018-07-25 RX ADMIN — Medication 3 MILLILITER(S): at 17:10

## 2018-07-25 RX ADMIN — PIPERACILLIN AND TAZOBACTAM 25 GRAM(S): 4; .5 INJECTION, POWDER, LYOPHILIZED, FOR SOLUTION INTRAVENOUS at 22:27

## 2018-07-25 RX ADMIN — PYRIDOSTIGMINE BROMIDE 60 MILLIGRAM(S): 60 SOLUTION ORAL at 10:01

## 2018-07-25 RX ADMIN — PYRIDOSTIGMINE BROMIDE 180 MILLIGRAM(S): 60 SOLUTION ORAL at 23:06

## 2018-07-25 RX ADMIN — Medication 1 TABLET(S): at 10:43

## 2018-07-25 RX ADMIN — PIPERACILLIN AND TAZOBACTAM 25 GRAM(S): 4; .5 INJECTION, POWDER, LYOPHILIZED, FOR SOLUTION INTRAVENOUS at 06:52

## 2018-07-25 RX ADMIN — PYRIDOSTIGMINE BROMIDE 60 MILLIGRAM(S): 60 SOLUTION ORAL at 06:56

## 2018-07-25 RX ADMIN — Medication 50 MILLIEQUIVALENT(S): at 04:42

## 2018-07-25 RX ADMIN — Medication 3 MILLILITER(S): at 05:48

## 2018-07-25 RX ADMIN — PYRIDOSTIGMINE BROMIDE 60 MILLIGRAM(S): 60 SOLUTION ORAL at 17:48

## 2018-07-25 RX ADMIN — Medication 50 MILLIEQUIVALENT(S): at 02:30

## 2018-07-25 RX ADMIN — Medication 50 MILLIGRAM(S): at 06:06

## 2018-07-25 RX ADMIN — PIPERACILLIN AND TAZOBACTAM 25 GRAM(S): 4; .5 INJECTION, POWDER, LYOPHILIZED, FOR SOLUTION INTRAVENOUS at 14:08

## 2018-07-25 RX ADMIN — PYRIDOSTIGMINE BROMIDE 60 MILLIGRAM(S): 60 SOLUTION ORAL at 14:08

## 2018-07-25 NOTE — PROGRESS NOTE ADULT - ASSESSMENT
27F with PMHX of myasthenia gravis presents with worsening  worsening ptosis and weakness x 2 months, admitted for MG exacerbation, unresponsive to IVIG, transferred to MICU for worsening SOB and initiation of plasmapheresis.     #Neuro:  Myasthenia gravis  - s/p 1x IVIG, and plasmapheresis x3  - planned for total of 5 sessions of plasmapheresis  - neuro recs appreciated. continue with Solumedrol 50mg qd  - patients secretions much improved. c/w home pyridostigmine 60mg QID and 180 qhs  - will obtain CT chest to r/o thymoma   - AAOx3    # Resp;  Shortness of breath  - s/p intubation for respiratory failure in the setting of MG, now s/p extubation  - resolved     # CV:  - patient with mild hypotension overnight which resolved following 500 cc IVF bolus  - will continue to monitor   - otherwise stable    #ID:  Possible PNA  - patient with possible PNA seen on CXR  - vanc d/brooks   - continue with Zosyn (today final day) and bactim    # Renal:  - Cr WNL- no issues at this time    #GI:  - patient passed bedside s/s eval   - continue with regular diet  - formal s/s pending     #DVT ppx:  - lovenox subq

## 2018-07-25 NOTE — PROGRESS NOTE ADULT - SUBJECTIVE AND OBJECTIVE BOX
*******************************  Kirby Venessa, PGY-1  Pager [phone]  *******************************    INTERVAL HPI/OVERNIGHT EVENTS:    SUBJECTIVE: Patient seen and examined at bedside. Patient mildly hypotensive o/n with SBP in the 80s. Patient given 500cc bolus with improvement in BP. Patient continues to endorse some mild weakness and feels overall unchanged compared to yesterday     CONSTITUTIONAL: Mild weakness. No fevers or chills  EYES/ENT: No visual changes;  No vertigo or throat pain   NECK: No pain or stiffness  RESPIRATORY: No cough, wheezing, hemoptysis; No shortness of breath  CARDIOVASCULAR: No chest pain or palpitations  GASTROINTESTINAL: No abdominal or epigastric pain. No nausea, vomiting, or hematemesis; No diarrhea or constipation. No melena or hematochezia.  GENITOURINARY: No dysuria, frequency or hematuria  NEUROLOGICAL: No numbness. Mild weakness  SKIN: No itching, rashes    OBJECTIVE:    VITAL SIGNS:  ICU Vital Signs Last 24 Hrs  T(C): 37.4 (25 Jul 2018 04:00), Max: 37.4 (25 Jul 2018 04:00)  T(F): 99.3 (25 Jul 2018 04:00), Max: 99.3 (25 Jul 2018 04:00)  HR: 114 (25 Jul 2018 07:00) (63 - 114)  BP: 94/59 (25 Jul 2018 07:00) (83/49 - 122/71)  BP(mean): 71 (25 Jul 2018 07:00) (60 - 92)  ABP: --  ABP(mean): --  RR: 21 (25 Jul 2018 07:00) (14 - 32)  SpO2: 99% (25 Jul 2018 07:00) (97% - 100%)        07-24 @ 07:01  -  07-25 @ 07:00  --------------------------------------------------------  IN: 900 mL / OUT: 350 mL / NET: 550 mL      CAPILLARY BLOOD GLUCOSE      POCT Blood Glucose.: 106 mg/dL (25 Jul 2018 06:13)      PHYSICAL EXAM:  GENERAL: NAD  HEAD:  Atraumatic, Normocephalic  EYES: EOMI, PERRLA, conjunctiva and sclera clear  NECK: Supple, No JVD  CHEST/LUNG: Clear to auscultation bilaterally anteriorly; No wheeze  HEART: Regular rate and rhythm; No murmurs, rubs, or gallops  ABDOMEN: Soft, Nontender, Nondistended; Bowel sounds present  EXTREMITIES:  2+ Peripheral Pulses, No clubbing, cyanosis, or edema  NEURO: 5/5 strength in all 4 extremities. mild-mod b/l ptosis R>L, improving. Mild ophthalmoplegia   SKIN: No rashes or lesions    MEDICATIONS:  MEDICATIONS  (STANDING):  ALBUTerol/ipratropium for Nebulization 3 milliLiter(s) Nebulizer every 6 hours  enoxaparin Injectable 40 milliGRAM(s) SubCutaneous every 24 hours  insulin lispro (HumaLOG) corrective regimen sliding scale   SubCutaneous every 6 hours  methylPREDNISolone sodium succinate Injectable 50 milliGRAM(s) IV Push daily  pantoprazole  Injectable 40 milliGRAM(s) IV Push daily  piperacillin/tazobactam IVPB. 3.375 Gram(s) IV Intermittent every 8 hours  pyridostigmine 60 milliGRAM(s) Oral <User Schedule>  pyridostigmine  milliGRAM(s) Oral <User Schedule>  trimethoprim  160 mG/sulfamethoxazole 800 mG 1 Tablet(s) Oral <User Schedule>    MEDICATIONS  (PRN):  ALPRAZolam 0.25 milliGRAM(s) Oral every 12 hours PRN Anxiety  polyethylene glycol 3350 17 Gram(s) Oral daily PRN Constipation      ALLERGIES:  Allergies    No Known Drug Allergies  shellfish (Anaphylaxis)    Intolerances        LABS:                        9.6    14.3  )-----------( 157      ( 25 Jul 2018 00:53 )             29.4     07-25    139  |  102  |  14  ----------------------------<  91  3.5   |  26  |  0.64    Ca    8.9      25 Jul 2018 00:53  Phos  4.1     07-25  Mg     2.1     07-25    TPro  6.1  /  Alb  4.3  /  TBili  0.6  /  DBili  x   /  AST  17  /  ALT  21  /  AlkPhos  34<L>  07-25    PT/INR - ( 25 Jul 2018 00:53 )   PT: 12.2 sec;   INR: 1.12 ratio         PTT - ( 25 Jul 2018 00:53 )  PTT:30.5 sec      RADIOLOGY & ADDITIONAL TESTS: Reviewed.

## 2018-07-25 NOTE — PROGRESS NOTE ADULT - SUBJECTIVE AND OBJECTIVE BOX
s: The patient is in MICU, appears to be stable, intubated, follows commands    O:  ICU Vital Signs Last 24 Hrs  T(C): 37.4 (25 Jul 2018 04:00), Max: 37.4 (25 Jul 2018 04:00)  T(F): 99.3 (25 Jul 2018 04:00), Max: 99.3 (25 Jul 2018 04:00)  HR: 114 (25 Jul 2018 07:00) (63 - 114)  BP: 94/59 (25 Jul 2018 07:00) (83/49 - 122/71)  BP(mean): 71 (25 Jul 2018 07:00) (60 - 92)  ABP: --  ABP(mean): --  RR: 21 (25 Jul 2018 07:00) (14 - 32)  SpO2: 99% (25 Jul 2018 07:00) (97% - 100%)      MEDICATIONS  (STANDING):  ALBUTerol/ipratropium for Nebulization 3 milliLiter(s) Nebulizer every 6 hours  enoxaparin Injectable 40 milliGRAM(s) SubCutaneous every 24 hours  insulin lispro (HumaLOG) corrective regimen sliding scale   SubCutaneous every 6 hours  methylPREDNISolone sodium succinate Injectable 50 milliGRAM(s) IV Push daily  pantoprazole  Injectable 40 milliGRAM(s) IV Push daily  piperacillin/tazobactam IVPB. 3.375 Gram(s) IV Intermittent every 8 hours  pyridostigmine 60 milliGRAM(s) Oral <User Schedule>  pyridostigmine  milliGRAM(s) Oral <User Schedule>  trimethoprim  160 mG/sulfamethoxazole 800 mG 1 Tablet(s) Oral <User Schedule>    MEDICATIONS  (PRN):  ALPRAZolam 0.25 milliGRAM(s) Oral every 12 hours PRN Anxiety  polyethylene glycol 3350 17 Gram(s) Oral daily PRN Constipation    Exam:  Alert, awake  exam shows improving cont' to have ophtholmoplesia, ptosis right worse than left but improving  on ventilator  UE BL 5/5  LE BL 5/5 s: The patient is in MICU, appears to be stable, no acute complains.    O:  ICU Vital Signs Last 24 Hrs  T(C): 37.4 (25 Jul 2018 04:00), Max: 37.4 (25 Jul 2018 04:00)  T(F): 99.3 (25 Jul 2018 04:00), Max: 99.3 (25 Jul 2018 04:00)  HR: 114 (25 Jul 2018 07:00) (63 - 114)  BP: 94/59 (25 Jul 2018 07:00) (83/49 - 122/71)  BP(mean): 71 (25 Jul 2018 07:00) (60 - 92)  ABP: --  ABP(mean): --  RR: 21 (25 Jul 2018 07:00) (14 - 32)  SpO2: 99% (25 Jul 2018 07:00) (97% - 100%)      MEDICATIONS  (STANDING):  ALBUTerol/ipratropium for Nebulization 3 milliLiter(s) Nebulizer every 6 hours  enoxaparin Injectable 40 milliGRAM(s) SubCutaneous every 24 hours  insulin lispro (HumaLOG) corrective regimen sliding scale   SubCutaneous every 6 hours  methylPREDNISolone sodium succinate Injectable 50 milliGRAM(s) IV Push daily  pantoprazole  Injectable 40 milliGRAM(s) IV Push daily  piperacillin/tazobactam IVPB. 3.375 Gram(s) IV Intermittent every 8 hours  pyridostigmine 60 milliGRAM(s) Oral <User Schedule>  pyridostigmine  milliGRAM(s) Oral <User Schedule>  trimethoprim  160 mG/sulfamethoxazole 800 mG 1 Tablet(s) Oral <User Schedule>    MEDICATIONS  (PRN):  ALPRAZolam 0.25 milliGRAM(s) Oral every 12 hours PRN Anxiety  polyethylene glycol 3350 17 Gram(s) Oral daily PRN Constipation    Exam:  Alert, awake  exam shows improving cont' to have ophtholmoplesia, ptosis right worse than left but improving  on ventilator  UE BL 5/5  LE BL 5/5

## 2018-07-25 NOTE — PROGRESS NOTE ADULT - SUBJECTIVE AND OBJECTIVE BOX
27 year old female who was admitted for IVIG infusion for exacerbation of MG symptoms, failed to respond and blood bank initiated TPE on  for worsening respiratory symptoms. She was intubated the following day and underwent 2 more TPE on  and , following which there was improvement in her breathing and was extubated on . Also noted on  overnight she was transfused cryo, FFP and pRBC for significant bleeding from her central line and right IJ shiley. Her coags were within normal limits. Preprocedure fibrinogen was within normal limits. Last night had an episode of hypotension that has responded to bolus of fluids.     She feels good today, mild weakness with significant improvement in her ptosis although continues to have double vision. Strength in her extremities improved and can walk to the bathroom. The catheter sites are dry and intact, no blood clots. Denies fever, SOB, chest pain.      PAST MEDICAL & SURGICAL HISTORY:  Myasthenia gravis  Asthma  No significant past surgical history      MEDICATIONS  (STANDING):  ALBUTerol/ipratropium for Nebulization 3 milliLiter(s) Nebulizer every 6 hours  enoxaparin Injectable 40 milliGRAM(s) SubCutaneous every 24 hours  insulin lispro (HumaLOG) corrective regimen sliding scale   SubCutaneous every 6 hours  piperacillin/tazobactam IVPB. 3.375 Gram(s) IV Intermittent every 8 hours  pyridostigmine 60 milliGRAM(s) Oral <User Schedule>  pyridostigmine  milliGRAM(s) Oral <User Schedule>  trimethoprim  160 mG/sulfamethoxazole 800 mG 1 Tablet(s) Oral <User Schedule>    MEDICATIONS  (PRN):  ALPRAZolam 0.25 milliGRAM(s) Oral every 12 hours PRN Anxiety  polyethylene glycol 3350 17 Gram(s) Oral daily PRN Constipation      Social History:  Single, denies smoking or alcohol      FAMILY HISTORY:  No pertinent family history in first degree relatives      Allergies  No Known Drug Allergies  shellfish (Anaphylaxis)      REVIEW OF SYSTEMS:  CONSTITUTIONAL: No weakness, No fevers or chills  EYES/ENT: Improvement in B/l ptosis and double vision; reduced oral secretions, No swallowing difficulty, no speech difficulty   RESPIRATORY: No SOB, cough, wheezing, hemoptysis   CARDIOVASCULAR: No chest pain or palpitations  GASTROINTESTINAL: No nausea, vomiting, abdominal pain, three episodes of diarrhea yesterday has now resolved   MUSCULOSKELETAL: Weakness improving, full range of motion preserved  NEUROLOGICAL: see HPI, No headache no numbness  HEMATOLOGY: No anemia, no central line bleeding since yesterday  SKIN: No itching, burning, rashes, petechia, or lesions    Vital Signs Last 24 Hrs  T(C): 37.4 (2018 04:00), Max: 37.4 (2018 04:00)  T(F): 99.3 (2018 04:00), Max: 99.3 (2018 04:00)  HR: 81 (2018 10:35) (63 - 114)  BP: 93/56 (2018 10:35) (83/49 - 122/71)  BP(mean): 81 (2018 10:00) (60 - 92)  RR: 31 (2018 10:) (14 - 32)  SpO2: 99% (2018 10:35) (97% - 100%)    Daily Weight in k.3 (2018 04:00)    PHYSICAL EXAM:  General: Well developed, well nourished, comfortable in bed  Eyes: mild bilateral ptosis  HEENT:  MMM, reducedoral secretions, Right IJ double lumen catheter, dressing dry and intact without tenderness.   Respiratory:  B/L CTA anteriorly  Cardiovascular:  S1, S2 + regular rhythm  Abdomen: Soft, nontender, nondistended, + bowel sounds  Extremities: Warm, nontender, no pedal edema  Skin: No visible rash  Neurological: AAOx3   5/5 all extremities   Psychiatry: Appropriate affect                          9.6    14.3  )-----------( 157      ( 2018 00:53 )             29.4       Hematocrit: 29.4 % ( @ 00:53)  Hematocrit: 30.1 % ( @ 08:14)  Hematocrit: 22.5 % ( @ 03:57)  Hematocrit: 25.2 % ( @ 23:37)  Hematocrit: 28.1 % ( @ 18:08)          139  |  102  |  14  ----------------------------<  91  3.5   |  26  |  0.64    Ca    8.9      2018 00:53  Phos  4.1       Mg     2.1         TPro  6.1  /  Alb  4.3  /  TBili  0.6  /  DBili  x   /  AST  17  /  ALT  21  /  AlkPhos  34<L>      PT/INR - ( 2018 00:53 )   PT: 12.2 sec;   INR: 1.12 ratio         PTT - ( 2018 00:53 )  PTT:30.5 sec  Fibrinogen Assay: 333 mg/dL ( @ 08:14)  Fibrinogen Assay: 94 mg/dL ( @ 23:37)  Fibrinogen Assay: See Note ( @ 18:08)  Fibrinogen clauss: 214 ( @ 11:19)  Fibrinogen clauss: 98 ( @ 06:33)

## 2018-07-25 NOTE — CHART NOTE - NSCHARTNOTEFT_GEN_A_CORE
MICU Transfer Note    Transfer from: MICU    Transfer to: (  ) Medicine    (  ) Telemetry     (   ) RCU        (    ) Palliative         (   ) Stroke Unit          (xx   ) ____Neurology______________    Accepting Physician: Dr. Bennett    Signout given to: Dr. Bennett    MICU COURSE:  27 yr old female with PMHx Myasthenia Gravis dx feb 2018 with an exacerbation in march requiring intubation and 5 rounds of plasmapheresis and eventually placed on pyridostigmine. Pt presented this admission to [facility] 7/18/18 with MG crisis failed IVIG therapy requiring ICU admission 7/20/18 and intubation secondary to hypoxic/hypercapnic resp failure. Pt received TPE 4 of 5 sessions over a seven day course due for last treatment this friday (7/27/18), in addition to pulse dose steroid therapy of solumedrol 500 mg IV qd x 3 days, placed on bactrim therapy three times per week for PJP prophylaxis.  Course c/b by a vaginal infection requiring diflucan therapy, aspiration pmn in which she was placed on broad spectrum antibx, decrease in coagulation products 2/2 to PLEX requiring FFP and cryoprecipitate transfusions. Pt pulmonary status improved with eventual extubation on 7/23/18. Placed on prednisone 60 mg p.o. qday. Currently pt's neuro status with much improvement and currently with no longer need for ICU care.                  ASSESSMENT & PLAN:   -Acute Hypoxemic Respiratory failure - extubated 7/23  -Myasthenia Crisis - completed PLEX therapy with weakness and ptosis improving. transitioned to Prednisone. Mestinon restarted.   -Oropharyngeal dysphagia - passed swallow eval - on PO diet  -Sepsis - presumed pneumonia due to respiratory distress and progressive hypoxemia prior - plan to complete Zosyn today  -Acute blood loss anemia - Hemoglobin stable. Oozing from shiley site significantly improved with FFP/cryo and surgicell dressings            For Followup:          Yesika ANP-BC (ext. [id number])

## 2018-07-25 NOTE — PROGRESS NOTE ADULT - ASSESSMENT
26yo F with PMH MG (diagnosed 2/2018) presenting with worsening ptosis and weakness x 2 months. P/W ophthalmoplegia and BL ptosis and muscle weakness, she was admitted and IVIG initiated, regardless, 24 hours after admission, NIF and VC were worsening, intubated receiving PLEX and steroids now extubated and tolerating well, exam shows improvement.  impression: MG exacerbation  Plan:  [x] S/P Solumedrol 500 x 3 d  [] C/W PLEX today will have 4th session  [] C/W Mestinon home dose 67-87-  [] CT Chest to r/o thymoma pending  [] CXR possible PNA on c/w zosyn, & bactrim 28yo F with PMH MG (diagnosed 2/2018) presenting with worsening ptosis and weakness x 2 months. P/W ophthalmoplegia and BL ptosis and muscle weakness, she was admitted and IVIG initiated, regardless, 24 hours after admission, NIF and VC were worsening, intubated receiving PLEX and steroids now extubated and tolerating well, exam shows improvement.  impression: MG exacerbation  Plan:  [x] S/P Solumedrol 500 x 3 d  [] C/W PLEX today will have 4th session  [] C/W Mestinon home dose 95-11-28-  [] CT Chest to r/o thymoma pending

## 2018-07-25 NOTE — PROGRESS NOTE ADULT - ASSESSMENT
27 year old female with MG crisis requiring intubation for worsening respiratory distress has undergone 3 TPE since 7/19 and has shown significant improvement in her strength, b/l ptosis and respiratory distress and was extubated on 7/23. She has had bleeding from her central line and shiley requiring transfusion of cryo and FFP overnight on 7/23.  No bleeding since yesterday, today will proceed with #4/5 TPE. Plan is to process one plasma volume using 5% albumin as replacement fluid during first half of procedure and plasma during the second half. I have again explained to Ms Blackburn about the risks and benefits of using plasma and answered all questions. Plan discussed with the patient and MICU team and currently scheduled for next TPE on Friday.

## 2018-07-26 LAB
ALBUMIN SERPL ELPH-MCNC: 4.2 G/DL — SIGNIFICANT CHANGE UP (ref 3.3–5)
ALP SERPL-CCNC: 34 U/L — LOW (ref 40–120)
ALT FLD-CCNC: 20 U/L — SIGNIFICANT CHANGE UP (ref 10–45)
ANION GAP SERPL CALC-SCNC: 14 MMOL/L — SIGNIFICANT CHANGE UP (ref 5–17)
APTT BLD: 24.1 SEC — LOW (ref 27.5–37.4)
AST SERPL-CCNC: 18 U/L — SIGNIFICANT CHANGE UP (ref 10–40)
BILIRUB SERPL-MCNC: 0.5 MG/DL — SIGNIFICANT CHANGE UP (ref 0.2–1.2)
BUN SERPL-MCNC: 17 MG/DL — SIGNIFICANT CHANGE UP (ref 7–23)
CALCIUM SERPL-MCNC: 8.7 MG/DL — SIGNIFICANT CHANGE UP (ref 8.4–10.5)
CHLORIDE SERPL-SCNC: 101 MMOL/L — SIGNIFICANT CHANGE UP (ref 96–108)
CO2 SERPL-SCNC: 22 MMOL/L — SIGNIFICANT CHANGE UP (ref 22–31)
CREAT SERPL-MCNC: 0.81 MG/DL — SIGNIFICANT CHANGE UP (ref 0.5–1.3)
FIBRINOGEN PPP-MCNC: 320 MG/DL — SIGNIFICANT CHANGE UP (ref 310–510)
GLUCOSE BLDC GLUCOMTR-MCNC: 120 MG/DL — HIGH (ref 70–99)
GLUCOSE BLDC GLUCOMTR-MCNC: 122 MG/DL — HIGH (ref 70–99)
GLUCOSE BLDC GLUCOMTR-MCNC: 91 MG/DL — SIGNIFICANT CHANGE UP (ref 70–99)
GLUCOSE BLDC GLUCOMTR-MCNC: 95 MG/DL — SIGNIFICANT CHANGE UP (ref 70–99)
GLUCOSE BLDC GLUCOMTR-MCNC: 97 MG/DL — SIGNIFICANT CHANGE UP (ref 70–99)
GLUCOSE SERPL-MCNC: 91 MG/DL — SIGNIFICANT CHANGE UP (ref 70–99)
HCT VFR BLD CALC: 30.2 % — LOW (ref 34.5–45)
HGB BLD-MCNC: 10.3 G/DL — LOW (ref 11.5–15.5)
INR BLD: 1.07 RATIO — SIGNIFICANT CHANGE UP (ref 0.88–1.16)
IRON SATN MFR SERPL: 22 UG/DL — LOW (ref 30–160)
IRON SATN MFR SERPL: 9 % — LOW (ref 14–50)
MAGNESIUM SERPL-MCNC: 2.1 MG/DL — SIGNIFICANT CHANGE UP (ref 1.6–2.6)
MCHC RBC-ENTMCNC: 24.9 PG — LOW (ref 27–34)
MCHC RBC-ENTMCNC: 34 GM/DL — SIGNIFICANT CHANGE UP (ref 32–36)
MCV RBC AUTO: 73.4 FL — LOW (ref 80–100)
PHOSPHATE SERPL-MCNC: 5.7 MG/DL — HIGH (ref 2.5–4.5)
PLATELET # BLD AUTO: 182 K/UL — SIGNIFICANT CHANGE UP (ref 150–400)
POTASSIUM SERPL-MCNC: 3.9 MMOL/L — SIGNIFICANT CHANGE UP (ref 3.5–5.3)
POTASSIUM SERPL-SCNC: 3.9 MMOL/L — SIGNIFICANT CHANGE UP (ref 3.5–5.3)
PROT SERPL-MCNC: 6 G/DL — SIGNIFICANT CHANGE UP (ref 6–8.3)
PROTHROM AB SERPL-ACNC: 11.6 SEC — SIGNIFICANT CHANGE UP (ref 9.8–12.7)
RBC # BLD: 4.12 M/UL — SIGNIFICANT CHANGE UP (ref 3.8–5.2)
RBC # FLD: 19.3 % — HIGH (ref 10.3–14.5)
SODIUM SERPL-SCNC: 137 MMOL/L — SIGNIFICANT CHANGE UP (ref 135–145)
TIBC SERPL-MCNC: 241 UG/DL — SIGNIFICANT CHANGE UP (ref 220–430)
UIBC SERPL-MCNC: 219 UG/DL — SIGNIFICANT CHANGE UP (ref 110–370)
WBC # BLD: 15.5 K/UL — HIGH (ref 3.8–10.5)
WBC # FLD AUTO: 15.5 K/UL — HIGH (ref 3.8–10.5)

## 2018-07-26 PROCEDURE — 99232 SBSQ HOSP IP/OBS MODERATE 35: CPT

## 2018-07-26 PROCEDURE — 99233 SBSQ HOSP IP/OBS HIGH 50: CPT | Mod: GC

## 2018-07-26 RX ORDER — INSULIN LISPRO 100/ML
VIAL (ML) SUBCUTANEOUS
Qty: 0 | Refills: 0 | Status: DISCONTINUED | OUTPATIENT
Start: 2018-07-26 | End: 2018-07-27

## 2018-07-26 RX ORDER — INSULIN LISPRO 100/ML
VIAL (ML) SUBCUTANEOUS AT BEDTIME
Qty: 0 | Refills: 0 | Status: DISCONTINUED | OUTPATIENT
Start: 2018-07-26 | End: 2018-07-27

## 2018-07-26 RX ADMIN — PYRIDOSTIGMINE BROMIDE 60 MILLIGRAM(S): 60 SOLUTION ORAL at 06:06

## 2018-07-26 RX ADMIN — PYRIDOSTIGMINE BROMIDE 60 MILLIGRAM(S): 60 SOLUTION ORAL at 14:11

## 2018-07-26 RX ADMIN — Medication 3 MILLILITER(S): at 17:10

## 2018-07-26 RX ADMIN — Medication 3 MILLILITER(S): at 11:10

## 2018-07-26 RX ADMIN — PYRIDOSTIGMINE BROMIDE 60 MILLIGRAM(S): 60 SOLUTION ORAL at 10:00

## 2018-07-26 RX ADMIN — Medication 3 MILLILITER(S): at 00:26

## 2018-07-26 RX ADMIN — PYRIDOSTIGMINE BROMIDE 60 MILLIGRAM(S): 60 SOLUTION ORAL at 17:16

## 2018-07-26 RX ADMIN — PYRIDOSTIGMINE BROMIDE 180 MILLIGRAM(S): 60 SOLUTION ORAL at 21:59

## 2018-07-26 RX ADMIN — Medication 3 MILLILITER(S): at 05:47

## 2018-07-26 RX ADMIN — ENOXAPARIN SODIUM 40 MILLIGRAM(S): 100 INJECTION SUBCUTANEOUS at 21:59

## 2018-07-26 RX ADMIN — Medication 60 MILLIGRAM(S): at 06:06

## 2018-07-26 NOTE — PROGRESS NOTE ADULT - ASSESSMENT
26yo F with PMH MG (diagnosed 2/2018) presenting with worsening ptosis and weakness x 2 months. P/W ophthalmoplegia and BL ptosis and muscle weakness, she was admitted and IVIG initiated, regardless, 24 hours after admission, NIF and VC were worsening, intubated receiving PLEX and steroids now extubated and tolerating well, exam shows improvement.  impression: MG exacerbation  Plan:  [x] S/P Solumedrol 500 x 3 d  [] C/W PLEX today will have 4th session  [] Increase mestinon short acting to every 8 hrs.   [] Continue Long acting 180mg qd  [] Will discuss with pt's neurologist whether to go ahead with CT Chest to r/o thymoma   [] Transfer to [facility] when bed available. Or to medicine floor under [facility] if bed needed. 26yo F with PMH MG (diagnosed 2/2018) presenting with worsening ptosis and weakness x 2 months. P/W ophthalmoplegia and BL ptosis and muscle weakness, she was admitted and IVIG initiated, regardless, 24 hours after admission, NIF and VC were worsening, intubated receiving PLEX and steroids now extubated and tolerating well, exam shows improvement.  impression: MG exacerbation  Plan:  [x] S/P Solumedrol 500 x 3 d  [] C/W PLEX today will have 4th session  [] mestinon short acting qid  [] Continue Long acting 180mg qd  [] Will discuss with pt's neurologist whether to go ahead with CT Chest to r/o thymoma   [] Transfer to [facility] when bed available. Or to medicine floor under [facility] if bed needed.

## 2018-07-26 NOTE — PROGRESS NOTE ADULT - SUBJECTIVE AND OBJECTIVE BOX
*******************************  Kirby Venessa, PGY-1  Pager [phone]  *******************************    INTERVAL HPI/OVERNIGHT EVENTS:    SUBJECTIVE: Patient seen and examined at bedside. FRANCISCO overnight. Patient reports much improvement in her strength    CONSTITUTIONAL: No weakness, fevers or chills  EYES/ENT: No visual changes;  No vertigo or throat pain   NECK: No pain or stiffness  RESPIRATORY: No cough, wheezing, hemoptysis; No shortness of breath  CARDIOVASCULAR: No chest pain or palpitations  GASTROINTESTINAL: No abdominal or epigastric pain. No nausea, vomiting, or hematemesis; No constipation. No melena or hematochezia.  GENITOURINARY: No dysuria, frequency or hematuria  NEUROLOGICAL: No numbness or weakness  SKIN: No itching, rashes    OBJECTIVE:    VITAL SIGNS:  ICU Vital Signs Last 24 Hrs  T(C): 37 (26 Jul 2018 04:00), Max: 37.4 (25 Jul 2018 20:00)  T(F): 98.6 (26 Jul 2018 04:00), Max: 99.3 (25 Jul 2018 20:00)  HR: 70 (26 Jul 2018 07:00) (58 - 115)  BP: 92/51 (26 Jul 2018 07:00) (84/50 - 109/62)  BP(mean): 69 (26 Jul 2018 07:00) (61 - 81)  ABP: --  ABP(mean): --  RR: 15 (26 Jul 2018 07:00) (12 - 31)  SpO2: 99% (26 Jul 2018 07:00) (97% - 100%)        07-25 @ 07:01  -  07-26 @ 07:00  --------------------------------------------------------  IN: 370 mL / OUT: 2375 mL / NET: -2005 mL      CAPILLARY BLOOD GLUCOSE      POCT Blood Glucose.: 95 mg/dL (26 Jul 2018 06:09)      PHYSICAL EXAM:  GENERAL: NAD  HEAD:  Atraumatic, Normocephalic  EYES: EOMI, PERRLA, conjunctiva and sclera clear  NECK: Supple, No JVD  CHEST/LUNG: Clear to auscultation bilaterally anteriorly; No wheeze  HEART: Regular rate and rhythm; No murmurs, rubs, or gallops  ABDOMEN: Soft, Nontender, Nondistended; Bowel sounds present  EXTREMITIES:  2+ Peripheral Pulses, No clubbing, cyanosis, or edema  NEURO: 5/5 strength in all 4 extremities. mild ptosis R, improving.    SKIN: No rashes or lesions      MEDICATIONS:  MEDICATIONS  (STANDING):  ALBUTerol/ipratropium for Nebulization 3 milliLiter(s) Nebulizer every 6 hours  enoxaparin Injectable 40 milliGRAM(s) SubCutaneous every 24 hours  insulin lispro (HumaLOG) corrective regimen sliding scale   SubCutaneous every 6 hours  predniSONE   Tablet 60 milliGRAM(s) Oral daily  pyridostigmine 60 milliGRAM(s) Oral <User Schedule>  pyridostigmine  milliGRAM(s) Oral <User Schedule>  trimethoprim  160 mG/sulfamethoxazole 800 mG 1 Tablet(s) Oral <User Schedule>    MEDICATIONS  (PRN):  ALPRAZolam 0.25 milliGRAM(s) Oral every 12 hours PRN Anxiety  polyethylene glycol 3350 17 Gram(s) Oral daily PRN Constipation      ALLERGIES:  Allergies    No Known Drug Allergies  shellfish (Anaphylaxis)    Intolerances        LABS:                        10.3   15.5  )-----------( 182      ( 26 Jul 2018 00:41 )             30.2     07-26    137  |  101  |  17  ----------------------------<  91  3.9   |  22  |  0.81    Ca    8.7      26 Jul 2018 00:40  Phos  5.7     07-26  Mg     2.1     07-26    TPro  6.0  /  Alb  4.2  /  TBili  0.5  /  DBili  x   /  AST  18  /  ALT  20  /  AlkPhos  34<L>  07-26    PT/INR - ( 26 Jul 2018 00:41 )   PT: 11.6 sec;   INR: 1.07 ratio         PTT - ( 26 Jul 2018 00:41 )  PTT:24.1 sec      RADIOLOGY & ADDITIONAL TESTS: Reviewed.

## 2018-07-26 NOTE — CHART NOTE - NSCHARTNOTEFT_GEN_A_CORE
Follow up.    Adm dx:   Source: Patient [ ]    Family [ ]     other [ ]    Diet :       Patient reports [ ] nausea  [ ] vomiting [ ] diarrhea [ ] constipation  [ ]chewing problems [ ] swallowing issues  [ ] other:      PO intake:  < 50% [ ] 50-75% [ ]   % [ ]  other :     Source for PO intake [ ] Patient [ ] family [ ] chart [ ] staff [ ] other     Enteral /Parenteral Nutrition:       Current Weight:   % Weight Change    Pertinent Medications: MEDICATIONS  (STANDING):  ALBUTerol/ipratropium for Nebulization 3 milliLiter(s) Nebulizer every 6 hours  enoxaparin Injectable 40 milliGRAM(s) SubCutaneous every 24 hours  insulin lispro (HumaLOG) corrective regimen sliding scale   SubCutaneous every 6 hours  predniSONE   Tablet 60 milliGRAM(s) Oral daily  pyridostigmine 60 milliGRAM(s) Oral <User Schedule>  pyridostigmine  milliGRAM(s) Oral <User Schedule>  trimethoprim  160 mG/sulfamethoxazole 800 mG 1 Tablet(s) Oral <User Schedule>    MEDICATIONS  (PRN):  ALPRAZolam 0.25 milliGRAM(s) Oral every 12 hours PRN Anxiety  polyethylene glycol 3350 17 Gram(s) Oral daily PRN Constipation    Pertinent Labs:  07-26 Na137 mmol/L Glu 91 mg/dL K+ 3.9 mmol/L Cr  0.81 mg/dL BUN 17 mg/dL 07-26 Phos 5.7 mg/dL<H> 07-26 Alb 4.2 g/dL      Skin:     Estimated Needs:   [ ] no change since previous assessment  [ ] recalculated:       Previous Nutrition Diagnosis:     [ ] Inadequate Energy Intake [ ]Inadequate Oral Intake [ ] Excessive Energy Intake     [ ] Underweight [ ] Increased Nutrient Needs [ ] Overweight/Obesity     [ ] Altered GI Function [ ] Unintended Weight Loss [ ] Food & Nutrition Related Knowledge Deficit [ ] Malnutrition          Nutrition Diagnosis is [ ] ongoing  [ ] resolved [ ] not applicable          New Nutrition Diagnosis: [ ] not applicable    [ ] Inadequate Protein Energy Intake [ ]Inadequate Oral Intake [ ] Excessive Energy Intake     [ ] Underweight [ ] Increased Nutrient Needs [ ] Overweight/Obesity     [ ] Altered GI Function [ ] Unintended Weight Loss [ ] Food & Nutrition Related Knowledge Deficit[ ] Limited Adherence to nutrition related recommendations [ ] Malnutrition  [ ] other: Free text       Related to:      As evidenced by:      Interventions:     Recommend    [ ] Change Diet To:    [ ] Nutrition Supplement    [ ] Nutrition Support    [ ] Other:        Monitoring and Evaluation:     [ ] PO intake [ ] Tolerance to diet prescription [ ] weights [ ] follow up per protocol    [ ] other: Follow up.    Adm dx:  MG exacerbation.    Source: Patient [x ]    Family [ ]     other [x ] chart    Diet : 7/24 regular    Pt reports shellfish allergy, on adm allergy status was unknown. Reports good appetite, no GI issues, last BM 7/25.    Current Weight: 7/26 132, dosing wt 7/18 135.5  no edema      Pertinent Medications: MEDICATIONS  (STANDING):  ALBUTerol/ipratropium for Nebulization 3 milliLiter(s) Nebulizer every 6 hours  enoxaparin Injectable 40 milliGRAM(s) SubCutaneous every 24 hours  insulin lispro (HumaLOG) corrective regimen sliding scale   SubCutaneous every 6 hours  predniSONE   Tablet 60 milliGRAM(s) Oral daily  pyridostigmine 60 milliGRAM(s) Oral <User Schedule>  pyridostigmine  milliGRAM(s) Oral <User Schedule>  trimethoprim  160 mG/sulfamethoxazole 800 mG 1 Tablet(s) Oral <User Schedule>    MEDICATIONS  (PRN):  ALPRAZolam 0.25 milliGRAM(s) Oral every 12 hours PRN Anxiety  polyethylene glycol 3350 17 Gram(s) Oral daily PRN Constipation    Pertinent Labs:  07-26 Na137 mmol/L Glu 91 mg/dL K+ 3.9 mmol/L Cr  0.81 mg/dL BUN 17 mg/dL 07-26 Phos 5.7 mg/dL<H> 07-26 Alb 4.2 g/dL  POCT Blood Glucose.: 95 mg/dL (26 Jul 2018 06:09)  POCT Blood Glucose.: 97 mg/dL (26 Jul 2018 00:23)  POCT Blood Glucose.: 166 mg/dL (25 Jul 2018 17:41)  POCT Blood Glucose.: 112 mg/dL (25 Jul 2018 11:59)      Skin: no pressure injuries     Estimated Needs:   [x ] no change since previous assessment  [ ] recalculated:       Previous Nutrition Diagnosis: swallowing difficulty    Nutrition Diagnosis is [ ] ongoing  [ x] resolved [ ] not applicable   pt on regular diet     New Nutrition Diagnosis: [x ] not applicable        Recommend    [ ] Change Diet To:    [ ] Nutrition Supplement    [ ] Nutrition Support    [x ] Other:  continue regular diet       Monitoring and Evaluation:     [x ] PO intake [x ] Tolerance to diet prescription [x ] weights [x ] follow up per protocol    [ ] other:

## 2018-07-26 NOTE — PROGRESS NOTE ADULT - SUBJECTIVE AND OBJECTIVE BOX
s: The patient is in MICU, appears to be stable, no acute complaints. Still has some diplopia    O:  Vital Signs Last 24 Hrs  T(C): 37.1 (26 Jul 2018 08:00), Max: 37.4 (25 Jul 2018 20:00)  T(F): 98.7 (26 Jul 2018 08:00), Max: 99.3 (25 Jul 2018 20:00)  HR: 110 (26 Jul 2018 11:23) (58 - 120)  BP: 86/52 (26 Jul 2018 11:00) (84/50 - 97/52)  BP(mean): 64 (26 Jul 2018 11:00) (61 - 74)  RR: 18 (26 Jul 2018 11:00) (12 - 25)  SpO2: 98% (26 Jul 2018 11:23) (97% - 100%)	      MEDICATIONS  (STANDING):  ALBUTerol/ipratropium for Nebulization 3 milliLiter(s) Nebulizer every 6 hours  enoxaparin Injectable 40 milliGRAM(s) SubCutaneous every 24 hours  insulin lispro (HumaLOG) corrective regimen sliding scale   SubCutaneous three times a day before meals  insulin lispro (HumaLOG) corrective regimen sliding scale   SubCutaneous at bedtime  predniSONE   Tablet 60 milliGRAM(s) Oral daily  pyridostigmine 60 milliGRAM(s) Oral <User Schedule>  pyridostigmine  milliGRAM(s) Oral <User Schedule>  trimethoprim  160 mG/sulfamethoxazole 800 mG 1 Tablet(s) Oral <User Schedule>    MEDICATIONS  (PRN):  ALPRAZolam 0.25 milliGRAM(s) Oral every 12 hours PRN Anxiety  polyethylene glycol 3350 17 Gram(s) Oral daily PRN Constipation      Exam:  Alert, awake, speech fluent, no dysarthria  exam shows improving, mild ptosis R/L, able to do prolonged upward gaze,  UE BL 5/5, neck flex/ext 5/5  LE BL 5/5                          10.3   15.5  )-----------( 182      ( 26 Jul 2018 00:41 )             30.2

## 2018-07-26 NOTE — PROGRESS NOTE ADULT - ASSESSMENT
27F with PMHX of myasthenia gravis presents with worsening  worsening ptosis and weakness x 2 months, admitted for MG exacerbation, unresponsive to IVIG, transferred to MICU for worsening SOB and initiation of plasmapheresis.     #Neuro:  Myasthenia gravis  - s/p 1x IVIG, and plasmapheresis x3  - planned for total of 5 sessions of plasmapheresis  - neuro recs appreciated. continue with Solumedrol 50mg qd  - patients secretions much improved. c/w home pyridostigmine 60mg QID and 180 qhs  - will obtain CT chest to r/o thymoma   - AAOx3    # Resp;  Shortness of breath  - s/p intubation for respiratory failure in the setting of MG, now s/p extubation  - resolved     # CV:  - patient with mild hypotension overnight which resolved following 500 cc IVF bolus  - will continue to monitor   - otherwise stable    #ID:  Possible PNA  - patient with possible PNA seen on CXR  - vanc d/brooks   - continue with Zosyn (today final day) and bactim    # Renal:  - Cr WNL- no issues at this time    #GI:  - patient passed bedside s/s eval   - continue with regular diet  - formal s/s pending     #DVT ppx:  - lovenox subq 27F with PMHX of myasthenia gravis presents with worsening  worsening ptosis and weakness x 2 months, admitted for MG exacerbation, unresponsive to IVIG, transferred to MICU for worsening SOB and initiation of plasmapheresis.     #Neuro:  Myasthenia gravis  - s/p 1x IVIG, and plasmapheresis x4  - planned for total of 5 sessions of plasmapheresis  - neuro recs appreciated. continue with Solumedrol 50mg qd  - c/w home pyridostigmine 60mg QID and 180 qhs  - per conversation with radiology, patients CT from May 2018 has no evidence of thymoma    - AAOx3    # Resp;  Shortness of breath  - s/p intubation for respiratory failure in the setting of MG, now s/p extubation  - resolved     # CV:  - no further episodes of hypotension  - will continue to monitor     #ID:  Possible PNA  - patient with possible PNA seen on CXR  - s/p course with Vanc and Zosyn  - c/w bactim    # Renal:  - Cr WNL- no issues at this time    #GI:  - patient passed bedside s/s eval   - continue with regular diet    #DVT ppx:  - lovenox subq

## 2018-07-27 ENCOUNTER — TRANSCRIPTION ENCOUNTER (OUTPATIENT)
Age: 28
End: 2018-07-27

## 2018-07-27 VITALS — OXYGEN SATURATION: 100 %

## 2018-07-27 LAB
ALBUMIN SERPL ELPH-MCNC: 4.7 G/DL — SIGNIFICANT CHANGE UP (ref 3.3–5)
ALP SERPL-CCNC: 40 U/L — SIGNIFICANT CHANGE UP (ref 40–120)
ALT FLD-CCNC: 22 U/L — SIGNIFICANT CHANGE UP (ref 10–45)
ANION GAP SERPL CALC-SCNC: 13 MMOL/L — SIGNIFICANT CHANGE UP (ref 5–17)
APTT BLD: 27.2 SEC — LOW (ref 27.5–37.4)
AST SERPL-CCNC: 17 U/L — SIGNIFICANT CHANGE UP (ref 10–40)
BASOPHILS # BLD AUTO: 0 K/UL — SIGNIFICANT CHANGE UP (ref 0–0.2)
BASOPHILS NFR BLD AUTO: 0.1 % — SIGNIFICANT CHANGE UP (ref 0–2)
BILIRUB SERPL-MCNC: 0.4 MG/DL — SIGNIFICANT CHANGE UP (ref 0.2–1.2)
BUN SERPL-MCNC: 16 MG/DL — SIGNIFICANT CHANGE UP (ref 7–23)
CALCIUM SERPL-MCNC: 9.4 MG/DL — SIGNIFICANT CHANGE UP (ref 8.4–10.5)
CHLORIDE SERPL-SCNC: 99 MMOL/L — SIGNIFICANT CHANGE UP (ref 96–108)
CO2 SERPL-SCNC: 25 MMOL/L — SIGNIFICANT CHANGE UP (ref 22–31)
CREAT SERPL-MCNC: 0.7 MG/DL — SIGNIFICANT CHANGE UP (ref 0.5–1.3)
EOSINOPHIL # BLD AUTO: 0 K/UL — SIGNIFICANT CHANGE UP (ref 0–0.5)
EOSINOPHIL NFR BLD AUTO: 0.2 % — SIGNIFICANT CHANGE UP (ref 0–6)
FIBRINOGEN PPP-MCNC: 335 MG/DL — SIGNIFICANT CHANGE UP (ref 310–510)
FIBRINOGEN PPP-MCNC: 337 MG/DL — SIGNIFICANT CHANGE UP (ref 310–510)
GLUCOSE BLDC GLUCOMTR-MCNC: 144 MG/DL — HIGH (ref 70–99)
GLUCOSE SERPL-MCNC: 105 MG/DL — HIGH (ref 70–99)
HCT VFR BLD CALC: 32.3 % — LOW (ref 34.5–45)
HCT VFR BLD CALC: 32.9 % — LOW (ref 34.5–45)
HGB BLD-MCNC: 10 G/DL — LOW (ref 11.5–15.5)
HGB BLD-MCNC: 10.5 G/DL — LOW (ref 11.5–15.5)
INR BLD: 1.04 RATIO — SIGNIFICANT CHANGE UP (ref 0.88–1.16)
LYMPHOCYTES # BLD AUTO: 1 K/UL — SIGNIFICANT CHANGE UP (ref 1–3.3)
LYMPHOCYTES # BLD AUTO: 8.2 % — LOW (ref 13–44)
MAGNESIUM SERPL-MCNC: 2 MG/DL — SIGNIFICANT CHANGE UP (ref 1.6–2.6)
MCHC RBC-ENTMCNC: 22.9 PG — LOW (ref 27–34)
MCHC RBC-ENTMCNC: 23.6 PG — LOW (ref 27–34)
MCHC RBC-ENTMCNC: 31 GM/DL — LOW (ref 32–36)
MCHC RBC-ENTMCNC: 32.1 GM/DL — SIGNIFICANT CHANGE UP (ref 32–36)
MCV RBC AUTO: 73.6 FL — LOW (ref 80–100)
MCV RBC AUTO: 73.8 FL — LOW (ref 80–100)
MONOCYTES # BLD AUTO: 0.2 K/UL — SIGNIFICANT CHANGE UP (ref 0–0.9)
MONOCYTES NFR BLD AUTO: 1.7 % — LOW (ref 2–14)
NEUTROPHILS # BLD AUTO: 10.9 K/UL — HIGH (ref 1.8–7.4)
NEUTROPHILS NFR BLD AUTO: 89.8 % — HIGH (ref 43–77)
PHOSPHATE SERPL-MCNC: 4.1 MG/DL — SIGNIFICANT CHANGE UP (ref 2.5–4.5)
PLAT MORPH BLD: NORMAL — SIGNIFICANT CHANGE UP
PLATELET # BLD AUTO: 208 K/UL — SIGNIFICANT CHANGE UP (ref 150–400)
PLATELET # BLD AUTO: 224 K/UL — SIGNIFICANT CHANGE UP (ref 150–400)
POTASSIUM SERPL-MCNC: 3.7 MMOL/L — SIGNIFICANT CHANGE UP (ref 3.5–5.3)
POTASSIUM SERPL-SCNC: 3.7 MMOL/L — SIGNIFICANT CHANGE UP (ref 3.5–5.3)
PROT SERPL-MCNC: 6.7 G/DL — SIGNIFICANT CHANGE UP (ref 6–8.3)
PROTHROM AB SERPL-ACNC: 11.3 SEC — SIGNIFICANT CHANGE UP (ref 9.8–12.7)
RBC # BLD: 4.37 M/UL — SIGNIFICANT CHANGE UP (ref 3.8–5.2)
RBC # BLD: 4.46 M/UL — SIGNIFICANT CHANGE UP (ref 3.8–5.2)
RBC # FLD: 20.3 % — HIGH (ref 10.3–14.5)
RBC # FLD: 20.9 % — HIGH (ref 10.3–14.5)
RBC BLD AUTO: SIGNIFICANT CHANGE UP
SODIUM SERPL-SCNC: 137 MMOL/L — SIGNIFICANT CHANGE UP (ref 135–145)
WBC # BLD: 12.1 K/UL — HIGH (ref 3.8–10.5)
WBC # BLD: 18.9 K/UL — HIGH (ref 3.8–10.5)
WBC # FLD AUTO: 12.1 K/UL — HIGH (ref 3.8–10.5)
WBC # FLD AUTO: 18.9 K/UL — HIGH (ref 3.8–10.5)

## 2018-07-27 PROCEDURE — 94799 UNLISTED PULMONARY SVC/PX: CPT

## 2018-07-27 PROCEDURE — 94003 VENT MGMT INPAT SUBQ DAY: CPT

## 2018-07-27 PROCEDURE — 82550 ASSAY OF CK (CPK): CPT

## 2018-07-27 PROCEDURE — 99285 EMERGENCY DEPT VISIT HI MDM: CPT

## 2018-07-27 PROCEDURE — 36514 APHERESIS PLASMA: CPT

## 2018-07-27 PROCEDURE — 94150 VITAL CAPACITY TEST: CPT

## 2018-07-27 PROCEDURE — 83605 ASSAY OF LACTIC ACID: CPT

## 2018-07-27 PROCEDURE — 80053 COMPREHEN METABOLIC PANEL: CPT

## 2018-07-27 PROCEDURE — 71045 X-RAY EXAM CHEST 1 VIEW: CPT

## 2018-07-27 PROCEDURE — 84702 CHORIONIC GONADOTROPIN TEST: CPT

## 2018-07-27 PROCEDURE — 99239 HOSP IP/OBS DSCHRG MGMT >30: CPT

## 2018-07-27 PROCEDURE — 94640 AIRWAY INHALATION TREATMENT: CPT

## 2018-07-27 PROCEDURE — 97116 GAIT TRAINING THERAPY: CPT

## 2018-07-27 PROCEDURE — P9016: CPT

## 2018-07-27 PROCEDURE — 81003 URINALYSIS AUTO W/O SCOPE: CPT

## 2018-07-27 PROCEDURE — 85610 PROTHROMBIN TIME: CPT

## 2018-07-27 PROCEDURE — 85027 COMPLETE CBC AUTOMATED: CPT

## 2018-07-27 PROCEDURE — 97530 THERAPEUTIC ACTIVITIES: CPT

## 2018-07-27 PROCEDURE — P9045: CPT

## 2018-07-27 PROCEDURE — 86901 BLOOD TYPING SEROLOGIC RH(D): CPT

## 2018-07-27 PROCEDURE — 83735 ASSAY OF MAGNESIUM: CPT

## 2018-07-27 PROCEDURE — 84100 ASSAY OF PHOSPHORUS: CPT

## 2018-07-27 PROCEDURE — 84295 ASSAY OF SERUM SODIUM: CPT

## 2018-07-27 PROCEDURE — 36415 COLL VENOUS BLD VENIPUNCTURE: CPT

## 2018-07-27 PROCEDURE — 82803 BLOOD GASES ANY COMBINATION: CPT

## 2018-07-27 PROCEDURE — 82962 GLUCOSE BLOOD TEST: CPT

## 2018-07-27 PROCEDURE — 87070 CULTURE OTHR SPECIMN AEROBIC: CPT

## 2018-07-27 PROCEDURE — 84132 ASSAY OF SERUM POTASSIUM: CPT

## 2018-07-27 PROCEDURE — 94002 VENT MGMT INPAT INIT DAY: CPT

## 2018-07-27 PROCEDURE — 99232 SBSQ HOSP IP/OBS MODERATE 35: CPT

## 2018-07-27 PROCEDURE — P9012: CPT

## 2018-07-27 PROCEDURE — 80202 ASSAY OF VANCOMYCIN: CPT

## 2018-07-27 PROCEDURE — P9011: CPT

## 2018-07-27 PROCEDURE — 84484 ASSAY OF TROPONIN QUANT: CPT

## 2018-07-27 PROCEDURE — 36430 TRANSFUSION BLD/BLD COMPNT: CPT

## 2018-07-27 PROCEDURE — 82947 ASSAY GLUCOSE BLOOD QUANT: CPT

## 2018-07-27 PROCEDURE — 83550 IRON BINDING TEST: CPT

## 2018-07-27 PROCEDURE — 82330 ASSAY OF CALCIUM: CPT

## 2018-07-27 PROCEDURE — 86850 RBC ANTIBODY SCREEN: CPT

## 2018-07-27 PROCEDURE — 97165 OT EVAL LOW COMPLEX 30 MIN: CPT

## 2018-07-27 PROCEDURE — 85730 THROMBOPLASTIN TIME PARTIAL: CPT

## 2018-07-27 PROCEDURE — 97162 PT EVAL MOD COMPLEX 30 MIN: CPT

## 2018-07-27 PROCEDURE — 86900 BLOOD TYPING SEROLOGIC ABO: CPT

## 2018-07-27 PROCEDURE — 80048 BASIC METABOLIC PNL TOTAL CA: CPT

## 2018-07-27 PROCEDURE — 85014 HEMATOCRIT: CPT

## 2018-07-27 PROCEDURE — 82565 ASSAY OF CREATININE: CPT

## 2018-07-27 PROCEDURE — 99232 SBSQ HOSP IP/OBS MODERATE 35: CPT | Mod: 25

## 2018-07-27 PROCEDURE — 93005 ELECTROCARDIOGRAM TRACING: CPT

## 2018-07-27 PROCEDURE — 86923 COMPATIBILITY TEST ELECTRIC: CPT

## 2018-07-27 PROCEDURE — 82553 CREATINE MB FRACTION: CPT

## 2018-07-27 PROCEDURE — 82435 ASSAY OF BLOOD CHLORIDE: CPT

## 2018-07-27 PROCEDURE — P9059: CPT

## 2018-07-27 PROCEDURE — P9041: CPT

## 2018-07-27 PROCEDURE — 85384 FIBRINOGEN ACTIVITY: CPT

## 2018-07-27 RX ORDER — SODIUM CHLORIDE 9 MG/ML
500 INJECTION INTRAMUSCULAR; INTRAVENOUS; SUBCUTANEOUS ONCE
Qty: 0 | Refills: 0 | Status: COMPLETED | OUTPATIENT
Start: 2018-07-27 | End: 2018-07-27

## 2018-07-27 RX ORDER — PYRIDOSTIGMINE BROMIDE 60 MG/5ML
1 SOLUTION ORAL
Qty: 0 | Refills: 0 | COMMUNITY
Start: 2018-07-27

## 2018-07-27 RX ADMIN — Medication 3 MILLILITER(S): at 05:31

## 2018-07-27 RX ADMIN — PYRIDOSTIGMINE BROMIDE 60 MILLIGRAM(S): 60 SOLUTION ORAL at 10:25

## 2018-07-27 RX ADMIN — Medication 3 MILLILITER(S): at 12:42

## 2018-07-27 RX ADMIN — Medication 3 MILLILITER(S): at 00:04

## 2018-07-27 RX ADMIN — Medication 60 MILLIGRAM(S): at 06:14

## 2018-07-27 RX ADMIN — PYRIDOSTIGMINE BROMIDE 60 MILLIGRAM(S): 60 SOLUTION ORAL at 06:14

## 2018-07-27 RX ADMIN — SODIUM CHLORIDE 500 MILLILITER(S): 9 INJECTION INTRAMUSCULAR; INTRAVENOUS; SUBCUTANEOUS at 11:00

## 2018-07-27 RX ADMIN — Medication 3 MILLILITER(S): at 17:21

## 2018-07-27 RX ADMIN — PYRIDOSTIGMINE BROMIDE 60 MILLIGRAM(S): 60 SOLUTION ORAL at 15:27

## 2018-07-27 NOTE — DISCHARGE NOTE ADULT - ADDITIONAL INSTRUCTIONS
Opthalmology follow up: [phone] Opthalmology follow up: [phone]    Neurologist specializing in myasthenia gravis: [phone]

## 2018-07-27 NOTE — DISCHARGE NOTE ADULT - PLAN OF CARE
Resolution You came into the hospital because you were having a myasthenia exacerbation. You shortly required a tube to be placed in your throat to help you breath, and you required a Plasma exchange, in order to get your strength back. Please continue to take your medications as prescribed. Please take 5 tablets of prednisone once a day from 7/28-7/31, take 4 tablets once a day from 8/1-8/4, take 3 tablets once a day from 8/5-8/8, and take 2 tablets once a day from 8/9 on. Follow up with your Neurologist within 2 weeks of discharge. If you start having worsening shortness of breath, severe weakness, or excessive secretions, call your Neurologist immediately, or return to the ED.

## 2018-07-27 NOTE — DISCHARGE NOTE ADULT - PROVIDER TOKENS
TOKVANESA:'[id number]' TOKEN:'[id number]',FREE:[LAST:[Opthalmology],PHONE:[phone]],FAX:[(   )    -]] TOKEN:'[id number]',FREE:[LAST:[Opthalmology],PHONE:[phone]],FAX:[(   )    -]],TOKEN:'[id number]'

## 2018-07-27 NOTE — OCCUPATIONAL THERAPY INITIAL EVALUATION ADULT - PERTINENT HX OF CURRENT PROBLEM, REHAB EVAL
26yo F with PMH MG (diagnosed 2/2018) presenting with worsening ptosis and weakness x 2 months. Pt reports she underwent 5 rounds of plasmapheresis in 3/2018 and continued PO pyridostigmine. Pt reports she felt well for about 1 month and symptoms have been gradually worsening.

## 2018-07-27 NOTE — DISCHARGE NOTE ADULT - CARE PROVIDER_API CALL
Linda Sanchez (DO), Neurology  [address] [place] [address]  Phone: [phone]  Fax: [phone] Linda Sanchez (DO), Neurology  [address] [place] [address]  Phone: [phone]  Fax: [phone]    Opthalmology,   Phone: [phone]  Fax: (   )    - Linda Sanchez (), Neurology  [address] [place] [address]  Phone: [phone]  Fax: [phone]    Opthalmology,   Phone: [phone]  Fax: (   )    -    Branden Delgado), Neurology  [address] [place] [address]  Phone: [phone]  Fax: [phone]

## 2018-07-27 NOTE — PROGRESS NOTE ADULT - SUBJECTIVE AND OBJECTIVE BOX
27 year old female who was admitted for IVIG infusion for exacerbation of MG symptoms, failed to respond and blood bank initiated TPE on  for worsening respiratory symptoms. She was intubated the following day and has so far undergone 4 TPE every other day. There has been  improvement in her breathing and she was extubated on . The first 3 procedures of TPE were performed using 5% albumin as replacement fluid. She had bleeding from central lines on  that required FFP and cryo transfusion overnight, the next TPE on  was performed using 5% albumin and FFP as replacement fluid. No further episodes of bleeding. Preprocedure fibrinogen levels are normal.       She feels good today, with no weakness, able to ambulate without assistance. She has significant improvement in her ptosis although continues to have double vision and has covered her right eye to overcome double vision. The [facility] site is dry and intact with no blood clots. Denies fever, SOB, chest pain.    PAST MEDICAL & SURGICAL HISTORY:  Myasthenia gravis  Asthma  No significant past surgical history      MEDICATIONS  (STANDING):  ALBUTerol/ipratropium for Nebulization 3 milliLiter(s) Nebulizer every 6 hours  enoxaparin Injectable 40 milliGRAM(s) SubCutaneous every 24 hours  insulin lispro (HumaLOG) corrective regimen sliding scale   SubCutaneous three times a day before meals  insulin lispro (HumaLOG) corrective regimen sliding scale   SubCutaneous at bedtime  predniSONE   Tablet 60 milliGRAM(s) Oral daily  pyridostigmine 60 milliGRAM(s) Oral <User Schedule>  pyridostigmine  milliGRAM(s) Oral <User Schedule>  sodium chloride 0.9% Bolus 500 milliLiter(s) IV Bolus once  trimethoprim  160 mG/sulfamethoxazole 800 mG 1 Tablet(s) Oral <User Schedule>    MEDICATIONS  (PRN):  ALPRAZolam 0.25 milliGRAM(s) Oral every 12 hours PRN Anxiety  polyethylene glycol 3350 17 Gram(s) Oral daily PRN Constipation      Social History:  Single, denies smoking and drinking      FAMILY HISTORY:  No pertinent family history in first degree relatives      Allergies  NKDA  shellfish (Anaphylaxis)    REVIEW OF SYSTEMS:    CONSTITUTIONAL: No weakness, No fevers or chills  EYES/ENT: Significant improvement in B/l ptosis and double vision, No swallowing difficulty, no speech difficulty   RESPIRATORY: No SOB, cough, wheezing, hemoptysis   CARDIOVASCULAR: No chest pain or palpitations  GASTROINTESTINAL: No nausea, vomiting, diarrhea or abdominal pain   MUSCULOSKELETAL: weakness significantly improved, full range of motion preserved  NEUROLOGICAL: No headache no numbness  HEMATOLOGY: No anemia, no bleeding, no bruising  SKIN: No itching, burning, rashes, petechia, or lesions    Vital Signs Last 24 Hrs  T(C): 37.2 (2018 08:00), Max: 37.4 (2018 16:00)  T(F): 99 (2018 08:00), Max: 99.3 (2018 16:00)  HR: 95 (2018 11:12) (61 - 120)  BP: 93/51 (2018 11:12) (79/50 - 110/58)  BP(mean): 67 (2018 11:12) (57 - 81)  RR: 13 (2018 11:12) (12 - 25)  SpO2: 100% (2018 11:12) (97% - 100%)        Daily Weight in k (2018 04:00)    PHYSICAL EXAM:  General: Well developed, well nourished, comfortable in bed  Eyes: mild bilateral ptosis, right eye covered to overcome double vision  HEENT:  MMM,  Right IJ double lumen catheter, dressing dry and intact without tenderness.   Respiratory:  B/L CTA anteriorly  Cardiovascular:  S1, S2 + regular rhythm  Abdomen: Soft, nontender, nondistended, + bowel sounds  Extremities: Warm, nontender, no pedal edema  Skin: No visible rash  Neurological: AAOx3   5/5 all extremities   Psychiatry: Appropriate affect                        10.0   12.1  )-----------( 208      ( 2018 11:03 )             32.3       Hematocrit: 32.3 % ( @ 11:03)  Hematocrit: 32.9 % ( @ 00:48)  Hematocrit: 30.2 % ( @ 00:41)  Hematocrit: 29.4 % ( @ 00:53)        137  |  99  |  16  ----------------------------<  105<H>  3.7   |  25  |  0.70    Ca    9.4      2018 00:48  Phos  4.1       Mg     2.0         TPro  6.7  /  Alb  4.7  /  TBili  0.4  /  DBili  x   /  AST  17  /  ALT  22  /  AlkPhos  40      PT/INR - ( 2018 00:48 )   PT: 11.3 sec;   INR: 1.04 ratio      PTT - ( 2018 00:48 )  PTT:27.2 sec  Fibrinogen Assay: 335 mg/dL ( @ 11:03)  Fibrinogen Assay: 337 mg/dL ( @ 00:48)  Fibrinogen Assay: 320 mg/dL ( @ 00:41)  Fibrinogen Assay: 452 mg/dL ( @ 11:50)

## 2018-07-27 NOTE — PROGRESS NOTE ADULT - SUBJECTIVE AND OBJECTIVE BOX
Neurology Follow up note    Name  ANTONIO POON    HPI:  28yo F with PMH MG (diagnosed 2/2018) presenting with worsening ptosis and weakness x 2 months. Pt reports she underwent 5 rounds of plasmapheresis in 3/2018 and continued PO pyridostigmine. Pt reports she felt well for about 1 month and symptoms have been gradually worsening. Symptoms include ptosis, extremity weakness x 4 extremities, and blurred vision. Pt reports she has been on life support in past prior to plasmapheresis treatment. Denies any difficulty breathing. Able to ambulate. Pt saw new neurologist today and sent to ED for IVIG treatment. Denies any CP, SOB, n/v, numbness/tingling.   Neuro Linda Sanchez (18 Jul 2018 19:29)      Interval History - no events overnight. ocular symptoms and breathing improved.        MEDICATIONS  (STANDING):  ALBUTerol/ipratropium for Nebulization 3 milliLiter(s) Nebulizer every 6 hours  enoxaparin Injectable 40 milliGRAM(s) SubCutaneous every 24 hours  insulin lispro (HumaLOG) corrective regimen sliding scale   SubCutaneous three times a day before meals  insulin lispro (HumaLOG) corrective regimen sliding scale   SubCutaneous at bedtime  predniSONE   Tablet 60 milliGRAM(s) Oral daily  pyridostigmine 60 milliGRAM(s) Oral <User Schedule>  pyridostigmine  milliGRAM(s) Oral <User Schedule>  trimethoprim  160 mG/sulfamethoxazole 800 mG 1 Tablet(s) Oral <User Schedule>    MEDICATIONS  (PRN):  ALPRAZolam 0.25 milliGRAM(s) Oral every 12 hours PRN Anxiety  polyethylene glycol 3350 17 Gram(s) Oral daily PRN Constipation      Allergies    No Known Drug Allergies  shellfish (Anaphylaxis)    Intolerances        Objective:   Vital Signs Last 24 Hrs  T(C): 36.9 (27 Jul 2018 04:00), Max: 37.4 (26 Jul 2018 16:00)  T(F): 98.5 (27 Jul 2018 04:00), Max: 99.3 (26 Jul 2018 16:00)  HR: 97 (27 Jul 2018 10:08) (61 - 120)  BP: 91/54 (27 Jul 2018 10:08) (85/57 - 110/58)  BP(mean): 69 (27 Jul 2018 09:00) (63 - 81)  RR: 25 (27 Jul 2018 10:08) (12 - 25)  SpO2: 100% (27 Jul 2018 10:08) (97% - 100%)    xam:  Alert, awake, speech fluent, no dysarthria  exam shows improving, mild ptosis R/L, able to do prolonged upward gaze,  UE BL 5/5, neck flex/ext 5/5  LE BL 5/5      Other:    07-27    137  |  99  |  16  ----------------------------<  105<H>  3.7   |  25  |  0.70    Ca    9.4      27 Jul 2018 00:48  Phos  4.1     07-27  Mg     2.0     07-27    TPro  6.7  /  Alb  4.7  /  TBili  0.4  /  DBili  x   /  AST  17  /  ALT  22  /  AlkPhos  40  07-27 07-27    137  |  99  |  16  ----------------------------<  105<H>  3.7   |  25  |  0.70    Ca    9.4      27 Jul 2018 00:48  Phos  4.1     07-27  Mg     2.0     07-27    TPro  6.7  /  Alb  4.7  /  TBili  0.4  /  DBili  x   /  AST  17  /  ALT  22  /  AlkPhos  40  07-27    LIVER FUNCTIONS - ( 27 Jul 2018 00:48 )  Alb: 4.7 g/dL / Pro: 6.7 g/dL / ALK PHOS: 40 U/L / ALT: 22 U/L / AST: 17 U/L / GGT: x             Radiology    EKG:  tele:  TTE:  EEG:

## 2018-07-27 NOTE — DISCHARGE NOTE ADULT - CARE PROVIDERS DIRECT ADDRESSES
,[email or web address]_Unknown [email or web address],DirectAddress_Unknown,[email or web address]

## 2018-07-27 NOTE — PROGRESS NOTE ADULT - SUBJECTIVE AND OBJECTIVE BOX
*******************************  Kirby Venessa, PGY-2  Pager [phone]  *******************************    INTERVAL HPI/OVERNIGHT EVENTS:    SUBJECTIVE: Patient seen and examined at bedside. FRANCISCO overnight. Patient reports feeling overall unchanged compared to yesterday.     CONSTITUTIONAL: No weakness, fevers or chills  EYES/ENT: No visual changes;  No vertigo or throat pain   NECK: No pain or stiffness  RESPIRATORY: No cough, wheezing, hemoptysis; No shortness of breath  CARDIOVASCULAR: No chest pain or palpitations  GASTROINTESTINAL: No abdominal or epigastric pain. No nausea, vomiting, or hematemesis; No diarrhea or constipation. No melena or hematochezia.  GENITOURINARY: No dysuria, frequency or hematuria  NEUROLOGICAL: No numbness or weakness  SKIN: No itching, rashes    OBJECTIVE:    VITAL SIGNS:  ICU Vital Signs Last 24 Hrs  T(C): 36.9 (27 Jul 2018 04:00), Max: 37.4 (26 Jul 2018 16:00)  T(F): 98.5 (27 Jul 2018 04:00), Max: 99.3 (26 Jul 2018 16:00)  HR: 68 (27 Jul 2018 07:00) (61 - 120)  BP: 91/52 (27 Jul 2018 07:00) (85/57 - 110/58)  BP(mean): 68 (27 Jul 2018 07:00) (62 - 81)  ABP: --  ABP(mean): --  RR: 15 (27 Jul 2018 07:00) (12 - 25)  SpO2: 99% (27 Jul 2018 07:00) (97% - 100%)        07-26 @ 07:01  -  07-27 @ 07:00  --------------------------------------------------------  IN: 740 mL / OUT: 650 mL / NET: 90 mL      CAPILLARY BLOOD GLUCOSE      POCT Blood Glucose.: 91 mg/dL (26 Jul 2018 21:19)      PHYSICAL EXAM:  GENERAL: NAD  HEAD:  Atraumatic, Normocephalic  EYES: EOMI, PERRLA, conjunctiva and sclera clear  NECK: Supple, No JVD  CHEST/LUNG: Clear to auscultation bilaterally anteriorly; No wheeze  HEART: Regular rate and rhythm; No murmurs, rubs, or gallops  ABDOMEN: Soft, Nontender, Nondistended; Bowel sounds present  EXTREMITIES:  2+ Peripheral Pulses, No clubbing, cyanosis, or edema  NEURO: 5/5 strength in all 4 extremities.  SKIN: No rashes or lesions      MEDICATIONS:  MEDICATIONS  (STANDING):  ALBUTerol/ipratropium for Nebulization 3 milliLiter(s) Nebulizer every 6 hours  enoxaparin Injectable 40 milliGRAM(s) SubCutaneous every 24 hours  insulin lispro (HumaLOG) corrective regimen sliding scale   SubCutaneous three times a day before meals  insulin lispro (HumaLOG) corrective regimen sliding scale   SubCutaneous at bedtime  predniSONE   Tablet 60 milliGRAM(s) Oral daily  pyridostigmine 60 milliGRAM(s) Oral <User Schedule>  pyridostigmine  milliGRAM(s) Oral <User Schedule>  trimethoprim  160 mG/sulfamethoxazole 800 mG 1 Tablet(s) Oral <User Schedule>    MEDICATIONS  (PRN):  ALPRAZolam 0.25 milliGRAM(s) Oral every 12 hours PRN Anxiety  polyethylene glycol 3350 17 Gram(s) Oral daily PRN Constipation      ALLERGIES:  Allergies    No Known Drug Allergies  shellfish (Anaphylaxis)    Intolerances        LABS:                        10.5   18.9  )-----------( 224      ( 27 Jul 2018 00:48 )             32.9     07-27    137  |  99  |  16  ----------------------------<  105<H>  3.7   |  25  |  0.70    Ca    9.4      27 Jul 2018 00:48  Phos  4.1     07-27  Mg     2.0     07-27    TPro  6.7  /  Alb  4.7  /  TBili  0.4  /  DBili  x   /  AST  17  /  ALT  22  /  AlkPhos  40  07-27    PT/INR - ( 27 Jul 2018 00:48 )   PT: 11.3 sec;   INR: 1.04 ratio         PTT - ( 27 Jul 2018 00:48 )  PTT:27.2 sec      RADIOLOGY & ADDITIONAL TESTS: Reviewed.

## 2018-07-27 NOTE — PROGRESS NOTE ADULT - ASSESSMENT
27 year old female with MG crisis requiring intubation for worsening respiratory distress has undergone 4 TPE since 7/19 and has shown significant improvement in her strength, b/l ptosis and respiratory distress. She has well tolerated all the TPE procedures. She was extubated on 7/23. Due to bleed from central line on 7/23, we performed her 4th TPE on 7/25 using 5% albumin and FFP as replacement fluid. She has no other bleeding episode, current preprocedure fibrinogen levels: 335mg/dL. Today we will proceed with last #5/5 TPE by processing one plasma volume using 5% albumin as replacement fluid. The same was discussed with MICU team and the patient.

## 2018-07-27 NOTE — DISCHARGE NOTE ADULT - HOSPITAL COURSE
26yo F with PMH MG (diagnosed 2/2018) presenting with worsening ptosis and weakness x 2 months. Pt reports she underwent 5 rounds of plasmapheresis in 3/2018 and continued PO pyridostigmine. Pt reports she felt well for about 1 month and symptoms have been gradually worsening. Symptoms include ptosis, extremity weakness x 4 extremities, and blurred vision. Pt reports she has been on life support in past prior to plasmapheresis treatment. Denies any difficulty breathing. Able to ambulate. Pt saw new neurologist today and sent to ED for IVIG treatment. Denies any CP, SOB, n/v, numbness/tingling.     HOSPITAL COURSE:  Patient admitted to MICU for closer monitoring and for possible PLEX. Home pyridostigmine held in the setting of her MG crisis. Patient was given a dose of IVIG with no improvement. Patient was intubated for hypoxic and hypercapnic respiratory failure in the setting of her myasthenic crisis. Patient then ordered for a total of 5 sessions of plasma exchange. Patient started on steroids, and was extubated following her 3rd session of PLEX. Following extubation, patient had minimal secretions and was restarted on her home Pyridostigmine. Patients symptoms slowly improved and patient received her total 5 sessions of PLEX. Patient stable to be discharged to home and was instructed to follow up with her outpt neurologist within 2 weeks of discharge. Patient discharged with 2 week steroid taper to 20mg Prednisone, and continued Bactrim ppx against PCP. Patient discharged to home in stable condition.

## 2018-07-27 NOTE — DISCHARGE NOTE ADULT - PATIENT PORTAL LINK FT
You can access the [facility] Portal, offered by [facility], by registering with the following website: [email or web address]

## 2018-07-27 NOTE — PROGRESS NOTE ADULT - PROVIDER SPECIALTY LIST ADULT
MICU
Neurology
Transfusion Medicine
MICU
MICU

## 2018-07-27 NOTE — PROGRESS NOTE ADULT - ATTENDING COMMENTS
Pt is a 28yo F with PMH MG (diagnosed 2/2018) who presented with worsening ptosis and weakness x 2 months,  ophthalmoplegia and BL ptosis and muscle weakness.  She was admitted and despite early IVIG initiation was having worsening NIP and VC which led to intubation.   After receiving PLEX and steroids she is now extubated and breathing well, exam shows improvement but she still remains with bulbar weakness fatiguing.  Good peripheral strength.      impression: Myasthenia Gravis exacerbation  Plan:  [x] S/P Solumedrol 500 x 3 d  [] C/W PLEX  [] Restart Mestinon home dose 30-83-  [] CT Chest to r/o thymoma  [] CXR possible PNA on c/w zosyn, & bactrim
Pt seen and examined. 27 F with h/o Myasthenia gravis, now with acute resp failure with hypoxia and hypercapnia requiring intubation, 2/2 a MG crisis now also with bacterial pneumonia (? aspiration). Completed 3 days of solumedrol 500 mg IV daily  per neuro recs. Will change to Prednisone 60 mg daily today- if patient refuses will use equivalent solumedrol dosing. Cont PLEX every other day x 5 sessions - to have 3rd PLEX today. Cont broad spectrum antibiotic coverage with Vanc/ Zosyn for presumed pneumonia given prior increase in PEEP and FiO2. FUP combicath Cx. BAL Cxs from previous admission at [facility] showed MRSA and H influenza. FiO2 and PEEP downtitrated and patient doing well on pressure support trials. Shock state 2/2 sedation +/- sepsis. Titrate pressors to MAP of > 65. Overall prognosis guarded.     Diagnosis:  -Acute Hypoxemic Respiratory failure - extubated 7/23  -Myasthenia Crisis - continue PLEX - weakness and ptosis improving. Switch to Prednisone. Mestinon restarted. Patient has completed PLEX 5/5 with today. Neurology assistance appreciated. After PLEX will d/c line and if patient stable will d/c home.  -Oropharyngeal dysphagia - passed swallow eval - on PO diet  -Sepsis - presumed pneumonia due to respiratory distress and progressive hypoxemia prior - completed antibiotics  -Acute blood loss anemia - Hemoglobin stable. Oozing from shiley site significantly improved with FFP/cryo and surgicell dressings    Patient medically stable for transfer to neurology service. Should go to [facility] but if bed becomes necessary can be transferred to any other floor per discussion with Neuro
Pt seen and examined. 27 F with h/o Myasthenia gravis, now with acute resp failure with hypoxia and hypercapnia requiring intubation, 2/2 a MG crisis now also with bacterial pneumonia (? aspiration). Completed 3 days of solumedrol 500 mg IV daily  per neuro recs. Will change to Prednisone 60 mg daily today- if patient refuses will use equivalent solumedrol dosing. Cont PLEX every other day x 5 sessions - to have 3rd PLEX today. Cont broad spectrum antibiotic coverage with Vanc/ Zosyn for presumed pneumonia given prior increase in PEEP and FiO2. FUP combicath Cx. BAL Cxs from previous admission at [facility] showed MRSA and H influenza. FiO2 and PEEP downtitrated and patient doing well on pressure support trials. Shock state 2/2 sedation +/- sepsis. Titrate pressors to MAP of > 65. Overall prognosis guarded.     Diagnosis:  -Acute Hypoxemic Respiratory failure - extubated 7/23  -Myasthenia Crisis - continue PLEX - weakness and ptosis improving. Switch to Prednisone. Mestinon restarted. Patient for PLEX 4/5 today - if stable and no bleeding will plan for transfer to neurology service. Neurology assistance appreciated  -Oropharyngeal dysphagia - passed swallow eval - on PO diet  -Sepsis - presumed pneumonia due to respiratory distress and progressive hypoxemia prior - plan to complete Zosyn today  -Acute blood loss anemia - Hemoglobin stable. Oozing from shiley site significantly improved with FFP/cryo and surgicell dressings    - Critical Care Time Spent: 35 mins+. Frequent bedside visits.
Pt seen and examined. 27 F with h/o Myasthenia gravis, now with acute resp failure with hypoxia and hypercapnia requiring intubation, 2/2 a MG crisis now also with bacterial pneumonia (? aspiration). Completed 3 days of solumedrol 500 mg IV daily  per neuro recs. Will change to Prednisone 60 mg daily today- if patient refuses will use equivalent solumedrol dosing. Cont PLEX every other day x 5 sessions - to have 3rd PLEX today. Cont broad spectrum antibiotic coverage with Vanc/ Zosyn for presumed pneumonia given prior increase in PEEP and FiO2. FUP combicath Cx. BAL Cxs from previous admission at [facility] showed MRSA and H influenza. FiO2 and PEEP downtitrated and patient doing well on pressure support trials. Shock state 2/2 sedation +/- sepsis. Titrate pressors to MAP of > 65. Overall prognosis guarded.   - Critical Care Time Spent: 35 mins+. Frequent bedside visits.    Diagnosis:  -Acute Hypoxemic Respiratory failure - extubated 7/23  -Myasthenia Crisis - continue PLEX - weakness and ptosis improving. Switch to Prednisone. Mestinon restarted.   -Oropharyngeal dysphagia - passed swallow eval - on PO diet  -Sepsis - presumed pneumonia due to respiratory distress and progressive hypoxemia prior - d/c vanco no evidence of MRSA at present and continue Zosyn to complete 5 days  -Acute blood loss anemia - bleeding/oozing from shiley and TLC - will give FFP and cryo. Will plan for dermabond vs suture of line site if bleeding continues
Pt seen and examined. 27 F with h/o Myasthenia gravis, now with acute resp failure with hypoxia and hypercapnia requiring intubation, 2/2 a MG crisis now also with bacterial pneumonia (? aspiration). Completed 3 days of solumedrol 500 mg IV daily  per neuro recs. Will change to Prednisone 60 mg daily today- if patient refuses will use equivalent solumedrol dosing. Cont PLEX every other day x 5 sessions - to have 3rd PLEX today. Cont broad spectrum antibiotic coverage with Vanc/ Zosyn for presumed pneumonia given prior increase in PEEP and FiO2. FUP combicath Cx. BAL Cxs from previous admission at [facility] showed MRSA and H influenza. FiO2 and PEEP downtitrated and patient doing well on pressure support trials. Shock state 2/2 sedation +/- sepsis. Titrate pressors to MAP of > 65. Overall prognosis guarded.   - Critical Care Time Spent: 35 mins    Diagnosis:  -Acute Hypoxemic Respiratory failure - wean to extubate  -Myasthenia Crisis - continue PLEX - will decrease steroid dosing, Neuro followup. Weakness and ptosis improving  -Oropharyngeal dysphagia - NPO for possible extubation - swallow evaluation if extubated  -Sepsis - presumed pneumonia due to respiratory distress and progressive hypoxemia prior - continue antibiotics. followup cultures
Pt seen and examined. 27 F with h/o Myasthenia gravis, now with acute resp failure with hypoxia and hypercapnia requiring intubation, 2/2 a MG crisis now also with bacterial pneumonia (? aspiration). Completed 3 days of solumedrol 500 mg IV daily  per neuro recs. Will change to Prednisone 60 mg daily today- if patient refuses will use equivalent solumedrol dosing. Cont PLEX every other day x 5 sessions - to have 3rd PLEX today. Cont broad spectrum antibiotic coverage with Vanc/ Zosyn for presumed pneumonia given prior increase in PEEP and FiO2. FUP combicath Cx. BAL Cxs from previous admission at [facility] showed MRSA and H influenza. FiO2 and PEEP downtitrated and patient doing well on pressure support trials. Shock state 2/2 sedation +/- sepsis. Titrate pressors to MAP of > 65. Overall prognosis guarded.     Diagnosis:  -Acute Hypoxemic Respiratory failure - extubated 7/23  -Myasthenia Crisis - continue PLEX - weakness and ptosis improving. Switch to Prednisone. Mestinon restarted. Patient has completed PLEX 4/5 with next session planned for tomorrow. Neurology assistance appreciated  -Oropharyngeal dysphagia - passed swallow eval - on PO diet  -Sepsis - presumed pneumonia due to respiratory distress and progressive hypoxemia prior - completed antibiotics  -Acute blood loss anemia - Hemoglobin stable. Oozing from shiley site significantly improved with FFP/cryo and surgicell dressings    Patient medically stable for transfer to neurology service. Should go to [facility] but if bed becomes necessary can be transferred to any other floor per discussion with Neuro
Patient extubated and feeling better. No respiratory distress, strength 5/5 with improved ptosis and ophthalmoparesis. Getting PLEX 4/5 today, last will be Friday. Ok to transfer to neurology, continue outpatient mestinon, will check NIFs/VC, chest CT, and discuss follow-up with her outpatient neurologist, Dr Sanchez
Pt seen and examined. 27 F with h/o Myasthenia gravis, now with acute resp failure with hypoxia and hypercapnia requiring intubation, 2/2 a MG crisis. Cont solumedrol 500 mg IV daily x 3 days per neuro recs. Receiving second plasma exchange today. Now with increasing O2 requirements, mobile air bronchograms on bedside US. Probable pneumonia (? 2/2 aspiration). Will check Bcxs, Scx, UA. Check CXR. PEEP increased to 8, FiO2 70 %. Will start broad coverage with Vanc/ Zosyn. Shock state 2/2 sedation +/- sepsis. Titrate pressors to MAP of > 65. Overall prognosis guarded.   - Critical Care Time Spent: 35 mins
Pt seen and examined. 27 F with h/o Myasthenia gravis, now with acute resp failure with hypoxia and hypercapnia requiring intubation, 2/2 a MG crisis. Will initiate solumedrol 500 mg IV daily x 3 days today per neuro recs. Plan for second plasma exchange today. Shock state possibly 2/2 sedation, bedside US with IVC<1.5 cm. Will bolus with 1 L LR. titrate pressors to MAP of > 65. Overall prognosis guarded.   - Critical Care Time Spent: 35 mins
Pt seen and examined. 27 F with h/o Myasthenia gravis, now with acute resp failure with hypoxia and hypercapnia requiring intubation, 2/2 a MG crisis now also with bacterial pneumonia (? aspiration). Completed 3 days of solumedrol 500 mg IV daily  per neuro recs. Will change to Prednisone 60 mg daily tomorrow. Cont PLEX every other day x 5 sessions. Cont broad spectrum antibiotic coverage with Vanc/ Zosyn. FUP combicath Cx. BAL Cxs from previous admission at [facility] showed MRSA and H influenza. FiO2 now titrated down to 50 %, PEEP at 8. Will cont to bring down FiO2 and PEEP as tolerated. Shock state 2/2 sedation +/- sepsis. Titrate pressors to MAP of > 65. Overall prognosis guarded.   - Critical Care Time Spent: 35 mins

## 2018-07-27 NOTE — DISCHARGE NOTE ADULT - MEDICATION SUMMARY - MEDICATIONS TO TAKE
I will START or STAY ON the medications listed below when I get home from the hospital:    predniSONE 10 mg oral tablet  -- Take 50mg once a day from 7/28-7/31  Take 40mg once a day from 8/1-8/4  Take 30mg once a day from 8/5-8/8  Take 20mg once a day from 8/9 on.  -- It is very important that you take or use this exactly as directed.  Do not skip doses or discontinue unless directed by your doctor.  Obtain medical advice before taking any non-prescription drugs as some may affect the action of this medication.  Take with food or milk.    -- Indication: For Myasthenia gravis    pyridostigmine 60 mg oral tablet  -- 1 tab(s) by mouth 4 times a day  -- Indication: For Myasthenia gravis    pyridostigmine 180 mg oral tablet, extended release  -- 1 tab(s) by mouth at bedtime  -- Indication: For Myasthenia gravis    sulfamethoxazole-trimethoprim 800 mg-160 mg oral tablet  -- 1 tab(s) by mouth Monday, Wednesday, and Friday while on Prednisone  -- Indication: For PCP Prophylaxis

## 2018-07-27 NOTE — PROGRESS NOTE ADULT - NSHPATTENDINGPLANDISCUSS_GEN_ALL_CORE
MICU and apheresis team
MICU and apheresis team
MICU team
MICU team
Dr. Kwesi Floyd, patient and neurology consult service
MICU team
MICU team
neuro and icu team
MICU team
ICU team
MICU team

## 2018-07-27 NOTE — PROGRESS NOTE ADULT - ASSESSMENT
27F with PMHX of myasthenia gravis presents with worsening  worsening ptosis and weakness x 2 months, admitted for MG exacerbation, unresponsive to IVIG, transferred to MICU for worsening SOB and initiation of plasmapheresis.     #Neuro:  Myasthenia gravis  - s/p 1x IVIG, and plasmapheresis x4  - planned for sessions of plasmapheresis today  - neuro recs appreciated. continue with Prednisone 60 qd  - c/w home pyridostigmine 60mg QID and 180 qhs  - per conversation with radiology, patients CT from May 2018 has no evidence of thymoma    - AAOx3    # Resp;  Shortness of breath  - s/p intubation for respiratory failure in the setting of MG, now s/p extubation  - resolved     # CV:  - no further episodes of hypotension  - will continue to monitor     #ID:  Possible PNA  - patient with possible PNA seen on CXR  - s/p course with Vanc and Zosyn  - c/w bactim ppx    # Renal:  - Cr WNL- no issues at this time    #GI:  - patient passed bedside s/s eval   - continue with regular diet    #DVT ppx:  - lovenox subq

## 2018-07-27 NOTE — PROGRESS NOTE ADULT - ASSESSMENT
28yo F with PMH MG (diagnosed 2/2018) presenting with worsening ptosis and weakness x 2 months. P/W ophthalmoplegia and BL ptosis and muscle weakness, she was admitted and IVIG initiated, regardless, 24 hours after admission, NIF and VC were worsening, intubated receiving PLEX and steroids now extubated and tolerating well, exam shows improvement.  impression: MG exacerbation  Plan:  [x] S/P Solumedrol 500 x 3 d  [] C/W PLEX today will have 4th session 7/27  [] mestinon short acting qid  [] Continue Long acting mestinon 180mg qd  [] Will discuss case with Dr. Sanchez  [] potential d/c out of MICU if stable 28yo F with PMH MG (diagnosed 2/2018) presenting with worsening ptosis and weakness x 2 months. P/W ophthalmoplegia and BL ptosis and muscle weakness, she was admitted and IVIG initiated, regardless, 24 hours after admission, NIF and VC were worsening, intubated receiving PLEX and steroids now extubated and tolerating well, exam shows improvement.  impression: MG exacerbation  Plan:  [x] S/P Solumedrol 500 x 3 d  [] C/W PLEX today will have 4th session 7/27  [] mestinon short acting qid  [] Continue Long acting mestinon 180mg qd  [] Will discuss case with Dr. Sanchez  [] f/u PT recommendations  [] potential d/c out of MICU if stable

## 2018-08-27 ENCOUNTER — INPATIENT (INPATIENT)
Facility: HOSPITAL | Age: 28
LOS: 1 days | Discharge: HOME | DRG: 57 | End: 2018-08-29
Attending: PSYCHIATRY & NEUROLOGY | Admitting: PSYCHIATRY & NEUROLOGY
Payer: COMMERCIAL

## 2018-08-27 VITALS
DIASTOLIC BLOOD PRESSURE: 79 MMHG | HEART RATE: 80 BPM | TEMPERATURE: 99 F | OXYGEN SATURATION: 99 % | SYSTOLIC BLOOD PRESSURE: 123 MMHG | RESPIRATION RATE: 17 BRPM

## 2018-08-27 DIAGNOSIS — G70.01 MYASTHENIA GRAVIS WITH (ACUTE) EXACERBATION: ICD-10-CM

## 2018-08-27 LAB
ALBUMIN SERPL ELPH-MCNC: 4.6 G/DL — SIGNIFICANT CHANGE UP (ref 3.3–5)
ALP SERPL-CCNC: 101 U/L — SIGNIFICANT CHANGE UP (ref 40–120)
ALT FLD-CCNC: 10 U/L — SIGNIFICANT CHANGE UP (ref 10–45)
ANION GAP SERPL CALC-SCNC: 12 MMOL/L — SIGNIFICANT CHANGE UP (ref 5–17)
ANISOCYTOSIS BLD QL: SLIGHT — SIGNIFICANT CHANGE UP
APPEARANCE UR: CLEAR — SIGNIFICANT CHANGE UP
AST SERPL-CCNC: 21 U/L — SIGNIFICANT CHANGE UP (ref 10–40)
BASE EXCESS BLDV CALC-SCNC: -2 MMOL/L — SIGNIFICANT CHANGE UP (ref -2–2)
BASOPHILS # BLD AUTO: 0.1 K/UL — SIGNIFICANT CHANGE UP (ref 0–0.2)
BASOPHILS NFR BLD AUTO: 0.9 % — SIGNIFICANT CHANGE UP (ref 0–2)
BILIRUB SERPL-MCNC: 0.6 MG/DL — SIGNIFICANT CHANGE UP (ref 0.2–1.2)
BILIRUB UR-MCNC: NEGATIVE — SIGNIFICANT CHANGE UP
BUN SERPL-MCNC: 9 MG/DL — SIGNIFICANT CHANGE UP (ref 7–23)
CA-I SERPL-SCNC: 1.2 MMOL/L — SIGNIFICANT CHANGE UP (ref 1.12–1.3)
CALCIUM SERPL-MCNC: 9.4 MG/DL — SIGNIFICANT CHANGE UP (ref 8.4–10.5)
CHLORIDE BLDV-SCNC: 111 MMOL/L — HIGH (ref 96–108)
CHLORIDE SERPL-SCNC: 104 MMOL/L — SIGNIFICANT CHANGE UP (ref 96–108)
CO2 BLDV-SCNC: 25 MMOL/L — SIGNIFICANT CHANGE UP (ref 22–30)
CO2 SERPL-SCNC: 21 MMOL/L — LOW (ref 22–31)
COLOR SPEC: YELLOW — SIGNIFICANT CHANGE UP
CREAT SERPL-MCNC: 0.71 MG/DL — SIGNIFICANT CHANGE UP (ref 0.5–1.3)
DACRYOCYTES BLD QL SMEAR: SLIGHT — SIGNIFICANT CHANGE UP
DIFF PNL FLD: NEGATIVE — SIGNIFICANT CHANGE UP
ELLIPTOCYTES BLD QL SMEAR: SLIGHT — SIGNIFICANT CHANGE UP
EOSINOPHIL # BLD AUTO: 0.2 K/UL — SIGNIFICANT CHANGE UP (ref 0–0.5)
EOSINOPHIL NFR BLD AUTO: 2.2 % — SIGNIFICANT CHANGE UP (ref 0–6)
EPI CELLS # UR: SIGNIFICANT CHANGE UP /HPF
GAS PNL BLDV: 137 MMOL/L — SIGNIFICANT CHANGE UP (ref 136–145)
GAS PNL BLDV: SIGNIFICANT CHANGE UP
GLUCOSE BLDV-MCNC: 84 MG/DL — SIGNIFICANT CHANGE UP (ref 70–99)
GLUCOSE SERPL-MCNC: 90 MG/DL — SIGNIFICANT CHANGE UP (ref 70–99)
GLUCOSE UR QL: NEGATIVE — SIGNIFICANT CHANGE UP
HCG UR QL: NEGATIVE — SIGNIFICANT CHANGE UP
HCO3 BLDV-SCNC: 24 MMOL/L — SIGNIFICANT CHANGE UP (ref 21–29)
HCT VFR BLD CALC: 32.1 % — LOW (ref 34.5–45)
HCT VFR BLDA CALC: 32 % — LOW (ref 39–50)
HGB BLD CALC-MCNC: 10.2 G/DL — LOW (ref 11.5–15.5)
HGB BLD-MCNC: 10.2 G/DL — LOW (ref 11.5–15.5)
HYPOCHROMIA BLD QL: SLIGHT — SIGNIFICANT CHANGE UP
KETONES UR-MCNC: NEGATIVE — SIGNIFICANT CHANGE UP
LACTATE BLDV-MCNC: 1.5 MMOL/L — SIGNIFICANT CHANGE UP (ref 0.7–2)
LEUKOCYTE ESTERASE UR-ACNC: NEGATIVE — SIGNIFICANT CHANGE UP
LYMPHOCYTES # BLD AUTO: 2.6 K/UL — SIGNIFICANT CHANGE UP (ref 1–3.3)
LYMPHOCYTES # BLD AUTO: 36.9 % — SIGNIFICANT CHANGE UP (ref 13–44)
MAGNESIUM SERPL-MCNC: 2 MG/DL — SIGNIFICANT CHANGE UP (ref 1.6–2.6)
MCHC RBC-ENTMCNC: 22.4 PG — LOW (ref 27–34)
MCHC RBC-ENTMCNC: 31.9 GM/DL — LOW (ref 32–36)
MCV RBC AUTO: 70.2 FL — LOW (ref 80–100)
MICROCYTES BLD QL: SIGNIFICANT CHANGE UP
MONOCYTES # BLD AUTO: 0.9 K/UL — SIGNIFICANT CHANGE UP (ref 0–0.9)
MONOCYTES NFR BLD AUTO: 13.2 % — SIGNIFICANT CHANGE UP (ref 2–14)
NEUTROPHILS # BLD AUTO: 3.2 K/UL — SIGNIFICANT CHANGE UP (ref 1.8–7.4)
NEUTROPHILS NFR BLD AUTO: 46.8 % — SIGNIFICANT CHANGE UP (ref 43–77)
NITRITE UR-MCNC: NEGATIVE — SIGNIFICANT CHANGE UP
PCO2 BLDV: 46 MMHG — SIGNIFICANT CHANGE UP (ref 35–50)
PH BLDV: 7.33 — LOW (ref 7.35–7.45)
PH UR: 5.5 — SIGNIFICANT CHANGE UP (ref 5–8)
PLAT MORPH BLD: NORMAL — SIGNIFICANT CHANGE UP
PLATELET # BLD AUTO: 388 K/UL — SIGNIFICANT CHANGE UP (ref 150–400)
PO2 BLDV: 33 MMHG — SIGNIFICANT CHANGE UP (ref 25–45)
POIKILOCYTOSIS BLD QL AUTO: SLIGHT — SIGNIFICANT CHANGE UP
POLYCHROMASIA BLD QL SMEAR: SLIGHT — SIGNIFICANT CHANGE UP
POTASSIUM BLDV-SCNC: 3.8 MMOL/L — SIGNIFICANT CHANGE UP (ref 3.5–5.3)
POTASSIUM SERPL-MCNC: 4.1 MMOL/L — SIGNIFICANT CHANGE UP (ref 3.5–5.3)
POTASSIUM SERPL-SCNC: 4.1 MMOL/L — SIGNIFICANT CHANGE UP (ref 3.5–5.3)
PROT SERPL-MCNC: 7.6 G/DL — SIGNIFICANT CHANGE UP (ref 6–8.3)
PROT UR-MCNC: NEGATIVE — SIGNIFICANT CHANGE UP
RBC # BLD: 4.57 M/UL — SIGNIFICANT CHANGE UP (ref 3.8–5.2)
RBC # FLD: 19.7 % — HIGH (ref 10.3–14.5)
RBC BLD AUTO: ABNORMAL
RBC CASTS # UR COMP ASSIST: SIGNIFICANT CHANGE UP /HPF (ref 0–2)
SAO2 % BLDV: 54 % — LOW (ref 67–88)
SODIUM SERPL-SCNC: 137 MMOL/L — SIGNIFICANT CHANGE UP (ref 135–145)
SP GR SPEC: 1.02 — SIGNIFICANT CHANGE UP (ref 1.01–1.02)
TARGETS BLD QL SMEAR: SLIGHT — SIGNIFICANT CHANGE UP
UROBILINOGEN FLD QL: NEGATIVE — SIGNIFICANT CHANGE UP
WBC # BLD: 6.9 K/UL — SIGNIFICANT CHANGE UP (ref 3.8–10.5)
WBC # FLD AUTO: 6.9 K/UL — SIGNIFICANT CHANGE UP (ref 3.8–10.5)
WBC UR QL: SIGNIFICANT CHANGE UP /HPF (ref 0–5)

## 2018-08-27 PROCEDURE — 99285 EMERGENCY DEPT VISIT HI MDM: CPT | Mod: 25

## 2018-08-27 PROCEDURE — 71045 X-RAY EXAM CHEST 1 VIEW: CPT | Mod: 26

## 2018-08-27 PROCEDURE — 93010 ELECTROCARDIOGRAM REPORT: CPT

## 2018-08-27 RX ORDER — SODIUM CHLORIDE 9 MG/ML
1000 INJECTION, SOLUTION INTRAVENOUS
Qty: 0 | Refills: 0 | Status: DISCONTINUED | OUTPATIENT
Start: 2018-08-27 | End: 2018-08-28

## 2018-08-27 RX ORDER — PYRIDOSTIGMINE BROMIDE 60 MG/5ML
180 SOLUTION ORAL
Qty: 0 | Refills: 0 | Status: DISCONTINUED | OUTPATIENT
Start: 2018-08-27 | End: 2018-08-29

## 2018-08-27 RX ORDER — ENOXAPARIN SODIUM 100 MG/ML
40 INJECTION SUBCUTANEOUS EVERY 24 HOURS
Qty: 0 | Refills: 0 | Status: DISCONTINUED | OUTPATIENT
Start: 2018-08-27 | End: 2018-08-29

## 2018-08-27 RX ORDER — PYRIDOSTIGMINE BROMIDE 60 MG/5ML
60 SOLUTION ORAL
Qty: 0 | Refills: 0 | Status: DISCONTINUED | OUTPATIENT
Start: 2018-08-27 | End: 2018-08-29

## 2018-08-27 RX ADMIN — SODIUM CHLORIDE 100 MILLILITER(S): 9 INJECTION, SOLUTION INTRAVENOUS at 19:54

## 2018-08-27 RX ADMIN — PYRIDOSTIGMINE BROMIDE 180 MILLIGRAM(S): 60 SOLUTION ORAL at 22:25

## 2018-08-27 RX ADMIN — Medication 40 MILLIGRAM(S): at 21:50

## 2018-08-27 NOTE — ED PROVIDER NOTE - NS ED ROS FT
GENERAL: No fever or chills  EYES: no change in vision  HEENT: no trouble swallowing or speaking  CARDIAC: no chest pain  PULMONARY: no cough or SOB  GI: no abdominal pain, no nausea, no vomiting, no diarrhea or constipation  : No changes in urination  SKIN: no rashes  NEURO: +weakness of both eyelids, both arms, both legs. no numbness or tingling.   MSK: No joint pain     ~Darron Naylor PGY1

## 2018-08-27 NOTE — ED PROVIDER NOTE - PHYSICAL EXAMINATION
Gen: AAOx3, non-toxic  Head: NCAT  HEENT: EOMI, oral mucosa moist, normal conjunctiva  Lung: CTAB, no respiratory distress, no wheezes/rhonchi/rales B/L, speaking in full sentences  CV: RRR, no murmurs, rubs or gallops  Abd: soft, NTND, no guarding, no CVA tenderness  MSK: no visible deformities  Neuro: No focal sensory or motor deficits  Skin: Warm, well perfused, no rash  Psych: normal affect.     ~Darron Naylor PGY1

## 2018-08-27 NOTE — ED PROVIDER NOTE - ATTENDING CONTRIBUTION TO CARE
------------ATTENDING NOTE------------   pt w/  c/o several days of gradually increasing generalized weakness, fatigue, bilateral ptosis, feeling increasingly dyspnea on exertion, feels identical to multiple MG crises, no recent fevers/illness, evaluated by MICU and Neuro, no infectious signs/symptoms, labs overall wnl, -->  - Cirilo Canada MD   ----------------------------------------------

## 2018-08-27 NOTE — H&P ADULT - HISTORY OF PRESENT ILLNESS
Pt is a 26yo F with PMH Myasthenia Gravis (dx 2018), SS trait presenting with worsening ptosis and weakness for one week slowly progressive over this time period presenting with concern for Myasthenia Crisis. She was last admitted to [facility] in 2018 from 7/18-27. Prior to this admission she underwent 5 rounds of plasmapheresis in 3/2018 and was continued on po pyridostigmine at that time. Her symptoms on presentation last admission were extremity weakness, ptosis, blurred vision. She reports similar symptoms now with bilateral ptosis and arm leg weakness making it difficult to walk and difficult to hold plates. She is still able to sit up in bed, shrug her shoulders, life her head off the pillow, and cough. AST/ALT not elevated. She has been compliant with her home pyridostigmine 60mg qid (180mg qhs).     Last admission she was admitted for MICU for closer monitoring and possible PLEX with home pyridostigmine held in setting of MG crisis. She was given a dose of IVIG without improvement. She was then intubated with hypoxic/hypercapnic resp failure. She required a total of 5 sessions of plasma exchange with subsequent improvement. She was discharged on a 2-week steroid taper and Bactrim ppx which she completed.

## 2018-08-27 NOTE — ED ADULT NURSE NOTE - OBJECTIVE STATEMENT
patient came in with  complaining of weakness to arms ,legs, eyes (+ptosis) & face for 1.5 week.  Hx of myasthenia gravis. Denies difficulty breathing. Respiration easy & non-labored. Patient claimed she has full ability to ambulate without problems. Seen & examined by Dr. Canada.

## 2018-08-27 NOTE — CONSULT NOTE ADULT - ASSESSMENT
28yo F with PMH Myasthenia Gravis (dx 2018), SS trait presenting with worsening ptosis and weakness for one week slowly progressive over this time period presenting with concern for Myasthenia Crisis.    #Myasthenia Crisis: patient meets 0/6 [place] criteria at this time. 28yo F with PMH Myasthenia Gravis (dx 2018), SS trait presenting with worsening ptosis and weakness for one week slowly progressive over this time period presenting with concern for Myasthenia Crisis.    #Myasthenia Crisis: patient has no respiratory complaints at this time, is able to shrug and cough, speaking normally, endorsing some double vision and extremity weakness similar to prior episodes. Likely Myasthenia Gravis crisis but not in need of ICU level of care at this time. NIF -60, Vital capacity 2.4 L. Excellent respiratory function. Agree with Neurology consult.  -f/u neurology recommendations.  -vitals q4h  -NIF and Vital capacity  -no need for emergent plasmapheresis at this time.  -no need for emergent intubation at this time.  -continue with current management    Patient is not a candidate for MICU at this time.    Yinka Rendon  MICU Night Resident, PGY-3  Pager [phone] / [phone] / [employer] [phone]

## 2018-08-27 NOTE — CONSULT NOTE ADULT - SUBJECTIVE AND OBJECTIVE BOX
CHIEF COMPLAINT: MG crisis    HPI:  Pt is a 28yo F with PMH Myasthenia Gravis (dx 2018), SS trait presenting with worsening ptosis and weakness for one week slowly progressive over this time period presenting with concern for Myasthenia Crisis. She was last admitted to [facility] in 2018 from -. Prior to this admission she underwent 5 rounds of plasmapheresis in 3/2018 and was continued on po pyridostigmine at that time. Her symptoms on presentation last admission were extremity weakness, ptosis, blurred vision. She reports similar symptoms now with bilateral ptosis and arm leg weakness making it difficult to walk and difficult to hold plates. She is still able to sit up in bed, shrug her shoulders, life her head off the pillow, and cough. AST/ALT not elevated. She has been compliant with her home pyridostigmine 60mg qid (180mg qhs).     Last admission she was admitted for MICU for closer monitoring and possible PLEX with home pyridostigmine held in setting of MG crisis. She was given a dose of IVIG without improvement. She was then intubated with hypoxic/hypercapnic resp failure. She required a total of 5 sessions of plasma exchange with subsequent improvement. She was discharged on a 2-week steroid taper and Bactrim ppx which she completed.     ED:  Vitals: Tmax 98.7degF, HR 77, /84, RR 18, SpO2 100% RA.  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  Recent Travel: no  Advance Directives: full code    Allergies  No Known Drug Allergies  shellfish (Anaphylaxis)    Intolerances: n/a    HOME MEDICATIONS:  Home Medications:  pyridostigmine 180 mg oral tablet, extended release: 1 tab(s) orally at bedtime (2018 14:03)  pyridostigmine 60 mg oral tablet: 1 tab(s) orally 4 times a day (2018 14:03)      REVIEW OF SYSTEMS:  Constitutional: [x ] negative [ ] fevers [ ] chills [ ] weight loss [ ] weight gain  HEENT: [ ] negative [ ] dry eyes [ ] eye irritation [ ] postnasal drip [ ] nasal congestion [x] blurry vision and ptosis  CV: [ x] negative  [ ] chest pain [ ] orthopnea [ ] palpitations [ ] murmur  Resp: [x ] negative [ ] cough [ ] shortness of breath [ ] dyspnea [ ] wheezing [ ] sputum [ ] hemoptysis  GI: [x ] negative [ ] nausea [ ] vomiting [ ] diarrhea [ ] constipation [ ] abd pain [ ] dysphagia   : [x ] negative [ ] dysuria [ ] nocturia [ ] hematuria [ ] increased urinary frequency  Musculoskeletal: [ ] negative [ ] back pain [ ] myalgias [ ] arthralgias [ ] fracture [x] weakness  Skin: [x ] negative [ ] rash [ ] itch  Neurological: [ ] negative [ ] headache [ ] dizziness [ ] syncope [ ] weakness [ ] numbness [x] MG crisis  Psychiatric: [ x] negative [ ] anxiety [ ] depression  Endocrine: [x ] negative [ ] diabetes [ ] thyroid problem  Hematologic/Lymphatic: [ ] negative [ ] anemia [ ] bleeding problem  Allergic/Immunologic: [ ] negative [ ] itchy eyes [ ] nasal discharge [ ] hives [ ] angioedema  [x ] All other systems negative  [ ] Unable to assess ROS because ________    OBJECTIVE:  ICU Vital Signs Last 24 Hrs  T(C): 36.9 (27 Aug 2018 19:45), Max: 37.1 (27 Aug 2018 18:44)  T(F): 98.4 (27 Aug 2018 19:45), Max: 98.7 (27 Aug 2018 18:44)  HR: 77 (27 Aug 2018 19:45) (77 - 80)  BP: 120/84 (27 Aug 2018 19:45) (120/84 - 123/79)  BP(mean): --  ABP: --  ABP(mean): --  RR: 18 (27 Aug 2018 19:45) (17 - 18)  SpO2: 100% (27 Aug 2018 19:45) (99% - 100%)    CAPILLARY BLOOD GLUCOSE: n/a      Vital Signs Last 24 Hrs  T(C): 36.9 (27 Aug 2018 19:45), Max: 37.1 (27 Aug 2018 18:44)  T(F): 98.4 (27 Aug 2018 19:45), Max: 98.7 (27 Aug 2018 18:44)  HR: 77 (27 Aug 2018 19:45) (77 - 80)  BP: 120/84 (27 Aug 2018 19:45) (120/84 - 123/79)  BP(mean): --  RR: 18 (27 Aug 2018 19:45) (17 - 18)  SpO2: 100% (27 Aug 2018 19:45) (99% - 100%)    PHYSICAL EXAM:  GENERAL: young well-appearing female in NAD sitting up in bed breathing comfortably  HEAD:  Atraumatic, Normocephalic  EYES: bilateral ptosis, lateral gaze palsy L>R; PERRL, conjunctiva and sclera clear  ENMT: No tonsillar erythema, exudates, or enlargement; Moist mucous membranes, Good dentition  NECK: Supple, No JVD  NERVOUS SYSTEM: AOX3, 4/5 biceps, 5/5 triceps, 4/5 hip flexors, 5/5 dorsi and plantarflexion   PSYCHIATRIC: Appropriate affect and mood  CHEST/LUNG: CTAB; No rales, rhonchi, wheezing, or rubs  HEART: Regular rate and rhythm; No murmurs, rubs, or gallops. No LE edema  ABDOMEN: Soft, Nontender, Nondistended; Bowel sounds present  EXTREMITIES:  2+ Peripheral Pulses, No clubbing, cyanosis  SKIN: No rashes or lesions    LINES: [facility] MEDICATIONS:  Standing Meds:  lactated ringers. 1000 milliLiter(s) IV Continuous <Continuous>    PRN Meds: n/a    LABS:                        10.2   6.9   )-----------( 388      ( 27 Aug 2018 19:58 )             32.1     Hgb Trend: 10.2<--  Urinalysis Basic - ( 27 Aug 2018 19:58 )    Color: Yellow / Appearance: Clear / S.019 / pH: x  Gluc: x / Ketone: Negative  / Bili: Negative / Urobili: Negative   Blood: x / Protein: Negative / Nitrite: Negative   Leuk Esterase: Negative / RBC: 0-2 /HPF / WBC 0-2 /HPF   Sq Epi: x / Non Sq Epi: OCC /HPF / Bacteria: x    Venous Blood Gas:   @ 19:58  7.33/46/33/24/54  VBG Lactate: 1.5      MICROBIOLOGY: n/a    RADIOLOGY:  [x ] Reviewed and interpreted by me  No new

## 2018-08-27 NOTE — H&P ADULT - NSHPPHYSICALEXAM_GEN_ALL_CORE
MS - Alert, awake, speech fluent, no dysarthria  CNs - B/L moderate ptosis R/L, face symmetric   Motor - 4/5 neck flexion and 4+/5 shoulder girdle muscle strenght, distally 5/5, LE 5/5 b/l UE  Sensation - Grossly in tact throughout  Coordiantion - FNF in tact b/l  Gait - normal

## 2018-08-27 NOTE — ED ADULT NURSE NOTE - CHPI ED NUR SYMPTOMS NEG
no nausea/no change in level of consciousness/no dizziness/no confusion/no blurred vision/no fever/no loss of consciousness/no numbness/no vomiting

## 2018-08-27 NOTE — H&P ADULT - ATTENDING COMMENTS
Patient seen and examined.  She has poorly controlled generalized myasthenia gravis, MGFA class 3b.  Last intubation 6 weeks ago, then on steroids for only two weeks.  Presented with bilateral ptosis and diplopia and neck flexion weakness but no SOB.      Today, neck flexion improved, UE power 4+/5 proximal, bilateral ptosis more on the right, with left medial and lateral rectus EOM weakness.    Will start prednisone 20mg QD, cont mestinon, get Chest CT to eval for thymoma.  Will call outside neurologist to get AchR ab results if possible.

## 2018-08-27 NOTE — H&P ADULT - NSHPLABSRESULTS_GEN_ALL_CORE
10.2   6.9   )-----------( 388      ( 27 Aug 2018 19:58 )             32.1   08-27    137  |  104  |  9   ----------------------------<  90  4.1   |  21<L>  |  0.71    Ca    9.4      27 Aug 2018 19:58  Mg     2.0     08-27    TPro  7.6  /  Alb  4.6  /  TBili  0.6  /  DBili  x   /  AST  21  /  ALT  10  /  AlkPhos  101  08-27

## 2018-08-27 NOTE — ED PROVIDER NOTE - OBJECTIVE STATEMENT
27F p/w myasthenic crisis. Was diagnosed in February. Has had 3 episodes total: February, last month, and today. Each episode presents with weakness of her eyelids, bilateral legs, bilateral arms. 27F p/w myasthenic crisis. Was diagnosed in February. Has had 3 episodes total: February, last month, and today. Each episode presents with weakness of her eyelids, bilateral legs, bilateral arms. No shortness of breath now but was intubated for the past two episodes. 27F p/w myasthenic crisis. Was diagnosed in February. Has had 3 episodes total: February, last month, and today. Each episode presents with weakness of her eyelids, bilateral legs, bilateral arms. No shortness of breath now but was intubated for the past two episodes. Takes pyridostigmine every 4 hours (180 at 10pm , 60 at all other hours). Has been compliant with her medication. Denies any recent illness or any other symptoms aside from the weakness described above.

## 2018-08-27 NOTE — H&P ADULT - ASSESSMENT
Pt is a 28yo F with PMH Myasthenia Gravis (dx 2018), SS trait presenting with worsening ptosis and weakness for one week slowly progressive over this time period presenting with concern for Myasthenia Crisis. She was last admitted to [facility] in 2018 from 7/18-27. Prior to this admission she underwent 5 rounds of plasmapheresis in 3/2018 and was continued on po pyridostigmine at that time. Her symptoms on presentation last admission were extremity weakness, ptosis, blurred vision. She reports similar symptoms now with bilateral ptosis and arm leg weakness making it difficult to walk and difficult to hold plates. She is still able to sit up in bed, shrug her shoulders, life her head off the pillow, and cough. On exam pt has b/l ptosis, neck flexion weakness, arm and leg weakness.    Impression - MG exacerbation    Plan:  ICU consulted, no need for ICU level of care   Prednisone 40mg stat dose now  C/w Home Mestinon dose 60/60/60/180  NIF and Vitals Q6H  Vitals and pulse ox  Neuro assessments q4H  Will assess in the Am with Dr. Delgado  Discussed plan with Dr. Delgado

## 2018-08-27 NOTE — ED PROVIDER NOTE - MEDICAL DECISION MAKING DETAILS
see mdm / attending note Pt's current presentation consitent with myasthenic crisis. No current respiratory symptoms, but has been intubated for past two episodes. Will consult neuro, MICU, and respiratory for NIF.     see mdm / attending note

## 2018-08-27 NOTE — ED ADULT NURSE NOTE - NSIMPLEMENTINTERV_GEN_ALL_ED
Implemented All Fall Risk Interventions:  [place] to call system. Call bell, personal items and telephone within reach. Instruct patient to call for assistance. Room bathroom lighting operational. Non-slip footwear when patient is off stretcher. Physically safe environment: no spills, clutter or unnecessary equipment. Stretcher in lowest position, wheels locked, appropriate side rails in place. Provide visual cue, wrist band, yellow gown, etc. Monitor gait and stability. Monitor for mental status changes and reorient to person, place, and time. Review medications for side effects contributing to fall risk. Reinforce activity limits and safety measures with patient and family.

## 2018-08-27 NOTE — CONSULT NOTE ADULT - ATTENDING COMMENTS
27F Hx Myasthenia Gravis Dxx 2018, SS trait, Ex-ICU for failed IVIG, intubation and emergent PLEX x 5 (7/22-25) presented to ED with progressive weakness and worsening ptosis over a week duration concern for Myasthenia Exacerbation.   - Minimal secretion and less pronounced bulbar symptoms with NIF -ve 60 and VC > 2.4 Li   - No urgent PLEX procedure needed at this time   - Discussed with Neurology on-call

## 2018-08-27 NOTE — ED PROVIDER NOTE - PROGRESS NOTE DETAILS
Respiratory was contacted and is here to do NIF. MICU and Neurology have also been contacted and are coming to see the patient. MICU saw the pt and will take her. Not recommending any emergent therapy for right now. MICU recs admission to neuro tele. Not recommending any emergent therapy for right now. Pt is feeling well, no worsening of her weakness. Still denies any respiratory symptoms. Pt is feeling well, no worsening of her weakness. Still denies any respiratory symptoms. Pt has received her 10 PM dose of pyridostigmine 180mg.

## 2018-08-28 PROBLEM — G70.00 MYASTHENIA GRAVIS WITHOUT (ACUTE) EXACERBATION: Chronic | Status: ACTIVE | Noted: 2018-07-18

## 2018-08-28 PROCEDURE — 71260 CT THORAX DX C+: CPT | Mod: 26

## 2018-08-28 RX ORDER — PANTOPRAZOLE SODIUM 20 MG/1
40 TABLET, DELAYED RELEASE ORAL
Qty: 0 | Refills: 0 | Status: DISCONTINUED | OUTPATIENT
Start: 2018-08-28 | End: 2018-08-29

## 2018-08-28 RX ORDER — IPRATROPIUM/ALBUTEROL SULFATE 18-103MCG
3 AEROSOL WITH ADAPTER (GRAM) INHALATION EVERY 6 HOURS
Qty: 0 | Refills: 0 | Status: DISCONTINUED | OUTPATIENT
Start: 2018-08-28 | End: 2018-08-29

## 2018-08-28 RX ADMIN — PYRIDOSTIGMINE BROMIDE 60 MILLIGRAM(S): 60 SOLUTION ORAL at 16:54

## 2018-08-28 RX ADMIN — Medication 3 MILLILITER(S): at 13:24

## 2018-08-28 RX ADMIN — Medication 3 MILLILITER(S): at 23:06

## 2018-08-28 RX ADMIN — PYRIDOSTIGMINE BROMIDE 180 MILLIGRAM(S): 60 SOLUTION ORAL at 21:01

## 2018-08-28 RX ADMIN — PANTOPRAZOLE SODIUM 40 MILLIGRAM(S): 20 TABLET, DELAYED RELEASE ORAL at 10:24

## 2018-08-28 RX ADMIN — Medication 20 MILLIGRAM(S): at 10:24

## 2018-08-28 RX ADMIN — PYRIDOSTIGMINE BROMIDE 60 MILLIGRAM(S): 60 SOLUTION ORAL at 13:21

## 2018-08-28 RX ADMIN — ENOXAPARIN SODIUM 40 MILLIGRAM(S): 100 INJECTION SUBCUTANEOUS at 05:54

## 2018-08-28 RX ADMIN — Medication 3 MILLILITER(S): at 05:54

## 2018-08-28 RX ADMIN — PYRIDOSTIGMINE BROMIDE 60 MILLIGRAM(S): 60 SOLUTION ORAL at 07:33

## 2018-08-28 NOTE — PROGRESS NOTE ADULT - SUBJECTIVE AND OBJECTIVE BOX
Draft Neurology Follow up note    Name: ANTONIO POON    Subjective: Patient concerned w/ double vision when looking left. Otherwise stable     HPI: Pt is a 26yo F with PMH Myasthenia Gravis (dx 2018), SS trait presenting with worsening ptosis and weakness for one week slowly progressive over this time period presenting with concern for Myasthenia Crisis. She was last admitted to [facility] in 2018 from 7/18-27. Prior to this admission she underwent 5 rounds of plasmapheresis in 3/2018 and was continued on po pyridostigmine at that time. Her symptoms on presentation last admission were extremity weakness, ptosis, blurred vision. She reports similar symptoms now with bilateral ptosis and arm leg weakness making it difficult to walk and difficult to hold plates. She is still able to sit up in bed, shrug her shoulders, life her head off the pillow, and cough. AST/ALT not elevated. She has been compliant with her home pyridostigmine 60mg qid (180mg qhs). Last admission she was admitted for MICU for closer monitoring and possible PLEX with home pyridostigmine held in setting of MG crisis. She was given a dose of IVIG without improvement. She was then intubated with hypoxic/hypercapnic resp failure. She required a total of 5 sessions of plasma exchange with subsequent improvement. She was discharged on a 2-week steroid taper and Bactrim ppx which she completed.     PMH/PSH:   Asthma    Myasthenia gravis    MEDICATIONS  (STANDING):  ALBUTerol/ipratropium for Nebulization 3 milliLiter(s) Nebulizer every 6 hours  enoxaparin Injectable 40 milliGRAM(s) SubCutaneous every 24 hours  lactated ringers. 1000 milliLiter(s) (100 mL/Hr) IV Continuous <Continuous>  pantoprazole    Tablet 40 milliGRAM(s) Oral before breakfast  predniSONE   Tablet 20 milliGRAM(s) Oral daily  pyridostigmine 60 milliGRAM(s) Oral <User Schedule>  pyridostigmine  milliGRAM(s) Oral <User Schedule>    MEDICATIONS  (PRN):    Allergies  No Known Drug Allergies  shellfish (Anaphylaxis)    Objective:   Vital Signs Last 24 Hrs  T(C): 36.9 (28 Aug 2018 08:37), Max: 37.3 (28 Aug 2018 01:57)  T(F): 98.4 (28 Aug 2018 08:37), Max: 99.1 (28 Aug 2018 01:57)  HR: 68 (28 Aug 2018 08:37) (68 - 82)  BP: 108/69 (28 Aug 2018 08:37) (98/64 - 123/79)  RR: 16 (28 Aug 2018 08:37) (16 - 20)  SpO2: 100% (28 Aug 2018 08:37) (98% - 100%)    General Exam:   General appearance: No acute distress                   Neuro:   MS - Alert, awake, speech fluent, no dysarthria  CNs - Right ptosis, EOMI except for lag w/ weakened left lateral rectus, face symmetric   Motor - 4+/5 neck flexion and 4+/5 distal wrist flexion b/l. LE 5/5 b/l   Sensation - Grossly in tact throughout  Coordination - FNF in tact b/l    08-27    137  |  104  |  9   ----------------------------<  90  4.1   |  21<L>  |  0.71    Ca    9.4      27 Aug 2018 19:58  Mg     2.0     08-27    TPro  7.6  /  Alb  4.6  /  TBili  0.6  /  DBili  x   /  AST  21  /  ALT  10  /  AlkPhos  101  08-27    LIVER FUNCTIONS - ( 27 Aug 2018 19:58 )  Alb: 4.6 g/dL / Pro: 7.6 g/dL / ALK PHOS: 101 U/L / ALT: 10 U/L / AST: 21 U/L / GGT: x           Radiology  < from: Xray Chest 1 View- PORTABLE-Urgent (08.27.18 @ 19:49) >  FINDINGS:  The lungs are clear.  No pleural effusions or pneumothorax.  Heart size is within normal limits.   No acute osseous abnormality.    IMPRESSION: Clear lungs.    < end of copied text >

## 2018-08-28 NOTE — PROGRESS NOTE ADULT - ASSESSMENT
Pt is a 26yo F with PMH Myasthenia Gravis (dx 2018), SS trait presenting with worsening ptosis and weakness for one week slowly progressive over this time period presenting with concern for Myasthenia Crisis. She was last admitted to [facility] in 2018 from 7/18-27. Prior to this admission she underwent 5 rounds of plasmapheresis in 3/2018 and was continued on po pyridostigmine at that time. Her symptoms on presentation last admission were extremity weakness, ptosis, blurred vision. She reports similar symptoms now with bilateral ptosis and arm leg weakness making it difficult to walk and difficult to hold plates. She is still able to sit up in bed, shrug her shoulders, life her head off the pillow, and cough. On exam pt has b/l ptosis, neck flexion weakness, arm and leg weakness.

## 2018-08-28 NOTE — PROGRESS NOTE ADULT - PROBLEM SELECTOR PLAN 1
acute worsening   Plan:   []CT chest w/ w/o: ordered acute worsening   Plan:   []CT chest w/ w/o: ordered  Meds: [20mg daily]prednisone [60TID, 180qhs]Mestinon  Labs: [will confirm w/ Dr. Sanchez]MG antibodies   Consults: [no needs]PT/OT [aware]ICU  Recommendations  -NIFs & Vital Capacity   -neuro checks   -need to follow up (not if office today, back 8/29/18) w/ home neurologist, Dr. Sanchez: [phone]

## 2018-08-29 ENCOUNTER — TRANSCRIPTION ENCOUNTER (OUTPATIENT)
Age: 28
End: 2018-08-29

## 2018-08-29 VITALS
TEMPERATURE: 99 F | RESPIRATION RATE: 17 BRPM | OXYGEN SATURATION: 99 % | DIASTOLIC BLOOD PRESSURE: 61 MMHG | HEART RATE: 78 BPM | SYSTOLIC BLOOD PRESSURE: 98 MMHG

## 2018-08-29 LAB
ANION GAP SERPL CALC-SCNC: 16 MMOL/L — SIGNIFICANT CHANGE UP (ref 5–17)
BASOPHILS # BLD AUTO: 0.02 K/UL — SIGNIFICANT CHANGE UP (ref 0–0.2)
BASOPHILS NFR BLD AUTO: 0.2 % — SIGNIFICANT CHANGE UP (ref 0–2)
BUN SERPL-MCNC: 13 MG/DL — SIGNIFICANT CHANGE UP (ref 7–23)
CALCIUM SERPL-MCNC: 9.2 MG/DL — SIGNIFICANT CHANGE UP (ref 8.4–10.5)
CHLORIDE SERPL-SCNC: 102 MMOL/L — SIGNIFICANT CHANGE UP (ref 96–108)
CO2 SERPL-SCNC: 21 MMOL/L — LOW (ref 22–31)
CREAT SERPL-MCNC: 0.7 MG/DL — SIGNIFICANT CHANGE UP (ref 0.5–1.3)
EOSINOPHIL # BLD AUTO: 0.06 K/UL — SIGNIFICANT CHANGE UP (ref 0–0.5)
EOSINOPHIL NFR BLD AUTO: 0.6 % — SIGNIFICANT CHANGE UP (ref 0–6)
GLUCOSE SERPL-MCNC: 89 MG/DL — SIGNIFICANT CHANGE UP (ref 70–99)
HCT VFR BLD CALC: 29.4 % — LOW (ref 34.5–45)
HGB BLD-MCNC: 9.3 G/DL — LOW (ref 11.5–15.5)
IMM GRANULOCYTES NFR BLD AUTO: 0.4 % — SIGNIFICANT CHANGE UP (ref 0–1.5)
LYMPHOCYTES # BLD AUTO: 3.38 K/UL — HIGH (ref 1–3.3)
LYMPHOCYTES # BLD AUTO: 33.5 % — SIGNIFICANT CHANGE UP (ref 13–44)
MCHC RBC-ENTMCNC: 21.8 PG — LOW (ref 27–34)
MCHC RBC-ENTMCNC: 31.6 GM/DL — LOW (ref 32–36)
MCV RBC AUTO: 68.9 FL — LOW (ref 80–100)
MONOCYTES # BLD AUTO: 0.8 K/UL — SIGNIFICANT CHANGE UP (ref 0–0.9)
MONOCYTES NFR BLD AUTO: 7.9 % — SIGNIFICANT CHANGE UP (ref 2–14)
NEUTROPHILS # BLD AUTO: 5.8 K/UL — SIGNIFICANT CHANGE UP (ref 1.8–7.4)
NEUTROPHILS NFR BLD AUTO: 57.4 % — SIGNIFICANT CHANGE UP (ref 43–77)
PLATELET # BLD AUTO: 416 K/UL — HIGH (ref 150–400)
POTASSIUM SERPL-MCNC: 3.4 MMOL/L — LOW (ref 3.5–5.3)
POTASSIUM SERPL-SCNC: 3.4 MMOL/L — LOW (ref 3.5–5.3)
RBC # BLD: 4.27 M/UL — SIGNIFICANT CHANGE UP (ref 3.8–5.2)
RBC # FLD: 20.1 % — HIGH (ref 10.3–14.5)
SODIUM SERPL-SCNC: 139 MMOL/L — SIGNIFICANT CHANGE UP (ref 135–145)
WBC # BLD: 10.1 K/UL — SIGNIFICANT CHANGE UP (ref 3.8–10.5)
WBC # FLD AUTO: 10.1 K/UL — SIGNIFICANT CHANGE UP (ref 3.8–10.5)

## 2018-08-29 PROCEDURE — 71045 X-RAY EXAM CHEST 1 VIEW: CPT

## 2018-08-29 PROCEDURE — 84295 ASSAY OF SERUM SODIUM: CPT

## 2018-08-29 PROCEDURE — 82803 BLOOD GASES ANY COMBINATION: CPT

## 2018-08-29 PROCEDURE — 81001 URINALYSIS AUTO W/SCOPE: CPT

## 2018-08-29 PROCEDURE — 80048 BASIC METABOLIC PNL TOTAL CA: CPT

## 2018-08-29 PROCEDURE — 99231 SBSQ HOSP IP/OBS SF/LOW 25: CPT

## 2018-08-29 PROCEDURE — 94640 AIRWAY INHALATION TREATMENT: CPT

## 2018-08-29 PROCEDURE — 82947 ASSAY GLUCOSE BLOOD QUANT: CPT

## 2018-08-29 PROCEDURE — 82435 ASSAY OF BLOOD CHLORIDE: CPT

## 2018-08-29 PROCEDURE — 82330 ASSAY OF CALCIUM: CPT

## 2018-08-29 PROCEDURE — 71260 CT THORAX DX C+: CPT

## 2018-08-29 PROCEDURE — 85027 COMPLETE CBC AUTOMATED: CPT

## 2018-08-29 PROCEDURE — 83605 ASSAY OF LACTIC ACID: CPT

## 2018-08-29 PROCEDURE — 99285 EMERGENCY DEPT VISIT HI MDM: CPT

## 2018-08-29 PROCEDURE — 81025 URINE PREGNANCY TEST: CPT

## 2018-08-29 PROCEDURE — 83735 ASSAY OF MAGNESIUM: CPT

## 2018-08-29 PROCEDURE — 82962 GLUCOSE BLOOD TEST: CPT

## 2018-08-29 PROCEDURE — 80053 COMPREHEN METABOLIC PANEL: CPT

## 2018-08-29 PROCEDURE — 84132 ASSAY OF SERUM POTASSIUM: CPT

## 2018-08-29 PROCEDURE — 93005 ELECTROCARDIOGRAM TRACING: CPT

## 2018-08-29 PROCEDURE — 94150 VITAL CAPACITY TEST: CPT

## 2018-08-29 PROCEDURE — 85014 HEMATOCRIT: CPT

## 2018-08-29 RX ORDER — POTASSIUM CHLORIDE 20 MEQ
20 PACKET (EA) ORAL ONCE
Qty: 0 | Refills: 0 | Status: COMPLETED | OUTPATIENT
Start: 2018-08-29 | End: 2018-08-29

## 2018-08-29 RX ADMIN — Medication 3 MILLILITER(S): at 05:00

## 2018-08-29 RX ADMIN — Medication 20 MILLIEQUIVALENT(S): at 09:11

## 2018-08-29 RX ADMIN — ENOXAPARIN SODIUM 40 MILLIGRAM(S): 100 INJECTION SUBCUTANEOUS at 05:00

## 2018-08-29 RX ADMIN — PYRIDOSTIGMINE BROMIDE 60 MILLIGRAM(S): 60 SOLUTION ORAL at 09:09

## 2018-08-29 RX ADMIN — PANTOPRAZOLE SODIUM 40 MILLIGRAM(S): 20 TABLET, DELAYED RELEASE ORAL at 05:00

## 2018-08-29 RX ADMIN — Medication 20 MILLIGRAM(S): at 05:00

## 2018-08-29 RX ADMIN — PYRIDOSTIGMINE BROMIDE 60 MILLIGRAM(S): 60 SOLUTION ORAL at 12:38

## 2018-08-29 NOTE — DISCHARGE NOTE ADULT - PATIENT PORTAL LINK FT
You can access the [email or web address] Portal, offered by [facility], by registering with the following website: [email or web address]

## 2018-08-29 NOTE — DISCHARGE NOTE ADULT - OTHER SIGNIFICANT FINDINGS
< from: CT Chest w/ IV Cont (08.28.18 @ 16:09) >  Impression: Unremarkable CT scan of the chest.    < end of copied text >

## 2018-08-29 NOTE — DISCHARGE NOTE ADULT - PLAN OF CARE
Prevention of recurrent exacerbation Continue taking prednisone 20 mg daily. Continue Mestinon 60 mg QID and 180 mg qhs. Follow-up with Dr. Sanchez as outpatient. -take prednisone 20 mg daily as prescribed. Continue home med Mestinon 60 mg QID and 180 mg qhs. -Follow-up with Dr. Sanchez as outpatient, already scheduled for in 2 weeks.

## 2018-08-29 NOTE — DISCHARGE NOTE ADULT - HOSPITAL COURSE
Patient is a 27F with history of myasthenia gravis who follows with Dr. Sanchez from neurology as an outpatient. For about a week prior to admission, patient was experiencing progressively worse bilateral ptosis and weakness. She was last admitted in August for MG exacerbation and underwent plasmapheresis. She has been on a steroid taper at home since then. She was admitted for close monitoring of her respiratory and neurological status. She has been continued on mestinon and prednisone. She had a CT of the chest which showed no acute abnormalities. She is doing better and vital signs are stable for discharge. She was instructed to continue taking her mestinon as well as the prednisone 20 mg daily, to be tapered down slowly as an outpatient. She is to follow-up with Dr. Sanchez in the office after discharge. Patient is a 27F with history of myasthenia gravis who follows with Dr. Sanchez from neurology as an outpatient. For about a week prior to admission, patient was experiencing progressively worse bilateral ptosis and weakness. She was last admitted in August for MG exacerbation and underwent plasmapheresis. She has been on a steroid taper at home since then. She was admitted for close monitoring of her respiratory and neurological status. She has been continued on mestinon and prednisone. She had a CT of the chest which showed no acute abnormalities (no thymoma). She is doing better and vital signs are stable for discharge. She was instructed to continue taking her mestinon as well as the prednisone 20 mg daily, with no taper until otherwise specified by her neurologist. She is to follow-up with Dr. Sanchez in the office (already scheduled in 2 weeks) after discharge.

## 2018-08-29 NOTE — DISCHARGE NOTE ADULT - NS AS DC STROKE ED MATERIALS
Prescribed Medications/Call 911 for Stroke/Risk Factors for Stroke/Need for Followup After Discharge/Stroke Education Booklet/Stroke Warning Signs and Symptoms

## 2018-08-29 NOTE — DISCHARGE NOTE ADULT - MEDICATION SUMMARY - MEDICATIONS TO TAKE
I will START or STAY ON the medications listed below when I get home from the hospital:    predniSONE 20 mg oral tablet  -- 1 tab(s) by mouth once a day  -- Indication: For Myasthenia gravis    pyridostigmine 60 mg oral tablet  -- 1 tab(s) by mouth 4 times a day  -- Indication: For Myasthenia gravis    pyridostigmine 180 mg oral tablet, extended release  -- 1 tab(s) by mouth at bedtime  -- Indication: For Myasthenia gravis

## 2018-08-29 NOTE — DISCHARGE NOTE ADULT - CARE PROVIDER_API CALL
Linda Sanchez (DO), Neurology  [address] [place] [address]  Phone: [phone]  Fax: [phone]

## 2018-08-29 NOTE — DISCHARGE NOTE ADULT - CARE PLAN
Principal Discharge DX:	Myasthenia gravis in crisis  Goal:	Prevention of recurrent exacerbation  Assessment and plan of treatment:	Continue taking prednisone 20 mg daily. Continue Mestinon 60 mg QID and 180 mg qhs. Follow-up with Dr. Sanchez as outpatient. Principal Discharge DX:	Myasthenia gravis in crisis  Goal:	Prevention of recurrent exacerbation  Assessment and plan of treatment:	-take prednisone 20 mg daily as prescribed. Continue home med Mestinon 60 mg QID and 180 mg qhs. -Follow-up with Dr. Sanchez as outpatient, already scheduled for in 2 weeks.

## 2018-10-05 ENCOUNTER — INPATIENT (INPATIENT)
Facility: HOSPITAL | Age: 28
LOS: 5 days | Discharge: HOME | DRG: 208 | End: 2018-10-11
Attending: INTERNAL MEDICINE | Admitting: INTERNAL MEDICINE
Payer: MEDICAID

## 2018-10-05 VITALS
WEIGHT: 130.07 LBS | SYSTOLIC BLOOD PRESSURE: 144 MMHG | DIASTOLIC BLOOD PRESSURE: 92 MMHG | TEMPERATURE: 98 F | HEIGHT: 63 IN | OXYGEN SATURATION: 90 % | HEART RATE: 103 BPM | RESPIRATION RATE: 18 BRPM

## 2018-10-05 DIAGNOSIS — G70.01 MYASTHENIA GRAVIS WITH (ACUTE) EXACERBATION: ICD-10-CM

## 2018-10-05 LAB
ALBUMIN SERPL ELPH-MCNC: 4.5 G/DL — SIGNIFICANT CHANGE UP (ref 3.3–5)
ALP SERPL-CCNC: 83 U/L — SIGNIFICANT CHANGE UP (ref 40–120)
ALT FLD-CCNC: 16 U/L — SIGNIFICANT CHANGE UP (ref 10–45)
ANION GAP SERPL CALC-SCNC: 13 MMOL/L — SIGNIFICANT CHANGE UP (ref 5–17)
ANISOCYTOSIS BLD QL: SLIGHT — SIGNIFICANT CHANGE UP
APTT BLD: 23.5 SEC — LOW (ref 27.5–37.4)
AST SERPL-CCNC: 16 U/L — SIGNIFICANT CHANGE UP (ref 10–40)
BASE EXCESS BLDA CALC-SCNC: 1.6 MMOL/L — SIGNIFICANT CHANGE UP (ref -2–2)
BASOPHILS # BLD AUTO: 0.1 K/UL — SIGNIFICANT CHANGE UP (ref 0–0.2)
BASOPHILS NFR BLD AUTO: 0.5 % — SIGNIFICANT CHANGE UP (ref 0–2)
BILIRUB SERPL-MCNC: 0.7 MG/DL — SIGNIFICANT CHANGE UP (ref 0.2–1.2)
BLD GP AB SCN SERPL QL: NEGATIVE — SIGNIFICANT CHANGE UP
BUN SERPL-MCNC: 19 MG/DL — SIGNIFICANT CHANGE UP (ref 7–23)
CALCIUM SERPL-MCNC: 9.6 MG/DL — SIGNIFICANT CHANGE UP (ref 8.4–10.5)
CHLORIDE SERPL-SCNC: 101 MMOL/L — SIGNIFICANT CHANGE UP (ref 96–108)
CO2 BLDA-SCNC: 27 MMOL/L — SIGNIFICANT CHANGE UP (ref 22–30)
CO2 SERPL-SCNC: 23 MMOL/L — SIGNIFICANT CHANGE UP (ref 22–31)
CREAT SERPL-MCNC: 0.93 MG/DL — SIGNIFICANT CHANGE UP (ref 0.5–1.3)
DACRYOCYTES BLD QL SMEAR: SLIGHT — SIGNIFICANT CHANGE UP
ELLIPTOCYTES BLD QL SMEAR: SLIGHT — SIGNIFICANT CHANGE UP
EOSINOPHIL # BLD AUTO: 0.1 K/UL — SIGNIFICANT CHANGE UP (ref 0–0.5)
EOSINOPHIL NFR BLD AUTO: 0.7 % — SIGNIFICANT CHANGE UP (ref 0–6)
GLUCOSE SERPL-MCNC: 93 MG/DL — SIGNIFICANT CHANGE UP (ref 70–99)
HCG SERPL-ACNC: <2 MIU/ML — SIGNIFICANT CHANGE UP
HCO3 BLDA-SCNC: 26 MMOL/L — SIGNIFICANT CHANGE UP (ref 21–29)
HCT VFR BLD CALC: 34.6 % — SIGNIFICANT CHANGE UP (ref 34.5–45)
HGB BLD-MCNC: 11.1 G/DL — LOW (ref 11.5–15.5)
HYPOCHROMIA BLD QL: SLIGHT — SIGNIFICANT CHANGE UP
INR BLD: 0.97 RATIO — SIGNIFICANT CHANGE UP (ref 0.88–1.16)
LYMPHOCYTES # BLD AUTO: 29.9 % — SIGNIFICANT CHANGE UP (ref 13–44)
LYMPHOCYTES # BLD AUTO: 4.9 K/UL — HIGH (ref 1–3.3)
MACROCYTES BLD QL: SLIGHT — SIGNIFICANT CHANGE UP
MCHC RBC-ENTMCNC: 21.9 PG — LOW (ref 27–34)
MCHC RBC-ENTMCNC: 32 GM/DL — SIGNIFICANT CHANGE UP (ref 32–36)
MCV RBC AUTO: 68.3 FL — LOW (ref 80–100)
MICROCYTES BLD QL: SIGNIFICANT CHANGE UP
MONOCYTES # BLD AUTO: 1.7 K/UL — HIGH (ref 0–0.9)
MONOCYTES NFR BLD AUTO: 10.5 % — SIGNIFICANT CHANGE UP (ref 2–14)
NEUTROPHILS # BLD AUTO: 9.6 K/UL — HIGH (ref 1.8–7.4)
NEUTROPHILS NFR BLD AUTO: 58.4 % — SIGNIFICANT CHANGE UP (ref 43–77)
OVALOCYTES BLD QL SMEAR: SLIGHT — SIGNIFICANT CHANGE UP
PCO2 BLDA: 43 MMHG — SIGNIFICANT CHANGE UP (ref 32–46)
PH BLDA: 7.4 — SIGNIFICANT CHANGE UP (ref 7.35–7.45)
PLAT MORPH BLD: NORMAL — SIGNIFICANT CHANGE UP
PLATELET # BLD AUTO: 453 K/UL — HIGH (ref 150–400)
PO2 BLDA: 93 MMHG — SIGNIFICANT CHANGE UP (ref 74–108)
POIKILOCYTOSIS BLD QL AUTO: SLIGHT — SIGNIFICANT CHANGE UP
POLYCHROMASIA BLD QL SMEAR: SLIGHT — SIGNIFICANT CHANGE UP
POTASSIUM SERPL-MCNC: 3.6 MMOL/L — SIGNIFICANT CHANGE UP (ref 3.5–5.3)
POTASSIUM SERPL-SCNC: 3.6 MMOL/L — SIGNIFICANT CHANGE UP (ref 3.5–5.3)
PROT SERPL-MCNC: 7.8 G/DL — SIGNIFICANT CHANGE UP (ref 6–8.3)
PROTHROM AB SERPL-ACNC: 10.5 SEC — SIGNIFICANT CHANGE UP (ref 9.8–12.7)
RAPID RVP RESULT: DETECTED
RBC # BLD: 5.07 M/UL — SIGNIFICANT CHANGE UP (ref 3.8–5.2)
RBC # FLD: 19.3 % — HIGH (ref 10.3–14.5)
RBC BLD AUTO: ABNORMAL
RH IG SCN BLD-IMP: POSITIVE — SIGNIFICANT CHANGE UP
RV+EV RNA SPEC QL NAA+PROBE: DETECTED
SAO2 % BLDA: 97 % — HIGH (ref 92–96)
SODIUM SERPL-SCNC: 137 MMOL/L — SIGNIFICANT CHANGE UP (ref 135–145)
TARGETS BLD QL SMEAR: SLIGHT — SIGNIFICANT CHANGE UP
WBC # BLD: 16.5 K/UL — HIGH (ref 3.8–10.5)
WBC # FLD AUTO: 16.5 K/UL — HIGH (ref 3.8–10.5)

## 2018-10-05 PROCEDURE — 99291 CRITICAL CARE FIRST HOUR: CPT

## 2018-10-05 PROCEDURE — 71045 X-RAY EXAM CHEST 1 VIEW: CPT | Mod: 26

## 2018-10-05 PROCEDURE — 93010 ELECTROCARDIOGRAM REPORT: CPT

## 2018-10-05 RX ORDER — CEFTRIAXONE 500 MG/1
2 INJECTION, POWDER, FOR SOLUTION INTRAMUSCULAR; INTRAVENOUS EVERY 24 HOURS
Qty: 0 | Refills: 0 | Status: DISCONTINUED | OUTPATIENT
Start: 2018-10-06 | End: 2018-10-07

## 2018-10-05 RX ORDER — INFLUENZA VIRUS VACCINE 15; 15; 15; 15 UG/.5ML; UG/.5ML; UG/.5ML; UG/.5ML
0.5 SUSPENSION INTRAMUSCULAR ONCE
Qty: 0 | Refills: 0 | Status: DISCONTINUED | OUTPATIENT
Start: 2018-10-05 | End: 2018-10-11

## 2018-10-05 RX ORDER — AZITHROMYCIN 500 MG/1
TABLET, FILM COATED ORAL
Qty: 0 | Refills: 0 | Status: DISCONTINUED | OUTPATIENT
Start: 2018-10-05 | End: 2018-10-07

## 2018-10-05 RX ORDER — AZITHROMYCIN 500 MG/1
500 TABLET, FILM COATED ORAL EVERY 24 HOURS
Qty: 0 | Refills: 0 | Status: DISCONTINUED | OUTPATIENT
Start: 2018-10-06 | End: 2018-10-07

## 2018-10-05 RX ORDER — DIPHENHYDRAMINE HCL 50 MG
25 CAPSULE ORAL ONCE
Qty: 0 | Refills: 0 | Status: COMPLETED | OUTPATIENT
Start: 2018-10-05 | End: 2018-10-05

## 2018-10-05 RX ORDER — IPRATROPIUM/ALBUTEROL SULFATE 18-103MCG
3 AEROSOL WITH ADAPTER (GRAM) INHALATION EVERY 6 HOURS
Qty: 0 | Refills: 0 | Status: DISCONTINUED | OUTPATIENT
Start: 2018-10-05 | End: 2018-10-11

## 2018-10-05 RX ORDER — IMMUNE GLOBULIN (HUMAN) 10 G/100ML
30 INJECTION INTRAVENOUS; SUBCUTANEOUS ONCE
Qty: 0 | Refills: 0 | Status: DISCONTINUED | OUTPATIENT
Start: 2018-10-05 | End: 2018-10-05

## 2018-10-05 RX ORDER — CEFTRIAXONE 500 MG/1
2 INJECTION, POWDER, FOR SOLUTION INTRAMUSCULAR; INTRAVENOUS ONCE
Qty: 0 | Refills: 0 | Status: COMPLETED | OUTPATIENT
Start: 2018-10-05 | End: 2018-10-05

## 2018-10-05 RX ORDER — ENOXAPARIN SODIUM 100 MG/ML
40 INJECTION SUBCUTANEOUS DAILY
Qty: 0 | Refills: 0 | Status: DISCONTINUED | OUTPATIENT
Start: 2018-10-05 | End: 2018-10-11

## 2018-10-05 RX ORDER — AZITHROMYCIN 500 MG/1
500 TABLET, FILM COATED ORAL ONCE
Qty: 0 | Refills: 0 | Status: COMPLETED | OUTPATIENT
Start: 2018-10-05 | End: 2018-10-05

## 2018-10-05 RX ORDER — IMMUNE GLOBULIN (HUMAN) 10 G/100ML
20 INJECTION INTRAVENOUS; SUBCUTANEOUS ONCE
Qty: 0 | Refills: 0 | Status: COMPLETED | OUTPATIENT
Start: 2018-10-05 | End: 2018-10-05

## 2018-10-05 RX ORDER — CEFTRIAXONE 500 MG/1
INJECTION, POWDER, FOR SOLUTION INTRAMUSCULAR; INTRAVENOUS
Qty: 0 | Refills: 0 | Status: DISCONTINUED | OUTPATIENT
Start: 2018-10-05 | End: 2018-10-07

## 2018-10-05 RX ORDER — ACETAMINOPHEN 500 MG
650 TABLET ORAL ONCE
Qty: 0 | Refills: 0 | Status: COMPLETED | OUTPATIENT
Start: 2018-10-05 | End: 2018-10-05

## 2018-10-05 RX ADMIN — Medication 25 MILLIGRAM(S): at 21:42

## 2018-10-05 RX ADMIN — IMMUNE GLOBULIN (HUMAN) 33.33 GRAM(S): 10 INJECTION INTRAVENOUS; SUBCUTANEOUS at 22:02

## 2018-10-05 RX ADMIN — CEFTRIAXONE 100 GRAM(S): 500 INJECTION, POWDER, FOR SOLUTION INTRAMUSCULAR; INTRAVENOUS at 18:48

## 2018-10-05 RX ADMIN — Medication 260 MILLIGRAM(S): at 21:43

## 2018-10-05 RX ADMIN — Medication 50 MILLIGRAM(S): at 22:01

## 2018-10-05 RX ADMIN — Medication 125 MILLIGRAM(S): at 18:30

## 2018-10-05 RX ADMIN — AZITHROMYCIN 250 MILLIGRAM(S): 500 TABLET, FILM COATED ORAL at 20:52

## 2018-10-05 NOTE — CONSULT NOTE ADULT - SUBJECTIVE AND OBJECTIVE BOX
Neurology  Consult Note  10-05-18    Name:  ANTONIO POON; 28y (1990)    Reason for Admission: Myasthenia gravis with exacerbation    HPI:   26yo F with PMH MG (diagnosed 2/2018) presenting with worsening ptosis and weakness x 2 months. Pt reports she underwent 5 rounds of plasmapheresis in 3/2018 and continued PO pyridostigmine. Pt reports she felt well for about 1 month and symptoms have been gradually worsening. Symptoms include ptosis, extremity weakness x 4 extremities, and blurred vision. Pt reports she has been on life support in past prior to plasmapheresis treatment. Denies any difficulty breathing. Able to ambulate. Pt saw new neurologist today and sent to ED for IVIG treatment. Denies any CP, SOB, n/v, numbness/tingling.   Neuro [facility]    ICU Vital Signs Last 24 Hrs  T(C): 36.8 (05 Oct 2018 16:53), Max: 36.8 (05 Oct 2018 16:53)  HR: 85 (05 Oct 2018 19:02) (85 - 103)  BP: 144/92 (05 Oct 2018 16:53) (144/92 - 144/92)  RR: 18 (05 Oct 2018 16:53) (18 - 18)  SpO2: 100% (05 Oct 2018 19:02) (90% - 100%)    PAST MEDICAL & SURGICAL HISTORY:  Myasthenia gravis  Asthma  No significant past surgical history    Home Medications:  pyridostigmine 180 mg oral tablet, extended release: 1 tab(s) orally at bedtime (27 Jul 2018 14:03)  pyridostigmine 60 mg oral tablet: 1 tab(s) orally 4 times a day (27 Jul 2018 14:03)    MEDICATIONS  (STANDING):  ALBUTerol/ipratropium for Nebulization 3 milliLiter(s) Nebulizer every 6 hours  azithromycin  IVPB 500 milliGRAM(s) IV Intermittent once  azithromycin  IVPB      cefTRIAXone   IVPB      immune   globulin 10% (GAMMAGARD) IVPB 30 Gram(s) IV Intermittent Once  methylPREDNISolone sodium succinate Injectable 380 milliGRAM(s) IV Push Once (05 Oct 2018 18:21)    Review of Systems:  As states in HPI.    shellfish (Anaphylaxis)    PMHx:   Myasthenia gravis  Asthma    PFHx:   No pertinent family history in first degree relatives    PSuHx:   No significant past surgical history    Medications:  MEDICATIONS  (STANDING):  ALBUTerol/ipratropium for Nebulization 3 milliLiter(s) Nebulizer every 6 hours  azithromycin  IVPB 500 milliGRAM(s) IV Intermittent once  azithromycin  IVPB      cefTRIAXone   IVPB      immune   globulin 10% (GAMMAGARD) IVPB 30 Gram(s) IV Intermittent Once  methylPREDNISolone sodium succinate Injectable 380 milliGRAM(s) IV Push Once    MEDICATIONS  (PRN):    Vitals:  T(C): 36.8 (10-05-18 @ 16:53), Max: 36.8 (10-05-18 @ 16:53)  HR: 85 (10-05-18 @ 19:02) (85 - 103)  BP: 144/92 (10-05-18 @ 16:53) (144/92 - 144/92)  RR: 18 (10-05-18 @ 16:53) (18 - 18)  SpO2: 100% (10-05-18 @ 19:02) (90% - 100%)    Labs:                        11.1   16.5  )-----------( 453      ( 05 Oct 2018 18:46 )             34.6   CAPILLARY BLOOD GLUCOSE  PT/INR - ( 05 Oct 2018 18:46 )   PT: 10.5 sec;   INR: 0.97 ratio    PTT - ( 05 Oct 2018 18:46 )  PTT:23.5 sec    PHYSICAL EXAMINATION:  General: Acute respiratory distress. Well-developed, well nourished.  Eyes: Conjunctiva and sclera clear.  Neurologic:  - Mental Status:  Alert, awake, oriented to person, place, and time; Speech is fluent with intact naming, repetition, and comprehension; Good overall fund of knowledge;  - Cranial Nerves II-XII:    II:  Visual fields are full to confrontation; Pupils are equal, round, and reactive to light;  III, IV, VI:  Extraocular movements are intact without nystagmus.  V:  Facial sensation is intact in the V1-V3 distribution bilaterally.  VII:  Face is symmetric with normal eye closure and smile  VIII:  Hearing is intact to finger rub.  IX, X:  Uvula is midline and soft palate rises symmetrically  XI:  Head turning and shoulder shrug are intact.  XII:  Tongue protudes in the midline.  - Motor:  Strength is 5/5 throughout.  There is no pronator drift.  Normal muscle bulk and tone throughout.  - Reflexes:  3+ and symmetric at the biceps, triceps, brachioradialis, knees, and ankles.  Plantar responses flexor.  - Sensory:  Intact throughout to vibration, and joint-position  - Coordination:  Finger-nose-finger and heel-knee-shin intact without dysmetria.    - Gait:   Currently unable to assess d/t acuity of respiratory distress.

## 2018-10-05 NOTE — H&P ADULT - ASSESSMENT
27F with PMHX of myasthenia gravis presents, past MICU stay and intubation, admitted for viral illness precipitating MG crisis.     #Neuro:  Myasthenia gravis  - s/p 1x IVIG  - planned for sessions of plasmapheresis today  - neuro recs appreciated. continue with Prednisone 60 qd  - c/w home pyridostigmine 60mg QID and 180 qhs  - per conversation with radiology, patients CT from May 2018 has no evidence of thymoma    - AAOx3    # Resp;  Shortness of breath  - s/p Solumedrol in the ED  - cont PRN Duoneb    # CV:  - will continue to monitor     #ID:  Possible PNA  - s/p CTX and Azithromycin the ED    # Renal:    #GI:    #DVT ppx: 27F with PMHX of myasthenia gravis presents, past MICU stay and intubation, admitted for viral illness precipitating MG crisis.     #Neuro:  Myasthenia gravis  - s/p 1x IVIG  - planned for sessions of plasmapheresis today  - neuro recs appreciated. continue with IV solumedrol   - hold home MG meds for now   -     # Resp;  Shortness of breath  - s/p Solumedrol in the ED  - cont PRN Duoneb    # CV:  - will continue to monitor     #ID:  Possible PNA  - s/p CTX and Azithromycin the ED    # Renal:    #GI:    #DVT ppx: 27F with PMHX of myasthenia gravis presents, past MICU stay and intubation, admitted for viral illness precipitating MG crisis.     #Neuro:  Myasthenia gravis  - patient likely with Myasthenic crisis precipitated by URI  - currently oxygenating well, with improved work of breathing while on BiPAP  - neuro recs appreciated. s/p 500mg IV Solumedrol  - will start 60mg q6 of Solumedrol   - per Neuro, will give IVIG. will f/u recs regarding further doses  - hold home Pyridostigmine meds for now in the setting of MG crisis  - will continue to monitor     # Resp;  Shortness of breath  - likely 2/2 MG crisis  - CXR unremarkable, and RVP pending  - plan as above  - cont PRN Duoneb  - cont BiPAP as required     # CV:  - stable   - will continue to monitor    #GI  - stable  - will keep NPO for now while on BiPAP    #Renal  - stable  - monitor daily BMPs    #Heme/Onc  - stable  - monitor daily CBC    #Endo  - stable  - will monitor FS for hyperglycemia while on steroids    #ID  Viral URI  - patient reporting several days of cold like symptoms  - likely with a viral URI  - continue with Ceftriaxone and Azithromycin   - will check urine legionella and d/c Azithro if negative  - CXR unremarkable and RVP pending   - will continue with supportive care for now    #DVT ppx  - Lovenox

## 2018-10-05 NOTE — ED PROVIDER NOTE - OBJECTIVE STATEMENT
29 yo F c PMH of myasthenia gravis with multiple ICU admission, hx of plasmapharesis and IVIG on prednisone ran out of prednisone yesterday has 2 day hx of worsening chest congestion and SOB and progressive chest muscle and neck weakness.     ROS positive: muscle weakness, neck weakness, SOB, chest congestion  ROS negative: f/c, coryza, pharyngitis, CP, abdominal pain, n/v, dysuria, hematuria, leg edema

## 2018-10-05 NOTE — ED ADULT NURSE NOTE - NSIMPLEMENTINTERV_GEN_ALL_ED
Implemented All Universal Safety Interventions:  [place] to call system. Call bell, personal items and telephone within reach. Instruct patient to call for assistance. Room bathroom lighting operational. Non-slip footwear when patient is off stretcher. Physically safe environment: no spills, clutter or unnecessary equipment. Stretcher in lowest position, wheels locked, appropriate side rails in place.

## 2018-10-05 NOTE — ED ADULT TRIAGE NOTE - CHIEF COMPLAINT QUOTE
Pt reports that she has Myasthenia Gravis and has being experiencing SOB since today. pt denies chest pain

## 2018-10-05 NOTE — H&P ADULT - NSHPPHYSICALEXAM_GEN_ALL_CORE
PHYSICAL EXAM:  GENERAL: NAD, well-developed  HEAD:  Atraumatic, Normocephalic  EYES: EOMI, PERRLA, conjunctiva and sclera clear  NECK: Supple, No JVD  CHEST/LUNG: Clear to auscultation bilaterally; No wheeze  HEART: Regular rate and rhythm; No murmurs, rubs, or gallops  ABDOMEN: Soft, Nontender, Nondistended; Bowel sounds present  EXTREMITIES:  2+ Peripheral Pulses, No clubbing, cyanosis, or edema  PSYCH: AAOx3  NEUROLOGY: non-focal  SKIN: No rashes or lesions PHYSICAL EXAM:  GENERAL: NAD, well-developed  HEAD:  Atraumatic, Normocephalic  EYES: EOMI, PERRLA, conjunctiva and sclera clear  NECK: Supple, No JVD  CHEST/LUNG: Bilateral rales anteriorly   HEART: Regular rate and rhythm; No murmurs, rubs, or gallops  ABDOMEN: Soft, Nontender, Nondistended; Bowel sounds present  EXTREMITIES:  2+ Peripheral Pulses, No clubbing, cyanosis, or edema  PSYCH: AAOx3  NEUROLOGY: CN II-XII intact. 5/5 motor strength in all 4 extremities   SKIN: No rashes or lesions

## 2018-10-05 NOTE — ED ADULT NURSE NOTE - OBJECTIVE STATEMENT
27 y/o female presenting to the ED via walking in complaining of difficulty breathing. Patient initially went to pediatrics and moved to critical care for higher level of care. Report was transferred from [facility] MAYES to [facility]AR. Hx of Myasthenia gravis, patient on oral steroids and has missed one dose. Patient states has been experiencing SOB x 3 days. Patient states son is sick at home with a cold. Positive dry cough noted. Patient denies chest pain, dizziness, n/v/d, numbness, tingling. No drooling noted. Patient tachypneic and tachycardic. Positive used of accessory muscles noted. SPO2 100% on bipap. Patient speaking in full sentences. BIPAP initiated. CM in place. MICU and ED MD Butch Means at bedside. Safety and comfort measures provided.

## 2018-10-05 NOTE — H&P ADULT - NSHPREVIEWOFSYSTEMS_GEN_ALL_CORE
REVIEW OF SYSTEMS:    CONSTITUTIONAL: No weakness, fevers or chills  EYES/ENT: No visual changes;  No vertigo or throat pain   NECK: No pain or stiffness  RESPIRATORY: No cough, wheezing, hemoptysis; No shortness of breath  CARDIOVASCULAR: No chest pain or palpitations  GASTROINTESTINAL: No abdominal or epigastric pain. No nausea, vomiting, or hematemesis; No diarrhea or constipation. No melena or hematochezia.  GENITOURINARY: No dysuria, frequency or hematuria  NEUROLOGICAL: No numbness or weakness  SKIN: No itching, rashes REVIEW OF SYSTEMS:    CONSTITUTIONAL: No weakness, fevers or chills  EYES/ENT: No visual changes;  No vertigo or throat pain   NECK: No pain or stiffness  RESPIRATORY: No cough, wheezing, hemoptysis; + shortness of breath  CARDIOVASCULAR: No chest pain or palpitations  GASTROINTESTINAL: No abdominal or epigastric pain. No nausea, vomiting, or hematemesis; No diarrhea or constipation. No melena or hematochezia.  GENITOURINARY: No dysuria, frequency or hematuria  NEUROLOGICAL: No numbness or weakness  SKIN: No itching, rashes REVIEW OF SYSTEMS:    CONSTITUTIONAL: No weakness, fevers or chills  EYES/ENT: No visual changes;  No vertigo or throat pain   NECK: No pain or stiffness  RESPIRATORY: + productive cough and SOB. No wheezing, hemoptysis.  CARDIOVASCULAR: No chest pain or palpitations  GASTROINTESTINAL: No abdominal or epigastric pain. No nausea, vomiting, or hematemesis; No diarrhea or constipation. No melena or hematochezia.  GENITOURINARY: No dysuria, frequency or hematuria  NEUROLOGICAL: Mild weakness, which is at baseline.   SKIN: No itching, rashes

## 2018-10-05 NOTE — ED PROVIDER NOTE - PROGRESS NOTE DETAILS
MD Clary,Attending: pt seen by neuro and ICU teams --bolus steroids given. Accepted for admission to ICU for impending respiratory failure secondary to combination of LRTI and myasthenic crisis

## 2018-10-05 NOTE — ED PROVIDER NOTE - MEDICAL DECISION MAKING DETAILS
MD Clary,Attending: pt seen. agree with above HPI/ROS/PE. Know MG with ijncreasing respiratory distress over past 3 days. exposed to son with "cold" Friday, No fever/chills. In moderate distress. for 02, NF via RT --then bipap for support. Urgent ICU/neuro consuts. Move to critical care area for impending respiratory failure--intubation when indicated. Taking 10 mg prednisone BID and pyridostygmine mg QID and 180 mg gHS.

## 2018-10-05 NOTE — ED PROVIDER NOTE - RESPIRATORY DISTRESS
YES/moderate to severe increased wob no stridor or wheezes difficulty completing sentences satting 86 on NC then 100 on NRB

## 2018-10-05 NOTE — CONSULT NOTE ADULT - ASSESSMENT
28yo F with PMH MG (diagnosed 2/2018) presenting with worsening ptosis and weakness x 2 months. Pt reports she underwent 5 rounds of plasmapheresis in 3/2018 and continued PO pyridostigmine. Pt reports she felt well for about 1 month and symptoms have been gradually worsening. Symptoms include ptosis, extremity weakness x 4 extremities, and blurred vision. Pt reports she has been on life support in past prior to plasmapheresis treatment. Denies any difficulty breathing. Able to ambulate. Pt saw new neurologist today and sent to ED for IVIG treatment. Denies any CP, SOB, n/v, numbness/tingling.   Neuro Linda Sanchez    ARDS in Myesthenic Crisis.     Plan:  - D/C Mestinon  - Solumedrol 500mg IV qD  - IVIG 400mg/kg/day x5 days. 26yo F with PMH MG (diagnosed 2/2018) presenting with worsening ptosis and weakness x 2 months. Pt reports she underwent 5 rounds of plasmapheresis in 3/2018 and continued PO pyridostigmine. Pt reports she felt well for about 1 month and symptoms have been gradually worsening. Symptoms include ptosis, extremity weakness x 4 extremities, and blurred vision. Pt reports she has been on life support in past prior to plasmapheresis treatment. Denies any difficulty breathing. Able to ambulate. Pt saw new neurologist today and sent to ED for IVIG treatment. Denies any CP, SOB, n/v, numbness/tingling.   Neuro Linda Sanchez    ARDS in Myesthenic Crisis.     Plan:  - D/C Mestinon if intubated  - Solumedrol 500mg IV qD  - IVIG 400mg/kg/day x5 days.

## 2018-10-05 NOTE — H&P ADULT - NSHPLABSRESULTS_GEN_ALL_CORE
(10-05 @ 18:46)                      11.1  16.5 )-----------( 453                 34.6    Neutrophils = -- (--%)  Lymphocytes = -- (--%)  Eosinophils = -- (--%)  Basophils = -- (--%)  Monocytes = -- (--%)  Bands = --%          ( 05 Oct 2018 18:46 )   PT: 10.5 sec;   INR: 0.97 ratio;             RVP:      Arterial Blood Gas:  10-05 @ 18:47  7.40/43/93/26/97/1.6  ABG lactate: --      Tox:       < from: Xray Chest 1 View- PORTABLE-Urgent (10.05.18 @ 18:23) >    ******PRELIMINARY REPORT******    ******PRELIMINARY REPORT******              INTERPRETATION:  clear lungs    < end of copied text >

## 2018-10-05 NOTE — H&P ADULT - ATTENDING COMMENTS
myastheia gravis on prednisone/ Hx prior intubation/viral syndrome child sick  Seems like viral infection may be causing myasthenia flare. Initial NIF -60 Initial VC close to 600 and these improved in ICU with 2nd VC > 1L. Was extremely rhonchorous in ED I was concerned she would not be able to cough and clear secretions so brought to MICU. ABG without hypercapnia but she looked more comfortable on Bipap 10/5/40. D/W neuro resident gave 500 mg solumedrol NS 30 g IVIG For possible MG flare. willhold mestinon for now.  In Micu comfortable on Bipap with NIF and VC improving enterorhinovirus positive on RVP.  Impriving with our treatment.

## 2018-10-05 NOTE — ED ADULT NURSE NOTE - CHPI ED NUR SYMPTOMS NEG
no wheezing/no body aches/no fever/no chest pain/no edema/no hemoptysis/no diaphoresis/no headache/no chills

## 2018-10-06 LAB
ANION GAP SERPL CALC-SCNC: 11 MMOL/L — SIGNIFICANT CHANGE UP (ref 5–17)
APTT BLD: 27.1 SEC — LOW (ref 27.5–37.4)
BASE EXCESS BLDA CALC-SCNC: 3.5 MMOL/L — HIGH (ref -2–2)
BASOPHILS # BLD AUTO: 0 K/UL — SIGNIFICANT CHANGE UP (ref 0–0.2)
BASOPHILS NFR BLD AUTO: 0.1 % — SIGNIFICANT CHANGE UP (ref 0–2)
BUN SERPL-MCNC: 16 MG/DL — SIGNIFICANT CHANGE UP (ref 7–23)
CALCIUM SERPL-MCNC: 9.2 MG/DL — SIGNIFICANT CHANGE UP (ref 8.4–10.5)
CHLORIDE SERPL-SCNC: 96 MMOL/L — SIGNIFICANT CHANGE UP (ref 96–108)
CO2 BLDA-SCNC: 28 MMOL/L — SIGNIFICANT CHANGE UP (ref 22–30)
CO2 SERPL-SCNC: 26 MMOL/L — SIGNIFICANT CHANGE UP (ref 22–31)
CREAT SERPL-MCNC: 0.62 MG/DL — SIGNIFICANT CHANGE UP (ref 0.5–1.3)
EOSINOPHIL # BLD AUTO: 0 K/UL — SIGNIFICANT CHANGE UP (ref 0–0.5)
EOSINOPHIL NFR BLD AUTO: 0.1 % — SIGNIFICANT CHANGE UP (ref 0–6)
GAS PNL BLDA: SIGNIFICANT CHANGE UP
GLUCOSE BLDC GLUCOMTR-MCNC: 130 MG/DL — HIGH (ref 70–99)
GLUCOSE SERPL-MCNC: 147 MG/DL — HIGH (ref 70–99)
HCO3 BLDA-SCNC: 27 MMOL/L — SIGNIFICANT CHANGE UP (ref 21–29)
HCT VFR BLD CALC: 30.6 % — LOW (ref 34.5–45)
HGB BLD-MCNC: 10 G/DL — LOW (ref 11.5–15.5)
HOROWITZ INDEX BLDA+IHG-RTO: 40 — SIGNIFICANT CHANGE UP
INR BLD: 1.11 RATIO — SIGNIFICANT CHANGE UP (ref 0.88–1.16)
LACTATE BLDV-MCNC: 3.8 MMOL/L — HIGH (ref 0.7–2)
LEGIONELLA AG UR QL: NEGATIVE — SIGNIFICANT CHANGE UP
LYMPHOCYTES # BLD AUTO: 0.7 K/UL — LOW (ref 1–3.3)
LYMPHOCYTES # BLD AUTO: 6.1 % — LOW (ref 13–44)
MAGNESIUM SERPL-MCNC: 2.1 MG/DL — SIGNIFICANT CHANGE UP (ref 1.6–2.6)
MCHC RBC-ENTMCNC: 22 PG — LOW (ref 27–34)
MCHC RBC-ENTMCNC: 32.5 GM/DL — SIGNIFICANT CHANGE UP (ref 32–36)
MCV RBC AUTO: 67.6 FL — LOW (ref 80–100)
MONOCYTES # BLD AUTO: 0.3 K/UL — SIGNIFICANT CHANGE UP (ref 0–0.9)
MONOCYTES NFR BLD AUTO: 2.6 % — SIGNIFICANT CHANGE UP (ref 2–14)
NEUTROPHILS # BLD AUTO: 10.4 K/UL — HIGH (ref 1.8–7.4)
NEUTROPHILS NFR BLD AUTO: 91.1 % — HIGH (ref 43–77)
PCO2 BLDA: 37 MMHG — SIGNIFICANT CHANGE UP (ref 32–46)
PH BLDA: 7.47 — HIGH (ref 7.35–7.45)
PHOSPHATE SERPL-MCNC: 4.2 MG/DL — SIGNIFICANT CHANGE UP (ref 2.5–4.5)
PLATELET # BLD AUTO: 397 K/UL — SIGNIFICANT CHANGE UP (ref 150–400)
PO2 BLDA: 133 MMHG — HIGH (ref 74–108)
POTASSIUM SERPL-MCNC: 3.8 MMOL/L — SIGNIFICANT CHANGE UP (ref 3.5–5.3)
POTASSIUM SERPL-SCNC: 3.8 MMOL/L — SIGNIFICANT CHANGE UP (ref 3.5–5.3)
PROTHROM AB SERPL-ACNC: 12.1 SEC — SIGNIFICANT CHANGE UP (ref 9.8–12.7)
RBC # BLD: 4.52 M/UL — SIGNIFICANT CHANGE UP (ref 3.8–5.2)
RBC # FLD: 18.8 % — HIGH (ref 10.3–14.5)
SAO2 % BLDA: 100 % — HIGH (ref 92–96)
SODIUM SERPL-SCNC: 133 MMOL/L — LOW (ref 135–145)
WBC # BLD: 11.4 K/UL — HIGH (ref 3.8–10.5)
WBC # FLD AUTO: 11.4 K/UL — HIGH (ref 3.8–10.5)

## 2018-10-06 PROCEDURE — 99291 CRITICAL CARE FIRST HOUR: CPT

## 2018-10-06 PROCEDURE — 71045 X-RAY EXAM CHEST 1 VIEW: CPT | Mod: 26

## 2018-10-06 RX ORDER — MIDAZOLAM HYDROCHLORIDE 1 MG/ML
0.02 INJECTION, SOLUTION INTRAMUSCULAR; INTRAVENOUS
Qty: 100 | Refills: 0 | Status: DISCONTINUED | OUTPATIENT
Start: 2018-10-06 | End: 2018-10-09

## 2018-10-06 RX ORDER — PYRIDOSTIGMINE BROMIDE 60 MG/5ML
60 SOLUTION ORAL EVERY 6 HOURS
Qty: 0 | Refills: 0 | Status: DISCONTINUED | OUTPATIENT
Start: 2018-10-06 | End: 2018-10-06

## 2018-10-06 RX ORDER — MIDAZOLAM HYDROCHLORIDE 1 MG/ML
2 INJECTION, SOLUTION INTRAMUSCULAR; INTRAVENOUS
Qty: 0 | Refills: 0 | Status: DISCONTINUED | OUTPATIENT
Start: 2018-10-06 | End: 2018-10-07

## 2018-10-06 RX ORDER — PYRIDOSTIGMINE BROMIDE 60 MG/5ML
30 SOLUTION ORAL EVERY 6 HOURS
Qty: 0 | Refills: 0 | Status: DISCONTINUED | OUTPATIENT
Start: 2018-10-06 | End: 2018-10-06

## 2018-10-06 RX ORDER — SODIUM CHLORIDE 9 MG/ML
1000 INJECTION, SOLUTION INTRAVENOUS ONCE
Qty: 0 | Refills: 0 | Status: COMPLETED | OUTPATIENT
Start: 2018-10-06 | End: 2018-10-06

## 2018-10-06 RX ADMIN — MIDAZOLAM HYDROCHLORIDE 1 MG/KG/HR: 1 INJECTION, SOLUTION INTRAMUSCULAR; INTRAVENOUS at 22:50

## 2018-10-06 RX ADMIN — AZITHROMYCIN 250 MILLIGRAM(S): 500 TABLET, FILM COATED ORAL at 18:44

## 2018-10-06 RX ADMIN — Medication 3 MILLILITER(S): at 11:00

## 2018-10-06 RX ADMIN — MIDAZOLAM HYDROCHLORIDE 4 MILLIGRAM(S): 1 INJECTION, SOLUTION INTRAMUSCULAR; INTRAVENOUS at 21:30

## 2018-10-06 RX ADMIN — PYRIDOSTIGMINE BROMIDE 60 MILLIGRAM(S): 60 SOLUTION ORAL at 18:45

## 2018-10-06 RX ADMIN — Medication 3 MILLILITER(S): at 17:05

## 2018-10-06 RX ADMIN — CEFTRIAXONE 100 GRAM(S): 500 INJECTION, POWDER, FOR SOLUTION INTRAMUSCULAR; INTRAVENOUS at 18:16

## 2018-10-06 RX ADMIN — MIDAZOLAM HYDROCHLORIDE 2 MILLIGRAM(S): 1 INJECTION, SOLUTION INTRAMUSCULAR; INTRAVENOUS at 22:00

## 2018-10-06 RX ADMIN — Medication 60 MILLIGRAM(S): at 16:50

## 2018-10-06 RX ADMIN — Medication 60 MILLIGRAM(S): at 10:27

## 2018-10-06 RX ADMIN — FENTANYL CITRATE 100 MICROGRAM(S): 50 INJECTION INTRAVENOUS at 21:30

## 2018-10-06 RX ADMIN — Medication 3 MILLILITER(S): at 00:01

## 2018-10-06 RX ADMIN — SODIUM CHLORIDE 2000 MILLILITER(S): 9 INJECTION, SOLUTION INTRAVENOUS at 12:35

## 2018-10-06 RX ADMIN — Medication 60 MILLIGRAM(S): at 23:13

## 2018-10-06 RX ADMIN — Medication 60 MILLIGRAM(S): at 05:19

## 2018-10-06 RX ADMIN — MIDAZOLAM HYDROCHLORIDE 2 MILLIGRAM(S): 1 INJECTION, SOLUTION INTRAMUSCULAR; INTRAVENOUS at 22:50

## 2018-10-06 RX ADMIN — ENOXAPARIN SODIUM 40 MILLIGRAM(S): 100 INJECTION SUBCUTANEOUS at 12:24

## 2018-10-06 RX ADMIN — Medication 3 MILLILITER(S): at 05:48

## 2018-10-06 NOTE — PROGRESS NOTE ADULT - ASSESSMENT
27F with PMHX of myasthenia gravis presents, past MICU stay and intubation, admitted for viral illness precipitating MG crisis.     #Neuro:  Myasthenia gravis  - patient likely with Myasthenic crisis precipitated by URI w/ EV and RV.   - currently oxygenating well, with improved work of breathing while on BiPAP  - neuro recs appreciated. s/p 500mg IV Solumedrol, will cont w/ solumedrol 60mg q6hrs and IVIG x 5 days.   - have been holding home Pyridostigmine meds for now in the setting of MG crisis, however will reassess with neuro today as pt UE's getting weaker since admission.   - will continue to monitor     # Resp;  Shortness of breath  - likely 2/2 MG crisis  - CXR unremarkable, EV/RV URI, remains on CTX and azithro.   - cont PRN Duoneb  - cont w/ o2 supplemention.     # CV:  - stable   - will continue to monitor    #GI  - stable, no active issues.     #Renal  - stable  - monitor daily BMPs    #Heme/Onc  - stable  - monitor daily CBC    #Endo  - stable  - will monitor FS for hyperglycemia while on steroids    #ID  Viral URI w/ EV and RV  - patient reporting several days of cold like symptoms  - continue with Ceftriaxone and Azithromycin x 5 days, legionella not yet sent, will send today but likely plan for 5 days w/ CTX and azithro     #DVT ppx  - Lovenox

## 2018-10-06 NOTE — AIRWAY PLACEMENT NOTE ADULT - MEDICATIONS GIVEN TO FACILATATE INTUBATION
100mg propofol 4mg versed 100mcg fentanyl 100mg propofol 4mg versed 100mcg fentanyl given in divided doses

## 2018-10-06 NOTE — PROGRESS NOTE ADULT - SUBJECTIVE AND OBJECTIVE BOX
Note Author: Giancarlo Rodriguez, PGY 3  [facility] Pager: [phone]  [facility] Pager: [phone]  Spectra:       Patient is a 28y old  Female who presents with a chief complaint of Myastenia (05 Oct 2018 19:09)      SUBJECTIVE / OVERNIGHT EVENTS:  No acute events overnight. Patient seen and examined in AM. Remains stable on 2L NC, feels weaker in UE's since admission. otherwise no complaints.     MEDICATIONS  (STANDING):  ALBUTerol/ipratropium for Nebulization 3 milliLiter(s) Nebulizer every 6 hours  azithromycin  IVPB      azithromycin  IVPB 500 milliGRAM(s) IV Intermittent every 24 hours  cefTRIAXone   IVPB      cefTRIAXone   IVPB 2 Gram(s) IV Intermittent every 24 hours  enoxaparin Injectable 40 milliGRAM(s) SubCutaneous daily  influenza   Vaccine 0.5 milliLiter(s) IntraMuscular once  methylPREDNISolone sodium succinate Injectable 60 milliGRAM(s) IV Push every 6 hours    MEDICATIONS  (PRN):      CAPILLARY BLOOD GLUCOSE      POCT Blood Glucose.: 130 mg/dL (06 Oct 2018 08:41)    I&O's Summary    05 Oct 2018 07:01  -  06 Oct 2018 07:00  --------------------------------------------------------  IN: 500 mL / OUT: 200 mL / NET: 300 mL    06 Oct 2018 07:01  -  06 Oct 2018 10:44  --------------------------------------------------------  IN: 0 mL / OUT: 420 mL / NET: -420 mL        Vital Signs Last 24 Hrs  T(C): 37.2 (06 Oct 2018 07:30), Max: 37.2 (05 Oct 2018 19:15)  T(F): 99 (06 Oct 2018 07:30), Max: 99 (05 Oct 2018 19:15)  HR: 74 (06 Oct 2018 10:00) (54 - 119)  BP: 110/74 (06 Oct 2018 10:00) (97/60 - 144/92)  BP(mean): 88 (06 Oct 2018 10:00) (73 - 95)  RR: 22 (06 Oct 2018 10:00) (14 - 30)  SpO2: 97% (06 Oct 2018 10:00) (90% - 100%)    PHYSICAL EXAM:  General: NAD, well-developed  HEENT: EOMI  Neck: Supple, No JVD  Chest/Lung: Clear to auscultation bilaterally; No wheezes  Heart: Regular rate and rhythm; No murmurs, rubs, or gallops. Capillary refill WNL  [place]: Soft, Nontender, Nondistended; Bowel sounds present  Extremities: No lower extremity edema.   Psych: AAOx3  Neurology: BL UE 3/5, BL LEs 5/5  Skin: No rashes or lesions    LABS:                        11.1   16.5  )-----------( 453      ( 05 Oct 2018 18:46 )             34.6       10-05    137  |  101  |  19  ----------------------------<  93  3.6   |  23  |  0.93    Ca    9.6      05 Oct 2018 18:46    TPro  7.8  /  Alb  4.5  /  TBili  0.7  /  DBili  x   /  AST  16  /  ALT  16  /  AlkPhos  83  10-05      PT/INR - ( 05 Oct 2018 18:46 )   PT: 10.5 sec;   INR: 0.97 ratio         PTT - ( 05 Oct 2018 18:46 )  PTT:23.5 sec      ( 05 Oct 2018 18:47 ) pH: 7.40  /  pCO2: 43    /  pO2: 93    / HCO3: 26    / Base Excess: 1.6   /  SaO2: 97                        EKG:   CXR:       RADIOLOGY & ADDITIONAL TESTS:    Imaging Personally Reviewed:    Consultant(s) Notes Reviewed:      Care Discussed with Consultants/Other Providers:

## 2018-10-06 NOTE — AIRWAY PLACEMENT NOTE ADULT - POST AIRWAY PLACEMENT ASSESSMENT:
positive end tidal CO2 noted/breath sounds bilateral/breath sounds equal/CXR pending/chest excursion noted

## 2018-10-07 LAB — GAS PNL BLDA: SIGNIFICANT CHANGE UP

## 2018-10-07 PROCEDURE — 99222 1ST HOSP IP/OBS MODERATE 55: CPT | Mod: GC

## 2018-10-07 PROCEDURE — 99291 CRITICAL CARE FIRST HOUR: CPT

## 2018-10-07 RX ORDER — IMMUNE GLOBULIN (HUMAN) 10 G/100ML
20 INJECTION INTRAVENOUS; SUBCUTANEOUS DAILY
Qty: 0 | Refills: 0 | Status: COMPLETED | OUTPATIENT
Start: 2018-10-07 | End: 2018-10-10

## 2018-10-07 RX ORDER — PANTOPRAZOLE SODIUM 20 MG/1
40 TABLET, DELAYED RELEASE ORAL DAILY
Qty: 0 | Refills: 0 | Status: DISCONTINUED | OUTPATIENT
Start: 2018-10-07 | End: 2018-10-10

## 2018-10-07 RX ORDER — PROPOFOL 10 MG/ML
5 INJECTION, EMULSION INTRAVENOUS
Qty: 1000 | Refills: 0 | Status: DISCONTINUED | OUTPATIENT
Start: 2018-10-07 | End: 2018-10-09

## 2018-10-07 RX ORDER — FENTANYL CITRATE 50 UG/ML
100 INJECTION INTRAVENOUS ONCE
Qty: 0 | Refills: 0 | Status: DISCONTINUED | OUTPATIENT
Start: 2018-10-07 | End: 2018-10-06

## 2018-10-07 RX ORDER — DIPHENHYDRAMINE HCL 50 MG
25 CAPSULE ORAL ONCE
Qty: 0 | Refills: 0 | Status: COMPLETED | OUTPATIENT
Start: 2018-10-07 | End: 2018-10-07

## 2018-10-07 RX ORDER — ACETAMINOPHEN 500 MG
1000 TABLET ORAL ONCE
Qty: 0 | Refills: 0 | Status: COMPLETED | OUTPATIENT
Start: 2018-10-07 | End: 2018-10-07

## 2018-10-07 RX ORDER — MIDAZOLAM HYDROCHLORIDE 1 MG/ML
4 INJECTION, SOLUTION INTRAMUSCULAR; INTRAVENOUS ONCE
Qty: 0 | Refills: 0 | Status: DISCONTINUED | OUTPATIENT
Start: 2018-10-07 | End: 2018-10-06

## 2018-10-07 RX ADMIN — IMMUNE GLOBULIN (HUMAN) 33.33 GRAM(S): 10 INJECTION INTRAVENOUS; SUBCUTANEOUS at 13:32

## 2018-10-07 RX ADMIN — Medication 400 MILLIGRAM(S): at 13:16

## 2018-10-07 RX ADMIN — PANTOPRAZOLE SODIUM 40 MILLIGRAM(S): 20 TABLET, DELAYED RELEASE ORAL at 12:31

## 2018-10-07 RX ADMIN — Medication 3 MILLILITER(S): at 17:18

## 2018-10-07 RX ADMIN — Medication 3 MILLILITER(S): at 05:29

## 2018-10-07 RX ADMIN — Medication 3 MILLILITER(S): at 00:01

## 2018-10-07 RX ADMIN — Medication 3 MILLILITER(S): at 11:33

## 2018-10-07 RX ADMIN — Medication 25 MILLIGRAM(S): at 13:16

## 2018-10-07 RX ADMIN — PROPOFOL 1.77 MICROGRAM(S)/KG/MIN: 10 INJECTION, EMULSION INTRAVENOUS at 13:26

## 2018-10-07 RX ADMIN — Medication 3 MILLILITER(S): at 23:31

## 2018-10-07 RX ADMIN — ENOXAPARIN SODIUM 40 MILLIGRAM(S): 100 INJECTION SUBCUTANEOUS at 12:31

## 2018-10-07 NOTE — PROGRESS NOTE ADULT - ASSESSMENT
27F with PMHX of myasthenia gravis presents, past MICU stay and intubation, admitted for viral illness precipitating MG crisis.     #Neuro:  Myasthenia gravis  - patient likely with Myasthenic crisis precipitated by URI w/ EV and RV.   - now intubated 2/2 respiratory distress   - neuro recs appreciated. s/p 500mg IV Solumedrol, will cont w/ solumedrol 60mg q6hrs and IVIG x 5 days.   - have been holding home Pyridostigmine meds for now in the setting of MG crisis, however will reassess with neuro today as pt UE's getting weaker since admission.   - will continue to monitor     # Resp;  Shortness of breath  - likely 2/2 MG crisis  - intubated/sedated   - CXR unremarkable, EV/RV URI, remains on CTX and azithro.   - cont PRN Duoneb  - cont w/ o2 supplemention.     # CV:  - stable   - will continue to monitor    #GI  - stable, no active issues.     #Renal  - stable  - monitor daily BMPs    #Heme/Onc  - stable  - monitor daily CBC    #Endo  - stable  - will monitor FS for hyperglycemia while on steroids    #ID  Viral URI w/ EV and RV  - patient reporting several days of cold like symptoms  - continue with Ceftriaxone and Azithromycin x 5 days, legionella not yet sent, will send today but likely plan for 5 days w/ CTX and azithro     #DVT ppx  - Lovenox 27F with PMHX of myasthenia gravis presents, past MICU stay and intubation, admitted for viral illness precipitating MG crisis.     #Neuro:  Myasthenia gravis  - patient likely with Myasthenic crisis precipitated by URI w/ EV and RV.   - now intubated 2/2 respiratory distress   - neuro recs appreciated. s/p 500mg IV Solumedrol, will cont w/ solumedrol 60mg qday (1 mg/kg) and IVIG x 5 days.   - follow neuro recs for restarting Mestinon now that pt is intubated   - will continue to monitor     # Resp;  Shortness of breath  - likely 2/2 MG crisis  - intubated/sedated   - CXR unremarkable, EV/RV URI  -d/c abx   - cont PRN Duoneb  - cont w/ o2 supplemention.     # CV:  - stable   - will continue to monitor    #GI  - stable, no active issues.     #Renal  - stable  - monitor daily BMPs    #Heme/Onc  - stable  - monitor daily CBC    #Endo  - stable  - will monitor FS for hyperglycemia while on steroids    #ID  Viral URI w/ EV and RV  - patient reporting several days of cold like symptoms  - d/c all abx, legionella neg     #DVT ppx  - Lovenox

## 2018-10-07 NOTE — PROGRESS NOTE ADULT - SUBJECTIVE AND OBJECTIVE BOX
Patient seen today. She is a 28 y/o female with ho MG presented with respiratory sxs, right eye ptosis and mild 4 limb extremity weakness. Dx with MG crisis. She was initally taken off all her mestinon and was intubated last night. As per our suggestion last nite, she was given one dose of Mestinon 6o mgs hours prior to intubation. She was intubated yesterday PM. Her esam is minimal today. Able to arouse with difficulty but understand commands and able to lift all extremities minimally but against gravity.

## 2018-10-07 NOTE — PROGRESS NOTE ADULT - ASSESSMENT
Our recommendations:  Case discussed with our neuromuscular expert, Dr. Delgado.    1- No Mestinon until she is rated as 4/4 with cervical flexion, and 4 extremities. At that time wean off steroids and provide pyridostigmine equi dose in IV to her PO mestinon dose. This may take a few days to start this process.  2- Continue IV Steroids and IVIG  3- If not effective consider plex.

## 2018-10-07 NOTE — PROGRESS NOTE ADULT - SUBJECTIVE AND OBJECTIVE BOX
INTERVAL HPI/OVERNIGHT EVENTS: Pt intubated ON for respiratory distress    SUBJECTIVE: Patient seen and examined at bedside.   ROS unable to obtain: pt intubated/sedated     OBJECTIVE:    VITAL SIGNS:  ICU Vital Signs Last 24 Hrs  T(C): 37 (07 Oct 2018 04:00), Max: 37.1 (06 Oct 2018 12:00)  T(F): 98.6 (07 Oct 2018 04:00), Max: 98.8 (06 Oct 2018 20:00)  HR: 130 (07 Oct 2018 07:00) (58 - 147)  BP: 102/65 (07 Oct 2018 07:00) (96/62 - 151/67)  BP(mean): 79 (07 Oct 2018 07:00) (73 - 118)  ABP: --  ABP(mean): --  RR: 22 (07 Oct 2018 07:00) (14 - 38)  SpO2: 100% (07 Oct 2018 07:00) (95% - 100%)    Mode: AC/ CMV (Assist Control/ Continuous Mandatory Ventilation), RR (machine): 12, TV (machine): 450, FiO2: 30, PEEP: 5, ITime: 1, MAP: 8, PIP: 20    10-06 @ 07:01  -  10-07 @ 07:00  --------------------------------------------------------  IN: 1731.4 mL / OUT: 1820 mL / NET: -88.6 mL      CAPILLARY BLOOD GLUCOSE      POCT Blood Glucose.: 130 mg/dL (06 Oct 2018 08:41)      PHYSICAL EXAM:    General: NAD  HEENT: NC/AT; PERRL, clear conjunctiva  Neck: supple  Respiratory: CTA b/l  Cardiovascular: +S1/S2; RRR  Abdomen: soft, NT/ND; +BS x4  Extremities: WWP, 2+ peripheral pulses b/l; no LE edema  Skin: normal color and turgor; no rash  Neurological:    MEDICATIONS:  MEDICATIONS  (STANDING):  ALBUTerol/ipratropium for Nebulization 3 milliLiter(s) Nebulizer every 6 hours  cefTRIAXone   IVPB      cefTRIAXone   IVPB 2 Gram(s) IV Intermittent every 24 hours  enoxaparin Injectable 40 milliGRAM(s) SubCutaneous daily  influenza   Vaccine 0.5 milliLiter(s) IntraMuscular once  methylPREDNISolone sodium succinate Injectable 60 milliGRAM(s) IV Push daily  midazolam Infusion 0.017 mG/kG/Hr (1 mL/Hr) IV Continuous <Continuous>  propofol Infusion 5 MICROgram(s)/kG/Min (1.77 mL/Hr) IV Continuous <Continuous>    MEDICATIONS  (PRN):      ALLERGIES:  Allergies    No Known Drug Allergies  shellfish (Anaphylaxis)    Intolerances        LABS:                        10.0   11.4  )-----------( 397      ( 06 Oct 2018 20:08 )             30.6     10-06    133<L>  |  96  |  16  ----------------------------<  147<H>  3.8   |  26  |  0.62    Ca    9.2      06 Oct 2018 20:08  Phos  4.2     10-06  Mg     2.1     10-06    TPro  7.8  /  Alb  4.5  /  TBili  0.7  /  DBili  x   /  AST  16  /  ALT  16  /  AlkPhos  83  10-05    PT/INR - ( 06 Oct 2018 20:08 )   PT: 12.1 sec;   INR: 1.11 ratio         PTT - ( 06 Oct 2018 20:08 )  PTT:27.1 sec      RADIOLOGY & ADDITIONAL TESTS: Reviewed. INTERVAL HPI/OVERNIGHT EVENTS: Pt intubated ON for respiratory distress    SUBJECTIVE: Patient seen and examined at bedside.   ROS unable to obtain: pt intubated/sedated     OBJECTIVE:    VITAL SIGNS:  ICU Vital Signs Last 24 Hrs  T(C): 37 (07 Oct 2018 04:00), Max: 37.1 (06 Oct 2018 12:00)  T(F): 98.6 (07 Oct 2018 04:00), Max: 98.8 (06 Oct 2018 20:00)  HR: 130 (07 Oct 2018 07:00) (58 - 147)  BP: 102/65 (07 Oct 2018 07:00) (96/62 - 151/67)  BP(mean): 79 (07 Oct 2018 07:00) (73 - 118)  ABP: --  ABP(mean): --  RR: 22 (07 Oct 2018 07:00) (14 - 38)  SpO2: 100% (07 Oct 2018 07:00) (95% - 100%)    Mode: AC/ CMV (Assist Control/ Continuous Mandatory Ventilation), RR (machine): 12, TV (machine): 450, FiO2: 30, PEEP: 5, ITime: 1, MAP: 8, PIP: 20    10-06 @ 07:01  -  10-07 @ 07:00  --------------------------------------------------------  IN: 1731.4 mL / OUT: 1820 mL / NET: -88.6 mL      CAPILLARY BLOOD GLUCOSE      POCT Blood Glucose.: 130 mg/dL (06 Oct 2018 08:41)      PHYSICAL EXAM:    General: NAD  HEENT: NC/AT; PERRL, clear conjunctiva  Neck: supple  Respiratory: coarse breath sounds   Cardiovascular: +S1/S2; RRR  Abdomen: soft, NT/ND; +BS x4  Extremities: WWP, 2+ peripheral pulses b/l; no LE edema  Skin: normal color and turgor; no rash  Neurological: following commands, awakens to touch     MEDICATIONS:  MEDICATIONS  (STANDING):  ALBUTerol/ipratropium for Nebulization 3 milliLiter(s) Nebulizer every 6 hours  cefTRIAXone   IVPB      cefTRIAXone   IVPB 2 Gram(s) IV Intermittent every 24 hours  enoxaparin Injectable 40 milliGRAM(s) SubCutaneous daily  influenza   Vaccine 0.5 milliLiter(s) IntraMuscular once  methylPREDNISolone sodium succinate Injectable 60 milliGRAM(s) IV Push daily  midazolam Infusion 0.017 mG/kG/Hr (1 mL/Hr) IV Continuous <Continuous>  propofol Infusion 5 MICROgram(s)/kG/Min (1.77 mL/Hr) IV Continuous <Continuous>    MEDICATIONS  (PRN):      ALLERGIES:  Allergies    No Known Drug Allergies  shellfish (Anaphylaxis)    Intolerances        LABS:                        10.0   11.4  )-----------( 397      ( 06 Oct 2018 20:08 )             30.6     10-06    133<L>  |  96  |  16  ----------------------------<  147<H>  3.8   |  26  |  0.62    Ca    9.2      06 Oct 2018 20:08  Phos  4.2     10-06  Mg     2.1     10-06    TPro  7.8  /  Alb  4.5  /  TBili  0.7  /  DBili  x   /  AST  16  /  ALT  16  /  AlkPhos  83  10-05    PT/INR - ( 06 Oct 2018 20:08 )   PT: 12.1 sec;   INR: 1.11 ratio         PTT - ( 06 Oct 2018 20:08 )  PTT:27.1 sec      RADIOLOGY & ADDITIONAL TESTS: Reviewed.

## 2018-10-07 NOTE — CHART NOTE - NSCHARTNOTEFT_GEN_A_CORE
Trial of mestinon 30mg q4 endorse to medical team; trial given but patient with poor tolerance, with secretions. Intubated yesterday evening. Attending to follow up in AM with regards to plan.     Discussed with Dr. Esparza

## 2018-10-08 LAB
ANION GAP SERPL CALC-SCNC: 12 MMOL/L — SIGNIFICANT CHANGE UP (ref 5–17)
ANION GAP SERPL CALC-SCNC: 9 MMOL/L — SIGNIFICANT CHANGE UP (ref 5–17)
APTT BLD: 22.2 SEC — LOW (ref 27.5–37.4)
BASOPHILS # BLD AUTO: 0 K/UL — SIGNIFICANT CHANGE UP (ref 0–0.2)
BASOPHILS NFR BLD AUTO: 0.1 % — SIGNIFICANT CHANGE UP (ref 0–2)
BUN SERPL-MCNC: 22 MG/DL — SIGNIFICANT CHANGE UP (ref 7–23)
BUN SERPL-MCNC: 23 MG/DL — SIGNIFICANT CHANGE UP (ref 7–23)
CALCIUM SERPL-MCNC: 8.9 MG/DL — SIGNIFICANT CHANGE UP (ref 8.4–10.5)
CALCIUM SERPL-MCNC: 9.3 MG/DL — SIGNIFICANT CHANGE UP (ref 8.4–10.5)
CHLORIDE SERPL-SCNC: 100 MMOL/L — SIGNIFICANT CHANGE UP (ref 96–108)
CHLORIDE SERPL-SCNC: 98 MMOL/L — SIGNIFICANT CHANGE UP (ref 96–108)
CO2 SERPL-SCNC: 26 MMOL/L — SIGNIFICANT CHANGE UP (ref 22–31)
CO2 SERPL-SCNC: 27 MMOL/L — SIGNIFICANT CHANGE UP (ref 22–31)
CREAT SERPL-MCNC: 0.71 MG/DL — SIGNIFICANT CHANGE UP (ref 0.5–1.3)
CREAT SERPL-MCNC: 0.72 MG/DL — SIGNIFICANT CHANGE UP (ref 0.5–1.3)
EOSINOPHIL # BLD AUTO: 0 K/UL — SIGNIFICANT CHANGE UP (ref 0–0.5)
EOSINOPHIL NFR BLD AUTO: 0.2 % — SIGNIFICANT CHANGE UP (ref 0–6)
GLUCOSE BLDC GLUCOMTR-MCNC: 105 MG/DL — HIGH (ref 70–99)
GLUCOSE BLDC GLUCOMTR-MCNC: 130 MG/DL — HIGH (ref 70–99)
GLUCOSE SERPL-MCNC: 114 MG/DL — HIGH (ref 70–99)
GLUCOSE SERPL-MCNC: 91 MG/DL — SIGNIFICANT CHANGE UP (ref 70–99)
HCT VFR BLD CALC: 29.6 % — LOW (ref 34.5–45)
HGB BLD-MCNC: 9.7 G/DL — LOW (ref 11.5–15.5)
INR BLD: 1.05 RATIO — SIGNIFICANT CHANGE UP (ref 0.88–1.16)
LYMPHOCYTES # BLD AUTO: 0.6 K/UL — LOW (ref 1–3.3)
LYMPHOCYTES # BLD AUTO: 7.6 % — LOW (ref 13–44)
MAGNESIUM SERPL-MCNC: 2.3 MG/DL — SIGNIFICANT CHANGE UP (ref 1.6–2.6)
MAGNESIUM SERPL-MCNC: 2.3 MG/DL — SIGNIFICANT CHANGE UP (ref 1.6–2.6)
MCHC RBC-ENTMCNC: 22.2 PG — LOW (ref 27–34)
MCHC RBC-ENTMCNC: 32.7 GM/DL — SIGNIFICANT CHANGE UP (ref 32–36)
MCV RBC AUTO: 67.7 FL — LOW (ref 80–100)
MONOCYTES # BLD AUTO: 0.2 K/UL — SIGNIFICANT CHANGE UP (ref 0–0.9)
MONOCYTES NFR BLD AUTO: 2.8 % — SIGNIFICANT CHANGE UP (ref 2–14)
NEUTROPHILS # BLD AUTO: 7.6 K/UL — HIGH (ref 1.8–7.4)
NEUTROPHILS NFR BLD AUTO: 89.3 % — HIGH (ref 43–77)
PHOSPHATE SERPL-MCNC: 4.6 MG/DL — HIGH (ref 2.5–4.5)
PHOSPHATE SERPL-MCNC: 5.3 MG/DL — HIGH (ref 2.5–4.5)
PLATELET # BLD AUTO: 327 K/UL — SIGNIFICANT CHANGE UP (ref 150–400)
POTASSIUM SERPL-MCNC: 4 MMOL/L — SIGNIFICANT CHANGE UP (ref 3.5–5.3)
POTASSIUM SERPL-MCNC: 4 MMOL/L — SIGNIFICANT CHANGE UP (ref 3.5–5.3)
POTASSIUM SERPL-SCNC: 4 MMOL/L — SIGNIFICANT CHANGE UP (ref 3.5–5.3)
POTASSIUM SERPL-SCNC: 4 MMOL/L — SIGNIFICANT CHANGE UP (ref 3.5–5.3)
PROTHROM AB SERPL-ACNC: 11.4 SEC — SIGNIFICANT CHANGE UP (ref 9.8–12.7)
RBC # BLD: 4.38 M/UL — SIGNIFICANT CHANGE UP (ref 3.8–5.2)
RBC # FLD: 18.5 % — HIGH (ref 10.3–14.5)
SODIUM SERPL-SCNC: 134 MMOL/L — LOW (ref 135–145)
SODIUM SERPL-SCNC: 138 MMOL/L — SIGNIFICANT CHANGE UP (ref 135–145)
WBC # BLD: 8.5 K/UL — SIGNIFICANT CHANGE UP (ref 3.8–10.5)
WBC # FLD AUTO: 8.5 K/UL — SIGNIFICANT CHANGE UP (ref 3.8–10.5)

## 2018-10-08 PROCEDURE — 99291 CRITICAL CARE FIRST HOUR: CPT

## 2018-10-08 RX ORDER — INSULIN LISPRO 100/ML
VIAL (ML) SUBCUTANEOUS EVERY 6 HOURS
Qty: 0 | Refills: 0 | Status: DISCONTINUED | OUTPATIENT
Start: 2018-10-08 | End: 2018-10-11

## 2018-10-08 RX ORDER — DIPHENHYDRAMINE HCL 50 MG
25 CAPSULE ORAL DAILY
Qty: 0 | Refills: 0 | Status: DISCONTINUED | OUTPATIENT
Start: 2018-10-08 | End: 2018-10-10

## 2018-10-08 RX ORDER — ACETAMINOPHEN 500 MG
1000 TABLET ORAL ONCE
Qty: 0 | Refills: 0 | Status: COMPLETED | OUTPATIENT
Start: 2018-10-08 | End: 2018-10-08

## 2018-10-08 RX ADMIN — Medication 25 MILLIGRAM(S): at 13:15

## 2018-10-08 RX ADMIN — Medication 3 MILLILITER(S): at 06:00

## 2018-10-08 RX ADMIN — MIDAZOLAM HYDROCHLORIDE 1 MG/KG/HR: 1 INJECTION, SOLUTION INTRAMUSCULAR; INTRAVENOUS at 11:00

## 2018-10-08 RX ADMIN — PROPOFOL 1.77 MICROGRAM(S)/KG/MIN: 10 INJECTION, EMULSION INTRAVENOUS at 00:48

## 2018-10-08 RX ADMIN — IMMUNE GLOBULIN (HUMAN) 33.33 GRAM(S): 10 INJECTION INTRAVENOUS; SUBCUTANEOUS at 13:45

## 2018-10-08 RX ADMIN — Medication 3 MILLILITER(S): at 23:54

## 2018-10-08 RX ADMIN — Medication 400 MILLIGRAM(S): at 13:15

## 2018-10-08 RX ADMIN — Medication 3 MILLILITER(S): at 11:48

## 2018-10-08 RX ADMIN — Medication 3 MILLILITER(S): at 17:21

## 2018-10-08 RX ADMIN — PROPOFOL 1.77 MICROGRAM(S)/KG/MIN: 10 INJECTION, EMULSION INTRAVENOUS at 11:00

## 2018-10-08 RX ADMIN — PANTOPRAZOLE SODIUM 40 MILLIGRAM(S): 20 TABLET, DELAYED RELEASE ORAL at 11:00

## 2018-10-08 RX ADMIN — Medication 60 MILLIGRAM(S): at 00:48

## 2018-10-08 RX ADMIN — ENOXAPARIN SODIUM 40 MILLIGRAM(S): 100 INJECTION SUBCUTANEOUS at 11:00

## 2018-10-08 NOTE — PROGRESS NOTE ADULT - ASSESSMENT
27F with PMHX of myasthenia gravis presents, past MICU stay and intubation, admitted for viral illness precipitating MG crisis.     #Neuro:  Myasthenia gravis  - patient likely with Myasthenic crisis precipitated by URI w/ EV and RV.   - now intubated 2/2 respiratory distress   - neuro recs appreciated. s/p 500mg IV Solumedrol, will cont w/ solumedrol 60mg qday (1 mg/kg) and IVIG x 5 days.   - follow neuro recs for restarting Mestinon now that pt is intubated, will consider plasma exchange if no improvement   - will continue to monitor     # Resp;  Shortness of breath  - likely 2/2 MG crisis  - intubated/sedated   - CXR unremarkable, EV/RV URI  -d/c abx   - cont PRN Duoneb  - cont w/ o2 supplemention.     # CV:  - stable   - will continue to monitor    #GI  - stable, no active issues.     #Renal  - stable  - monitor daily BMPs    #Heme/Onc  - stable  - monitor daily CBC    #Endo  - stable  - will monitor FS for hyperglycemia while on steroids    #ID  Viral URI w/ EV and RV  - patient reporting several days of cold like symptoms  - d/c all abx, legionella neg     #DVT ppx  - Lovenox

## 2018-10-08 NOTE — DIETITIAN INITIAL EVALUATION ADULT. - ENERGY NEEDS
Estimated calorie needs for intubated pt per [employer] ([facility]) 2003 1401cal/day (24kcals/kg)   Ht: 63"  Wt: 130  BMI: 23 kg/m2   IBW:  115(+/-10%)     113% IBW  Edema:  none       Skin: no pressure injuries documented

## 2018-10-08 NOTE — PROGRESS NOTE ADULT - SUBJECTIVE AND OBJECTIVE BOX
CHIEF COMPLAINT: Patient is a 28y old  Female who presents with a chief complaint of Myasthenia (07 Oct 2018 18:31)    Interval Events:  No acute events overnight.     REVIEW OF SYSTEMS:    [ ] All other systems negative  [X ] Unable to assess ROS because patient intubated     OBJECTIVE:  ICU Vital Signs Last 24 Hrs  T(C): 36.2 (08 Oct 2018 04:00), Max: 36.7 (08 Oct 2018 00:00)  T(F): 97.2 (08 Oct 2018 04:00), Max: 98 (08 Oct 2018 00:00)  HR: 111 (08 Oct 2018 06:00) (70 - 130)  BP: 106/63 (08 Oct 2018 06:00) (95/50 - 127/78)  BP(mean): 79 (08 Oct 2018 06:00) (67 - 93)  ABP: --  ABP(mean): --  RR: 12 (08 Oct 2018 06:00) (12 - 22)  SpO2: 100% (08 Oct 2018 06:00) (100% - 100%)    Mode: AC/ CMV (Assist Control/ Continuous Mandatory Ventilation), RR (machine): 12, TV (machine): 450, FiO2: 30, PEEP: 5, ITime: 1, MAP: 8, PIP: 22    10-06 @ 07:01  -  10-07 @ 07:00  --------------------------------------------------------  IN: 1731.4 mL / OUT: 1820 mL / NET: -88.6 mL    10-07 @ 07:01  -  10-08 @ 06:59  --------------------------------------------------------  IN: 903.1 mL / OUT: 1250 mL / NET: -346.9 mL      CAPILLARY BLOOD GLUCOSE      POCT Blood Glucose.: 130 mg/dL (06 Oct 2018 18:12)      PHYSICAL EXAM:  General:   HEENT:   Lymph Nodes:  Neck:   Respiratory:   Cardiovascular:   Abdomen:   Extremities:   Skin:   Neurological:  Psychiatry:    HOSPITAL MEDICATIONS:  MEDICATIONS  (STANDING):  ALBUTerol/ipratropium for Nebulization 3 milliLiter(s) Nebulizer every 6 hours  enoxaparin Injectable 40 milliGRAM(s) SubCutaneous daily  immune   globulin 10% (GAMMAGARD) IVPB 20 Gram(s) IV Intermittent daily  influenza   Vaccine 0.5 milliLiter(s) IntraMuscular once  methylPREDNISolone sodium succinate Injectable 60 milliGRAM(s) IV Push daily  midazolam Infusion 0.017 mG/kG/Hr (1 mL/Hr) IV Continuous <Continuous>  pantoprazole  Injectable 40 milliGRAM(s) IV Push daily  propofol Infusion 5 MICROgram(s)/kG/Min (1.77 mL/Hr) IV Continuous <Continuous>    MEDICATIONS  (PRN):      LABS:  (10-08 @ 03:29)                        9.7  8.5 )-----------( 327                 29.6    Neutrophils = 7.6 (89.3%)  Lymphocytes = 0.6 (7.6%)  Eosinophils = 0.0 (0.2%)  Basophils = 0.0 (0.1%)  Monocytes = 0.2 (2.8%)  Bands = --%    WBC Trend: 8.5<--, 11.4<--, 16.5<--  Hb Trend: 9.7<--, 10.0<--, 11.1<--  Plt Trend: 327<--, 397<--, 453<--  10-08    138  |  100  |  22  ----------------------------<  114<H>  4.0   |  26  |  0.71    Ca    9.3      08 Oct 2018 03:29  Phos  4.6     10-08  Mg     2.3     10-08      Creatinine Trend: 0.71<--, 0.72<--, 0.62<--, 0.93<--  PT/INR - ( 08 Oct 2018 03:29 )   PT: 11.4 sec;   INR: 1.05 ratio         PTT - ( 08 Oct 2018 03:29 )  PTT:22.2 sec    Arterial Blood Gas:  10-07 @ 02:01  7.42/43/102/28/98/3.6  ABG lactate: --  Arterial Blood Gas:  10-06 @ 20:02  7.47/37/133/27/100/3.5  ABG lactate: --  Arterial Blood Gas:  10-06 @ 12:24  7.45/35/80/24/97/1.2  ABG lactate: --    Venous Blood Gas:  10-06 @ 21:02  --/--/--/--/--  VBG Lactate: 3.8          MICROBIOLOGY:   Blood Cx:  Urine Cx:  Sputum Cx:  Legionella:  RVP:    RADIOLOGY:  X Ray:  CT:  MRI:  Ultrasound:  [ ] Reviewed and interpreted by me    EKG: CHIEF COMPLAINT: Patient is a 28y old  Female who presents with a chief complaint of Myasthenia (07 Oct 2018 18:31)    Interval Events:  No acute events overnight.   Patient following commands this morning.     REVIEW OF SYSTEMS:    [ ] All other systems negative  [X ] Unable to assess ROS because patient intubated     OBJECTIVE:  ICU Vital Signs Last 24 Hrs  T(C): 36.2 (08 Oct 2018 04:00), Max: 36.7 (08 Oct 2018 00:00)  T(F): 97.2 (08 Oct 2018 04:00), Max: 98 (08 Oct 2018 00:00)  HR: 111 (08 Oct 2018 06:00) (70 - 130)  BP: 106/63 (08 Oct 2018 06:00) (95/50 - 127/78)  BP(mean): 79 (08 Oct 2018 06:00) (67 - 93)  ABP: --  ABP(mean): --  RR: 12 (08 Oct 2018 06:00) (12 - 22)  SpO2: 100% (08 Oct 2018 06:00) (100% - 100%)    Mode: AC/ CMV (Assist Control/ Continuous Mandatory Ventilation), RR (machine): 12, TV (machine): 450, FiO2: 30, PEEP: 5, ITime: 1, MAP: 8, PIP: 22    10-06 @ 07:01  -  10-07 @ 07:00  --------------------------------------------------------  IN: 1731.4 mL / OUT: 1820 mL / NET: -88.6 mL    10-07 @ 07:01  -  10-08 @ 06:59  --------------------------------------------------------  IN: 903.1 mL / OUT: 1250 mL / NET: -346.9 mL      CAPILLARY BLOOD GLUCOSE      POCT Blood Glucose.: 130 mg/dL (06 Oct 2018 18:12)      PHYSICAL EXAM:    GENERAL: Comfortable, no acute distress   HEAD:  Normocephalic, atraumatic  EYES: EOMI, PERRLA  HEENT: Moist mucous membranes  NECK: Supple, No JVD  NERVOUS SYSTEM:  Arousable, following commands and moving all extremities   CHEST/LUNG: Clear to auscultation bilaterally, ET tube in place   HEART: Tachycardic but regular, no murmur   ABDOMEN: Soft, Nontender, Nondistended, Bowel sounds present  EXTREMITIES:   No clubbing, cyanosis, or edema  MUSCULOSKELETAL: No muscle tenderness, no joint tenderness  SKIN:  warm and dry, no rash           HOSPITAL MEDICATIONS:  MEDICATIONS  (STANDING):  ALBUTerol/ipratropium for Nebulization 3 milliLiter(s) Nebulizer every 6 hours  enoxaparin Injectable 40 milliGRAM(s) SubCutaneous daily  immune   globulin 10% (GAMMAGARD) IVPB 20 Gram(s) IV Intermittent daily  influenza   Vaccine 0.5 milliLiter(s) IntraMuscular once  methylPREDNISolone sodium succinate Injectable 60 milliGRAM(s) IV Push daily  midazolam Infusion 0.017 mG/kG/Hr (1 mL/Hr) IV Continuous <Continuous>  pantoprazole  Injectable 40 milliGRAM(s) IV Push daily  propofol Infusion 5 MICROgram(s)/kG/Min (1.77 mL/Hr) IV Continuous <Continuous>    MEDICATIONS  (PRN):      LABS:  (10-08 @ 03:29)                        9.7  8.5 )-----------( 327                 29.6    Neutrophils = 7.6 (89.3%)  Lymphocytes = 0.6 (7.6%)  Eosinophils = 0.0 (0.2%)  Basophils = 0.0 (0.1%)  Monocytes = 0.2 (2.8%)  Bands = --%    WBC Trend: 8.5<--, 11.4<--, 16.5<--  Hb Trend: 9.7<--, 10.0<--, 11.1<--  Plt Trend: 327<--, 397<--, 453<--  10-08    138  |  100  |  22  ----------------------------<  114<H>  4.0   |  26  |  0.71    Ca    9.3      08 Oct 2018 03:29  Phos  4.6     10-08  Mg     2.3     10-08      Creatinine Trend: 0.71<--, 0.72<--, 0.62<--, 0.93<--  PT/INR - ( 08 Oct 2018 03:29 )   PT: 11.4 sec;   INR: 1.05 ratio         PTT - ( 08 Oct 2018 03:29 )  PTT:22.2 sec    Arterial Blood Gas:  10-07 @ 02:01  7.42/43/102/28/98/3.6  ABG lactate: --  Arterial Blood Gas:  10-06 @ 20:02  7.47/37/133/27/100/3.5  ABG lactate: --  Arterial Blood Gas:  10-06 @ 12:24  7.45/35/80/24/97/1.2  ABG lactate: --    Venous Blood Gas:  10-06 @ 21:02  --/--/--/--/--  VBG Lactate: 3.8          MICROBIOLOGY:   Blood Cx:  Urine Cx:  Sputum Cx:  Legionella:  RVP:    RADIOLOGY:  X Ray:  CT:  MRI:  Ultrasound:  [ ] Reviewed and interpreted by me    EKG:

## 2018-10-08 NOTE — DIETITIAN INITIAL EVALUATION ADULT. - NS AS NUTRI INTERV ENTERAL NUTRITION
Continue Jevity 1.2 at 65 cc/hr x 18 hrs provides 1404 kcals, 65 gm protein, 944 cc free water  meets 24 Kcal/Kg, 1.1Gm/kg dosing wt 59kg.  Adjust EN prn for propofol kcals.

## 2018-10-08 NOTE — DIETITIAN INITIAL EVALUATION ADULT. - OTHER INFO
Pt seen for: MICU Length Of Stay   Adm dx: MG crisis, respiratory distress   GI issues:  no V/abd distention  Last BM: 10/5   Food allergies: shellfish    Vit/supplement PTA: none noted

## 2018-10-09 LAB
ANION GAP SERPL CALC-SCNC: 9 MMOL/L — SIGNIFICANT CHANGE UP (ref 5–17)
APTT BLD: 23.7 SEC — LOW (ref 27.5–37.4)
BUN SERPL-MCNC: 24 MG/DL — HIGH (ref 7–23)
CALCIUM SERPL-MCNC: 9 MG/DL — SIGNIFICANT CHANGE UP (ref 8.4–10.5)
CHLORIDE SERPL-SCNC: 101 MMOL/L — SIGNIFICANT CHANGE UP (ref 96–108)
CO2 SERPL-SCNC: 29 MMOL/L — SIGNIFICANT CHANGE UP (ref 22–31)
CREAT SERPL-MCNC: 0.7 MG/DL — SIGNIFICANT CHANGE UP (ref 0.5–1.3)
GLUCOSE BLDC GLUCOMTR-MCNC: 109 MG/DL — HIGH (ref 70–99)
GLUCOSE BLDC GLUCOMTR-MCNC: 116 MG/DL — HIGH (ref 70–99)
GLUCOSE BLDC GLUCOMTR-MCNC: 130 MG/DL — HIGH (ref 70–99)
GLUCOSE BLDC GLUCOMTR-MCNC: 83 MG/DL — SIGNIFICANT CHANGE UP (ref 70–99)
GLUCOSE SERPL-MCNC: 102 MG/DL — HIGH (ref 70–99)
HCT VFR BLD CALC: 28.1 % — LOW (ref 34.5–45)
HGB BLD-MCNC: 9.2 G/DL — LOW (ref 11.5–15.5)
INR BLD: 1.09 RATIO — SIGNIFICANT CHANGE UP (ref 0.88–1.16)
MAGNESIUM SERPL-MCNC: 2.4 MG/DL — SIGNIFICANT CHANGE UP (ref 1.6–2.6)
MCHC RBC-ENTMCNC: 22.4 PG — LOW (ref 27–34)
MCHC RBC-ENTMCNC: 32.7 GM/DL — SIGNIFICANT CHANGE UP (ref 32–36)
MCV RBC AUTO: 68.5 FL — LOW (ref 80–100)
PHOSPHATE SERPL-MCNC: 4.8 MG/DL — HIGH (ref 2.5–4.5)
PLATELET # BLD AUTO: 365 K/UL — SIGNIFICANT CHANGE UP (ref 150–400)
POTASSIUM SERPL-MCNC: 3.8 MMOL/L — SIGNIFICANT CHANGE UP (ref 3.5–5.3)
POTASSIUM SERPL-SCNC: 3.8 MMOL/L — SIGNIFICANT CHANGE UP (ref 3.5–5.3)
PROTHROM AB SERPL-ACNC: 11.9 SEC — SIGNIFICANT CHANGE UP (ref 9.8–12.7)
RBC # BLD: 4.1 M/UL — SIGNIFICANT CHANGE UP (ref 3.8–5.2)
RBC # FLD: 18.8 % — HIGH (ref 10.3–14.5)
SODIUM SERPL-SCNC: 139 MMOL/L — SIGNIFICANT CHANGE UP (ref 135–145)
WBC # BLD: 8.3 K/UL — SIGNIFICANT CHANGE UP (ref 3.8–10.5)
WBC # FLD AUTO: 8.3 K/UL — SIGNIFICANT CHANGE UP (ref 3.8–10.5)

## 2018-10-09 PROCEDURE — 99291 CRITICAL CARE FIRST HOUR: CPT

## 2018-10-09 RX ORDER — FENTANYL CITRATE 50 UG/ML
25 INJECTION INTRAVENOUS ONCE
Qty: 0 | Refills: 0 | Status: DISCONTINUED | OUTPATIENT
Start: 2018-10-09 | End: 2018-10-09

## 2018-10-09 RX ORDER — ACETAMINOPHEN 500 MG
1000 TABLET ORAL ONCE
Qty: 0 | Refills: 0 | Status: COMPLETED | OUTPATIENT
Start: 2018-10-09 | End: 2018-10-09

## 2018-10-09 RX ADMIN — Medication 3 MILLILITER(S): at 12:25

## 2018-10-09 RX ADMIN — FENTANYL CITRATE 25 MICROGRAM(S): 50 INJECTION INTRAVENOUS at 04:15

## 2018-10-09 RX ADMIN — Medication 60 MILLIGRAM(S): at 00:56

## 2018-10-09 RX ADMIN — IMMUNE GLOBULIN (HUMAN) 33.33 GRAM(S): 10 INJECTION INTRAVENOUS; SUBCUTANEOUS at 12:30

## 2018-10-09 RX ADMIN — PANTOPRAZOLE SODIUM 40 MILLIGRAM(S): 20 TABLET, DELAYED RELEASE ORAL at 11:20

## 2018-10-09 RX ADMIN — Medication 3 MILLILITER(S): at 05:55

## 2018-10-09 RX ADMIN — FENTANYL CITRATE 25 MICROGRAM(S): 50 INJECTION INTRAVENOUS at 03:41

## 2018-10-09 RX ADMIN — Medication 3 MILLILITER(S): at 23:21

## 2018-10-09 RX ADMIN — ENOXAPARIN SODIUM 40 MILLIGRAM(S): 100 INJECTION SUBCUTANEOUS at 11:20

## 2018-10-09 RX ADMIN — Medication 3 MILLILITER(S): at 17:09

## 2018-10-09 RX ADMIN — Medication 400 MILLIGRAM(S): at 11:50

## 2018-10-09 RX ADMIN — Medication 25 MILLIGRAM(S): at 11:50

## 2018-10-09 NOTE — PROGRESS NOTE ADULT - ASSESSMENT
MYASTHENIC CRISIS -- IVIG likely to provide rapid improvement, albeit of short duration. total planned doses are 5. Would continue with Prednisone (or equivalent other corticosteroid) at 80mg/daily to control myasthenia symptoms. Avoid magnesium, beta-blockers, calcium channel blockers, aminoglycoside, tetracycline, fluoroquinolone or macrolide antibiotics, and anticonvulsant drugs. No thymoma identified on CT.  SECRETIONS -- Half life of mestinon is less than 90 minutes.

## 2018-10-09 NOTE — PROGRESS NOTE ADULT - ASSESSMENT
27F with PMHX of myasthenia gravis presents, past MICU stay and intubation, admitted for viral illness precipitating MG crisis.     #Neuro:  Myasthenia gravis  - patient likely with Myasthenic crisis precipitated by URI w/ EV and RV.   - now intubated 2/2 respiratory distress   - neuro recs appreciated. s/p 500mg IV Solumedrol, will cont w/ solumedrol 60mg qday (1 mg/kg) and IVIG x 5 days.   - follow neuro recs for starting pyridostigmine, but will hold off given large amount of secretions   - patient currently improving, but if she worsens will consider plasma exchange   - will continue to monitor     # Resp;  Shortness of breath  - likely 2/2 MG crisis  - intubated/sedated   - CXR unremarkable, EV/RV URI  -d/c abx   - cont PRN Duoneb  - cont w/ o2 supplemention.     # CV:  - stable   - will continue to monitor    #GI  - stable, no active issues.     #Renal  - stable  - monitor daily BMPs    #Heme/Onc  - stable  - monitor daily CBC    #Endo  - stable  - will monitor FS for hyperglycemia while on steroids    #ID  Viral URI w/ EV and RV  - patient reporting several days of cold like symptoms  - d/c all abx, legionella neg     #DVT ppx  - Lovenox 27F with PMHX of myasthenia gravis presents, past MICU stay and intubation, admitted for viral illness precipitating MG crisis.     #Neuro:  Myasthenia gravis  - patient likely with Myasthenic crisis precipitated by URI w/ EV and RV.   - now intubated 2/2 respiratory distress   - neuro recs appreciated. s/p 500mg IV Solumedrol, will cont w/ solumedrol 1gr daily and IVIG x 5 days.   - follow neuro recs for starting pyridostigmine, but will hold off given large amount of secretions   - patient currently improving, but if she worsens will consider plasma exchange   - will continue to monitor     # Resp;  Shortness of breath  - likely 2/2 MG crisis  - intubated/sedated   - CXR unremarkable, EV/RV URI  -d/c abx   - cont PRN Duoneb  - cont w/ o2 supplemention.     # CV:  - stable   - will continue to monitor    #GI  - stable, no active issues.     #Renal  - stable  - monitor daily BMPs    #Heme/Onc  - stable  - monitor daily CBC    #Endo  - stable  - will monitor FS for hyperglycemia while on steroids    #ID  Viral URI w/ EV and RV  - patient reporting several days of cold like symptoms  - d/c all abx, legionella neg     #DVT ppx  - Lovenox 27F with PMHX of myasthenia gravis presents, past MICU stay and intubation, admitted for viral illness precipitating MG crisis.     #Neuro:  Myasthenia gravis  - patient likely with Myasthenic crisis precipitated by URI w/ EV and RV.   - now intubated 2/2 respiratory distress   - neuro recs appreciated. s/p 500mg IV Solumedrol, will cont w/  IVIG x 5 days.   - Spoke with Dr. Delgado who recommended continuing 60mg of solumedrol given her improvement, no need for additional 1gr solumedrol today   - follow neuro recs for starting pyridostigmine, but will hold off given large amount of secretions   - patient currently improving, but if she worsens will consider plasma exchange   - will continue to monitor   - Patient to follow up with Dr. Delgado on discharge office #[phone]    # Resp;  Shortness of breath  - likely 2/2 MG crisis  - CXR unremarkable, EV/RV URI  -d/c abx   - cont PRN Duoneb  - extubated to face mask this morning     # CV:  - stable   - will continue to monitor    #GI  - stable, no active issues.     #Renal  - stable  - monitor daily BMPs    #Heme/Onc  - stable  - monitor daily CBC    #Endo  - stable  - will monitor FS for hyperglycemia while on steroids    #ID  Viral URI w/ EV and RV  - patient reporting several days of cold like symptoms  - d/c all abx, legionella neg     #DVT ppx  - Lovenox

## 2018-10-09 NOTE — PROGRESS NOTE ADULT - SUBJECTIVE AND OBJECTIVE BOX
Patient extubated today.    patient seen and examined briefly with housestaff.    s/p IVIG x 4 treatments (10/5 + 10/7, 10/8, 10/9).  solumedrol 60mg given on 10/5 + 10/6, 10/7, 10/8) also solumedrol 375mg on 10/5/18.  Azithromycin given on 10/5/18.

## 2018-10-09 NOTE — PROGRESS NOTE ADULT - ASSESSMENT
She is a 26 y/o female with ho MG currently being treated for myasthenia crisis. She was extubated today.     Plan     cont IVIG   monitor VC and NIF every 2 hours   Patient received 1 gram of solumedrol   Start Prednisone 50 mg daily   tapered after 2-3 days       Thank you for the opportunity to assist in the care of your patient.  If you have any questions, please feel free to contact us at [phone].

## 2018-10-09 NOTE — PROGRESS NOTE ADULT - SUBJECTIVE AND OBJECTIVE BOX
CHIEF COMPLAINT: Patient is a 28y old  Female who presents with a chief complaint of Myastenia (08 Oct 2018 06:59)    Interval Events:  No acute events overnight. Patient CPAPing well this morning on the vent.   Denies any pain currently.     REVIEW OF SYSTEMS:  [ ] All other systems negative  [X ] Unable to assess ROS because intubated     OBJECTIVE:  ICU Vital Signs Last 24 Hrs  T(C): 36.8 (09 Oct 2018 04:00), Max: 37.2 (08 Oct 2018 16:00)  T(F): 98.2 (09 Oct 2018 04:00), Max: 99 (08 Oct 2018 16:00)  HR: 74 (09 Oct 2018 07:00) (62 - 138)  BP: 116/67 (09 Oct 2018 07:00) (99/54 - 126/75)  BP(mean): 86 (09 Oct 2018 07:00) (70 - 100)  ABP: --  ABP(mean): --  RR: 11 (09 Oct 2018 07:00) (11 - 21)  SpO2: 100% (09 Oct 2018 07:00) (100% - 100%)    Mode: AC/ CMV (Assist Control/ Continuous Mandatory Ventilation), RR (machine): 12, TV (machine): 450, FiO2: 30, PEEP: 5, PS: 5, ITime: 1, MAP: 8, PIP: 22    10-08 @ 07:01  -  10-09 @ 07:00  --------------------------------------------------------  IN: 1618.6 mL / OUT: 800 mL / NET: 818.6 mL      CAPILLARY BLOOD GLUCOSE      POCT Blood Glucose.: 130 mg/dL (09 Oct 2018 06:45)      PHYSICAL EXAM:    GENERAL: Comfortable, no acute distress   HEAD:  Normocephalic, atraumatic  EYES: EOMI, PERRLA  HEENT: Moist mucous membranes  NECK: Supple, No JVD  NERVOUS SYSTEM:  Following commands and moving all extremities   CHEST/LUNG: Clear to auscultation bilaterally, ET tube in place   HEART: Tachycardic but regular, no murmur   ABDOMEN: Soft, Nontender, Nondistended, Bowel sounds present  EXTREMITIES:   No clubbing, cyanosis, or edema  MUSCULOSKELETAL: No muscle tenderness, no joint tenderness  SKIN:  warm and dry, no rash     HOSPITAL MEDICATIONS:  MEDICATIONS  (STANDING):  ALBUTerol/ipratropium for Nebulization 3 milliLiter(s) Nebulizer every 6 hours  enoxaparin Injectable 40 milliGRAM(s) SubCutaneous daily  immune   globulin 10% (GAMMAGARD) IVPB 20 Gram(s) IV Intermittent daily  influenza   Vaccine 0.5 milliLiter(s) IntraMuscular once  insulin lispro (HumaLOG) corrective regimen sliding scale   SubCutaneous every 6 hours  methylPREDNISolone sodium succinate Injectable 60 milliGRAM(s) IV Push daily  midazolam Infusion 0.017 mG/kG/Hr (1 mL/Hr) IV Continuous <Continuous>  pantoprazole  Injectable 40 milliGRAM(s) IV Push daily  propofol Infusion 5 MICROgram(s)/kG/Min (1.77 mL/Hr) IV Continuous <Continuous>    MEDICATIONS  (PRN):  diphenhydrAMINE   Injectable 25 milliGRAM(s) IV Push daily PRN prior to IVIG      LABS:  (10-09 @ 02:40)                        9.2  8.3 )-----------( 365                 28.1    Neutrophils = -- (--%)  Lymphocytes = -- (--%)  Eosinophils = -- (--%)  Basophils = -- (--%)  Monocytes = -- (--%)  Bands = --%    WBC Trend: 8.3<--, 8.5<--, 11.4<--  Hb Trend: 9.2<--, 9.7<--, 10.0<--, 11.1<--  Plt Trend: 365<--, 327<--, 397<--, 453<--  10-09    139  |  101  |  24<H>  ----------------------------<  102<H>  3.8   |  29  |  0.70    Ca    9.0      09 Oct 2018 02:40  Phos  4.8     10-09  Mg     2.4     10-09      Creatinine Trend: 0.70<--, 0.71<--, 0.72<--, 0.62<--, 0.93<--  PT/INR - ( 09 Oct 2018 02:40 )   PT: 11.9 sec;   INR: 1.09 ratio         PTT - ( 09 Oct 2018 02:40 )  PTT:23.7 sec              MICROBIOLOGY:   Blood Cx:  Urine Cx:  Sputum Cx:  Legionella:  RVP:    RADIOLOGY:  X Ray:  CT:  MRI:  Ultrasound:  [ ] Reviewed and interpreted by me    EKG:

## 2018-10-09 NOTE — PROGRESS NOTE ADULT - SUBJECTIVE AND OBJECTIVE BOX
Subjective: Interval History - No events overnight. Patient was awake and following commands, seen in morning as well after intubation.     Objective:   Vital Signs Last 24 Hrs  T(C): 37.3 (09 Oct 2018 12:00), Max: 37.3 (09 Oct 2018 12:00)  T(F): 99.1 (09 Oct 2018 12:00), Max: 99.1 (09 Oct 2018 12:00)  HR: 84 (09 Oct 2018 12:00) (62 - 149)  BP: 122/77 (09 Oct 2018 12:00) (99/54 - 126/75)  BP(mean): 95 (09 Oct 2018 12:00) (70 - 100)  RR: 22 (09 Oct 2018 12:00) (11 - 22)  SpO2: 100% (09 Oct 2018 12:00) (100% - 100%)    General Exam:   General appearance: No acute distress                   Neurological Exam:    Mental Status: Orientated to self, month and place.  Attention intact.  No dysarthria, aphasia or neglect.  Knowledge intact.  Registration intact.  Short and long term memory grossly intact.      Cranial Nerves: PERRL, EOMI, VFF, no nystagmus or diplopia.  No APD.  Fundi not visualized bilaterally.  CN V1-3 intact to light touch and pinprick.  No facial asymmetry.  Hearing intact to finger rub bilaterally.  Tongue midline.  Sternocleidomastoid and Trapezius intact bilaterally.  Motor:   Tone: normal.                  Strength: intact throughout                Dysmeria: None to finger-nose-finger or heel-shin-heel  Tremor: No resting, postural or action tremor.  No myoclonus.  Sensation: intact to light touch  Gait: deferred     Other:    10-09    139  |  101  |  24<H>  ----------------------------<  102<H>  3.8   |  29  |  0.70    Ca    9.0      09 Oct 2018 02:40  Phos  4.8     10-09  Mg     2.4     10-09                              9.2    8.3   )-----------( 365      ( 09 Oct 2018 02:40 )             28.1     Radiology    - no recent imaging       MEDICATIONS  (STANDING):  ALBUTerol/ipratropium for Nebulization 3 milliLiter(s) Nebulizer every 6 hours  enoxaparin Injectable 40 milliGRAM(s) SubCutaneous daily  immune   globulin 10% (GAMMAGARD) IVPB 20 Gram(s) IV Intermittent daily  influenza   Vaccine 0.5 milliLiter(s) IntraMuscular once  insulin lispro (HumaLOG) corrective regimen sliding scale   SubCutaneous every 6 hours  midazolam Infusion 0.017 mG/kG/Hr (1 mL/Hr) IV Continuous <Continuous>  pantoprazole  Injectable 40 milliGRAM(s) IV Push daily  propofol Infusion 5 MICROgram(s)/kG/Min (1.77 mL/Hr) IV Continuous <Continuous>    MEDICATIONS  (PRN):  diphenhydrAMINE   Injectable 25 milliGRAM(s) IV Push daily PRN prior to IVIG

## 2018-10-10 LAB
ANION GAP SERPL CALC-SCNC: 13 MMOL/L — SIGNIFICANT CHANGE UP (ref 5–17)
APTT BLD: 24.5 SEC — LOW (ref 27.5–37.4)
BUN SERPL-MCNC: 27 MG/DL — HIGH (ref 7–23)
CALCIUM SERPL-MCNC: 9.1 MG/DL — SIGNIFICANT CHANGE UP (ref 8.4–10.5)
CHLORIDE SERPL-SCNC: 100 MMOL/L — SIGNIFICANT CHANGE UP (ref 96–108)
CO2 SERPL-SCNC: 24 MMOL/L — SIGNIFICANT CHANGE UP (ref 22–31)
CREAT SERPL-MCNC: 0.88 MG/DL — SIGNIFICANT CHANGE UP (ref 0.5–1.3)
CULTURE RESULTS: SIGNIFICANT CHANGE UP
CULTURE RESULTS: SIGNIFICANT CHANGE UP
GLUCOSE BLDC GLUCOMTR-MCNC: 103 MG/DL — HIGH (ref 70–99)
GLUCOSE BLDC GLUCOMTR-MCNC: 109 MG/DL — HIGH (ref 70–99)
GLUCOSE BLDC GLUCOMTR-MCNC: 148 MG/DL — HIGH (ref 70–99)
GLUCOSE SERPL-MCNC: 98 MG/DL — SIGNIFICANT CHANGE UP (ref 70–99)
HCT VFR BLD CALC: 30.1 % — LOW (ref 34.5–45)
HGB BLD-MCNC: 9.4 G/DL — LOW (ref 11.5–15.5)
INR BLD: 1.12 RATIO — SIGNIFICANT CHANGE UP (ref 0.88–1.16)
MAGNESIUM SERPL-MCNC: 2 MG/DL — SIGNIFICANT CHANGE UP (ref 1.6–2.6)
MCHC RBC-ENTMCNC: 21.2 PG — LOW (ref 27–34)
MCHC RBC-ENTMCNC: 31 GM/DL — LOW (ref 32–36)
MCV RBC AUTO: 68.1 FL — LOW (ref 80–100)
PHOSPHATE SERPL-MCNC: 3.9 MG/DL — SIGNIFICANT CHANGE UP (ref 2.5–4.5)
PLATELET # BLD AUTO: 379 K/UL — SIGNIFICANT CHANGE UP (ref 150–400)
POTASSIUM SERPL-MCNC: 3.7 MMOL/L — SIGNIFICANT CHANGE UP (ref 3.5–5.3)
POTASSIUM SERPL-SCNC: 3.7 MMOL/L — SIGNIFICANT CHANGE UP (ref 3.5–5.3)
PROTHROM AB SERPL-ACNC: 12.1 SEC — SIGNIFICANT CHANGE UP (ref 9.8–12.7)
RBC # BLD: 4.42 M/UL — SIGNIFICANT CHANGE UP (ref 3.8–5.2)
RBC # FLD: 18.5 % — HIGH (ref 10.3–14.5)
SODIUM SERPL-SCNC: 137 MMOL/L — SIGNIFICANT CHANGE UP (ref 135–145)
SPECIMEN SOURCE: SIGNIFICANT CHANGE UP
SPECIMEN SOURCE: SIGNIFICANT CHANGE UP
WBC # BLD: 10.9 K/UL — HIGH (ref 3.8–10.5)
WBC # FLD AUTO: 10.9 K/UL — HIGH (ref 3.8–10.5)

## 2018-10-10 PROCEDURE — 99291 CRITICAL CARE FIRST HOUR: CPT

## 2018-10-10 PROCEDURE — 99233 SBSQ HOSP IP/OBS HIGH 50: CPT | Mod: GC

## 2018-10-10 RX ORDER — SODIUM CHLORIDE 9 MG/ML
250 INJECTION, SOLUTION INTRAVENOUS ONCE
Qty: 0 | Refills: 0 | Status: COMPLETED | OUTPATIENT
Start: 2018-10-10 | End: 2018-10-10

## 2018-10-10 RX ORDER — LINEZOLID 600 MG/300ML
INJECTION, SOLUTION INTRAVENOUS
Qty: 0 | Refills: 0 | Status: DISCONTINUED | OUTPATIENT
Start: 2018-10-10 | End: 2018-10-10

## 2018-10-10 RX ORDER — ACETAMINOPHEN 500 MG
975 TABLET ORAL ONCE
Qty: 0 | Refills: 0 | Status: COMPLETED | OUTPATIENT
Start: 2018-10-10 | End: 2018-10-10

## 2018-10-10 RX ORDER — SODIUM CHLORIDE 9 MG/ML
1000 INJECTION INTRAMUSCULAR; INTRAVENOUS; SUBCUTANEOUS
Qty: 0 | Refills: 0 | Status: DISCONTINUED | OUTPATIENT
Start: 2018-10-10 | End: 2018-10-10

## 2018-10-10 RX ORDER — MEROPENEM 1 G/30ML
INJECTION INTRAVENOUS
Qty: 0 | Refills: 0 | Status: DISCONTINUED | OUTPATIENT
Start: 2018-10-10 | End: 2018-10-10

## 2018-10-10 RX ADMIN — Medication 3 MILLILITER(S): at 05:29

## 2018-10-10 RX ADMIN — SODIUM CHLORIDE 1000 MILLILITER(S): 9 INJECTION, SOLUTION INTRAVENOUS at 01:43

## 2018-10-10 RX ADMIN — IMMUNE GLOBULIN (HUMAN) 33.33 GRAM(S): 10 INJECTION INTRAVENOUS; SUBCUTANEOUS at 12:02

## 2018-10-10 RX ADMIN — PANTOPRAZOLE SODIUM 40 MILLIGRAM(S): 20 TABLET, DELAYED RELEASE ORAL at 12:10

## 2018-10-10 RX ADMIN — Medication 25 MILLIGRAM(S): at 12:01

## 2018-10-10 RX ADMIN — Medication 60 MILLIGRAM(S): at 06:34

## 2018-10-10 RX ADMIN — Medication 3 MILLILITER(S): at 11:22

## 2018-10-10 RX ADMIN — SODIUM CHLORIDE 1000 MILLILITER(S): 9 INJECTION, SOLUTION INTRAVENOUS at 02:56

## 2018-10-10 RX ADMIN — ENOXAPARIN SODIUM 40 MILLIGRAM(S): 100 INJECTION SUBCUTANEOUS at 12:01

## 2018-10-10 RX ADMIN — Medication 3 MILLILITER(S): at 17:22

## 2018-10-10 RX ADMIN — Medication 975 MILLIGRAM(S): at 12:01

## 2018-10-10 RX ADMIN — Medication 975 MILLIGRAM(S): at 12:24

## 2018-10-10 NOTE — PROGRESS NOTE ADULT - SUBJECTIVE AND OBJECTIVE BOX
CHIEF COMPLAINT: Patient is a 28y old  Female who presents with a chief complaint of Myasthenic crisis (09 Oct 2018 15:45)    Interval Events:  Overnight patient became hypotensive to 80s/50s when she was sleeping. She was asymptomatic and was given 500cc of NS.   She feels well this morning and has no complaints. Reports her blood pressure is usually low at night.     REVIEW OF SYSTEMS:  Constitutional:   Eyes:  ENT:  CV: No CP  Resp: No SOB or cough, + secretions, but is able to cough them up   GI:  :  MSK:   Integumentary:  Neurological: + generalized weakness now improving   Psychiatric:  Endocrine:  Hematologic/Lymphatic:  Allergic/Immunologic:  [X ] All other systems negative  [ ] Unable to assess ROS because ________    OBJECTIVE:  ICU Vital Signs Last 24 Hrs  T(C): 37.2 (10 Oct 2018 04:05), Max: 37.3 (09 Oct 2018 12:00)  T(F): 98.9 (10 Oct 2018 04:05), Max: 99.2 (10 Oct 2018 00:00)  HR: 100 (10 Oct 2018 07:00) (56 - 149)  BP: 90/52 (10 Oct 2018 07:00) (85/50 - 123/83)  BP(mean): 69 (10 Oct 2018 07:00) (61 - 99)  ABP: --  ABP(mean): --  RR: 15 (10 Oct 2018 07:00) (12 - 23)  SpO2: 100% (10 Oct 2018 07:00) (96% - 100%)    Mode: CPAP with PS, FiO2: 30, PEEP: 5, PS: 5, MAP: 6.4, PIP: 14    10-09 @ 07:01  -  10-10 @ 07:00  --------------------------------------------------------  IN: 1570 mL / OUT: 750 mL / NET: 820 mL      CAPILLARY BLOOD GLUCOSE      POCT Blood Glucose.: 109 mg/dL (10 Oct 2018 06:35)      PHYSICAL EXAM:    GENERAL: Comfortable, no acute distress   HEAD:  Normocephalic, atraumatic  EYES: EOMI, PERRLA  HEENT: Moist mucous membranes  NECK: Supple, No JVD  NERVOUS SYSTEM:  Alert & Oriented X3, Motor Strength 5/5 B/L upper and lower extremities  CHEST/LUNG: Clear to auscultation bilaterally  HEART: Regular rate and rhythm, + 2/6 systolic murmur   ABDOMEN: Soft, Nontender, Nondistended, Bowel sounds present  EXTREMITIES:   No clubbing, cyanosis, or edema  MUSCULOSKELETAL: No muscle tenderness, no joint tenderness  SKIN:  warm and dry, no rash           HOSPITAL MEDICATIONS:  MEDICATIONS  (STANDING):  acetaminophen   Tablet .. 975 milliGRAM(s) Oral once  ALBUTerol/ipratropium for Nebulization 3 milliLiter(s) Nebulizer every 6 hours  enoxaparin Injectable 40 milliGRAM(s) SubCutaneous daily  immune   globulin 10% (GAMMAGARD) IVPB 20 Gram(s) IV Intermittent daily  influenza   Vaccine 0.5 milliLiter(s) IntraMuscular once  insulin lispro (HumaLOG) corrective regimen sliding scale   SubCutaneous every 6 hours  methylPREDNISolone sodium succinate Injectable 60 milliGRAM(s) IV Push daily  pantoprazole  Injectable 40 milliGRAM(s) IV Push daily    MEDICATIONS  (PRN):  diphenhydrAMINE   Injectable 25 milliGRAM(s) IV Push daily PRN prior to IVIG      LABS:  (10-10 @ 00:42)                        9.4  10.9 )-----------( 379                 30.1    Neutrophils = -- (--%)  Lymphocytes = -- (--%)  Eosinophils = -- (--%)  Basophils = -- (--%)  Monocytes = -- (--%)  Bands = --%    WBC Trend: 10.9<--, 8.3<--, 8.5<--  Hb Trend: 9.4<--, 9.2<--, 9.7<--, 10.0<--, 11.1<--  Plt Trend: 379<--, 365<--, 327<--, 397<--, 453<--  10-10    137  |  100  |  27<H>  ----------------------------<  98  3.7   |  24  |  0.88    Ca    9.1      10 Oct 2018 00:42  Phos  3.9     10-10  Mg     2.0     10-10      Creatinine Trend: 0.88<--, 0.70<--, 0.71<--, 0.72<--, 0.62<--, 0.93<--  PT/INR - ( 10 Oct 2018 00:42 )   PT: 12.1 sec;   INR: 1.12 ratio         PTT - ( 10 Oct 2018 00:42 )  PTT:24.5 sec              MICROBIOLOGY:   Blood Cx:  Urine Cx:  Sputum Cx:  Legionella:  RVP:    RADIOLOGY:  X Ray:  CT:  MRI:  Ultrasound:  [ ] Reviewed and interpreted by me    EKG:

## 2018-10-10 NOTE — CHART NOTE - NSCHARTNOTEFT_GEN_A_CORE
MICU Transfer Note    Transfer from: MICU  Transfer to:  (  ) Medicine    (  ) Telemetry    (  ) RCU    (  ) Palliative    ( X ) Neurology  Accepting physician: Dr. Stephenson      MICU COURSE:  26yo F with PMH Myasthenia Gravis (dx 2018), SS trait, prior multiple admissions to MICU for MG crisis requiring intubation, last admission for MG crisis 7/18-7/28/2018 required MICU stay with intubation for hypoxic and hypercarbic respiratory failure, was extubated post 3rd session of PLEX now, presenting with SOB x 1 day. Patient reports that for the last week she has been experiencing cold like symptoms, including productive cough and congestion. Patient is unable to effectively clear her secretions. Patient endorses SOB for 2 days prior to presentation. Of note, patient ran out of her PO Prednisone 1 day prior to presentation, and has been unable to obtain more due to insurance issues.     In the ED, patient was found to be  tachycardic to 103 and O2 sat 90% on RA. NIF  -60 / . Patient had increased work of breathing, and was started on BiPAP. CTX was found to be clear however pt was pancultured and was empirically started on Ceftriaxone and Azithromycin for community acquired PNA. She was also given 125 IV solumedrol, and ordered for another 380mg. MICU team was consulted for increased work of breathing, and accepted for closer airway monitoring.    Her respiratory status worsened and she was intubated on 10/9. She received 5 doses of IVIG (last dose 10/10) as well as 1mg/kg/day of solumedrol (60mg) after the initial loading doses. She improved and was extubated on 10/9.   She became hypotensive overnight to 85/50, but was asymptomatic. Her BP improved with 500cc of NS. Patient also reports her BP usually is low at night.   She was initially started on abx but with her RVP being positive and cultures were negative, abx were stopped.     ASSESSMENT & PLAN:         For Follow-Up: MICU Transfer Note    Transfer from: MICU  Transfer to:  (  ) Medicine    (  ) Telemetry    (  ) RCU    (  ) Palliative    ( X ) Neurology  Accepting physician: Dr. Stephenson      MICU COURSE:  26yo F with PMH Myasthenia Gravis (dx 2018), SS trait, prior multiple admissions to MICU for MG crisis requiring intubation, last admission for MG crisis 7/18-7/28/2018 required MICU stay with intubation for hypoxic and hypercarbic respiratory failure, was extubated post 3rd session of PLEX now, presenting with SOB x 1 day. Patient reports that for the last week she has been experiencing cold like symptoms, including productive cough and congestion. Patient is unable to effectively clear her secretions. Patient endorses SOB for 2 days prior to presentation. Of note, patient ran out of her PO Prednisone 1 day prior to presentation, and has been unable to obtain more due to insurance issues.     In the ED, patient was found to be  tachycardic to 103 and O2 sat 90% on RA. NIF  -60 / . Patient had increased work of breathing, and was started on BiPAP. CTX was found to be clear however pt was pancultured and was empirically started on Ceftriaxone and Azithromycin for community acquired PNA. She was also given 125 IV solumedrol, and ordered for another 380mg. MICU team was consulted for increased work of breathing, and accepted for closer airway monitoring.    Her respiratory status worsened and she was intubated on 10/9. She received 5 doses of IVIG (last dose 10/10) as well as 1mg/kg/day of solumedrol (60mg) after the initial loading doses. She improved and was extubated on 10/9.   She became hypotensive overnight to 85/50, but was asymptomatic. Her BP improved with 500cc of NS. Patient also reports her BP usually is low at night.   She was initially started on abx but with her RVP being positive and cultures were negative, abx were stopped.     ASSESSMENT & PLAN:     #Neuro:  Myasthenia gravis  - patient likely with Myasthenic crisis precipitated by URI w/ EV and RV.   - intubated 2/2 respiratory distress and then extubated 10/9  - neuro recs appreciated. s/p 500mg IV Solumedrol, will cont w/  IVIG x 5 days with last dose today   - c/w solumedrol 1mg/kg daily (60mg daily)  - follow neuro recs for starting pyridostigmine  - Patient to follow up with Dr. Delgado on discharge office #[phone]    # Resp;  Shortness of breath  - likely 2/2 MG crisis  - CXR unremarkable, EV/RV URI  - d/c abx   - cont PRN Duoneb  - s/p extubation 10/9, sating well on RA   # CV:  - stable   - will continue to monitor    #GI  - stable, no active issues.     #Renal  - stable  - monitor daily BMPs    #Heme/Onc  - stable  - monitor daily CBC    #Endo  - stable  - will monitor FS on ISS for hyperglycemia while on steroids    #ID  Viral URI w/ EV and RV  - patient reporting several days of cold like symptoms  - d/c all abx, legionella neg     #DVT ppx  - Lovenox    For Follow-Up:    [] patient to follow up with Dr. Delgado on discharge ([phone])  [] pending PT eval  [] completed IVIG on 10/10  [] on solumedrol 60mg daily currently, will need to taper and transition to PO prior to discharge       Kirk Link MD PGY3  [phone] / [phone]

## 2018-10-10 NOTE — PROGRESS NOTE ADULT - SUBJECTIVE AND OBJECTIVE BOX
Subjective: Interval History - Patient had extubation yesterday, tolerated procedure well.       Objective:     Vital Signs Last 24 Hrs  T(C): 37.3 (10 Oct 2018 08:00), Max: 37.3 (09 Oct 2018 12:00)  T(F): 99.2 (10 Oct 2018 08:00), Max: 99.2 (10 Oct 2018 00:00)  HR: 94 (10 Oct 2018 08:00) (56 - 149)  BP: 97/59 (10 Oct 2018 08:00) (85/50 - 122/77)  BP(mean): 71 (10 Oct 2018 08:00) (61 - 96)  RR: 13 (10 Oct 2018 08:00) (13 - 23)  SpO2: 100% (10 Oct 2018 08:00) (96% - 100%)    General Exam:   General appearance: No acute distress                   Neurological Exam:    Mental Status: Orientated to self, month and place.  Attention intact.  No dysarthria, aphasia or neglect.  Knowledge intact.  Registration intact.  Short and long term memory grossly intact.      Cranial Nerves: PERRL, EOMI, VFF, no nystagmus or diplopia.  No APD.  Fundi not visualized bilaterally.  CN V1-3 intact to light touch and pinprick.  No facial asymmetry.  Hearing intact to finger rub bilaterally.  Tongue midline.  Sternocleidomastoid and Trapezius intact bilaterally.    Neck flexion and extension : 5/5    Motor:   Tone: normal.                  Strength: intact throughout                Dysmeria: None to finger-nose-finger or heel-shin-heel  Tremor: No resting, postural or action tremor.  No myoclonus.  Sensation: intact to light touch  Gait: deferred     Other:    10-09    139  |  101  |  24<H>  ----------------------------<  102<H>  3.8   |  29  |  0.70    Ca    9.0      09 Oct 2018 02:40  Phos  4.8     10-09  Mg     2.4     10-09                              9.2    8.3   )-----------( 365      ( 09 Oct 2018 02:40 )             28.1     Radiology    - no recent imaging       MEDICATIONS  (STANDING):  ALBUTerol/ipratropium for Nebulization 3 milliLiter(s) Nebulizer every 6 hours  enoxaparin Injectable 40 milliGRAM(s) SubCutaneous daily  immune   globulin 10% (GAMMAGARD) IVPB 20 Gram(s) IV Intermittent daily  influenza   Vaccine 0.5 milliLiter(s) IntraMuscular once  insulin lispro (HumaLOG) corrective regimen sliding scale   SubCutaneous every 6 hours  midazolam Infusion 0.017 mG/kG/Hr (1 mL/Hr) IV Continuous <Continuous>  pantoprazole  Injectable 40 milliGRAM(s) IV Push daily  propofol Infusion 5 MICROgram(s)/kG/Min (1.77 mL/Hr) IV Continuous <Continuous>    MEDICATIONS  (PRN):  diphenhydrAMINE   Injectable 25 milliGRAM(s) IV Push daily PRN prior to IVIG

## 2018-10-10 NOTE — PROGRESS NOTE ADULT - ASSESSMENT
She is a 26 y/o female with ho MG currently being treated for myasthenia crisis. She was extubated yesterday. Today Neck flexion and extension improved to 5/5.      Plan     cont IVIG - last dose   monitor VC and NIF every 4 hours   cont Prednisone 80 mg daily   tapered after 2-3 days   follow outpatient with        Thank you for the opportunity to assist in the care of your patient.  If you have any questions, please feel free to contact us [place] [phone].

## 2018-10-10 NOTE — PROGRESS NOTE ADULT - ASSESSMENT
27F with PMHX of myasthenia gravis presents, past MICU stay and intubation, admitted for viral illness precipitating MG crisis.     #Neuro:  Myasthenia gravis  - patient likely with Myasthenic crisis precipitated by URI w/ EV and RV.   - intubated 2/2 respiratory distress and then extubated 10/9  - neuro recs appreciated. s/p 500mg IV Solumedrol, will cont w/  IVIG x 5 days with last dose today   - c/w solumedrol 1mg/kg daily (60mg daily)  - follow neuro recs for starting pyridostigmine  - Patient to follow up with Dr. Delgado on discharge office #[phone]    # Resp;  Shortness of breath  - likely 2/2 MG crisis  - CXR unremarkable, EV/RV URI  - d/c abx   - cont PRN Duoneb  - s/p extubation 10/9, sating well on RA   # CV:  - stable   - will continue to monitor    #GI  - stable, no active issues.     #Renal  - stable  - monitor daily BMPs    #Heme/Onc  - stable  - monitor daily CBC    #Endo  - stable  - will monitor FS on ISS for hyperglycemia while on steroids    #ID  Viral URI w/ EV and RV  - patient reporting several days of cold like symptoms  - d/c all abx, legionella neg     #DVT ppx  - Lovenox

## 2018-10-11 ENCOUNTER — TRANSCRIPTION ENCOUNTER (OUTPATIENT)
Age: 28
End: 2018-10-11

## 2018-10-11 VITALS — HEART RATE: 97 BPM | SYSTOLIC BLOOD PRESSURE: 113 MMHG | RESPIRATION RATE: 24 BRPM | DIASTOLIC BLOOD PRESSURE: 75 MMHG

## 2018-10-11 LAB
GLUCOSE BLDC GLUCOMTR-MCNC: 102 MG/DL — HIGH (ref 70–99)
GLUCOSE BLDC GLUCOMTR-MCNC: 108 MG/DL — HIGH (ref 70–99)
GLUCOSE BLDC GLUCOMTR-MCNC: 113 MG/DL — HIGH (ref 70–99)
GLUCOSE BLDC GLUCOMTR-MCNC: 167 MG/DL — HIGH (ref 70–99)
GLUCOSE BLDC GLUCOMTR-MCNC: 93 MG/DL — SIGNIFICANT CHANGE UP (ref 70–99)

## 2018-10-11 PROCEDURE — 99232 SBSQ HOSP IP/OBS MODERATE 35: CPT | Mod: GC

## 2018-10-11 RX ORDER — PYRIDOSTIGMINE BROMIDE 60 MG/5ML
1 SOLUTION ORAL
Qty: 0 | Refills: 0 | DISCHARGE
Start: 2018-10-11

## 2018-10-11 RX ORDER — PYRIDOSTIGMINE BROMIDE 60 MG/5ML
1 SOLUTION ORAL
Qty: 0 | Refills: 0 | COMMUNITY
Start: 2018-10-11

## 2018-10-11 RX ORDER — IPRATROPIUM/ALBUTEROL SULFATE 18-103MCG
3 AEROSOL WITH ADAPTER (GRAM) INHALATION EVERY 6 HOURS
Qty: 0 | Refills: 0 | Status: DISCONTINUED | OUTPATIENT
Start: 2018-10-11 | End: 2018-10-11

## 2018-10-11 RX ADMIN — Medication 60 MILLIGRAM(S): at 05:28

## 2018-10-11 RX ADMIN — Medication 3 MILLILITER(S): at 00:23

## 2018-10-11 RX ADMIN — Medication 1: at 00:38

## 2018-10-11 RX ADMIN — ENOXAPARIN SODIUM 40 MILLIGRAM(S): 100 INJECTION SUBCUTANEOUS at 12:15

## 2018-10-11 NOTE — PHYSICAL THERAPY INITIAL EVALUATION ADULT - ADDITIONAL COMMENTS
Pt states she lives with her  and 2 children in a house with 4 steps to enter, +HR. Pt states she stays on the 1st floor. Pt states her  is available to assist when needed.

## 2018-10-11 NOTE — DISCHARGE NOTE ADULT - HOSPITAL COURSE
27 year-old F with PMH of myasthenia gravis who presented to the hospital with a myasthenia crisis in the setting of viral URI. The patient was admitted to the MICU and started on IV steroids and antibiotics for possible community acquired pneumonia. The patient was intubated for respiratory failure. The patient received IVIG for 5 days and was continued on intravenous steroids. She was extubated and was maintained on room air. She was transitioned to oral prednisone and continued to improve. She is now medically stable and cleared for discharge with outpatient Neurology follow up.

## 2018-10-11 NOTE — PROGRESS NOTE ADULT - ATTENDING COMMENTS
Agree with above. Patient seen and examined. Patient admitted with MG crisis and respiratory distress.   -Myasthenia gravis - patient developed respiratory distress overnight and was intubated for hypoxemic respiratory failure. Continue Solumedrol 1mg/kg and IVIg. I am concerned that patient will require PLEX given the rapid progression of her symptoms. Will followup with Neurology  -Acute Hypoxemic Respiratory Failure  - now intubated. continue mechanical ventilation. ABG noted. Respiratory distress due to progression of neurologic disease  -Oropharyngeal dysphagia - start feeds via OGT  -ID - discontinue antibiotics. No signs/symptoms of bacterial infection. RVP with Rhinovirus/Enterovirus    Critical Care Time 35 minutes
Agree with above. Patient seen and examined. Patient admitted with MG crisis and respiratory distress.   -Myasthenia gravis - patient with slight increase in UE weakness this AM. Will continue Solumedrol 60 QD and IVIg x 5 days. Followup with neurology and if worsens may require PLEX. She has required PLEX in past.  -Respiratory distress - initially improved but presently with increasing dyspnea and slightly worsened NIF (50). Now requiring NIPPV. Will check ABG and monitor respiratory status with low threshold for intubation. Patient does have rhino/enterovirus URI which is likely trigger for her symptoms. Will continue to hold Mestinon given secretions. Continue Ceftriaxone/Azithro for CAP coverage.  -Oropharyngeal dysphagia - NPO while on NIPPV    Critical Care Time 35 minutes
Myasthenia crisis in the setting of viral URI - extubated yesterday and stable for two days.   Would DC home from here after determining the steroid taper and whether/when to resume mestinon. Patient also needs an outpt appt set up with Dr. JIM Cage before she leaves.
Myasthenia crisis in the setting of viral URI - extubated yesterday and stable today  dc to floor with neuro follow-up
patient seen and examined with housestaff    extubated. markedly stronger. no cough. admits to viral illness preceding admission/intubation    5/5 strength. (+) lid lag. sustained upgaze without difficulty. right hand  not fatiguable.    For IVIG #5 today.  continue oral prednisone 40mg twice daily for now.  may transfer to neurology floor if so desired.
Sickle cell and Myasthenia Gravis admitted with crisis in the setting of rhinovirus URI intubated due to bulbar weakness. today with NIF 40 and able to move extremities     - > complete IVIG dose  - > d/w neuro re pyridostigmine dosing  then attempt extubation    cc time 45 mins

## 2018-10-11 NOTE — PROGRESS NOTE ADULT - ASSESSMENT
26 y/o female with ho MG currently being treated for myasthenia crisis. She was extubated 2 days ago and doing very well. Neck flexion and extension improved to 5/5.      Plan:  -Should be on prednisone 60mg qAM  -Mestinon 60 qd  -Follow up with Dr. Delgado.     Case and plan discussed with Dr. Delgado who will reach out to patient to have an appointment in the next few days.

## 2018-10-11 NOTE — PROGRESS NOTE ADULT - ASSESSMENT
27F with PMHX of myasthenia gravis presents, past MICU stay and intubation, admitted for viral illness precipitating MG crisis.     #Neuro:  Myasthenia gravis  - patient likely with Myasthenic crisis precipitated by URI w/ EV and RV.   - intubated 2/2 respiratory distress and then extubated 10/9, doing well on room air  - neuro recs appreciated. s/p 500mg IV Solumedrol, completed IVIG yesterday (5 day course)  - c/w solumedrol 1mg/kg daily (60mg daily)  - follow neuro recs for starting pyridostigmine  - Patient to follow up with Dr. Delgado on discharge office #[phone]    # Resp;  Shortness of breath  - likely 2/2 MG crisis  - CXR unremarkable, EV/RV URI  - cont PRN Duoneb  - s/p extubation 10/9, sating well on RA     # CV:  - stable   - will continue to monitor    #GI  - stable, no active issues.     #Renal  - stable  - monitor daily BMPs    #Heme/Onc  - stable  - monitor daily CBC    #Endo  - stable  - will monitor FS on ISS for hyperglycemia while on steroids    #ID  Viral URI w/ EV and RV  - patient reporting several days of cold like symptoms  - d/c all abx, legionella neg     #DVT ppx  - Lovenox    Edgardo Agrawal, PGY-3  MICU

## 2018-10-11 NOTE — DISCHARGE NOTE ADULT - MEDICATION SUMMARY - MEDICATIONS TO STOP TAKING
I will STOP taking the medications listed below when I get home from the hospital:    pyridostigmine 180 mg oral tablet, extended release  -- 1 tab(s) by mouth at bedtime

## 2018-10-11 NOTE — DISCHARGE NOTE ADULT - PLAN OF CARE
Continue medications and Neurology follow up You were admitted to the hospital with a myasthenia crisis, likely in the setting of a viral infection. You were admitted to the ICU and required a ventilator for breathing support. You were able to be taken off of the ventilator and your symptoms improved. Please take your medications as prescribed and follow up with your Neurologist, Dr. Delgado, after discharge. His office will call you for an appointment. Continue monitoring You have a history of asthma, which was stable during this admission. Please continue to follow up with your primary care doctor. If you experience shortness of breath and/or wheezing, please call your doctor and/or present to an emergency department for evaluation. You were admitted to the hospital with a myasthenia crisis, likely in the setting of a viral infection. You were admitted to the ICU and required a ventilator for breathing support. You were able to be taken off of the ventilator and your symptoms improved. Please take your medications as prescribed and follow up with your Neurologist, Dr. Delgado, after discharge. His office will call you for an appointment. Please take Prednisone 60mg daily for the next week. You were admitted to the hospital with a myasthenia crisis, likely in the setting of a viral infection. You were admitted to the ICU and required a ventilator for breathing support. You were able to be taken off of the ventilator and your symptoms improved. Please take your medications as prescribed and follow up with your Neurologist, Dr. Delgado, after discharge. His office will call you for an appointment. Please take Prednisone 60mg daily for the next week. If you begin to experience weakness, shortness of breath, or lagging of your eyelids, please present to an emergency department for evaluation.

## 2018-10-11 NOTE — PHYSICAL THERAPY INITIAL EVALUATION ADULT - PLANNED THERAPY INTERVENTIONS, PT EVAL
GOAL: Stair Negotiation Training: Patient will be able to negotiate up & down 1 flight of stairs with bilateral rails, step to gait pattern, in 2 weeks./strengthening/bed mobility training

## 2018-10-11 NOTE — DISCHARGE NOTE ADULT - CARE PROVIDER_API CALL
Branden Delgado (MD), Neurology  [address] [address]  Phone: [phone]  Fax: [phone]

## 2018-10-11 NOTE — DISCHARGE NOTE ADULT - MEDICATION SUMMARY - MEDICATIONS TO TAKE
I will START or STAY ON the medications listed below when I get home from the hospital:    predniSONE 20 mg oral tablet  -- 3 tab(s) by mouth once a day   -- Indication: For Myasthenia gravis with exacerbation    pyridostigmine 60 mg oral tablet  -- 1 tab(s) by mouth 4 times a day  -- Indication: For Myasthenia gravis with exacerbation

## 2018-10-11 NOTE — PHYSICAL THERAPY INITIAL EVALUATION ADULT - PERTINENT HX OF CURRENT PROBLEM, REHAB EVAL
Pt is a 28 y.o. female with PMH Myasthenia Gravis (dx 2018), SS trait, prior multiple admissions to MICU for MG crisis requiring intubation, last admission for MG crisis 7/18-7/28/2018 required MICU stay with intubation for hypoxic and hypercarbic respiratory failure, was extubated post 3rd session of PLEX now, presenting with SOB x 1 day. (-) CXR.

## 2018-10-11 NOTE — DISCHARGE NOTE ADULT - PATIENT PORTAL LINK FT
You can access the [facility] Patient Portal, offered by [facility], by registering with the following website: [email or web address]

## 2018-10-11 NOTE — DISCHARGE NOTE ADULT - CONDITIONS AT DISCHARGE
vss, afeb., ambulating ad jake freely, no difficulties in breathing and eating, pt to go home, discharge instructions given to the pt

## 2018-10-11 NOTE — DISCHARGE NOTE ADULT - ADDITIONAL INSTRUCTIONS
Please follow up with Dr. Delgado. He is aware that you are being discharged and his office will call you for a follow up appointment.

## 2018-10-11 NOTE — DISCHARGE NOTE ADULT - CARE PLAN
Principal Discharge DX:	Myasthenic crisis  Goal:	Continue medications and Neurology follow up  Assessment and plan of treatment:	You were admitted to the hospital with a myasthenia crisis, likely in the setting of a viral infection. You were admitted to the ICU and required a ventilator for breathing support. You were able to be taken off of the ventilator and your symptoms improved. Please take your medications as prescribed and follow up with your Neurologist, Dr. Delgado, after discharge. His office will call you for an appointment.  Secondary Diagnosis:	Asthma  Goal:	Continue monitoring  Assessment and plan of treatment:	You have a history of asthma, which was stable during this admission. Please continue to follow up with your primary care doctor. If you experience shortness of breath and/or wheezing, please call your doctor and/or present to an emergency department for evaluation. Principal Discharge DX:	Myasthenic crisis  Goal:	Continue medications and Neurology follow up  Assessment and plan of treatment:	You were admitted to the hospital with a myasthenia crisis, likely in the setting of a viral infection. You were admitted to the ICU and required a ventilator for breathing support. You were able to be taken off of the ventilator and your symptoms improved. Please take your medications as prescribed and follow up with your Neurologist, Dr. Delgado, after discharge. His office will call you for an appointment. Please take Prednisone 60mg daily for the next week.  Secondary Diagnosis:	Asthma  Goal:	Continue monitoring  Assessment and plan of treatment:	You have a history of asthma, which was stable during this admission. Please continue to follow up with your primary care doctor. If you experience shortness of breath and/or wheezing, please call your doctor and/or present to an emergency department for evaluation. Principal Discharge DX:	Myasthenic crisis  Goal:	Continue medications and Neurology follow up  Assessment and plan of treatment:	You were admitted to the hospital with a myasthenia crisis, likely in the setting of a viral infection. You were admitted to the ICU and required a ventilator for breathing support. You were able to be taken off of the ventilator and your symptoms improved. Please take your medications as prescribed and follow up with your Neurologist, Dr. Delgado, after discharge. His office will call you for an appointment. Please take Prednisone 60mg daily for the next week. If you begin to experience weakness, shortness of breath, or lagging of your eyelids, please present to an emergency department for evaluation.  Secondary Diagnosis:	Asthma  Goal:	Continue monitoring  Assessment and plan of treatment:	You have a history of asthma, which was stable during this admission. Please continue to follow up with your primary care doctor. If you experience shortness of breath and/or wheezing, please call your doctor and/or present to an emergency department for evaluation.

## 2018-10-11 NOTE — PROGRESS NOTE ADULT - SUBJECTIVE AND OBJECTIVE BOX
Neurology Follow up note    Name  ANTONIO POON        Subjective: Patient is sitting comfortably in chair and states that she is ready to go home and feels much better.    MEDICATIONS  (STANDING):  enoxaparin Injectable 40 milliGRAM(s) SubCutaneous daily  influenza   Vaccine 0.5 milliLiter(s) IntraMuscular once  insulin lispro (HumaLOG) corrective regimen sliding scale   SubCutaneous every 6 hours  predniSONE   Tablet 40 milliGRAM(s) Oral two times a day    MEDICATIONS  (PRN):  ALBUTerol/ipratropium for Nebulization 3 milliLiter(s) Nebulizer every 6 hours PRN Shortness of Breath and/or Wheezing      Allergies    No Known Drug Allergies  shellfish (Anaphylaxis)    Intolerances        Objective:   Vital Signs Last 24 Hrs  T(C): 36.8 (11 Oct 2018 12:00), Max: 37.2 (10 Oct 2018 16:00)  T(F): 98.3 (11 Oct 2018 12:00), Max: 98.9 (10 Oct 2018 16:00)  HR: 105 (11 Oct 2018 12:00) (72 - 112)  BP: 103/65 (11 Oct 2018 12:00) (85/49 - 112/76)  BP(mean): 79 (11 Oct 2018 12:00) (61 - 725)  RR: 23 (11 Oct 2018 12:00) (13 - 30)  SpO2: 100% (11 Oct 2018 12:00) (98% - 100%)    General Exam:   General appearance: No acute distress                   Neurological Exam:  Mental Status: AAOx3, fluent speech, follows commands    Cranial Nerves: EOMI, PERRL, V1-V3 intact, facial symmetry intact, no dysarthria    Motor: neck flexion and extension 5/5. 4+/5 UE    Sensation: Intact to LT throughout        Other:    10-10    137  |  100  |  27<H>  ----------------------------<  98  3.7   |  24  |  0.88    Ca    9.1      10 Oct 2018 00:42  Phos  3.9     10-10  Mg     2.0     10-10      10-10    137  |  100  |  27<H>  ----------------------------<  98  3.7   |  24  |  0.88    Ca    9.1      10 Oct 2018 00:42  Phos  3.9     10-10  Mg     2.0     10-10          Radiology    EKG:  tele:  TTE:  EEG:

## 2018-10-11 NOTE — PROGRESS NOTE ADULT - SUBJECTIVE AND OBJECTIVE BOX
CHIEF COMPLAINT: Myasthenia crisis w/ URI    Interval Events: No acute events overnight. Patient denies any complaints at this time, reporting that her weakness has improved.    REVIEW OF SYSTEMS:  Constitutional: [x ] negative [ ] fevers [ ] chills [ ] weight loss [ ] weight gain  HEENT: [x] negative [ ] dry eyes [ ] eye irritation [ ] postnasal drip [ ] nasal congestion  CV: [x ] negative  [ ] chest pain [ ] orthopnea [ ] palpitations [ ] murmur  Resp: [x ] negative [ ] cough [ ] shortness of breath [ ] dyspnea [ ] wheezing [ ] sputum [ ] hemoptysis  GI: [x ] negative [ ] nausea [ ] vomiting [ ] diarrhea [ ] constipation [ ] abd pain [ ] dysphagia   : [ x] negative [ ] dysuria [ ] nocturia [ ] hematuria [ ] increased urinary frequency  Musculoskeletal: [ x] negative [ ] back pain [ ] myalgias [ ] arthralgias [ ] fracture  Skin: [x ] negative [ ] rash [ ] itch  Neurological: [x ] negative [ ] headache [ ] dizziness [ ] syncope [ ] weakness [ ] numbness  Psychiatric: [x ] negative [ ] anxiety [ ] depression  Endocrine: [x ] negative [ ] diabetes [ ] thyroid problem  Hematologic/Lymphatic: [x ] negative [ ] anemia [ ] bleeding problem  Allergic/Immunologic: [x ] negative [ ] itchy eyes [ ] nasal discharge [ ] hives [ ] angioedema  [x ] All other systems negative  [ ] Unable to assess ROS because ________    OBJECTIVE:  ICU Vital Signs Last 24 Hrs  T(C): 36.8 (11 Oct 2018 04:00), Max: 37.3 (10 Oct 2018 08:00)  T(F): 98.2 (11 Oct 2018 04:00), Max: 99.2 (10 Oct 2018 08:00)  HR: 76 (11 Oct 2018 05:00) (72 - 131)  BP: 93/54 (11 Oct 2018 05:00) (85/49 - 106/63)  BP(mean): 68 (11 Oct 2018 05:00) (61 - 725)  ABP: --  ABP(mean): --  RR: 14 (11 Oct 2018 05:00) (13 - 30)  SpO2: 100% (11 Oct 2018 05:00) (99% - 100%)        10-10 @ 07:01  -  10-11 @ 07:00  --------------------------------------------------------  IN: 688 mL / OUT: 800 mL / NET: -112 mL      CAPILLARY BLOOD GLUCOSE      POCT Blood Glucose.: 102 mg/dL (11 Oct 2018 05:26)      PHYSICAL EXAM:  General: NAD, laying in bed  HEENT: PERRL, NC/AT  Lymph Nodes: No LAD  Neck: Supple  Respiratory: CTAB  Cardiovascular: S1S2, RRR  Abdomen: Soft, nontender, nondistended  Extremities: No LE edema  Skin: Warm, no rashes  Neurological: AAOx3,   Psychiatry: Mood and affect appropriate to clinical condition    LINES: [facility] MEDICATIONS:  enoxaparin Injectable 40 milliGRAM(s) SubCutaneous daily        insulin lispro (HumaLOG) corrective regimen sliding scale   SubCutaneous every 6 hours  methylPREDNISolone sodium succinate Injectable 60 milliGRAM(s) IV Push daily    ALBUTerol/ipratropium for Nebulization 3 milliLiter(s) Nebulizer every 6 hours              influenza   Vaccine 0.5 milliLiter(s) IntraMuscular once          LABS:                        9.4    10.9  )-----------( 379      ( 10 Oct 2018 00:42 )             30.1     Hgb Trend: 9.4<--, 9.2<--, 9.7<--, 10.0<--, 11.1<--  10-10    137  |  100  |  27<H>  ----------------------------<  98  3.7   |  24  |  0.88    Ca    9.1      10 Oct 2018 00:42  Phos  3.9     10-10  Mg     2.0     10-10      Creatinine Trend: 0.88<--, 0.70<--, 0.71<--, 0.72<--, 0.62<--, 0.93<--  PT/INR - ( 10 Oct 2018 00:42 )   PT: 12.1 sec;   INR: 1.12 ratio         PTT - ( 10 Oct 2018 00:42 )  PTT:24.5 sec          MICROBIOLOGY:     RADIOLOGY:  [ ] Reviewed and interpreted by me    EKG:

## 2018-10-23 ENCOUNTER — APPOINTMENT (OUTPATIENT)
Dept: NEUROLOGY | Facility: CLINIC | Age: 28
End: 2018-10-23
Payer: MEDICAID

## 2018-10-23 VITALS
WEIGHT: 138 LBS | SYSTOLIC BLOOD PRESSURE: 120 MMHG | HEART RATE: 91 BPM | HEIGHT: 61 IN | BODY MASS INDEX: 26.06 KG/M2 | DIASTOLIC BLOOD PRESSURE: 77 MMHG

## 2018-10-23 PROCEDURE — 99214 OFFICE O/P EST MOD 30 MIN: CPT

## 2018-10-23 RX ORDER — UBIDECARENONE/VIT E ACET 100MG-5
CAPSULE ORAL
Refills: 0 | Status: ACTIVE | COMMUNITY

## 2018-10-23 RX ORDER — PYRIDOSTIGMINE BROMIDE 60 MG/1
60 TABLET ORAL
Refills: 0 | Status: ACTIVE | COMMUNITY

## 2018-12-06 ENCOUNTER — APPOINTMENT (OUTPATIENT)
Dept: NEUROLOGY | Facility: CLINIC | Age: 28
End: 2018-12-06
Payer: MEDICAID

## 2018-12-06 VITALS
HEIGHT: 61 IN | HEART RATE: 98 BPM | DIASTOLIC BLOOD PRESSURE: 72 MMHG | SYSTOLIC BLOOD PRESSURE: 124 MMHG | BODY MASS INDEX: 28.89 KG/M2 | WEIGHT: 153 LBS

## 2018-12-06 LAB
ANION GAP SERPL CALC-SCNC: 9 MMOL/L
BUN SERPL-MCNC: 15 MG/DL
CALCIUM SERPL-MCNC: 9.2 MG/DL
CHLORIDE SERPL-SCNC: 105 MMOL/L
CO2 SERPL-SCNC: 25 MMOL/L
CREAT SERPL-MCNC: 0.75 MG/DL
GLUCOSE SERPL-MCNC: 120 MG/DL
POTASSIUM SERPL-SCNC: 4.4 MMOL/L
SODIUM SERPL-SCNC: 139 MMOL/L

## 2018-12-06 PROCEDURE — 99214 OFFICE O/P EST MOD 30 MIN: CPT

## 2018-12-06 RX ORDER — COLD-HOT PACK
125 MCG EACH MISCELLANEOUS
Qty: 30 | Refills: 5 | Status: ACTIVE | COMMUNITY
Start: 2018-12-06 | End: 1900-01-01

## 2018-12-11 LAB — ACHR BLOCK AB SER QL: <15

## 2018-12-12 LAB — ACHR BIND AB SER-ACNC: 3.32 NMOL/L

## 2018-12-13 LAB — ACHR MOD AB SER-ACNC: 47

## 2018-12-26 PROCEDURE — 93005 ELECTROCARDIOGRAM TRACING: CPT

## 2018-12-26 PROCEDURE — 85027 COMPLETE CBC AUTOMATED: CPT

## 2018-12-26 PROCEDURE — 83605 ASSAY OF LACTIC ACID: CPT

## 2018-12-26 PROCEDURE — 85730 THROMBOPLASTIN TIME PARTIAL: CPT

## 2018-12-26 PROCEDURE — 84702 CHORIONIC GONADOTROPIN TEST: CPT

## 2018-12-26 PROCEDURE — 82803 BLOOD GASES ANY COMBINATION: CPT

## 2018-12-26 PROCEDURE — 94660 CPAP INITIATION&MGMT: CPT

## 2018-12-26 PROCEDURE — 82435 ASSAY OF BLOOD CHLORIDE: CPT

## 2018-12-26 PROCEDURE — 82565 ASSAY OF CREATININE: CPT

## 2018-12-26 PROCEDURE — 80053 COMPREHEN METABOLIC PANEL: CPT

## 2018-12-26 PROCEDURE — 82947 ASSAY GLUCOSE BLOOD QUANT: CPT

## 2018-12-26 PROCEDURE — 96374 THER/PROPH/DIAG INJ IV PUSH: CPT

## 2018-12-26 PROCEDURE — 85610 PROTHROMBIN TIME: CPT

## 2018-12-26 PROCEDURE — 86901 BLOOD TYPING SEROLOGIC RH(D): CPT

## 2018-12-26 PROCEDURE — 99291 CRITICAL CARE FIRST HOUR: CPT | Mod: 25

## 2018-12-26 PROCEDURE — 86900 BLOOD TYPING SEROLOGIC ABO: CPT

## 2018-12-26 PROCEDURE — 87040 BLOOD CULTURE FOR BACTERIA: CPT

## 2018-12-26 PROCEDURE — 87449 NOS EACH ORGANISM AG IA: CPT

## 2018-12-26 PROCEDURE — 36600 WITHDRAWAL OF ARTERIAL BLOOD: CPT

## 2018-12-26 PROCEDURE — 94150 VITAL CAPACITY TEST: CPT

## 2018-12-26 PROCEDURE — 94003 VENT MGMT INPAT SUBQ DAY: CPT

## 2018-12-26 PROCEDURE — 80048 BASIC METABOLIC PNL TOTAL CA: CPT

## 2018-12-26 PROCEDURE — 71045 X-RAY EXAM CHEST 1 VIEW: CPT

## 2018-12-26 PROCEDURE — 84132 ASSAY OF SERUM POTASSIUM: CPT

## 2018-12-26 PROCEDURE — 87486 CHLMYD PNEUM DNA AMP PROBE: CPT

## 2018-12-26 PROCEDURE — 83735 ASSAY OF MAGNESIUM: CPT

## 2018-12-26 PROCEDURE — 87798 DETECT AGENT NOS DNA AMP: CPT

## 2018-12-26 PROCEDURE — 31500 INSERT EMERGENCY AIRWAY: CPT

## 2018-12-26 PROCEDURE — 87633 RESP VIRUS 12-25 TARGETS: CPT

## 2018-12-26 PROCEDURE — 84100 ASSAY OF PHOSPHORUS: CPT

## 2018-12-26 PROCEDURE — 85014 HEMATOCRIT: CPT

## 2018-12-26 PROCEDURE — 82330 ASSAY OF CALCIUM: CPT

## 2018-12-26 PROCEDURE — 97161 PT EVAL LOW COMPLEX 20 MIN: CPT

## 2018-12-26 PROCEDURE — 87581 M.PNEUMON DNA AMP PROBE: CPT

## 2018-12-26 PROCEDURE — 82962 GLUCOSE BLOOD TEST: CPT

## 2018-12-26 PROCEDURE — 94002 VENT MGMT INPAT INIT DAY: CPT

## 2018-12-26 PROCEDURE — 94640 AIRWAY INHALATION TREATMENT: CPT

## 2018-12-26 PROCEDURE — 84295 ASSAY OF SERUM SODIUM: CPT

## 2018-12-26 PROCEDURE — 86850 RBC ANTIBODY SCREEN: CPT

## 2019-01-07 NOTE — PATIENT PROFILE ADULT. - PURPOSEFUL PROACTIVE ROUNDING
Chief Complaint   Patient presents with    Vomiting     x3 this morning.     Flank Pain     left side radiates around to abdomen Patient

## 2019-02-01 ENCOUNTER — APPOINTMENT (OUTPATIENT)
Dept: NEUROLOGY | Facility: CLINIC | Age: 29
End: 2019-02-01
Payer: MEDICAID

## 2019-02-01 VITALS
DIASTOLIC BLOOD PRESSURE: 73 MMHG | WEIGHT: 162 LBS | HEART RATE: 80 BPM | SYSTOLIC BLOOD PRESSURE: 106 MMHG | BODY MASS INDEX: 30.58 KG/M2 | HEIGHT: 61 IN

## 2019-02-01 PROCEDURE — 99214 OFFICE O/P EST MOD 30 MIN: CPT

## 2019-02-01 NOTE — HISTORY OF PRESENT ILLNESS
[FreeTextEntry1] : Since last visit in December her right eye also has ptosis toward end of day.  Gets leg fatigue after walking a couple blocks, no dysphagia, rare diplopia.  Sometimes needs to push up from chair.  Denies SOB. \par \par taking mestinon 60mg QID, prednisone 40mg QD, vit D, omeprazole\par \par MG-ADL score 10 today

## 2019-02-01 NOTE — PHYSICAL EXAM
[Person] : oriented to person [Place] : oriented to place [Time] : oriented to time [Short Term Intact] : short term memory intact [Remote Intact] : remote memory intact [Registration Intact] : recent registration memory intact [Concentration Intact] : normal concentrating ability [Visual Intact] : visual attention was ~T not ~L decreased [Naming Objects] : no difficulty naming common objects [Repeating Phrases] : no difficulty repeating a phrase [Writing A Sentence] : no difficulty writing a sentence [Fluency] : fluency intact [Comprehension] : comprehension intact [Reading] : reading intact [Past History] : adequate knowledge of personal past history [Cranial Nerves Optic (II)] : visual acuity intact bilaterally,  visual fields full to confrontation, pupils equal round and reactive to light [Cranial Nerves Oculomotor (III)] : extraocular motion intact [Cranial Nerves Trigeminal (V)] : facial sensation intact symmetrically [Cranial Nerves Facial (VII)] : face symmetrical [Cranial Nerves Vestibulocochlear (VIII)] : hearing was intact bilaterally [Cranial Nerves Glossopharyngeal (IX)] : tongue and palate midline [Cranial Nerves Accessory (XI - Cranial And Spinal)] : head turning and shoulder shrug symmetric [Cranial Nerves Hypoglossal (XII)] : there was no tongue deviation with protrusion [Motor Tone] : muscle tone was normal in all four extremities [No Muscle Atrophy] : normal bulk in all four extremities [Hand Weakness Right] : normal hand  [+4] : arm extension  +4/5 [Hand Weakness Left] : normal hand  [5] : ankle dorsiflexion 5/5 [Sensation Tactile Decrease] : light touch was intact [Sensation Vibration Decrease] : vibration was intact [Proprioception] : proprioception was intact [Abnormal Walk] : normal gait [Balance] : balance was intact [Past-pointing] : there was no past-pointing [Tremor] : no tremor present [3+] : Ankle jerk left 3+ [Plantar Reflex Right Only] : normal on the right [Plantar Reflex Left Only] : normal on the left [FreeTextEntry5] : no ptosis  [FreeTextEntry6] : MG-ADL 11

## 2019-02-01 NOTE — ASSESSMENT
[FreeTextEntry1] : Patient is seropositive MG, MGFA class 2b.  She is an excellent candidate for soliris, will now proceed with auth, refer to ID for meningococcal vaccines.  cont current meds for now\par \par f/u 4 weeks after soliris starts

## 2019-02-11 ENCOUNTER — OUTPATIENT (OUTPATIENT)
Dept: OUTPATIENT SERVICES | Facility: HOSPITAL | Age: 29
LOS: 1 days | End: 2019-02-11
Payer: COMMERCIAL

## 2019-02-11 ENCOUNTER — APPOINTMENT (OUTPATIENT)
Dept: INFECTIOUS DISEASE | Facility: CLINIC | Age: 29
End: 2019-02-11
Payer: MEDICAID

## 2019-02-11 VITALS
DIASTOLIC BLOOD PRESSURE: 75 MMHG | BODY MASS INDEX: 30.21 KG/M2 | OXYGEN SATURATION: 100 % | SYSTOLIC BLOOD PRESSURE: 114 MMHG | WEIGHT: 160 LBS | HEIGHT: 61 IN | HEART RATE: 95 BPM | TEMPERATURE: 98.3 F

## 2019-02-11 DIAGNOSIS — B97.89 OTHER VIRAL AGENTS AS THE CAUSE OF DISEASES CLASSIFIED ELSEWHERE: ICD-10-CM

## 2019-02-11 DIAGNOSIS — Z86.69 PERSONAL HISTORY OF OTHER DISEASES OF THE NERVOUS SYSTEM AND SENSE ORGANS: ICD-10-CM

## 2019-02-11 PROCEDURE — G0009: CPT

## 2019-02-11 PROCEDURE — 90670 PCV13 VACCINE IM: CPT

## 2019-02-11 PROCEDURE — 90734 MENACWYD/MENACWYCRM VACC IM: CPT

## 2019-02-11 PROCEDURE — G0463: CPT | Mod: 25

## 2019-02-11 PROCEDURE — 99204 OFFICE O/P NEW MOD 45 MIN: CPT

## 2019-02-11 PROCEDURE — 90472 IMMUNIZATION ADMIN EACH ADD: CPT

## 2019-02-11 NOTE — REVIEW OF SYSTEMS
[Recent Weight Gain (___ Lbs)] : recent [unfilled] ~Ulb weight gain [Negative] : Heme/Lymph [Fever] : no fever [Chills] : no chills [Body Aches] : no body aches [Eye Pain] : no eye pain [Red Eyes] : eyes not red [Earache] : no earache [Loss Of Hearing] : no hearing loss [Chest Pain] : no chest pain [Palpitations] : no palpitations [Shortness Of Breath] : no shortness of breath [Wheezing] : no wheezing [Cough] : no cough [SOB on Exertion] : no shortness of breath during exertion [Abdominal Pain] : no abdominal pain [Vomiting] : no vomiting [Diarrhea] : no diarrhea [Dysuria] : no dysuria [Pelvic Pain] : no pelvic pain [Joint Pain] : no joint pain [Joint Swelling] : no joint swelling [Skin Lesions] : no skin lesions [Skin Wound] : no skin wound [Confused] : no confusion [Convulsions] : no convulsions [Suicidal] : not suicidal [Anxiety] : no anxiety [Easy Bleeding] : no tendency for easy bleeding [Easy Bruising] : no tendency for easy bruising

## 2019-02-11 NOTE — ASSESSMENT
[FreeTextEntry1] : 28 f diagnosed with myasthenia gravis about a year ago now on prednisone 40 daily was sent from neuro for ID w/u and vaccinations before starting soliris (Eculizumab) \par pt was born in [place] and had BCG vaccination, no TB exposure, had routine vaccinations\par \par Myasthenia gravis on prednisone 40\par plan for soliris\par immunocompromised\par \par * will check HIV, Hep B and C, quantiferon\par * add bactrim daily for PCP ppx\par * meningococcus vaccine and prevnar

## 2019-02-11 NOTE — PHYSICAL EXAM
[General Appearance - Alert] : alert [General Appearance - In No Acute Distress] : in no acute distress [Sclera] : the sclera and conjunctiva were normal [Extraocular Movements] : extraocular movements were intact [Outer Ear] : the ears and nose were normal in appearance [Oropharynx] : the oropharynx was normal with no thrush [Neck Appearance] : the appearance of the neck was normal [Neck Cervical Mass (___cm)] : no neck mass was observed [Auscultation Breath Sounds / Voice Sounds] : lungs were clear to auscultation bilaterally [Heart Rate And Rhythm] : heart rate was normal and rhythm regular [Heart Sounds] : normal S1 and S2 [Full Pulse] : the pedal pulses are present [Edema] : there was no peripheral edema [Bowel Sounds] : normal bowel sounds [Abdomen Soft] : soft [Abdomen Tenderness] : non-tender [No Palpable Adenopathy] : no palpable adenopathy [Nail Clubbing] : no clubbing  or cyanosis of the fingernails [Skin Color & Pigmentation] : normal skin color and pigmentation [] : no rash [Cranial Nerves] : cranial nerves 2-12 were intact [No Focal Deficits] : no focal deficits [Oriented To Time, Place, And Person] : oriented to person, place, and time [Affect] : the affect was normal

## 2019-02-12 LAB
HBV CORE IGG+IGM SER QL: NONREACTIVE
HBV SURFACE AB SER QL: REACTIVE
HBV SURFACE AG SER QL: NONREACTIVE
HCV AB SER QL: NONREACTIVE
HCV S/CO RATIO: 0.13 S/CO
HIV1+2 AB SPEC QL IA.RAPID: NONREACTIVE
M TB IFN-G BLD-IMP: ABNORMAL
QUANTIFERON TB PLUS MITOGEN MINUS NIL: 0.48 IU/ML
QUANTIFERON TB PLUS NIL: 0.01 IU/ML
QUANTIFERON TB PLUS TB1 MINUS NIL: 0 IU/ML
QUANTIFERON TB PLUS TB2 MINUS NIL: 0 IU/ML

## 2019-02-20 DIAGNOSIS — D84.8 OTHER SPECIFIED IMMUNODEFICIENCIES: ICD-10-CM

## 2019-02-20 DIAGNOSIS — Z23 ENCOUNTER FOR IMMUNIZATION: ICD-10-CM

## 2019-02-20 DIAGNOSIS — D84.9 IMMUNODEFICIENCY, UNSPECIFIED: ICD-10-CM

## 2019-02-20 DIAGNOSIS — G70.00 MYASTHENIA GRAVIS WITHOUT (ACUTE) EXACERBATION: ICD-10-CM

## 2019-03-18 ENCOUNTER — OUTPATIENT (OUTPATIENT)
Dept: OUTPATIENT SERVICES | Facility: HOSPITAL | Age: 29
LOS: 1 days | End: 2019-03-18
Payer: COMMERCIAL

## 2019-03-18 ENCOUNTER — APPOINTMENT (OUTPATIENT)
Dept: INFECTIOUS DISEASE | Facility: CLINIC | Age: 29
End: 2019-03-18
Payer: MEDICAID

## 2019-03-18 VITALS
TEMPERATURE: 98.3 F | BODY MASS INDEX: 30.58 KG/M2 | WEIGHT: 162 LBS | HEIGHT: 61 IN | SYSTOLIC BLOOD PRESSURE: 114 MMHG | DIASTOLIC BLOOD PRESSURE: 73 MMHG | HEART RATE: 82 BPM | OXYGEN SATURATION: 98 %

## 2019-03-18 DIAGNOSIS — Z23 ENCOUNTER FOR IMMUNIZATION: ICD-10-CM

## 2019-03-18 DIAGNOSIS — G70.00 MYASTHENIA GRAVIS WITHOUT (ACUTE) EXACERBATION: ICD-10-CM

## 2019-03-18 DIAGNOSIS — D84.8 OTHER SPECIFIED IMMUNODEFICIENCIES: ICD-10-CM

## 2019-03-18 DIAGNOSIS — B97.89 OTHER VIRAL AGENTS AS THE CAUSE OF DISEASES CLASSIFIED ELSEWHERE: ICD-10-CM

## 2019-03-18 DIAGNOSIS — D84.9 IMMUNODEFICIENCY, UNSPECIFIED: ICD-10-CM

## 2019-03-18 PROCEDURE — G0463: CPT | Mod: 25

## 2019-03-18 PROCEDURE — 90471 IMMUNIZATION ADMIN: CPT

## 2019-03-18 PROCEDURE — 99215 OFFICE O/P EST HI 40 MIN: CPT

## 2019-03-18 PROCEDURE — 90734 MENACWYD/MENACWYCRM VACC IM: CPT

## 2019-03-18 NOTE — PHYSICAL EXAM
[General Appearance - In No Acute Distress] : in no acute distress [General Appearance - Alert] : alert [Extraocular Movements] : extraocular movements were intact [Sclera] : the sclera and conjunctiva were normal [Oropharynx] : the oropharynx was normal with no thrush [Outer Ear] : the ears and nose were normal in appearance [Neck Appearance] : the appearance of the neck was normal [Neck Cervical Mass (___cm)] : no neck mass was observed [Auscultation Breath Sounds / Voice Sounds] : lungs were clear to auscultation bilaterally [Heart Rate And Rhythm] : heart rate was normal and rhythm regular [Full Pulse] : the pedal pulses are present [Heart Sounds] : normal S1 and S2 [Edema] : there was no peripheral edema [Bowel Sounds] : normal bowel sounds [Abdomen Soft] : soft [Abdomen Tenderness] : non-tender [Nail Clubbing] : no clubbing  or cyanosis of the fingernails [No Palpable Adenopathy] : no palpable adenopathy [] : no rash [Skin Color & Pigmentation] : normal skin color and pigmentation [Cranial Nerves] : cranial nerves 2-12 were intact [No Focal Deficits] : no focal deficits [Affect] : the affect was normal [Oriented To Time, Place, And Person] : oriented to person, place, and time

## 2019-03-25 NOTE — ASSESSMENT
[FreeTextEntry1] : 28 f diagnosed with myasthenia gravis about a year ago now on prednisone 20 daily  and soliris, was sent from neuro for ID w/u and vaccinations before starting soliris (Eculizumab), received prevnar and menactra last visit\par pt was born in [place] and had BCG vaccination, no TB exposure, had routine vaccinations, had positive PPD and negative CXR in the past but the quantiferon came back indeterminate as pt is on steroids\par she is asymptomatic and on prednisone for over a year\par \par Myasthenia gravis on prednisone 20 and  soliris\par immunocompromised\par indeterminate quantiferon\par \par * will check a CXR\par * pt stated that has positive PPD but not sure and had negative CXRs in the past , will wait for the CXR and if negative will likely monitor the patient as she has MG and the quant is indeterminate in view of steroid use\par * c/w  bactrim daily for PCP ppx\par * second meningococcus vaccine \par * Bexsero was ordered pt will come back for vaccination\par * start azithro 500 daily for now as pt is not fully immunized yet

## 2019-03-25 NOTE — HISTORY OF PRESENT ILLNESS
[FreeTextEntry1] : 28 f diagnosed with myasthenia gravis about a year ago now on prednisone 20 daily  and soliris, was sent from neuro for ID w/u and vaccinations before starting soliris (Eculizumab), received prevnar and menactra last visit\par pt was born in [place] and had BCG vaccination, no TB exposure, had routine vaccinations, had positive PPD and negative CXR in the past but the quantiferon came back indeterminate as pt is on steroids\par she is asymptomatic and on prednisone for over a year\par

## 2019-04-02 ENCOUNTER — OUTPATIENT (OUTPATIENT)
Dept: OUTPATIENT SERVICES | Facility: HOSPITAL | Age: 29
LOS: 1 days | End: 2019-04-02
Payer: COMMERCIAL

## 2019-04-02 ENCOUNTER — APPOINTMENT (OUTPATIENT)
Dept: INFECTIOUS DISEASE | Facility: CLINIC | Age: 29
End: 2019-04-02

## 2019-04-02 DIAGNOSIS — Z23 ENCOUNTER FOR IMMUNIZATION: ICD-10-CM

## 2019-04-02 DIAGNOSIS — B97.89 OTHER VIRAL AGENTS AS THE CAUSE OF DISEASES CLASSIFIED ELSEWHERE: ICD-10-CM

## 2019-04-02 PROCEDURE — 90471 IMMUNIZATION ADMIN: CPT

## 2019-04-02 PROCEDURE — 90620 MENB-4C VACCINE IM: CPT

## 2019-04-04 NOTE — PATIENT PROFILE OB - RESUSCITATION EFFORTS, BABY A
Patient presents to the ER with suicidal ideation.  Patient states that she has felt suicidal for the last week.  Patient reports that she bought rat poisoning with the plan to hurt herself however did not take it and came here.  Patient is requesting Voluntary admission to . Patient also reports that she has been on a cocaine binge for the last 4 days, states she also drank alcohol in large amounts   
Yes

## 2019-04-08 DIAGNOSIS — D84.9 IMMUNODEFICIENCY, UNSPECIFIED: ICD-10-CM

## 2019-05-02 ENCOUNTER — APPOINTMENT (OUTPATIENT)
Dept: INFECTIOUS DISEASE | Facility: CLINIC | Age: 29
End: 2019-05-02

## 2019-05-02 ENCOUNTER — OUTPATIENT (OUTPATIENT)
Dept: OUTPATIENT SERVICES | Facility: HOSPITAL | Age: 29
LOS: 1 days | End: 2019-05-02

## 2019-05-02 DIAGNOSIS — D84.9 IMMUNODEFICIENCY, UNSPECIFIED: ICD-10-CM

## 2019-05-02 DIAGNOSIS — Z23 ENCOUNTER FOR IMMUNIZATION: ICD-10-CM

## 2019-06-20 NOTE — PATIENT PROFILE ADULT. - NS PRO PT REFERRAL QUES 2 YN
BONE MARROW BIOPSY & ASPIRATION    POST PROCEDURE NOTE    Procedural physician:  Rachel Walter PA-C   Assistant:   None     Indications: b-cell lymphoma, staging    Drains/Pack/Catheters: None     Estimated blood loss: None or negligible    Description of Procedure:  Patient was prepped and draped in sterile fashion. Local anesthetic injected. A disposable biopsy needle used to obtain bone marrow aspiration and trephine biopsies from Left posterior iliac crest.    Anesthetic: Local 20 mL of 1% Lidocaine    Findings/Complications: A sterile dressing was applied to the biopsy site. No complications.    Post-Op Diagnosis: Final pathology report to follow.    Specimens removed: Adequate specimen was obtained. Per med tech, adequate spicules obtained.    Post-procedure evaluation: Tolerated procedure    Physician Plan of Care: Patient is to remain in supine position for one hour with an ice pack covering the affected area.  Nurse instructed to monitor patient's blood pressure and pulse and report any other untoward reactions for the next hour.    Disposition: Home  Patient will follow-up as scheduled with Dr. Nunes on 7/15/19 to discuss results.     Rachel Walter PA-C       no

## 2019-06-21 ENCOUNTER — APPOINTMENT (OUTPATIENT)
Dept: NEUROLOGY | Facility: CLINIC | Age: 29
End: 2019-06-21
Payer: MEDICAID

## 2019-06-21 VITALS — DIASTOLIC BLOOD PRESSURE: 63 MMHG | SYSTOLIC BLOOD PRESSURE: 109 MMHG | RESPIRATION RATE: 16 BRPM | HEART RATE: 73 BPM

## 2019-06-21 PROCEDURE — 96413 CHEMO IV INFUSION 1 HR: CPT

## 2019-06-21 RX ORDER — ECULIZUMAB 300 MG/30ML
300 INJECTION, SOLUTION, CONCENTRATE INTRAVENOUS
Refills: 0 | Status: COMPLETED | OUTPATIENT
Start: 2019-06-21

## 2019-06-21 RX ADMIN — ECULIZUMAB 0 MG/30ML: 300 INJECTION, SOLUTION, CONCENTRATE INTRAVENOUS at 00:00

## 2019-06-26 ENCOUNTER — MEDICATION RENEWAL (OUTPATIENT)
Age: 29
End: 2019-06-26

## 2019-06-28 ENCOUNTER — APPOINTMENT (OUTPATIENT)
Dept: NEUROLOGY | Facility: CLINIC | Age: 29
End: 2019-06-28
Payer: MEDICAID

## 2019-06-28 PROCEDURE — 96413 CHEMO IV INFUSION 1 HR: CPT

## 2019-07-05 ENCOUNTER — APPOINTMENT (OUTPATIENT)
Dept: NEUROLOGY | Facility: CLINIC | Age: 29
End: 2019-07-05
Payer: MEDICAID

## 2019-07-05 VITALS — DIASTOLIC BLOOD PRESSURE: 72 MMHG | HEART RATE: 80 BPM | SYSTOLIC BLOOD PRESSURE: 107 MMHG | RESPIRATION RATE: 16 BRPM

## 2019-07-05 PROCEDURE — 96413 CHEMO IV INFUSION 1 HR: CPT

## 2019-07-05 RX ORDER — ECULIZUMAB 300 MG/30ML
300 INJECTION, SOLUTION, CONCENTRATE INTRAVENOUS
Refills: 0 | Status: COMPLETED | OUTPATIENT
Start: 2019-07-05

## 2019-07-05 RX ADMIN — ECULIZUMAB 0 MG/30ML: 300 INJECTION, SOLUTION, CONCENTRATE INTRAVENOUS at 00:00

## 2019-07-12 ENCOUNTER — APPOINTMENT (OUTPATIENT)
Dept: NEUROLOGY | Facility: CLINIC | Age: 29
End: 2019-07-12
Payer: MEDICAID

## 2019-07-12 VITALS — SYSTOLIC BLOOD PRESSURE: 107 MMHG | RESPIRATION RATE: 16 BRPM | DIASTOLIC BLOOD PRESSURE: 67 MMHG | HEART RATE: 70 BPM

## 2019-07-12 PROCEDURE — 96413 CHEMO IV INFUSION 1 HR: CPT

## 2019-07-12 RX ORDER — ECULIZUMAB 300 MG/30ML
300 INJECTION, SOLUTION, CONCENTRATE INTRAVENOUS
Refills: 0 | Status: COMPLETED | OUTPATIENT
Start: 2019-07-12

## 2019-07-12 RX ADMIN — ECULIZUMAB 0 MG/30ML: 300 INJECTION, SOLUTION, CONCENTRATE INTRAVENOUS at 00:00

## 2019-07-19 ENCOUNTER — APPOINTMENT (OUTPATIENT)
Dept: NEUROLOGY | Facility: CLINIC | Age: 29
End: 2019-07-19
Payer: MEDICAID

## 2019-07-19 VITALS — HEART RATE: 82 BPM | SYSTOLIC BLOOD PRESSURE: 105 MMHG | RESPIRATION RATE: 16 BRPM | DIASTOLIC BLOOD PRESSURE: 67 MMHG

## 2019-07-19 PROCEDURE — 96413 CHEMO IV INFUSION 1 HR: CPT

## 2019-07-19 RX ORDER — ECULIZUMAB 300 MG/30ML
300 INJECTION, SOLUTION, CONCENTRATE INTRAVENOUS
Refills: 0 | Status: COMPLETED | OUTPATIENT
Start: 2019-07-19

## 2019-07-19 RX ADMIN — ECULIZUMAB 0 MG/30ML: 300 INJECTION, SOLUTION, CONCENTRATE INTRAVENOUS at 00:00

## 2019-07-29 NOTE — PATIENT PROFILE ADULT. - FUNCTIONAL SCREEN CURRENT LEVEL: EATING, MLM
"Chief complaint:   Chief Complaint   Patient presents with   â¢ Medicare Wellness Visit       Vitals:  Visit Vitals  /80   Pulse 72   Ht 5' 1.5"" (1.562 m)   Wt 60.8 kg   BMI 24.91 kg/mÂ²       HISTORY OF PRESENT ILLNESS     HPI     This is a 81year old female who presents today for a Medicare Wellness Visit. She is also here for a med check. She has myotonic dystrophy. She has difficulty leaving the house. She is getting weaker. She cannot get back in her house easily. Cheo Carias is a 81year old female who presents today for follow up of her diabetes mellitus type 1, hypertension, and hyperlipidemia. Her diabetes is under good control. She checks blood sugars at home 3 times per day and the average is good. Hypoglycemic events are never occurring. Her last dilated eye exam was within the last year. There was no retinopathy present. She does not have neuropathy in her feet. Her last urine testing revealed no microalbuminuria. Her blood pressure is under good control. Her last lipid testing revealed good control. She notes no side effects from her medications. She has severe insulin resistance. She sees endo here. She has had labile BP. Her endo increased lisinopril to 30 mg and that helped a little but she still has surges to 180's. She has hx of low BP but has not had this recently.     Her most recent labs are as follows:    Lab Results   Component Value Date    HGBA1C 7.6 (H) 09/22/2017    HGBA1C 6.9 (H) 06/14/2017     Lab Results   Component Value Date    CHOLESTEROL 184 06/14/2017    HDL 38 (L) 06/14/2017    CALCLDL 125 06/14/2017    TRIGLYCERIDE 106 06/14/2017     Lab Results   Component Value Date    MALBCR 18.0 12/11/2018    CREATININE 0.70 09/21/2018    CREATININE 0.59 09/20/2018       Immunization History   Administered Date(s) Administered   â¢ Influenza, Unspecified Formulation 11/06/2017   â¢ Influenza, injectable, quadrivalent 10/29/2014, 11/02/2015   â¢ Influenza, seasonal, injectable, " trivalent 10/16/2003, 10/22/2004, 10/12/2006, 10/26/2007, 10/21/2008, 01/13/2010, 10/05/2011   â¢ Pneumococcal polysaccharide, adult, 23 valent 11/13/2008   â¢ Td:Adult type tetanus/diphtheria 08/12/2011         Other significant issues:  Patient Active Problem List   Diagnosis   â¢ Hypopituitarism (CMS/HCC)   â¢ Hypothyroidism   â¢ HTN (hypertension)   â¢ Hyperlipidemia   â¢ Hydrocephalus   â¢ Epistaxis   â¢ Muscular dystrophy, myotonic (CMS/HCC)   â¢ Visual field defect   â¢ Pseudophakia of both eyes   â¢ DM (diabetes mellitus), type 1 (CMS/HCC)   â¢ Leukopenia   â¢ Thrombocytopenia (CMS/HCC)   â¢ Anemia   â¢ Ocular migraine   â¢ Bilateral dry eyes   â¢ Frequent PVCs   â¢ PVD (posterior vitreous detachment), right eye   â¢ Subdural hematoma (CMS/HCC)   â¢ Open thigh wound   â¢ Chronic diarrhea   â¢ Abnormal LFTs   â¢ Hepatic fibrosis       PREVENTIVE HEALTH:    Diet:  diabetic diet  Exercise:  sedentary  Advance directive:  Discussed and on file  Last eye exam:  within the last year  Hearing:  abnormal and the patient wears hearing aids    No exam data present    Screening for abdominal aortic aneurysm:  not indicated    Tobacco history:   reports that she has never smoked. She has never used smokeless tobacco.  Alcohol history:   reports that she does not drink alcohol.     Last lipid testing:  acceptable    CHOLESTEROL (mg/dL)   Date Value   06/14/2017 184   04/19/2016 175     CALCULATED LDL (mg/dL)   Date Value   06/14/2017 125   04/19/2016 108     HDL (mg/dL)   Date Value   06/14/2017 38 (L)   04/19/2016 40     TRIGLYCERIDE (mg/dL)   Date Value   06/14/2017 106   04/19/2016 136       Last glucose:  moderately elevated and the patient is known to be diabetic    Glucose (mg/dL)   Date Value   09/21/2018 189 (H)   06/19/2018 129 (H)       Immunizations:  TDaP/Td:  refuses  Pneumococcal:  up to date declines prevnar  Influenza:  up to date  Zoster:  refuses    Immunization History   Administered Date(s) Administered   â¢ Influenza, Unspecified Formulation 11/06/2017   â¢ Influenza, injectable, quadrivalent 10/29/2014, 11/02/2015   â¢ Influenza, seasonal, injectable, trivalent 10/16/2003, 10/22/2004, 10/12/2006, 10/26/2007, 10/21/2008, 01/13/2010, 10/05/2011   â¢ Pneumococcal polysaccharide, adult, 23 valent 11/13/2008   â¢ Td:Adult type tetanus/diphtheria 08/12/2011       Health Maintenance:  Colonoscopy:  patient refuses too difficult for her to do. She discussed with GI already and does not want to do  DEXA scan:  up to date  PAP smear:  no longer indicated due to hysterectomy for benign reasons  Mammogram:  up to date    Health Maintenance Summary     Shingles Vaccine (1 of 2)  Overdue since 8/22/1998    DTaP/Tdap/Td Vaccine (1 - Tdap)  Overdue since 8/13/2011    Pneumococcal Vaccine 65+ (1 of 2 - PCV13)  Overdue since 8/22/2013    Colorectal Cancer Screening-Colonoscopy (Every 3 Years)  Overdue since 10/23/2016    Medicare Wellness 65+ (Yearly)  Overdue since 6/19/2019    DM/CKD GFR (Yearly)  Ordered on 7/2/2019    Influenza Vaccine (1)  Next due on 9/1/2019    Diabetes A1C (Every 6 Months)  Ordered on 7/2/2019    Diabetes Eye Exam (Yearly)  Next due on 2/28/2020    Diabetes Foot Exam (Yearly)  Next due on 7/29/2020    Depression Screening (Yearly)  Next due on 7/29/2020    Breast Cancer Screening (Every 2 Years)  Next due on 3/14/2021    Osteoporosis Screening   Completed    Hepatitis C Screening   Completed    Meningococcal Vaccine   Aged Out          Current providers: Care Team was updated  Patient Care Team:  Cy Rogers DO as PCP - General (Internal Medicine)    There is no evidence of cognitive impairment by direct observation. I reviewed the Health Risk Assessment in its entirety and it was entered into the electronic health record.       Other significant problems:  Patient Active Problem List    Diagnosis Date Noted   â¢ Thrombocytopenia (CMS/HCC) 06/04/2015     Priority: Medium   â¢ Anemia 06/04/2015     Priority: Medium   â¢ Hepatic fibrosis 02/07/2019     Priority: Low     Probable fatty liver disease complicated by hepatic fibrosis monitor for progression to cirrhosis currently asymptomatic     â¢ Abnormal LFTs 10/02/2018     Priority: Low   â¢ Chronic diarrhea 02/13/2018     Priority: Low     Postcholecystectomy diarrheal syndrome probable element of pancreatic insufficiency though her pancreatic elastase levels were normal she did have evidence of elevated fecal fat on qualitative assessment. She has had a response to oral antibiotics for diarrheal symptoms suggesting element of intestinal bacterial overgrowth.      â¢ Open thigh wound 08/30/2017     Priority: Low   â¢ Subdural hematoma (CMS/[facility]) 04/23/2017     Priority: Low   â¢ PVD (posterior vitreous detachment), right eye 02/01/2017     Priority: Low   â¢ Frequent PVCs 04/01/2016     Priority: Low   â¢ Ocular migraine 01/27/2016     Priority: Low   â¢ Bilateral dry eyes 01/27/2016     Priority: Low   â¢ Leukopenia 06/04/2015     Priority: Low   â¢ DM (diabetes mellitus), type 1 (CMS/[facility]) 03/05/2015     Priority: Low   â¢ Visual field defect 01/08/2015     Priority: Low   â¢ Pseudophakia of both eyes 01/08/2015     Priority: Low   â¢ Muscular dystrophy, myotonic (CMS/[facility]) 10/29/2014     Priority: Low     Type 2      â¢ Hypopituitarism (CMS/[facility])      Priority: Low   â¢ Hypothyroidism      Priority: Low   â¢ HTN (hypertension)      Priority: Low   â¢ Hyperlipidemia      Priority: Low   â¢ Hydrocephalus      Priority: Low   â¢ Epistaxis      Priority: Low       PAST MEDICAL, FAMILY AND SOCIAL HISTORY     Medications:  Current Outpatient Prescriptions   Medication   â¢ levothyroxine (LEVOTHROID) 175 MCG tablet   â¢ insulin aspart (NOVOLOG) 100 UNIT/ML injection   â¢ lisinopril (ZESTRIL) 20 MG tablet   â¢ Ferrous Sulfate (IRON) 325 (65 FE) MG Tab   â¢ glucagon (GLUCAGON EMERGENCY) 1 MG injection   â¢ Calcium-Vitamin D-Vitamin K (CALCIUM SOFT CHEWS PO)   â¢ Glucose Blood strip     Current Facility-Administered Medications   Medication   â¢ cyanocobalamin injection 1,000 mcg       Allergies:  ALLERGIES:   Allergen Reactions   â¢ Penicillins Other (See Comments)     Chest tightness at age 10   â¢ Contrast Media HIVES     Pt tolerated with 13hr premeds 9/20/18   â¢ Erythromycin DIARRHEA   â¢ Hydrochlorothiazide VOMITING and NAUSEA     Hypotension   â¢ Latex   (Environmental) PRURITUS   â¢ Shell Fish HIVES       Past Medical  History/Surgeries:  Past Medical History:   Diagnosis Date   â¢ Abdominal pain    â¢ Anemia    â¢ Arthralgia    â¢ Arthritis    â¢ Carpal tunnel syndrome    â¢ Cerebral infarction (CMS/HCC)     weaker on L   â¢ Chronic diarrhea    â¢ DM2 (diabetes mellitus, type 2) (CMS/HCC)    â¢ Encounter for long-term (current) use of insulin (CMS/HCC)    â¢ Epistaxis    â¢ HTN (hypertension)    â¢ Hyperglycemia    â¢ Hyperlipidemia    â¢ Hypopituitarism (CMS/HCC)    â¢ Hypothyroidism    â¢ Insulin resistance 03/13/2018   â¢ Liver disease     fatty liver   â¢ Myotonic dystrophy, type 2 (CMS/HCC)    â¢ Open thigh wound 8/30/2017   â¢ Osteopenia    â¢ Pancytopenia (CMS/HCC) 03/13/2018   â¢ Pneumonia    â¢ PONV (postoperative nausea and vomiting)    â¢ Pseudophakia of both eyes    â¢ Type I (juvenile type) diabetes mellitus without mention of complication, uncontrolled    â¢ Visual field defect     secondary to brain tumor surgery & stroke   â¢ Vitamin B12 deficiency    â¢ Vitamin D deficiency        Past Surgical History:   Procedure Laterality Date   â¢ Cataract extrac w/ intraocular lens imp&ant vit,bilaterl  08/14/2006; 07/24/2006   â¢ Colonoscopy diagnostic  10/23/13    repeat colonoscopy in 3 years   â¢ Colonoscopy w/ biopsies and polypectomy  10/19/2007   â¢ Dexa bone density axial skeleton  07/13/2007   â¢ Epistaxis management  4/21/16   â¢ Esophagogastroduodenoscopy transoral flex w/bx single or mult  05/05/2009   â¢ Excis pituitary,transnasal/septal  01/16/2009   â¢ Excision of salivary gland  08/24/2009    parotid gland   â¢ Eye surgery  7-23-15    Left eyelid lesion excision with path and Left nose lesion excision 7-23-15   â¢ Hysterectomy  12/26/2007   â¢ Mammo screening bilateral  05/16/2012   â¢ Soft tissue biopsy  08/05/2005   â¢ Thyroidectomy     â¢ Upper gastrointestinal endoscopy  10/23/13       Family History:  Family History   Problem Relation Age of Onset   â¢ Congestive Heart Failure Mother    â¢ Macular degeneration Mother    â¢ Stroke Maternal Grandmother    â¢ Stroke Maternal Grandfather    â¢ Stroke Paternal Grandmother    â¢ Cancer Brother         kidney   â¢ Cancer, Pancreatic Maternal Cousin 59       Social History:  Social History     Tobacco Use   â¢ Smoking status: Never Smoker   â¢ Smokeless tobacco: Never Used   Substance Use Topics   â¢ Alcohol use: No     Alcohol/week: 0.0 oz       REVIEW OF SYSTEMS     Review of Systems   All other systems reviewed and are negative. PHYSICAL EXAM     Physical Exam   Constitutional: She is oriented to person, place, and time. She appears well-developed and well-nourished. Non-toxic appearance. No distress. HENT:   Head: Normocephalic and atraumatic. Right Ear: Tympanic membrane, external ear and ear canal normal.   Left Ear: Tympanic membrane, external ear and ear canal normal.   Nose: Nose normal. No mucosal edema or rhinorrhea. Mouth/Throat: Oropharynx is clear and moist. No oral lesions. No oropharyngeal exudate. Eyes: Pupils are equal, round, and reactive to light. Conjunctivae, EOM and lids are normal. Right eye exhibits no discharge. Left eye exhibits no discharge. No scleral icterus. Neck: Trachea normal. Neck supple. No JVD present. Carotid bruit is not present. No thyroid mass and no thyromegaly present. Cardiovascular: Normal rate, regular rhythm, normal heart sounds and intact distal pulses. Exam reveals no gallop and no friction rub. No murmur heard. Pulmonary/Chest: Effort normal and breath sounds normal. No respiratory distress. She has no wheezes. She has no rales.  Chest wall is not dull to percussion. Right breast exhibits no mass, no nipple discharge, no skin change and no tenderness. Left breast exhibits no mass, no nipple discharge, no skin change and no tenderness. Abdominal: Soft. Normal appearance and bowel sounds are normal. She exhibits no distension and no mass. There is no splenomegaly or hepatomegaly. There is no tenderness. There is no rigidity, no rebound and no guarding. Musculoskeletal: Normal range of motion. She exhibits no edema or tenderness. Lymphadenopathy:     She has no cervical adenopathy. She has no axillary adenopathy. Neurological: She is alert and oriented to person, place, and time. She has normal strength and normal reflexes. No cranial nerve deficit or sensory deficit. Coordination and gait normal.   Skin: Skin is warm and dry. No rash noted. She is not diaphoretic. No cyanosis. No pallor. Nails show no clubbing. Psychiatric: She has a normal mood and affect. Her behavior is normal. Thought content normal.   Vitals reviewed. ASSESSMENT/PLAN     ASSESSMENT:  1. Medicare annual wellness visit, subs   2. Encounter for screening mammogram for malignant neoplasm of breast - due 11/17   3. Type 1 diabetes- on insulin- sees endo. HBA1c controlled   4. HTN- high recently- increase to 40 mg lisiniopril and recheck 2 weeks and at home   5. High chol- refuses statin due to neuromuscular disease (myoptonic dystrophy)  6. Myotonic dystrophy- discussed fall prevention  7, hx subdural  8. Hx of thrombocytopenia- due for recheck    PLAN:  Orders Placed This Encounter   â¢ CBC & Auto Differential   â¢ Comprehensive Metabolic Panel   â¢ Lipid Panel with Reflex   â¢ lisinopril (ZESTRIL) 20 MG tablet       Return in about 1 year (around 7/29/2020) for Medicare Wellness Visit. (0) independent

## 2019-08-01 ENCOUNTER — APPOINTMENT (OUTPATIENT)
Dept: NEUROLOGY | Facility: CLINIC | Age: 29
End: 2019-08-01
Payer: MEDICAID

## 2019-08-01 VITALS
SYSTOLIC BLOOD PRESSURE: 90 MMHG | DIASTOLIC BLOOD PRESSURE: 51 MMHG | HEART RATE: 86 BPM | HEIGHT: 61 IN | BODY MASS INDEX: 31.72 KG/M2 | WEIGHT: 168 LBS

## 2019-08-01 PROCEDURE — 99213 OFFICE O/P EST LOW 20 MIN: CPT

## 2019-08-01 NOTE — PHYSICAL EXAM
[Person] : oriented to person [Place] : oriented to place [Time] : oriented to time [Short Term Intact] : short term memory intact [Remote Intact] : remote memory intact [Registration Intact] : recent registration memory intact [Concentration Intact] : normal concentrating ability [Visual Intact] : visual attention was ~T not ~L decreased [Naming Objects] : no difficulty naming common objects [Repeating Phrases] : no difficulty repeating a phrase [Writing A Sentence] : no difficulty writing a sentence [Fluency] : fluency intact [Comprehension] : comprehension intact [Reading] : reading intact [Past History] : adequate knowledge of personal past history [Cranial Nerves Optic (II)] : visual acuity intact bilaterally,  visual fields full to confrontation, pupils equal round and reactive to light [Cranial Nerves Oculomotor (III)] : extraocular motion intact [Cranial Nerves Trigeminal (V)] : facial sensation intact symmetrically [Cranial Nerves Vestibulocochlear (VIII)] : hearing was intact bilaterally [Cranial Nerves Facial (VII)] : face symmetrical [Cranial Nerves Accessory (XI - Cranial And Spinal)] : head turning and shoulder shrug symmetric [Cranial Nerves Glossopharyngeal (IX)] : tongue and palate midline [Cranial Nerves Hypoglossal (XII)] : there was no tongue deviation with protrusion [Motor Strength] : muscle strength was normal in all four extremities [Motor Tone] : muscle tone was normal in all four extremities [No Muscle Atrophy] : normal bulk in all four extremities [Hand Weakness Right] : normal hand  [Hand Weakness Left] : normal hand  [5] : ankle dorsiflexion 5/5 [Sensation Tactile Decrease] : light touch was intact [Sensation Vibration Decrease] : vibration was intact [Proprioception] : proprioception was intact [Abnormal Walk] : normal gait [Balance] : balance was intact [Tremor] : no tremor present [Past-pointing] : there was no past-pointing [3+] : Patella left 3+ [Plantar Reflex Right Only] : normal on the right [Plantar Reflex Left Only] : normal on the left [FreeTextEntry5] : no ptosis  [FreeTextEntry6] : MG-ADL 0

## 2019-08-01 NOTE — HISTORY OF PRESENT ILLNESS
[FreeTextEntry1] : Patient doing very well on soliris, off prednisone now, taking mestinon 60mg QID only

## 2019-08-01 NOTE — ASSESSMENT
[FreeTextEntry1] : Patient doing very well, will cont soliris. \par \par Mestinon 60mg QID, can taper down to TID in a couple weeks.  f/u 3 months

## 2019-08-07 ENCOUNTER — APPOINTMENT (OUTPATIENT)
Dept: NEUROLOGY | Facility: CLINIC | Age: 29
End: 2019-08-07
Payer: MEDICAID

## 2019-08-07 VITALS — DIASTOLIC BLOOD PRESSURE: 64 MMHG | SYSTOLIC BLOOD PRESSURE: 96 MMHG | RESPIRATION RATE: 16 BRPM | HEART RATE: 78 BPM

## 2019-08-07 PROCEDURE — 96413 CHEMO IV INFUSION 1 HR: CPT

## 2019-08-07 RX ORDER — ECULIZUMAB 300 MG/30ML
300 INJECTION, SOLUTION, CONCENTRATE INTRAVENOUS
Refills: 0 | Status: COMPLETED | OUTPATIENT
Start: 2019-08-07

## 2019-08-07 RX ADMIN — ECULIZUMAB 0 MG/30ML: 300 INJECTION, SOLUTION, CONCENTRATE INTRAVENOUS at 00:00

## 2019-08-19 ENCOUNTER — APPOINTMENT (OUTPATIENT)
Dept: NEUROLOGY | Facility: CLINIC | Age: 29
End: 2019-08-19
Payer: MEDICAID

## 2019-08-19 VITALS — SYSTOLIC BLOOD PRESSURE: 105 MMHG | DIASTOLIC BLOOD PRESSURE: 66 MMHG | HEART RATE: 87 BPM | RESPIRATION RATE: 16 BRPM

## 2019-08-19 PROCEDURE — 96413 CHEMO IV INFUSION 1 HR: CPT

## 2019-08-19 RX ORDER — ECULIZUMAB 300 MG/30ML
300 INJECTION, SOLUTION, CONCENTRATE INTRAVENOUS
Refills: 0 | Status: COMPLETED | OUTPATIENT
Start: 2019-08-19

## 2019-08-19 RX ADMIN — ECULIZUMAB 0 MG/30ML: 300 INJECTION, SOLUTION, CONCENTRATE INTRAVENOUS at 00:00

## 2019-09-04 ENCOUNTER — APPOINTMENT (OUTPATIENT)
Dept: NEUROLOGY | Facility: CLINIC | Age: 29
End: 2019-09-04
Payer: MEDICAID

## 2019-09-04 VITALS — SYSTOLIC BLOOD PRESSURE: 98 MMHG | DIASTOLIC BLOOD PRESSURE: 70 MMHG | HEART RATE: 69 BPM | RESPIRATION RATE: 16 BRPM

## 2019-09-04 PROCEDURE — 96413 CHEMO IV INFUSION 1 HR: CPT

## 2019-09-04 RX ORDER — ECULIZUMAB 300 MG/30ML
300 INJECTION, SOLUTION, CONCENTRATE INTRAVENOUS
Refills: 0 | Status: COMPLETED | OUTPATIENT
Start: 2019-09-04

## 2019-09-04 RX ADMIN — ECULIZUMAB 0 MG/30ML: 300 INJECTION, SOLUTION, CONCENTRATE INTRAVENOUS at 00:00

## 2019-09-18 ENCOUNTER — APPOINTMENT (OUTPATIENT)
Dept: NEUROLOGY | Facility: CLINIC | Age: 29
End: 2019-09-18
Payer: MEDICAID

## 2019-09-18 PROCEDURE — 96413 CHEMO IV INFUSION 1 HR: CPT

## 2019-10-02 ENCOUNTER — APPOINTMENT (OUTPATIENT)
Dept: NEUROLOGY | Facility: CLINIC | Age: 29
End: 2019-10-02
Payer: MEDICAID

## 2019-10-02 PROCEDURE — 96413 CHEMO IV INFUSION 1 HR: CPT

## 2019-10-16 ENCOUNTER — APPOINTMENT (OUTPATIENT)
Dept: NEUROLOGY | Facility: CLINIC | Age: 29
End: 2019-10-16
Payer: MEDICAID

## 2019-10-16 VITALS — DIASTOLIC BLOOD PRESSURE: 63 MMHG | SYSTOLIC BLOOD PRESSURE: 108 MMHG | HEART RATE: 82 BPM

## 2019-10-16 VITALS — RESPIRATION RATE: 16 BRPM

## 2019-10-16 PROCEDURE — 96413 CHEMO IV INFUSION 1 HR: CPT

## 2019-10-16 RX ORDER — ECULIZUMAB 300 MG/30ML
300 INJECTION, SOLUTION, CONCENTRATE INTRAVENOUS
Refills: 0 | Status: COMPLETED | OUTPATIENT
Start: 2019-10-16

## 2019-10-16 RX ADMIN — ECULIZUMAB 0 MG/30ML: 300 INJECTION, SOLUTION, CONCENTRATE INTRAVENOUS at 00:00

## 2019-10-30 ENCOUNTER — APPOINTMENT (OUTPATIENT)
Dept: NEUROLOGY | Facility: CLINIC | Age: 29
End: 2019-10-30
Payer: MEDICAID

## 2019-10-30 PROCEDURE — 96413 CHEMO IV INFUSION 1 HR: CPT

## 2019-11-12 ENCOUNTER — APPOINTMENT (OUTPATIENT)
Dept: NEUROLOGY | Facility: CLINIC | Age: 29
End: 2019-11-12
Payer: MEDICAID

## 2019-11-12 VITALS — HEART RATE: 76 BPM | RESPIRATION RATE: 16 BRPM | DIASTOLIC BLOOD PRESSURE: 62 MMHG | SYSTOLIC BLOOD PRESSURE: 108 MMHG

## 2019-11-12 PROCEDURE — 96413 CHEMO IV INFUSION 1 HR: CPT

## 2019-11-12 RX ORDER — ECULIZUMAB 300 MG/30ML
300 INJECTION, SOLUTION, CONCENTRATE INTRAVENOUS
Refills: 0 | Status: COMPLETED | OUTPATIENT
Start: 2019-11-12

## 2019-11-12 RX ADMIN — ECULIZUMAB 0 MG/30ML: 300 INJECTION, SOLUTION, CONCENTRATE INTRAVENOUS at 00:00

## 2019-11-12 NOTE — ED ADULT NURSE NOTE - NS TRANSFER PATIENT BELONGINGS
Alert and oriented to person, place, time/situation. normal mood and affect. no apparent risk to self or others.
Clothing

## 2019-11-26 ENCOUNTER — APPOINTMENT (OUTPATIENT)
Dept: NEUROLOGY | Facility: CLINIC | Age: 29
End: 2019-11-26
Payer: MEDICAID

## 2019-11-26 VITALS — RESPIRATION RATE: 16 BRPM | DIASTOLIC BLOOD PRESSURE: 62 MMHG | SYSTOLIC BLOOD PRESSURE: 102 MMHG | HEART RATE: 71 BPM

## 2019-11-26 PROCEDURE — 96413 CHEMO IV INFUSION 1 HR: CPT

## 2019-11-26 RX ORDER — ECULIZUMAB 300 MG/30ML
300 INJECTION, SOLUTION, CONCENTRATE INTRAVENOUS
Refills: 0 | Status: COMPLETED | OUTPATIENT
Start: 2019-11-26

## 2019-11-26 RX ADMIN — ECULIZUMAB 0 MG/30ML: 300 INJECTION, SOLUTION, CONCENTRATE INTRAVENOUS at 00:00

## 2019-12-10 ENCOUNTER — APPOINTMENT (OUTPATIENT)
Dept: NEUROLOGY | Facility: CLINIC | Age: 29
End: 2019-12-10
Payer: MEDICAID

## 2019-12-10 VITALS — DIASTOLIC BLOOD PRESSURE: 58 MMHG | RESPIRATION RATE: 16 BRPM | SYSTOLIC BLOOD PRESSURE: 99 MMHG | HEART RATE: 74 BPM

## 2019-12-10 PROCEDURE — 96413 CHEMO IV INFUSION 1 HR: CPT

## 2019-12-10 RX ORDER — ECULIZUMAB 300 MG/30ML
300 INJECTION, SOLUTION, CONCENTRATE INTRAVENOUS
Refills: 0 | Status: COMPLETED | OUTPATIENT
Start: 2019-12-10

## 2019-12-10 RX ADMIN — ECULIZUMAB 0 MG/30ML: 300 INJECTION, SOLUTION, CONCENTRATE INTRAVENOUS at 00:00

## 2019-12-23 ENCOUNTER — APPOINTMENT (OUTPATIENT)
Dept: NEUROLOGY | Facility: CLINIC | Age: 29
End: 2019-12-23
Payer: MEDICAID

## 2019-12-23 VITALS — DIASTOLIC BLOOD PRESSURE: 66 MMHG | SYSTOLIC BLOOD PRESSURE: 109 MMHG | HEART RATE: 83 BPM | RESPIRATION RATE: 16 BRPM

## 2019-12-23 PROCEDURE — 96413 CHEMO IV INFUSION 1 HR: CPT

## 2019-12-23 RX ORDER — ECULIZUMAB 300 MG/30ML
300 INJECTION, SOLUTION, CONCENTRATE INTRAVENOUS
Refills: 0 | Status: COMPLETED | OUTPATIENT
Start: 2019-12-23

## 2019-12-23 RX ADMIN — ECULIZUMAB 0 MG/30ML: 300 INJECTION, SOLUTION, CONCENTRATE INTRAVENOUS at 00:00

## 2020-01-01 NOTE — PATIENT PROFILE OB - PRENATAL LABORATORY TESTS, INFANT PROFILE
ALFRED BUSTAMANTE; First Name: ______      GA 34.3 weeks;     Age: 4 d;   PMA: 34.6   BW:  _1810_____   MRN: [id number]    COURSE: 34 weeks, , immature thermoregulation, hyperbilirubinemia       INTERVAL EVENTS: tolerating feeds, D/C photo   Incubator 29.5  in isolette coming out   Weight (g):           1780     up 20g              Intake (ml/kg/day): 150   Urine output (ml/kg/hr or frequency): x8                                Stools (frequency): x3  Other:     Growth:    HC (cm): 30 (12-20), 30.5 (12-17) Length (cm):  44; [place] weight %  ____ ; ADWG (g/day)  _____ .  *******************************************************    Respiratory: Comfortable in RA.  CV: No current issues. Continue cardiorespiratory monitoring.  Heme: B+/ O+/ C neg Hyperbilirubinemia due to prematurity needing photo. Monitor bilirubin levels. Monitor for anemia given concern for abruption.  FEN: Feed EHM/Neo22 PO ad linh q3 hours based on cues, taking 15-35 ml. Enable breastfeeding. Triple feeding pattern. At risk for glucose and electrolyte disturbances. Glucose monitoring as per protocol.   ID: Monitor for signs of sepsis. IOL for maternal reasons (PEC with severe features). Screening CBC reassuring, no need for antibiotics at this time   Neuro: Normal exam for GA. NDE prior to discharge.  Thermal: Monitor for mature thermoregulation in the open crib prior to discharge. now in heated isolette    Social:     Labs/Imaging/Studies:   - bili      group B strep/HIV/VDRL/RPR/HBsAG/blood type

## 2020-01-03 NOTE — ED ADULT NURSE NOTE - PT NEEDS ASSIST
I called and informed pt that his lantus was ordered and sent to pharmacy. He was not sure how long it takes him to get meds from [facility]. But he said he will let me know when he receives it. yes

## 2020-01-06 NOTE — ED ADULT NURSE NOTE - DOES PATIENT HAVE ADVANCE DIRECTIVE
[address] [place] [address]  Phone: [phone]  Fax: [phone]    Geo Martins MD        January 6, 2020     Dinora Frances MD  [address] [place] [address] [address] [place] [id number]    Patient: Keshawn Roy  MR Number: [id number]  YOB: 1939  Date of Visit: 1/6/2020    Dear Dr. Dinora Frances: Thank you for the request for consultation for Devan Laureano to me for the evaluation of left knee contusion with anticoagulation. Below are the relevant portions of my assessment and plan of care. If you have questions, please do not hesitate to call me. I look forward to following Richardson Adler along with you.     Sincerely,        Geo Martins MD
No
68

## 2020-01-07 ENCOUNTER — APPOINTMENT (OUTPATIENT)
Dept: NEUROLOGY | Facility: CLINIC | Age: 30
End: 2020-01-07
Payer: MEDICAID

## 2020-01-07 VITALS — RESPIRATION RATE: 16 BRPM | HEART RATE: 83 BPM | SYSTOLIC BLOOD PRESSURE: 111 MMHG | DIASTOLIC BLOOD PRESSURE: 64 MMHG

## 2020-01-07 PROCEDURE — 96413 CHEMO IV INFUSION 1 HR: CPT

## 2020-01-07 RX ORDER — ECULIZUMAB 300 MG/30ML
300 INJECTION, SOLUTION, CONCENTRATE INTRAVENOUS
Refills: 0 | Status: COMPLETED | OUTPATIENT
Start: 2020-01-07

## 2020-01-07 RX ADMIN — ECULIZUMAB 0 MG/30ML: 300 INJECTION, SOLUTION, CONCENTRATE INTRAVENOUS at 00:00

## 2020-01-21 ENCOUNTER — APPOINTMENT (OUTPATIENT)
Dept: NEUROLOGY | Facility: CLINIC | Age: 30
End: 2020-01-21
Payer: MEDICAID

## 2020-01-21 PROCEDURE — 96413 CHEMO IV INFUSION 1 HR: CPT

## 2020-02-04 ENCOUNTER — APPOINTMENT (OUTPATIENT)
Dept: NEUROLOGY | Facility: CLINIC | Age: 30
End: 2020-02-04
Payer: MEDICAID

## 2020-02-04 PROCEDURE — 96413 CHEMO IV INFUSION 1 HR: CPT

## 2020-02-18 ENCOUNTER — APPOINTMENT (OUTPATIENT)
Dept: NEUROLOGY | Facility: CLINIC | Age: 30
End: 2020-02-18
Payer: MEDICAID

## 2020-02-18 VITALS — DIASTOLIC BLOOD PRESSURE: 63 MMHG | RESPIRATION RATE: 16 BRPM | HEART RATE: 83 BPM | SYSTOLIC BLOOD PRESSURE: 100 MMHG

## 2020-02-18 PROCEDURE — 96413 CHEMO IV INFUSION 1 HR: CPT

## 2020-02-18 RX ORDER — ECULIZUMAB 300 MG/30ML
300 INJECTION, SOLUTION, CONCENTRATE INTRAVENOUS
Refills: 0 | Status: COMPLETED | OUTPATIENT
Start: 2020-02-18

## 2020-02-18 RX ADMIN — ECULIZUMAB 0 MG/30ML: 300 INJECTION, SOLUTION, CONCENTRATE INTRAVENOUS at 00:00

## 2020-03-05 ENCOUNTER — APPOINTMENT (OUTPATIENT)
Dept: NEUROLOGY | Facility: CLINIC | Age: 30
End: 2020-03-05
Payer: MEDICAID

## 2020-03-05 PROCEDURE — 96413 CHEMO IV INFUSION 1 HR: CPT

## 2020-03-18 ENCOUNTER — APPOINTMENT (OUTPATIENT)
Dept: NEUROLOGY | Facility: CLINIC | Age: 30
End: 2020-03-18
Payer: MEDICAID

## 2020-03-18 PROCEDURE — 96413 CHEMO IV INFUSION 1 HR: CPT

## 2020-04-01 ENCOUNTER — APPOINTMENT (OUTPATIENT)
Dept: NEUROLOGY | Facility: CLINIC | Age: 30
End: 2020-04-01
Payer: MEDICAID

## 2020-04-01 PROCEDURE — 96413 CHEMO IV INFUSION 1 HR: CPT

## 2020-04-15 ENCOUNTER — APPOINTMENT (OUTPATIENT)
Dept: NEUROLOGY | Facility: CLINIC | Age: 30
End: 2020-04-15
Payer: MEDICAID

## 2020-04-15 VITALS — DIASTOLIC BLOOD PRESSURE: 60 MMHG | HEART RATE: 79 BPM | SYSTOLIC BLOOD PRESSURE: 102 MMHG | RESPIRATION RATE: 18 BRPM

## 2020-04-15 PROCEDURE — 96413 CHEMO IV INFUSION 1 HR: CPT

## 2020-04-15 RX ORDER — ECULIZUMAB 300 MG/30ML
300 INJECTION, SOLUTION, CONCENTRATE INTRAVENOUS
Refills: 0 | Status: COMPLETED | OUTPATIENT
Start: 2020-04-15

## 2020-04-15 RX ADMIN — ECULIZUMAB 0 MG/30ML: 300 INJECTION, SOLUTION, CONCENTRATE INTRAVENOUS at 00:00

## 2020-04-20 NOTE — PATIENT PROFILE ADULT. - AS SC BRADEN SENSORY
From: Gagandeep Reyna  To: Sarah Caicedo DO  Sent: 4/18/2020 12:15 PM CDT  Subject: Medication Question    This message is being sent by Joselin Garcia on behalf of Natanael Henry Dr. needs a refill for his Vyvanse that he takes in the morning. He's been taking the extended release of the Adderall 10mg at 1pm each day. I think he may need a dose increase. It doesn't seem to help a whole lot. He gets very antsy and agitated in the afternoons.   I hope you & your family are doing well and staying safe & healthy!  Thank you,  Joselin Garcia   (4) no impairment

## 2020-04-30 ENCOUNTER — APPOINTMENT (OUTPATIENT)
Dept: NEUROLOGY | Facility: CLINIC | Age: 30
End: 2020-04-30
Payer: MEDICAID

## 2020-04-30 VITALS — HEART RATE: 74 BPM | RESPIRATION RATE: 18 BRPM | SYSTOLIC BLOOD PRESSURE: 101 MMHG | DIASTOLIC BLOOD PRESSURE: 63 MMHG

## 2020-04-30 PROCEDURE — 96413 CHEMO IV INFUSION 1 HR: CPT

## 2020-04-30 RX ORDER — ECULIZUMAB 300 MG/30ML
300 INJECTION, SOLUTION, CONCENTRATE INTRAVENOUS
Refills: 0 | Status: COMPLETED | OUTPATIENT
Start: 2020-04-30

## 2020-04-30 RX ADMIN — ECULIZUMAB 0 MG/30ML: 300 INJECTION, SOLUTION, CONCENTRATE INTRAVENOUS at 00:00

## 2020-05-14 ENCOUNTER — APPOINTMENT (OUTPATIENT)
Dept: NEUROLOGY | Facility: CLINIC | Age: 30
End: 2020-05-14
Payer: MEDICAID

## 2020-05-14 VITALS — HEART RATE: 76 BPM | RESPIRATION RATE: 18 BRPM | DIASTOLIC BLOOD PRESSURE: 61 MMHG | SYSTOLIC BLOOD PRESSURE: 98 MMHG

## 2020-05-14 PROCEDURE — 96413 CHEMO IV INFUSION 1 HR: CPT

## 2020-05-14 RX ORDER — ECULIZUMAB 300 MG/30ML
300 INJECTION, SOLUTION, CONCENTRATE INTRAVENOUS
Refills: 0 | Status: COMPLETED | OUTPATIENT
Start: 2020-05-14

## 2020-05-14 RX ADMIN — ECULIZUMAB 0 MG/30ML: 300 INJECTION, SOLUTION, CONCENTRATE INTRAVENOUS at 00:00

## 2020-05-28 ENCOUNTER — APPOINTMENT (OUTPATIENT)
Dept: NEUROLOGY | Facility: CLINIC | Age: 30
End: 2020-05-28
Payer: MEDICAID

## 2020-05-28 VITALS — DIASTOLIC BLOOD PRESSURE: 66 MMHG | SYSTOLIC BLOOD PRESSURE: 104 MMHG | RESPIRATION RATE: 18 BRPM | HEART RATE: 72 BPM

## 2020-05-28 PROCEDURE — 96413 CHEMO IV INFUSION 1 HR: CPT

## 2020-05-28 RX ORDER — ECULIZUMAB 300 MG/30ML
300 INJECTION, SOLUTION, CONCENTRATE INTRAVENOUS
Refills: 0 | Status: COMPLETED | OUTPATIENT
Start: 2020-05-28

## 2020-05-28 RX ADMIN — ECULIZUMAB 0 MG/30ML: 300 INJECTION, SOLUTION, CONCENTRATE INTRAVENOUS at 00:00

## 2020-05-29 ENCOUNTER — APPOINTMENT (OUTPATIENT)
Dept: NEUROLOGY | Facility: CLINIC | Age: 30
End: 2020-05-29

## 2020-05-29 NOTE — HISTORY OF PRESENT ILLNESS
[Home] : at home, [unfilled] , at the time of the visit. [Verbal consent obtained from patient] : the patient, [unfilled] [Other Location: e.g. Home (Enter Location, City,State)___] : at [unfilled]

## 2020-06-10 NOTE — OCCUPATIONAL THERAPY INITIAL EVALUATION ADULT - MD ORDER
Group Topic: BH Education    Date: 6/10/2020  Start Time: 11:10 AM  End Time: 12:00 PM  Facilitators: Etelvina De La Fuente OT    Focus: Pt. discussed and was educated on unhelpful negative thinking habits and identify ways to challenge them  Number in attendance: 13    Method: Group  Attendance: Present  Participation: Active  Patient Response: Attentive and Demonstrated insight  Mood: Anxious and Happy  Affect: Type: Euthymic (normal mood)   Range: Full (normal)   Congruency: Congruent   Stability: Stable  Behavior/Socialization: Appropriate to group, Cooperative, Engaged and Supportive  Thought Process: Demonstrated insight and Focused  Task Performance: Follows directions  Patient Evaluation: Independent - full participation   This visit was performed via live interactive two-way video.    Clinician Location: [facility]    Patient Location: Home  Patient verbally consented to video visit.    Occupational Therapy Evaluation and Treatment

## 2020-06-11 ENCOUNTER — APPOINTMENT (OUTPATIENT)
Dept: NEUROLOGY | Facility: CLINIC | Age: 30
End: 2020-06-11
Payer: MEDICAID

## 2020-06-11 VITALS — RESPIRATION RATE: 18 BRPM | HEART RATE: 76 BPM | SYSTOLIC BLOOD PRESSURE: 103 MMHG | DIASTOLIC BLOOD PRESSURE: 66 MMHG

## 2020-06-11 VITALS — TEMPERATURE: 98.3 F

## 2020-06-11 PROCEDURE — 96413 CHEMO IV INFUSION 1 HR: CPT

## 2020-06-11 RX ORDER — ECULIZUMAB 300 MG/30ML
300 INJECTION, SOLUTION, CONCENTRATE INTRAVENOUS
Refills: 0 | Status: COMPLETED | OUTPATIENT
Start: 2020-06-11

## 2020-06-11 RX ADMIN — ECULIZUMAB 0 MG/30ML: 300 INJECTION, SOLUTION, CONCENTRATE INTRAVENOUS at 00:00

## 2020-06-19 NOTE — PROGRESS NOTE ADULT - REASON FOR ADMISSION
Patient : Sixto Verduzco Age: 22 year old Sex: male   MRN: [id number] Encounter Date: 6/18/2020      History     Chief Complaint   Patient presents with   • Testicle Pain     x 2 days. denies trauma     HPI   1:03 AM the patient is an otherwise healthy 22-year-old male who presents to the emergency department with chief complaint of scrotal pain and swelling.  Symptoms onset 2 days ago without clear inciting event (patient denies any change in activity or trauma).  He describes an aching feeling that is present bilaterally, left greater than right.  He has also noticed some redness overlying the skin on the scrotum and feels that the testicles are mildly swollen.  The patient has not taken anything for his symptoms prior to arrival in the emergency department.  He denies any other symptoms such as fever, chills, dysuria, hematuria, penile discharge or groin pain.  He has never experienced similar symptoms in the past.  He is  and his wife has no symptoms at this time.  Patient does not take any daily medications, reports allergy to amoxicillin.  He is a former smoker, rarely uses alcohol, denies use of any illicits.    Allergies   Allergen Reactions   • Amoxicillin [A-Cillin] HIVES       Prior to admission medications: none    Past Medical History:   Diagnosis Date   • Allergy    • Heart murmur Patient states he had a heart murmur when he was younger but no longer has one.   • Reduced vision Wears glasses to see the board in school   • Right arm fracture Student states he broke his right arm around age 6       Past Surgical History:   Procedure Laterality Date   • EYE MUSCLE SURGERY         Family history is reviewed and not pertinent.      Social History     Tobacco Use   • Smoking status: Former Smoker   • Smokeless tobacco: Never Used   Substance Use Topics   • Alcohol use: No   • Drug use: No       Review of Systems   Constitutional: Negative for chills, fatigue and fever.   HENT: Negative for congestion,  rhinorrhea and sore throat.    Respiratory: Negative for cough and shortness of breath.    Gastrointestinal: Negative for abdominal pain, diarrhea, nausea and vomiting.   Genitourinary: Positive for scrotal swelling and testicular pain. Negative for difficulty urinating, discharge, dysuria, frequency and genital sores.   Musculoskeletal: Negative for arthralgias and myalgias.   Skin: Negative for color change and rash.   Neurological: Negative for dizziness, weakness and headaches.   Hematological: Does not bruise/bleed easily.       Physical Exam     ED Triage Vitals [06/18/20 0055]   ED Triage Vitals Group      Temp 99.1 °F (37.3 °C)      Heart Rate 72      Resp 18      /64      SpO2 99 %      EtCO2 mmHg       Height       Weight       Weight Scale Used       BMI (Calculated)       IBW/kg (Calculated)        Physical Exam   Constitutional: He is oriented to person, place, and time. He appears well-developed and well-nourished. No distress.   HENT:   Head: Normocephalic.   Right Ear: External ear normal.   Left Ear: External ear normal.   Mouth/Throat: Oropharynx is clear and moist and mucous membranes are normal.   Eyes: Pupils are equal, round, and reactive to light. Conjunctivae and EOM are normal. No scleral icterus.   Neck: Normal range of motion. Neck supple. No JVD present.   Cardiovascular: Normal rate, regular rhythm, normal heart sounds and intact distal pulses. Exam reveals no gallop and no friction rub.   No murmur heard.  Pulmonary/Chest: Effort normal and breath sounds normal. No respiratory distress. He has no wheezes. He has no rales. He exhibits no tenderness.   Abdominal: Soft. Bowel sounds are normal. He exhibits no distension. There is no abdominal tenderness.   Genitourinary:    Genitourinary Comments: No scrotal edema.  Mild superficial erythema of scrotal skin.  No palpable masses.  No epididymal tenderness.  No penile lesions or discharge.     Musculoskeletal: Normal range of motion.          General: No tenderness or edema.   Lymphadenopathy:     He has no cervical adenopathy.   Neurological: He is alert and oriented to person, place, and time. He exhibits normal muscle tone. Coordination normal.   Skin: Skin is warm and dry. No rash noted. He is not diaphoretic. No erythema. No pallor.   Psychiatric: He has a normal mood and affect. His behavior is normal.   Nursing note and vitals reviewed.      ED Course     Procedures    Lab Results     Results for orders placed or performed during the hospital encounter of 06/18/20   URINALYSIS & REFLEX MICRO WITH CULTURE IF INDICATED   Result Value Ref Range    COLOR, URINALYSIS Yellow     APPEARANCE, URINALYSIS Clear     GLUCOSE, URINALYSIS Negative Negative mg/dL    BILIRUBIN, URINALYSIS Negative Negative    KETONES, URINALYSIS Negative Negative mg/dL    SPECIFIC GRAVITY, URINALYSIS 1.026 1.005 - 1.030    OCCULT BLOOD, URINALYSIS Negative Negative    PH, URINALYSIS 6.0 5.0 - 7.0    PROTEIN, URINALYSIS Trace (A) Negative mg/dL    UROBILINOGEN, URINALYSIS 0.2 0.2, 1.0 mg/dL    NITRITE, URINALYSIS Negative Negative    LEUKOCYTE ESTERASE, URINALYSIS Negative Negative         Radiology Results     Imaging Results          US Testicles and Scrotum (Final result)  Result time 06/18/20 03:15:12    Final result                 Impression:    IMPRESSION:    1.  Unremarkable sonographic appearance of the testicles.  2.  Small left hydrocele.    I have personally reviewed the images and modified the resident's report as  necessary.             Narrative:    US TESTICLES AND SCROTUM    HISTORY: Bilateral testicle pain and swelling there is worsening in the  last 2 days.    COMPARISON: None available.    TECHNIQUE: Grayscale, color Doppler, and spectral duplex images of the  testicles.    FINDINGS:    The right testicle measures 4.2 x 2.4 x 3.9 cm and the left testicle  measures 3.5 x 1.9 x 3.3 cm. Both testicles demonstrate normal homogeneous  echotexture. No evidence  Myasthenia of testicular fracture or suspicious  intratesticular lesions.  Symmetric color Doppler flow is demonstrated on  transverse view of the testicles. Bilateral low resistive arterial  waveforms and normal venous Doppler waveforms are noted.     The epididymides are unremarkable. No varicocele. No right-sided hydrocele.  Small left hydrocele.                    Preliminary result                 Impression:      1.  Unremarkable sonographic appearance of the testicles.  2.  Small left hydrocele.             Narrative:    US TESTICLES AND SCROTUM    HISTORY: Bilateral testicle pain and swelling there is worsening in the  last 2 days.    COMPARISON: None available.    TECHNIQUE: Grayscale, color Doppler, and spectral duplex images of the  testicles.    FINDINGS:    The right testicle measures 4.2 x 2.4 x 3.9 cm and the left testicle  measures 3.5 x 1.9 x 3.3 cm. Both testicles demonstrate normal homogeneous  echotexture. No evidence of testicular fracture or suspicious  intratesticular lesions small hypoechoic area within the right testicle   may  represent a tiny cyst. Symmetric color Doppler flow is demonstrated on  transverse view of the testicles. Bilateral low resistive arterial  waveforms and normal venous Doppler waveforms are noted.     The epididymides are unremarkable. No varicocele. No right-sided   hydrocele.  Small left hydrocele.                                  ED Medication Orders (From admission, onward)    Ordered Start     Status Ordering Provider    06/18/20 0151 06/18/20 0152  ibuprofen (MOTRIN) tablet 600 mg  ONCE      Last MAR action:  Given NERY ROSS               [facility]  ED course/MDM  Initial visit:  Otherwise healthy male presenting with scrotal pain, swelling, erythema that onset over the past 2 days.  No trauma, monogamous asymptomatic partner. On exam, he had mild erythema to the left scrotal skin, but no obvious edema or palpable masses.  Discussed plan for urinalysis to  further assess his symptoms.  Patient was agreeable.    1:44 AM Recheck:  I updated the patient on his unremarkable urinalysis.  Discussed plan for scrotal ultrasound to rule out any non palpable areas of swelling or inflammation.  Will also order ibuprofen for pain.  Patient was agreeable.     3:38 AM Recheck:  Patient reports slight improvement in discomfort with ibuprofen.  Updated him that his scrotal ultrasound was unremarkable, showing no evidence of focal inflammation or swelling.  Suspect that he may have local irritation of the skin related to candidal infection, as he describes that the pain is increased with shorts and underwear rubbing on the area during the day.  Will plan to treat with nystatin cream and powder and have the patient monitor symptoms.  Encouraged follow-up with the emergency department if he has any new or worsening symptoms.  Patient indicated understanding.  Any questions were answered.    Critical Care time spent on this patient outside of billable procedures:  None.      Diagnosis  The encounter diagnosis was Scrotal irritation.    Follow Up:  [facility]  [address] [facility] [address]  [phone]    As needed, If symptoms worsen          Summary of your Discharge Medications      Take these Medications      Details   * nystatin 271283 UNIT/GM powder  Commonly known as:  MYCOSTATIN   Apply topically 3 times daily. Use until redness resolves.     * nystatin [id number] UNIT/GM cream  Commonly known as:  MYCOSTATIN   Apply topically 2 times daily. Use until redness resolves.         * This list has 2 medication(s) that are the same as other medications prescribed for you. Read the directions carefully, and ask your doctor or other care provider to review them with you.                Pt is discharged to home/self care in stable condition.              Esperazna Keith MD  06/18/20 1471

## 2020-06-25 ENCOUNTER — APPOINTMENT (OUTPATIENT)
Dept: NEUROLOGY | Facility: CLINIC | Age: 30
End: 2020-06-25
Payer: MEDICAID

## 2020-06-25 PROCEDURE — 96413 CHEMO IV INFUSION 1 HR: CPT

## 2020-07-09 ENCOUNTER — APPOINTMENT (OUTPATIENT)
Dept: NEUROLOGY | Facility: CLINIC | Age: 30
End: 2020-07-09
Payer: MEDICAID

## 2020-07-09 VITALS — RESPIRATION RATE: 18 BRPM | DIASTOLIC BLOOD PRESSURE: 66 MMHG | HEART RATE: 74 BPM | SYSTOLIC BLOOD PRESSURE: 106 MMHG

## 2020-07-09 PROCEDURE — 96413 CHEMO IV INFUSION 1 HR: CPT

## 2020-07-09 RX ORDER — ECULIZUMAB 300 MG/30ML
300 INJECTION, SOLUTION, CONCENTRATE INTRAVENOUS
Refills: 0 | Status: COMPLETED | OUTPATIENT
Start: 2020-07-09

## 2020-07-09 RX ADMIN — ECULIZUMAB 0 MG/30ML: 300 INJECTION, SOLUTION, CONCENTRATE INTRAVENOUS at 00:00

## 2020-07-21 ENCOUNTER — RX RENEWAL (OUTPATIENT)
Age: 30
End: 2020-07-21

## 2020-07-22 ENCOUNTER — APPOINTMENT (OUTPATIENT)
Dept: NEUROLOGY | Facility: CLINIC | Age: 30
End: 2020-07-22

## 2020-07-23 ENCOUNTER — APPOINTMENT (OUTPATIENT)
Dept: NEUROLOGY | Facility: CLINIC | Age: 30
End: 2020-07-23
Payer: MEDICAID

## 2020-07-23 VITALS — HEART RATE: 70 BPM | SYSTOLIC BLOOD PRESSURE: 106 MMHG | DIASTOLIC BLOOD PRESSURE: 68 MMHG | RESPIRATION RATE: 18 BRPM

## 2020-07-23 PROCEDURE — 96413 CHEMO IV INFUSION 1 HR: CPT

## 2020-07-23 RX ORDER — ECULIZUMAB 300 MG/30ML
300 INJECTION, SOLUTION, CONCENTRATE INTRAVENOUS
Refills: 0 | Status: COMPLETED | OUTPATIENT
Start: 2020-07-23

## 2020-07-23 RX ADMIN — ECULIZUMAB 0 MG/30ML: 300 INJECTION, SOLUTION, CONCENTRATE INTRAVENOUS at 00:00

## 2020-08-06 ENCOUNTER — APPOINTMENT (OUTPATIENT)
Dept: NEUROLOGY | Facility: CLINIC | Age: 30
End: 2020-08-06
Payer: MEDICAID

## 2020-08-06 PROCEDURE — 96413 CHEMO IV INFUSION 1 HR: CPT

## 2020-08-19 NOTE — PATIENT PROFILE ADULT. - REASON FOR ADMISSION
SUBJECTIVE:   HPI: Adelita Van  is a 48 y.o. female who presents for annual physical .   Anxiety    Patient is here today for her wellness exam and f/u on anxiety. She was following with Dr Harris for anxiety/depression. Doing well on her current meds. She will need refills on her prescriptions soon since Dr Harris is no longer practicing. She feels stable on her current meds and does not want a new referral to psyc at this tome.    Anxiety  Presents for follow-up visit. Symptoms include excessive worry. Patient reports no chest pain, confusion, decreased concentration, depressed mood, dizziness, irritability, nervous/anxious behavior, palpitations, restlessness, shortness of breath or suicidal ideas. Symptoms occur rarely. The severity of symptoms is mild. The quality of sleep is good. Nighttime awakenings: occasional.     Compliance with medications is %.     (Not in a hospital admission)    Review of patient's allergies indicates:  No Known Allergies  Current Outpatient Medications on File Prior to Visit   Medication Sig Dispense Refill    OXcarbazepine (TRILEPTAL) 150 MG Tab Take 1/2 tablet every 12 hours thereafter 60 tablet 2    RETIN-A MICRO PUMP 0.06 % GlwP       sertraline (ZOLOFT) 100 MG tablet Take 1 tablet each night at bedtime 60 tablet 2    zolpidem (AMBIEN) 10 mg Tab TAKE 1 TABLET BY MOUTH EVERY NIGHT AT BEDTIME 30 tablet 0    [DISCONTINUED] gabapentin (NEURONTIN) 600 MG tablet TAKE 1 TABLET BY MOUTH THREE TIMES DAILY (Patient not taking: No sig reported) 90 tablet 0     No current facility-administered medications on file prior to visit.      Past Medical History:   Diagnosis Date    Abnormal Pap smear of vagina     rpeat pap    Anxiety     Depression      Past Surgical History:   Procedure Laterality Date    breast augmention       BREAST BIOPSY      INJECTION OF PUDENDAL NERVE Bilateral 1/17/2019    Procedure: INJECTION, NERVE, PUDENDAL;  Surgeon: Rc Crandall MD;  Location:  "[facility] OR;  Service: Pain Management;  Laterality: Bilateral;     Family History   Problem Relation Age of Onset    Breast cancer Mother 40    Breast cancer Maternal Aunt     Ovarian cancer Neg Hx      Social History     Tobacco Use    Smoking status: Never Smoker    Smokeless tobacco: Never Used   Substance Use Topics    Alcohol use: Yes     Alcohol/week: 0.0 standard drinks     Comment: a few times per week    Drug use: No      Health Maintenance Topics with due status: Not Due       Topic Last Completion Date    TETANUS VACCINE 07/11/2012    Lipid Panel 11/24/2018    Influenza Vaccine 12/06/2019    Mammogram 02/12/2020     There is no immunization history for the selected administration types on file for this patient.    Review of Systems   Constitutional: Negative for appetite change, chills, diaphoresis, fatigue, irritability and unexpected weight change.   HENT: Negative for ear discharge, hearing loss, trouble swallowing and voice change.    Eyes: Negative for photophobia and pain.   Respiratory: Negative for chest tightness, shortness of breath and stridor.    Cardiovascular: Negative for chest pain and palpitations.   Gastrointestinal: Negative for abdominal pain, blood in stool and vomiting.   Endocrine: Negative for cold intolerance and heat intolerance.   Genitourinary: Negative for difficulty urinating and flank pain.   Musculoskeletal: Negative for joint swelling and neck stiffness.   Skin: Negative for pallor.   Neurological: Negative for dizziness, speech difficulty and headaches.   Hematological: Does not bruise/bleed easily.   Psychiatric/Behavioral: Negative for confusion, decreased concentration and suicidal ideas. The patient is not nervous/anxious.       OBJECTIVE:      Vitals:    08/19/20 1357   BP: 100/70   Pulse: 95   Temp: 97.9 °F (36.6 °C)   TempSrc: Skin   SpO2: 99%   Weight: 47.6 kg (105 lb)   Height: 5' 1" (1.549 m)     Physical Exam  Vitals signs and nursing note reviewed. "   Constitutional:       General: She is not in acute distress.     Appearance: She is well-developed.   HENT:      Head: Normocephalic and atraumatic.      Right Ear: Tympanic membrane normal.      Left Ear: Tympanic membrane normal.      Nose: Nose normal.      Mouth/Throat:      Pharynx: Uvula midline.   Eyes:      General: Lids are normal.      Conjunctiva/sclera: Conjunctivae normal.      Pupils: Pupils are equal, round, and reactive to light.      Right eye: Pupil is round and reactive.      Left eye: Pupil is round and reactive.   Neck:      Musculoskeletal: Normal range of motion and neck supple.      Thyroid: No thyromegaly.      Vascular: No JVD.      Trachea: Trachea normal.   Cardiovascular:      Rate and Rhythm: Normal rate and regular rhythm.      Heart sounds: Normal heart sounds.   Pulmonary:      Effort: Pulmonary effort is normal. No tachypnea or respiratory distress.      Breath sounds: Normal breath sounds.   Abdominal:      General: Bowel sounds are normal.      Palpations: Abdomen is soft.      Tenderness: There is no abdominal tenderness.   Musculoskeletal: Normal range of motion.   Lymphadenopathy:      Cervical: No cervical adenopathy.   Skin:     General: Skin is warm and dry.      Findings: No rash.   Neurological:      Mental Status: She is alert and oriented to person, place, and time.   Psychiatric:         Attention and Perception: Attention normal.         Mood and Affect: Mood normal.         Speech: Speech normal.         Behavior: Behavior normal. Behavior is cooperative.         Thought Content: Thought content normal.        Assessment:       1. Preventative health care    2. Anxiety    3. Primary insomnia    4. Exposure to SARS-associated coronavirus        Plan:       Preventative health care  -     CBC auto differential; Future; Expected date: 08/19/2020  -     Comprehensive metabolic panel; Future; Expected date: 08/19/2020  -     Lipid Panel; Future; Expected date:  08/19/2020  -     TSH; Future; Expected date: 08/19/2020  -     Urinalysis; Future; Expected date: 08/19/2020  Counseled on age and gender appropriate medical preventative services, including cancer screenings, immunizations, overall nutritional health, need for a consistent exercise regimen and an overall push towards maintaining a vigorous and active lifestyle.      Anxiety  Stable on current meds    Primary insomnia  Stable on current meds    Exposure to SARS-associated coronavirus  -     COVID-19 (SARS CoV-2) IgG Antibody; Future; Expected date: 08/19/2020          Follow up in about 3 months (around 11/19/2020) for insomnia.               progressive weakness x 2 weeks and Respiratory distress

## 2020-08-20 ENCOUNTER — APPOINTMENT (OUTPATIENT)
Dept: NEUROLOGY | Facility: CLINIC | Age: 30
End: 2020-08-20
Payer: MEDICAID

## 2020-08-20 VITALS — HEART RATE: 73 BPM | RESPIRATION RATE: 18 BRPM | SYSTOLIC BLOOD PRESSURE: 99 MMHG | DIASTOLIC BLOOD PRESSURE: 64 MMHG

## 2020-08-20 VITALS — TEMPERATURE: 97.4 F

## 2020-08-20 PROCEDURE — 96413 CHEMO IV INFUSION 1 HR: CPT

## 2020-08-20 RX ORDER — ECULIZUMAB 300 MG/30ML
300 INJECTION, SOLUTION, CONCENTRATE INTRAVENOUS
Refills: 0 | Status: COMPLETED | OUTPATIENT
Start: 2020-08-20

## 2020-08-20 RX ADMIN — ECULIZUMAB 0 MG/30ML: 300 INJECTION, SOLUTION, CONCENTRATE INTRAVENOUS at 00:00

## 2020-09-03 ENCOUNTER — APPOINTMENT (OUTPATIENT)
Dept: NEUROLOGY | Facility: CLINIC | Age: 30
End: 2020-09-03

## 2020-09-10 ENCOUNTER — APPOINTMENT (OUTPATIENT)
Dept: NEUROLOGY | Facility: CLINIC | Age: 30
End: 2020-09-10
Payer: MEDICAID

## 2020-09-10 VITALS — HEART RATE: 64 BPM | SYSTOLIC BLOOD PRESSURE: 97 MMHG | DIASTOLIC BLOOD PRESSURE: 64 MMHG | RESPIRATION RATE: 18 BRPM

## 2020-09-10 VITALS — TEMPERATURE: 97.7 F

## 2020-09-10 PROCEDURE — 96413 CHEMO IV INFUSION 1 HR: CPT

## 2020-09-10 RX ORDER — ECULIZUMAB 300 MG/30ML
300 INJECTION, SOLUTION, CONCENTRATE INTRAVENOUS
Refills: 0 | Status: COMPLETED | OUTPATIENT
Start: 2020-09-10

## 2020-09-10 RX ADMIN — ECULIZUMAB 0 MG/30ML: 300 INJECTION, SOLUTION, CONCENTRATE INTRAVENOUS at 00:00

## 2020-09-24 ENCOUNTER — APPOINTMENT (OUTPATIENT)
Dept: NEUROLOGY | Facility: CLINIC | Age: 30
End: 2020-09-24
Payer: MEDICAID

## 2020-09-24 VITALS — TEMPERATURE: 97.7 F

## 2020-09-24 PROCEDURE — 96413 CHEMO IV INFUSION 1 HR: CPT

## 2020-10-08 ENCOUNTER — APPOINTMENT (OUTPATIENT)
Dept: NEUROLOGY | Facility: CLINIC | Age: 30
End: 2020-10-08
Payer: MEDICAID

## 2020-10-08 VITALS — RESPIRATION RATE: 18 BRPM | DIASTOLIC BLOOD PRESSURE: 61 MMHG | HEART RATE: 69 BPM | SYSTOLIC BLOOD PRESSURE: 96 MMHG

## 2020-10-08 VITALS — TEMPERATURE: 97.9 F

## 2020-10-08 PROCEDURE — 96413 CHEMO IV INFUSION 1 HR: CPT

## 2020-10-22 ENCOUNTER — APPOINTMENT (OUTPATIENT)
Dept: NEUROLOGY | Facility: CLINIC | Age: 30
End: 2020-10-22
Payer: MEDICAID

## 2020-10-22 VITALS — TEMPERATURE: 97.9 F

## 2020-10-22 VITALS — RESPIRATION RATE: 18 BRPM | SYSTOLIC BLOOD PRESSURE: 102 MMHG | HEART RATE: 69 BPM | DIASTOLIC BLOOD PRESSURE: 66 MMHG

## 2020-10-22 PROCEDURE — 96413 CHEMO IV INFUSION 1 HR: CPT

## 2020-10-22 PROCEDURE — 99072 ADDL SUPL MATRL&STAF TM PHE: CPT

## 2020-10-22 RX ORDER — ECULIZUMAB 300 MG/30ML
300 INJECTION, SOLUTION, CONCENTRATE INTRAVENOUS
Refills: 0 | Status: COMPLETED | OUTPATIENT
Start: 2020-10-22

## 2020-10-22 RX ADMIN — ECULIZUMAB 0 MG/30ML: 300 INJECTION, SOLUTION, CONCENTRATE INTRAVENOUS at 00:00

## 2020-11-05 ENCOUNTER — APPOINTMENT (OUTPATIENT)
Dept: NEUROLOGY | Facility: CLINIC | Age: 30
End: 2020-11-05
Payer: MEDICAID

## 2020-11-05 VITALS — TEMPERATURE: 97.9 F

## 2020-11-05 PROCEDURE — 96413 CHEMO IV INFUSION 1 HR: CPT

## 2020-11-05 PROCEDURE — 99072 ADDL SUPL MATRL&STAF TM PHE: CPT

## 2020-11-19 ENCOUNTER — APPOINTMENT (OUTPATIENT)
Dept: NEUROLOGY | Facility: CLINIC | Age: 30
End: 2020-11-19
Payer: MEDICAID

## 2020-11-19 VITALS — TEMPERATURE: 98 F

## 2020-11-19 PROCEDURE — 96413 CHEMO IV INFUSION 1 HR: CPT

## 2020-12-03 ENCOUNTER — APPOINTMENT (OUTPATIENT)
Dept: NEUROLOGY | Facility: CLINIC | Age: 30
End: 2020-12-03
Payer: MEDICAID

## 2020-12-03 VITALS — TEMPERATURE: 97 F

## 2020-12-03 VITALS — TEMPERATURE: 98 F

## 2020-12-03 PROCEDURE — 99072 ADDL SUPL MATRL&STAF TM PHE: CPT

## 2020-12-03 PROCEDURE — 96413 CHEMO IV INFUSION 1 HR: CPT

## 2020-12-16 ENCOUNTER — APPOINTMENT (OUTPATIENT)
Dept: NEUROLOGY | Facility: CLINIC | Age: 30
End: 2020-12-16
Payer: MEDICAID

## 2020-12-16 PROCEDURE — 96413 CHEMO IV INFUSION 1 HR: CPT

## 2020-12-16 PROCEDURE — 99072 ADDL SUPL MATRL&STAF TM PHE: CPT

## 2020-12-28 ENCOUNTER — EMERGENCY (EMERGENCY)
Facility: HOSPITAL | Age: 30
LOS: 0 days | Discharge: HOME | End: 2020-12-28
Attending: STUDENT IN AN ORGANIZED HEALTH CARE EDUCATION/TRAINING PROGRAM
Payer: MEDICAID

## 2020-12-28 VITALS
WEIGHT: 160.06 LBS | DIASTOLIC BLOOD PRESSURE: 69 MMHG | SYSTOLIC BLOOD PRESSURE: 126 MMHG | HEART RATE: 103 BPM | OXYGEN SATURATION: 100 % | RESPIRATION RATE: 18 BRPM | TEMPERATURE: 99 F | HEIGHT: 63 IN

## 2020-12-28 DIAGNOSIS — Z91.013 ALLERGY TO SEAFOOD: ICD-10-CM
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
## 2020-12-28 DIAGNOSIS — S61.318A: ICD-10-CM

## 2020-12-28 DIAGNOSIS — J45.909 UNSPECIFIED ASTHMA, UNCOMPLICATED: ICD-10-CM

## 2020-12-28 DIAGNOSIS — G70.00 MYASTHENIA GRAVIS WITHOUT (ACUTE) EXACERBATION: ICD-10-CM

## 2020-12-28 DIAGNOSIS — Z04.71 ENCOUNTER FOR EXAMINATION AND OBSERVATION FOLLOWING ALLEGED ADULT PHYSICAL ABUSE: ICD-10-CM

## 2020-12-28 DIAGNOSIS — S09.90XA UNSPECIFIED INJURY OF HEAD, INITIAL ENCOUNTER: ICD-10-CM
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
## 2020-12-28 PROCEDURE — 70450 CT HEAD/BRAIN W/O DYE: CPT | Mod: 26,MA

## 2020-12-28 PROCEDURE — 99284 EMERGENCY DEPT VISIT MOD MDM: CPT

## 2020-12-28 RX ORDER — ACETAMINOPHEN 500 MG
650 TABLET ORAL ONCE
Refills: 0 | Status: COMPLETED | OUTPATIENT
Start: 2020-12-28 | End: 2020-12-28

## 2020-12-28 RX ADMIN — Medication 650 MILLIGRAM(S): at 13:44

## 2020-12-28 NOTE — ED PROVIDER NOTE - PATIENT PORTAL LINK FT
You can access the FollowMyHealth Patient Portal offered by [facility] by registering at the following website: [email or web address]. By joining [facility]’s FollowMyHealth portal, you will also be able to view your health information using other applications (apps) compatible with our system.
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
## 2020-12-28 NOTE — ED PROVIDER NOTE - PROGRESS NOTE DETAILS
pt does not want to stay for xray stating that "I do not feel safe", main room was made a covid area, pt is in the hallway in bed 22 and feels uncomfortable and wants to go home, pt is moving her extremities including finger, pain most likely due to the nail avulsion

## 2020-12-28 NOTE — ED PROVIDER NOTE - MUSCULOSKELETAL, MLM
Spine appears normal, range of motion is not limited, left shoulder tenderness normal ROM, right 5th pinky tenderness due to nail evulsion normal ROM. left shoulder tenderness

## 2020-12-28 NOTE — ED ADULT NURSE NOTE - NSIMPLEMENTINTERV_GEN_ALL_ED
Implemented All Universal Safety Interventions:  [place] to call system. Call bell, personal items and telephone within reach. Instruct patient to call for assistance. Room bathroom lighting operational. Non-slip footwear when patient is off stretcher. Physically safe environment: no spills, clutter or unnecessary equipment. Stretcher in lowest position, wheels locked, appropriate side rails in place.
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
## 2020-12-31 ENCOUNTER — APPOINTMENT (OUTPATIENT)
Dept: NEUROLOGY | Facility: CLINIC | Age: 30
End: 2020-12-31
Payer: MEDICAID

## 2020-12-31 PROCEDURE — 99072 ADDL SUPL MATRL&STAF TM PHE: CPT

## 2020-12-31 PROCEDURE — 96413 CHEMO IV INFUSION 1 HR: CPT

## 2020-12-31 RX ORDER — ECULIZUMAB 300 MG/30ML
300 INJECTION, SOLUTION, CONCENTRATE INTRAVENOUS
Refills: 0 | Status: COMPLETED | OUTPATIENT
Start: 2020-12-31

## 2020-12-31 RX ADMIN — ECULIZUMAB 0 MG/30ML: 300 INJECTION, SOLUTION, CONCENTRATE INTRAVENOUS at 00:00

## 2021-01-14 ENCOUNTER — APPOINTMENT (OUTPATIENT)
Dept: NEUROLOGY | Facility: CLINIC | Age: 31
End: 2021-01-14
Payer: MEDICAID

## 2021-01-14 PROCEDURE — 99072 ADDL SUPL MATRL&STAF TM PHE: CPT

## 2021-01-14 PROCEDURE — 96413 CHEMO IV INFUSION 1 HR: CPT

## 2021-01-28 ENCOUNTER — APPOINTMENT (OUTPATIENT)
Dept: NEUROLOGY | Facility: CLINIC | Age: 31
End: 2021-01-28
Payer: MEDICAID

## 2021-01-28 PROCEDURE — 99072 ADDL SUPL MATRL&STAF TM PHE: CPT

## 2021-01-28 PROCEDURE — 96413 CHEMO IV INFUSION 1 HR: CPT

## 2021-02-11 ENCOUNTER — APPOINTMENT (OUTPATIENT)
Dept: NEUROLOGY | Facility: CLINIC | Age: 31
End: 2021-02-11
Payer: MEDICAID

## 2021-02-11 PROCEDURE — 99072 ADDL SUPL MATRL&STAF TM PHE: CPT

## 2021-02-11 PROCEDURE — 96413 CHEMO IV INFUSION 1 HR: CPT

## 2021-02-25 ENCOUNTER — APPOINTMENT (OUTPATIENT)
Dept: NEUROLOGY | Facility: CLINIC | Age: 31
End: 2021-02-25
Payer: MEDICAID

## 2021-02-25 PROCEDURE — 99072 ADDL SUPL MATRL&STAF TM PHE: CPT

## 2021-02-25 PROCEDURE — 96413 CHEMO IV INFUSION 1 HR: CPT

## 2021-03-04 ENCOUNTER — NON-APPOINTMENT (OUTPATIENT)
Age: 31
End: 2021-03-04

## 2021-03-04 ENCOUNTER — APPOINTMENT (OUTPATIENT)
Dept: NEUROLOGY | Facility: CLINIC | Age: 31
End: 2021-03-04
Payer: MEDICAID

## 2021-03-04 VITALS
WEIGHT: 166 LBS | DIASTOLIC BLOOD PRESSURE: 69 MMHG | HEIGHT: 61 IN | BODY MASS INDEX: 31.34 KG/M2 | HEART RATE: 68 BPM | SYSTOLIC BLOOD PRESSURE: 106 MMHG

## 2021-03-04 PROCEDURE — 99213 OFFICE O/P EST LOW 20 MIN: CPT

## 2021-03-04 PROCEDURE — 99072 ADDL SUPL MATRL&STAF TM PHE: CPT

## 2021-03-04 RX ORDER — PYRIDOSTIGMINE BROMIDE 60 MG/1
60 TABLET ORAL
Qty: 120 | Refills: 4 | Status: DISCONTINUED | COMMUNITY
Start: 2019-08-01 | End: 2021-03-04

## 2021-03-04 RX ORDER — PREDNISONE 20 MG/1
20 TABLET ORAL
Qty: 90 | Refills: 3 | Status: DISCONTINUED | COMMUNITY
Start: 2018-10-23 | End: 2021-03-04

## 2021-03-04 RX ORDER — OMEPRAZOLE 40 MG/1
40 CAPSULE, DELAYED RELEASE ORAL
Qty: 30 | Refills: 5 | Status: DISCONTINUED | COMMUNITY
Start: 2018-10-23 | End: 2021-03-04

## 2021-03-04 RX ORDER — HUMAN IMMUNOGLOBULIN G 40 G/400ML
40 LIQUID INTRAVENOUS
Qty: 1 | Refills: 5 | Status: DISCONTINUED | COMMUNITY
Start: 2018-10-23 | End: 2021-03-04

## 2021-03-04 RX ORDER — SULFAMETHOXAZOLE AND TRIMETHOPRIM 400; 80 MG/1; MG/1
400-80 TABLET ORAL DAILY
Qty: 30 | Refills: 3 | Status: DISCONTINUED | COMMUNITY
Start: 2019-02-11 | End: 2021-03-04

## 2021-03-04 RX ORDER — PYRIDOSTIGMINE BROMIDE 60 MG/1
60 TABLET ORAL
Qty: 120 | Refills: 5 | Status: DISCONTINUED | COMMUNITY
Start: 2019-02-01 | End: 2021-03-04

## 2021-03-04 RX ORDER — AZITHROMYCIN 500 MG/1
500 TABLET, FILM COATED ORAL DAILY
Qty: 1 | Refills: 1 | Status: DISCONTINUED | COMMUNITY
Start: 2019-03-25 | End: 2021-03-04

## 2021-03-04 NOTE — PHYSICAL EXAM
[Person] : oriented to person [Place] : oriented to place [Time] : oriented to time [Short Term Intact] : short term memory intact [Remote Intact] : remote memory intact [Registration Intact] : recent registration memory intact [Concentration Intact] : normal concentrating ability [Visual Intact] : visual attention was ~T not ~L decreased [Naming Objects] : no difficulty naming common objects [Repeating Phrases] : no difficulty repeating a phrase [Writing A Sentence] : no difficulty writing a sentence [Fluency] : fluency intact [Comprehension] : comprehension intact [Reading] : reading intact [Past History] : adequate knowledge of personal past history [Cranial Nerves Optic (II)] : visual acuity intact bilaterally,  visual fields full to confrontation, pupils equal round and reactive to light [Cranial Nerves Oculomotor (III)] : extraocular motion intact [Cranial Nerves Trigeminal (V)] : facial sensation intact symmetrically [Cranial Nerves Facial (VII)] : face symmetrical [Cranial Nerves Vestibulocochlear (VIII)] : hearing was intact bilaterally [Cranial Nerves Glossopharyngeal (IX)] : tongue and palate midline [Cranial Nerves Accessory (XI - Cranial And Spinal)] : head turning and shoulder shrug symmetric [Cranial Nerves Hypoglossal (XII)] : there was no tongue deviation with protrusion [Motor Tone] : muscle tone was normal in all four extremities [Motor Strength] : muscle strength was normal in all four extremities [No Muscle Atrophy] : normal bulk in all four extremities [Hand Weakness Right] : normal hand  [Hand Weakness Left] : normal hand  [5] : ankle dorsiflexion 5/5 [Sensation Tactile Decrease] : light touch was intact [Sensation Vibration Decrease] : vibration was intact [Proprioception] : proprioception was intact [Abnormal Walk] : normal gait [Balance] : balance was intact [Past-pointing] : there was no past-pointing [Tremor] : no tremor present [3+] : Ankle jerk left 3+ [Plantar Reflex Right Only] : normal on the right [Plantar Reflex Left Only] : normal on the left [FreeTextEntry5] : mild left ptosis [FreeTextEntry6] : MG-ADL 0

## 2021-03-11 ENCOUNTER — APPOINTMENT (OUTPATIENT)
Dept: NEUROLOGY | Facility: CLINIC | Age: 31
End: 2021-03-11
Payer: MEDICAID

## 2021-03-11 PROCEDURE — 96413 CHEMO IV INFUSION 1 HR: CPT

## 2021-03-11 PROCEDURE — 99072 ADDL SUPL MATRL&STAF TM PHE: CPT

## 2021-03-25 ENCOUNTER — APPOINTMENT (OUTPATIENT)
Dept: NEUROLOGY | Facility: CLINIC | Age: 31
End: 2021-03-25
Payer: MEDICAID

## 2021-03-25 PROCEDURE — 96413 CHEMO IV INFUSION 1 HR: CPT

## 2021-03-25 PROCEDURE — 99072 ADDL SUPL MATRL&STAF TM PHE: CPT

## 2021-04-08 ENCOUNTER — APPOINTMENT (OUTPATIENT)
Dept: NEUROLOGY | Facility: CLINIC | Age: 31
End: 2021-04-08
Payer: MEDICAID

## 2021-04-08 VITALS — TEMPERATURE: 97.3 F

## 2021-04-08 PROCEDURE — 99072 ADDL SUPL MATRL&STAF TM PHE: CPT

## 2021-04-08 PROCEDURE — 96413 CHEMO IV INFUSION 1 HR: CPT

## 2021-04-22 ENCOUNTER — APPOINTMENT (OUTPATIENT)
Dept: NEUROLOGY | Facility: CLINIC | Age: 31
End: 2021-04-22
Payer: MEDICAID

## 2021-04-22 VITALS — HEART RATE: 63 BPM | RESPIRATION RATE: 18 BRPM | SYSTOLIC BLOOD PRESSURE: 105 MMHG | DIASTOLIC BLOOD PRESSURE: 72 MMHG

## 2021-04-22 PROCEDURE — 99072 ADDL SUPL MATRL&STAF TM PHE: CPT

## 2021-04-22 PROCEDURE — 96413 CHEMO IV INFUSION 1 HR: CPT

## 2021-04-22 PROCEDURE — 99213 OFFICE O/P EST LOW 20 MIN: CPT | Mod: 25

## 2021-04-22 RX ORDER — PYRIDOSTIGMINE BROMIDE 180 MG/1
180 TABLET ORAL
Qty: 30 | Refills: 5 | Status: ACTIVE | COMMUNITY
Start: 2021-04-22 | End: 1900-01-01

## 2021-04-22 NOTE — PHYSICAL EXAM
[Person] : oriented to person [Place] : oriented to place [Time] : oriented to time [Short Term Intact] : short term memory intact [Remote Intact] : remote memory intact [Registration Intact] : recent registration memory intact [Concentration Intact] : normal concentrating ability [Visual Intact] : visual attention was ~T not ~L decreased [Naming Objects] : no difficulty naming common objects [Repeating Phrases] : no difficulty repeating a phrase [Writing A Sentence] : no difficulty writing a sentence [Fluency] : fluency intact [Comprehension] : comprehension intact [Reading] : reading intact [Past History] : adequate knowledge of personal past history [Cranial Nerves Optic (II)] : visual acuity intact bilaterally,  visual fields full to confrontation, pupils equal round and reactive to light [Cranial Nerves Trigeminal (V)] : facial sensation intact symmetrically [Cranial Nerves Facial (VII)] : face symmetrical [Cranial Nerves Glossopharyngeal (IX)] : tongue and palate midline [Cranial Nerves Vestibulocochlear (VIII)] : hearing was intact bilaterally [Cranial Nerves Accessory (XI - Cranial And Spinal)] : head turning and shoulder shrug symmetric [Cranial Nerves Hypoglossal (XII)] : there was no tongue deviation with protrusion [Motor Tone] : muscle tone was normal in all four extremities [Motor Strength] : muscle strength was normal in all four extremities [No Muscle Atrophy] : normal bulk in all four extremities [Hand Weakness Right] : normal hand  [Hand Weakness Left] : normal hand  [5] : ankle dorsiflexion 5/5 [Sensation Tactile Decrease] : light touch was intact [Sensation Vibration Decrease] : vibration was intact [Proprioception] : proprioception was intact [Abnormal Walk] : normal gait [Balance] : balance was intact [Past-pointing] : there was no past-pointing [Tremor] : no tremor present [3+] : Ankle jerk left 3+ [Plantar Reflex Right Only] : normal on the right [Plantar Reflex Left Only] : normal on the left [FreeTextEntry5] : pronounced right ptosis, mild on the left, bilateral medial rectus EOM weakness [FreeTextEntry6] : MG-ADL 4, neck flex 5/5

## 2021-04-22 NOTE — HISTORY OF PRESENT ILLNESS
[FreeTextEntry1] : Patient states for two weeks she has had daily and constant diplopia, right more than left ptosis, and some fatigue with chewing.  No SOB, dysphagia or dysarthria.  Limbs are strong.  She continues to work but sx's slow her down. \par \par She is currently getting today's Soliris infusion. She takes mestinon 60mg TID, down from QID a couple months ago because she was doing very well.

## 2021-04-22 NOTE — ASSESSMENT
[FreeTextEntry1] : Patient is having emergence of symptoms after prolonged MM of disease. \par \par Will increase mestinon to 180mg timespan QD with 60mg QID and see if that is enough to improve sx's. \par \par Will see her in two weeks and determine if she needs prednisone.

## 2021-05-06 ENCOUNTER — APPOINTMENT (OUTPATIENT)
Dept: NEUROLOGY | Facility: CLINIC | Age: 31
End: 2021-05-06
Payer: MEDICAID

## 2021-05-06 PROCEDURE — 96413 CHEMO IV INFUSION 1 HR: CPT

## 2021-05-06 PROCEDURE — 99072 ADDL SUPL MATRL&STAF TM PHE: CPT

## 2021-05-20 ENCOUNTER — APPOINTMENT (OUTPATIENT)
Dept: NEUROLOGY | Facility: CLINIC | Age: 31
End: 2021-05-20
Payer: MEDICAID

## 2021-05-20 PROCEDURE — 96413 CHEMO IV INFUSION 1 HR: CPT

## 2021-05-20 PROCEDURE — 99072 ADDL SUPL MATRL&STAF TM PHE: CPT

## 2021-06-03 ENCOUNTER — APPOINTMENT (OUTPATIENT)
Dept: NEUROLOGY | Facility: CLINIC | Age: 31
End: 2021-06-03
Payer: MEDICAID

## 2021-06-03 PROCEDURE — 99072 ADDL SUPL MATRL&STAF TM PHE: CPT

## 2021-06-03 PROCEDURE — 96413 CHEMO IV INFUSION 1 HR: CPT

## 2021-06-17 ENCOUNTER — APPOINTMENT (OUTPATIENT)
Dept: NEUROLOGY | Facility: CLINIC | Age: 31
End: 2021-06-17
Payer: MEDICAID

## 2021-06-17 PROCEDURE — 96413 CHEMO IV INFUSION 1 HR: CPT

## 2021-06-17 PROCEDURE — 99072 ADDL SUPL MATRL&STAF TM PHE: CPT

## 2021-06-25 NOTE — ED ADULT NURSE NOTE - CAS EDN DISCHARGE ASSESSMENT
"Hospital Medicine Daily Progress Note    Date of Service  6/24/2021    Chief Complaint  73 y.o. female admitted 6/23/2021 with lower extremity edema    Hospital Course  Per notes, \"73 y.o. female with a past medical history of chronic heart failure with preserved ejection fraction, chronic kidney disease stage III, hypertension who presented 6/23/2021 with worsening lower extremity swelling since her discharge from skilled nursing facility.  Patient reports that she has not been taking her medications since leaving [facility] skilled [facility] facility, since then she has been having progressive lower extremity swelling in addition to generalized weakness and unsteady gait.  She has been wrapping her lower extremities with limited benefit.  She denies having shortness of breath cough and fever.\"      Interval Problem Update  Seen examined by me this morning at bedside, still having some weakness and leg swelling.  It appears that she had not been prescribed a medication upon leaving SNF.    She states her long-term plan is to move into an assisted living here in [place], she also has family in [place].  I do believe she would be a great candidate for rehab, referral sent.  Discussed with PT/OT there are some agreement that she would be a good candidate for rehab.    Consultants/Specialty  PMR    Code Status  Full Code    Disposition  TBD    Review of Systems  Review of Systems   Cardiovascular: Positive for leg swelling.   Neurological: Positive for weakness.   All other systems reviewed and are negative.       Physical Exam  Temp:  [36.6 °C (97.9 °F)-36.7 °C (98.1 °F)] 36.7 °C (98 °F)  Pulse:  [108-116] 116  Resp:  [18] 18  BP: (123-129)/(79-87) 123/84  SpO2:  [94 %-97 %] 97 %    Physical Exam  Vitals and nursing note reviewed.   Constitutional:       Appearance: Normal appearance. She is obese.   Cardiovascular:      Rate and Rhythm: Normal rate and regular rhythm.      Pulses: Normal pulses.      Heart sounds: Normal heart " sounds.   Pulmonary:      Effort: Pulmonary effort is normal.      Breath sounds: Normal breath sounds.   Abdominal:      General: Abdomen is flat. Bowel sounds are normal.      Palpations: Abdomen is soft.   Musculoskeletal:      Right lower leg: Edema present.      Left lower leg: Edema present.   Skin:     General: Skin is warm and dry.   Neurological:      General: No focal deficit present.      Mental Status: She is alert and oriented to person, place, and time. Mental status is at baseline.         Fluids    Intake/Output Summary (Last 24 hours) at 6/24/2021 1905  Last data filed at 6/24/2021 1600  Gross per 24 hour   Intake 890 ml   Output 150 ml   Net 740 ml       Laboratory  Recent Labs     06/23/21  1434 06/24/21  0403   WBC 3.7* 4.6*   RBC 3.78* 3.77*   HEMOGLOBIN 12.6 12.2   HEMATOCRIT 37.8 37.0   .0* 98.1*   MCH 33.3* 32.4   MCHC 33.3* 33.0*   RDW 69.0* 72.1*   PLATELETCT 166 190   MPV 8.8* 9.9     Recent Labs     06/23/21  1434 06/24/21  0403   SODIUM 129* 132*   POTASSIUM 4.3 4.0   CHLORIDE 89* 93*   CO2 27 25   GLUCOSE 73 71   BUN 27* 27*   CREATININE 1.24 1.26   CALCIUM 8.6 8.5     Recent Labs     06/23/21  1434 06/24/21  0403   APTT 39.7*  --    INR 2.03* 2.04*               Imaging  DX-CHEST-PORTABLE (1 VIEW)   Final Result      No acute cardiopulmonary abnormality identified.           Assessment/Plan  * Edema, lower extremity- (present on admission)  Assessment & Plan  Likely secondary to missing her medications since discharge from SNF  Still having significant edema, will use Lasix IV and restart home diuretics at higher dose  Daily strict input and output  Daily standing weights.  Daily BMP to watch renal function, electrolytes    AF (atrial fibrillation) (HCC)  Assessment & Plan  Continue metoprolol with hold parameters.  Coumadin per pharmacy    Elevated liver enzymes  Assessment & Plan  Appears to be chronic however numbers higher than baseline.  Likely secondary to hepatic  congestion from missing her diuretics.  Denies having abdominal pain.  Continue to monitor with restarting diuretics, consider imaging if liver enzymes and bilirubin does not improve with diuretics    (HFpEF) heart failure with preserved ejection fraction (HCC)- (present on admission)  Assessment & Plan  With worsening lower extremity edema.  Missed taking her medications since discharge from SNF  Continue with Lasix, reevaluate daily  Daily strict input and output  Daily standing weights.  Daily BMP to watch renal function, electrolytes.      Stage 3 chronic kidney disease (HCC)- (present on admission)  Assessment & Plan  Avoid nephrotoxins as much as possible, renally dose medications, monitor inputs and outputs     HTN (hypertension)- (present on admission)  Assessment & Plan  Resume home metoprolol with hold parameters       VTE prophylaxis: None       Alert and oriented to person, place and time

## 2021-07-01 ENCOUNTER — APPOINTMENT (OUTPATIENT)
Dept: NEUROLOGY | Facility: CLINIC | Age: 31
End: 2021-07-01
Payer: MEDICAID

## 2021-07-01 PROCEDURE — 99072 ADDL SUPL MATRL&STAF TM PHE: CPT

## 2021-07-01 PROCEDURE — 96413 CHEMO IV INFUSION 1 HR: CPT

## 2021-07-15 ENCOUNTER — APPOINTMENT (OUTPATIENT)
Dept: NEUROLOGY | Facility: CLINIC | Age: 31
End: 2021-07-15
Payer: MEDICAID

## 2021-07-15 VITALS — HEART RATE: 82 BPM | SYSTOLIC BLOOD PRESSURE: 111 MMHG | DIASTOLIC BLOOD PRESSURE: 74 MMHG | RESPIRATION RATE: 18 BRPM

## 2021-07-15 PROCEDURE — 96413 CHEMO IV INFUSION 1 HR: CPT

## 2021-07-15 PROCEDURE — 99072 ADDL SUPL MATRL&STAF TM PHE: CPT

## 2021-07-15 RX ORDER — ECULIZUMAB 300 MG/30ML
300 INJECTION, SOLUTION, CONCENTRATE INTRAVENOUS
Refills: 0 | Status: COMPLETED | OUTPATIENT
Start: 2021-07-15

## 2021-07-15 RX ADMIN — ECULIZUMAB 0 MG/30ML: 300 INJECTION, SOLUTION, CONCENTRATE INTRAVENOUS at 00:00

## 2021-07-29 ENCOUNTER — APPOINTMENT (OUTPATIENT)
Dept: NEUROLOGY | Facility: CLINIC | Age: 31
End: 2021-07-29
Payer: MEDICAID

## 2021-07-29 PROCEDURE — 96413 CHEMO IV INFUSION 1 HR: CPT

## 2021-07-29 PROCEDURE — 99072 ADDL SUPL MATRL&STAF TM PHE: CPT

## 2021-08-12 ENCOUNTER — APPOINTMENT (OUTPATIENT)
Dept: NEUROLOGY | Facility: CLINIC | Age: 31
End: 2021-08-12
Payer: MEDICAID

## 2021-08-12 VITALS — SYSTOLIC BLOOD PRESSURE: 105 MMHG | DIASTOLIC BLOOD PRESSURE: 71 MMHG | RESPIRATION RATE: 18 BRPM | HEART RATE: 79 BPM

## 2021-08-12 PROCEDURE — 96413 CHEMO IV INFUSION 1 HR: CPT

## 2021-08-12 PROCEDURE — 99072 ADDL SUPL MATRL&STAF TM PHE: CPT

## 2021-08-12 RX ORDER — ECULIZUMAB 300 MG/30ML
300 INJECTION, SOLUTION, CONCENTRATE INTRAVENOUS
Refills: 0 | Status: COMPLETED | OUTPATIENT
Start: 2021-08-12

## 2021-08-12 RX ADMIN — ECULIZUMAB 0 MG/30ML: 300 INJECTION, SOLUTION, CONCENTRATE INTRAVENOUS at 00:00

## 2021-08-26 ENCOUNTER — APPOINTMENT (OUTPATIENT)
Dept: NEUROLOGY | Facility: CLINIC | Age: 31
End: 2021-08-26
Payer: MEDICAID

## 2021-08-26 PROCEDURE — 96413 CHEMO IV INFUSION 1 HR: CPT

## 2021-08-26 PROCEDURE — 99072 ADDL SUPL MATRL&STAF TM PHE: CPT

## 2021-09-09 ENCOUNTER — APPOINTMENT (OUTPATIENT)
Dept: NEUROLOGY | Facility: CLINIC | Age: 31
End: 2021-09-09
Payer: MEDICAID

## 2021-09-09 VITALS — DIASTOLIC BLOOD PRESSURE: 65 MMHG | HEART RATE: 78 BPM | SYSTOLIC BLOOD PRESSURE: 99 MMHG | RESPIRATION RATE: 16 BRPM

## 2021-09-09 PROCEDURE — 99072 ADDL SUPL MATRL&STAF TM PHE: CPT

## 2021-09-09 PROCEDURE — 96413 CHEMO IV INFUSION 1 HR: CPT

## 2021-09-09 RX ORDER — ECULIZUMAB 300 MG/30ML
300 INJECTION, SOLUTION, CONCENTRATE INTRAVENOUS
Refills: 0 | Status: COMPLETED | OUTPATIENT
Start: 2021-09-09

## 2021-09-09 RX ADMIN — ECULIZUMAB 0 MG/30ML: 300 INJECTION, SOLUTION, CONCENTRATE INTRAVENOUS at 00:00

## 2021-09-23 ENCOUNTER — APPOINTMENT (OUTPATIENT)
Dept: NEUROLOGY | Facility: CLINIC | Age: 31
End: 2021-09-23
Payer: MEDICAID

## 2021-09-23 VITALS — DIASTOLIC BLOOD PRESSURE: 68 MMHG | RESPIRATION RATE: 18 BRPM | SYSTOLIC BLOOD PRESSURE: 105 MMHG | HEART RATE: 72 BPM

## 2021-09-23 PROCEDURE — 96413 CHEMO IV INFUSION 1 HR: CPT

## 2021-09-23 PROCEDURE — 99072 ADDL SUPL MATRL&STAF TM PHE: CPT

## 2021-09-23 RX ORDER — ECULIZUMAB 300 MG/30ML
300 INJECTION, SOLUTION, CONCENTRATE INTRAVENOUS
Refills: 0 | Status: COMPLETED | OUTPATIENT
Start: 2021-09-23

## 2021-09-23 RX ADMIN — ECULIZUMAB 0 MG/30ML: 300 INJECTION, SOLUTION, CONCENTRATE INTRAVENOUS at 00:00

## 2021-09-27 NOTE — ASSESSMENT
Pt in afib up 80s-120. MD notified. New orders for amio bolus, and gtt and PICC in Am. [FreeTextEntry1] : Patient doing very well on Soliris.  MM of disease, cont mestinon 60mg TID and Soliris\par \par f/u 6 months

## 2021-10-07 ENCOUNTER — APPOINTMENT (OUTPATIENT)
Dept: NEUROLOGY | Facility: CLINIC | Age: 31
End: 2021-10-07
Payer: MEDICAID

## 2021-10-07 PROCEDURE — 99072 ADDL SUPL MATRL&STAF TM PHE: CPT

## 2021-10-07 PROCEDURE — 96413 CHEMO IV INFUSION 1 HR: CPT

## 2021-10-21 ENCOUNTER — APPOINTMENT (OUTPATIENT)
Dept: NEUROLOGY | Facility: CLINIC | Age: 31
End: 2021-10-21
Payer: MEDICAID

## 2021-10-21 PROCEDURE — 99072 ADDL SUPL MATRL&STAF TM PHE: CPT

## 2021-10-21 PROCEDURE — 96413 CHEMO IV INFUSION 1 HR: CPT

## 2021-11-04 ENCOUNTER — APPOINTMENT (OUTPATIENT)
Dept: NEUROLOGY | Facility: CLINIC | Age: 31
End: 2021-11-04
Payer: MEDICAID

## 2021-11-04 PROCEDURE — 96413 CHEMO IV INFUSION 1 HR: CPT

## 2021-11-04 PROCEDURE — 99072 ADDL SUPL MATRL&STAF TM PHE: CPT

## 2021-11-15 ENCOUNTER — APPOINTMENT (OUTPATIENT)
Dept: NEUROLOGY | Facility: CLINIC | Age: 31
End: 2021-11-15

## 2021-11-15 RX ORDER — PYRIDOSTIGMINE BROMIDE 60 MG/1
60 TABLET ORAL
Qty: 270 | Refills: 3 | Status: ACTIVE | COMMUNITY
Start: 2021-03-04 | End: 1900-01-01

## 2021-11-16 RX ORDER — PYRIDOSTIGMINE BROMIDE 60 MG/1
60 TABLET ORAL
Qty: 120 | Refills: 5 | Status: ACTIVE | COMMUNITY
Start: 2018-10-23 | End: 1900-01-01

## 2021-11-18 ENCOUNTER — APPOINTMENT (OUTPATIENT)
Dept: NEUROLOGY | Facility: CLINIC | Age: 31
End: 2021-11-18
Payer: MEDICAID

## 2021-11-18 VITALS — SYSTOLIC BLOOD PRESSURE: 95 MMHG | DIASTOLIC BLOOD PRESSURE: 61 MMHG | RESPIRATION RATE: 18 BRPM | HEART RATE: 64 BPM

## 2021-11-18 PROCEDURE — 96413 CHEMO IV INFUSION 1 HR: CPT

## 2021-11-18 PROCEDURE — 99072 ADDL SUPL MATRL&STAF TM PHE: CPT

## 2021-12-02 ENCOUNTER — APPOINTMENT (OUTPATIENT)
Dept: NEUROLOGY | Facility: CLINIC | Age: 31
End: 2021-12-02
Payer: MEDICAID

## 2021-12-02 VITALS — SYSTOLIC BLOOD PRESSURE: 105 MMHG | DIASTOLIC BLOOD PRESSURE: 63 MMHG | HEART RATE: 81 BPM | RESPIRATION RATE: 18 BRPM

## 2021-12-02 PROCEDURE — 99072 ADDL SUPL MATRL&STAF TM PHE: CPT

## 2021-12-02 PROCEDURE — 96365 THER/PROPH/DIAG IV INF INIT: CPT

## 2021-12-02 RX ORDER — ECULIZUMAB 300 MG/30ML
300 INJECTION, SOLUTION, CONCENTRATE INTRAVENOUS
Refills: 0 | Status: COMPLETED | OUTPATIENT
Start: 2021-12-02

## 2021-12-02 RX ADMIN — ECULIZUMAB 0 MG/30ML: 300 INJECTION, SOLUTION, CONCENTRATE INTRAVENOUS at 00:00

## 2021-12-16 ENCOUNTER — APPOINTMENT (OUTPATIENT)
Dept: NEUROLOGY | Facility: CLINIC | Age: 31
End: 2021-12-16
Payer: MEDICAID

## 2021-12-16 PROCEDURE — 96365 THER/PROPH/DIAG IV INF INIT: CPT

## 2021-12-30 ENCOUNTER — APPOINTMENT (OUTPATIENT)
Dept: NEUROLOGY | Facility: CLINIC | Age: 31
End: 2021-12-30
Payer: MEDICAID

## 2021-12-30 VITALS — SYSTOLIC BLOOD PRESSURE: 107 MMHG | RESPIRATION RATE: 18 BRPM | DIASTOLIC BLOOD PRESSURE: 68 MMHG | HEART RATE: 63 BPM

## 2021-12-30 PROCEDURE — 96413 CHEMO IV INFUSION 1 HR: CPT

## 2021-12-30 RX ORDER — ECULIZUMAB 300 MG/30ML
300 INJECTION, SOLUTION, CONCENTRATE INTRAVENOUS
Refills: 0 | Status: COMPLETED | OUTPATIENT
Start: 2021-12-30

## 2021-12-30 RX ADMIN — ECULIZUMAB 0 MG/30ML: 300 INJECTION, SOLUTION, CONCENTRATE INTRAVENOUS at 00:00

## 2022-01-13 ENCOUNTER — APPOINTMENT (OUTPATIENT)
Dept: NEUROLOGY | Facility: CLINIC | Age: 32
End: 2022-01-13
Payer: MEDICAID

## 2022-01-13 VITALS — RESPIRATION RATE: 16 BRPM | HEART RATE: 74 BPM | SYSTOLIC BLOOD PRESSURE: 100 MMHG | DIASTOLIC BLOOD PRESSURE: 63 MMHG

## 2022-01-13 VITALS — DIASTOLIC BLOOD PRESSURE: 63 MMHG | HEIGHT: 61 IN | HEART RATE: 74 BPM | SYSTOLIC BLOOD PRESSURE: 100 MMHG

## 2022-01-13 PROCEDURE — 96413 CHEMO IV INFUSION 1 HR: CPT

## 2022-01-13 RX ORDER — ECULIZUMAB 300 MG/30ML
300 INJECTION, SOLUTION, CONCENTRATE INTRAVENOUS
Refills: 0 | Status: COMPLETED | OUTPATIENT
Start: 2022-01-13

## 2022-01-13 RX ADMIN — ECULIZUMAB 0 MG/30ML: 300 INJECTION, SOLUTION, CONCENTRATE INTRAVENOUS at 00:00

## 2022-01-20 ENCOUNTER — APPOINTMENT (OUTPATIENT)
Dept: NEUROLOGY | Facility: CLINIC | Age: 32
End: 2022-01-20

## 2022-01-27 ENCOUNTER — APPOINTMENT (OUTPATIENT)
Dept: NEUROLOGY | Facility: CLINIC | Age: 32
End: 2022-01-27
Payer: MEDICAID

## 2022-01-27 VITALS
HEART RATE: 63 BPM | BODY MASS INDEX: 31.34 KG/M2 | SYSTOLIC BLOOD PRESSURE: 98 MMHG | RESPIRATION RATE: 16 BRPM | WEIGHT: 166 LBS | DIASTOLIC BLOOD PRESSURE: 66 MMHG | HEIGHT: 61 IN

## 2022-01-27 PROCEDURE — 99072 ADDL SUPL MATRL&STAF TM PHE: CPT

## 2022-01-27 PROCEDURE — 96413 CHEMO IV INFUSION 1 HR: CPT

## 2022-02-10 ENCOUNTER — APPOINTMENT (OUTPATIENT)
Dept: NEUROLOGY | Facility: CLINIC | Age: 32
End: 2022-02-10
Payer: MEDICAID

## 2022-02-10 PROCEDURE — 96413 CHEMO IV INFUSION 1 HR: CPT

## 2022-02-10 PROCEDURE — 99072 ADDL SUPL MATRL&STAF TM PHE: CPT

## 2022-02-24 ENCOUNTER — APPOINTMENT (OUTPATIENT)
Dept: NEUROLOGY | Facility: CLINIC | Age: 32
End: 2022-02-24
Payer: MEDICAID

## 2022-02-24 PROCEDURE — 99072 ADDL SUPL MATRL&STAF TM PHE: CPT

## 2022-02-24 PROCEDURE — 96365 THER/PROPH/DIAG IV INF INIT: CPT

## 2022-03-04 NOTE — ED ADULT TRIAGE NOTE - NSWEIGHTCALCTOOLDRUG_GEN_A_CORE
Fioricet as needed for headaches. No additional caffeine or Tylenol/acetaminophen with this medication. No working or driving while on this medication as it may cause drowsiness. Read package insert on potential side effects. Keep a headache log  Bring headache log with you to next PCP appointment  May be beneficial to discuss preventative headache medication  Increase water intake to 64 ounces of water per day  Follow-up with ENT for cerumen impaction  Make PCP follow-up appointment      Patient Education        Cluster Headache: Care Instructions  Your Care Instructions  Cluster headaches are very painful. They happen on one side of the head and often start at night. They can last for 30 minutes to several hours. They usually occur in groups, or clusters, over weeks or months. You may have a stuffy nose and watery eyes during the headaches. The cause of cluster headaches is not known. Medicine may help prevent cluster headaches. You also can try to avoid things that trigger your headaches. Follow-up care is a key part of your treatment and safety. Be sure to make and go to all appointments, and call your doctor if you are having problems. It's also a good idea to know your test results and keep a list of the medicines you take. How can you care for yourself at home? · Watch for new symptoms with a headache. These include fever, weakness or numbness, vision changes, or confusion. They may be signs of a more serious problem. · Be safe with medicines. Take your medicines exactly as prescribed. Call your doctor if you think you are having a problem with your medicine. You will get more details on the specific medicines your doctor prescribes. · If your doctor recommends it, take an over-the-counter pain medicine, such as acetaminophen (Tylenol), ibuprofen (Advil, Motrin), or naproxen (Aleve). Read and follow all instructions on the label.   · Do not take two or more pain medicines at the same time unless the doctor told you to. Many pain medicines have acetaminophen, which is Tylenol. Too much acetaminophen (Tylenol) can be harmful. · Carry medicine with you to quickly treat a headache. · Put ice or a cold pack on the area for 10 to 20 minutes at a time. Put a thin cloth between the ice and your skin. · If your doctor prescribed at-home oxygen therapy to stop a cluster headache, follow the directions for using it. To prevent cluster headaches  · Keep a headache diary. Avoiding triggers may help you prevent headaches. Write down when a headache begins, how long it lasts, and what might have triggered it. This could include stress, alcohol, or certain foods. · Exercise daily to lower stress. · Limit caffeine by not drinking too much coffee, tea, or soda. But do not quit caffeine suddenly. This can also give you headaches. · Do not smoke or allow others to smoke around you. If you need help quitting, talk to your doctor about stop-smoking programs and medicines. These can increase your chances of quitting for good. · Tell your doctor if your headaches get worse and medicines do not help. You may need to try a different medicine. When should you call for help? Call 911 anytime you think you may need emergency care. For example, call if:    · You have symptoms of a stroke. These may include:  ? Sudden numbness, tingling, weakness, or loss of movement in your face, arm, or leg, especially on only one side of your body. ? Sudden vision changes. ? Sudden trouble speaking. ? Sudden confusion or trouble understanding simple statements. ? Sudden problems with walking or balance. ? A sudden, severe headache that is different from past headaches. Call your doctor now or seek immediate medical care if:    · You have a fever with a stiff neck or a severe headache.     · You are sensitive to light or feel very sleepy or confused.     · You have new nausea and vomiting and you cannot keep down food or liquids.    Watch closely for changes in your health, and be sure to contact your doctor if:    · You have a headache that does not get better within 1 or 2 days.     · Your headaches get worse or happen more often. Where can you learn more? Go to https://chpetoniaeb.E[employer]ark. org and sign in to your [employer] account. Enter Q752 in the [facility] box to learn more about \"Cluster Headache: Care Instructions. \"     If you do not have an account, please click on the \"Sign Up Now\" link. Current as of: April 8, 2021               Content Version: 13.1  © 2006-2021 [employer]. Care instructions adapted under license by [facility] ([facility]). If you have questions about a medical condition or this instruction, always ask your healthcare professional. [address] any warranty or liability for your use of this information. Patient Education        Earwax Blockage: Care Instructions  Your Care Instructions     Earwax is a natural substance that protects the ear canal. Normally, earwax drains from the ears and does not cause problems. Sometimes earwax builds up and hardens. Earwax blockage (also called cerumen impaction) can cause some loss of hearing and pain. When wax is tightly packed, you will need to have your doctor remove it. Follow-up care is a key part of your treatment and safety. Be sure to make and go to all appointments, and call your doctor if you are having problems. It's also a good idea to know your test results and keep a list of the medicines you take. How can you care for yourself at home? · Do not try to remove earwax with cotton swabs, fingers, or other objects. This can make the blockage worse and damage the eardrum. · If your doctor recommends that you try to remove earwax at home:  ? Soften and loosen the earwax with warm mineral oil. You also can try hydrogen peroxide mixed with an equal amount of room temperature water.  Place 2 drops of the fluid, warmed to body temperature, in the ear two times a day for up to 5 days. ? Once the wax is loose and soft, all that is usually needed to remove it from the ear canal is a gentle, warm shower. Direct the water into the ear, then tip your head to let the earwax drain out. Dry your ear thoroughly with a hair dryer set on low. Hold the dryer several inches from your ear. ? If the warm mineral oil and shower do not work, use an over-the-counter wax softener. Read and follow all instructions on the label. After using the wax softener, use an ear syringe to gently flush the ear. Make sure the flushing solution is body temperature. Cool or hot fluids in the ear can cause dizziness. When should you call for help? Call your doctor now or seek immediate medical care if:    · Pus or blood drains from your ear.     · Your ears are ringing or feel full.     · You have a loss of hearing. Watch closely for changes in your health, and be sure to contact your doctor if:    · You have pain or reduced hearing after 1 week of home treatment.     · You have any new symptoms, such as nausea or balance problems. Where can you learn more? Go to https://DefenCall.[employer]. org and sign in to your [employer] account. Enter T810 in the [employer] box to learn more about \"Earwax Blockage: Care Instructions. \"     If you do not have an account, please click on the \"Sign Up Now\" link. Current as of: July 1, 2021               Content Version: 13.1  © 2006-2021 [employer]. Care instructions adapted under license by [facility] ([facility]). If you have questions about a medical condition or this instruction, always ask your healthcare professional. [place] [address] any warranty or liability for your use of this information.  used

## 2022-03-09 RX ORDER — ECULIZUMAB 300 MG/30ML
300 INJECTION, SOLUTION, CONCENTRATE INTRAVENOUS
Qty: 8 | Refills: 11 | Status: ACTIVE | COMMUNITY
Start: 2022-01-26 | End: 1900-01-01

## 2022-03-10 ENCOUNTER — APPOINTMENT (OUTPATIENT)
Dept: NEUROLOGY | Facility: CLINIC | Age: 32
End: 2022-03-10
Payer: MEDICAID

## 2022-03-10 PROCEDURE — 99072 ADDL SUPL MATRL&STAF TM PHE: CPT

## 2022-03-10 PROCEDURE — 96413 CHEMO IV INFUSION 1 HR: CPT

## 2022-03-21 ENCOUNTER — APPOINTMENT (OUTPATIENT)
Dept: NEUROLOGY | Facility: CLINIC | Age: 32
End: 2022-03-21
Payer: MEDICAID

## 2022-03-21 VITALS
WEIGHT: 167 LBS | DIASTOLIC BLOOD PRESSURE: 77 MMHG | HEIGHT: 61 IN | BODY MASS INDEX: 31.53 KG/M2 | HEART RATE: 72 BPM | SYSTOLIC BLOOD PRESSURE: 116 MMHG

## 2022-03-21 DIAGNOSIS — Z78.9 OTHER SPECIFIED HEALTH STATUS: ICD-10-CM

## 2022-03-21 PROCEDURE — 99072 ADDL SUPL MATRL&STAF TM PHE: CPT

## 2022-03-21 PROCEDURE — 99215 OFFICE O/P EST HI 40 MIN: CPT

## 2022-03-22 PROBLEM — Z78.9 NON-SMOKER: Status: ACTIVE | Noted: 2022-03-22

## 2022-03-22 NOTE — DATA REVIEWED
[de-identified] : CT scan of the chest was negative for any thymoma or thymic tissue.  I also reviewed the initial consultation [de-identified] : Patient is antibody positive for myasthenia gravis and most of the records of her private previous neurologist are not available for last 1 year however the rest of the medical records are nonrevealing.I also reviewed the initial consultation by Dr. Delgado who recorded the history of myasthenia gravis since 2017 including treatment with plasma exchange IVIG therapy prednisone and Mestinon and considered her to be high risk for crisis and has been currently on Soliris.

## 2022-03-22 NOTE — PHYSICAL EXAM
[FreeTextEntry1] : General examination is unremarkable.  Head neck, ear nose and throat is unremarkable.  There is no carotid bruit, thyromegaly or lymphadenopathy.  Chest is clear and heart sounds are normal.  Abdominal soft.  There are no signs of meningeal irritation.  There is mild pretibial edema.  Neck is supple with full range of motion.\par \par Memory language cognitive and behavioral functions are normal.  She has 1 mm asymmetry of the palpebral fissure on the left being lower but there is no significant ptosis and extraocular movements are full without nystagmus or diplopia tongue is midline without any weakness and pharyngeal reflexes are normal.  There is no facial weakness bulbar functions are intact and neck muscles are 5/5.  There is a diffuse muscle weakness and she cannot sustain full strength and has there is a diffuse muscle weakness of -5/5 without any hypotonia or spasticity and reflexes are present and there is no Babinski sign and all sensory modalities are normal.  She does not use any assistive devices gait is normal.  Finger-to-nose and heel-to-shin testing is normal and Romberg sign is negative.  All primary and secondary modalities of sensations are preserved.

## 2022-03-22 NOTE — END OF VISIT
[Time Spent: ___ minutes] : I have spent [unfilled] minutes of time on the encounter. [>50% of the face to face encounter time was spent on counseling and/or coordination of care for ___] : Greater than 50% of the face to face encounter time was spent on counseling and/or coordination of care for [unfilled] No

## 2022-03-22 NOTE — DISCUSSION/SUMMARY
[FreeTextEntry1] : Opinion–the patient has history of myasthenia gravis since 2017 without any history of thymoma or thymic hyperplasia and initially did respond to plasma exchange IVIG therapy and steroids but her previous neurologist decided to give her Soliris and ever since she did not have any crisis and did not require hospitalization.  I therefore advised her to continue the same meanwhile secure all the blood test reports gave her a prescription for bone density test and will renew all her prescriptions which only at the present time include Mestinon and Soliris and that she has not been on any steroids or IVIG therapies.  Education and counseling was provided and she will return back in approximately 2 to 3 months or on a as needed basis.  She was cautioned regarding the effects of immunosuppression with Soliris and that she must remain under the vigilant care of her internist and she understands.

## 2022-03-22 NOTE — HISTORY OF PRESENT ILLNESS
[FreeTextEntry1] : Ms. Blackburn is a 31-year-old -American lady of being evaluated for transition of care and has history of myasthenia gravis and according to the previous neurologist who has left the practice she has been extensively investigated and is antibody positive myasthenia gravis and had failed prednisone and IVIG therapy however today she stated that IVIG did help and she even had complete improvement in 2019 and was in and out of the hospitals for IV infusion and also high-dose steroids helped for 1 year and has been on pyridostigmine 60 mg 4 times a day including vitamin D.  However she has been put on Soliris and claims that I movement has increased without ptosis but continues to have droopy eye and sometimes it even causes three fourths of the closure of the left eye she has been on Soliris for 2 years and has no longer any masticatory difficulty or dysphagia.  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  There is no pertinent past medical history and had herniorrhaphy  section and carries a sickle cell trait has 2 children and fully employed and I reviewed her medications and allergies.  She was last evaluated approximately 1 year ago by the neurology consultant and there have been no testing or any lab values for last 1 year and was advised to secure the blood tests performed 4 weeks ago by her primary care physician and a prescription for bone density was given.

## 2022-03-24 ENCOUNTER — APPOINTMENT (OUTPATIENT)
Dept: NEUROLOGY | Facility: CLINIC | Age: 32
End: 2022-03-24
Payer: MEDICAID

## 2022-03-24 PROCEDURE — 99072 ADDL SUPL MATRL&STAF TM PHE: CPT

## 2022-03-24 PROCEDURE — 96413 CHEMO IV INFUSION 1 HR: CPT

## 2022-04-07 ENCOUNTER — APPOINTMENT (OUTPATIENT)
Dept: NEUROLOGY | Facility: CLINIC | Age: 32
End: 2022-04-07
Payer: MEDICAID

## 2022-04-07 PROCEDURE — 96413 CHEMO IV INFUSION 1 HR: CPT

## 2022-04-21 ENCOUNTER — APPOINTMENT (OUTPATIENT)
Dept: NEUROLOGY | Facility: CLINIC | Age: 32
End: 2022-04-21
Payer: MEDICAID

## 2022-04-21 PROCEDURE — 96413 CHEMO IV INFUSION 1 HR: CPT

## 2022-05-04 NOTE — PROVIDER CONTACT NOTE (OTHER) - SITUATION
MD Baig Patient admitted for MG crisis on plasmapheresis with central line and shiley access that is continuously oozing

## 2022-05-05 ENCOUNTER — APPOINTMENT (OUTPATIENT)
Dept: NEUROLOGY | Facility: CLINIC | Age: 32
End: 2022-05-05
Payer: MEDICAID

## 2022-05-05 PROCEDURE — 96413 CHEMO IV INFUSION 1 HR: CPT

## 2022-05-19 ENCOUNTER — APPOINTMENT (OUTPATIENT)
Dept: NEUROLOGY | Facility: CLINIC | Age: 32
End: 2022-05-19
Payer: MEDICAID

## 2022-05-19 PROCEDURE — 96413 CHEMO IV INFUSION 1 HR: CPT

## 2022-05-26 RX ORDER — PYRIDOSTIGMINE BROMIDE 60 MG/1
60 TABLET ORAL 4 TIMES DAILY
Qty: 360 | Refills: 1 | Status: ACTIVE | COMMUNITY
Start: 2022-05-26 | End: 1900-01-01

## 2022-06-02 ENCOUNTER — APPOINTMENT (OUTPATIENT)
Dept: NEUROLOGY | Facility: CLINIC | Age: 32
End: 2022-06-02
Payer: MEDICAID

## 2022-06-02 PROCEDURE — 96413 CHEMO IV INFUSION 1 HR: CPT

## 2022-06-16 ENCOUNTER — APPOINTMENT (OUTPATIENT)
Dept: NEUROLOGY | Facility: CLINIC | Age: 32
End: 2022-06-16
Payer: MEDICAID

## 2022-06-16 PROCEDURE — 96413 CHEMO IV INFUSION 1 HR: CPT

## 2022-06-29 ENCOUNTER — APPOINTMENT (OUTPATIENT)
Dept: NEUROLOGY | Facility: CLINIC | Age: 32
End: 2022-06-29

## 2022-06-30 ENCOUNTER — APPOINTMENT (OUTPATIENT)
Dept: NEUROLOGY | Facility: CLINIC | Age: 32
End: 2022-06-30

## 2022-06-30 PROCEDURE — 96413 CHEMO IV INFUSION 1 HR: CPT

## 2022-07-08 ENCOUNTER — APPOINTMENT (OUTPATIENT)
Dept: NEUROLOGY | Facility: CLINIC | Age: 32
End: 2022-07-08

## 2022-07-08 VITALS
WEIGHT: 165 LBS | BODY MASS INDEX: 31.15 KG/M2 | DIASTOLIC BLOOD PRESSURE: 66 MMHG | SYSTOLIC BLOOD PRESSURE: 97 MMHG | HEART RATE: 66 BPM | HEIGHT: 61 IN

## 2022-07-08 DIAGNOSIS — D84.9 IMMUNODEFICIENCY, UNSPECIFIED: ICD-10-CM

## 2022-07-08 PROCEDURE — 99213 OFFICE O/P EST LOW 20 MIN: CPT

## 2022-07-08 NOTE — PHYSICAL EXAM
[FreeTextEntry1] : General examination is unremarkable.  Head neck, ear nose and throat is unremarkable.  Her thyroid palpation does not reveal any nodularity but thyroid is full without any tenderness.  Chest is clear heart sounds are normal and abdomen is soft.  There are no meningeal signs.\par \par Neurological examination revealed mild left eye proptosis ptosis but it more appears like a lid lag and there is mild diplopia in horizontal gaze.  Rest of her neurological examination is completely normal as delineated. [General Appearance - Alert] : alert [General Appearance - In No Acute Distress] : in no acute distress [Oriented To Time, Place, And Person] : oriented to person, place, and time [Impaired Insight] : insight and judgment were intact [Affect] : the affect was normal [Person] : oriented to person [Place] : oriented to place [Time] : oriented to time [Concentration Intact] : normal concentrating ability [Visual Intact] : visual attention was ~T not ~L decreased [Naming Objects] : no difficulty naming common objects [Repeating Phrases] : no difficulty repeating a phrase [Writing A Sentence] : no difficulty writing a sentence [Fluency] : fluency intact [Comprehension] : comprehension intact [Reading] : reading intact [Past History] : adequate knowledge of personal past history [Cranial Nerves Optic (II)] : visual acuity intact bilaterally,  visual fields full to confrontation, pupils equal round and reactive to light [Cranial Nerves Oculomotor (III)] : extraocular motion intact [Cranial Nerves Trigeminal (V)] : facial sensation intact symmetrically [Cranial Nerves Facial (VII)] : face symmetrical [Cranial Nerves Vestibulocochlear (VIII)] : hearing was intact bilaterally [Cranial Nerves Glossopharyngeal (IX)] : tongue and palate midline [Cranial Nerves Accessory (XI - Cranial And Spinal)] : head turning and shoulder shrug symmetric [Cranial Nerves Hypoglossal (XII)] : there was no tongue deviation with protrusion [Motor Tone] : muscle tone was normal in all four extremities [Motor Strength] : muscle strength was normal in all four extremities [No Muscle Atrophy] : normal bulk in all four extremities [Sensation Tactile Decrease] : light touch was intact [Abnormal Walk] : normal gait [Past-pointing] : there was no past-pointing [Balance] : balance was intact [Tremor] : no tremor present [2+] : Ankle jerk left 2+ [Plantar Reflex Right Only] : normal on the right [Plantar Reflex Left Only] : normal on the left

## 2022-07-08 NOTE — REVIEW OF SYSTEMS
[As Noted in HPI] : as noted in HPI [Proptosis] : proptosis [Negative] : Heme/Lymph [de-identified] : She has ptosis of the left eye and diplopia by history.

## 2022-07-08 NOTE — DISCUSSION/SUMMARY
[FreeTextEntry1] : Opinion–the patient is antibody positive myasthenia gravis patient being treated with Soliris and Mestinon and has no side effects.  I am somewhat concerned regarding thyroid disease and today advised her to get thyroid function tests and give her a prescription and to call me after 1 week to confirm the results meanwhile she will return back for follow-up evaluation in approximately 4 months or on a as needed basis.  She just had Soliris and has no side effects and was cautioned regarding immunosuppressed state and if there is any fever rash infections or any unusual symptoms to contact her internist or call me for appropriate advice and evaluation and she expressed understanding.

## 2022-07-14 ENCOUNTER — APPOINTMENT (OUTPATIENT)
Dept: NEUROLOGY | Facility: CLINIC | Age: 32
End: 2022-07-14

## 2022-07-14 PROCEDURE — 96413 CHEMO IV INFUSION 1 HR: CPT

## 2022-07-28 ENCOUNTER — APPOINTMENT (OUTPATIENT)
Dept: NEUROLOGY | Facility: CLINIC | Age: 32
End: 2022-07-28

## 2022-07-28 PROCEDURE — 96413 CHEMO IV INFUSION 1 HR: CPT

## 2022-08-11 ENCOUNTER — APPOINTMENT (OUTPATIENT)
Dept: NEUROLOGY | Facility: CLINIC | Age: 32
End: 2022-08-11

## 2022-08-11 PROCEDURE — 96413 CHEMO IV INFUSION 1 HR: CPT

## 2022-08-25 ENCOUNTER — APPOINTMENT (OUTPATIENT)
Dept: NEUROLOGY | Facility: CLINIC | Age: 32
End: 2022-08-25

## 2022-08-25 PROCEDURE — 96413 CHEMO IV INFUSION 1 HR: CPT

## 2022-09-08 ENCOUNTER — APPOINTMENT (OUTPATIENT)
Dept: NEUROLOGY | Facility: CLINIC | Age: 32
End: 2022-09-08

## 2022-09-08 PROCEDURE — 96413 CHEMO IV INFUSION 1 HR: CPT

## 2022-09-16 NOTE — PROGRESS NOTE ADULT - SUBJECTIVE AND OBJECTIVE BOX
0530 Patient woke complaining of feeling of racing heart. BP/HR checked. BP 96/54 . Placed order for STAT EKG. EKG completed at 0551 which showed SVT. Placed order for telemetry monitoring. Called and [facility] Hospitalist and then called Dr. Will Hernandez due to slow response. At time of phone call with Dr. Will Hernandez, patients HR had dropped to 86. Order given to administer AM dose of metroprolol now (6:15 AM) and continue to monitor via telemetry. Patient resting comfortably and wife is at bedside. INTERVAL HPI/OVERNIGHT EVENTS:      28 Y/O female PMHx of mysasthenia gravis on physostigmine BIBEMS w/ SOB and weakness. As per significant other, pt c/o increasing weakness over 2 weeks. EMS found the pt w/ decreased responsiveness, was bagged, and placed on non rebreather. Upon arrival to ED, pt was found unresponsive, shallow breathing, and was intubated by ED attending for hypoxic respiratory failure. 5/12 with hypoxia on mechanical ventilation. CXR with left hemithorax opacification from mucus plugging.     24 hr events:  weaned today but became tachypneic; with copious drooling and oral secretions; discussed with neurology, motor strength overall improved s/p IvIG, however respiratory status is not improving.  HD catheter placed today      CENTRAL LINE: [x ] YES [ ] NO  LOCATION: [facility]   DATE INSERTED: 5/16/18  REMOVE: [ ] YES [x ] NO  EXPLAIN:    SRIVASTAVA: [ ] YES [x ] NO    DATE INSERTED:  REMOVE:  [ ] YES [ ] NO  EXPLAIN:    A-LINE:  [ ] YES [x ] NO  LOCATION:   DATE INSERTED:  REMOVE:  [ ] YES [ ] NO  EXPLAIN:    REVIEW OF SYSTEMS:   CONSTITUTIONAL: + fatigue No fever, weight loss  HEENT: drooling, with increased secretions.  EYES: + L eye droop.  No eye pain, visual disturbances, or discharge  RESPIRATORY: No cough, wheezing, chills or hemoptysis; No shortness of breath  CARDIOVASCULAR: No chest pain, palpitations, dizziness, or leg swelling  GASTROINTESTINAL: No abdominal or epigastric pain. No nausea, vomiting, or hematemesis; No diarrhea or constipation. No melena or hematochezia.  NEUROLOGICAL: No headaches, memory loss, loss of strength, numbness, or tremors  SKIN: No itching, burning, rashes, or lesions     ICU Vital Signs Last 24 Hrs  T(C): 37.1 (16 May 2018 15:06), Max: 37.6 (15 May 2018 23:16)  T(F): 98.8 (16 May 2018 15:06), Max: 99.7 (15 May 2018 23:16)  HR: 79 (16 May 2018 18:16) (53 - 131)  BP: 111/73 (16 May 2018 18:00) (106/77 - 145/116)  BP(mean): 82 (16 May 2018 18:00) (75 - 122)  ABP: --  ABP(mean): --  RR: 16 (16 May 2018 18:00) (15 - 30)  SpO2: 100% (16 May 2018 18:16) (94% - 100%)          I&O's Detail    15 May 2018 07:01  -  16 May 2018 07:00  --------------------------------------------------------  IN:    dexmedetomidine Infusion: 189.9 mL    IV PiggyBack: 250 mL    Jevity: 1260 mL    propofol Infusion: 297.4 mL  Total IN: 1997.3 mL    OUT:    Voided: 200 mL  Total OUT: 200 mL    Total NET: 1797.3 mL      16 May 2018 07:01  -  16 May 2018 19:08  --------------------------------------------------------  IN:    dexmedetomidine Infusion: 95.1 mL    Jevity: 720 mL    propofol Infusion: 132.8 mL  Total IN: 947.9 mL    OUT:  Total OUT: 0 mL    Total NET: 947.9 mL          Mode: AC/ CMV (Assist Control/ Continuous Mandatory Ventilation)  RR (machine): 12  TV (machine): 350  FiO2: 40  PEEP: 5  ITime: 0.8  MAP: 8  PIP: 20    CAPILLARY BLOOD GLUCOSE          MEDICATIONS  NEURO  Meds: acetaminophen    Suspension. 650 milliGRAM(s) Oral every 6 hours PRN Mild Pain (1 - 3)  dexmedetomidine Infusion 0.7 MICROgram(s)/kG/Hr (13.965 mL/Hr) IV Continuous <Continuous>  propofol Infusion 5 MICROgram(s)/kG/Min (2.394 mL/Hr) IV Continuous <Continuous>    RESPIRATORY    Meds: ALBUTerol/ipratropium for Nebulization 3 milliLiter(s) Nebulizer every 4 hours  diphenhydrAMINE   Injectable 50 milliGRAM(s) IV Push once  sodium chloride 3%  Inhalation 2 milliLiter(s) Inhalation every 8 hours    CARDIOVASCULAR  Meds:   GI/NUTRITION  Meds: pantoprazole   Suspension 40 milliGRAM(s) Oral daily    GENITOURINARY  Meds: albumin human  5% IVPB 3000 milliLiter(s) IV Intermittent once  calcium gluconate IVPB 1 Gram(s) IV Intermittent every 1 hour PRN Signs of Citrate Toxicity    HEMATOLOGIC  Meds: heparin  Injectable 5000 Unit(s) IV Push once  heparin  Injectable 5000 Unit(s) IV Push once  heparin  Injectable 5000 Unit(s) SubCutaneous every 12 hours    [x] VTE Prophylaxis  INFECTIOUS DISEASES  Meds: levoFLOXacin IVPB      levoFLOXacin IVPB 750 milliGRAM(s) IV Intermittent every 24 hours    ENDOCRINE  CAPILLARY BLOOD GLUCOSE        Meds: predniSONE   Tablet 60 milliGRAM(s) Oral daily    OTHER MEDICATIONS:  chlorhexidine 0.12% Liquid 15 milliLiter(s) Swish and Spit two times a day  chlorhexidine 4% Liquid 1 Application(s) Topical <User Schedule>  pyridostigmine 60 milliGRAM(s) Oral every 6 hours  :    PHYSICAL EXAM:    Gen: NAD, lying comfortably in bed, suctioning her mouth  HEENT: left ptosis  Resp: Rhonchi b/l  CVS: S1S2 RRR  Abd: soft NT/ND +BS  Ext: no edema, moving both extremities.    Neuro: A&Ox3, writing on paper, moving all extremities, motor strength significantly improved 4-5/5 all extremities    LABS:                        7.6    14.82 )-----------( 323      ( 16 May 2018 02:55 )             24.0      05-16    146<H>  |  111<H>  |  19  ----------------------------<  103<H>  3.7   |  29  |  0.61    Ca    8.4<L>      16 May 2018 02:55  Phos  4.2     05-16  Mg     2.7     05-16          Culture Results:   Numerous Staphylococcus aureus  Normal Respiratory Olga present (05-15 @ 00:40)  Culture Results:   Numerous Staphylococcus aureus  Normal Respiratory Olga present (05-15 @ 00:40)  Culture Results:   Testing in progress (05-15 @ 00:40)  Culture Results:   Testing in progress (05-15 @ 00:40)  Culture Results:   No growth to date. (05-14 @ 15:33)      RADIOLOGY & ADDITIONAL STUDIES:    CULTURES

## 2022-09-23 ENCOUNTER — APPOINTMENT (OUTPATIENT)
Dept: NEUROLOGY | Facility: CLINIC | Age: 32
End: 2022-09-23

## 2022-09-23 PROCEDURE — 96413 CHEMO IV INFUSION 1 HR: CPT

## 2022-10-05 ENCOUNTER — APPOINTMENT (OUTPATIENT)
Dept: NEUROLOGY | Facility: CLINIC | Age: 32
End: 2022-10-05

## 2022-10-06 ENCOUNTER — APPOINTMENT (OUTPATIENT)
Dept: NEUROLOGY | Facility: CLINIC | Age: 32
End: 2022-10-06

## 2022-10-07 ENCOUNTER — APPOINTMENT (OUTPATIENT)
Dept: NEUROLOGY | Facility: CLINIC | Age: 32
End: 2022-10-07

## 2022-10-20 ENCOUNTER — APPOINTMENT (OUTPATIENT)
Dept: NEUROLOGY | Facility: CLINIC | Age: 32
End: 2022-10-20

## 2022-10-20 PROCEDURE — 96413 CHEMO IV INFUSION 1 HR: CPT

## 2022-11-03 ENCOUNTER — APPOINTMENT (OUTPATIENT)
Dept: NEUROLOGY | Facility: CLINIC | Age: 32
End: 2022-11-03

## 2022-11-03 PROCEDURE — 96413 CHEMO IV INFUSION 1 HR: CPT

## 2022-11-18 ENCOUNTER — APPOINTMENT (OUTPATIENT)
Dept: NEUROLOGY | Facility: CLINIC | Age: 32
End: 2022-11-18

## 2022-11-18 PROCEDURE — 96413 CHEMO IV INFUSION 1 HR: CPT

## 2022-11-22 NOTE — DISCHARGE NOTE ADULT - SOCIAL WORKER'S NAME
"CLINICAL NUTRITION SERVICES - ASSESSMENT NOTE     Nutrition Prescription    RECOMMENDATIONS FOR MDs/PROVIDERS TO ORDER:      Malnutrition Status:    Severe in acute illness    Recommendations already ordered by Registered Dietitian (RD):  Informed pt RD would follow for hopeful improvement in swallow and offer snacks/supplements as needed.    Future/Additional Recommendations:  Diet advancement (ability to swallow without pain), po, wt, labs     REASON FOR ASSESSMENT  Nguyen Olivier is a/an 77 year old female assessed by the dietitian for Admission Nutrition Risk Screen for eating poorly due to difficulty swallowing with weight loss    Pt with  Pmhx of lung CA, chemotherapy induced neutropenia. Recently started chemotherapy for lung cancer. 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 Severe dysphagia possibly secondary to oropharyngeal candidiasis    Current plan is pt would like to discharge on Hospice.    NUTRITION HISTORY  Pt unable to eat or drink since 11/18/22 per chart review  Pt is receiving IV hydration and has a clear liquid diet ordered  Pt reports she was doing fine before her recent issues happened.  She could eat whatever she wanted.      She said she was told her issues might be from a blood clot.    CURRENT NUTRITION ORDERS  Diet: Clear Liquid  Intake/Tolerance: 0%  IV fluids provide: 1800 mL, 90 gm Dextrose, 306 Kcal/day    LABS  Labs reviewed  Na 134 L  K 3.3 L  Glucose 134 H    MEDICATIONS  Medications reviewed  Decadron  Benadryl  Diflucan  Magic Mouthwash  Cont, IV D5 NS at 75 mL/hr    ANTHROPOMETRICS  Height: 160 cm (5' 3\")  Most Recent Weight: 61.7 kg (136 lb)  5.5% weight loss in 10 days  IBW: 52.2 kg  BMI: Normal BMI  Weight History:   Wt Readings from Last 3 Encounters:   11/19/22 61.7 kg (136 lb)   11/09/22 62.1 kg (136 lb 14.4 oz)   10/12/22 65.3 kg (144 lb)       Dosing Weight: 61.7 kg    ASSESSED NUTRITION NEEDS  Estimated Energy Needs: 1545 - 1850 kcals/day (25 - 30 " kcals/kg)  Justification: Increased needs and Maintenance  Estimated Protein Needs: 62 -74 grams protein/day (1 - 1.2 grams of pro/kg)  Justification: Increased needs  Estimated Fluid Needs: 1545 -1850 mL/day (25 - 30 mL/kg), or per provider   Justification: Maintenance    PHYSICAL FINDINGS  Missing some teeth  Per chart and observation,  Skin - body rash  Edema - +1 ankles and feet  GI: WDL  Last BM 11/19/22    MALNUTRITION:  % Weight Loss:  > 2% in 1 week (severe malnutrition)  % Intake:  Decreased intake does not meet criteria for malnutrition - she will meet tomorrow if she contains to be unable to swallow  Subcutaneous Fat Loss:  Orbital region moderate depletion  Muscle Loss:  Temporal region moderated depletion  Fluid Retention:  trace    Malnutrition Diagnosis: Severe malnutrition  In Context of:  Acute illness or injury    NUTRITION DIAGNOSIS  Malnutrition related to acute illness as evidenced by >2% weight loss in 2 weeks and moderate fat and muscle losses      INTERVENTIONS  Implementation   None at this time - explained RD will follow for hopeful swallow improvement and offer supplements as needed    Goals  Diet advancement if able     Monitoring/Evaluation  Progress toward goals will be monitored and evaluated per protocol.     [place], [facility] [place] - [phone]

## 2022-12-01 ENCOUNTER — APPOINTMENT (OUTPATIENT)
Dept: NEUROLOGY | Facility: CLINIC | Age: 32
End: 2022-12-01

## 2022-12-01 PROCEDURE — 96413 CHEMO IV INFUSION 1 HR: CPT

## 2022-12-15 ENCOUNTER — APPOINTMENT (OUTPATIENT)
Dept: NEUROLOGY | Facility: CLINIC | Age: 32
End: 2022-12-15

## 2022-12-15 PROCEDURE — 96413 CHEMO IV INFUSION 1 HR: CPT

## 2022-12-29 ENCOUNTER — APPOINTMENT (OUTPATIENT)
Dept: NEUROLOGY | Facility: CLINIC | Age: 32
End: 2022-12-29
Payer: MEDICAID

## 2022-12-29 PROCEDURE — 96413 CHEMO IV INFUSION 1 HR: CPT

## 2022-12-30 RX ORDER — PYRIDOSTIGMINE BROMIDE 60 MG/1
60 TABLET ORAL 4 TIMES DAILY
Qty: 360 | Refills: 1 | Status: ACTIVE | COMMUNITY
Start: 2022-12-30 | End: 1900-01-01

## 2023-01-04 RX ORDER — PYRIDOSTIGMINE BROMIDE 60 MG/1
60 TABLET ORAL 4 TIMES DAILY
Qty: 360 | Refills: 1 | Status: ACTIVE | COMMUNITY
Start: 2023-01-04 | End: 1900-01-01

## 2023-01-12 ENCOUNTER — APPOINTMENT (OUTPATIENT)
Dept: NEUROLOGY | Facility: CLINIC | Age: 33
End: 2023-01-12

## 2023-01-19 ENCOUNTER — APPOINTMENT (OUTPATIENT)
Dept: NEUROLOGY | Facility: CLINIC | Age: 33
End: 2023-01-19
Payer: MEDICAID

## 2023-01-19 PROCEDURE — 96413 CHEMO IV INFUSION 1 HR: CPT

## 2023-01-26 ENCOUNTER — APPOINTMENT (OUTPATIENT)
Dept: NEUROLOGY | Facility: CLINIC | Age: 33
End: 2023-01-26
Payer: MEDICAID

## 2023-01-26 PROCEDURE — 96413 CHEMO IV INFUSION 1 HR: CPT

## 2023-02-02 ENCOUNTER — APPOINTMENT (OUTPATIENT)
Dept: NEUROLOGY | Facility: CLINIC | Age: 33
End: 2023-02-02
Payer: MEDICAID

## 2023-02-02 VITALS
HEIGHT: 62 IN | HEART RATE: 79 BPM | WEIGHT: 150 LBS | BODY MASS INDEX: 27.6 KG/M2 | SYSTOLIC BLOOD PRESSURE: 111 MMHG | DIASTOLIC BLOOD PRESSURE: 77 MMHG

## 2023-02-02 DIAGNOSIS — Z79.52 IMMUNODEFICIENCY DUE TO DRUGS: ICD-10-CM

## 2023-02-02 DIAGNOSIS — D84.821 IMMUNODEFICIENCY DUE TO DRUGS: ICD-10-CM

## 2023-02-02 DIAGNOSIS — T38.0X5A IMMUNODEFICIENCY DUE TO DRUGS: ICD-10-CM

## 2023-02-02 PROCEDURE — 99214 OFFICE O/P EST MOD 30 MIN: CPT

## 2023-02-02 NOTE — DATA REVIEWED
[de-identified] : I reviewed the electronic medical records of Dr. Branden Delgado and noted the contents of his treatment investigations which were extensive including his records and the hospital records that indicate that she was admitted between 2018–2019 for myasthenic crisis requiring IVIG therapy and plasma exchange including steroids and ultimately responded to Soliris as the aforementioned other modalities were none beneficial.

## 2023-02-02 NOTE — HISTORY OF PRESENT ILLNESS
[FreeTextEntry1] : Ms. Blackburn is a 32-year-old -American lady who was transitioned to my care after Dr. Delgado left this practice and has been on Soliris and Mestinon.\par \par Currently she has bilateral mild ptosis and occasional left lateral diplopia and today I increased her Mestinon to 90 mg 4 times a day hoping that it might help her ptosis.  There has not been any bulbar dysfunction or any other manifestation of acetylcholine receptor antibody positive myasthenia gravis.\par \par Her background history revealed that she had been recurrently hospitalized in year 2018 through 2019 and was extensively treated with IVIG including plasma exchange but none of them benefited her and she continued to have profound myasthenia gravis symptoms requiring hospitalization and ever since she has been put on Soliris every 2 weeks at [facility] all symptoms have dramatically improved with the exception of mild ptosis and occasional diplopia.  Thus it appears that she was an ideal candidate to be treated with Soliris as it has dramatically improved her including return to her employment and can perform most activities of daily living.\par \par There is no interim past medical history and there is no surgical procedures in the interim.  She has no toxic habits.  I reviewed her medications and allergies.

## 2023-02-09 ENCOUNTER — APPOINTMENT (OUTPATIENT)
Dept: NEUROLOGY | Facility: CLINIC | Age: 33
End: 2023-02-09
Payer: MEDICAID

## 2023-02-09 PROCEDURE — 96413 CHEMO IV INFUSION 1 HR: CPT

## 2023-02-23 ENCOUNTER — APPOINTMENT (OUTPATIENT)
Dept: NEUROLOGY | Facility: CLINIC | Age: 33
End: 2023-02-23
Payer: MEDICAID

## 2023-02-23 PROCEDURE — 96413 CHEMO IV INFUSION 1 HR: CPT

## 2023-03-09 ENCOUNTER — APPOINTMENT (OUTPATIENT)
Dept: NEUROLOGY | Facility: CLINIC | Age: 33
End: 2023-03-09
Payer: MEDICAID

## 2023-03-09 PROCEDURE — 96413 CHEMO IV INFUSION 1 HR: CPT

## 2023-03-23 ENCOUNTER — APPOINTMENT (OUTPATIENT)
Dept: NEUROLOGY | Facility: CLINIC | Age: 33
End: 2023-03-23
Payer: MEDICAID

## 2023-03-23 PROCEDURE — 96413 CHEMO IV INFUSION 1 HR: CPT

## 2023-04-06 ENCOUNTER — APPOINTMENT (OUTPATIENT)
Dept: NEUROLOGY | Facility: CLINIC | Age: 33
End: 2023-04-06

## 2023-04-27 ENCOUNTER — APPOINTMENT (OUTPATIENT)
Dept: NEUROLOGY | Facility: CLINIC | Age: 33
End: 2023-04-27
Payer: MEDICAID

## 2023-04-27 PROCEDURE — 96413 CHEMO IV INFUSION 1 HR: CPT

## 2023-05-25 ENCOUNTER — APPOINTMENT (OUTPATIENT)
Dept: NEUROLOGY | Facility: CLINIC | Age: 33
End: 2023-05-25

## 2023-05-26 ENCOUNTER — APPOINTMENT (OUTPATIENT)
Dept: NEUROLOGY | Facility: CLINIC | Age: 33
End: 2023-05-26

## 2023-06-05 NOTE — PHYSICAL THERAPY INITIAL EVALUATION ADULT - ASSISTIVE DEVICE FOR TRANSFER: STAND/SIT, REHAB EVAL
rolling walker Include Validation In Note: Yes Counseling Text: I reviewed the side effect in detail. Patient should get monthly blood tests, not donate blood, not drive at night if vision affected, and not share medication. Detail Level: Zone Any Myalgia?: No Female Pregnancy Counseling Text: Female patients should also be on two forms of birth control while taking this medication and for one month after their last dose. Hypertriglyceridemia Monitoring: I explained this is common when taking isotretinoin. If this worsens they will contact us. Dosing Month 1 (Required For Cumulative Dosing): 30mg Daily Nosebleeds Normal Treatment: I explained this is common when taking isotretinoin. I recommended saline mist in each nostril multiple times a day. If this worsens they will contact us. Retinoid Dermatitis Aggressive Treatment: I recommended more frequent application of Cetaphil or CeraVe to the areas of dermatitis. I also prescribed a topical steroid for twice daily use until the dermatitis resolves. Xerosis Aggressive Treatment: I recommended application of Cetaphil or CeraVe numerous times a day and before going to bed to all dry areas. I also prescribed a topical steroid for twice daily use. Xerosis Normal Treatment: I recommended application of Cetaphil or CeraVe numerous times a day going to bed to all dry areas. What Is The Patient's Gender: Female Hypertriglyceridemia Treatment: I explained this is common when taking isotretinoin. If this worsens they will contact us. They may try OTC ibuprofen. Hypertriglyceridemia Monitoring: I explained this is common when taking isotretinoin. We will monitor closely. Dosing Month 2 (Required For Cumulative Dosing): 40mg Daily Cheilitis Normal Treatment: I recommended application of Vaseline or Aquaphor numerous times a day (as often as every hour) and before going to bed. Xerosis Aggressive Treatment: I recommended application of Cetaphil or CeraVe numerous times a day going to bed to all dry areas. I also prescribed a topical steroid for twice daily use. Use Therapeutic Ranged Or Therapeutic Target: Range Are Labs Available For Review?: No- Pending Dosing Month 3 (Required For Cumulative Dosing): 60mg Daily Headache Monitoring: I recommended monitoring the headaches for now. There is no evidence of increased intracranial pressure. They were instructed to call if the headaches are worsening. Lower Range (In Mg/Kg): 150 Pounds Preamble Statement (Weight Entered In Details Tab): Reported Weight in pounds: Dosing Month 4 (Required For Cumulative Dosing): 80mg Daily Kilograms Preamble Statement (Weight Entered In Details Tab): Reported Weight in kilograms: Cheilitis Aggressive Treatment: I recommended application of Vaseline or Aquaphor numerous times a day (as often as every hour) and before going to bed. I also prescribed a topical steroid for twice daily use. Female Completion Statement: After discussing her treatment course we decided to discontinue isotretinoin therapy at this time. I explained that she would need to continue her birth control methods for at least one month after the last dosage. She should also get a pregnancy test one month after the last dose. She shouldn't donate blood for one month after the last dose. She should call with any new symptoms of depression. Upper Range (In Mg/Kg): 200 Patient Weight (Optional But Required For Cumulative Dose-Numbers And Decimals Only): 156 Target Cumulative Dosage (In Mg/Kg): 135 Retinoid Dermatitis Normal Treatment: I recommended more frequent application of Cetaphil or CeraVe to the areas of dermatitis. Weight Units: pounds Is Xerosis Present?: Yes - Normal Treatment Male Completion Statement: After discussing his treatment course we decided to discontinue isotretinoin therapy at this time. He shouldn't donate blood for one month after the last dose. He should call with any new symptoms of depression. Xerosis Normal Treatment: I recommended application of Cetaphil or CeraVe numerous times a day and before going to bed to all dry areas.

## 2023-07-25 ENCOUNTER — APPOINTMENT (OUTPATIENT)
Dept: NEUROLOGY | Facility: CLINIC | Age: 33
End: 2023-07-25
Payer: MEDICAID

## 2023-07-25 VITALS
SYSTOLIC BLOOD PRESSURE: 115 MMHG | DIASTOLIC BLOOD PRESSURE: 81 MMHG | HEIGHT: 62 IN | HEART RATE: 70 BPM | BODY MASS INDEX: 27.6 KG/M2 | WEIGHT: 150 LBS

## 2023-07-25 DIAGNOSIS — G70.00 MYASTHENIA GRAVIS W/OUT (ACUTE) EXACERBATION: ICD-10-CM

## 2023-07-25 DIAGNOSIS — Z86.2 PERSONAL HISTORY OF DISEASES OF THE BLOOD AND BLOOD-FORMING ORGANS AND CERTAIN DISORDERS INVOLVING THE IMMUNE MECHANISM: ICD-10-CM

## 2023-07-25 PROCEDURE — 99213 OFFICE O/P EST LOW 20 MIN: CPT

## 2023-07-26 PROBLEM — Z86.2 HISTORY OF SICKLE CELL TRAIT: Status: RESOLVED | Noted: 2022-03-22 | Resolved: 2023-07-26

## 2023-07-26 PROBLEM — G70.00 MYASTHENIA GRAVIS, ACHR ANTIBODY POSITIVE: Status: ACTIVE | Noted: 2018-10-23

## 2023-07-26 RX ORDER — PYRIDOSTIGMINE BROMIDE 60 MG/1
60 TABLET ORAL 4 TIMES DAILY
Qty: 360 | Refills: 1 | Status: ACTIVE | COMMUNITY
Start: 2023-07-26 | End: 1900-01-01

## 2023-07-26 NOTE — PHYSICAL EXAM
[General Appearance - Alert] : alert [General Appearance - In No Acute Distress] : in no acute distress [Oriented To Time, Place, And Person] : oriented to person, place, and time [Impaired Insight] : insight and judgment were intact [Affect] : the affect was normal [Place] : oriented to place [Person] : oriented to person [Time] : oriented to time [Concentration Intact] : normal concentrating ability [Visual Intact] : visual attention was ~T not ~L decreased [Naming Objects] : no difficulty naming common objects [Repeating Phrases] : no difficulty repeating a phrase [Writing A Sentence] : no difficulty writing a sentence [Fluency] : fluency intact [Comprehension] : comprehension intact [Reading] : reading intact [Past History] : adequate knowledge of personal past history [Cranial Nerves Oculomotor (III)] : extraocular motion intact [Cranial Nerves Optic (II)] : visual acuity intact bilaterally,  visual fields full to confrontation, pupils equal round and reactive to light [Cranial Nerves Trigeminal (V)] : facial sensation intact symmetrically [Cranial Nerves Facial (VII)] : face symmetrical [Cranial Nerves Vestibulocochlear (VIII)] : hearing was intact bilaterally [Cranial Nerves Glossopharyngeal (IX)] : tongue and palate midline [Cranial Nerves Accessory (XI - Cranial And Spinal)] : head turning and shoulder shrug symmetric [Cranial Nerves Hypoglossal (XII)] : there was no tongue deviation with protrusion [Motor Tone] : muscle tone was normal in all four extremities [Motor Strength] : muscle strength was normal in all four extremities [No Muscle Atrophy] : normal bulk in all four extremities [Sensation Tactile Decrease] : light touch was intact [Abnormal Walk] : normal gait [Balance] : balance was intact [2+] : Ankle jerk left 2+ [FreeTextEntry1] : General examination is unremarkable.  There is no carotid bruit thyromegaly or lymphadenopathy and neck is supple chest is clear and heart sounds are normal.\par \par Neurological examination is completely normal as delineated. [Past-pointing] : there was no past-pointing [Tremor] : no tremor present [Plantar Reflex Right Only] : normal on the right [Plantar Reflex Left Only] : normal on the left

## 2023-07-26 NOTE — HISTORY OF PRESENT ILLNESS
[FreeTextEntry1] : Ms. Blackburn is a 32-year-old lady who has been followed in this office for myasthenia gravis and has been on Mestinon also known as pyridostigmine 60 mg 4 times daily and there is no neurological complaints is entirely controlled and denies any ptosis diplopia or dysarthria or dysphagia or dyspnea and there is no focal or lateralized weakness neck muscle weakness and a detailed review of systems is unremarkable and she stated that she is well and has come back for follow-up and renewal of her Mestinon.  Review of systems is unremarkable and there is no interim past medical history.  I reviewed her medications and allergies

## 2023-07-26 NOTE — DISCUSSION/SUMMARY
[FreeTextEntry1] : Opinion–the patient has stable myasthenia gravis without any symptoms and has a normal neurological examination and her pyridostigmine 60 mg 4 times daily was renewed for 90 days and sent to her pharmacy with 1 refill and was cautioned regarding myasthenia gravis exacerbation risks and complications of infection and drug incompatibility and she expressed understanding and will return back for follow-up in 6 months or on a as needed basis.

## 2023-09-06 NOTE — ED ADULT TRIAGE NOTE - BMI (KG/M2)
Pt was last seen on 8/23 for a f/u. Next appt is on 9-12 for a left shoulder injection and TPI's. Pt fell last Tuesday, injuring her Right side. She broke a rib. Back pain is worse and would like a nerve block for her back pain as advised by ER. Pt was given steroid back from ER and is using lidocaine patches. Spoke with daughter as well per pt request.  Not sure what nerve block ER was referring to. Daughter is agreeable that this will all take time to heal. She will see to it that pt comes to appt on Tuesday as scheduled.   Dr Thorne is aware. No new recommendations. Pt to f/u as scheduled.    32.2

## 2023-10-09 NOTE — ED ADULT TRIAGE NOTE - BP NONINVASIVE DIASTOLIC (MM HG)
Quality 226: Preventive Care And Screening: Tobacco Use: Screening And Cessation Intervention: Patient screened for tobacco use and is an ex/non-smoker Quality 138: Melanoma: Coordination Of Care: A treatment plan was communicated to the physicians providing continuing care within one month of diagnosis outlining: diagnosis, tumor thickness and a plan for surgery or alternate care. Detail Level: Simple Quality 397: Melanoma: Reporting: Pathology report includes the pT Category, thickness, ulceration and mitotic rate, peripheral and deep margin status and presence or absence of microsatellitosis for invasive tumors. 79 Quality 431: Preventive Care And Screening: Unhealthy Alcohol Use - Screening: Patient not identified as an unhealthy alcohol user when screened for unhealthy alcohol use using a systematic screening method Quality 137: Melanoma: Continuity Of Care - Recall System: Patient information entered into a recall system that includes: target date for the next exam specified AND a process to follow up with patients regarding missed or unscheduled appointments

## 2024-01-03 ENCOUNTER — APPOINTMENT (OUTPATIENT)
Dept: MATERNAL FETAL MEDICINE | Facility: CLINIC | Age: 34
End: 2024-01-03
Payer: MEDICAID

## 2024-01-03 ENCOUNTER — APPOINTMENT (OUTPATIENT)
Dept: ANTEPARTUM | Facility: CLINIC | Age: 34
End: 2024-01-03

## 2024-01-03 ENCOUNTER — ASOB RESULT (OUTPATIENT)
Age: 34
End: 2024-01-03

## 2024-01-03 PROCEDURE — 99204 OFFICE O/P NEW MOD 45 MIN: CPT | Mod: 25

## 2024-01-03 PROCEDURE — 76811 OB US DETAILED SNGL FETUS: CPT

## 2024-01-17 ENCOUNTER — APPOINTMENT (OUTPATIENT)
Dept: ANTEPARTUM | Facility: CLINIC | Age: 34
End: 2024-01-17

## 2024-01-24 ENCOUNTER — APPOINTMENT (OUTPATIENT)
Dept: ANTEPARTUM | Facility: CLINIC | Age: 34
End: 2024-01-24

## 2024-02-20 RX ORDER — PYRIDOSTIGMINE BROMIDE 60 MG/1
60 TABLET ORAL 4 TIMES DAILY
Qty: 360 | Refills: 1 | Status: ACTIVE | COMMUNITY
Start: 2024-02-20 | End: 1900-01-01

## 2024-03-01 NOTE — ED PROVIDER NOTE - ABDOMINAL EXAM
soft/nontender... What Type Of Note Output Would You Prefer (Optional)?: Standard Output How Severe Is Your Rash?: mild Is This A New Presentation, Or A Follow-Up?: Rash

## 2024-03-05 ENCOUNTER — APPOINTMENT (OUTPATIENT)
Dept: NEUROLOGY | Facility: CLINIC | Age: 34
End: 2024-03-05

## 2024-05-23 NOTE — PATIENT PROFILE OB - BREAST MILK PROVIDES PROTECTION AGAINST INFECTION
“Patient's name, , procedure and correct site were confirmed during the [place] Timeout.” Statement Selected

## 2024-05-24 NOTE — AIRWAY PLACEMENT NOTE ADULT - AIRWAY TYPE
[FreeTextEntry1] : Acute eye pain - Neurological cause ruled out - Follow-up with ophthalmology - Can return on as-needed basis
endotracheal tube

## 2024-05-28 ENCOUNTER — APPOINTMENT (OUTPATIENT)
Dept: ANTEPARTUM | Facility: CLINIC | Age: 34
End: 2024-05-28
Payer: MEDICAID

## 2024-05-28 ENCOUNTER — OUTPATIENT (OUTPATIENT)
Dept: INPATIENT UNIT | Facility: HOSPITAL | Age: 34
LOS: 1 days | Discharge: HOME | End: 2024-05-28
Payer: MEDICAID

## 2024-05-28 VITALS — HEART RATE: 83 BPM | DIASTOLIC BLOOD PRESSURE: 62 MMHG | SYSTOLIC BLOOD PRESSURE: 99 MMHG

## 2024-05-28 VITALS
HEART RATE: 75 BPM | TEMPERATURE: 98 F | DIASTOLIC BLOOD PRESSURE: 75 MMHG | RESPIRATION RATE: 16 BRPM | SYSTOLIC BLOOD PRESSURE: 121 MMHG

## 2024-05-28 DIAGNOSIS — O26.899 OTHER SPECIFIED PREGNANCY RELATED CONDITIONS, UNSPECIFIED TRIMESTER: ICD-10-CM

## 2024-05-28 DIAGNOSIS — Z98.890 OTHER SPECIFIED POSTPROCEDURAL STATES: Chronic | ICD-10-CM

## 2024-05-28 PROCEDURE — 76815 OB US LIMITED FETUS(S): CPT | Mod: 26

## 2024-05-28 PROCEDURE — 99212 OFFICE O/P EST SF 10 MIN: CPT

## 2024-05-28 PROCEDURE — 76819 FETAL BIOPHYS PROFIL W/O NST: CPT | Mod: 26

## 2024-05-28 RX ORDER — FERROUS SULFATE 325(65) MG
1 TABLET ORAL
Refills: 0 | DISCHARGE

## 2024-05-28 NOTE — OB RN TRIAGE NOTE - FALL HARM RISK - UNIVERSAL INTERVENTIONS
Bed in lowest position, wheels locked, appropriate side rails in place/Call bell, personal items and telephone in reach/Instruct patient to call for assistance before getting out of bed or chair/Non-slip footwear when patient is out of bed/[place] to call system/Physically safe environment - no spills, clutter or unnecessary equipment/Purposeful Proactive Rounding/Room/bathroom lighting operational, light cord in reach

## 2024-05-28 NOTE — OB RN TRIAGE NOTE - NS DC INTERPRETER YES NO
No

## 2024-05-28 NOTE — OB PROVIDER TRIAGE NOTE - HISTORY OF PRESENT ILLNESS
This is a 33 year old  at 40w 2d who presents because doctor told her to go to hospital to be induced for post dates. She was supposed to go to [facility] but mistakenly came to [facility]. She states she was checked in [place] this morning because she was there for the weekend and her doctor told her she should be checked for post dates. She was 1cm this morning and was told her NABOR was 6. She states she feels 1  contraction an hour. Denies leaking of fluid, vaginal bleeding. +FM.     PNC with Dr Dalia Montano at [facility]. AP course complicated by:   - elevated risk of down syndrome in first trimester screening however NIPS negative   - History of myasthenia gravis- pt was diagnosed in . In 2018 she was intubated and received plasmapheresis.  This is a 33 year old  at 40w 2d who presents because doctor told her to go to hospital to be induced for post dates. She was supposed to go to [facility] but mistakenly came to [facility]. She states she was checked in CT this morning because she was there for the weekend and her doctor told her she should be checked for post dates. She was 1cm this morning and was told her NABOR was 6. She states she feels 1  contraction an hour. Denies leaking of fluid, vaginal bleeding. +FM.     PNC with Dr Dalia Montano at [facility]. AP course complicated by:   - elevated risk of down syndrome in first trimester screening however NIPS negative   - History of myasthenia gravis- pt was diagnosed in 2017. In 2018 she was intubated and received plasmapheresis. Pt is now on pyridostigmine Bromi tab 60mg 4 times a day. She follows neuro Dr Padron but has not seen him in this pregnancy   - History of 1 x  for face presentation. pt desires tolac/

## 2024-05-28 NOTE — OB PROVIDER TRIAGE NOTE - NSHPPHYSICALEXAM_GEN_ALL_CORE
T(C): 36.9 (05-28-24 @ 20:56), Max: 36.9 (05-28-24 @ 20:07)  HR: 83 (05-28-24 @ 21:45) (74 - 83)  BP: 99/62 (05-28-24 @ 21:45) (99/62 - 121/75)  RR: 16 (05-28-24 @ 20:56) (16 - 16)  SpO2: --  General: Female sitting comfortably   Lungs: No respiratory distress  Abdomen: Soft, nontender. Gravid.   TAUS:  Vertex, fundal placenta, NABOR 7.26, MVP 2.40cm, M mode 135, BPP 8/8. Sono saved in ASOB.   NST: baseline 130, moderate variability, +accels, no decels. tracing reviewed by Dr Johnson and Dr Quintanilla  [place]: q1-3 min  SVE: 1/60/-3

## 2024-05-28 NOTE — OB PROVIDER TRIAGE NOTE - PLAN OF CARE
This is a 33y  @ 40w2d  female with reassuring BPP/NST, discharged to home.    - Discussed with Dr. Johnson who discussed with Dr. Quintanilla. Tracing was reviewed by both.   - Patient to be discharged home with follow up and return precautions  - Please follow up with your obstetrician at your next scheduled appointment.   - Please return for decreased/no fetal movement, vaginal bleeding similar to that of a period, leaking/gush of fluid, regular contractions occurring 4-5 minutes for one hour or requiring pain medication   - Patient and partner and educated of plan and demonstrate understanding. All questions answered. Discharge instructions provided and signed.   - Discharged at 955PM

## 2024-05-28 NOTE — OB RN TRIAGE NOTE - FALL HARM RISK - CONCLUSION
balance training/bed mobility training/gait training/strengthening/transfer training Universal Safety Interventions

## 2024-05-28 NOTE — OB RN TRIAGE NOTE - NSICDXPASTMEDICALHX_GEN_ALL_CORE_FT
PAST MEDICAL HISTORY:  Asthma     Myasthenia gravis      PAST MEDICAL HISTORY:  Childhood asthma     Myasthenia gravis     Sickle cell trait

## 2024-05-29 RX ORDER — PYRIDOSTIGMINE BROMIDE 60 MG/1
60 TABLET ORAL
Qty: 120 | Refills: 5 | Status: ACTIVE | COMMUNITY
Start: 2024-05-29 | End: 1900-01-01

## 2024-05-30 ENCOUNTER — APPOINTMENT (OUTPATIENT)
Dept: ANTEPARTUM | Facility: CLINIC | Age: 34
End: 2024-05-30
Payer: MEDICAID

## 2024-05-30 ENCOUNTER — ASOB RESULT (OUTPATIENT)
Age: 34
End: 2024-05-30

## 2024-05-30 DIAGNOSIS — J45.909 UNSPECIFIED ASTHMA, UNCOMPLICATED: ICD-10-CM

## 2024-05-30 DIAGNOSIS — Z3A.40 40 WEEKS GESTATION OF PREGNANCY: ICD-10-CM

## 2024-05-30 DIAGNOSIS — O99.513 DISEASES OF THE RESPIRATORY SYSTEM COMPLICATING PREGNANCY, THIRD TRIMESTER: ICD-10-CM

## 2024-05-30 DIAGNOSIS — O99.013 ANEMIA COMPLICATING PREGNANCY, THIRD TRIMESTER: ICD-10-CM

## 2024-05-30 DIAGNOSIS — O34.219 MATERNAL CARE FOR UNSPECIFIED TYPE SCAR FROM PREVIOUS CESAREAN DELIVERY: ICD-10-CM

## 2024-05-30 DIAGNOSIS — O48.0 POST-TERM PREGNANCY: ICD-10-CM

## 2024-05-30 DIAGNOSIS — D57.3 SICKLE-CELL TRAIT: ICD-10-CM

## 2024-05-30 PROCEDURE — 76818 FETAL BIOPHYS PROFILE W/NST: CPT

## 2024-05-31 ENCOUNTER — INPATIENT (INPATIENT)
Facility: HOSPITAL | Age: 34
LOS: 2 days | Discharge: HOME | DRG: 951 | End: 2024-06-03
Attending: OBSTETRICS & GYNECOLOGY | Admitting: OBSTETRICS & GYNECOLOGY
Payer: COMMERCIAL

## 2024-05-31 ENCOUNTER — TRANSCRIPTION ENCOUNTER (OUTPATIENT)
Age: 34
End: 2024-05-31

## 2024-05-31 VITALS
HEART RATE: 80 BPM | OXYGEN SATURATION: 100 % | TEMPERATURE: 98 F | DIASTOLIC BLOOD PRESSURE: 70 MMHG | SYSTOLIC BLOOD PRESSURE: 125 MMHG | RESPIRATION RATE: 16 BRPM

## 2024-05-31 DIAGNOSIS — Z98.890 OTHER SPECIFIED POSTPROCEDURAL STATES: Chronic | ICD-10-CM

## 2024-05-31 DIAGNOSIS — Z34.80 ENCOUNTER FOR SUPERVISION OF OTHER NORMAL PREGNANCY, UNSPECIFIED TRIMESTER: ICD-10-CM

## 2024-05-31 DIAGNOSIS — O26.899 OTHER SPECIFIED PREGNANCY RELATED CONDITIONS, UNSPECIFIED TRIMESTER: ICD-10-CM

## 2024-05-31 LAB
ABO RH CONFIRMATION: SIGNIFICANT CHANGE UP
APTT BLD: 25.3 SEC — SIGNIFICANT CHANGE UP (ref 24.5–35.6)
BASOPHILS # BLD AUTO: 0.02 K/UL — SIGNIFICANT CHANGE UP (ref 0–0.2)
BASOPHILS NFR BLD AUTO: 0.3 % — SIGNIFICANT CHANGE UP (ref 0–2)
BLD GP AB SCN SERPL QL: SIGNIFICANT CHANGE UP
EOSINOPHIL # BLD AUTO: 0.05 K/UL — SIGNIFICANT CHANGE UP (ref 0–0.5)
EOSINOPHIL NFR BLD AUTO: 0.7 % — SIGNIFICANT CHANGE UP (ref 0–6)
FIBRINOGEN PPP-MCNC: 374 MG/DL — SIGNIFICANT CHANGE UP (ref 200–475)
HCT VFR BLD CALC: 35.8 % — SIGNIFICANT CHANGE UP (ref 34.5–45)
HGB BLD-MCNC: 11.7 G/DL — SIGNIFICANT CHANGE UP (ref 11.5–15.5)
IMM GRANULOCYTES NFR BLD AUTO: 1.2 % — HIGH (ref 0–0.9)
INR BLD: 0.87 RATIO — SIGNIFICANT CHANGE UP (ref 0.85–1.18)
LYMPHOCYTES # BLD AUTO: 1.76 K/UL — SIGNIFICANT CHANGE UP (ref 1–3.3)
LYMPHOCYTES # BLD AUTO: 23 % — SIGNIFICANT CHANGE UP (ref 13–44)
MCHC RBC-ENTMCNC: 26.5 PG — LOW (ref 27–34)
MCHC RBC-ENTMCNC: 32.7 GM/DL — SIGNIFICANT CHANGE UP (ref 32–36)
MCV RBC AUTO: 81.2 FL — SIGNIFICANT CHANGE UP (ref 80–100)
MONOCYTES # BLD AUTO: 0.9 K/UL — SIGNIFICANT CHANGE UP (ref 0–0.9)
MONOCYTES NFR BLD AUTO: 11.8 % — SIGNIFICANT CHANGE UP (ref 2–14)
NEUTROPHILS # BLD AUTO: 4.82 K/UL — SIGNIFICANT CHANGE UP (ref 1.8–7.4)
NEUTROPHILS NFR BLD AUTO: 63 % — SIGNIFICANT CHANGE UP (ref 43–77)
NRBC # BLD: 0 /100 WBCS — SIGNIFICANT CHANGE UP (ref 0–0)
PLATELET # BLD AUTO: 226 K/UL — SIGNIFICANT CHANGE UP (ref 150–400)
PROTHROM AB SERPL-ACNC: 10 SEC — SIGNIFICANT CHANGE UP (ref 9.5–13)
RBC # BLD: 4.41 M/UL — SIGNIFICANT CHANGE UP (ref 3.8–5.2)
RBC # FLD: 17.8 % — HIGH (ref 10.3–14.5)
WBC # BLD: 7.64 K/UL — SIGNIFICANT CHANGE UP (ref 3.8–10.5)
WBC # FLD AUTO: 7.64 K/UL — SIGNIFICANT CHANGE UP (ref 3.8–10.5)

## 2024-05-31 RX ORDER — CHLORHEXIDINE GLUCONATE 213 G/1000ML
1 SOLUTION TOPICAL DAILY
Refills: 0 | Status: DISCONTINUED | OUTPATIENT
Start: 2024-05-31 | End: 2024-06-01

## 2024-05-31 RX ORDER — OXYTOCIN 10 UNIT/ML
333.33 VIAL (ML) INJECTION
Qty: 20 | Refills: 0 | Status: DISCONTINUED | OUTPATIENT
Start: 2024-05-31 | End: 2024-06-03

## 2024-05-31 RX ORDER — SODIUM CHLORIDE 9 MG/ML
1000 INJECTION, SOLUTION INTRAVENOUS
Refills: 0 | Status: DISCONTINUED | OUTPATIENT
Start: 2024-05-31 | End: 2024-06-01

## 2024-05-31 RX ORDER — CITRIC ACID/SODIUM CITRATE 300-500 MG
30 SOLUTION, ORAL ORAL ONCE
Refills: 0 | Status: COMPLETED | OUTPATIENT
Start: 2024-05-31 | End: 2024-05-31

## 2024-05-31 RX ORDER — FAMOTIDINE 10 MG/ML
20 INJECTION INTRAVENOUS ONCE
Refills: 0 | Status: COMPLETED | OUTPATIENT
Start: 2024-05-31 | End: 2024-05-31

## 2024-05-31 RX ORDER — CEFAZOLIN SODIUM 1 G
2000 VIAL (EA) INJECTION ONCE
Refills: 0 | Status: COMPLETED | OUTPATIENT
Start: 2024-05-31 | End: 2024-05-31

## 2024-05-31 RX ADMIN — FAMOTIDINE 20 MILLIGRAM(S): 10 INJECTION INTRAVENOUS at 22:30

## 2024-05-31 RX ADMIN — CHLORHEXIDINE GLUCONATE 1 APPLICATION(S): 213 SOLUTION TOPICAL at 22:56

## 2024-05-31 RX ADMIN — Medication 30 MILLILITER(S): at 22:30

## 2024-05-31 RX ADMIN — SODIUM CHLORIDE 200 MILLILITER(S): 9 INJECTION, SOLUTION INTRAVENOUS at 22:56

## 2024-05-31 NOTE — OB PROVIDER H&P - NS_PRENATALLABSOURCEHEPATITISCPN_OBGYN_ALL_OB
negative Alert-The patient is alert, awake and responds to voice. The patient is oriented to time, place, and person. The triage nurse is able to obtain subjective information.

## 2024-05-31 NOTE — OB PROVIDER H&P - NS_OBGYNHISTORY_OBGYN_ALL_OB_FT
Prenatal care: Liset     History  OBHx:  2009 uncomplicated pregnancy   C/S due to face presentation 10/21/2017  GYNHx: h/o trich in 2017 Denies fibroids, cysts, or abnormal pap smears  PMH: Sickle cell trait, myasthenia gravis, anemia in pregnancy    Meds: PNV,iron, mestinon  PSHx: Denies  Allergies: NKA  Social: Denies tobacco, EtOH, and drug use  Psych: Denies anxiety, depression, PTSD, or previous suicide attempts. Pt feels safe at home.

## 2024-05-31 NOTE — OB RN PATIENT PROFILE - FUNCTIONAL ASSESSMENT - DAILY ACTIVITY 6.
min to none for increased tolerance to ADL's.   ? ROM: No loss of ROM, less tightness in hips.  ? Strength: Rt UE tests at 5/5 without pain for improved tolerance to activities.   ? Function: Able to tolerate lifting and carrying household items without aggravation, Able to sleep without waking with pain, tolerate sitting or walking for an hour without increased pain  Minimum tenderness and spasms in mid and low back muscles for better tolerance to work positions/activities  Patient to be independent with home exercise program as demonstrated by performance with correct form without cues.  Oswestry improves to 32% impaired or better.     Patient goals:Less pain or go away    Pt. Education:  [x] Yes  [] No  [x] Reviewed Prior HEP/Ed  Method of Education: [x] Verbal  [x] Demo Lifting techniques [x] Written- to be issued next treatment.  Comprehension of Education:  [x] Verbalizes understanding.  [x] Demonstrates understanding.  [] Needs review.  [] Demonstrates/verbalizes HEP/Ed previously given.     Access Code: [email or web address]  URL: [email or web address]  Date: 04/30/2024  Prepared by: Virginia Cheng    Exercises  - Seated Scapular Retraction  - 1 x daily - 7 x weekly - 3 sets - 10 reps  - Seated Shoulder Shrugs  - 1 x daily - 7 x weekly - 3 sets - 10 reps  - Wall Push Up with Plus  - 1 x daily - 7 x weekly - 3 sets - 10 reps  - Seated Thoracic Lumbar Extension with Pectoralis Stretch  - 1 x daily - 7 x weekly - 3 sets - 10 reps  - Standing Lower Cervical and Upper Thoracic Stretch  - 1 x daily - 7 x weekly - 3 sets - 10 reps  - Corner Pec Major Stretch  - 1 x daily - 7 x weekly - 3 sets - 10 reps  - Seated Lumbar Flexion Stretch  - 1 x daily - 7 x weekly - 3 sets - 10 reps  - Open Book Chest Rotation Stretch on Foam 1/2 Roll  - 1 x daily - 7 x weekly - 3 sets - 10 reps  - Standing Shoulder Scaption  - 1 x daily - 7 x weekly - 3 sets - 10 reps  - Shoulder extension with resistance - Neutral  - 1 x daily - 7 x weekly  4 = No assist / stand by assistance

## 2024-05-31 NOTE — OB PROVIDER H&P - ASSESSMENT
32 yo  @ 40w5d admitted in early labor for repeat C/S, pt is stable     - NST reviewed  - Continuous maternal and fetal monitoring  - Admission labs ordered   - 2 units ordered, 2 IV lines   - Informed consent obtained at the bedside by Dr. Cosby   - Pain management options discussed   - Anesthesiology consulted    D/W

## 2024-05-31 NOTE — OB RN TRIAGE NOTE - IN THE PAST 12 MONTHS HAVE YOU USED DRUGS OTHER THAN THOSE REQUIRED FOR MEDICAL REASON?
[facility]  [address] [facility] [address]   [place] [address] [phone]    9/6/2022    Ruthie Bojorquez  [address] [facility] [address]    Dear Ruthie,    Our records indicate that you had a scheduled appointment on 9/6/2022 with ADRIANA Han for which you cancelled late or did not show for your appointment.    This is a reminder of [facility]'s missed appointment policy, which requires you to give at least 24 hour notice if you are unable to keep your appointment.      This letter is a reminder that after missing 2 appointments you may be discharged from ADRIANA Han's care.    Please contact the clinic if you have any questions.       Sincerely,        [facility]         No

## 2024-05-31 NOTE — OB RN PATIENT PROFILE - NS_PRENATALLABSOURCEGBS36_OBGYN_ALL_OB
I will START or STAY ON the medications listed below when I get home from the hospital:    aspirin 81 mg oral delayed release tablet  -- 1 tab(s) by mouth once a day  -- Indication: For TIA    metFORMIN 500 mg oral tablet  -- 1 tab(s) by mouth 2 times a day  -- Check with your doctor before becoming pregnant.  Do not drink alcoholic beverages when taking this medication.  It is very important that you take or use this exactly as directed.  Do not skip doses or discontinue unless directed by your doctor.  Obtain medical advice before taking any non-prescription drugs as some may affect the action of this medication.  Take with food or milk.    -- Indication: For DM    nystatin 100,000 units/g topical cream  -- 1 application on skin 2 times a day  -- Indication: For fungal infection    Lasix 40 mg oral tablet  -- 1 tab(s) by mouth once a day  -- Indication: For CHF    Daily Multiple Vitamins oral tablet  -- 1 tab(s) by mouth once a day   -- Indication: For supplement I will START or STAY ON the medications listed below when I get home from the hospital:    aspirin 81 mg oral delayed release tablet  -- 1 tab(s) by mouth once a day  -- Indication: For stroke prevention    metFORMIN 500 mg oral tablet  -- 1 tab(s) by mouth 2 times a day  -- Check with your doctor before becoming pregnant.  Do not drink alcoholic beverages when taking this medication.  It is very important that you take or use this exactly as directed.  Do not skip doses or discontinue unless directed by your doctor.  Obtain medical advice before taking any non-prescription drugs as some may affect the action of this medication.  Take with food or milk.    -- Indication: For DM    atorvastatin 80 mg oral tablet  -- 1 tab(s) by mouth once a day (at bedtime)  -- Indication: For hyperlipidemia    nystatin 100,000 units/g topical cream  -- 1 application on skin 2 times a day  -- Indication: For fungal infection    Lasix 40 mg oral tablet  -- 1 tab(s) by mouth once a day  -- Indication: For LE edema    Daily Multiple Vitamins oral tablet  -- 1 tab(s) by mouth once a day   -- Indication: For supplement hard copy, drawn during this pregnancy

## 2024-05-31 NOTE — OB RN PATIENT PROFILE - NS PRO DEPRESSION SCREENING Y/N6
[address]  Pre-Sedation/Analgesia History & Physical    Pt Name: Jose Antonio Gonzales  MRN: [id number]  YOB: 1983  Provider Performing Procedure: Kaylene Rodrigues DO   Primary Care Physician: No primary care provider on file. MEDICAL HISTORY       has a past medical history of ADHD (attention deficit hyperactivity disorder), Bipolar disorder ([address] Chiari malformation, Depression, Fibromyalgia, and Pseudotumor cerebri. SURGICAL HISTORY   has a past surgical history that includes Cholecystectomy (2007); other surgical history (2007); other surgical history; Ventriculoperitoneal shunt; Back Injection (Bilateral, 5/3/2022); Pain management procedure (N/A, 6/28/2022); and Back Injection (Bilateral, 8/17/2022). ALLERGIES   Allergies as of 10/19/2022    (No Known Allergies)       MEDICATIONS   Prior to Admission medications    Medication Sig Start Date End Date Taking? Authorizing Provider   methylphenidate (RITALIN) 10 MG tablet Take 1 tablet by mouth 2 times daily for 30 days. (Take with additional 5 mg tablet for a total of 15 mg twice per day) 11/15/22 12/15/22  Griselda Cousin, DO   methylphenidate (RITALIN) 5 MG tablet Take 1 tablet by mouth 2 times daily for 30 days.  11/15/22 12/15/22  Griselda Cousin, DO   lamoTRIgine (LAMICTAL) 100 MG tablet Take 1 tablet by mouth daily 11/15/22   Griselda Cousin, DO   cloNIDine (CATAPRES) 0.1 MG tablet Take 1 tablet by mouth 2 times daily (Take 1 tablet in the morning and 1 tablet at bedtime for anxiety) 11/2/22   Griselda Cousin, DO   hydrOXYzine pamoate (VISTARIL) 50 MG capsule Take 1 capsule by mouth 2 times daily as needed for Anxiety 11/2/22   Griselda Cousin, DO   pseudoephedrine (SUDAFED) 30 MG tablet Take 30 mg by mouth every 4 hours as needed for Congestion    Historical Provider, MD   Loratadine (CLARITIN PO) Take by mouth    Historical Provider, MD     PHYSICAL:   Vitals:    11/29/22 1008   BP: (!) 115/56   Pulse: 64   Resp: 16   Temp: 97.3 °F
no

## 2024-05-31 NOTE — OB PROVIDER H&P - HISTORY OF PRESENT ILLNESS
34 YO  at 40w5d LMP: 23 GIRMA: 26 presents with complaints of 2 episode of vaginal spotting at 3 pm. pt states at that time she started to have pain c/w mild contractions. pt states that they were every 20 min. pt states that now pain is q 1 hour and is 4-5/10 pain.  Pt notes +FM. Denies LOF, HA, vision changes, Chest pain, SOB, epigastric pain, edema, N/V/D, or RUQ pain.    Prenatal care: Liset     History  OBHx:  2009 uncomplicated pregnancy   C/S due to face presentation 10/21/2017  GYNHx: h/o trich in 2017 Denies fibroids, cysts, or abnormal pap smears  PMH: Sickle cell trait, myasthenia gravis, anemia in pregnancy    Meds: PNV,iron, mestinon  PSHx: Denies  Allergies: NKA  Social: Denies tobacco, EtOH, and drug use  Psych: Denies anxiety, depression, PTSD, or previous suicide attempts. Pt feels safe at home.

## 2024-05-31 NOTE — OB PROVIDER H&P - NSHPPHYSICALEXAM_GEN_ALL_CORE
Vital Signs Last 24 Hrs  T(C): 36.5 (31 May 2024 22:41), Max: 36.8 (31 May 2024 17:14)  T(F): 97.7 (31 May 2024 22:41), Max: 98.3 (31 May 2024 17:14)  HR: 85 (31 May 2024 22:41) (80 - 117)  BP: 125/70 (31 May 2024 22:41) (123/86 - 125/70)  BP(mean): --  RR: 15 (31 May 2024 22:41) (15 - 16)  SpO2: 100% (31 May 2024 22:41) (98% - 100%)    Parameters below as of 31 May 2024 22:41  Patient On (Oxygen Delivery Method): room air      General: Pt resting comfortably, NAD  Abd: Gravid, soft, non-tender  Ext: Soft, non-tender, no edema  SVE: 2/70/-3, no vaginal bleeding noted   NST: ractive, baseline 150, moderate variability, + accels, no decels   TOCO: contractions q 3-4 min

## 2024-05-31 NOTE — OB RN PATIENT PROFILE - NS_PARA_OBGYN_ALL_OB_NU
Complex Repair And Rotation Flap Text: The defect edges were debeveled with a #15 scalpel blade.  The primary defect was closed partially with a complex linear closure.  Given the location of the remaining defect, shape of the defect and the proximity to free margins a rotation flap was deemed most appropriate for complete closure of the defect.  Using a sterile surgical marker, an appropriate advancement flap was drawn incorporating the defect and placing the expected incisions within the relaxed skin tension lines where possible.    The area thus outlined was incised deep to adipose tissue with a #15 scalpel blade.  The skin margins were undermined to an appropriate distance in all directions utilizing iris scissors. 2

## 2024-06-01 DIAGNOSIS — G70.00 MYASTHENIA GRAVIS WITHOUT (ACUTE) EXACERBATION: ICD-10-CM

## 2024-06-01 LAB
ALLERGY+IMMUNOLOGY DIAG STUDY NOTE: SIGNIFICANT CHANGE UP
ANTIBODY INTERPRETATION 2: SIGNIFICANT CHANGE UP
BASOPHILS # BLD AUTO: 0.02 K/UL — SIGNIFICANT CHANGE UP (ref 0–0.2)
BASOPHILS NFR BLD AUTO: 0.2 % — SIGNIFICANT CHANGE UP (ref 0–2)
EOSINOPHIL # BLD AUTO: 0.03 K/UL — SIGNIFICANT CHANGE UP (ref 0–0.5)
EOSINOPHIL NFR BLD AUTO: 0.3 % — SIGNIFICANT CHANGE UP (ref 0–6)
HCT VFR BLD CALC: 25.8 % — LOW (ref 34.5–45)
HGB BLD-MCNC: 8.4 G/DL — LOW (ref 11.5–15.5)
IMM GRANULOCYTES NFR BLD AUTO: 0.6 % — SIGNIFICANT CHANGE UP (ref 0–0.9)
LYMPHOCYTES # BLD AUTO: 1.09 K/UL — SIGNIFICANT CHANGE UP (ref 1–3.3)
LYMPHOCYTES # BLD AUTO: 10.7 % — LOW (ref 13–44)
MCHC RBC-ENTMCNC: 26.9 PG — LOW (ref 27–34)
MCHC RBC-ENTMCNC: 32.6 GM/DL — SIGNIFICANT CHANGE UP (ref 32–36)
MCV RBC AUTO: 82.7 FL — SIGNIFICANT CHANGE UP (ref 80–100)
MONOCYTES # BLD AUTO: 0.91 K/UL — HIGH (ref 0–0.9)
MONOCYTES NFR BLD AUTO: 8.9 % — SIGNIFICANT CHANGE UP (ref 2–14)
NEUTROPHILS # BLD AUTO: 8.08 K/UL — HIGH (ref 1.8–7.4)
NEUTROPHILS NFR BLD AUTO: 79.3 % — HIGH (ref 43–77)
NRBC # BLD: 0 /100 WBCS — SIGNIFICANT CHANGE UP (ref 0–0)
PLATELET # BLD AUTO: 171 K/UL — SIGNIFICANT CHANGE UP (ref 150–400)
RBC # BLD: 3.12 M/UL — LOW (ref 3.8–5.2)
RBC # FLD: 17.5 % — HIGH (ref 10.3–14.5)
T PALLIDUM AB TITR SER: NEGATIVE — SIGNIFICANT CHANGE UP
WBC # BLD: 10.19 K/UL — SIGNIFICANT CHANGE UP (ref 3.8–10.5)
WBC # FLD AUTO: 10.19 K/UL — SIGNIFICANT CHANGE UP (ref 3.8–10.5)

## 2024-06-01 RX ORDER — MAGNESIUM HYDROXIDE 400 MG/1
30 TABLET, CHEWABLE ORAL
Refills: 0 | Status: DISCONTINUED | OUTPATIENT
Start: 2024-06-01 | End: 2024-06-03

## 2024-06-01 RX ORDER — DIPHENHYDRAMINE HCL 50 MG
25 CAPSULE ORAL EVERY 6 HOURS
Refills: 0 | Status: DISCONTINUED | OUTPATIENT
Start: 2024-06-01 | End: 2024-06-03

## 2024-06-01 RX ORDER — OXYCODONE HYDROCHLORIDE 5 MG/1
5 TABLET ORAL
Refills: 0 | Status: DISCONTINUED | OUTPATIENT
Start: 2024-06-01 | End: 2024-06-03

## 2024-06-01 RX ORDER — KETOROLAC TROMETHAMINE 30 MG/ML
30 SYRINGE (ML) INJECTION EVERY 6 HOURS
Refills: 0 | Status: DISCONTINUED | OUTPATIENT
Start: 2024-06-01 | End: 2024-06-02

## 2024-06-01 RX ORDER — ACETAMINOPHEN 500 MG
975 TABLET ORAL
Refills: 0 | Status: DISCONTINUED | OUTPATIENT
Start: 2024-06-01 | End: 2024-06-03

## 2024-06-01 RX ORDER — SODIUM CHLORIDE 9 MG/ML
500 INJECTION, SOLUTION INTRAVENOUS ONCE
Refills: 0 | Status: COMPLETED | OUTPATIENT
Start: 2024-06-01 | End: 2024-06-01

## 2024-06-01 RX ORDER — FAMOTIDINE 10 MG/ML
20 INJECTION INTRAVENOUS
Refills: 0 | Status: DISCONTINUED | OUTPATIENT
Start: 2024-06-01 | End: 2024-06-03

## 2024-06-01 RX ORDER — IBUPROFEN 200 MG
600 TABLET ORAL EVERY 6 HOURS
Refills: 0 | Status: COMPLETED | OUTPATIENT
Start: 2024-06-01 | End: 2025-04-30

## 2024-06-01 RX ORDER — SIMETHICONE 80 MG/1
80 TABLET, CHEWABLE ORAL EVERY 4 HOURS
Refills: 0 | Status: DISCONTINUED | OUTPATIENT
Start: 2024-06-01 | End: 2024-06-03

## 2024-06-01 RX ORDER — FOLIC ACID 0.8 MG
1 TABLET ORAL DAILY
Refills: 0 | Status: DISCONTINUED | OUTPATIENT
Start: 2024-06-01 | End: 2024-06-03

## 2024-06-01 RX ORDER — TETANUS TOXOID, REDUCED DIPHTHERIA TOXOID AND ACELLULAR PERTUSSIS VACCINE, ADSORBED 5; 2.5; 8; 8; 2.5 [IU]/.5ML; [IU]/.5ML; UG/.5ML; UG/.5ML; UG/.5ML
0.5 SUSPENSION INTRAMUSCULAR ONCE
Refills: 0 | Status: DISCONTINUED | OUTPATIENT
Start: 2024-06-01 | End: 2024-06-03

## 2024-06-01 RX ORDER — LANOLIN
1 OINTMENT (GRAM) TOPICAL EVERY 6 HOURS
Refills: 0 | Status: DISCONTINUED | OUTPATIENT
Start: 2024-06-01 | End: 2024-06-03

## 2024-06-01 RX ORDER — OXYTOCIN 10 UNIT/ML
333.33 VIAL (ML) INJECTION
Qty: 20 | Refills: 0 | Status: DISCONTINUED | OUTPATIENT
Start: 2024-06-01 | End: 2024-06-03

## 2024-06-01 RX ORDER — SODIUM CHLORIDE 9 MG/ML
1000 INJECTION, SOLUTION INTRAVENOUS
Refills: 0 | Status: DISCONTINUED | OUTPATIENT
Start: 2024-06-01 | End: 2024-06-03

## 2024-06-01 RX ORDER — HEPARIN SODIUM 5000 [USP'U]/ML
5000 INJECTION INTRAVENOUS; SUBCUTANEOUS EVERY 12 HOURS
Refills: 0 | Status: DISCONTINUED | OUTPATIENT
Start: 2024-06-01 | End: 2024-06-03

## 2024-06-01 RX ORDER — FERROUS SULFATE 325(65) MG
325 TABLET ORAL DAILY
Refills: 0 | Status: DISCONTINUED | OUTPATIENT
Start: 2024-06-01 | End: 2024-06-02

## 2024-06-01 RX ORDER — PYRIDOSTIGMINE BROMIDE 60 MG/5ML
60 SOLUTION ORAL
Refills: 0 | Status: DISCONTINUED | OUTPATIENT
Start: 2024-06-01 | End: 2024-06-03

## 2024-06-01 RX ORDER — FAMOTIDINE 10 MG/ML
20 INJECTION INTRAVENOUS DAILY
Refills: 0 | Status: DISCONTINUED | OUTPATIENT
Start: 2024-06-01 | End: 2024-06-01

## 2024-06-01 RX ORDER — CITRIC ACID/SODIUM CITRATE 300-500 MG
30 SOLUTION, ORAL ORAL ONCE
Refills: 0 | Status: COMPLETED | OUTPATIENT
Start: 2024-06-01 | End: 2024-06-01

## 2024-06-01 RX ADMIN — Medication 1 MILLIGRAM(S): at 17:50

## 2024-06-01 RX ADMIN — Medication 1000 MILLIUNIT(S)/MIN: at 08:53

## 2024-06-01 RX ADMIN — Medication 30 MILLIGRAM(S): at 12:56

## 2024-06-01 RX ADMIN — SODIUM CHLORIDE 1000 MILLILITER(S): 9 INJECTION, SOLUTION INTRAVENOUS at 04:05

## 2024-06-01 RX ADMIN — PYRIDOSTIGMINE BROMIDE 60 MILLIGRAM(S): 60 SOLUTION ORAL at 15:00

## 2024-06-01 RX ADMIN — PYRIDOSTIGMINE BROMIDE 60 MILLIGRAM(S): 60 SOLUTION ORAL at 07:57

## 2024-06-01 RX ADMIN — Medication 30 MILLIGRAM(S): at 19:00

## 2024-06-01 RX ADMIN — Medication 100 MILLIGRAM(S): at 06:02

## 2024-06-01 RX ADMIN — Medication 975 MILLIGRAM(S): at 23:11

## 2024-06-01 RX ADMIN — PYRIDOSTIGMINE BROMIDE 60 MILLIGRAM(S): 60 SOLUTION ORAL at 21:32

## 2024-06-01 RX ADMIN — SODIUM CHLORIDE 125 MILLILITER(S): 9 INJECTION, SOLUTION INTRAVENOUS at 15:00

## 2024-06-01 RX ADMIN — HEPARIN SODIUM 5000 UNIT(S): 5000 INJECTION INTRAVENOUS; SUBCUTANEOUS at 19:47

## 2024-06-01 RX ADMIN — Medication 325 MILLIGRAM(S): at 21:31

## 2024-06-01 RX ADMIN — Medication 30 MILLILITER(S): at 05:50

## 2024-06-01 RX ADMIN — Medication 1 TABLET(S): at 18:00

## 2024-06-01 RX ADMIN — Medication 30 MILLIGRAM(S): at 18:10

## 2024-06-01 RX ADMIN — Medication 30 MILLIGRAM(S): at 11:58

## 2024-06-01 RX ADMIN — FAMOTIDINE 20 MILLIGRAM(S): 10 INJECTION INTRAVENOUS at 06:34

## 2024-06-01 NOTE — OB PROVIDER DELIVERY SUMMARY - NSPROVIDERDELIVERYNOTE_OBGYN_ALL_OB_FT
1.BABY GIRL AT 06:34 FROM VTX PRESENTATIONS,APGARS 9/9,3096GMS;CORDX1 AROUND THE NECK.  2.NL TUBES AND OVARIES BILATERALLY.  3.ABDOMINOPELVIC ADHESIONS LYSED WITH BLUNT AND SHARP DISSECTION.  4SNOW USED.    DICTATION NUMBER:[id number]

## 2024-06-01 NOTE — OB PROVIDER LABOR PROGRESS NOTE - NS_OBIHIFHRDETAILS_OBGYN_ALL_OB_FT
baseline 130, moderate variability, +accels, no decels
Baseline 120, moderate variability +accels, no decels

## 2024-06-01 NOTE — OB NEONATOLOGY/PEDIATRICIAN DELIVERY SUMMARY - NSPEDSNEONOTESA_OBGYN_ALL_OB_FT
Neonatologist, myself, called to the unscheduled, repeat  of a 32yo . Indication for  being maternal labor. EGA 40 weeks, 5 days. Maternal Serologies: A+/Ab+(Anti-jkA)/GBS-/HIV-/HepB-/RPR-/Rubella Immune. Pregnancy significant for myasthenia gravis (mother on pyridostigmine), anemia, mother notably positive for sickle cell trait (FOB status not reported at this time). Artificial rupture of membranes was at delivery and significant for clear fluid. Delivery significant for a live, viable, queen female. Baby notably cried at delivery, was suctioned by OB, cord clamping delayed approximately 45 seconds. Baby was subsequently brought to the radiant warmer by OB where Peds assumed care. Baby was warm/dried/stimulated, bulb suction of the nose and mouth performed as necessary to clear airway. Baby did not require supplemental O2. APGARs 9/9 at 1 and 5 minutes of life respectively. Baby stable to remain on  Nursery Service to continue couplet care with mother.

## 2024-06-01 NOTE — OB RN DELIVERY SUMMARY - NSSELHIDDEN_OBGYN_ALL_OB_FT
[NS_DeliveryAttending1_OBGYN_ALL_OB_FT:[id number]==],[NS_DeliveryAssist1_OBGYN_ALL_OB_FT:[id number]]

## 2024-06-01 NOTE — OB RN INTRAOPERATIVE NOTE - NSSELHIDDEN_OBGYN_ALL_OB_FT
[NS_DeliveryAttending1_OBGYN_ALL_OB_FT:[id number]==],[NS_DeliveryAssist1_OBGYN_ALL_OB_FT:[id number]=]

## 2024-06-01 NOTE — OB PROVIDER LABOR PROGRESS NOTE - ASSESSMENT
32 yo  @40w6d admitted with previous c/s in early labor, pt stable     -For repeat c/s  -Awaiting blood to be ready  -Continue current care   -Anesthesiology consulted for epidural     Dr Cosby aware   
Patient with previous c/s in early labor    -For repeat c/s  -Awaiting blood to be ready  -Continue current care     Dr Cosby aware

## 2024-06-01 NOTE — OB PROVIDER DELIVERY SUMMARY - NSSELHIDDEN_OBGYN_ALL_OB_FT
[NS_DeliveryAttending1_OBGYN_ALL_OB_FT:[id number]==],[NS_DeliveryAssist1_OBGYN_ALL_OB_FT:[id number]=]

## 2024-06-01 NOTE — OB PROVIDER LABOR PROGRESS NOTE - NS_SUBJECTIVE/OBJECTIVE_OBGYN_ALL_OB_FT
Patient awaiting repeat c/s.  Reports more pain.  C/S delayed due to antibodies noted in blood, and awaiting blood to be ready.
Pt has complaints of pain with contractions, requesting epidural

## 2024-06-02 ENCOUNTER — TRANSCRIPTION ENCOUNTER (OUTPATIENT)
Age: 34
End: 2024-06-02

## 2024-06-02 LAB
HCT VFR BLD CALC: 22.5 % — LOW (ref 34.5–45)
HGB BLD-MCNC: 7.4 G/DL — LOW (ref 11.5–15.5)
MCHC RBC-ENTMCNC: 26.8 PG — LOW (ref 27–34)
MCHC RBC-ENTMCNC: 32.9 GM/DL — SIGNIFICANT CHANGE UP (ref 32–36)
MCV RBC AUTO: 81.5 FL — SIGNIFICANT CHANGE UP (ref 80–100)
NRBC # BLD: 0 /100 WBCS — SIGNIFICANT CHANGE UP (ref 0–0)
PLATELET # BLD AUTO: 170 K/UL — SIGNIFICANT CHANGE UP (ref 150–400)
RBC # BLD: 2.76 M/UL — LOW (ref 3.8–5.2)
RBC # FLD: 18.2 % — HIGH (ref 10.3–14.5)
WBC # BLD: 9.09 K/UL — SIGNIFICANT CHANGE UP (ref 3.8–10.5)
WBC # FLD AUTO: 9.09 K/UL — SIGNIFICANT CHANGE UP (ref 3.8–10.5)

## 2024-06-02 RX ORDER — FERROUS SULFATE 325(65) MG
325 TABLET ORAL
Refills: 0 | Status: DISCONTINUED | OUTPATIENT
Start: 2024-06-02 | End: 2024-06-03

## 2024-06-02 RX ORDER — ASCORBIC ACID 60 MG
500 TABLET,CHEWABLE ORAL DAILY
Refills: 0 | Status: DISCONTINUED | OUTPATIENT
Start: 2024-06-02 | End: 2024-06-03

## 2024-06-02 RX ORDER — IBUPROFEN 200 MG
600 TABLET ORAL EVERY 6 HOURS
Refills: 0 | Status: DISCONTINUED | OUTPATIENT
Start: 2024-06-02 | End: 2024-06-03

## 2024-06-02 RX ADMIN — SIMETHICONE 80 MILLIGRAM(S): 80 TABLET, CHEWABLE ORAL at 20:51

## 2024-06-02 RX ADMIN — Medication 975 MILLIGRAM(S): at 15:56

## 2024-06-02 RX ADMIN — PYRIDOSTIGMINE BROMIDE 60 MILLIGRAM(S): 60 SOLUTION ORAL at 20:42

## 2024-06-02 RX ADMIN — Medication 975 MILLIGRAM(S): at 21:43

## 2024-06-02 RX ADMIN — Medication 1 MILLIGRAM(S): at 12:27

## 2024-06-02 RX ADMIN — Medication 975 MILLIGRAM(S): at 20:43

## 2024-06-02 RX ADMIN — Medication 975 MILLIGRAM(S): at 17:00

## 2024-06-02 RX ADMIN — Medication 975 MILLIGRAM(S): at 10:44

## 2024-06-02 RX ADMIN — Medication 325 MILLIGRAM(S): at 18:26

## 2024-06-02 RX ADMIN — Medication 600 MILLIGRAM(S): at 15:56

## 2024-06-02 RX ADMIN — Medication 1 TABLET(S): at 12:27

## 2024-06-02 RX ADMIN — Medication 975 MILLIGRAM(S): at 09:45

## 2024-06-02 RX ADMIN — Medication 30 MILLIGRAM(S): at 01:27

## 2024-06-02 RX ADMIN — PYRIDOSTIGMINE BROMIDE 60 MILLIGRAM(S): 60 SOLUTION ORAL at 05:04

## 2024-06-02 RX ADMIN — PYRIDOSTIGMINE BROMIDE 60 MILLIGRAM(S): 60 SOLUTION ORAL at 15:57

## 2024-06-02 RX ADMIN — Medication 600 MILLIGRAM(S): at 17:00

## 2024-06-02 RX ADMIN — HEPARIN SODIUM 5000 UNIT(S): 5000 INJECTION INTRAVENOUS; SUBCUTANEOUS at 07:56

## 2024-06-02 RX ADMIN — HEPARIN SODIUM 5000 UNIT(S): 5000 INJECTION INTRAVENOUS; SUBCUTANEOUS at 20:42

## 2024-06-02 RX ADMIN — FAMOTIDINE 20 MILLIGRAM(S): 10 INJECTION INTRAVENOUS at 18:26

## 2024-06-02 RX ADMIN — PYRIDOSTIGMINE BROMIDE 60 MILLIGRAM(S): 60 SOLUTION ORAL at 09:45

## 2024-06-02 RX ADMIN — MAGNESIUM HYDROXIDE 30 MILLILITER(S): 400 TABLET, CHEWABLE ORAL at 20:51

## 2024-06-02 RX ADMIN — Medication 500 MILLIGRAM(S): at 12:27

## 2024-06-02 RX ADMIN — SIMETHICONE 80 MILLIGRAM(S): 80 TABLET, CHEWABLE ORAL at 09:46

## 2024-06-03 ENCOUNTER — APPOINTMENT (OUTPATIENT)
Dept: ANTEPARTUM | Facility: CLINIC | Age: 34
End: 2024-06-03

## 2024-06-03 VITALS
SYSTOLIC BLOOD PRESSURE: 111 MMHG | TEMPERATURE: 98 F | RESPIRATION RATE: 18 BRPM | OXYGEN SATURATION: 99 % | HEART RATE: 86 BPM | DIASTOLIC BLOOD PRESSURE: 74 MMHG

## 2024-06-03 DIAGNOSIS — D62 ACUTE POSTHEMORRHAGIC ANEMIA: ICD-10-CM

## 2024-06-03 PROCEDURE — 36415 COLL VENOUS BLD VENIPUNCTURE: CPT

## 2024-06-03 PROCEDURE — 85025 COMPLETE CBC W/AUTO DIFF WBC: CPT

## 2024-06-03 PROCEDURE — 86901 BLOOD TYPING SEROLOGIC RH(D): CPT

## 2024-06-03 PROCEDURE — 86905 BLOOD TYPING RBC ANTIGENS: CPT

## 2024-06-03 PROCEDURE — 59025 FETAL NON-STRESS TEST: CPT

## 2024-06-03 PROCEDURE — 86870 RBC ANTIBODY IDENTIFICATION: CPT

## 2024-06-03 PROCEDURE — 85730 THROMBOPLASTIN TIME PARTIAL: CPT

## 2024-06-03 PROCEDURE — 86780 TREPONEMA PALLIDUM: CPT

## 2024-06-03 PROCEDURE — 85384 FIBRINOGEN ACTIVITY: CPT

## 2024-06-03 PROCEDURE — 86880 COOMBS TEST DIRECT: CPT

## 2024-06-03 PROCEDURE — 85027 COMPLETE CBC AUTOMATED: CPT

## 2024-06-03 PROCEDURE — 86900 BLOOD TYPING SEROLOGIC ABO: CPT

## 2024-06-03 PROCEDURE — 59050 FETAL MONITOR W/REPORT: CPT

## 2024-06-03 PROCEDURE — 85610 PROTHROMBIN TIME: CPT

## 2024-06-03 PROCEDURE — 86922 COMPATIBILITY TEST ANTIGLOB: CPT

## 2024-06-03 PROCEDURE — 86850 RBC ANTIBODY SCREEN: CPT

## 2024-06-03 RX ORDER — IBUPROFEN 200 MG
1 TABLET ORAL
Qty: 20 | Refills: 0
Start: 2024-06-03 | End: 2024-06-07

## 2024-06-03 RX ORDER — CHOLECALCIFEROL (VITAMIN D3) 125 MCG
1 CAPSULE ORAL
Refills: 0 | DISCHARGE

## 2024-06-03 RX ADMIN — Medication 600 MILLIGRAM(S): at 12:20

## 2024-06-03 RX ADMIN — Medication 600 MILLIGRAM(S): at 11:20

## 2024-06-03 RX ADMIN — FAMOTIDINE 20 MILLIGRAM(S): 10 INJECTION INTRAVENOUS at 06:13

## 2024-06-03 RX ADMIN — Medication 600 MILLIGRAM(S): at 07:13

## 2024-06-03 RX ADMIN — Medication 600 MILLIGRAM(S): at 06:13

## 2024-06-03 RX ADMIN — Medication 975 MILLIGRAM(S): at 11:30

## 2024-06-03 RX ADMIN — Medication 600 MILLIGRAM(S): at 00:53

## 2024-06-03 RX ADMIN — HEPARIN SODIUM 5000 UNIT(S): 5000 INJECTION INTRAVENOUS; SUBCUTANEOUS at 06:13

## 2024-06-03 RX ADMIN — Medication 600 MILLIGRAM(S): at 01:53

## 2024-06-03 RX ADMIN — PYRIDOSTIGMINE BROMIDE 60 MILLIGRAM(S): 60 SOLUTION ORAL at 03:53

## 2024-06-03 RX ADMIN — Medication 325 MILLIGRAM(S): at 06:13

## 2024-06-03 RX ADMIN — Medication 1 TABLET(S): at 11:26

## 2024-06-03 RX ADMIN — SIMETHICONE 80 MILLIGRAM(S): 80 TABLET, CHEWABLE ORAL at 00:53

## 2024-06-03 RX ADMIN — Medication 975 MILLIGRAM(S): at 12:30

## 2024-06-03 RX ADMIN — Medication 1 MILLIGRAM(S): at 11:26

## 2024-06-03 RX ADMIN — PYRIDOSTIGMINE BROMIDE 60 MILLIGRAM(S): 60 SOLUTION ORAL at 11:27

## 2024-06-03 RX ADMIN — Medication 500 MILLIGRAM(S): at 11:27

## 2024-06-03 NOTE — DISCHARGE NOTE OB - PLAN OF CARE
follow up with pcp as 2 weeks follow up with own ob/gyn in 2  weeks. no sex, pelvic rest, no heavy lifting

## 2024-06-03 NOTE — PROGRESS NOTE ADULT - ASSESSMENT
A/P: POD #2 s/p c/s; acute blood loss anemia; asymptomatic   discharge pt home  discharge instructions   -d/w dr. kylah grider  
A/P: 32 yo POD #0 s/p repeat c/s, delivery and postpartum course uncomplicated  history of anemia, myasthenia gravis last flare 6 years ago no pulmonary history     - as per anesthesia 1st 24hrs continuos pulse ox and reassess after 24hrs  -Pain management as needed  -DVT ppx: OOB and ambulate, heparin  - cbc in am   - continue home meds   -Advance diet with flatus  -Encourage breastfeeding

## 2024-06-03 NOTE — DISCHARGE NOTE OB - CARE PROVIDER_API CALL
Dalia Hutchins  Obstetrics and Gynecology  [address] [place] [address]  Phone: [phone]  Fax: [phone]  Follow Up Time: 2 weeks

## 2024-06-03 NOTE — DISCHARGE NOTE OB - MATERIALS PROVIDED
Vaccinations/[facility]  Screening Program/  Immunization Record/Breastfeeding Log/Bottle Feeding Log/Breastfeeding Mother’s Support Group Information/Guide to Postpartum Care/[facility] Hearing Screen Program/Back To Sleep Handout/Shaken Baby Prevention Handout/Breastfeeding Guide and Packet/Birth Certificate Instructions/Discharge Medication Information for Patients and Families Pocket Guide/Tdap Vaccination (VIS Pub Date: 2012)

## 2024-06-03 NOTE — PROGRESS NOTE ADULT - PROBLEM SELECTOR PLAN 3
A/P: POD #2 s/p c/s; acute blood loss anemia; asymptomatic   discharge pt home  discharge instructions   -d/w dr. kylah grider    -d/w dr. montero

## 2024-06-03 NOTE — PROGRESS NOTE ADULT - SUBJECTIVE AND OBJECTIVE BOX
ledy w ob team/ anesthesia /postpartum team    pt in post op 0 pt in pacu 2    - pt dx w myasthenia gravis x6yrs- last flare up 6yrs ago; pt does not have any pulm history of infection/problem    - as per anesthesia 1st 24hrs continuos pulse ox and reassess after 24hrs    
Patient seen resting comfortably.  No new complaints.  Denies HA, CP, SOB, N/V/D, dizziness, palpitations, worsening abdominal pain, worsening vaginal bleeding, or any other concerns. + Ambulation, due to void - valente to be removed, + flatus - tolerating regular diet. Attempting breastfeeding.     Vital Signs Last 24 Hrs  T(C): 36.7 (2024 08:30), Max: 37.7 (2024 02:15)  T(F): 98.1 (2024 08:30), Max: 99.9 (2024 02:15)  HR: 98 (2024 08:30) (71 - 98)  BP: 123/91 (2024 08:30) (100/63 - 123/91)  BP(mean): 84 (2024 07:42) (75 - 883)  RR: 16 (2024 08:30) (16 - 20)  SpO2: 97% (2024 08:30) (93% - 98%)    Parameters below as of 2024 08:30  Patient On (Oxygen Delivery Method): room air        Gen: A&O x 3, NAD  Chest: CTABL  Cardiac: S1+S2+ RRR  Breast: Soft, nontender, nonengorged  Abdomen: Nontender, nondistended, +BS, Incision C/D/I  Gyn: Minimal bleeding, valente in place  Extremities: Nontender, DTRS 2+, no worsening edema, venodynes intact                          7.4    9.09  )-----------( 170      ( 2024 06:55 )             22.5       A/P:  Patient s/p  section, POD #1 with acute blood loss anemia     -Pain management as needed  -Advance as per protocol  -OOB and ambulate  -Repeat CBC and iron supplements  -MG maintenance meds  -Encourage breastfeeding 
Patient seen at bedside, resting comfortably offers no new complaints. Burger in place draining clear adequate urine, tolerating clear diet at this time.  Attempting breastfeeding.  Denies HA, CP, SOB, N/V/D, dizziness, wekaness, palpitations, worsening abdominal pain, worsening vaginal bleeding.    Vital Signs Last 24 Hrs  T(C): 36.6 (01 Jun 2024 11:00), Max: 36.8 (31 May 2024 17:14)  T(F): 97.9 (01 Jun 2024 11:00), Max: 98.3 (31 May 2024 17:14)  HR: 80 (01 Jun 2024 11:00) (66 - 117)  BP: 120/77 (01 Jun 2024 11:00) (120/77 - 136/92)  BP(mean): 90 (01 Jun 2024 11:00) (87 - 106)  RR: 16 (01 Jun 2024 11:00) (15 - 18)  SpO2: 97% (01 Jun 2024 11:00) (95% - 100%)  Parameters below as of 01 Jun 2024 11:00  Patient On (Oxygen Delivery Method): room air        Gen: A&O x 3, NAD  Chest: CTA B/L  Cardiac: S1, S2  RRR  Breast: Soft, nontender, nonengorged  Abdomen: soft; nontender, nondistended; Dressing C/D/I  Gyn: Minimal lochia  Ext: Nontender, no worsening edema, venodynes intact, no calf tenderness                          11.7   7.64  )-----------( 226      ( 31 May 2024 22:00 )             35.8     
Patient seen at bedisde resting comfortably offers no new complaints. + Ambulation, + void without difficulty, + flatus; tolerating regular diet. both breastfeeding and bottle feeding. Denies HA, CP, SOB, N/V/D,  no bm; dizziness, palpitations, worsening abdominal pain, worsening vaginal bleeding, or any other concerns.   Vital Signs Last 24 Hrs  T(C): 37.1 (03 Jun 2024 06:32), Max: 37.1 (03 Jun 2024 06:32)  T(F): 98.7 (03 Jun 2024 06:32), Max: 98.7 (03 Jun 2024 06:32)  HR: 76 (03 Jun 2024 06:32) (76 - 90)  BP: 106/70 (03 Jun 2024 06:32) (106/70 - 116/75)  BP(mean): 87 (02 Jun 2024 21:03) (87 - 87)  RR: 16 (03 Jun 2024 06:32) (16 - 17)  SpO2: 98% (03 Jun 2024 06:32) (97% - 98%)    Parameters below as of 03 Jun 2024 06:32  Patient On (Oxygen Delivery Method): room air        Gen: A&O x 3, NAD  Chest: CTABL  Cardiac: S1+S2+ RRR  Breast: Soft, nontender, nonengorged  Abdomen: +BS; soft; Nontender, nondistended, dressing removed Incision C/D/I steri strips in place   Gyn: Minimal lochia  Extremities: Nontender, DTRS 2+, no worsening edema                        7.4    9.09  )-----------( 170      ( 02 Jun 2024 06:55 )             22.5       A/P: POD #2 s/p c/s; acute blood loss anemia; asymptomatic     -Pain management as needed  -cont post op care  -OOB and ambulate  - f/u Rpt CBC   -encourage insentive spirometer use  -Encourage breastfeeding   -d/w dr. montero

## 2024-06-03 NOTE — DISCHARGE NOTE OB - CARE PLAN
Principal Discharge DX:	 delivery delivered  Assessment and plan of treatment:	follow up with own ob/gyn in 2  weeks. no sex, pelvic rest, no heavy lifting  Secondary Diagnosis:	Myasthenia gravis  Assessment and plan of treatment:	follow up with pcp as 2 weeks   1

## 2024-06-03 NOTE — DISCHARGE NOTE OB - MEDICATION SUMMARY - MEDICATIONS TO CHANGE
Hide Additional Notes?: No Detail Level: Zone denies I will SWITCH the dose or number of times a day I take the medications listed below when I get home from the hospital:  None

## 2024-06-03 NOTE — LACTATION INITIAL EVALUATION - LACTATION INTERVENTIONS
pt. breast and formula feeds; safe formula feeding/prep inst. given; Breastfeeding on cue 8-12X/24 hours with diaper count to assess for adequate intake, safe skin to skin and rooming-in encouraged. ; pt. attended mother-baby breastfeeding and d/c class this morning; pt's questions addressed; declined Referral to Tele-lactation program for breastfeeding f/u assessment post-discharge/initiate/review safe skin-to-skin/initiate/review hand expression/post discharge community resources provided/initiate/review supplementation plan due to medical indications/review techniques to increase milk supply/review techniques to manage sore nipples/engorgement/reviewed components of an effective feeding and at least 8 effective feedings per day required/reviewed importance of monitoring infant diapers, the breastfeeding log, and minimum output each day/reviewed benefits and recommendations for rooming in/reviewed feeding on demand/by cue at least 8 times a day/reviewed indications of inadequate milk transfer that would require supplementation

## 2024-06-03 NOTE — PROGRESS NOTE ADULT - PROBLEM SELECTOR PLAN 2
- as per anesthesia 1st 24hrs continuos pulse ox and reassess after 24hrs  -Pain management as needed  -DVT ppx: OOB and ambulate, heparin  - cbc in am   - continue home meds   -Advance diet with flatus  -Encourage breastfeeding
A/P: POD #2 s/p c/s; acute blood loss anemia; asymptomatic     discharge pt home  discharge instructions   -d/w dr. kylah grider      -d/w dr. montero

## 2024-06-03 NOTE — DISCHARGE NOTE OB - MEDICATION SUMMARY - MEDICATIONS TO TAKE
I will START or STAY ON the medications listed below when I get home from the hospital:    ibuprofen 600 mg oral tablet  -- 1 tab(s) by mouth every 6 hours  -- Indication: For pain as needed    pyridostigmine 60 mg oral tablet  -- 1 tab(s) by mouth 4 times a day  -- Indication: For Myasthenia gravis    PNV Prenatal oral tablet  -- 1 tab(s) orally  -- Indication: For if breastfeeding    ferrous sulfate 160 mg (50 mg elemental iron) oral tablet, extended release  -- 1 tab(s) orally  -- Indication: For anemia

## 2024-06-03 NOTE — DISCHARGE NOTE OB - PATIENT PORTAL LINK FT
You can access the FollowMyHealth Patient Portal offered by [facility] by registering at the following website: [email or web address]. By joining [employer]’s FollowMyHealth portal, you will also be able to view your health information using other applications (apps) compatible with our system.

## 2024-06-25 ENCOUNTER — INPATIENT (INPATIENT)
Facility: HOSPITAL | Age: 34
LOS: 9 days | Discharge: HOME | DRG: 57 | End: 2024-07-05
Attending: PSYCHIATRY & NEUROLOGY | Admitting: SPECIALIST
Payer: COMMERCIAL

## 2024-06-25 VITALS
DIASTOLIC BLOOD PRESSURE: 65 MMHG | HEART RATE: 66 BPM | SYSTOLIC BLOOD PRESSURE: 112 MMHG | OXYGEN SATURATION: 98 % | RESPIRATION RATE: 16 BRPM | HEIGHT: 62 IN | TEMPERATURE: 98 F | WEIGHT: 149.91 LBS

## 2024-06-25 DIAGNOSIS — Z98.890 OTHER SPECIFIED POSTPROCEDURAL STATES: Chronic | ICD-10-CM

## 2024-06-25 PROCEDURE — 99285 EMERGENCY DEPT VISIT HI MDM: CPT

## 2024-06-26 DIAGNOSIS — G70.00 MYASTHENIA GRAVIS WITHOUT (ACUTE) EXACERBATION: ICD-10-CM

## 2024-06-26 LAB
ALBUMIN SERPL ELPH-MCNC: 4.1 G/DL — SIGNIFICANT CHANGE UP (ref 3.3–5)
ALBUMIN SERPL ELPH-MCNC: 4.4 G/DL — SIGNIFICANT CHANGE UP (ref 3.3–5)
ALBUMIN SERPL ELPH-MCNC: 4.5 G/DL — SIGNIFICANT CHANGE UP (ref 3.3–5)
ALP SERPL-CCNC: 122 U/L — HIGH (ref 40–120)
ALP SERPL-CCNC: 128 U/L — HIGH (ref 40–120)
ALP SERPL-CCNC: 58 U/L — SIGNIFICANT CHANGE UP (ref 40–120)
ALT FLD-CCNC: 13 U/L — SIGNIFICANT CHANGE UP (ref 10–45)
ALT FLD-CCNC: 14 U/L — SIGNIFICANT CHANGE UP (ref 10–45)
ALT FLD-CCNC: 7 U/L — LOW (ref 10–45)
ANION GAP SERPL CALC-SCNC: 14 MMOL/L — SIGNIFICANT CHANGE UP (ref 5–17)
APTT BLD: 28.9 SEC — SIGNIFICANT CHANGE UP (ref 24.5–35.6)
APTT BLD: 32.4 SEC — SIGNIFICANT CHANGE UP (ref 24.5–35.6)
AST SERPL-CCNC: 10 U/L — SIGNIFICANT CHANGE UP (ref 10–40)
AST SERPL-CCNC: 19 U/L — SIGNIFICANT CHANGE UP (ref 10–40)
AST SERPL-CCNC: 21 U/L — SIGNIFICANT CHANGE UP (ref 10–40)
BASE EXCESS BLDV CALC-SCNC: -1 MMOL/L — SIGNIFICANT CHANGE UP (ref -2–3)
BASE EXCESS BLDV CALC-SCNC: -1 MMOL/L — SIGNIFICANT CHANGE UP (ref -2–3)
BASE EXCESS BLDV CALC-SCNC: -1.8 MMOL/L — SIGNIFICANT CHANGE UP (ref -2–3)
BASE EXCESS BLDV CALC-SCNC: -2.2 MMOL/L — LOW (ref -2–3)
BASOPHILS # BLD AUTO: 0.02 K/UL — SIGNIFICANT CHANGE UP (ref 0–0.2)
BASOPHILS # BLD AUTO: 0.03 K/UL — SIGNIFICANT CHANGE UP (ref 0–0.2)
BASOPHILS # BLD AUTO: 0.03 K/UL — SIGNIFICANT CHANGE UP (ref 0–0.2)
BASOPHILS NFR BLD AUTO: 0.4 % — SIGNIFICANT CHANGE UP (ref 0–2)
BASOPHILS NFR BLD AUTO: 0.6 % — SIGNIFICANT CHANGE UP (ref 0–2)
BASOPHILS NFR BLD AUTO: 0.8 % — SIGNIFICANT CHANGE UP (ref 0–2)
BILIRUB SERPL-MCNC: 0.6 MG/DL — SIGNIFICANT CHANGE UP (ref 0.2–1.2)
BILIRUB SERPL-MCNC: 0.6 MG/DL — SIGNIFICANT CHANGE UP (ref 0.2–1.2)
BILIRUB SERPL-MCNC: 1.1 MG/DL — SIGNIFICANT CHANGE UP (ref 0.2–1.2)
BLD GP AB SCN SERPL QL: POSITIVE — SIGNIFICANT CHANGE UP
BUN SERPL-MCNC: 10 MG/DL — SIGNIFICANT CHANGE UP (ref 7–23)
BUN SERPL-MCNC: 11 MG/DL — SIGNIFICANT CHANGE UP (ref 7–23)
BUN SERPL-MCNC: 9 MG/DL — SIGNIFICANT CHANGE UP (ref 7–23)
CA-I BLD-SCNC: 1.2 MMOL/L — SIGNIFICANT CHANGE UP (ref 1.15–1.33)
CA-I BLD-SCNC: 1.26 MMOL/L — SIGNIFICANT CHANGE UP (ref 1.15–1.33)
CA-I SERPL-SCNC: 1.25 MMOL/L — SIGNIFICANT CHANGE UP (ref 1.15–1.33)
CA-I SERPL-SCNC: 1.26 MMOL/L — SIGNIFICANT CHANGE UP (ref 1.15–1.33)
CA-I SERPL-SCNC: 1.26 MMOL/L — SIGNIFICANT CHANGE UP (ref 1.15–1.33)
CA-I SERPL-SCNC: 1.29 MMOL/L — SIGNIFICANT CHANGE UP (ref 1.15–1.33)
CALCIUM SERPL-MCNC: 9.1 MG/DL — SIGNIFICANT CHANGE UP (ref 8.4–10.5)
CALCIUM SERPL-MCNC: 9.4 MG/DL — SIGNIFICANT CHANGE UP (ref 8.4–10.5)
CALCIUM SERPL-MCNC: 9.5 MG/DL — SIGNIFICANT CHANGE UP (ref 8.4–10.5)
CHLORIDE BLDV-SCNC: 107 MMOL/L — SIGNIFICANT CHANGE UP (ref 96–108)
CHLORIDE BLDV-SCNC: 108 MMOL/L — SIGNIFICANT CHANGE UP (ref 96–108)
CHLORIDE SERPL-SCNC: 107 MMOL/L — SIGNIFICANT CHANGE UP (ref 96–108)
CHLORIDE SERPL-SCNC: 107 MMOL/L — SIGNIFICANT CHANGE UP (ref 96–108)
CHLORIDE SERPL-SCNC: 109 MMOL/L — HIGH (ref 96–108)
CO2 BLDV-SCNC: 24 MMOL/L — SIGNIFICANT CHANGE UP (ref 22–26)
CO2 BLDV-SCNC: 24 MMOL/L — SIGNIFICANT CHANGE UP (ref 22–26)
CO2 BLDV-SCNC: 26 MMOL/L — SIGNIFICANT CHANGE UP (ref 22–26)
CO2 BLDV-SCNC: 26 MMOL/L — SIGNIFICANT CHANGE UP (ref 22–26)
CO2 SERPL-SCNC: 19 MMOL/L — LOW (ref 22–31)
CO2 SERPL-SCNC: 19 MMOL/L — LOW (ref 22–31)
CO2 SERPL-SCNC: 21 MMOL/L — LOW (ref 22–31)
CREAT SERPL-MCNC: 0.55 MG/DL — SIGNIFICANT CHANGE UP (ref 0.5–1.3)
CREAT SERPL-MCNC: 0.56 MG/DL — SIGNIFICANT CHANGE UP (ref 0.5–1.3)
CREAT SERPL-MCNC: 0.78 MG/DL — SIGNIFICANT CHANGE UP (ref 0.5–1.3)
EGFR: 104 ML/MIN/1.73M2 — SIGNIFICANT CHANGE UP
EGFR: 125 ML/MIN/1.73M2 — SIGNIFICANT CHANGE UP
EGFR: 126 ML/MIN/1.73M2 — SIGNIFICANT CHANGE UP
EOSINOPHIL # BLD AUTO: 0.19 K/UL — SIGNIFICANT CHANGE UP (ref 0–0.5)
EOSINOPHIL # BLD AUTO: 0.26 K/UL — SIGNIFICANT CHANGE UP (ref 0–0.5)
EOSINOPHIL # BLD AUTO: 0.28 K/UL — SIGNIFICANT CHANGE UP (ref 0–0.5)
EOSINOPHIL NFR BLD AUTO: 4.8 % — SIGNIFICANT CHANGE UP (ref 0–6)
EOSINOPHIL NFR BLD AUTO: 5.2 % — SIGNIFICANT CHANGE UP (ref 0–6)
EOSINOPHIL NFR BLD AUTO: 6.2 % — HIGH (ref 0–6)
FERRITIN SERPL-MCNC: 30 NG/ML — SIGNIFICANT CHANGE UP (ref 15–150)
FIBRINOGEN PPP-MCNC: 157 MG/DL — LOW (ref 200–445)
FIBRINOGEN PPP-MCNC: 364 MG/DL — SIGNIFICANT CHANGE UP (ref 200–445)
GAS PNL BLDV: 136 MMOL/L — SIGNIFICANT CHANGE UP (ref 136–145)
GAS PNL BLDV: 137 MMOL/L — SIGNIFICANT CHANGE UP (ref 136–145)
GAS PNL BLDV: 139 MMOL/L — SIGNIFICANT CHANGE UP (ref 136–145)
GAS PNL BLDV: 139 MMOL/L — SIGNIFICANT CHANGE UP (ref 136–145)
GAS PNL BLDV: SIGNIFICANT CHANGE UP
GLUCOSE BLDC GLUCOMTR-MCNC: 81 MG/DL — SIGNIFICANT CHANGE UP (ref 70–99)
GLUCOSE BLDV-MCNC: 78 MG/DL — SIGNIFICANT CHANGE UP (ref 70–99)
GLUCOSE BLDV-MCNC: 86 MG/DL — SIGNIFICANT CHANGE UP (ref 70–99)
GLUCOSE BLDV-MCNC: 89 MG/DL — SIGNIFICANT CHANGE UP (ref 70–99)
GLUCOSE BLDV-MCNC: 90 MG/DL — SIGNIFICANT CHANGE UP (ref 70–99)
GLUCOSE SERPL-MCNC: 90 MG/DL — SIGNIFICANT CHANGE UP (ref 70–99)
GLUCOSE SERPL-MCNC: 91 MG/DL — SIGNIFICANT CHANGE UP (ref 70–99)
GLUCOSE SERPL-MCNC: 94 MG/DL — SIGNIFICANT CHANGE UP (ref 70–99)
HCO3 BLDV-SCNC: 23 MMOL/L — SIGNIFICANT CHANGE UP (ref 22–29)
HCO3 BLDV-SCNC: 23 MMOL/L — SIGNIFICANT CHANGE UP (ref 22–29)
HCO3 BLDV-SCNC: 25 MMOL/L — SIGNIFICANT CHANGE UP (ref 22–29)
HCO3 BLDV-SCNC: 25 MMOL/L — SIGNIFICANT CHANGE UP (ref 22–29)
HCT VFR BLD CALC: 31.3 % — LOW (ref 34.5–45)
HCT VFR BLD CALC: 32.1 % — LOW (ref 34.5–45)
HCT VFR BLD CALC: 33.3 % — LOW (ref 34.5–45)
HCT VFR BLDA CALC: 31 % — LOW (ref 34.5–46.5)
HCT VFR BLDA CALC: 33 % — LOW (ref 34.5–46.5)
HCT VFR BLDA CALC: 34 % — LOW (ref 34.5–46.5)
HCT VFR BLDA CALC: 34 % — LOW (ref 34.5–46.5)
HCV AB S/CO SERPL IA: 0.15 S/CO — SIGNIFICANT CHANGE UP (ref 0–0.99)
HCV AB SERPL-IMP: SIGNIFICANT CHANGE UP
HGB BLD CALC-MCNC: 10.3 G/DL — LOW (ref 11.7–16.1)
HGB BLD CALC-MCNC: 11 G/DL — LOW (ref 11.7–16.1)
HGB BLD CALC-MCNC: 11.3 G/DL — LOW (ref 11.7–16.1)
HGB BLD CALC-MCNC: 11.4 G/DL — LOW (ref 11.7–16.1)
HGB BLD-MCNC: 10.4 G/DL — LOW (ref 11.5–15.5)
HGB BLD-MCNC: 10.7 G/DL — LOW (ref 11.5–15.5)
HGB BLD-MCNC: 10.9 G/DL — LOW (ref 11.5–15.5)
HOROWITZ INDEX BLDV+IHG-RTO: 21 — SIGNIFICANT CHANGE UP
HOROWITZ INDEX BLDV+IHG-RTO: SIGNIFICANT CHANGE UP
IMM GRANULOCYTES NFR BLD AUTO: 0.2 % — SIGNIFICANT CHANGE UP (ref 0–0.9)
IMM GRANULOCYTES NFR BLD AUTO: 0.2 % — SIGNIFICANT CHANGE UP (ref 0–0.9)
IMM GRANULOCYTES NFR BLD AUTO: 0.3 % — SIGNIFICANT CHANGE UP (ref 0–0.9)
INR BLD: 1.23 RATIO — HIGH (ref 0.85–1.18)
IRON SATN MFR SERPL: 25 UG/DL — LOW (ref 30–160)
IRON SATN MFR SERPL: 6 % — LOW (ref 14–50)
LACTATE BLDV-MCNC: 0.7 MMOL/L — SIGNIFICANT CHANGE UP (ref 0.5–2)
LACTATE BLDV-MCNC: 0.7 MMOL/L — SIGNIFICANT CHANGE UP (ref 0.5–2)
LACTATE BLDV-MCNC: 0.9 MMOL/L — SIGNIFICANT CHANGE UP (ref 0.5–2)
LACTATE BLDV-MCNC: 1 MMOL/L — SIGNIFICANT CHANGE UP (ref 0.5–2)
LYMPHOCYTES # BLD AUTO: 1.69 K/UL — SIGNIFICANT CHANGE UP (ref 1–3.3)
LYMPHOCYTES # BLD AUTO: 1.84 K/UL — SIGNIFICANT CHANGE UP (ref 1–3.3)
LYMPHOCYTES # BLD AUTO: 1.88 K/UL — SIGNIFICANT CHANGE UP (ref 1–3.3)
LYMPHOCYTES # BLD AUTO: 33.6 % — SIGNIFICANT CHANGE UP (ref 13–44)
LYMPHOCYTES # BLD AUTO: 41.4 % — SIGNIFICANT CHANGE UP (ref 13–44)
LYMPHOCYTES # BLD AUTO: 46.1 % — HIGH (ref 13–44)
MAGNESIUM SERPL-MCNC: 1.6 MG/DL — SIGNIFICANT CHANGE UP (ref 1.6–2.6)
MAGNESIUM SERPL-MCNC: 1.9 MG/DL — SIGNIFICANT CHANGE UP (ref 1.6–2.6)
MCHC RBC-ENTMCNC: 25.3 PG — LOW (ref 27–34)
MCHC RBC-ENTMCNC: 25.3 PG — LOW (ref 27–34)
MCHC RBC-ENTMCNC: 25.5 PG — LOW (ref 27–34)
MCHC RBC-ENTMCNC: 32.7 GM/DL — SIGNIFICANT CHANGE UP (ref 32–36)
MCHC RBC-ENTMCNC: 33.2 GM/DL — SIGNIFICANT CHANGE UP (ref 32–36)
MCHC RBC-ENTMCNC: 33.3 GM/DL — SIGNIFICANT CHANGE UP (ref 32–36)
MCV RBC AUTO: 75.9 FL — LOW (ref 80–100)
MCV RBC AUTO: 76.2 FL — LOW (ref 80–100)
MCV RBC AUTO: 78 FL — LOW (ref 80–100)
MONOCYTES # BLD AUTO: 0.47 K/UL — SIGNIFICANT CHANGE UP (ref 0–0.9)
MONOCYTES # BLD AUTO: 0.6 K/UL — SIGNIFICANT CHANGE UP (ref 0–0.9)
MONOCYTES # BLD AUTO: 0.65 K/UL — SIGNIFICANT CHANGE UP (ref 0–0.9)
MONOCYTES NFR BLD AUTO: 11.8 % — SIGNIFICANT CHANGE UP (ref 2–14)
MONOCYTES NFR BLD AUTO: 12.9 % — SIGNIFICANT CHANGE UP (ref 2–14)
MONOCYTES NFR BLD AUTO: 13.2 % — SIGNIFICANT CHANGE UP (ref 2–14)
NEUTROPHILS # BLD AUTO: 1.45 K/UL — LOW (ref 1.8–7.4)
NEUTROPHILS # BLD AUTO: 1.75 K/UL — LOW (ref 1.8–7.4)
NEUTROPHILS # BLD AUTO: 2.39 K/UL — SIGNIFICANT CHANGE UP (ref 1.8–7.4)
NEUTROPHILS NFR BLD AUTO: 36.2 % — LOW (ref 43–77)
NEUTROPHILS NFR BLD AUTO: 38.6 % — LOW (ref 43–77)
NEUTROPHILS NFR BLD AUTO: 47.5 % — SIGNIFICANT CHANGE UP (ref 43–77)
NRBC # BLD: 0 /100 WBCS — SIGNIFICANT CHANGE UP (ref 0–0)
PCO2 BLDV: 39 MMHG — SIGNIFICANT CHANGE UP (ref 39–42)
PCO2 BLDV: 41 MMHG — SIGNIFICANT CHANGE UP (ref 39–42)
PCO2 BLDV: 45 MMHG — HIGH (ref 39–42)
PCO2 BLDV: 45 MMHG — HIGH (ref 39–42)
PH BLDV: 7.35 — SIGNIFICANT CHANGE UP (ref 7.32–7.43)
PH BLDV: 7.35 — SIGNIFICANT CHANGE UP (ref 7.32–7.43)
PH BLDV: 7.36 — SIGNIFICANT CHANGE UP (ref 7.32–7.43)
PH BLDV: 7.38 — SIGNIFICANT CHANGE UP (ref 7.32–7.43)
PHOSPHATE SERPL-MCNC: 3.9 MG/DL — SIGNIFICANT CHANGE UP (ref 2.5–4.5)
PHOSPHATE SERPL-MCNC: 4.2 MG/DL — SIGNIFICANT CHANGE UP (ref 2.5–4.5)
PLATELET # BLD AUTO: 335 K/UL — SIGNIFICANT CHANGE UP (ref 150–400)
PLATELET # BLD AUTO: 369 K/UL — SIGNIFICANT CHANGE UP (ref 150–400)
PLATELET # BLD AUTO: 412 K/UL — HIGH (ref 150–400)
PO2 BLDV: 51 MMHG — HIGH (ref 25–45)
PO2 BLDV: 60 MMHG — HIGH (ref 25–45)
PO2 BLDV: 73 MMHG — HIGH (ref 25–45)
PO2 BLDV: 93 MMHG — HIGH (ref 25–45)
POTASSIUM BLDV-SCNC: 3.6 MMOL/L — SIGNIFICANT CHANGE UP (ref 3.5–5.1)
POTASSIUM BLDV-SCNC: 3.8 MMOL/L — SIGNIFICANT CHANGE UP (ref 3.5–5.1)
POTASSIUM BLDV-SCNC: 3.9 MMOL/L — SIGNIFICANT CHANGE UP (ref 3.5–5.1)
POTASSIUM BLDV-SCNC: 4 MMOL/L — SIGNIFICANT CHANGE UP (ref 3.5–5.1)
POTASSIUM SERPL-MCNC: 3.8 MMOL/L — SIGNIFICANT CHANGE UP (ref 3.5–5.3)
POTASSIUM SERPL-MCNC: 3.9 MMOL/L — SIGNIFICANT CHANGE UP (ref 3.5–5.3)
POTASSIUM SERPL-MCNC: 4.1 MMOL/L — SIGNIFICANT CHANGE UP (ref 3.5–5.3)
POTASSIUM SERPL-SCNC: 3.8 MMOL/L — SIGNIFICANT CHANGE UP (ref 3.5–5.3)
POTASSIUM SERPL-SCNC: 3.9 MMOL/L — SIGNIFICANT CHANGE UP (ref 3.5–5.3)
POTASSIUM SERPL-SCNC: 4.1 MMOL/L — SIGNIFICANT CHANGE UP (ref 3.5–5.3)
PROT SERPL-MCNC: 6.1 G/DL — SIGNIFICANT CHANGE UP (ref 6–8.3)
PROT SERPL-MCNC: 7.8 G/DL — SIGNIFICANT CHANGE UP (ref 6–8.3)
PROT SERPL-MCNC: 7.9 G/DL — SIGNIFICANT CHANGE UP (ref 6–8.3)
PROTHROM AB SERPL-ACNC: 12.8 SEC — SIGNIFICANT CHANGE UP (ref 9.5–13)
RBC # BLD: 4.11 M/UL — SIGNIFICANT CHANGE UP (ref 3.8–5.2)
RBC # BLD: 4.23 M/UL — SIGNIFICANT CHANGE UP (ref 3.8–5.2)
RBC # BLD: 4.27 M/UL — SIGNIFICANT CHANGE UP (ref 3.8–5.2)
RBC # FLD: 17.2 % — HIGH (ref 10.3–14.5)
RBC # FLD: 17.3 % — HIGH (ref 10.3–14.5)
RBC # FLD: 17.6 % — HIGH (ref 10.3–14.5)
RH IG SCN BLD-IMP: POSITIVE — SIGNIFICANT CHANGE UP
SAO2 % BLDV: 82.7 % — SIGNIFICANT CHANGE UP (ref 67–88)
SAO2 % BLDV: 89.5 % — HIGH (ref 67–88)
SAO2 % BLDV: 95.3 % — HIGH (ref 67–88)
SAO2 % BLDV: 98.3 % — HIGH (ref 67–88)
SODIUM SERPL-SCNC: 140 MMOL/L — SIGNIFICANT CHANGE UP (ref 135–145)
SODIUM SERPL-SCNC: 142 MMOL/L — SIGNIFICANT CHANGE UP (ref 135–145)
SODIUM SERPL-SCNC: 142 MMOL/L — SIGNIFICANT CHANGE UP (ref 135–145)
TIBC SERPL-MCNC: 398 UG/DL — SIGNIFICANT CHANGE UP (ref 220–430)
TRANSFERRIN SERPL-MCNC: 306 MG/DL — SIGNIFICANT CHANGE UP (ref 200–360)
UIBC SERPL-MCNC: 373 UG/DL — HIGH (ref 110–370)
WBC # BLD: 3.99 K/UL — SIGNIFICANT CHANGE UP (ref 3.8–10.5)
WBC # BLD: 4.54 K/UL — SIGNIFICANT CHANGE UP (ref 3.8–10.5)
WBC # BLD: 5.03 K/UL — SIGNIFICANT CHANGE UP (ref 3.8–10.5)
WBC # FLD AUTO: 3.99 K/UL — SIGNIFICANT CHANGE UP (ref 3.8–10.5)
WBC # FLD AUTO: 4.54 K/UL — SIGNIFICANT CHANGE UP (ref 3.8–10.5)
WBC # FLD AUTO: 5.03 K/UL — SIGNIFICANT CHANGE UP (ref 3.8–10.5)

## 2024-06-26 PROCEDURE — 71045 X-RAY EXAM CHEST 1 VIEW: CPT | Mod: 26,76

## 2024-06-26 PROCEDURE — 99223 1ST HOSP IP/OBS HIGH 75: CPT | Mod: GC

## 2024-06-26 PROCEDURE — 99292 CRITICAL CARE ADDL 30 MIN: CPT | Mod: GC,25

## 2024-06-26 PROCEDURE — 93308 TTE F-UP OR LMTD: CPT | Mod: 26,GC

## 2024-06-26 PROCEDURE — 99252 IP/OBS CONSLTJ NEW/EST SF 35: CPT | Mod: 25

## 2024-06-26 PROCEDURE — 86077 PHYS BLOOD BANK SERV XMATCH: CPT

## 2024-06-26 PROCEDURE — 76604 US EXAM CHEST: CPT | Mod: 26,GC

## 2024-06-26 PROCEDURE — 36514 APHERESIS PLASMA: CPT

## 2024-06-26 RX ORDER — SODIUM CHLORIDE 0.9 % (FLUSH) 0.9 %
10 SYRINGE (ML) INJECTION
Refills: 0 | Status: DISCONTINUED | OUTPATIENT
Start: 2024-06-26 | End: 2024-07-05

## 2024-06-26 RX ORDER — PYRIDOSTIGMINE BROMIDE 60 MG/1
60 TABLET ORAL EVERY 6 HOURS
Refills: 0 | Status: DISCONTINUED | OUTPATIENT
Start: 2024-06-26 | End: 2024-06-26

## 2024-06-26 RX ORDER — ENOXAPARIN SODIUM 100 MG/ML
40 INJECTION SUBCUTANEOUS EVERY 24 HOURS
Refills: 0 | Status: DISCONTINUED | OUTPATIENT
Start: 2024-06-26 | End: 2024-07-05

## 2024-06-26 RX ORDER — ACETAMINOPHEN 325 MG
1000 TABLET ORAL ONCE
Refills: 0 | Status: COMPLETED | OUTPATIENT
Start: 2024-06-26 | End: 2024-06-26

## 2024-06-26 RX ORDER — FENTANYL CITRATE 50 UG/ML
25 INJECTION, SOLUTION INTRAMUSCULAR; INTRAVENOUS ONCE
Refills: 0 | Status: DISCONTINUED | OUTPATIENT
Start: 2024-06-26 | End: 2024-06-26

## 2024-06-26 RX ADMIN — Medication 400 MILLIGRAM(S): at 09:51

## 2024-06-26 RX ADMIN — ENOXAPARIN SODIUM 40 MILLIGRAM(S): 100 INJECTION SUBCUTANEOUS at 12:09

## 2024-06-26 RX ADMIN — PYRIDOSTIGMINE BROMIDE 60 MILLIGRAM(S): 60 TABLET ORAL at 12:09

## 2024-06-26 RX ADMIN — FENTANYL CITRATE 25 MICROGRAM(S): 50 INJECTION, SOLUTION INTRAMUSCULAR; INTRAVENOUS at 06:15

## 2024-06-26 RX ADMIN — Medication 1000 MILLIGRAM(S): at 10:51

## 2024-06-26 RX ADMIN — FENTANYL CITRATE 25 MICROGRAM(S): 50 INJECTION, SOLUTION INTRAMUSCULAR; INTRAVENOUS at 06:00

## 2024-06-27 LAB
GLUCOSE BLDC GLUCOMTR-MCNC: 88 MG/DL — SIGNIFICANT CHANGE UP (ref 70–99)
GLUCOSE BLDC GLUCOMTR-MCNC: 88 MG/DL — SIGNIFICANT CHANGE UP (ref 70–99)

## 2024-06-27 PROCEDURE — 99233 SBSQ HOSP IP/OBS HIGH 50: CPT | Mod: GC

## 2024-06-27 RX ORDER — FERROUS SULFATE 325(65) MG
325 TABLET ORAL
Refills: 0 | Status: DISCONTINUED | OUTPATIENT
Start: 2024-06-27 | End: 2024-07-05

## 2024-06-27 RX ORDER — POLYETHYLENE GLYCOL 3350 1 G/G
17 POWDER ORAL DAILY
Refills: 0 | Status: DISCONTINUED | OUTPATIENT
Start: 2024-06-27 | End: 2024-07-05

## 2024-06-27 RX ADMIN — ENOXAPARIN SODIUM 40 MILLIGRAM(S): 100 INJECTION SUBCUTANEOUS at 11:38

## 2024-06-27 RX ADMIN — Medication 1 APPLICATION(S): at 05:57

## 2024-06-28 LAB
ALBUMIN SERPL ELPH-MCNC: 4.3 G/DL — SIGNIFICANT CHANGE UP (ref 3.3–5)
ALP SERPL-CCNC: 79 U/L — SIGNIFICANT CHANGE UP (ref 40–120)
ALT FLD-CCNC: 9 U/L — LOW (ref 10–45)
ANION GAP SERPL CALC-SCNC: 13 MMOL/L — SIGNIFICANT CHANGE UP (ref 5–17)
AST SERPL-CCNC: 17 U/L — SIGNIFICANT CHANGE UP (ref 10–40)
BILIRUB SERPL-MCNC: 1 MG/DL — SIGNIFICANT CHANGE UP (ref 0.2–1.2)
BUN SERPL-MCNC: 12 MG/DL — SIGNIFICANT CHANGE UP (ref 7–23)
CA-I BLD-SCNC: 1.27 MMOL/L — SIGNIFICANT CHANGE UP (ref 1.15–1.33)
CALCIUM SERPL-MCNC: 9.8 MG/DL — SIGNIFICANT CHANGE UP (ref 8.4–10.5)
CHLORIDE SERPL-SCNC: 107 MMOL/L — SIGNIFICANT CHANGE UP (ref 96–108)
CO2 SERPL-SCNC: 21 MMOL/L — LOW (ref 22–31)
CREAT SERPL-MCNC: 0.64 MG/DL — SIGNIFICANT CHANGE UP (ref 0.5–1.3)
EGFR: 121 ML/MIN/1.73M2 — SIGNIFICANT CHANGE UP
FIBRINOGEN PPP-MCNC: 293 MG/DL — SIGNIFICANT CHANGE UP (ref 200–445)
GLUCOSE SERPL-MCNC: 89 MG/DL — SIGNIFICANT CHANGE UP (ref 70–99)
HCT VFR BLD CALC: 33.7 % — LOW (ref 34.5–45)
HGB BLD-MCNC: 10.8 G/DL — LOW (ref 11.5–15.5)
INR BLD: 1.03 RATIO — SIGNIFICANT CHANGE UP (ref 0.85–1.18)
MAGNESIUM SERPL-MCNC: 1.9 MG/DL — SIGNIFICANT CHANGE UP (ref 1.6–2.6)
MCHC RBC-ENTMCNC: 24.7 PG — LOW (ref 27–34)
MCHC RBC-ENTMCNC: 32 GM/DL — SIGNIFICANT CHANGE UP (ref 32–36)
MCV RBC AUTO: 77.1 FL — LOW (ref 80–100)
NRBC # BLD: 0 /100 WBCS — SIGNIFICANT CHANGE UP (ref 0–0)
PHOSPHATE SERPL-MCNC: 4.6 MG/DL — HIGH (ref 2.5–4.5)
PLATELET # BLD AUTO: 288 K/UL — SIGNIFICANT CHANGE UP (ref 150–400)
POTASSIUM SERPL-MCNC: 4.2 MMOL/L — SIGNIFICANT CHANGE UP (ref 3.5–5.3)
POTASSIUM SERPL-SCNC: 4.2 MMOL/L — SIGNIFICANT CHANGE UP (ref 3.5–5.3)
PROT SERPL-MCNC: 6.7 G/DL — SIGNIFICANT CHANGE UP (ref 6–8.3)
PROTHROM AB SERPL-ACNC: 10.8 SEC — SIGNIFICANT CHANGE UP (ref 9.5–13)
RBC # BLD: 4.37 M/UL — SIGNIFICANT CHANGE UP (ref 3.8–5.2)
RBC # FLD: 17.4 % — HIGH (ref 10.3–14.5)
SODIUM SERPL-SCNC: 141 MMOL/L — SIGNIFICANT CHANGE UP (ref 135–145)
WBC # BLD: 4.39 K/UL — SIGNIFICANT CHANGE UP (ref 3.8–10.5)
WBC # FLD AUTO: 4.39 K/UL — SIGNIFICANT CHANGE UP (ref 3.8–10.5)

## 2024-06-28 PROCEDURE — 36514 APHERESIS PLASMA: CPT

## 2024-06-28 PROCEDURE — 99233 SBSQ HOSP IP/OBS HIGH 50: CPT | Mod: GC

## 2024-06-28 RX ADMIN — Medication 325 MILLIGRAM(S): at 08:00

## 2024-06-29 PROCEDURE — 99232 SBSQ HOSP IP/OBS MODERATE 35: CPT

## 2024-06-29 RX ADMIN — ENOXAPARIN SODIUM 40 MILLIGRAM(S): 100 INJECTION SUBCUTANEOUS at 21:18

## 2024-06-30 LAB
ANION GAP SERPL CALC-SCNC: 11 MMOL/L — SIGNIFICANT CHANGE UP (ref 5–17)
BUN SERPL-MCNC: 10 MG/DL — SIGNIFICANT CHANGE UP (ref 7–23)
CALCIUM SERPL-MCNC: 9.2 MG/DL — SIGNIFICANT CHANGE UP (ref 8.4–10.5)
CHLORIDE SERPL-SCNC: 106 MMOL/L — SIGNIFICANT CHANGE UP (ref 96–108)
CO2 SERPL-SCNC: 20 MMOL/L — LOW (ref 22–31)
CREAT SERPL-MCNC: 0.6 MG/DL — SIGNIFICANT CHANGE UP (ref 0.5–1.3)
EGFR: 123 ML/MIN/1.73M2 — SIGNIFICANT CHANGE UP
GLUCOSE SERPL-MCNC: 83 MG/DL — SIGNIFICANT CHANGE UP (ref 70–99)
MAGNESIUM SERPL-MCNC: 1.9 MG/DL — SIGNIFICANT CHANGE UP (ref 1.6–2.6)
MRSA PCR RESULT.: SIGNIFICANT CHANGE UP
PHOSPHATE SERPL-MCNC: 4.2 MG/DL — SIGNIFICANT CHANGE UP (ref 2.5–4.5)
POTASSIUM SERPL-MCNC: 4.1 MMOL/L — SIGNIFICANT CHANGE UP (ref 3.5–5.3)
POTASSIUM SERPL-SCNC: 4.1 MMOL/L — SIGNIFICANT CHANGE UP (ref 3.5–5.3)
S AUREUS DNA NOSE QL NAA+PROBE: SIGNIFICANT CHANGE UP
SODIUM SERPL-SCNC: 137 MMOL/L — SIGNIFICANT CHANGE UP (ref 135–145)

## 2024-06-30 PROCEDURE — 99231 SBSQ HOSP IP/OBS SF/LOW 25: CPT | Mod: GC

## 2024-06-30 RX ADMIN — Medication 325 MILLIGRAM(S): at 08:22

## 2024-06-30 RX ADMIN — Medication 1 APPLICATION(S): at 13:20

## 2024-07-01 LAB
ALBUMIN SERPL ELPH-MCNC: 4.4 G/DL — SIGNIFICANT CHANGE UP (ref 3.3–5)
ALP SERPL-CCNC: 58 U/L — SIGNIFICANT CHANGE UP (ref 40–120)
ALT FLD-CCNC: 9 U/L — LOW (ref 10–45)
ANION GAP SERPL CALC-SCNC: 12 MMOL/L — SIGNIFICANT CHANGE UP (ref 5–17)
APTT BLD: 28.1 SEC — SIGNIFICANT CHANGE UP (ref 24.5–35.6)
AST SERPL-CCNC: 16 U/L — SIGNIFICANT CHANGE UP (ref 10–40)
BILIRUB SERPL-MCNC: 0.6 MG/DL — SIGNIFICANT CHANGE UP (ref 0.2–1.2)
BUN SERPL-MCNC: 10 MG/DL — SIGNIFICANT CHANGE UP (ref 7–23)
CA-I BLD-SCNC: 1.21 MMOL/L — SIGNIFICANT CHANGE UP (ref 1.15–1.33)
CALCIUM SERPL-MCNC: 9.3 MG/DL — SIGNIFICANT CHANGE UP (ref 8.4–10.5)
CHLORIDE SERPL-SCNC: 108 MMOL/L — SIGNIFICANT CHANGE UP (ref 96–108)
CO2 SERPL-SCNC: 22 MMOL/L — SIGNIFICANT CHANGE UP (ref 22–31)
CREAT SERPL-MCNC: 0.61 MG/DL — SIGNIFICANT CHANGE UP (ref 0.5–1.3)
EGFR: 122 ML/MIN/1.73M2 — SIGNIFICANT CHANGE UP
FIBRINOGEN PPP-MCNC: 220 MG/DL — SIGNIFICANT CHANGE UP (ref 200–445)
GLUCOSE SERPL-MCNC: 93 MG/DL — SIGNIFICANT CHANGE UP (ref 70–99)
HCT VFR BLD CALC: 29.4 % — LOW (ref 34.5–45)
HGB BLD-MCNC: 10 G/DL — LOW (ref 11.5–15.5)
INR BLD: 0.99 RATIO — SIGNIFICANT CHANGE UP (ref 0.85–1.18)
MCHC RBC-ENTMCNC: 25.4 PG — LOW (ref 27–34)
MCHC RBC-ENTMCNC: 34 GM/DL — SIGNIFICANT CHANGE UP (ref 32–36)
MCV RBC AUTO: 74.6 FL — LOW (ref 80–100)
NRBC # BLD: 0 /100 WBCS — SIGNIFICANT CHANGE UP (ref 0–0)
PLATELET # BLD AUTO: 202 K/UL — SIGNIFICANT CHANGE UP (ref 150–400)
POTASSIUM SERPL-MCNC: 4 MMOL/L — SIGNIFICANT CHANGE UP (ref 3.5–5.3)
POTASSIUM SERPL-SCNC: 4 MMOL/L — SIGNIFICANT CHANGE UP (ref 3.5–5.3)
PROT SERPL-MCNC: 6.3 G/DL — SIGNIFICANT CHANGE UP (ref 6–8.3)
PROTHROM AB SERPL-ACNC: 10.4 SEC — SIGNIFICANT CHANGE UP (ref 9.5–13)
RBC # BLD: 3.94 M/UL — SIGNIFICANT CHANGE UP (ref 3.8–5.2)
RBC # FLD: 17.7 % — HIGH (ref 10.3–14.5)
SODIUM SERPL-SCNC: 142 MMOL/L — SIGNIFICANT CHANGE UP (ref 135–145)
WBC # BLD: 3.64 K/UL — LOW (ref 3.8–10.5)
WBC # FLD AUTO: 3.64 K/UL — LOW (ref 3.8–10.5)

## 2024-07-01 PROCEDURE — 99232 SBSQ HOSP IP/OBS MODERATE 35: CPT

## 2024-07-01 PROCEDURE — 36514 APHERESIS PLASMA: CPT

## 2024-07-01 RX ADMIN — ENOXAPARIN SODIUM 40 MILLIGRAM(S): 100 INJECTION SUBCUTANEOUS at 21:51

## 2024-07-01 RX ADMIN — Medication 1 APPLICATION(S): at 12:28

## 2024-07-02 LAB
ANION GAP SERPL CALC-SCNC: 13 MMOL/L — SIGNIFICANT CHANGE UP (ref 5–17)
BUN SERPL-MCNC: 10 MG/DL — SIGNIFICANT CHANGE UP (ref 7–23)
CALCIUM SERPL-MCNC: 9.6 MG/DL — SIGNIFICANT CHANGE UP (ref 8.4–10.5)
CHLORIDE SERPL-SCNC: 108 MMOL/L — SIGNIFICANT CHANGE UP (ref 96–108)
CO2 SERPL-SCNC: 19 MMOL/L — LOW (ref 22–31)
CREAT SERPL-MCNC: 0.63 MG/DL — SIGNIFICANT CHANGE UP (ref 0.5–1.3)
EGFR: 122 ML/MIN/1.73M2 — SIGNIFICANT CHANGE UP
GLUCOSE SERPL-MCNC: 77 MG/DL — SIGNIFICANT CHANGE UP (ref 70–99)
HCT VFR BLD CALC: 31.5 % — LOW (ref 34.5–45)
HGB BLD-MCNC: 10.5 G/DL — LOW (ref 11.5–15.5)
MCHC RBC-ENTMCNC: 25.1 PG — LOW (ref 27–34)
MCHC RBC-ENTMCNC: 33.3 GM/DL — SIGNIFICANT CHANGE UP (ref 32–36)
MCV RBC AUTO: 75.2 FL — LOW (ref 80–100)
NRBC # BLD: 0 /100 WBCS — SIGNIFICANT CHANGE UP (ref 0–0)
PLATELET # BLD AUTO: 199 K/UL — SIGNIFICANT CHANGE UP (ref 150–400)
POTASSIUM SERPL-MCNC: 4.3 MMOL/L — SIGNIFICANT CHANGE UP (ref 3.5–5.3)
POTASSIUM SERPL-SCNC: 4.3 MMOL/L — SIGNIFICANT CHANGE UP (ref 3.5–5.3)
RBC # BLD: 4.19 M/UL — SIGNIFICANT CHANGE UP (ref 3.8–5.2)
RBC # FLD: 18 % — HIGH (ref 10.3–14.5)
SODIUM SERPL-SCNC: 140 MMOL/L — SIGNIFICANT CHANGE UP (ref 135–145)
WBC # BLD: 4.57 K/UL — SIGNIFICANT CHANGE UP (ref 3.8–10.5)
WBC # FLD AUTO: 4.57 K/UL — SIGNIFICANT CHANGE UP (ref 3.8–10.5)

## 2024-07-02 PROCEDURE — 99232 SBSQ HOSP IP/OBS MODERATE 35: CPT

## 2024-07-02 RX ADMIN — Medication 1 APPLICATION(S): at 12:23

## 2024-07-02 RX ADMIN — ENOXAPARIN SODIUM 40 MILLIGRAM(S): 100 INJECTION SUBCUTANEOUS at 21:13

## 2024-07-03 LAB
ALBUMIN SERPL ELPH-MCNC: 4.3 G/DL — SIGNIFICANT CHANGE UP (ref 3.3–5)
ALP SERPL-CCNC: 51 U/L — SIGNIFICANT CHANGE UP (ref 40–120)
ALT FLD-CCNC: 8 U/L — LOW (ref 10–45)
ANION GAP SERPL CALC-SCNC: 15 MMOL/L — SIGNIFICANT CHANGE UP (ref 5–17)
APTT BLD: 32.1 SEC — SIGNIFICANT CHANGE UP (ref 24.5–35.6)
AST SERPL-CCNC: 16 U/L — SIGNIFICANT CHANGE UP (ref 10–40)
BILIRUB SERPL-MCNC: 0.6 MG/DL — SIGNIFICANT CHANGE UP (ref 0.2–1.2)
BUN SERPL-MCNC: 12 MG/DL — SIGNIFICANT CHANGE UP (ref 7–23)
CA-I BLD-SCNC: 1.2 MMOL/L — SIGNIFICANT CHANGE UP (ref 1.15–1.33)
CALCIUM SERPL-MCNC: 9.4 MG/DL — SIGNIFICANT CHANGE UP (ref 8.4–10.5)
CHLORIDE SERPL-SCNC: 107 MMOL/L — SIGNIFICANT CHANGE UP (ref 96–108)
CO2 SERPL-SCNC: 20 MMOL/L — LOW (ref 22–31)
CREAT SERPL-MCNC: 0.62 MG/DL — SIGNIFICANT CHANGE UP (ref 0.5–1.3)
EGFR: 122 ML/MIN/1.73M2 — SIGNIFICANT CHANGE UP
FIBRINOGEN PPP-MCNC: 195 MG/DL — LOW (ref 200–445)
GLUCOSE SERPL-MCNC: 88 MG/DL — SIGNIFICANT CHANGE UP (ref 70–99)
HCT VFR BLD CALC: 31.2 % — LOW (ref 34.5–45)
HGB BLD-MCNC: 10.3 G/DL — LOW (ref 11.5–15.5)
INR BLD: 1.09 RATIO — SIGNIFICANT CHANGE UP (ref 0.85–1.18)
MAGNESIUM SERPL-MCNC: 2 MG/DL — SIGNIFICANT CHANGE UP (ref 1.6–2.6)
MCHC RBC-ENTMCNC: 25.5 PG — LOW (ref 27–34)
MCHC RBC-ENTMCNC: 33 GM/DL — SIGNIFICANT CHANGE UP (ref 32–36)
MCV RBC AUTO: 77.2 FL — LOW (ref 80–100)
NRBC # BLD: 0 /100 WBCS — SIGNIFICANT CHANGE UP (ref 0–0)
PHOSPHATE SERPL-MCNC: 4.8 MG/DL — HIGH (ref 2.5–4.5)
PLATELET # BLD AUTO: 216 K/UL — SIGNIFICANT CHANGE UP (ref 150–400)
POTASSIUM SERPL-MCNC: 4.3 MMOL/L — SIGNIFICANT CHANGE UP (ref 3.5–5.3)
POTASSIUM SERPL-SCNC: 4.3 MMOL/L — SIGNIFICANT CHANGE UP (ref 3.5–5.3)
PROT SERPL-MCNC: 6.3 G/DL — SIGNIFICANT CHANGE UP (ref 6–8.3)
PROTHROM AB SERPL-ACNC: 11.4 SEC — SIGNIFICANT CHANGE UP (ref 9.5–13)
RBC # BLD: 4.04 M/UL — SIGNIFICANT CHANGE UP (ref 3.8–5.2)
RBC # FLD: 18 % — HIGH (ref 10.3–14.5)
SODIUM SERPL-SCNC: 142 MMOL/L — SIGNIFICANT CHANGE UP (ref 135–145)
WBC # BLD: 4.42 K/UL — SIGNIFICANT CHANGE UP (ref 3.8–10.5)
WBC # FLD AUTO: 4.42 K/UL — SIGNIFICANT CHANGE UP (ref 3.8–10.5)

## 2024-07-03 PROCEDURE — 36514 APHERESIS PLASMA: CPT

## 2024-07-03 PROCEDURE — 99232 SBSQ HOSP IP/OBS MODERATE 35: CPT

## 2024-07-04 LAB
ANION GAP SERPL CALC-SCNC: 12 MMOL/L — SIGNIFICANT CHANGE UP (ref 5–17)
BUN SERPL-MCNC: 10 MG/DL — SIGNIFICANT CHANGE UP (ref 7–23)
CALCIUM SERPL-MCNC: 9 MG/DL — SIGNIFICANT CHANGE UP (ref 8.4–10.5)
CHLORIDE SERPL-SCNC: 111 MMOL/L — HIGH (ref 96–108)
CO2 SERPL-SCNC: 18 MMOL/L — LOW (ref 22–31)
CREAT SERPL-MCNC: 0.6 MG/DL — SIGNIFICANT CHANGE UP (ref 0.5–1.3)
EGFR: 123 ML/MIN/1.73M2 — SIGNIFICANT CHANGE UP
GLUCOSE SERPL-MCNC: 95 MG/DL — SIGNIFICANT CHANGE UP (ref 70–99)
HCT VFR BLD CALC: 29.2 % — LOW (ref 34.5–45)
HGB BLD-MCNC: 9.7 G/DL — LOW (ref 11.5–15.5)
MCHC RBC-ENTMCNC: 25.4 PG — LOW (ref 27–34)
MCHC RBC-ENTMCNC: 33.2 GM/DL — SIGNIFICANT CHANGE UP (ref 32–36)
MCV RBC AUTO: 76.4 FL — LOW (ref 80–100)
NRBC # BLD: 0 /100 WBCS — SIGNIFICANT CHANGE UP (ref 0–0)
PLATELET # BLD AUTO: 198 K/UL — SIGNIFICANT CHANGE UP (ref 150–400)
POTASSIUM SERPL-MCNC: 4.3 MMOL/L — SIGNIFICANT CHANGE UP (ref 3.5–5.3)
POTASSIUM SERPL-SCNC: 4.3 MMOL/L — SIGNIFICANT CHANGE UP (ref 3.5–5.3)
RBC # BLD: 3.82 M/UL — SIGNIFICANT CHANGE UP (ref 3.8–5.2)
RBC # FLD: 18.4 % — HIGH (ref 10.3–14.5)
SODIUM SERPL-SCNC: 141 MMOL/L — SIGNIFICANT CHANGE UP (ref 135–145)
WBC # BLD: 5.01 K/UL — SIGNIFICANT CHANGE UP (ref 3.8–10.5)
WBC # FLD AUTO: 5.01 K/UL — SIGNIFICANT CHANGE UP (ref 3.8–10.5)

## 2024-07-04 PROCEDURE — 99232 SBSQ HOSP IP/OBS MODERATE 35: CPT

## 2024-07-04 RX ADMIN — Medication 1 APPLICATION(S): at 19:00

## 2024-07-05 ENCOUNTER — TRANSCRIPTION ENCOUNTER (OUTPATIENT)
Age: 34
End: 2024-07-05

## 2024-07-05 VITALS
DIASTOLIC BLOOD PRESSURE: 61 MMHG | RESPIRATION RATE: 18 BRPM | HEART RATE: 86 BPM | TEMPERATURE: 98 F | OXYGEN SATURATION: 100 % | SYSTOLIC BLOOD PRESSURE: 94 MMHG

## 2024-07-05 LAB
ALBUMIN SERPL ELPH-MCNC: 5.1 G/DL — HIGH (ref 3.3–5)
ALP SERPL-CCNC: 52 U/L — SIGNIFICANT CHANGE UP (ref 40–120)
ALT FLD-CCNC: 9 U/L — LOW (ref 10–45)
ANION GAP SERPL CALC-SCNC: 15 MMOL/L — SIGNIFICANT CHANGE UP (ref 5–17)
APTT BLD: 29.3 SEC — SIGNIFICANT CHANGE UP (ref 24.5–35.6)
AST SERPL-CCNC: 17 U/L — SIGNIFICANT CHANGE UP (ref 10–40)
BILIRUB SERPL-MCNC: 1.1 MG/DL — SIGNIFICANT CHANGE UP (ref 0.2–1.2)
BUN SERPL-MCNC: 10 MG/DL — SIGNIFICANT CHANGE UP (ref 7–23)
CA-I BLD-SCNC: 1.26 MMOL/L — SIGNIFICANT CHANGE UP (ref 1.15–1.33)
CALCIUM SERPL-MCNC: 9.7 MG/DL — SIGNIFICANT CHANGE UP (ref 8.4–10.5)
CHLORIDE SERPL-SCNC: 104 MMOL/L — SIGNIFICANT CHANGE UP (ref 96–108)
CO2 SERPL-SCNC: 21 MMOL/L — LOW (ref 22–31)
CREAT SERPL-MCNC: 0.62 MG/DL — SIGNIFICANT CHANGE UP (ref 0.5–1.3)
EGFR: 122 ML/MIN/1.73M2 — SIGNIFICANT CHANGE UP
FIBRINOGEN PPP-MCNC: 197 MG/DL — LOW (ref 200–445)
GLUCOSE SERPL-MCNC: 80 MG/DL — SIGNIFICANT CHANGE UP (ref 70–99)
HCT VFR BLD CALC: 35 % — SIGNIFICANT CHANGE UP (ref 34.5–45)
HGB BLD-MCNC: 11.7 G/DL — SIGNIFICANT CHANGE UP (ref 11.5–15.5)
INR BLD: 1.01 RATIO — SIGNIFICANT CHANGE UP (ref 0.85–1.18)
MCHC RBC-ENTMCNC: 25.2 PG — LOW (ref 27–34)
MCHC RBC-ENTMCNC: 33.4 GM/DL — SIGNIFICANT CHANGE UP (ref 32–36)
MCV RBC AUTO: 75.4 FL — LOW (ref 80–100)
NRBC # BLD: 0 /100 WBCS — SIGNIFICANT CHANGE UP (ref 0–0)
PLATELET # BLD AUTO: 159 K/UL — SIGNIFICANT CHANGE UP (ref 150–400)
POTASSIUM SERPL-MCNC: 4.5 MMOL/L — SIGNIFICANT CHANGE UP (ref 3.5–5.3)
POTASSIUM SERPL-SCNC: 4.5 MMOL/L — SIGNIFICANT CHANGE UP (ref 3.5–5.3)
PROT SERPL-MCNC: 6.8 G/DL — SIGNIFICANT CHANGE UP (ref 6–8.3)
PROTHROM AB SERPL-ACNC: 11.1 SEC — SIGNIFICANT CHANGE UP (ref 9.5–13)
RBC # BLD: 4.64 M/UL — SIGNIFICANT CHANGE UP (ref 3.8–5.2)
RBC # FLD: 18.4 % — HIGH (ref 10.3–14.5)
SODIUM SERPL-SCNC: 140 MMOL/L — SIGNIFICANT CHANGE UP (ref 135–145)
WBC # BLD: 3.69 K/UL — LOW (ref 3.8–10.5)
WBC # FLD AUTO: 3.69 K/UL — LOW (ref 3.8–10.5)

## 2024-07-05 PROCEDURE — 85730 THROMBOPLASTIN TIME PARTIAL: CPT

## 2024-07-05 PROCEDURE — 83735 ASSAY OF MAGNESIUM: CPT

## 2024-07-05 PROCEDURE — 97116 GAIT TRAINING THERAPY: CPT

## 2024-07-05 PROCEDURE — 82803 BLOOD GASES ANY COMBINATION: CPT

## 2024-07-05 PROCEDURE — 84132 ASSAY OF SERUM POTASSIUM: CPT

## 2024-07-05 PROCEDURE — 83540 ASSAY OF IRON: CPT

## 2024-07-05 PROCEDURE — 83605 ASSAY OF LACTIC ACID: CPT

## 2024-07-05 PROCEDURE — 85610 PROTHROMBIN TIME: CPT

## 2024-07-05 PROCEDURE — P9041: CPT

## 2024-07-05 PROCEDURE — 82330 ASSAY OF CALCIUM: CPT

## 2024-07-05 PROCEDURE — 86922 COMPATIBILITY TEST ANTIGLOB: CPT

## 2024-07-05 PROCEDURE — 86905 BLOOD TYPING RBC ANTIGENS: CPT

## 2024-07-05 PROCEDURE — 82435 ASSAY OF BLOOD CHLORIDE: CPT

## 2024-07-05 PROCEDURE — 86901 BLOOD TYPING SEROLOGIC RH(D): CPT

## 2024-07-05 PROCEDURE — 86900 BLOOD TYPING SEROLOGIC ABO: CPT

## 2024-07-05 PROCEDURE — 83550 IRON BINDING TEST: CPT

## 2024-07-05 PROCEDURE — 82728 ASSAY OF FERRITIN: CPT

## 2024-07-05 PROCEDURE — 94150 VITAL CAPACITY TEST: CPT

## 2024-07-05 PROCEDURE — 86850 RBC ANTIBODY SCREEN: CPT

## 2024-07-05 PROCEDURE — 97165 OT EVAL LOW COMPLEX 30 MIN: CPT

## 2024-07-05 PROCEDURE — 80053 COMPREHEN METABOLIC PANEL: CPT

## 2024-07-05 PROCEDURE — 99285 EMERGENCY DEPT VISIT HI MDM: CPT | Mod: 25

## 2024-07-05 PROCEDURE — 36415 COLL VENOUS BLD VENIPUNCTURE: CPT

## 2024-07-05 PROCEDURE — 36514 APHERESIS PLASMA: CPT

## 2024-07-05 PROCEDURE — 85025 COMPLETE CBC W/AUTO DIFF WBC: CPT

## 2024-07-05 PROCEDURE — 86870 RBC ANTIBODY IDENTIFICATION: CPT

## 2024-07-05 PROCEDURE — 86803 HEPATITIS C AB TEST: CPT

## 2024-07-05 PROCEDURE — 82947 ASSAY GLUCOSE BLOOD QUANT: CPT

## 2024-07-05 PROCEDURE — 85014 HEMATOCRIT: CPT

## 2024-07-05 PROCEDURE — 71045 X-RAY EXAM CHEST 1 VIEW: CPT

## 2024-07-05 PROCEDURE — 85384 FIBRINOGEN ACTIVITY: CPT

## 2024-07-05 PROCEDURE — 84466 ASSAY OF TRANSFERRIN: CPT

## 2024-07-05 PROCEDURE — 87640 STAPH A DNA AMP PROBE: CPT

## 2024-07-05 PROCEDURE — 85027 COMPLETE CBC AUTOMATED: CPT

## 2024-07-05 PROCEDURE — 84100 ASSAY OF PHOSPHORUS: CPT

## 2024-07-05 PROCEDURE — 99238 HOSP IP/OBS DSCHRG MGMT 30/<: CPT

## 2024-07-05 PROCEDURE — 97162 PT EVAL MOD COMPLEX 30 MIN: CPT

## 2024-07-05 PROCEDURE — 82962 GLUCOSE BLOOD TEST: CPT

## 2024-07-05 PROCEDURE — 84295 ASSAY OF SERUM SODIUM: CPT

## 2024-07-05 PROCEDURE — 97530 THERAPEUTIC ACTIVITIES: CPT

## 2024-07-05 PROCEDURE — 97110 THERAPEUTIC EXERCISES: CPT

## 2024-07-05 PROCEDURE — 86880 COOMBS TEST DIRECT: CPT

## 2024-07-05 PROCEDURE — 80048 BASIC METABOLIC PNL TOTAL CA: CPT

## 2024-07-05 PROCEDURE — 85018 HEMOGLOBIN: CPT

## 2024-07-05 PROCEDURE — 87641 MR-STAPH DNA AMP PROBE: CPT

## 2024-07-05 RX ORDER — PRENATAL VIT/IRON FUM/FOLIC AC 60 MG-1 MG
1 TABLET ORAL
Refills: 0 | DISCHARGE

## 2024-07-05 RX ORDER — ACETAMINOPHEN 325 MG
975 TABLET ORAL ONCE
Refills: 0 | Status: DISCONTINUED | OUTPATIENT
Start: 2024-07-05 | End: 2024-07-05

## 2024-07-05 RX ADMIN — Medication 1 APPLICATION(S): at 11:36

## 2024-07-08 ENCOUNTER — APPOINTMENT (OUTPATIENT)
Dept: NEUROLOGY | Facility: CLINIC | Age: 34
End: 2024-07-08
Payer: MEDICAID

## 2024-07-08 VITALS
DIASTOLIC BLOOD PRESSURE: 70 MMHG | HEIGHT: 62 IN | BODY MASS INDEX: 26.13 KG/M2 | WEIGHT: 142 LBS | SYSTOLIC BLOOD PRESSURE: 107 MMHG | HEART RATE: 60 BPM

## 2024-07-08 DIAGNOSIS — D84.9 IMMUNODEFICIENCY, UNSPECIFIED: ICD-10-CM

## 2024-07-08 DIAGNOSIS — G70.00 MYASTHENIA GRAVIS W/OUT (ACUTE) EXACERBATION: ICD-10-CM

## 2024-07-08 PROCEDURE — 99214 OFFICE O/P EST MOD 30 MIN: CPT

## 2024-08-06 NOTE — OB PROVIDER LABOR PROGRESS NOTE - NSVAGINALEXAM_OBGYN_ALL_OB_DT
No concerns in regards to the blood sugar as her A1c back in January was at 5.4.  Slight increase in random blood sugar within normal A1c is not concerning.    Cholesterol however with some room for improvement as discussed.  Recommend switching cholesterol medication to rosuvastatin 20 mg instead of atorvastatin 20 mg.  Please discuss with patient okay to send to pharmacy x 90 tablets with a refill.   01-Jun-2024 01:19

## 2024-08-07 ENCOUNTER — APPOINTMENT (OUTPATIENT)
Dept: NEUROLOGY | Facility: CLINIC | Age: 34
End: 2024-08-07

## 2024-08-07 ENCOUNTER — INPATIENT (INPATIENT)
Facility: HOSPITAL | Age: 34
LOS: 13 days | Discharge: HOME | DRG: 57 | End: 2024-08-21
Attending: PSYCHIATRY & NEUROLOGY | Admitting: SPECIALIST
Payer: COMMERCIAL

## 2024-08-07 VITALS
WEIGHT: 139.99 LBS | HEART RATE: 60 BPM | SYSTOLIC BLOOD PRESSURE: 120 MMHG | RESPIRATION RATE: 20 BRPM | OXYGEN SATURATION: 99 % | HEIGHT: 62 IN | DIASTOLIC BLOOD PRESSURE: 81 MMHG | TEMPERATURE: 99 F

## 2024-08-07 DIAGNOSIS — Z98.890 OTHER SPECIFIED POSTPROCEDURAL STATES: Chronic | ICD-10-CM

## 2024-08-07 DIAGNOSIS — G70.00 MYASTHENIA GRAVIS WITHOUT (ACUTE) EXACERBATION: ICD-10-CM

## 2024-08-07 DIAGNOSIS — Z98.891 HISTORY OF UTERINE SCAR FROM PREVIOUS SURGERY: Chronic | ICD-10-CM

## 2024-08-07 LAB
ALBUMIN SERPL ELPH-MCNC: 4.5 G/DL — SIGNIFICANT CHANGE UP (ref 3.3–5)
ALP SERPL-CCNC: 122 U/L — HIGH (ref 40–120)
ALT FLD-CCNC: 12 U/L — SIGNIFICANT CHANGE UP (ref 10–45)
ANION GAP SERPL CALC-SCNC: 14 MMOL/L — SIGNIFICANT CHANGE UP (ref 5–17)
ANISOCYTOSIS BLD QL: SLIGHT — SIGNIFICANT CHANGE UP
AST SERPL-CCNC: 19 U/L — SIGNIFICANT CHANGE UP (ref 10–40)
BASE EXCESS BLDV CALC-SCNC: 2.3 MMOL/L — SIGNIFICANT CHANGE UP (ref -2–3)
BASOPHILS # BLD AUTO: 0.03 K/UL — SIGNIFICANT CHANGE UP (ref 0–0.2)
BASOPHILS NFR BLD AUTO: 0.9 % — SIGNIFICANT CHANGE UP (ref 0–2)
BILIRUB SERPL-MCNC: 1.2 MG/DL — SIGNIFICANT CHANGE UP (ref 0.2–1.2)
BUN SERPL-MCNC: 8 MG/DL — SIGNIFICANT CHANGE UP (ref 7–23)
CA-I SERPL-SCNC: 1.22 MMOL/L — SIGNIFICANT CHANGE UP (ref 1.15–1.33)
CALCIUM SERPL-MCNC: 10 MG/DL — SIGNIFICANT CHANGE UP (ref 8.4–10.5)
CHLORIDE BLDV-SCNC: 108 MMOL/L — SIGNIFICANT CHANGE UP (ref 96–108)
CHLORIDE SERPL-SCNC: 105 MMOL/L — SIGNIFICANT CHANGE UP (ref 96–108)
CO2 BLDV-SCNC: 29 MMOL/L — HIGH (ref 22–26)
CO2 SERPL-SCNC: 21 MMOL/L — LOW (ref 22–31)
CREAT SERPL-MCNC: 0.56 MG/DL — SIGNIFICANT CHANGE UP (ref 0.5–1.3)
EGFR: 125 ML/MIN/1.73M2 — SIGNIFICANT CHANGE UP
EOSINOPHIL # BLD AUTO: 0.06 K/UL — SIGNIFICANT CHANGE UP (ref 0–0.5)
EOSINOPHIL NFR BLD AUTO: 1.8 % — SIGNIFICANT CHANGE UP (ref 0–6)
GAS PNL BLDV: 134 MMOL/L — LOW (ref 136–145)
GAS PNL BLDV: SIGNIFICANT CHANGE UP
GAS PNL BLDV: SIGNIFICANT CHANGE UP
GLUCOSE BLDV-MCNC: 96 MG/DL — SIGNIFICANT CHANGE UP (ref 70–99)
GLUCOSE SERPL-MCNC: 91 MG/DL — SIGNIFICANT CHANGE UP (ref 70–99)
HCO3 BLDV-SCNC: 28 MMOL/L — SIGNIFICANT CHANGE UP (ref 22–29)
HCT VFR BLD CALC: 36.4 % — SIGNIFICANT CHANGE UP (ref 34.5–45)
HCT VFR BLDA CALC: 35 % — SIGNIFICANT CHANGE UP (ref 34.5–46.5)
HGB BLD CALC-MCNC: 11.8 G/DL — SIGNIFICANT CHANGE UP (ref 11.7–16.1)
HGB BLD-MCNC: 11.5 G/DL — SIGNIFICANT CHANGE UP (ref 11.5–15.5)
HYPOCHROMIA BLD QL: SLIGHT — SIGNIFICANT CHANGE UP
LACTATE BLDV-MCNC: 1.7 MMOL/L — SIGNIFICANT CHANGE UP (ref 0.5–2)
LYMPHOCYTES # BLD AUTO: 1.69 K/UL — SIGNIFICANT CHANGE UP (ref 1–3.3)
LYMPHOCYTES # BLD AUTO: 50 % — HIGH (ref 13–44)
MANUAL SMEAR VERIFICATION: SIGNIFICANT CHANGE UP
MCHC RBC-ENTMCNC: 24.5 PG — LOW (ref 27–34)
MCHC RBC-ENTMCNC: 31.6 GM/DL — LOW (ref 32–36)
MCV RBC AUTO: 77.4 FL — LOW (ref 80–100)
MICROCYTES BLD QL: SLIGHT — SIGNIFICANT CHANGE UP
MONOCYTES # BLD AUTO: 0.24 K/UL — SIGNIFICANT CHANGE UP (ref 0–0.9)
MONOCYTES NFR BLD AUTO: 7 % — SIGNIFICANT CHANGE UP (ref 2–14)
NEUTROPHILS # BLD AUTO: 1.27 K/UL — LOW (ref 1.8–7.4)
NEUTROPHILS NFR BLD AUTO: 37.7 % — LOW (ref 43–77)
PCO2 BLDV: 46 MMHG — HIGH (ref 39–42)
PH BLDV: 7.39 — SIGNIFICANT CHANGE UP (ref 7.32–7.43)
PLAT MORPH BLD: ABNORMAL
PLATELET # BLD AUTO: 304 K/UL — SIGNIFICANT CHANGE UP (ref 150–400)
PO2 BLDV: 41 MMHG — SIGNIFICANT CHANGE UP (ref 25–45)
POTASSIUM BLDV-SCNC: 5 MMOL/L — SIGNIFICANT CHANGE UP (ref 3.5–5.1)
POTASSIUM SERPL-MCNC: 4.1 MMOL/L — SIGNIFICANT CHANGE UP (ref 3.5–5.3)
POTASSIUM SERPL-SCNC: 4.1 MMOL/L — SIGNIFICANT CHANGE UP (ref 3.5–5.3)
PROT SERPL-MCNC: 8.1 G/DL — SIGNIFICANT CHANGE UP (ref 6–8.3)
RBC # BLD: 4.7 M/UL — SIGNIFICANT CHANGE UP (ref 3.8–5.2)
RBC # FLD: 16.8 % — HIGH (ref 10.3–14.5)
RBC BLD AUTO: ABNORMAL
SAO2 % BLDV: 70.1 % — SIGNIFICANT CHANGE UP (ref 67–88)
SODIUM SERPL-SCNC: 140 MMOL/L — SIGNIFICANT CHANGE UP (ref 135–145)
VARIANT LYMPHS # BLD: 2.6 % — SIGNIFICANT CHANGE UP (ref 0–6)
WBC # BLD: 3.38 K/UL — LOW (ref 3.8–10.5)
WBC # FLD AUTO: 3.38 K/UL — LOW (ref 3.8–10.5)

## 2024-08-07 PROCEDURE — 71045 X-RAY EXAM CHEST 1 VIEW: CPT | Mod: 26

## 2024-08-07 PROCEDURE — 99214 OFFICE O/P EST MOD 30 MIN: CPT

## 2024-08-07 PROCEDURE — 99285 EMERGENCY DEPT VISIT HI MDM: CPT

## 2024-08-07 RX ORDER — IMMUNE GLOBULIN (HUMAN) 10 G/100ML
100 INJECTION INTRAVENOUS; SUBCUTANEOUS ONCE
Refills: 0 | Status: DISCONTINUED | OUTPATIENT
Start: 2024-08-07 | End: 2024-08-07

## 2024-08-07 RX ORDER — ENOXAPARIN SODIUM 100 MG/ML
40 INJECTION SUBCUTANEOUS EVERY 24 HOURS
Refills: 0 | Status: DISCONTINUED | OUTPATIENT
Start: 2024-08-07 | End: 2024-08-21

## 2024-08-07 RX ORDER — PYRIDOSTIGMINE BROMIDE 60 MG/5ML
30 SOLUTION ORAL EVERY 6 HOURS
Refills: 0 | Status: DISCONTINUED | OUTPATIENT
Start: 2024-08-07 | End: 2024-08-11

## 2024-08-07 RX ORDER — IMMUNE GLOBULIN (HUMAN) 10 G/100ML
20 INJECTION INTRAVENOUS; SUBCUTANEOUS DAILY
Refills: 0 | Status: COMPLETED | OUTPATIENT
Start: 2024-08-07 | End: 2024-08-12

## 2024-08-07 RX ORDER — DIPHENHYDRAMINE HCL 50 MG
25 CAPSULE ORAL DAILY
Refills: 0 | Status: COMPLETED | OUTPATIENT
Start: 2024-08-07 | End: 2024-08-12

## 2024-08-07 RX ORDER — ACETAMINOPHEN 325 MG/1
325 TABLET ORAL DAILY
Refills: 0 | Status: DISCONTINUED | OUTPATIENT
Start: 2024-08-07 | End: 2024-08-09

## 2024-08-07 RX ORDER — GLYCOPYRROLATE 0.2 MG/ML
1 INJECTION INTRAMUSCULAR; INTRAVENOUS EVERY 6 HOURS
Refills: 0 | Status: DISCONTINUED | OUTPATIENT
Start: 2024-08-07 | End: 2024-08-12

## 2024-08-07 RX ADMIN — ACETAMINOPHEN 325 MILLIGRAM(S): 325 TABLET ORAL at 20:24

## 2024-08-07 RX ADMIN — IMMUNE GLOBULIN (HUMAN) 33.33 GRAM(S): 10 INJECTION INTRAVENOUS; SUBCUTANEOUS at 20:57

## 2024-08-07 RX ADMIN — ACETAMINOPHEN 325 MILLIGRAM(S): 325 TABLET ORAL at 22:26

## 2024-08-07 RX ADMIN — Medication 25 MILLIGRAM(S): at 20:24

## 2024-08-07 NOTE — ED PROVIDER NOTE - OBJECTIVE STATEMENT
33-year-old female with history of myasthenia gravis, prior admissions and intubations was treated with Mestinon 60 mg 4 times daily in the past also received Solaris, presents to the ED for heaviness in extremities including tongue, at this time denies shortness of breath, lethargy, drooling, chest pain.

## 2024-08-07 NOTE — ED ADULT TRIAGE NOTE - CHIEF COMPLAINT QUOTE
sent for admission for IVIG, hx of myasthenia gravis. reports feeling general weakness in extremities and heaviness in tongue. denies SOB. was intubated 5 years ago.

## 2024-08-07 NOTE — ED PROVIDER NOTE - ATTENDING APP SHARED VISIT CONTRIBUTION OF CARE
34 yo female hx of myasthenia gravis w/ prior intubation p/w weakness.  conversant on exam, lid lag noted, otherwise ambulatory, no respiratory distress.    chest x-ray independently reviewed by myself, no evidence of pneumothorax, no evidence of PNA.     CBC, CMP sent.  will get NIF and VC from respiratory.  neurology consult --> admit for further management.

## 2024-08-07 NOTE — H&P ADULT - HISTORY OF PRESENT ILLNESS
33 RHF with PMHx Asthma, Sickle cell trait, Myasthenia gravis (AChR antibody positive, diagnosed in 2017) on pyridostigmine 60mg QID presenting due to tongue and extremity heaviness, drooping eyelids, double vision, as well as neck weakness. Patient called her outpatient neurologist, Dr. Mason Eason who advised her to visit [facility] ED to initiate IVIG treatment. Last myasthenic crisis was on 6/26/2024. Patient denies any recent infections, increased secretions or difficulty swallowing. Patient was weaned off steroids more than 3 years ago and has not been on Solaris for the past 2 years due to insurance issues. Patient was intubated 3 times in past. Denies history of thymectomy. Patient's myasthenia gravis initially presented as ocular symptoms and then generalized. Prior symptoms included bulbar weakness, dysphagia, hypophonia, dyspnea, diplopia, ptosis, and limb weakness.

## 2024-08-07 NOTE — ED PROVIDER NOTE - NEUROLOGICAL, MLM
Bilateral ptosis as noted above. Alert and oriented, no focal deficits, no motor or sensory deficits.

## 2024-08-07 NOTE — ED ADULT NURSE NOTE - OBJECTIVE STATEMENT
Pt is a 32 y/o female PMH of Myasthenia Gravis, asthma and sickle cell trait presents to the ED c/o of generalized weakness in all extremities and a "heavy tongue." Pt states she was in the hospital approx a month ago with the same symptoms and they have been coming and going since then. When asked if she received IVIG last admission, pt denies. Upon assessment, pt denies having SOB, speaking full and coherent sentences and pt breath sounds are clear b/l. Pt has sensation in all extremities but feels weakness. Pt denies chest pain, HA, difficulty breathing, abdominal pain or urinary symptoms.

## 2024-08-07 NOTE — H&P ADULT - ASSESSMENT
ASSESSMENT: 33 RHF with PMHx Asthma, Sickle cell trait, Myasthenia gravis (AChR antibody positive, diagnosed in 2017) on pyridostigmine 60mg QID presenting due to tongue and extremity heaviness, b/l ptosis, diplopia, as well as neck weakness. Patient has had myasthenic crises in past and needed to be intubated 3 times in past. Labs pending. NIF -50 and VC 1450, saturating well on room air. Neurological exam revealed b/l ptosis, neck flexion 4-/5, neck extension 4+/5, able to count numbers to 20, and proximal>distal muscle weakness.       IMPRESSION: Bilateral ptosis,  proximal>distal muscle weakness with history of myasthenia gravis. Likely myasthenia gravis exacerbation for now but concern for rapid change into myasthenic crisis.     Recommendations:  [] NIF -50 and VC 1450 in ED, obtain NIF and VC q2h (concern for impending respiratory failure)  [] CXR, UA, Utox to r/o any infectious etiology  [] Start pyridostigmine 30 mg qid  [] Consider glycopyrrolate 1mg w/pyridostigmine dose if excessive cholinergic side effects  [] Starting IVIG 2g/kg now divided into 5 days per outpatient neurologist Dr. Eason's recommendations  [] Starting rate 30cc/hr and increase by 10cc/hr every 30 min to goal 70cc/hr. Infuse over 6-7 hours  [] Premedicate with Tylenol 325mg PO 30 minutes prior to dose  [] Premedicate with Benadryl 25mg PO 30 minutes prior to dose  [] Obtain daily CBC, BMP while on IVIG  [] Common adverse side effects include headache, nausea, fever, tremor, flushing, myalgias, high blood pressure, tachycardia  [] Observe for symptoms of pulmonary edema, hemolysis, thrombosis, and anaphylaxis   [] Cardiac monitoring with telemetry  [] Avoid medications like fluoroquinolones, macrolides, amnioglycosides, chloroquines. Consider avoiding anti hypertensives such as beta blockers, CCB, and magnesium.  [] Elective intubation if respiratory distress or VC below 15-20ml/kg IBW, decline in NIF to -25 to -30 cm h2o           ASSESSMENT: 33 RHF with PMHx Asthma, Sickle cell trait, Myasthenia gravis (AChR antibody positive, diagnosed in 2017) on pyridostigmine 60mg QID presenting due to tongue and extremity heaviness, b/l ptosis, diplopia, as well as neck weakness. Patient has had myasthenic crises in past and needed to be intubated 3 times in past. Labs pending. NIF -50 and VC 1450, saturating well on room air. Neurological exam revealed b/l ptosis, neck flexion 4-/5, neck extension 4+/5, able to count numbers to 20, and proximal>distal muscle weakness.       IMPRESSION: Bilateral ptosis,  proximal>distal muscle weakness with history of myasthenia gravis. Likely myasthenia gravis exacerbation for now but concern for rapid change into myasthenic crisis.     PLAN:  [] NIF -50 and VC 1450 in ED, obtain NIF and VC q2h (concern for impending respiratory failure)  [] CXR, UA, Utox to r/o any infectious etiology  [] Start pyridostigmine 30 mg qid  [] Consider glycopyrrolate 1mg w/pyridostigmine dose if excessive cholinergic side effects  [] Starting IVIG 2g/kg now divided into 5 days per outpatient neurologist Dr. Eason's recommendations  [] Starting rate 30cc/hr and increase by 10cc/hr every 30 min to goal 70cc/hr. Infuse over 6-7 hours  [] Premedicate with Tylenol 325mg PO 30 minutes prior to dose  [] Premedicate with Benadryl 25mg PO 30 minutes prior to dose  [] Obtain daily CBC, BMP while on IVIG  [] Common adverse side effects include headache, nausea, fever, tremor, flushing, myalgias, high blood pressure, tachycardia  [] Observe for symptoms of pulmonary edema, hemolysis, thrombosis, and anaphylaxis   [] Cardiac monitoring with telemetry  [] Avoid medications like fluoroquinolones, macrolides, amnioglycosides, chloroquines. Consider avoiding anti hypertensives such as beta blockers, CCB, and magnesium.  [] Elective intubation if respiratory distress or VC below 15-20ml/kg IBW, decline in NIF to -25 to -30 cm h2o

## 2024-08-07 NOTE — ED PROVIDER NOTE - PROGRESS NOTE DETAILS
neuro paged for eval sent in TBA for IVIG for MG - Juvencio Short PA-C seen by neuro senior resident, pending final recommendations for admission to floor vs icu - Juvencio Short PA-C

## 2024-08-07 NOTE — H&P ADULT - ATTENDING COMMENTS
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 She was treated with soliris in the past, which was no longer covered by insurance, She has been maintained on mestinon primarily. She does not have respiratory symptoms aside from nocturnal tracheal secretions.DSenies any recent fevers, chills, dysuria.     Outpatient neurologist: Dr. Eason who referred pt to ED yesterday following in office evaluation    Results  WBC 2.68 ( baseline~4-5 since June)  CXR clear    PFTs  NIF -50 and VC 1450      Neuro exam: Counts to 30 on 1 breath. Mildly hypophonic. There is b/l ptosis with dyscongujate gaze - limited left saccades in all directions, R saccade laterally and in vertical planes are slow. PERRL. Face symmetric. Tongue ML with good movement. Neck flex 4/5 Ext 5-  Strength UL delt/biceps/triceps 4  5- LE hip flexors 4 distally 5-  Intact LT  FNF intact  Walks with normal SL    Imp: MG exacerbation with bulbar symptoms and prox>distal weakness. Respiratory is stable at this time  - IVIG d2/5   - monitor CBC, will do infectious w/u UA, RVP  - PFTs q4-6h

## 2024-08-07 NOTE — H&P ADULT - NSHPPHYSICALEXAM_GEN_ALL_CORE
Physical Examination:   General - NAD, pleasant, cooperative   Cardiovascular -  no edema  Respiratory: able to speak in full sentences without difficulty, not in respiratory distress. No drooling/secretions noted.     Neurologic Exam:  Mental status - Awake, Alert, Oriented to person, place, and time. Speech fluent, repetition and naming intact. Follows simple commands. Fund of knowledge intact  Cranial nerves:  CN II: Visual fields are full to confrontation. Pupils are 3 mm and briskly reactive to light.   CN III, IV, VI: R eye upward gaze intact, all other EOM not intact (minimal movement of R eye in lateral gaze and downward gaze), +L eye with very minimal upgaze, downgaze and lateral gaze, no nystagmus, +b/l ptosis, L>R  CN V: Facial sensation is intact to light touch in all 3 divisions bilaterally.  CN VII: Face is symmetric with normal eye closure and smile.  CN VII: Hearing is normal to rubbing fingers  CN IX, X: Palate elevates symmetrically. Phonation is normal. Able to count to 20 in one breath.   CN XI: Shoulder shrug 4/5 strength b/l  CN XII: Tongue is midline with normal movements and no atrophy.    Motor - Normal bulk and tone throughout. No pronator drift of out-stretched arms.  Strength testing  Neck flexion 4-/5  Neck extension 4+/5            Deltoid(C5)  Biceps(C6)    Triceps(C7)     Wrist Extension    Wrist Flexion (C8)     Interossei  (T1)       R            4+                 5                        5                     5                              5                                                5                         5  L             4+                5                        5                     5                              5                                                 5                          5              Hip Flexion(L2/3)    Hip Extension (L4/5)   Knee Flexion (L4/5/S1)    Knee Extension (L3/4)   Dorsiflexion (L4/5)   Plantar Flexion (S1)  R              4-                                   4+                                    5                                                     5                                              5                          5  L               4                                     4+                                     5                                                     5                                              5                          5  Sensation - Light touch intact in all 4 extremities  DTR's -             Biceps      Triceps     Brachioradialis      Patellar    Ankle    Toes/plantar response  R             2+             2+                  2+                       2+            2+                 Mute  L              2+             2+                 2+                        2+           2+                 Mute    Coordination - No dysmetria on FTN b/l.    Gait and station - Gait not tested due to concern for patient safety.

## 2024-08-07 NOTE — H&P ADULT - NSHPREVIEWOFSYSTEMS_GEN_ALL_CORE
CONSTITUTIONAL - + fatigue, no fever or chills, no diaphoresis  SKIN - no rashes or lesions  EYES - +diplopia, +ptosis, no eye pain  ENT - no change in hearing, no difficulty swallowing  RESPIRATORY - no shortness of breath, no cough  CARDIAC - no chest pain, no palpitations  GI - no abdominal pain, no nausea, no vomiting, no diarrhea, no constipation, no bleeding  GENITOURINARY -  no dysuria; no hematuria,    MUSCULOSKELETAL: no joint pain, no swelling  NEUROLOGIC -  no headache, no paresthesias

## 2024-08-08 PROBLEM — Z86.2 HISTORY OF SICKLE CELL TRAIT: Status: RESOLVED | Noted: 2022-03-22 | Resolved: 2024-08-08

## 2024-08-08 LAB
ADD ON TEST-SPECIMEN IN LAB: SIGNIFICANT CHANGE UP
ALBUMIN SERPL ELPH-MCNC: 3.8 G/DL — SIGNIFICANT CHANGE UP (ref 3.3–5)
ALP SERPL-CCNC: 100 U/L — SIGNIFICANT CHANGE UP (ref 40–120)
ALT FLD-CCNC: 12 U/L — SIGNIFICANT CHANGE UP (ref 10–45)
ANION GAP SERPL CALC-SCNC: 11 MMOL/L — SIGNIFICANT CHANGE UP (ref 5–17)
APPEARANCE UR: CLEAR — SIGNIFICANT CHANGE UP
AST SERPL-CCNC: 18 U/L — SIGNIFICANT CHANGE UP (ref 10–40)
BILIRUB SERPL-MCNC: 1 MG/DL — SIGNIFICANT CHANGE UP (ref 0.2–1.2)
BILIRUB UR-MCNC: NEGATIVE — SIGNIFICANT CHANGE UP
BUN SERPL-MCNC: 6 MG/DL — LOW (ref 7–23)
CALCIUM SERPL-MCNC: 9.3 MG/DL — SIGNIFICANT CHANGE UP (ref 8.4–10.5)
CHLORIDE SERPL-SCNC: 105 MMOL/L — SIGNIFICANT CHANGE UP (ref 96–108)
CK SERPL-CCNC: 44 U/L — SIGNIFICANT CHANGE UP (ref 25–170)
CO2 SERPL-SCNC: 22 MMOL/L — SIGNIFICANT CHANGE UP (ref 22–31)
COLOR SPEC: YELLOW — SIGNIFICANT CHANGE UP
CREAT SERPL-MCNC: 0.61 MG/DL — SIGNIFICANT CHANGE UP (ref 0.5–1.3)
DIFF PNL FLD: NEGATIVE — SIGNIFICANT CHANGE UP
EGFR: 122 ML/MIN/1.73M2 — SIGNIFICANT CHANGE UP
FLUAV AG NPH QL: SIGNIFICANT CHANGE UP
FLUBV AG NPH QL: SIGNIFICANT CHANGE UP
GLUCOSE SERPL-MCNC: 79 MG/DL — SIGNIFICANT CHANGE UP (ref 70–99)
GLUCOSE UR QL: NEGATIVE MG/DL — SIGNIFICANT CHANGE UP
HCT VFR BLD CALC: 32.6 % — LOW (ref 34.5–45)
HGB BLD-MCNC: 10.3 G/DL — LOW (ref 11.5–15.5)
KETONES UR-MCNC: NEGATIVE MG/DL — SIGNIFICANT CHANGE UP
LEUKOCYTE ESTERASE UR-ACNC: NEGATIVE — SIGNIFICANT CHANGE UP
MCHC RBC-ENTMCNC: 24 PG — LOW (ref 27–34)
MCHC RBC-ENTMCNC: 31.6 GM/DL — LOW (ref 32–36)
MCV RBC AUTO: 76 FL — LOW (ref 80–100)
NITRITE UR-MCNC: NEGATIVE — SIGNIFICANT CHANGE UP
NRBC # BLD: 0 /100 WBCS — SIGNIFICANT CHANGE UP (ref 0–0)
PH UR: 6 — SIGNIFICANT CHANGE UP (ref 5–8)
PLATELET # BLD AUTO: 268 K/UL — SIGNIFICANT CHANGE UP (ref 150–400)
POTASSIUM SERPL-MCNC: 4 MMOL/L — SIGNIFICANT CHANGE UP (ref 3.5–5.3)
POTASSIUM SERPL-SCNC: 4 MMOL/L — SIGNIFICANT CHANGE UP (ref 3.5–5.3)
PROT SERPL-MCNC: 7.4 G/DL — SIGNIFICANT CHANGE UP (ref 6–8.3)
PROT UR-MCNC: NEGATIVE MG/DL — SIGNIFICANT CHANGE UP
RBC # BLD: 4.29 M/UL — SIGNIFICANT CHANGE UP (ref 3.8–5.2)
RBC # FLD: 16.8 % — HIGH (ref 10.3–14.5)
RSV RNA NPH QL NAA+NON-PROBE: SIGNIFICANT CHANGE UP
SARS-COV-2 RNA SPEC QL NAA+PROBE: SIGNIFICANT CHANGE UP
SODIUM SERPL-SCNC: 138 MMOL/L — SIGNIFICANT CHANGE UP (ref 135–145)
SP GR SPEC: 1.01 — SIGNIFICANT CHANGE UP (ref 1–1.03)
UROBILINOGEN FLD QL: 1 MG/DL — SIGNIFICANT CHANGE UP (ref 0.2–1)
WBC # BLD: 2.68 K/UL — LOW (ref 3.8–10.5)
WBC # FLD AUTO: 2.68 K/UL — LOW (ref 3.8–10.5)

## 2024-08-08 PROCEDURE — 99223 1ST HOSP IP/OBS HIGH 75: CPT

## 2024-08-08 RX ADMIN — Medication 25 MILLIGRAM(S): at 12:47

## 2024-08-08 RX ADMIN — PYRIDOSTIGMINE BROMIDE 30 MILLIGRAM(S): 60 SOLUTION ORAL at 05:03

## 2024-08-08 RX ADMIN — PYRIDOSTIGMINE BROMIDE 30 MILLIGRAM(S): 60 SOLUTION ORAL at 17:25

## 2024-08-08 RX ADMIN — ACETAMINOPHEN 325 MILLIGRAM(S): 325 TABLET ORAL at 13:30

## 2024-08-08 RX ADMIN — ENOXAPARIN SODIUM 40 MILLIGRAM(S): 100 INJECTION SUBCUTANEOUS at 12:48

## 2024-08-08 RX ADMIN — PYRIDOSTIGMINE BROMIDE 30 MILLIGRAM(S): 60 SOLUTION ORAL at 01:00

## 2024-08-08 RX ADMIN — PYRIDOSTIGMINE BROMIDE 30 MILLIGRAM(S): 60 SOLUTION ORAL at 23:55

## 2024-08-08 RX ADMIN — PYRIDOSTIGMINE BROMIDE 30 MILLIGRAM(S): 60 SOLUTION ORAL at 12:48

## 2024-08-08 RX ADMIN — ACETAMINOPHEN 325 MILLIGRAM(S): 325 TABLET ORAL at 12:47

## 2024-08-08 RX ADMIN — IMMUNE GLOBULIN (HUMAN) 33.33 GRAM(S): 10 INJECTION INTRAVENOUS; SUBCUTANEOUS at 12:48

## 2024-08-08 NOTE — REVIEW OF SYSTEMS
[Feeling Poorly] : feeling poorly [Facial Weakness] : facial weakness [Arm Weakness] : arm weakness [Hand Weakness] :  hand weakness [Leg Weakness] : leg weakness [Anxiety] : anxiety [Hoarseness] : hoarseness [As Noted in HPI] : as noted in HPI [Negative] : Endocrine

## 2024-08-08 NOTE — OCCUPATIONAL THERAPY INITIAL EVALUATION ADULT - PLANNED THERAPY INTERVENTIONS, OT EVAL
ADL retraining/cognitive, visual perceptual/strengthening ADL retraining/IADL retraining/cognitive, visual perceptual/strengthening

## 2024-08-08 NOTE — OCCUPATIONAL THERAPY INITIAL EVALUATION ADULT - DIAGNOSIS, OT EVAL
Pt p/w impaired UE strength, ptosis and double vision, impaired neck control, decreased activity tolerance

## 2024-08-08 NOTE — CHART NOTE - NSCHARTNOTEFT_GEN_A_CORE
S:  Patient examined at bedside with neuro general team and attending. Patient reported that she does not feel any different in terms of her weakness, diplopia, and ptosis from presentation to ED. Patient reported that at baseline her double vision and lid lad comes and goes but it is worse currently. When asked about inciting events that could be causing her weakness to be worse she reported that over the past 2-3 days she has noticed yellow fluid when whiping her vaginal region. She had a daughter in June and has noticed that the waxing and waning of her symptoms have increased since birth of her daughter. She denies fevers, chills, breast feeding, dysuria, increased or decreased urinary frequency. She does report that she has more difficulty swallowing during the night because of secretions.    O:    Constitutional: well-developed, well-nourished, well-groomed    Neurological Examination:    - Mental Status: Eyes open, attends to examiner, oriented to person, age, place, and time; speech is fluent with intact naming, repetition, and follows 1-step and 3-step mid-line crossing commands; good overall fund of knowledge (aware of current events, relevant past history, and vocabulary appropriate for level of education); immediate recall is 3/3 words and delayed recall is 3/3 words at 5 minutes; able to spell WORLD backwards and recite the days of the week in reverse; able to read a sentence.    - Cranial Nerves: visual fields are full to confrontation; pupils are equal, round, and reactive to light 3-4mm; extraocular movements limited on horizontal gaze but both eyes cross midline, mod to severe ptosis present, upward gaze limited but pupils do elevate from neutral position, patient reported binocular diplopia on neutral gaze and on upwards and lateral BL gaze are intact without nystagmus; facial sensation is intact in the V1-V3 distribution bilaterally; face is symmetric with normal eye closure and smile; hearing is intact to conversation; uvula is midline and soft palate rises symmetrically; shoulder shrug intact; tongue protrudes in the midline.    - Motor/Strength Testing:                                 Right           Left  Neck flexion               4                 4  Deltoid                     4                 4-  Biceps                      4                 4-  Triceps                     4                 4-  Wrist Ext (radial)       5                 5  Hand                  5                 4  Hip Flex                   4-                  4-  Knee Ext	                 4                  4-  Dorsiflex                  5                  5  Plantarflex               5                  5    - There is no pronator drift. Normal muscle bulk and tone throughout.    - Reflexes:   Bicep (C5/C6):                  R 2+ --- L 2+   Brachioradialis (C5/C6) :   R 2+ --- L 2+   Patella (L3/L4) :                 R 2+ --- L 2+   Ankle (S1) :                       R 2+ --- L 2+   toes mute BL    - Sensory: Intact throughout to light touch.   - Coordination: Finger-nose-finger intact without dysmetria.    - Gait: Normal steps, base, arm swing, and turning.        AP:  32 yo F w PMHx of asthma, sickle trait, and MG presenting for worsening MG concerning for impending crisis. CT chest 2018 (-) for malignancy    PLAN:  [] infectious work up  [] monitoring WBC and her wbc has been down trending over the past few months since birth of her daughter  [] NIF -50 and VC 1450 in ED, obtain NIF and VC q6h (concern for impending respiratory failure)  [] Start pyridostigmine 30 mg qid  [] Consider glycopyrrolate 1mg w/pyridostigmine dose if excessive cholinergic side effects  [] Starting IVIG 2g/kg now divided into 5 days per outpatient neurologist Dr. Eason's recommendations       *Starting rate 30cc/hr and increase by 10cc/hr every 30 min to goal 70cc/hr. Infuse over 6-7 hours       * observe for Common adverse SE (headache, nausea, fever, tremor, flushing, myalgias, high blood pressure, tachycardia)       *Observe for serious SE of pulmonary edema, hemolysis, thrombosis, and anaphylaxis   [x] Premedicate with Tylenol 325mg PO 30 minutes prior to dose  [x] Premedicate with Benadryl 25mg PO 30 minutes prior to dose  [] Obtain daily CBC, BMP while on IVIG  [] Cardiac monitoring with telemetry  [] Avoid medications like fluoroquinolones, macrolides, amnioglycosides, chloroquines. Consider avoiding anti hypertensives such as beta blockers, CCB, and magnesium.  [] Elective intubation if respiratory distress or VC below 15-20ml/kg IBW, decline in NIF to -25 to -30 cm h2o    *Attending attestation present in  dated 8/8/2024 *

## 2024-08-08 NOTE — OCCUPATIONAL THERAPY INITIAL EVALUATION ADULT - PERTINENT HX OF CURRENT PROBLEM, REHAB EVAL
33F with PMHx Asthma, Sickle cell trait, Myasthenia gravis (AChR antibody positive, diagnosed in 2017) on pyridostigmine 60mg QID presenting due to tongue and extremity heaviness, drooping eyelids, double vision, as well as neck weakness. Patient called her outpatient neurologist, Dr. Mason Eason who advised her to visit [facility] ED to initiate IVIG treatment. Last myasthenic crisis was on 6/26/2024. Patient denies any recent infections, increased secretions or difficulty swallowing. Patient was weaned off steroids more than 3 years ago and has not been on Solaris for the past 2 years due to insurance issues. Patient was intubated 3 times in past. Denies history of thymectomy. Patient's myasthenia gravis initially presented as ocular symptoms and then generalized. Prior symptoms included bulbar weakness, dysphagia, hypophonia, dyspnea, diplopia, ptosis, and limb weakness.  IMAGING: XR CHEST: (-).

## 2024-08-08 NOTE — PHYSICAL EXAM
[FreeTextEntry1] : Vital signs were recorded and unremarkable.  Head neck, ear nose and throat is unremarkable.  There is no carotid bruit, thyromegaly or lymphadenopathy.  Neck is supple without any meningeal signs.  Throat is clear and there is no infection or redness.  Chest is clear and heart sounds are normal.  Abdomen is soft nontender.  Pedal pulsations are present.  Neurological examination revealed her to be in distress with anxiety and secondary depression as she is helpless with 2 children at home and cannot function adequately.  There is oculomotor dysfunction with movement of the eyes being significantly restricted and a prominent left eye ptosis.  Gag reflex is normal yet it appears somewhat slow and she has mild lingual dysarthria muscles of mastication appear normal neck muscles are +4/5.  Limb muscles are 4/5 throughout including the neck muscles reflexes are 1+ symmetric no Babinski sign and sensations are preserved.  Gait is somewhat slow

## 2024-08-08 NOTE — HISTORY OF PRESENT ILLNESS
[FreeTextEntry1] : Ms. Blackburn is a 33-year-old lady last evaluated in July 2024 following a myasthenic crisis for which she was admitted to [facility] and received plasma exchange which initially benefited her for few days and started experiencing weakness fluctuating daily and the weakness involves symptoms of ptosis diplopia dysarthria or dysphagia and has usually some tachypnea in the morning which last for about 2 hours and Mestinon tends to improve it and she is on 2 tablets 4 times a day each 60 mg but he still has significant weakness which fluctuates and is unable to handle activities of daily living and currently she is in relief for recent delivery of a baby.  Thus she presents with diffuse generalized weakness bulbar dysfunction including chewing and swallowing difficulty which is slow with early morning dyspnea ptosis and diplopia.  Her maternity leave finishes on 8/29/2024 and she cannot return back to work in the current condition.  Original treatment by Dr. Erich Herring with Soliris was effective 2 years ago but it was started without initial failure of IVIG therapy and her insurance discontinued as she did not qualify and even after protest by the neurologist they would not approve the medication.  Consequently she was put in for only cortisone in the past including current management of Mestinon.  In the interim as a stated she had IVIG therapy plasma exchange and did not adequately respond.  Review of systems is unremarkable otherwise except for anxiety and depression secondary to her disability and children at home and she is single and helped by her mother.  There are no systemic illnesses and no gynecological or OB/GYN issues at this time no history of fever or any other medications which could be incompatible with myasthenia gravis but none were noted.  I reviewed her medications and allergies.

## 2024-08-08 NOTE — OCCUPATIONAL THERAPY INITIAL EVALUATION ADULT - ADDITIONAL COMMENTS
at baseline pt independent and cares for her 3 children, upon discharge her mother will be assisting her

## 2024-08-08 NOTE — DISCUSSION/SUMMARY
[FreeTextEntry1] : .  Opinion-opinion opinion the patient has worsening of myasthenic symptoms and appears she is likely to go into crisis as the symptoms are progressive and unremitting and her symptoms did not respond adequately to plasma exchange recently undertaken at [facility] including elevation of her Mestinon and was therefore advised options of steroid therapy but fear of exacerbation of myasthenia gravis following prednisone which is well-known I suggested it will be safe for her to go to the emergency room of [facility] and I received a phone call from the neurology resident and I discussed the management strategy and subsequently received a phone call from Dr. Asad Cummings at around 4:30 PM and discussed the condition in details and treatment with IVIG therapy reducing the Mestinon dose at the present time and to call me if any need of help but he is well aware of myasthenia gravis disease and we will manage her.  After her discharge I would like to see her and she will make sure that she has appropriate follow-up appointment.

## 2024-08-09 LAB
ALBUMIN SERPL ELPH-MCNC: 3.9 G/DL — SIGNIFICANT CHANGE UP (ref 3.3–5)
ALP SERPL-CCNC: 97 U/L — SIGNIFICANT CHANGE UP (ref 40–120)
ALT FLD-CCNC: 12 U/L — SIGNIFICANT CHANGE UP (ref 10–45)
ANION GAP SERPL CALC-SCNC: 13 MMOL/L — SIGNIFICANT CHANGE UP (ref 5–17)
AST SERPL-CCNC: 16 U/L — SIGNIFICANT CHANGE UP (ref 10–40)
BASOPHILS # BLD AUTO: 0.02 K/UL — SIGNIFICANT CHANGE UP (ref 0–0.2)
BASOPHILS NFR BLD AUTO: 0.7 % — SIGNIFICANT CHANGE UP (ref 0–2)
BILIRUB SERPL-MCNC: 1.3 MG/DL — HIGH (ref 0.2–1.2)
BUN SERPL-MCNC: 11 MG/DL — SIGNIFICANT CHANGE UP (ref 7–23)
CALCIUM SERPL-MCNC: 9.4 MG/DL — SIGNIFICANT CHANGE UP (ref 8.4–10.5)
CHLORIDE SERPL-SCNC: 103 MMOL/L — SIGNIFICANT CHANGE UP (ref 96–108)
CO2 SERPL-SCNC: 21 MMOL/L — LOW (ref 22–31)
CREAT SERPL-MCNC: 0.68 MG/DL — SIGNIFICANT CHANGE UP (ref 0.5–1.3)
EGFR: 119 ML/MIN/1.73M2 — SIGNIFICANT CHANGE UP
EOSINOPHIL # BLD AUTO: 0.09 K/UL — SIGNIFICANT CHANGE UP (ref 0–0.5)
EOSINOPHIL NFR BLD AUTO: 3.3 % — SIGNIFICANT CHANGE UP (ref 0–6)
GLUCOSE SERPL-MCNC: 65 MG/DL — LOW (ref 70–99)
HCT VFR BLD CALC: 32.7 % — LOW (ref 34.5–45)
HGB BLD-MCNC: 10.5 G/DL — LOW (ref 11.5–15.5)
LYMPHOCYTES # BLD AUTO: 1.42 K/UL — SIGNIFICANT CHANGE UP (ref 1–3.3)
LYMPHOCYTES # BLD AUTO: 52.2 % — HIGH (ref 13–44)
MAGNESIUM SERPL-MCNC: 1.9 MG/DL — SIGNIFICANT CHANGE UP (ref 1.6–2.6)
MCHC RBC-ENTMCNC: 24.2 PG — LOW (ref 27–34)
MCHC RBC-ENTMCNC: 32.1 GM/DL — SIGNIFICANT CHANGE UP (ref 32–36)
MCV RBC AUTO: 75.3 FL — LOW (ref 80–100)
MONOCYTES # BLD AUTO: 0.33 K/UL — SIGNIFICANT CHANGE UP (ref 0–0.9)
MONOCYTES NFR BLD AUTO: 12.1 % — SIGNIFICANT CHANGE UP (ref 2–14)
NEUTROPHILS # BLD AUTO: 0.86 K/UL — LOW (ref 1.8–7.4)
NEUTROPHILS NFR BLD AUTO: 31.7 % — LOW (ref 43–77)
NRBC # BLD: 0 /100 WBCS — SIGNIFICANT CHANGE UP (ref 0–0)
PHOSPHATE SERPL-MCNC: 4.5 MG/DL — SIGNIFICANT CHANGE UP (ref 2.5–4.5)
PLATELET # BLD AUTO: 254 K/UL — SIGNIFICANT CHANGE UP (ref 150–400)
POTASSIUM SERPL-MCNC: 3.8 MMOL/L — SIGNIFICANT CHANGE UP (ref 3.5–5.3)
POTASSIUM SERPL-SCNC: 3.8 MMOL/L — SIGNIFICANT CHANGE UP (ref 3.5–5.3)
PROT SERPL-MCNC: 8 G/DL — SIGNIFICANT CHANGE UP (ref 6–8.3)
RBC # BLD: 4.34 M/UL — SIGNIFICANT CHANGE UP (ref 3.8–5.2)
RBC # FLD: 16.8 % — HIGH (ref 10.3–14.5)
SODIUM SERPL-SCNC: 137 MMOL/L — SIGNIFICANT CHANGE UP (ref 135–145)
T4 FREE SERPL-MCNC: 1.1 NG/DL — SIGNIFICANT CHANGE UP (ref 0.9–1.8)
TSH SERPL-MCNC: 1.38 UIU/ML — SIGNIFICANT CHANGE UP (ref 0.27–4.2)
WBC # BLD: 2.72 K/UL — LOW (ref 3.8–10.5)
WBC # FLD AUTO: 2.72 K/UL — LOW (ref 3.8–10.5)

## 2024-08-09 PROCEDURE — 99233 SBSQ HOSP IP/OBS HIGH 50: CPT

## 2024-08-09 RX ORDER — KETOROLAC TROMETHAMINE 30 MG/ML
15 INJECTION, SOLUTION INTRAMUSCULAR ONCE
Refills: 0 | Status: DISCONTINUED | OUTPATIENT
Start: 2024-08-09 | End: 2024-08-09

## 2024-08-09 RX ORDER — ACETAMINOPHEN 325 MG/1
1000 TABLET ORAL ONCE
Refills: 0 | Status: COMPLETED | OUTPATIENT
Start: 2024-08-09 | End: 2024-08-09

## 2024-08-09 RX ORDER — ACETAMINOPHEN 325 MG/1
650 TABLET ORAL DAILY
Refills: 0 | Status: DISCONTINUED | OUTPATIENT
Start: 2024-08-09 | End: 2024-08-12

## 2024-08-09 RX ORDER — ACETAMINOPHEN 325 MG/1
650 TABLET ORAL ONCE
Refills: 0 | Status: COMPLETED | OUTPATIENT
Start: 2024-08-09 | End: 2024-08-09

## 2024-08-09 RX ADMIN — Medication 25 MILLIGRAM(S): at 10:25

## 2024-08-09 RX ADMIN — ACETAMINOPHEN 650 MILLIGRAM(S): 325 TABLET ORAL at 16:22

## 2024-08-09 RX ADMIN — PYRIDOSTIGMINE BROMIDE 30 MILLIGRAM(S): 60 SOLUTION ORAL at 05:26

## 2024-08-09 RX ADMIN — PYRIDOSTIGMINE BROMIDE 30 MILLIGRAM(S): 60 SOLUTION ORAL at 12:24

## 2024-08-09 RX ADMIN — ENOXAPARIN SODIUM 40 MILLIGRAM(S): 100 INJECTION SUBCUTANEOUS at 12:24

## 2024-08-09 RX ADMIN — PYRIDOSTIGMINE BROMIDE 30 MILLIGRAM(S): 60 SOLUTION ORAL at 17:56

## 2024-08-09 RX ADMIN — Medication 100 GRAM(S): at 20:37

## 2024-08-09 RX ADMIN — IMMUNE GLOBULIN (HUMAN) 33.33 GRAM(S): 10 INJECTION INTRAVENOUS; SUBCUTANEOUS at 10:49

## 2024-08-09 RX ADMIN — PYRIDOSTIGMINE BROMIDE 30 MILLIGRAM(S): 60 SOLUTION ORAL at 23:13

## 2024-08-09 RX ADMIN — ACETAMINOPHEN 650 MILLIGRAM(S): 325 TABLET ORAL at 10:25

## 2024-08-09 RX ADMIN — ACETAMINOPHEN 650 MILLIGRAM(S): 325 TABLET ORAL at 11:00

## 2024-08-09 RX ADMIN — ACETAMINOPHEN 650 MILLIGRAM(S): 325 TABLET ORAL at 17:00

## 2024-08-09 RX ADMIN — KETOROLAC TROMETHAMINE 15 MILLIGRAM(S): 30 INJECTION, SOLUTION INTRAMUSCULAR at 23:53

## 2024-08-09 NOTE — DISCHARGE NOTE PROVIDER - ATTENDING DISCHARGE PHYSICAL EXAMINATION:
Exam  General:  Healthy appearing.    Mental Status:  A&Ox3.    Cranial Nerves: VF intact, PERRL, significantly improved eye (left eye almost complete abduction, right eye complete adduction; left eye able to cross midline on adduction, right eye cannot abduct), normal sensation bilaterally, mild bilateral facial weakness, hearing intact, tongue protrudes to midline.    Motor:  UE 5/5 throughout. Lower extremities 5-/5 bilateral, R>L, hip flexion; rest of LE full strength    Sensation: Normal in arms and legs to soft    Reflexes: 2+ bilateral upper and lower extremities.    Coordination: No dysmetria.    Gait: Walks unassisted

## 2024-08-09 NOTE — PROGRESS NOTE ADULT - ATTENDING COMMENTS
Patient has had 2 sessions of IVIG with no significant changes in her weakness. Denies respiratory symptoms or dysphagia  Neuro exam: neck flex 5-. B/l moderate ptosis with limited movement left eye in all directions. Right eye horizontal gaze also limited  Counts to 25 on 1 breath  UL strength 4- proximally and distally, hip flexors 4+ distal LE 5-    Plan  cont IVIG 3/5 today  Monitor PFTs  leukopenia- will c/s medicine.   PT

## 2024-08-09 NOTE — DISCHARGE NOTE PROVIDER - HOSPITAL COURSE
HPI:  History of Present Illness:   33 RH female with PMHx Asthma, Sickle cell trait, Myasthenia gravis (AChR antibody positive, diagnosed in 2017) on pyridostigmine 60mg QID presenting due to tongue and extremity heaviness, drooping eyelids, double vision, as well as neck weakness. Patient to present to hospital by Dr. Mason Eason. Last MG crisis 6/26/2024. On presentation to ED, patient denies any recent infections, increased secretions or difficulty swallowing. Patient admitted to general neuro team for IVIG treatment and monitoring of resp status.    8/8:  Patient reported that she does not feel any different in terms of her weakness, diplopia, and ptosis from presentation to ED. Patient reported that at baseline her double vision and lid lad comes and goes but it is worse currently. When asked about inciting events that could be causing her weakness to be worse she reported that over the past 2-3 days she has noticed yellow fluid when whiping her vaginal region. She had a daughter in June and has noticed that the waxing and waning of her symptoms have increased since birth of her daughter. She denies fevers, chills, breast feeding, dysuria, increased or decreased urinary frequency. She does report that she has more difficulty swallowing during the night because of secretions.    8/9:  Overnight: @ 0600 AM 08/09 NIF -50 and Vc 480 (down from 1330)  During AM rounds: patient continues to feel weak to weaker than when she was admitted. She continues to deny breathing or swallowing issues.   HPI:   33 RH female with PMHx Asthma, Sickle cell trait, Myasthenia gravis (AChR antibody positive, diagnosed in 2017) on pyridostigmine 60mg QID presenting due to tongue and extremity heaviness, drooping eyelids, double vision, as well as neck weakness. Patient to present to hospital by Dr. Mason Eason. Last MG crisis 6/26/2024. On presentation to ED, patient denies any recent infections, increased secretions or difficulty swallowing.     Hospital course:  Patient admitted to Gen Neuro service due to concern for exacerbation of myasthenia gravis. Home mestinon was held and steroids were continued. She was treated with a 5 day course of IVIG, however did not have improvement in symptoms. Patient developed respiratory distress and required intubation and ICU level care. In MICU, she was initiated on PLEX and steroids switched to IV methylpred. Patient improved and was extubated, stable in terms of respiratory status and transferred to General Neurology service. Patient is now s/p 5 sessions of PLEX (ended 8/21) with significant improvement in weakness and ptosis. She continues to have diplopia. Mestinon was resumed and steroids transitioned to Prednisone 40mg daily. Physical therapy recommends outpatient PT. Patient is now neurologically stable for discharge home today. She will continue Mestinon 60mg four times a day and prednisone 40mg daily. She will also continue pantoprazole while on steroids.  Patient will follow outpatient with Neurologist Dr. Eason for further management.     Impression:   Myasthenia Gravis exacerbation, poorly responsive to IVIG. Now s/p 5 sessions of PLEX with improvement     Labs:                         10.1   13.97 )-----------( 314      ( 21 Aug 2024 06:21 )             31.0     08-20    139  |  106  |  16  ----------------------------<  89  3.6   |  22  |  0.65    Ca    9.4      20 Aug 2024 07:45  Phos  4.4     08-21  Mg     2.0     08-21      PT/INR - ( 21 Aug 2024 06:21 )   PT: 10.7 sec;   INR: 1.02 ratio        Urinalysis Basic - ( 20 Aug 2024 07:45 )    Color: x / Appearance: x / SG: x / pH: x  Gluc: 89 mg/dL / Ketone: x  / Bili: x / Urobili: x   Blood: x / Protein: x / Nitrite: x   Leuk Esterase: x / RBC: x / WBC x   Sq Epi: x / Non Sq Epi: x / Bacteria: x    Imaging:    Xray Chest 1 View- PORTABLE-Urgent (Xray Chest 1 View- PORTABLE-Urgent .) (08.07.24 @ 16:35)     FINDINGS:  There are no consolidations. There is no pleural effusion or pneumothorax.  The heart is magnified by technique  The visualized osseous structures demonstrate no acute pathology.    IMPRESSION:  Clear lungs.

## 2024-08-09 NOTE — PROGRESS NOTE ADULT - ASSESSMENT
ASSESSMENT: 33 RHF with PMHx Asthma, Sickle cell trait, Myasthenia gravis (AChR antibody positive, diagnosed in 2017) on pyridostigmine 60mg QID presenting due to tongue and extremity heaviness, b/l ptosis, diplopia, as well as neck weakness. Patient has had myasthenic crises in past and needed to be intubated 3 times in past. Labs pending. NIF -50 and VC 1450, saturating well on room air. Neurological exam revealed b/l ptosis, neck flexion 4-/5, neck extension 4+/5, able to count numbers to 20, and proximal>distal muscle weakness.       IMPRESSION: Bilateral ptosis,  proximal>distal muscle weakness with history of myasthenia gravis. Likely myasthenia gravis exacerbation for now but concern for rapid change into myasthenic crisis.     PLAN:  [] NIF -50 and VC 1450 in ED, obtain NIF and VC q4h (concern for impending respiratory failure)  [] CXR, UA, Utox to r/o any infectious etiology  [] Start pyridostigmine 30 mg qid  [] Consider glycopyrrolate 1mg w/pyridostigmine dose if excessive cholinergic side effects  [] Starting IVIG 2g/kg now divided into 5 days per outpatient neurologist Dr. Eason's recommendations (has received 2 doses)  [] Starting rate 30cc/hr and increase by 10cc/hr every 30 min to goal 70cc/hr. Infuse over 6-7 hours  [] Premedicate with Tylenol 325mg PO 30 minutes prior to dose  [] Premedicate with Benadryl 25mg PO 30 minutes prior to dose  [] Obtain daily CBC, BMP while on IVIG  [] Common adverse side effects include headache, nausea, fever, tremor, flushing, myalgias, high blood pressure, tachycardia  [] Observe for symptoms of pulmonary edema, hemolysis, thrombosis, and anaphylaxis   [] Cardiac monitoring with telemetry  [] Avoid medications like fluoroquinolones, macrolides, amnioglycosides, chloroquines. Consider avoiding anti hypertensives such as beta blockers, CCB, and magnesium.  [] Elective intubation if respiratory distress or VC below 15-20ml/kg IBW, decline in NIF to -25 to -30 cm h2o           ASSESSMENT: 33 RHF with PMHx Asthma, Sickle cell trait, Myasthenia gravis (AChR antibody positive, diagnosed in 2017) on pyridostigmine 60mg QID presenting due to tongue and extremity heaviness, b/l ptosis, diplopia, as well as neck weakness. Patient has had myasthenic crises in past and needed to be intubated 3 times in past. Labs pending. NIF -50 and VC 1450, saturating well on room air. Neurological exam revealed b/l ptosis, neck flexion 4-/5, neck extension 4+/5, able to count numbers to 20, and proximal>distal muscle weakness.       IMPRESSION: Bilateral ptosis,  proximal>distal muscle weakness with history of myasthenia gravis. Likely myasthenia gravis exacerbation for now but concern for rapid change into myasthenic crisis.     PLAN:  #myesthenia gravis concern for impending crisis  [] NIF -50 and VC 1450 in ED, obtain NIF and VC q4h (concern for impending respiratory failure). Plan to repeat this AM and continue to monitor  [] CXR, UA, Utox to r/o any infectious etiology  [] Start pyridostigmine 30 mg qid  [] Consider glycopyrrolate 1mg w/pyridostigmine dose if excessive cholinergic side effects  [] Starting IVIG 2g/kg now divided into 5 days per outpatient neurologist Dr. Eason's recommendations (has received 2 doses)  ----Starting rate 30cc/hr and increase by 10cc/hr every 30 min to goal 70cc/hr. Infuse over 6-7 hours  ----Premedicate with Tylenol 325mg PO 30 minutes prior to dose  ----Premedicate with Benadryl 25mg PO 30 minutes prior to dose  [] Obtain daily CBC, BMP while on IVIG  [] Common adverse side effects include headache, nausea, fever, tremor, flushing, myalgias, high blood pressure, tachycardia  [] Observe for symptoms of pulmonary edema, hemolysis, thrombosis, and anaphylaxis   [] Cardiac monitoring with telemetry  [] Avoid medications like fluoroquinolones, macrolides, amnioglycosides, chloroquines. Consider avoiding anti hypertensives such as beta blockers, CCB, and magnesium.  [] Elective intubation if respiratory distress or VC below 15-20ml/kg IBW, decline in NIF to -25 to -30 cm h2o    #leukopenia  []consult medicine regarding low wbc  []continue to monitor cbc    * case and plan not final until attending attestation *

## 2024-08-09 NOTE — PROGRESS NOTE ADULT - SUBJECTIVE AND OBJECTIVE BOX
SUBJECTIVE/INTERVAL HISTORY:  Overnight: @ 0600 AM  NIF -50 and Vc 480 (down from 1330)    PAST MEDICAL & SURGICAL HISTORY:  Myasthenia gravis w 3 previous intubations  Childhood asthma  Sickle cell trait  H/O umbilical hernia repair  H/O  section        FAMILY HISTORY:  No pertinent family history in first degree relatives        MEDICATIONS (HOME):  Home Medications:  pyridostigmine 60 mg oral tablet: 1 tab(s) orally 4 times a day (07 Aug 2024 16:35)    MEDICATIONS  (STANDING):  acetaminophen     Tablet .. 325 milliGRAM(s) Oral daily  diphenhydrAMINE 25 milliGRAM(s) Oral daily  enoxaparin Injectable 40 milliGRAM(s) SubCutaneous every 24 hours  immune   globulin 10% (GAMMAGARD) IVPB 20 Gram(s) IV Intermittent daily  pyridostigmine 30 milliGRAM(s) Oral every 6 hours    MEDICATIONS  (PRN):  glycopyrrolate 1 milliGRAM(s) Oral every 6 hours PRN increased secretions    ALLERGIES/INTOLERANCES:  Allergies  shellfish (Anaphylaxis)  No Known Drug Allergies    Intolerances    VITALS & EXAMINATION:  Vital Signs Last 24 Hrs  T(C): 36.8 (09 Aug 2024 04:30), Max: 36.9 (08 Aug 2024 17:05)  T(F): 98.2 (09 Aug 2024 04:30), Max: 98.4 (08 Aug 2024 17:05)  HR: 62 (09 Aug 2024 04:30) (44 - 62)  BP: 103/66 (09 Aug 2024 04:30) (94/64 - 117/84)  BP(mean): --  RR: 18 (09 Aug 2024 04:30) (18 - 18)  SpO2: 97% (09 Aug 2024 04:30) (94% - 98%)    Parameters below as of 09 Aug 2024 04:30  Patient On (Oxygen Delivery Method): room air      PHYSICAL EXAM    Constitutional: well-developed, well-nourished, well-groomed    Neurological Examination:    - Mental Status: Eyes open, attends to examiner, oriented to person, age, place, and time; speech is fluent with intact naming, repetition, and follows 1-step and 3-step mid-line crossing commands; good overall fund of knowledge (aware of current events, relevant past history, and vocabulary appropriate for level of education); immediate recall is 3/3 words and delayed recall is 3/3 words at 5 minutes; able to spell WORLD backwards and recite the days of the week in reverse; able to read a sentence.    - Cranial Nerves: visual fields are full to confrontation; pupils are equal, round, and reactive to light 3-4mm; extraocular movements limited on horizontal gaze but both eyes cross midline, mod to severe ptosis present, upward gaze limited but pupils do elevate from neutral position, patient reported binocular diplopia on neutral gaze and on upwards and lateral BL gaze are intact without nystagmus; facial sensation is intact in the V1-V3 distribution bilaterally; face is symmetric with normal eye closure and smile; hearing is intact to conversation; uvula is midline and soft palate rises symmetrically; shoulder shrug intact; tongue protrudes in the midline.    - Motor/Strength Testing: Patient continues to be very fatigable                                  Right           Left  Neck flexion               4                 4  Deltoid                     4                 4-  Biceps                      4                 4-  Triceps                     4                 4-  Wrist Ext (radial)       5                 5  Hand                  5                 4  Hip Flex                   4-                  4-  Knee Ext	       4                  4-  Dorsiflex                  5                  5  Plantarflex               5                  5    - There is no pronator drift. Normal muscle bulk and tone throughout.    - Reflexes:   Bicep (C5/C6):                  R 2+ --- L 2+   Brachioradialis (C5/C6) :   R 2+ --- L 2+   Patella (L3/L4) :                 R 2+ --- L 2+   Ankle (S1) :                       R 2+ --- L 2+   toes mute BL    - Sensory: Intact throughout to light touch.   - Coordination: Finger-nose-finger intact without dysmetria.    - Gait: Normal steps, base, arm swing, and turning.        LABORATORY:  CBC                       10.3   2.68  )-----------( 268      ( 08 Aug 2024 06:47 )             32.6     Chem 08-08    138  |  105  |  6<L>  ----------------------------<  79  4.0   |  22  |  0.61    Ca    9.3      08 Aug 2024 06:47    TPro  7.4  /  Alb  3.8  /  TBili  1.0  /  DBili  x   /  AST  18  /  ALT  12  /  AlkPhos  100  08-08    LFTs LIVER FUNCTIONS - ( 08 Aug 2024 06:47 )  Alb: 3.8 g/dL / Pro: 7.4 g/dL / ALK PHOS: 100 U/L / ALT: 12 U/L / AST: 18 U/L / GGT: x           Coagulopathy   Lipid Panel   A1c   Cardiac enzymes     U/A Urinalysis Basic - ( 08 Aug 2024 10:52 )    Color: Yellow / Appearance: Clear / S.011 / pH: x  Gluc: x / Ketone: Negative mg/dL  / Bili: Negative / Urobili: 1.0 mg/dL   Blood: x / Protein: Negative mg/dL / Nitrite: Negative   Leuk Esterase: Negative / RBC: x / WBC x   Sq Epi: x / Non Sq Epi: x / Bacteria: x        STUDIES & IMAGING:  Studies (EKG, EEG, EMG, etc):     Radiology (XR, CT, MR, U/S, TTE/MARIA E): SUBJECTIVE/INTERVAL HISTORY:  Overnight: @ 0600 AM  NIF -50 and Vc 480 (down from 1330)  During AM rounds: patient continues to feel weak to weaker than when she was admitted. She continues to deny breathing or swallowing issues.    PAST MEDICAL & SURGICAL HISTORY:  Myasthenia gravis w 3 previous intubations  Childhood asthma  Sickle cell trait  H/O umbilical hernia repair  H/O  section        FAMILY HISTORY:  No pertinent family history in first degree relatives        MEDICATIONS (HOME):  Home Medications:  pyridostigmine 60 mg oral tablet: 1 tab(s) orally 4 times a day (07 Aug 2024 16:35)    MEDICATIONS  (STANDING):  acetaminophen     Tablet .. 325 milliGRAM(s) Oral daily  diphenhydrAMINE 25 milliGRAM(s) Oral daily  enoxaparin Injectable 40 milliGRAM(s) SubCutaneous every 24 hours  immune   globulin 10% (GAMMAGARD) IVPB 20 Gram(s) IV Intermittent daily  pyridostigmine 30 milliGRAM(s) Oral every 6 hours    MEDICATIONS  (PRN):  glycopyrrolate 1 milliGRAM(s) Oral every 6 hours PRN increased secretions    ALLERGIES/INTOLERANCES:  Allergies  shellfish (Anaphylaxis)  No Known Drug Allergies    Intolerances    VITALS & EXAMINATION:  Vital Signs Last 24 Hrs  T(C): 36.8 (09 Aug 2024 04:30), Max: 36.9 (08 Aug 2024 17:05)  T(F): 98.2 (09 Aug 2024 04:30), Max: 98.4 (08 Aug 2024 17:05)  HR: 62 (09 Aug 2024 04:30) (44 - 62)  BP: 103/66 (09 Aug 2024 04:30) (94/64 - 117/84)  BP(mean): --  RR: 18 (09 Aug 2024 04:30) (18 - 18)  SpO2: 97% (09 Aug 2024 04:30) (94% - 98%)    Parameters below as of 09 Aug 2024 04:30  Patient On (Oxygen Delivery Method): room air      PHYSICAL EXAM    Constitutional: well-developed, well-nourished, well-groomed    Neurological Examination:    - Mental Status: Eyes open, attends to examiner, oriented to person, age, place, and time; speech is fluent with intact naming, repetition, and follows 1-step and 3-step mid-line crossing commands; good overall fund of knowledge (aware of current events, relevant past history, and vocabulary appropriate for level of education); immediate recall is 3/3 words and delayed recall is 3/3 words at 5 minutes; able to spell WORLD backwards and recite the days of the week in reverse; able to read a sentence.    - Cranial Nerves: visual fields are full to confrontation; pupils are equal, round, and reactive to light 3-4mm; extraocular movements limited on horizontal gaze but both eyes cross midline, mod to severe ptosis present, upward gaze limited but pupils do elevate from neutral position, patient reported binocular diplopia on neutral gaze and on upwards and lateral BL gaze are intact without nystagmus; facial sensation is intact in the V1-V3 distribution bilaterally; face is symmetric with normal eye closure and smile; hearing is intact to conversation; uvula is midline and soft palate rises symmetrically; shoulder shrug intact; tongue protrudes in the midline.    - Motor/Strength Testing: Patient continues to be very fatigable; patient made it 25 when counting on one full breath (made it to 27 yesterday evening)                                 Right           Left  Trapezius                   5                5  Neck flexion               4+              4+  Neck extension           4+              4+  Deltoid                     4                 4-  Biceps                      4                 4-  Triceps                     4                 4-  Wrist Ext (radial)       5                 5  Hand                  5                 4  Hip Flex                   4-                  4-  Knee Ext	       4                  4-  Dorsiflex                  5                  5  Plantarflex               5                  5    - There is no pronator drift. Normal muscle bulk and tone throughout.    - Reflexes:   Bicep (C5/C6):                  R 2+ --- L 2+   Brachioradialis (C5/C6) :   R 2+ --- L 2+   Patella (L3/L4) :                 R 2+ --- L 2+   Ankle (S1) :                       R 2+ --- L 2+   toes mute BL    - Sensory: Intact throughout to light touch.   - Coordination: Finger-nose-finger intact without dysmetria.    - Gait: Normal steps, base, arm swing, and turning.        LABORATORY:  CBC                       10.3   2.68  )-----------( 268      ( 08 Aug 2024 06:47 )             32.6     Chem 08-08    138  |  105  |  6<L>  ----------------------------<  79  4.0   |  22  |  0.61    Ca    9.3      08 Aug 2024 06:47    TPro  7.4  /  Alb  3.8  /  TBili  1.0  /  DBili  x   /  AST  18  /  ALT  12  /  AlkPhos  100  08-08    LFTs LIVER FUNCTIONS - ( 08 Aug 2024 06:47 )  Alb: 3.8 g/dL / Pro: 7.4 g/dL / ALK PHOS: 100 U/L / ALT: 12 U/L / AST: 18 U/L / GGT: x           Coagulopathy   Lipid Panel   A1c   Cardiac enzymes     U/A Urinalysis Basic - ( 08 Aug 2024 10:52 )    Color: Yellow / Appearance: Clear / S.011 / pH: x  Gluc: x / Ketone: Negative mg/dL  / Bili: Negative / Urobili: 1.0 mg/dL   Blood: x / Protein: Negative mg/dL / Nitrite: Negative   Leuk Esterase: Negative / RBC: x / WBC x   Sq Epi: x / Non Sq Epi: x / Bacteria: x        STUDIES & IMAGING:  Studies (EKG, EEG, EMG, etc):     Radiology (XR, CT, MR, U/S, TTE/MARIA E):

## 2024-08-09 NOTE — DISCHARGE NOTE PROVIDER - NSDCMRMEDTOKEN_GEN_ALL_CORE_FT
pyridostigmine 60 mg oral tablet: 1 tab(s) orally 4 times a day   pantoprazole 40 mg oral delayed release tablet: 1 tab(s) orally once a day (before a meal)  predniSONE 20 mg oral tablet: 2 tab(s) orally once a day MDD: 40mg  pyridostigmine 60 mg oral tablet: 1 tab(s) orally 4 times a day  sulfamethoxazole-trimethoprim 800 mg-160 mg oral tablet: 1 tab(s) orally once a day   Miscellaneous: Physical Therapy  pantoprazole 40 mg oral delayed release tablet: 1 tab(s) orally once a day (before a meal)  predniSONE 20 mg oral tablet: 2 tab(s) orally once a day MDD: 40mg  pyridostigmine 60 mg oral tablet: 1 tab(s) orally 4 times a day  sulfamethoxazole-trimethoprim 800 mg-160 mg oral tablet: 1 tab(s) orally once a day

## 2024-08-09 NOTE — DISCHARGE NOTE PROVIDER - NSDCCPCAREPLAN_GEN_ALL_CORE_FT
PRINCIPAL DISCHARGE DIAGNOSIS  Diagnosis: Myasthenia gravis with exacerbation  Assessment and Plan of Treatment: You were admitted with weakness, double vision, and drooping eyelids thought to be exacerbation of myasthenia gravis. Home mestinon was held and steroids were continued. You were treated with a 5 day course of IVIG, however did not have impovement in your symptoms. You developed respiratory distress and required intubation and ICU level care. You were treated with 5 sessions of PLEX with significant improvement and extubated. Mestinon was resumed. You were seen by physical therapy who recommends outpatient PT. You are now neurogically stable for discharge home today. You will continue Mestinon 60mg four times a day and prednisone 40mg daily. Please continue pantoprazole while on steroids.  You will follow outpatient with Neurologist Dr. Eason for further management.

## 2024-08-09 NOTE — DISCHARGE NOTE PROVIDER - CARE PROVIDER_API CALL
Mason Ly  Neurology  [address], [address]  [place] [address]  Phone: [phone]  Fax: [phone]  Follow Up Time: 2 weeks

## 2024-08-10 LAB
ALBUMIN SERPL ELPH-MCNC: 4 G/DL — SIGNIFICANT CHANGE UP (ref 3.3–5)
ALP SERPL-CCNC: 100 U/L — SIGNIFICANT CHANGE UP (ref 40–120)
ALT FLD-CCNC: 9 U/L — LOW (ref 10–45)
ANION GAP SERPL CALC-SCNC: 13 MMOL/L — SIGNIFICANT CHANGE UP (ref 5–17)
AST SERPL-CCNC: 14 U/L — SIGNIFICANT CHANGE UP (ref 10–40)
BASOPHILS # BLD AUTO: 0.01 K/UL — SIGNIFICANT CHANGE UP (ref 0–0.2)
BASOPHILS NFR BLD AUTO: 0.2 % — SIGNIFICANT CHANGE UP (ref 0–2)
BILIRUB SERPL-MCNC: 1.2 MG/DL — SIGNIFICANT CHANGE UP (ref 0.2–1.2)
BUN SERPL-MCNC: 10 MG/DL — SIGNIFICANT CHANGE UP (ref 7–23)
CALCIUM SERPL-MCNC: 9.6 MG/DL — SIGNIFICANT CHANGE UP (ref 8.4–10.5)
CHLORIDE SERPL-SCNC: 100 MMOL/L — SIGNIFICANT CHANGE UP (ref 96–108)
CO2 SERPL-SCNC: 20 MMOL/L — LOW (ref 22–31)
CREAT SERPL-MCNC: 0.65 MG/DL — SIGNIFICANT CHANGE UP (ref 0.5–1.3)
EGFR: 121 ML/MIN/1.73M2 — SIGNIFICANT CHANGE UP
EOSINOPHIL # BLD AUTO: 0.03 K/UL — SIGNIFICANT CHANGE UP (ref 0–0.5)
EOSINOPHIL NFR BLD AUTO: 0.5 % — SIGNIFICANT CHANGE UP (ref 0–6)
GLUCOSE SERPL-MCNC: 77 MG/DL — SIGNIFICANT CHANGE UP (ref 70–99)
HCG UR QL: NEGATIVE — SIGNIFICANT CHANGE UP
HCT VFR BLD CALC: 32.7 % — LOW (ref 34.5–45)
HGB BLD-MCNC: 10.4 G/DL — LOW (ref 11.5–15.5)
IMM GRANULOCYTES NFR BLD AUTO: 0.2 % — SIGNIFICANT CHANGE UP (ref 0–0.9)
LYMPHOCYTES # BLD AUTO: 1.25 K/UL — SIGNIFICANT CHANGE UP (ref 1–3.3)
LYMPHOCYTES # BLD AUTO: 22.5 % — SIGNIFICANT CHANGE UP (ref 13–44)
MAGNESIUM SERPL-MCNC: 2.2 MG/DL — SIGNIFICANT CHANGE UP (ref 1.6–2.6)
MCHC RBC-ENTMCNC: 24.2 PG — LOW (ref 27–34)
MCHC RBC-ENTMCNC: 31.8 GM/DL — LOW (ref 32–36)
MCV RBC AUTO: 76 FL — LOW (ref 80–100)
MONOCYTES # BLD AUTO: 0.57 K/UL — SIGNIFICANT CHANGE UP (ref 0–0.9)
MONOCYTES NFR BLD AUTO: 10.3 % — SIGNIFICANT CHANGE UP (ref 2–14)
NEUTROPHILS # BLD AUTO: 3.68 K/UL — SIGNIFICANT CHANGE UP (ref 1.8–7.4)
NEUTROPHILS NFR BLD AUTO: 66.3 % — SIGNIFICANT CHANGE UP (ref 43–77)
NRBC # BLD: 0 /100 WBCS — SIGNIFICANT CHANGE UP (ref 0–0)
PHOSPHATE SERPL-MCNC: 4.6 MG/DL — HIGH (ref 2.5–4.5)
PLATELET # BLD AUTO: 277 K/UL — SIGNIFICANT CHANGE UP (ref 150–400)
POTASSIUM SERPL-MCNC: 4 MMOL/L — SIGNIFICANT CHANGE UP (ref 3.5–5.3)
POTASSIUM SERPL-SCNC: 4 MMOL/L — SIGNIFICANT CHANGE UP (ref 3.5–5.3)
PROT SERPL-MCNC: 8.7 G/DL — HIGH (ref 6–8.3)
RBC # BLD: 4.3 M/UL — SIGNIFICANT CHANGE UP (ref 3.8–5.2)
RBC # FLD: 16.9 % — HIGH (ref 10.3–14.5)
SODIUM SERPL-SCNC: 133 MMOL/L — LOW (ref 135–145)
WBC # BLD: 5.55 K/UL — SIGNIFICANT CHANGE UP (ref 3.8–10.5)
WBC # FLD AUTO: 5.55 K/UL — SIGNIFICANT CHANGE UP (ref 3.8–10.5)

## 2024-08-10 PROCEDURE — 99233 SBSQ HOSP IP/OBS HIGH 50: CPT

## 2024-08-10 RX ADMIN — KETOROLAC TROMETHAMINE 15 MILLIGRAM(S): 30 INJECTION, SOLUTION INTRAMUSCULAR at 00:50

## 2024-08-10 RX ADMIN — ENOXAPARIN SODIUM 40 MILLIGRAM(S): 100 INJECTION SUBCUTANEOUS at 12:01

## 2024-08-10 RX ADMIN — IMMUNE GLOBULIN (HUMAN) 33.33 GRAM(S): 10 INJECTION INTRAVENOUS; SUBCUTANEOUS at 10:57

## 2024-08-10 RX ADMIN — Medication 25 MILLIGRAM(S): at 10:57

## 2024-08-10 RX ADMIN — PYRIDOSTIGMINE BROMIDE 30 MILLIGRAM(S): 60 SOLUTION ORAL at 11:04

## 2024-08-10 RX ADMIN — PYRIDOSTIGMINE BROMIDE 30 MILLIGRAM(S): 60 SOLUTION ORAL at 23:14

## 2024-08-10 RX ADMIN — ACETAMINOPHEN 650 MILLIGRAM(S): 325 TABLET ORAL at 10:57

## 2024-08-10 RX ADMIN — PYRIDOSTIGMINE BROMIDE 30 MILLIGRAM(S): 60 SOLUTION ORAL at 06:12

## 2024-08-10 RX ADMIN — PYRIDOSTIGMINE BROMIDE 30 MILLIGRAM(S): 60 SOLUTION ORAL at 17:15

## 2024-08-10 NOTE — PROGRESS NOTE ADULT - SUBJECTIVE AND OBJECTIVE BOX
Attending Note    Patient is on day 4 of IVIG. Has not noticed significant improvement in weakness. Denies any respiratory symptoms. Does not have dysphagia    Neuro exam: neck flex 5-, next extensor 5/5. B/l moderate ptosis with limited movement left eye in all directions. Right eye horizontal gaze also limited  Counts to 30 on 1 breath  UL strength improved 4+ deltoids, R biceps 4, L biceps 4- Triceps 4,  5/5 b/l hip flexors 4+    Labs  WBC 5.5   Na 133    Impression: MG exacerbation showing clinical signs of improvement on IVIG  cont IVIG 4/5 today, will determine by Monday if she will need course of prednisone or additional IVIG sessions  Monitor PFTs  leukopenia improving  Hyponatremia - send urine lytes. Increase fluid intake.   PT .

## 2024-08-11 LAB
ALBUMIN SERPL ELPH-MCNC: 4 G/DL — SIGNIFICANT CHANGE UP (ref 3.3–5)
ALP SERPL-CCNC: 94 U/L — SIGNIFICANT CHANGE UP (ref 40–120)
ALT FLD-CCNC: 9 U/L — LOW (ref 10–45)
ANION GAP SERPL CALC-SCNC: 11 MMOL/L — SIGNIFICANT CHANGE UP (ref 5–17)
AST SERPL-CCNC: 15 U/L — SIGNIFICANT CHANGE UP (ref 10–40)
BASE EXCESS BLDA CALC-SCNC: -0.6 MMOL/L — SIGNIFICANT CHANGE UP (ref -2–3)
BASOPHILS # BLD AUTO: 0.03 K/UL — SIGNIFICANT CHANGE UP (ref 0–0.2)
BASOPHILS NFR BLD AUTO: 0.9 % — SIGNIFICANT CHANGE UP (ref 0–2)
BILIRUB SERPL-MCNC: 1.2 MG/DL — SIGNIFICANT CHANGE UP (ref 0.2–1.2)
BUN SERPL-MCNC: 9 MG/DL — SIGNIFICANT CHANGE UP (ref 7–23)
CALCIUM SERPL-MCNC: 9.7 MG/DL — SIGNIFICANT CHANGE UP (ref 8.4–10.5)
CHLORIDE SERPL-SCNC: 104 MMOL/L — SIGNIFICANT CHANGE UP (ref 96–108)
CO2 BLDA-SCNC: 26 MMOL/L — HIGH (ref 19–24)
CO2 SERPL-SCNC: 22 MMOL/L — SIGNIFICANT CHANGE UP (ref 22–31)
CREAT SERPL-MCNC: 0.6 MG/DL — SIGNIFICANT CHANGE UP (ref 0.5–1.3)
EGFR: 123 ML/MIN/1.73M2 — SIGNIFICANT CHANGE UP
EOSINOPHIL # BLD AUTO: 0.05 K/UL — SIGNIFICANT CHANGE UP (ref 0–0.5)
EOSINOPHIL NFR BLD AUTO: 1.8 % — SIGNIFICANT CHANGE UP (ref 0–6)
GAS PNL BLDA: SIGNIFICANT CHANGE UP
GLUCOSE SERPL-MCNC: 78 MG/DL — SIGNIFICANT CHANGE UP (ref 70–99)
HCO3 BLDA-SCNC: 25 MMOL/L — SIGNIFICANT CHANGE UP (ref 21–28)
HCT VFR BLD CALC: 32.9 % — LOW (ref 34.5–45)
HGB BLD-MCNC: 10.6 G/DL — LOW (ref 11.5–15.5)
HOROWITZ INDEX BLDA+IHG-RTO: 28 — SIGNIFICANT CHANGE UP
LYMPHOCYTES # BLD AUTO: 1.21 K/UL — SIGNIFICANT CHANGE UP (ref 1–3.3)
LYMPHOCYTES # BLD AUTO: 42.8 % — SIGNIFICANT CHANGE UP (ref 13–44)
MAGNESIUM SERPL-MCNC: 2 MG/DL — SIGNIFICANT CHANGE UP (ref 1.6–2.6)
MANUAL SMEAR VERIFICATION: SIGNIFICANT CHANGE UP
MCHC RBC-ENTMCNC: 24.5 PG — LOW (ref 27–34)
MCHC RBC-ENTMCNC: 32.2 GM/DL — SIGNIFICANT CHANGE UP (ref 32–36)
MCV RBC AUTO: 76 FL — LOW (ref 80–100)
MONOCYTES # BLD AUTO: 0.48 K/UL — SIGNIFICANT CHANGE UP (ref 0–0.9)
MONOCYTES NFR BLD AUTO: 17 % — HIGH (ref 2–14)
NEUTROPHILS # BLD AUTO: 1.06 K/UL — LOW (ref 1.8–7.4)
NEUTROPHILS NFR BLD AUTO: 37.5 % — LOW (ref 43–77)
PCO2 BLDA: 43 MMHG — SIGNIFICANT CHANGE UP (ref 32–45)
PH BLDA: 7.37 — SIGNIFICANT CHANGE UP (ref 7.35–7.45)
PHOSPHATE SERPL-MCNC: 4.5 MG/DL — SIGNIFICANT CHANGE UP (ref 2.5–4.5)
PLAT MORPH BLD: NORMAL — SIGNIFICANT CHANGE UP
PLATELET # BLD AUTO: 252 K/UL — SIGNIFICANT CHANGE UP (ref 150–400)
PO2 BLDA: 173 MMHG — HIGH (ref 83–108)
POLYCHROMASIA BLD QL SMEAR: SLIGHT — SIGNIFICANT CHANGE UP
POTASSIUM SERPL-MCNC: 4.1 MMOL/L — SIGNIFICANT CHANGE UP (ref 3.5–5.3)
POTASSIUM SERPL-SCNC: 4.1 MMOL/L — SIGNIFICANT CHANGE UP (ref 3.5–5.3)
PROT SERPL-MCNC: 8.9 G/DL — HIGH (ref 6–8.3)
RBC # BLD: 4.33 M/UL — SIGNIFICANT CHANGE UP (ref 3.8–5.2)
RBC # FLD: 16.9 % — HIGH (ref 10.3–14.5)
RBC BLD AUTO: SIGNIFICANT CHANGE UP
SAO2 % BLDA: 100 % — HIGH (ref 94–98)
SODIUM SERPL-SCNC: 137 MMOL/L — SIGNIFICANT CHANGE UP (ref 135–145)
TARGETS BLD QL SMEAR: SLIGHT — SIGNIFICANT CHANGE UP
WBC # BLD: 2.83 K/UL — LOW (ref 3.8–10.5)
WBC # FLD AUTO: 2.83 K/UL — LOW (ref 3.8–10.5)

## 2024-08-11 PROCEDURE — 99233 SBSQ HOSP IP/OBS HIGH 50: CPT

## 2024-08-11 PROCEDURE — 71045 X-RAY EXAM CHEST 1 VIEW: CPT | Mod: 26

## 2024-08-11 RX ORDER — SODIUM CHLORIDE 9 MG/ML
1000 INJECTION INTRAMUSCULAR; INTRAVENOUS; SUBCUTANEOUS
Refills: 0 | Status: DISCONTINUED | OUTPATIENT
Start: 2024-08-11 | End: 2024-08-12

## 2024-08-11 RX ORDER — IMMUNE GLOBULIN (HUMAN) 10 G/100ML
20 INJECTION INTRAVENOUS; SUBCUTANEOUS DAILY
Refills: 0 | Status: DISCONTINUED | OUTPATIENT
Start: 2024-08-12 | End: 2024-08-12

## 2024-08-11 RX ORDER — PREDNISONE 10 MG
40 TABLET, DOSE PACK ORAL DAILY
Refills: 0 | Status: DISCONTINUED | OUTPATIENT
Start: 2024-08-11 | End: 2024-08-12

## 2024-08-11 RX ORDER — PANTOPRAZOLE SODIUM 40 MG
40 TABLET, DELAYED RELEASE (ENTERIC COATED) ORAL
Refills: 0 | Status: DISCONTINUED | OUTPATIENT
Start: 2024-08-11 | End: 2024-08-21

## 2024-08-11 RX ORDER — DIPHENHYDRAMINE HCL 50 MG
25 CAPSULE ORAL DAILY
Refills: 0 | Status: DISCONTINUED | OUTPATIENT
Start: 2024-08-12 | End: 2024-08-12

## 2024-08-11 RX ADMIN — ACETAMINOPHEN 650 MILLIGRAM(S): 325 TABLET ORAL at 09:24

## 2024-08-11 RX ADMIN — Medication 25 MILLIGRAM(S): at 09:24

## 2024-08-11 RX ADMIN — IMMUNE GLOBULIN (HUMAN) 33.33 GRAM(S): 10 INJECTION INTRAVENOUS; SUBCUTANEOUS at 10:03

## 2024-08-11 RX ADMIN — GLYCOPYRROLATE 1 MILLIGRAM(S): 0.2 INJECTION INTRAMUSCULAR; INTRAVENOUS at 00:24

## 2024-08-11 RX ADMIN — GLYCOPYRROLATE 1 MILLIGRAM(S): 0.2 INJECTION INTRAMUSCULAR; INTRAVENOUS at 20:14

## 2024-08-11 RX ADMIN — ENOXAPARIN SODIUM 40 MILLIGRAM(S): 100 INJECTION SUBCUTANEOUS at 11:26

## 2024-08-11 RX ADMIN — Medication 40 MILLIGRAM(S): at 11:10

## 2024-08-11 NOTE — PROGRESS NOTE ADULT - ASSESSMENT
33 RHF with PMHx Asthma, Sickle cell trait, Myasthenia gravis (AChR antibody positive, diagnosed in 2017) on pyridostigmine 60mg QID presenting due to tongue and extremity heaviness, b/l ptosis, diplopia, as well as neck weakness. Admitted for management of MG exacerbation. Pt receiving course of IVIG. Pt was reporting improving generalized weakness but on 5th day of IVIG pt with clinical worsening.    Impression: Bilateral ptosis, proximal>distal muscle weakness with history of myasthenia gravis. Likely myasthenia gravis exacerbation for now but concern for rapid change into myasthenic crisis.     Plan:  #Myasthenia Gravis Exacerbation  [ ] Plan for IVIG 8/7-8/11 (2 g/kg split over 5 days) - given clinical status, will reassess need for further IVIG and/or alternative therapies  [ ] Will reassess mestinon dose and frequency, in light of pt's secretions       [] Consider glycopyrrolate 1mg w/pyridostigmine dose if excessive cholinergic side effects. Also recommend suctioning.  [ ] q4h NIF and FVC. Consider Elective intubation if respiratory distress or VC below 15-20ml/kg IBW, decline in NIF to -25 to -30 cm h2o  [x] Pt not candidate for NSCU at this time    #leukopenia  [ ] Medicine consulted, appreciate recs  [ ] Will continue to trend      #hyponatremia  [ ] urine lytes. After obtained, IVF.    Case to be seen by neuro attending on AM rounds. Recommendations not finalized until after attending attestation.

## 2024-08-11 NOTE — PROGRESS NOTE ADULT - ATTENDING COMMENTS
Patient desaturated to 90 overnight with increased secretions. Had trouble swallowing. Needed supplemental oxygen and seen by MICU and NSICU. BIPAP was recommended but patient refused. Her VC dropped to 600 during the night but improved to 1100 this AM. NIFs -50      Neuro exam: Respiratory status is stable. Does not need NC.   neck flex 5-, next extensor 5/5. B/l moderate ptosis R>L. with limited movement left eye in all directions. Right eye horizontal gaze also limited.  Counts to 30 on 1 breath  UL strength  4+ deltoids, R biceps 4, L biceps 4 Triceps 4, Wrist extensors 4-  5/-5 b/l hip flexors 4+    Labs  WBC 5.5 (8/10) -->2.83 8/11  Na 133-->137    Impression: MG exacerbation with increased noctural secretions and overnight desaturation. Continues to have significant UL weakness and diplopia  Hyponatremia - has improved    Increase IVIG to 7 sessions: today d5/7  Start prednisone 40mg qd  d/c mestinon   Monitor PFTs q4hrs  leukopenia - c/s medicine Patient desaturated to 90 overnight with increased secretions. Had trouble swallowing. Needed supplemental oxygen and seen by MICU and NSICU. BIPAP was recommended but patient refused. Her VC dropped to 600 during the night but improved to 1100 this AM. NIFs -50      Neuro exam: Respiratory status is stable. Does not need NC.   neck flex 5-, next extensor 5/5. B/l moderate ptosis R>L. with limited movement left eye in all directions. Right eye horizontal gaze also limited.  Counts to 30 on 1 breath  UL strength  4+ deltoids, R biceps 4, L biceps 4 Triceps 4, Wrist extensors 4-  5/-5 b/l hip flexors 4+    Labs  WBC 5.5 (8/10) -->2.83   Na 133-->137  VB.37    CXR WNL    Impression: MG exacerbation with increased noctural secretions and overnight desaturation. Continues to have significant UL weakness and diplopia  Hyponatremia - has improved    Increase IVIG to 7 sessions: today d5/7  Start prednisone 40mg qd; GI ppx  d/c mestinon   Monitor PFTs q4hrs  leukopenia - c/s medicine

## 2024-08-11 NOTE — PROVIDER CONTACT NOTE (OTHER) - ACTION/TREATMENT ORDERED:
Pt to have fluids added to regimen; IVIG course to be extended per MD and attending recommendations. continue to monitor for signs of fluid overload and hypotension.

## 2024-08-11 NOTE — CHART NOTE - NSCHARTNOTEFT_GEN_A_CORE
:  Yolanda Vera    DATE/TIME: 08/11/24, 2am    INDICATION:    [] Shock    [x] Acute Hypoxic Respiratory failure    [ ] Other:       PROCEDURE:    [x] LIMITED ECHO    [x] LIMITED CHEST    [ ] LIMITED ABDOMINAL    [ ] OTHER      FINDINGS:  Cardiac:   LV: Grossly Normal LV Function.   RV: Normal RV size.   Pericardium: No pericardial effusion.      Pulmonary: A lines throughout. No pleural Effusion. Good diaphragmatic excursion on active/passive breathing and forceful inspiration     IVC 7mm     INTERPRETATION:  Grossly Normal cardiac function.   Normal aeration pattern of the lung.  Good diaphragmatic excursion     Images stored on [employer]

## 2024-08-11 NOTE — PROVIDER CONTACT NOTE (OTHER) - ASSESSMENT
blood pressure low, no other clinical signs noted, denies dizziness, endorses fatigue but attributes that to hospital course, not medication.

## 2024-08-11 NOTE — CONSULT NOTE ADULT - SUBJECTIVE AND OBJECTIVE BOX
NSICU CONSULT NOTE     Reason for consult: increased oral secretion and desaturation     HPI: 34yo woman with pmhx significant for Myasthenia gravis (AChR antibody positive, diagnosed in 2017) on pyridostigmine 60mg QID; last crisis on 6/26/2024; weaned off steroids more than 3 years ago and has not been on [employer] for the past 2 years due to insurance issues; was intubated 3 times in past, no thymectomy; myasthenia gravis initially presented as ocular symptoms and then generalized, sickle cell trait, present to [facility] ED for tongue and extremity heaviness, drooping eyelids, double vision, as well as neck weakness. Was started on IVIG and today will be the last dose for a total of 5. NSICU consulted for desaturation.   Reported by ACP that patient went to bathroom and desat to 89% then placed on NC. Patient at bedside reported she felt weak when she walked to the bathroom and she could not extend her neck back up, was not dyspneic, no chest pain. She reports that she was drooling more around 10pm and got better after receiving some medications.   Patient reports that she feels weaker and that she responded well in the past to plasmapheresis.     Exam:   HEENT: neck supple    Neurology: Alert, awake, oriented on self, place and year,  speech fluent, follows simple commands  Dysconjugate gaze, bilateral ptosis, face symmetric, uvula midline, tongue midline, scm 5/5, trapezius 5/5  Single breath count 30  NF/NE 4/5,5/5  Bilateral deltoids 5/5  L wrist flexion 3/5, extension 2/5  L biceps 3/5, triceps 2/5  R wrist flexion 3/5, extension 3/5  L biceps 3/5, triceps 3/5  RLE 5/5  LLE 5/5  Cardiovascular: S1, S2, Regular rate and rhythm   Pulmonary: Clear to Auscultation, No rales, No rhonchi, No wheezes   Gastrointestinal: Soft, Non-tender, Non-distended   Skin: warm and dry     Bedside pocus with good diaphragmatic excursion     VITALS:   Vital Signs Last 24 Hrs  T(C): 36.9 (11 Aug 2024 00:00), Max: 37.3 (10 Aug 2024 08:38)  T(F): 98.4 (11 Aug 2024 00:00), Max: 99.1 (10 Aug 2024 08:38)  HR: 76 (11 Aug 2024 00:00) (60 - 108)  BP: 102/70 (11 Aug 2024 00:00) (100/65 - 132/78)  BP(mean): --  RR: 18 (11 Aug 2024 00:00) (18 - 20)  SpO2: 98% (11 Aug 2024 00:00) (89% - 99%)    Parameters below as of 11 Aug 2024 00:00  Patient On (Oxygen Delivery Method): nasal cannula  O2 Flow (L/min): 2      Drips   none     Respiratory:    ABG - ( 11 Aug 2024 01:06 )  pH, Arterial: 7.37  pH, Blood: x     /  pCO2: 43    /  pO2: 173   / HCO3: 25    / Base Excess: -0.6  /  SaO2: 100.0               LABS:                        10.4   5.55  )-----------( 277      ( 10 Aug 2024 06:54 )             32.7     08-10    133<L>  |  100  |  10  ----------------------------<  77  4.0   |  20<L>  |  0.65      CAPILLARY BLOOD GLUCOSE          I&O's Summary    09 Aug 2024 07:01  -  10 Aug 2024 07:00  --------------------------------------------------------  IN: 580 mL / OUT: 0 mL / NET: 580 mL    10 Aug 2024 07:01  -  11 Aug 2024 02:28  --------------------------------------------------------  IN: 900 mL / OUT: 0 mL / NET: 900 mL        Imaging   CXR     EKG     MEDICATION LEVELS:     IVF FLUIDS/MEDICATIONS:   MEDICATIONS  (STANDING):  acetaminophen     Tablet .. 650 milliGRAM(s) Oral daily  acetaminophen   IVPB .. 1000 milliGRAM(s) IV Intermittent once  diphenhydrAMINE 25 milliGRAM(s) Oral daily  enoxaparin Injectable 40 milliGRAM(s) SubCutaneous every 24 hours  immune   globulin 10% (GAMMAGARD) IVPB 20 Gram(s) IV Intermittent daily  pyridostigmine 30 milliGRAM(s) Oral every 6 hours    MEDICATIONS  (PRN):  glycopyrrolate 1 milliGRAM(s) Oral every 6 hours PRN increased secretions     NSICU CONSULT NOTE     Reason for consult: desaturation     HPI: 34yo woman with pmhx significant for Myasthenia gravis (AChR antibody positive, diagnosed in 2017) on pyridostigmine 60mg QID; last crisis on 6/26/2024; weaned off steroids more than 3 years ago and has not been on [employer] for the past 2 years due to insurance issues; was intubated 3 times in past, no thymectomy; myasthenia gravis initially presented as ocular symptoms and then generalized, sickle cell trait, present to [facility] ED for tongue and extremity heaviness, drooping eyelids, double vision, as well as neck weakness. Was started on IVIG and today will be the last dose for a total of 5. NSICU consulted for desaturation.   Reported by ACP that patient went to bathroom and desat to 89% then placed on NC. Patient at bedside reported she felt weak when she walked to the bathroom and she could not extend her neck back up, was not dyspneic, no chest pain. She reports that she was drooling more around 10pm and got better after receiving some medications.   Patient reports that she feels weaker and that she responded well in the past to plasmapheresis.     Exam:   HEENT: neck supple    Neurology: Alert, awake, oriented on self, place and year,  speech fluent, follows simple commands  Dysconjugate gaze, bilateral ptosis, face symmetric, uvula midline, tongue midline, scm 5/5, trapezius 5/5  Single breath count 30  NF/NE 4/5,5/5  Bilateral deltoids 5/5  L wrist flexion 3/5, extension 2/5  L biceps 3/5, triceps 2/5  R wrist flexion 3/5, extension 3/5  L biceps 3/5, triceps 3/5  RLE 5/5  LLE 5/5  Cardiovascular: S1, S2, Regular rate and rhythm   Pulmonary: Clear to Auscultation, No rales, No rhonchi, No wheezes   Gastrointestinal: Soft, Non-tender, Non-distended   Skin: warm and dry     Bedside pocus with good diaphragmatic excursion     VITALS:   Vital Signs Last 24 Hrs  T(C): 36.9 (11 Aug 2024 00:00), Max: 37.3 (10 Aug 2024 08:38)  T(F): 98.4 (11 Aug 2024 00:00), Max: 99.1 (10 Aug 2024 08:38)  HR: 76 (11 Aug 2024 00:00) (60 - 108)  BP: 102/70 (11 Aug 2024 00:00) (100/65 - 132/78)  BP(mean): --  RR: 18 (11 Aug 2024 00:00) (18 - 20)  SpO2: 98% (11 Aug 2024 00:00) (89% - 99%)    Parameters below as of 11 Aug 2024 00:00  Patient On (Oxygen Delivery Method): nasal cannula  O2 Flow (L/min): 2      Drips   none     Respiratory:    ABG - ( 11 Aug 2024 01:06 )  pH, Arterial: 7.37  pH, Blood: x     /  pCO2: 43    /  pO2: 173   / HCO3: 25    / Base Excess: -0.6  /  SaO2: 100.0               LABS:                        10.4   5.55  )-----------( 277      ( 10 Aug 2024 06:54 )             32.7     08-10    133<L>  |  100  |  10  ----------------------------<  77  4.0   |  20<L>  |  0.65      CAPILLARY BLOOD GLUCOSE          I&O's Summary    09 Aug 2024 07:01  -  10 Aug 2024 07:00  --------------------------------------------------------  IN: 580 mL / OUT: 0 mL / NET: 580 mL    10 Aug 2024 07:01  -  11 Aug 2024 02:28  --------------------------------------------------------  IN: 900 mL / OUT: 0 mL / NET: 900 mL        Imaging   CXR     EKG     MEDICATION LEVELS:     IVF FLUIDS/MEDICATIONS:   MEDICATIONS  (STANDING):  acetaminophen     Tablet .. 650 milliGRAM(s) Oral daily  acetaminophen   IVPB .. 1000 milliGRAM(s) IV Intermittent once  diphenhydrAMINE 25 milliGRAM(s) Oral daily  enoxaparin Injectable 40 milliGRAM(s) SubCutaneous every 24 hours  immune   globulin 10% (GAMMAGARD) IVPB 20 Gram(s) IV Intermittent daily  pyridostigmine 30 milliGRAM(s) Oral every 6 hours    MEDICATIONS  (PRN):  glycopyrrolate 1 milliGRAM(s) Oral every 6 hours PRN increased secretions     NSICU CONSULT NOTE     Reason for consult: desaturation     HPI: 32yo woman with pmhx significant for Myasthenia gravis (AChR antibody positive, diagnosed in 2017) on pyridostigmine 60mg QID; last crisis on 6/26/2024; weaned off steroids more than 3 years ago and has not been on [employer] for the past 2 years due to insurance issues; was intubated 3 times in past, no thymectomy; myasthenia gravis initially presented as ocular symptoms and then generalized, asthma, sickle cell trait, present to [facility] ED for tongue and extremity heaviness, drooping eyelids, double vision, as well as neck weakness. Was started on IVIG and today will be the last dose for a total of 5. NSICU consulted for desaturation.   Reported by ACP that patient went to bathroom and desat to 89% then placed on NC. Patient at bedside reported she felt weak when she walked to the bathroom and she could not extend her neck back up, was not dyspneic, no chest pain. She reports that she was drooling more around 10pm and got better after receiving some medications.   Patient reports that she feels weaker and that she responded well in the past to plasmapheresis.     Exam:   HEENT: neck supple    Neurology: Alert, awake, oriented on self, place and year,  speech fluent, follows simple commands  Dysconjugate gaze, bilateral ptosis, face symmetric, uvula midline, tongue midline, scm 5/5, trapezius 5/5  Single breath count 30  NF/NE 4/5,5/5  Bilateral deltoids 5/5  L wrist flexion 3/5, extension 2/5  L biceps 3/5, triceps 2/5  R wrist flexion 3/5, extension 3/5  L biceps 3/5, triceps 3/5  RLE 5/5  LLE 5/5  Cardiovascular: S1, S2, Regular rate and rhythm   Pulmonary: Clear to Auscultation, No rales, No rhonchi, No wheezes   Gastrointestinal: Soft, Non-tender, Non-distended   Skin: warm and dry     Bedside pocus with good diaphragmatic excursion     VITALS:   Vital Signs Last 24 Hrs  T(C): 36.9 (11 Aug 2024 00:00), Max: 37.3 (10 Aug 2024 08:38)  T(F): 98.4 (11 Aug 2024 00:00), Max: 99.1 (10 Aug 2024 08:38)  HR: 76 (11 Aug 2024 00:00) (60 - 108)  BP: 102/70 (11 Aug 2024 00:00) (100/65 - 132/78)  BP(mean): --  RR: 18 (11 Aug 2024 00:00) (18 - 20)  SpO2: 98% (11 Aug 2024 00:00) (89% - 99%)    Parameters below as of 11 Aug 2024 00:00  Patient On (Oxygen Delivery Method): nasal cannula  O2 Flow (L/min): 2      Drips   none     Respiratory:    ABG - ( 11 Aug 2024 01:06 )  pH, Arterial: 7.37  pH, Blood: x     /  pCO2: 43    /  pO2: 173   / HCO3: 25    / Base Excess: -0.6  /  SaO2: 100.0               LABS:                        10.4   5.55  )-----------( 277      ( 10 Aug 2024 06:54 )             32.7     08-10    133<L>  |  100  |  10  ----------------------------<  77  4.0   |  20<L>  |  0.65      CAPILLARY BLOOD GLUCOSE          I&O's Summary    09 Aug 2024 07:01  -  10 Aug 2024 07:00  --------------------------------------------------------  IN: 580 mL / OUT: 0 mL / NET: 580 mL    10 Aug 2024 07:01  -  11 Aug 2024 02:28  --------------------------------------------------------  IN: 900 mL / OUT: 0 mL / NET: 900 mL        Imaging   CXR     EKG     MEDICATION LEVELS:     IVF FLUIDS/MEDICATIONS:   MEDICATIONS  (STANDING):  acetaminophen     Tablet .. 650 milliGRAM(s) Oral daily  acetaminophen   IVPB .. 1000 milliGRAM(s) IV Intermittent once  diphenhydrAMINE 25 milliGRAM(s) Oral daily  enoxaparin Injectable 40 milliGRAM(s) SubCutaneous every 24 hours  immune   globulin 10% (GAMMAGARD) IVPB 20 Gram(s) IV Intermittent daily  pyridostigmine 30 milliGRAM(s) Oral every 6 hours    MEDICATIONS  (PRN):  glycopyrrolate 1 milliGRAM(s) Oral every 6 hours PRN increased secretions     NSICU CONSULT NOTE     Reason for consult: desaturation     HPI: 32yo woman with pmhx significant for Myasthenia gravis (AChR antibody positive, diagnosed in 2017) on pyridostigmine 60mg QID; last crisis on 6/26/2024; weaned off steroids more than 3 years ago and has not been on [employer] for the past 2 years due to insurance issues; was intubated 3 times in past, no thymectomy; myasthenia gravis initially presented as ocular symptoms and then generalized, asthma, sickle cell trait, present to [facility] ED for tongue and extremity heaviness, drooping eyelids, double vision, as well as neck weakness. Was started on IVIG and today will be the last dose for a total of 5. NSICU consulted for desaturation.   Reported by ACP that patient went to bathroom and desat to 89% then placed on NC. Patient at bedside reported she felt weak when she walked to the bathroom and she could not extend her neck back up, was not dyspneic, no chest pain. She reports that she was drooling more around 10pm and got better after receiving some medications.   Patient reports that she feels weaker and that she responded well in the past to plasmapheresis.     Exam:   HEENT: neck supple    Neurology: Alert, awake, oriented on self, place and year, speech fluent and not pausing for breath, follows simple commands  Dysconjugate gaze, bilateral ptosis, face symmetric, uvula midline, tongue midline, scm 5/5, trapezius 5/5  Single breath count 30  NF/NE 4/5,5/5  Bilateral deltoids 5/5  L wrist flexion 3/5, extension 2/5  L biceps 3/5, triceps 2/5  R wrist flexion 3/5, extension 3/5  L biceps 3/5, triceps 3/5  RLE 5/5  LLE 5/5  Cardiovascular: S1, S2, Regular rate and rhythm   Pulmonary: Clear to Auscultation, No rales, No rhonchi, No wheezes   Gastrointestinal: Soft, Non-tender, Non-distended   Skin: warm and dry     Bedside pocus with good diaphragmatic excursion     VITALS:   Vital Signs Last 24 Hrs  T(C): 36.9 (11 Aug 2024 00:00), Max: 37.3 (10 Aug 2024 08:38)  T(F): 98.4 (11 Aug 2024 00:00), Max: 99.1 (10 Aug 2024 08:38)  HR: 76 (11 Aug 2024 00:00) (60 - 108)  BP: 102/70 (11 Aug 2024 00:00) (100/65 - 132/78)  BP(mean): --  RR: 18 (11 Aug 2024 00:00) (18 - 20)  SpO2: 98% (11 Aug 2024 00:00) (89% - 99%)    Parameters below as of 11 Aug 2024 00:00  Patient On (Oxygen Delivery Method): nasal cannula  O2 Flow (L/min): 2      Drips   none     Respiratory:    ABG - ( 11 Aug 2024 01:06 )  pH, Arterial: 7.37  pH, Blood: x     /  pCO2: 43    /  pO2: 173   / HCO3: 25    / Base Excess: -0.6  /  SaO2: 100.0               LABS:                        10.4   5.55  )-----------( 277      ( 10 Aug 2024 06:54 )             32.7     08-10    133<L>  |  100  |  10  ----------------------------<  77  4.0   |  20<L>  |  0.65      CAPILLARY BLOOD GLUCOSE          I&O's Summary    09 Aug 2024 07:01  -  10 Aug 2024 07:00  --------------------------------------------------------  IN: 580 mL / OUT: 0 mL / NET: 580 mL    10 Aug 2024 07:01  -  11 Aug 2024 02:28  --------------------------------------------------------  IN: 900 mL / OUT: 0 mL / NET: 900 mL        Imaging   CXR     EKG     MEDICATION LEVELS:     IVF FLUIDS/MEDICATIONS:   MEDICATIONS  (STANDING):  acetaminophen     Tablet .. 650 milliGRAM(s) Oral daily  acetaminophen   IVPB .. 1000 milliGRAM(s) IV Intermittent once  diphenhydrAMINE 25 milliGRAM(s) Oral daily  enoxaparin Injectable 40 milliGRAM(s) SubCutaneous every 24 hours  immune   globulin 10% (GAMMAGARD) IVPB 20 Gram(s) IV Intermittent daily  pyridostigmine 30 milliGRAM(s) Oral every 6 hours    MEDICATIONS  (PRN):  glycopyrrolate 1 milliGRAM(s) Oral every 6 hours PRN increased secretions

## 2024-08-11 NOTE — PROVIDER CONTACT NOTE (OTHER) - SITUATION
Pt has lower blood pressure, out of parameter. Patient IVIG initiated dose 5 at 10 AM; evaluated for rate change and found to be decreased.

## 2024-08-11 NOTE — CHART NOTE - NSCHARTNOTEFT_GEN_A_CORE
Called to patients bedside by nurse for increased secretions, decreased in sp02 that requiring supplemental oxygen and extreme generalized weakness.   Patient began to experience excessive secretions that required frequent suctioning. PRN Robinul given with some improvement. STAT NIF/VC done at bedside revealing concerning result. ABG and CxR done and WNL. NICU consulted, however deemed not ICU candidate at this time but BiPap recommended. However patient has refused bipap at this time.          acetaminophen     Tablet .. 650 milliGRAM(s) Oral daily  acetaminophen   IVPB .. 1000 milliGRAM(s) IV Intermittent once  diphenhydrAMINE 25 milliGRAM(s) Oral daily  enoxaparin Injectable 40 milliGRAM(s) SubCutaneous every 24 hours  glycopyrrolate 1 milliGRAM(s) Oral every 6 hours PRN  immune   globulin 10% (GAMMAGARD) IVPB 20 Gram(s) IV Intermittent daily  pyridostigmine 30 milliGRAM(s) Oral every 6 hours      CONSTITUTIONAL: No weight loss,+ weakness, fevers or chills  EYES/ENT: No visual changes;  No vertigo or throat pain   NECK: No pain or stiffness, swollen neck  RESPIRATORY: No shortness of breath, cough, wheezing, + INCREASED SECRETIONS  CARDIOVASCULAR: No chest pain or palpitations, dyspnea on exertion, LE swelling  GASTROINTESTINAL: No abdominal or epigastric pain; No nausea, vomiting, or hematemesis; No diarrhea or constipation; No melena or hematochezia.  GENITOURINARY: No dysuria, frequency incontinence or hematuria.  NEUROLOGICAL: No loss of sensation,  numbness, tingling, or weakness, seizure or blackouts.   SKIN: No itching, burning, rashes, or lesions   All other review of systems is negative unless indicated above.    Family History  Surgical History    PHYSICAL EXAM:   Vital Signs Last 24 Hrs  T(C): 36.9 (11 Aug 2024 00:00), Max: 37.3 (10 Aug 2024 08:38)  T(F): 98.4 (11 Aug 2024 00:00), Max: 99.1 (10 Aug 2024 08:38)  HR: 76 (11 Aug 2024 00:00) (60 - 108)  BP: 102/70 (11 Aug 2024 00:00) (100/65 - 132/78)  BP(mean): --  RR: 18 (11 Aug 2024 00:00) (18 - 20)  SpO2: 98% (11 Aug 2024 00:00) (89% - 99%)    Parameters below as of 11 Aug 2024 00:00  Patient On (Oxygen Delivery Method): nasal cannula  O2 Flow (L/min): 2      General: Alert and Oriented x 3. No acute Distress. Well Nourished, Well Developed  Cardiac: Regular Heart Rate and Rhythm. No Murmur. No Jugular Venous Distension. No Peripheral Edema.  Pulmonary: Clear Breath Sounds to Auscultation Bilateraly. No Accessory Muscle use.   Abdomen: Soft, Nontender, Nondistended. No palpable Mass. Normal Bowel Sounds    NEUROLOGICAL EXAM:  Mental status: Awake, alert, oriented x3, speech fluent and hypophonic, follows commands, no neglect, normal memory   Cranial Nerves: No facial asymmetry, no nystagmus, no dysarthria, tongue midline  Motor exam: Normal tone, no drift, normal fine finger movements.   Sensation: Intact to light touch   Coordination/ Gait: No dysmetria, gait not tested    LABS:                        10.4   5.55  )-----------( 277      ( 10 Aug 2024 06:54 )             32.7    08-10    133<L>  |  100  |  10  ----------------------------<  77  4.0   |  20<L>  |  0.65    Ca    9.6      10 Aug 2024 06:55  Phos  4.6     08-10  Mg     2.2     08-10    TPro  8.7<H>  /  Alb  4.0  /  TBili  1.2  /  DBili  x   /  AST  14  /  ALT  9<L>  /  AlkPhos  100  08-10        IMAGING: Reviewed by me.   CxR WNL    Plan:   Medication Changes: 6 a.m Mestinon   IV Fluids:  n/a   Labs: ABG (wnl)  Consultant: Neuro ICU   Radiographic Studies: CxR --> WNL  Nursing Interventions; Continue Telemetry Monitor, Pulse Oximeter, Neuro checks q4h, Oxygen, Activity, Recommended by NICU to initiate BiPAP (patient has refused at this time)  Will reassess in 30 minutes    Time spent with patient:  35 minutes  Event discussed with: Dr Douglass

## 2024-08-11 NOTE — PROGRESS NOTE ADULT - SUBJECTIVE AND OBJECTIVE BOX
*************************************  NEUROLOGY PROGRESS NOTE  **************************************    ANTONIO POON  Female  MRN-[id number]    Interval History: Pt with secretions and desaturation overnight (see chart note for further details). NSCU consulted, pt deemed not a candidate for NSCU at that time. Pt recommended BiPAP, but refusing at this time.      VITAL SIGNS:  Vital Signs Last 24 Hrs  T(C): 36.8 (11 Aug 2024 05:00), Max: 37.3 (10 Aug 2024 08:38)  T(F): 98.2 (11 Aug 2024 05:00), Max: 99.1 (10 Aug 2024 08:38)  HR: 72 (11 Aug 2024 05:00) (60 - 108)  BP: 99/64 (11 Aug 2024 05:00) (99/64 - 132/78)  BP(mean): --  RR: 18 (11 Aug 2024 05:00) (18 - 20)  SpO2: 96% (11 Aug 2024 05:00) (89% - 99%)    Parameters below as of 11 Aug 2024 05:00  Patient On (Oxygen Delivery Method): nasal cannula  O2 Flow (L/min): 2    Neuro exam - INCOMPLETE  MS: AAOX3, speech fluent  CN: VFF, significant EOM dysfunction (b/l but L worse than R), no facial asymmetry.  Motor:   neck flex 5-, next extensor 5/5. B/l moderate ptosis with limited movement left eye in all directions. Right eye horizontal gaze also limited  Counts to 30 on 1 breath  UL strength improved 4+ deltoids, R biceps 4, L biceps 4- Triceps 4,  5/5 b/l hip flexors 4+  Sensory: SILT    LABS:    CBC                       10.6   2.83  )-----------( 252      ( 11 Aug 2024 06:57 )             32.9     Chem 08-10    133<L>  |  100  |  10  ----------------------------<  77  4.0   |  20<L>  |  0.65    Ca    9.6      10 Aug 2024 06:55  Phos  4.6     08-10  Mg     2.2     08-10    TPro  8.7<H>  /  Alb  4.0  /  TBili  1.2  /  DBili  x   /  AST  14  /  ALT  9<L>  /  AlkPhos  100  08-10    LFTs LIVER FUNCTIONS - ( 10 Aug 2024 06:55 )  Alb: 4.0 g/dL / Pro: 8.7 g/dL / ALK PHOS: 100 U/L / ALT: 9 U/L / AST: 14 U/L / GGT: x           Coagulopathy   Lipid Panel   A1c   Cardiac enzymes     U/A Urinalysis Basic - ( 10 Aug 2024 06:55 )    Color: x / Appearance: x / SG: x / pH: x  Gluc: 77 mg/dL / Ketone: x  / Bili: x / Urobili: x   Blood: x / Protein: x / Nitrite: x   Leuk Esterase: x / RBC: x / WBC x   Sq Epi: x / Non Sq Epi: x / Bacteria: x      CSF  Immunological  Other      RADIOLOGY & ADDITIONAL STUDIES:

## 2024-08-12 LAB
ADD ON TEST-SPECIMEN IN LAB: SIGNIFICANT CHANGE UP
ALBUMIN SERPL ELPH-MCNC: 4.2 G/DL — SIGNIFICANT CHANGE UP (ref 3.3–5)
ALBUMIN SERPL ELPH-MCNC: 4.4 G/DL — SIGNIFICANT CHANGE UP (ref 3.3–5)
ALP SERPL-CCNC: 100 U/L — SIGNIFICANT CHANGE UP (ref 40–120)
ALP SERPL-CCNC: 102 U/L — SIGNIFICANT CHANGE UP (ref 40–120)
ALT FLD-CCNC: 10 U/L — SIGNIFICANT CHANGE UP (ref 10–45)
ALT FLD-CCNC: 9 U/L — LOW (ref 10–45)
ANION GAP SERPL CALC-SCNC: 14 MMOL/L — SIGNIFICANT CHANGE UP (ref 5–17)
ANION GAP SERPL CALC-SCNC: 18 MMOL/L — HIGH (ref 5–17)
APTT BLD: 28 SEC — SIGNIFICANT CHANGE UP (ref 24.5–35.6)
AST SERPL-CCNC: 13 U/L — SIGNIFICANT CHANGE UP (ref 10–40)
AST SERPL-CCNC: 15 U/L — SIGNIFICANT CHANGE UP (ref 10–40)
BASE EXCESS BLDV CALC-SCNC: -0.7 MMOL/L — SIGNIFICANT CHANGE UP (ref -2–3)
BASE EXCESS BLDV CALC-SCNC: -0.8 MMOL/L — SIGNIFICANT CHANGE UP (ref -2–3)
BASE EXCESS BLDV CALC-SCNC: -1.1 MMOL/L — SIGNIFICANT CHANGE UP (ref -2–3)
BASE EXCESS BLDV CALC-SCNC: -1.4 MMOL/L — SIGNIFICANT CHANGE UP (ref -2–3)
BASOPHILS # BLD AUTO: 0.01 K/UL — SIGNIFICANT CHANGE UP (ref 0–0.2)
BASOPHILS # BLD AUTO: 0.01 K/UL — SIGNIFICANT CHANGE UP (ref 0–0.2)
BASOPHILS NFR BLD AUTO: 0.2 % — SIGNIFICANT CHANGE UP (ref 0–2)
BASOPHILS NFR BLD AUTO: 0.2 % — SIGNIFICANT CHANGE UP (ref 0–2)
BILIRUB SERPL-MCNC: 1.4 MG/DL — HIGH (ref 0.2–1.2)
BILIRUB SERPL-MCNC: 1.4 MG/DL — HIGH (ref 0.2–1.2)
BUN SERPL-MCNC: 11 MG/DL — SIGNIFICANT CHANGE UP (ref 7–23)
BUN SERPL-MCNC: 14 MG/DL — SIGNIFICANT CHANGE UP (ref 7–23)
CA-I SERPL-SCNC: 1.27 MMOL/L — SIGNIFICANT CHANGE UP (ref 1.15–1.33)
CALCIUM SERPL-MCNC: 10 MG/DL — SIGNIFICANT CHANGE UP (ref 8.4–10.5)
CALCIUM SERPL-MCNC: 10.4 MG/DL — SIGNIFICANT CHANGE UP (ref 8.4–10.5)
CHLORIDE BLDV-SCNC: 108 MMOL/L — SIGNIFICANT CHANGE UP (ref 96–108)
CHLORIDE SERPL-SCNC: 103 MMOL/L — SIGNIFICANT CHANGE UP (ref 96–108)
CHLORIDE SERPL-SCNC: 104 MMOL/L — SIGNIFICANT CHANGE UP (ref 96–108)
CO2 BLDV-SCNC: 24 MMOL/L — SIGNIFICANT CHANGE UP (ref 22–26)
CO2 BLDV-SCNC: 27 MMOL/L — HIGH (ref 22–26)
CO2 SERPL-SCNC: 20 MMOL/L — LOW (ref 22–31)
CO2 SERPL-SCNC: 21 MMOL/L — LOW (ref 22–31)
CREAT SERPL-MCNC: 0.54 MG/DL — SIGNIFICANT CHANGE UP (ref 0.5–1.3)
CREAT SERPL-MCNC: 0.61 MG/DL — SIGNIFICANT CHANGE UP (ref 0.5–1.3)
EGFR: 122 ML/MIN/1.73M2 — SIGNIFICANT CHANGE UP
EGFR: 126 ML/MIN/1.73M2 — SIGNIFICANT CHANGE UP
EOSINOPHIL # BLD AUTO: 0.01 K/UL — SIGNIFICANT CHANGE UP (ref 0–0.5)
EOSINOPHIL # BLD AUTO: 0.02 K/UL — SIGNIFICANT CHANGE UP (ref 0–0.5)
EOSINOPHIL NFR BLD AUTO: 0.2 % — SIGNIFICANT CHANGE UP (ref 0–6)
EOSINOPHIL NFR BLD AUTO: 0.4 % — SIGNIFICANT CHANGE UP (ref 0–6)
FIBRINOGEN PPP-MCNC: 323 MG/DL — SIGNIFICANT CHANGE UP (ref 200–445)
GAS PNL BLDV: 135 MMOL/L — LOW (ref 136–145)
GAS PNL BLDV: SIGNIFICANT CHANGE UP
GAS PNL BLDV: SIGNIFICANT CHANGE UP
GLUCOSE BLDC GLUCOMTR-MCNC: 93 MG/DL — SIGNIFICANT CHANGE UP (ref 70–99)
GLUCOSE BLDV-MCNC: 84 MG/DL — SIGNIFICANT CHANGE UP (ref 70–99)
GLUCOSE SERPL-MCNC: 67 MG/DL — LOW (ref 70–99)
GLUCOSE SERPL-MCNC: 88 MG/DL — SIGNIFICANT CHANGE UP (ref 70–99)
HCO3 BLDV-SCNC: 23 MMOL/L — SIGNIFICANT CHANGE UP (ref 22–29)
HCO3 BLDV-SCNC: 25 MMOL/L — SIGNIFICANT CHANGE UP (ref 22–29)
HCT VFR BLD CALC: 35.2 % — SIGNIFICANT CHANGE UP (ref 34.5–45)
HCT VFR BLD CALC: 35.2 % — SIGNIFICANT CHANGE UP (ref 34.5–45)
HCT VFR BLDA CALC: 39 % — SIGNIFICANT CHANGE UP (ref 34.5–46.5)
HGB BLD CALC-MCNC: 12.9 G/DL — SIGNIFICANT CHANGE UP (ref 11.7–16.1)
HGB BLD-MCNC: 11 G/DL — LOW (ref 11.5–15.5)
HGB BLD-MCNC: 11.5 G/DL — SIGNIFICANT CHANGE UP (ref 11.5–15.5)
IMM GRANULOCYTES NFR BLD AUTO: 0.2 % — SIGNIFICANT CHANGE UP (ref 0–0.9)
IMM GRANULOCYTES NFR BLD AUTO: 0.2 % — SIGNIFICANT CHANGE UP (ref 0–0.9)
INR BLD: 1.15 RATIO — SIGNIFICANT CHANGE UP (ref 0.85–1.18)
LACTATE BLDV-MCNC: 1 MMOL/L — SIGNIFICANT CHANGE UP (ref 0.5–2)
LYMPHOCYTES # BLD AUTO: 0.66 K/UL — LOW (ref 1–3.3)
LYMPHOCYTES # BLD AUTO: 1.47 K/UL — SIGNIFICANT CHANGE UP (ref 1–3.3)
LYMPHOCYTES # BLD AUTO: 11.7 % — LOW (ref 13–44)
LYMPHOCYTES # BLD AUTO: 30.9 % — SIGNIFICANT CHANGE UP (ref 13–44)
MAGNESIUM SERPL-MCNC: 1.9 MG/DL — SIGNIFICANT CHANGE UP (ref 1.6–2.6)
MAGNESIUM SERPL-MCNC: 2 MG/DL — SIGNIFICANT CHANGE UP (ref 1.6–2.6)
MCHC RBC-ENTMCNC: 23.9 PG — LOW (ref 27–34)
MCHC RBC-ENTMCNC: 25 PG — LOW (ref 27–34)
MCHC RBC-ENTMCNC: 31.3 GM/DL — LOW (ref 32–36)
MCHC RBC-ENTMCNC: 32.7 GM/DL — SIGNIFICANT CHANGE UP (ref 32–36)
MCV RBC AUTO: 76.4 FL — LOW (ref 80–100)
MCV RBC AUTO: 76.5 FL — LOW (ref 80–100)
MONOCYTES # BLD AUTO: 0.07 K/UL — SIGNIFICANT CHANGE UP (ref 0–0.9)
MONOCYTES # BLD AUTO: 0.57 K/UL — SIGNIFICANT CHANGE UP (ref 0–0.9)
MONOCYTES NFR BLD AUTO: 1.2 % — LOW (ref 2–14)
MONOCYTES NFR BLD AUTO: 12 % — SIGNIFICANT CHANGE UP (ref 2–14)
NEUTROPHILS # BLD AUTO: 2.68 K/UL — SIGNIFICANT CHANGE UP (ref 1.8–7.4)
NEUTROPHILS # BLD AUTO: 4.9 K/UL — SIGNIFICANT CHANGE UP (ref 1.8–7.4)
NEUTROPHILS NFR BLD AUTO: 56.3 % — SIGNIFICANT CHANGE UP (ref 43–77)
NEUTROPHILS NFR BLD AUTO: 86.5 % — HIGH (ref 43–77)
NRBC # BLD: 0 /100 WBCS — SIGNIFICANT CHANGE UP (ref 0–0)
NRBC # BLD: 0 /100 WBCS — SIGNIFICANT CHANGE UP (ref 0–0)
PCO2 BLDV: 35 MMHG — LOW (ref 39–42)
PCO2 BLDV: 47 MMHG — HIGH (ref 39–42)
PCO2 BLDV: 48 MMHG — HIGH (ref 39–42)
PCO2 BLDV: 49 MMHG — HIGH (ref 39–42)
PH BLDV: 7.32 — SIGNIFICANT CHANGE UP (ref 7.32–7.43)
PH BLDV: 7.33 — SIGNIFICANT CHANGE UP (ref 7.32–7.43)
PH BLDV: 7.34 — SIGNIFICANT CHANGE UP (ref 7.32–7.43)
PH BLDV: 7.43 — SIGNIFICANT CHANGE UP (ref 7.32–7.43)
PHOSPHATE SERPL-MCNC: 4 MG/DL — SIGNIFICANT CHANGE UP (ref 2.5–4.5)
PHOSPHATE SERPL-MCNC: 4.1 MG/DL — SIGNIFICANT CHANGE UP (ref 2.5–4.5)
PLATELET # BLD AUTO: 270 K/UL — SIGNIFICANT CHANGE UP (ref 150–400)
PLATELET # BLD AUTO: 291 K/UL — SIGNIFICANT CHANGE UP (ref 150–400)
PO2 BLDV: 123 MMHG — HIGH (ref 25–45)
PO2 BLDV: 39 MMHG — SIGNIFICANT CHANGE UP (ref 25–45)
PO2 BLDV: 46 MMHG — HIGH (ref 25–45)
PO2 BLDV: 50 MMHG — HIGH (ref 25–45)
POTASSIUM BLDV-SCNC: 4.6 MMOL/L — SIGNIFICANT CHANGE UP (ref 3.5–5.1)
POTASSIUM SERPL-MCNC: 3.7 MMOL/L — SIGNIFICANT CHANGE UP (ref 3.5–5.3)
POTASSIUM SERPL-MCNC: 3.9 MMOL/L — SIGNIFICANT CHANGE UP (ref 3.5–5.3)
POTASSIUM SERPL-SCNC: 3.7 MMOL/L — SIGNIFICANT CHANGE UP (ref 3.5–5.3)
POTASSIUM SERPL-SCNC: 3.9 MMOL/L — SIGNIFICANT CHANGE UP (ref 3.5–5.3)
PROT SERPL-MCNC: 10.1 G/DL — HIGH (ref 6–8.3)
PROT SERPL-MCNC: 10.2 G/DL — HIGH (ref 6–8.3)
PROTHROM AB SERPL-ACNC: 12 SEC — SIGNIFICANT CHANGE UP (ref 9.5–13)
RBC # BLD: 4.6 M/UL — SIGNIFICANT CHANGE UP (ref 3.8–5.2)
RBC # BLD: 4.61 M/UL — SIGNIFICANT CHANGE UP (ref 3.8–5.2)
RBC # FLD: 16.8 % — HIGH (ref 10.3–14.5)
RBC # FLD: 17 % — HIGH (ref 10.3–14.5)
SAO2 % BLDV: 100 % — HIGH (ref 67–88)
SAO2 % BLDV: 65.5 % — LOW (ref 67–88)
SAO2 % BLDV: 76.8 % — SIGNIFICANT CHANGE UP (ref 67–88)
SAO2 % BLDV: 81.7 % — SIGNIFICANT CHANGE UP (ref 67–88)
SODIUM SERPL-SCNC: 141 MMOL/L — SIGNIFICANT CHANGE UP (ref 135–145)
SODIUM SERPL-SCNC: 142 MMOL/L — SIGNIFICANT CHANGE UP (ref 135–145)
WBC # BLD: 4.76 K/UL — SIGNIFICANT CHANGE UP (ref 3.8–10.5)
WBC # BLD: 5.66 K/UL — SIGNIFICANT CHANGE UP (ref 3.8–10.5)
WBC # FLD AUTO: 4.76 K/UL — SIGNIFICANT CHANGE UP (ref 3.8–10.5)
WBC # FLD AUTO: 5.66 K/UL — SIGNIFICANT CHANGE UP (ref 3.8–10.5)

## 2024-08-12 PROCEDURE — 71045 X-RAY EXAM CHEST 1 VIEW: CPT | Mod: 26

## 2024-08-12 PROCEDURE — 99291 CRITICAL CARE FIRST HOUR: CPT

## 2024-08-12 PROCEDURE — 99233 SBSQ HOSP IP/OBS HIGH 50: CPT

## 2024-08-12 RX ORDER — METHYLPREDNISOLONE 4 MG
32 TABLET ORAL DAILY
Refills: 0 | Status: DISCONTINUED | OUTPATIENT
Start: 2024-08-13 | End: 2024-08-19

## 2024-08-12 RX ORDER — POVIDONE, PROPYLENE GLYCOL 6.8; 3 MG/ML; MG/ML
1 LIQUID OPHTHALMIC EVERY 12 HOURS
Refills: 0 | Status: DISCONTINUED | OUTPATIENT
Start: 2024-08-12 | End: 2024-08-16

## 2024-08-12 RX ORDER — METHYLPREDNISOLONE 4 MG
80 TABLET ORAL DAILY
Refills: 0 | Status: DISCONTINUED | OUTPATIENT
Start: 2024-08-12 | End: 2024-08-12

## 2024-08-12 RX ORDER — FENTANYL CITRATE 50 UG/ML
12.5 INJECTION INTRAMUSCULAR; INTRAVENOUS ONCE
Refills: 0 | Status: DISCONTINUED | OUTPATIENT
Start: 2024-08-12 | End: 2024-08-12

## 2024-08-12 RX ORDER — IMMUNE GLOBULIN (HUMAN) 10 G/100ML
20 INJECTION INTRAVENOUS; SUBCUTANEOUS DAILY
Refills: 0 | Status: DISCONTINUED | OUTPATIENT
Start: 2024-08-12 | End: 2024-08-12

## 2024-08-12 RX ADMIN — Medication 75 MILLILITER(S): at 09:41

## 2024-08-12 RX ADMIN — Medication 80 MILLIGRAM(S): at 13:22

## 2024-08-12 NOTE — CONSULT NOTE ADULT - REASON FOR ADMISSION
Outlisten Access Code: [email or web address]  Expires: 9/13/2017  6:11 PM    Outlisten  A secure, online tool to manage your health information     [employer] Outlisten® is a secure, online tool that connects you to your personalized health information from the privacy of your home -- day or night - making it very easy for you to manage your healthcare. Once the activation process is completed, you can even access your medical information using the Outlisten deborah, which is available for free in the Apple Deborah store or Google Play store.     Outlisten provides the following levels of access (as shown below):   My Chart Features   [facility] Primary Care Doctor [facility]  Specialists [facility]  Urgent  Care Non-[facility]  Primary Care  Doctor   Email your healthcare team securely and privately 24/7 X X X X   Manage appointments: schedule your next appointment; view details of past/upcoming appointments X      Request prescription refills. X      View recent personal medical records, including lab and immunizations X X X X   View health record, including health history, allergies, medications X X X X   Read reports about your outpatient visits, procedures, consult and ER notes X X X X   See your discharge summary, which is a recap of your hospital and/or ER visit that includes your diagnosis, lab results, and care plan. X X       How to register for Outlisten:  1. Go to  [email or web address].  2. Click on the Sign Up Now box, which takes you to the New Member Sign Up page. You will need to provide the following information:  a. Enter your Outlisten Access Code exactly as it appears at the top of this page. (You will not need to use this code after you’ve completed the sign-up process. If you do not sign up before the expiration date, you must request a new code.)   b. Enter your date of birth.   c. Enter your home email address.   d. Click Submit, and follow the next screen’s instructions.  3. Create a Outlisten ID. This will be your [email or web address] 
Myasthenia gravis exacerbation
login ID and cannot be changed, so think of one that is secure and easy to remember.  4. Create a [employer] password. You can change your password at any time.  5. Enter your Password Reset Question and Answer. This can be used at a later time if you forget your password.   6. Enter your e-mail address. This allows you to receive e-mail notifications when new information is available in Fatsoma.  7. Click Sign Up. You can now view your health information.    For assistance activating your [employer]ats[employer] account, call [phone]        
Myasthenia gravis exacerbation

## 2024-08-12 NOTE — CONSULT NOTE ADULT - SUBJECTIVE AND OBJECTIVE BOX
Patient is a 33y old  Female who presents with a chief complaint of Myasthenia gravis exacerbation       HPI:  33 RHF with PMHx Asthma, Sickle cell trait, Myasthenia gravis (AChR antibody positive, diagnosed in 2017) on pyridostigmine 60mg QID presenting due to tongue and extremity heaviness, drooping eyelids, double vision, as well as neck weakness. Patient called her outpatient neurologist, Dr. Mason Eason who advised her to visit [facility] ED to initiate IVIG treatment. Last myasthenic crisis was on 2024. Patient denies any recent infections, increased secretions or difficulty swallowing. Patient was weaned off steroids more than 3 years ago and has not been on [employer] for the past 2 years due to insurance issues. Patient was intubated 3 times in past. Denies history of thymectomy. Patient's myasthenia gravis initially presented as ocular symptoms and then generalized. Prior symptoms included bulbar weakness, dysphagia, hypophonia, dyspnea, diplopia, ptosis, and limb weakness.      PAST MEDICAL & SURGICAL HISTORY:  Myasthenia gravis      Childhood asthma      Sickle cell trait      H/O umbilical hernia repair      H/O  section          MEDICATIONS  (STANDING):  acetaminophen   IVPB .. 1000 milliGRAM(s) IV Intermittent once  artificial  tears Solution 1 Drop(s) Both EYES every 12 hours  enoxaparin Injectable 40 milliGRAM(s) SubCutaneous every 24 hours  fentaNYL   Infusion... 0.5 MICROgram(s)/kG/Hr (1.53 mL/Hr) IV Continuous <Continuous>  methylPREDNISolone sodium succinate Injectable 32 milliGRAM(s) IV Push daily  pantoprazole    Tablet 40 milliGRAM(s) Oral before breakfast  propofol Infusion 30 MICROgram(s)/kG/Min (11 mL/Hr) IV Continuous <Continuous>    MEDICATIONS  (PRN):      Social History:  Marital Status:  Y( X )  N(  )      FAMILY HISTORY:  No pertinent family history in first degree relatives          Allergies    shellfish (Anaphylaxis)  No Known Drug Allergies    Intolerances        REVIEW OF SYSTEMS:    CONSTITUTIONAL: No fevers or chills  EYES/ENT: No vertigo or throat pain   RESPIRATORY: Increased secretions, some respiratory symptoms/SOB  CARDIOVASCULAR: No chest pain or palpitations  NEUROLOGICAL: MG  HEMATOLOGY: No bleeding  All other review of systems is negative unless indicated above.    Vital Signs Last 24 Hrs  T(C): 36.7 (13 Aug 2024 08:00), Max: 37.1 (12 Aug 2024 20:00)  T(F): 98 (13 Aug 2024 08:00), Max: 98.8 (12 Aug 2024 20:00)  HR: 139 (13 Aug 2024 07:00) (50 - 156)  BP: 147/61 (13 Aug 2024 07:00) (85/51 - 159/103)  BP(mean): 88 (13 Aug 2024 07:00) (65 - 126)  RR: 14 (13 Aug 2024 07:00) (10 - 35)  SpO2: 96% (13 Aug 2024 07:00) (93% - 100%)    O2 Concentration (%): 60    Daily Height in cm: 157.48 (12 Aug 2024 15:00)    Daily     PHYSICAL EXAM:  General: WN/WD, using oral suction due to increased secretions  Eyes: No jaundice  Respiratory: CTA B/L  CV: RRR, no murmurs  Musculoskeletal/Extremities: no deformities, no edema  Neurology: A&Ox3, strength 4-5/5 b/l UE and LE  Skin: No rash, no petechia                          11.6   5.30  )-----------( 238      ( 13 Aug 2024 00:38 )             36.1       Hematocrit: 36.1 % ( @ 00:38)  Hematocrit: 35.2 % ( @ 16:16)  Hematocrit: 35.2 % ( @ 07:24)  Hematocrit: 32.9 % ( @ 06:57)        145  |  112<H>  |  14  ----------------------------<  101<H>  4.3   |  18<L>  |  0.52    Ca    9.0      13 Aug 2024 00:38  Phos  4.9     08  Mg     1.7         TPro  6.8  /  Alb  4.9  /  TBili  0.9  /  DBili  x   /  AST  7<L>  /  ALT  <5<L>  /  AlkPhos  33<L>                  PT/INR - ( 13 Aug 2024 00:38 )   PT: 17.3 sec;   INR: 1.67 ratio         PTT - ( 13 Aug 2024 00:38 )  PTT:44.3 sec

## 2024-08-12 NOTE — RAPID RESPONSE TEAM SUMMARY - NSADDTLFINDINGSRRT_GEN_ALL_CORE
On arrival to room, patient saturating 100% on 2L NC, /72, HR 67, afebrile.  On arrival to room, patient saturating 100% on 2L NC, /72, HR 67, afebrile. On exam, patient very lethargic and weak appearing. She cannot hold her head up, chin touching chest. She is not using accessory muscles. She is intermittently providing herself with oral suctioning, with copious clear oral secretions. She reports this feels similar to her last MG crisis. She also reports she feels progressively worse despite completing 5 sessions of IVIG.    VBG drawn during RRT showing normal pH without evidence of hypercarbia.     Given progressive weakness and tenuous respiratory status, neurology and MICU called during RRT. Neuro planning on starting PLEX tonight and adjusting steroid dose. MICU to accept patient and will place [facility] on arrival.

## 2024-08-12 NOTE — CHART NOTE - NSCHARTNOTEFT_GEN_A_CORE
Ms. Blackburn is a 34 YO woman with PMH of myasthenia gravis (AChR antibody positive, diagnosed in 2017), previously requiring multiple intubations, asthma, sickle cell trait. She p/w weakness, not improving s/p IVIG, requiring MICU transfer for monitoring of respiratory status and for possible need for TPE. Pt has responded well to TPE in the past. Will initiate TPE with plan to perform 5 procedures with one plasma volume exchanged per TPE.    First TPE performed on 8/12/24. One plasma volume exchanged using albumin as replacement fluid. The patient tolerated the procedure well.    Next TPE to be performed on 8/14/24.

## 2024-08-12 NOTE — PROGRESS NOTE ADULT - SUBJECTIVE AND OBJECTIVE BOX
· Hold all BP lowering medications at this time in the setting of hypotension    · Appears to take amlodipine, atenolol, HCTZ and Lotensin as home medications- held 2/2 shock *************************************  NEUROLOGY PROGRESS NOTE  **************************************    ANTONIO POON  Female  MRN-[id number]    Subjective: Patient seen and examined at bedside with Neurology team and attending     Interval History:          VITAL SIGNS:  Vital Signs Last 24 Hrs  T(C): 36.8 (12 Aug 2024 05:00), Max: 36.9 (12 Aug 2024 00:45)  T(F): 98.2 (12 Aug 2024 05:00), Max: 98.4 (12 Aug 2024 00:45)  HR: 66 (12 Aug 2024 05:00) (43 - 88)  BP: 119/77 (12 Aug 2024 05:00) (87/58 - 129/66)  BP(mean): --  RR: 18 (12 Aug 2024 05:00) (18 - 18)  SpO2: 99% (12 Aug 2024 05:00) (94% - 100%)    Parameters below as of 12 Aug 2024 05:00  Patient On (Oxygen Delivery Method): nasal cannula  O2 Flow (L/min): 2              PHYSICAL EXAMINATION:  INCOMPLETE  General - NAD  Eyes - Fundoscopy with flat, sharp optic discs and no hemorrhage or exudates; Fundoscopy not well visualized; Fundoscopy not performed due to safety precautions in the setting of the COVID-19 pandemic    Neurologic Exam:  Mental status - Awake, Alert, Oriented to person, place, and time. Speech fluent, repetition and naming intact. Follows simple and complex commands. Attention/concentration, recent and remote memory (including registration and recall), and fund of knowledge intact    Cranial nerves - PERRLA, VFF, EOMI, face sensation (V1-V3) intact b/l, facial strength intact without asymmetry b/l, hearing intact b/l, palate with symmetric elevation, trapezius OR sternocleidomastiod 5/5 strength b/l, tongue midline on protrusion with full lateral movement    Motor - Normal bulk and tone throughout. No pronator drift.  Strength testing            Deltoid      Biceps      Triceps     Wrist Extension    Wrist Flexion     Interossei         R            5                 5               5                     5                              5                        5                 5  L             5                 5               5                     5                              5                        5                 5              Hip Flexion    Hip Extension    Knee Flexion    Knee Extension    Dorsiflexion    Plantar Flexion  R              5                           5                       5                           5                            5                          5  L              5                           5                        5                           5                            5                          5    Sensation - Light touch/temperature OR pain/vibration intact throughout    DTR's -             Biceps      Triceps     Brachioradialis      Patellar    Ankle    Toes/plantar response  R             2+             2+                  2+                       2+            2+                 Down  L              2+             2+                 2+                        2+           2+                 Down    Coordination - Finger to Nose intact b/l. No tremors appreciated    Gait and station - Normal casual gait. Romberg (-)      LABS:    CBC                       11.5   4.76  )-----------( 270      ( 12 Aug 2024 07:24 )             35.2     Chem 08-12    x   |  103  |  11  ----------------------------<  67<L>  3.7   |  21<L>  |  0.54    Ca    10.4      12 Aug 2024 07:26  Phos  4.1     08-12  Mg     2.0     08-12    TPro  10.1<H>  /  Alb  4.2  /  TBili  1.4<H>  /  DBili  x   /  AST  15  /  ALT  10  /  AlkPhos  100  08-12    LFTs LIVER FUNCTIONS - ( 12 Aug 2024 07:26 )  Alb: 4.2 g/dL / Pro: 10.1 g/dL / ALK PHOS: 100 U/L / ALT: 10 U/L / AST: 15 U/L / GGT: x           Coagulopathy   Lipid Panel   A1c   Cardiac enzymes     U/A Urinalysis Basic - ( 12 Aug 2024 07:26 )    Color: x / Appearance: x / SG: x / pH: x  Gluc: 67 mg/dL / Ketone: x  / Bili: x / Urobili: x   Blood: x / Protein: x / Nitrite: x   Leuk Esterase: x / RBC: x / WBC x   Sq Epi: x / Non Sq Epi: x / Bacteria: x      CSF  Immunological  Other      RADIOLOGY & ADDITIONAL STUDIES:           *************************************  NEUROLOGY PROGRESS NOTE  **************************************    ANTONIO POON  Female  MRN-[id number]    Subjective: Patient seen and examined at bedside with Neurology team and attending     Interval History: Had trouble swallowing prednisone pills this AM and has been choking on water. Reports feeling worse than previous days. States that she got vaccinations 4 years ago.           VITAL SIGNS:  Vital Signs Last 24 Hrs  T(C): 36.8 (12 Aug 2024 05:00), Max: 36.9 (12 Aug 2024 00:45)  T(F): 98.2 (12 Aug 2024 05:00), Max: 98.4 (12 Aug 2024 00:45)  HR: 66 (12 Aug 2024 05:00) (43 - 88)  BP: 119/77 (12 Aug 2024 05:00) (87/58 - 129/66)  BP(mean): --  RR: 18 (12 Aug 2024 05:00) (18 - 18)  SpO2: 99% (12 Aug 2024 05:00) (94% - 100%)    Parameters below as of 12 Aug 2024 05:00  Patient On (Oxygen Delivery Method): nasal cannula  O2 Flow (L/min): 2              PHYSICAL EXAMINATION:  INCOMPLETE  General - NAD    Neurologic Exam:  Mental status - Awake, Alert, Oriented to person, place, and time. Speech fluent, repetition and naming intact. Follows simple and complex commands. Attention/concentration, recent and remote memory (including registration and recall), and fund of knowledge intact    Cranial nerves - PERRLA, VFF, minimal lateral EOM, upgaze intact, downgaze minimal, face sensation (V1-V3) intact b/l, facial strength intact without asymmetry b/l, hearing intact b/l, palate with symmetric elevation, trapezius OR sternocleidomastiod 5/5 strength b/l, tongue midline on protrusion with full lateral movement    Motor - Normal bulk and tone throughout. No pronator drift.  Strength testing            Deltoid      Biceps      Triceps         R            3                 3               2          2                                                                 L             2                 3               2          2                                                                          Hip Flexion    Hip Extension    Knee Flexion    Knee Extension              Dorsiflexion    Plantar Flexion  R              2                           3                       4                           4                            4+                          4+  L              3                           4                        4                           4                           4+                          4+    Sensation - Light touch intact throughout    DTR's -             Biceps      Triceps     Brachioradialis      Patellar    Ankle    Toes/plantar response  R             2+             2+                  2+                       2+            2+                 Down  L              2+             2+                 2+                        2+           2+                 Down        LABS:    CBC                       11.5   4.76  )-----------( 270      ( 12 Aug 2024 07:24 )             35.2     Chem 08-12    x   |  103  |  11  ----------------------------<  67<L>  3.7   |  21<L>  |  0.54    Ca    10.4      12 Aug 2024 07:26  Phos  4.1     08-12  Mg     2.0     08-12    TPro  10.1<H>  /  Alb  4.2  /  TBili  1.4<H>  /  DBili  x   /  AST  15  /  ALT  10  /  AlkPhos  100  08-12    LFTs LIVER FUNCTIONS - ( 12 Aug 2024 07:26 )  Alb: 4.2 g/dL / Pro: 10.1 g/dL / ALK PHOS: 100 U/L / ALT: 10 U/L / AST: 15 U/L / GGT: x           Coagulopathy   Lipid Panel   A1c   Cardiac enzymes     U/A Urinalysis Basic - ( 12 Aug 2024 07:26 )    Color: x / Appearance: x / SG: x / pH: x  Gluc: 67 mg/dL / Ketone: x  / Bili: x / Urobili: x   Blood: x / Protein: x / Nitrite: x   Leuk Esterase: x / RBC: x / WBC x   Sq Epi: x / Non Sq Epi: x / Bacteria: x      CSF  Immunological  Other      RADIOLOGY & ADDITIONAL STUDIES:           *************************************  NEUROLOGY PROGRESS NOTE  **************************************    ANTONIO POON  Female  MRN-[id number]    Subjective: Patient seen and examined at bedside with Neurology team and attending     Interval History: Patient reported that she had trouble swallowing prednisone pill this AM and has been choking on water. Patient reports feeling worse than previous days. She states that she got vaccinations 4 years ago. Later in day, patient had rapid response called due to "increased lethargy and sensation of drowning". Patient was accepted to MICU.           VITAL SIGNS:  Vital Signs Last 24 Hrs  T(C): 36.8 (12 Aug 2024 05:00), Max: 36.9 (12 Aug 2024 00:45)  T(F): 98.2 (12 Aug 2024 05:00), Max: 98.4 (12 Aug 2024 00:45)  HR: 66 (12 Aug 2024 05:00) (43 - 88)  BP: 119/77 (12 Aug 2024 05:00) (87/58 - 129/66)  BP(mean): --  RR: 18 (12 Aug 2024 05:00) (18 - 18)  SpO2: 99% (12 Aug 2024 05:00) (94% - 100%)    Parameters below as of 12 Aug 2024 05:00  Patient On (Oxygen Delivery Method): nasal cannula  O2 Flow (L/min): 2          PHYSICAL EXAMINATION:   General - NAD    Neurologic Exam:  Mental status - Awake, Alert, Oriented to person, place, and time. Speech hypophonic.  Follows simple commands.     Cranial nerves - PERRLA, minimal lateral EOM, upgaze intact, downgaze minimal, face sensation (V1-V3) intact b/l, facial strength intact without asymmetry b/l, hearing intact b/l    Motor - Normal bulk throughout.   Strength testing            Deltoid      Biceps      Triceps         R            3                 3               2          2                                                                 L             2                 3               2          2                                                                          Hip Flexion    Hip Extension    Knee Flexion    Knee Extension              Dorsiflexion    Plantar Flexion  R              2                           3                       4                           4                            4+                          4+  L              3                           4                        4                           4                           4+                          4+    Sensation - Light touch intact throughout    DTR's -             Biceps      Triceps     Brachioradialis      Patellar    Ankle    Toes/plantar response  R             2+             2+                  2+                       2+            2+                 Mute  L              2+             2+                 2+                        2+           2+                 Mute        LABS:    CBC                       11.5   4.76  )-----------( 270      ( 12 Aug 2024 07:24 )             35.2     Chem 08-12    x   |  103  |  11  ----------------------------<  67<L>  3.7   |  21<L>  |  0.54    Ca    10.4      12 Aug 2024 07:26  Phos  4.1     08-12  Mg     2.0     08-12    TPro  10.1<H>  /  Alb  4.2  /  TBili  1.4<H>  /  DBili  x   /  AST  15  /  ALT  10  /  AlkPhos  100  08-12    LFTs LIVER FUNCTIONS - ( 12 Aug 2024 07:26 )  Alb: 4.2 g/dL / Pro: 10.1 g/dL / ALK PHOS: 100 U/L / ALT: 10 U/L / AST: 15 U/L / GGT: x           Coagulopathy   Lipid Panel   A1c   Cardiac enzymes     U/A Urinalysis Basic - ( 12 Aug 2024 07:26 )    Color: x / Appearance: x / SG: x / pH: x  Gluc: 67 mg/dL / Ketone: x  / Bili: x / Urobili: x   Blood: x / Protein: x / Nitrite: x   Leuk Esterase: x / RBC: x / WBC x   Sq Epi: x / Non Sq Epi: x / Bacteria: x      CSF  Immunological  Other      RADIOLOGY & ADDITIONAL STUDIES:

## 2024-08-12 NOTE — PROGRESS NOTE ADULT - ATTENDING COMMENTS
This AM she reported that she felt worse, newly unable to swallow her pills and worsening ability to manage her secretions. Discussed with Dr. Eason either starting PLEX or increasing steroids and waiting for IVIG to fully kick in and opted to increase methylprednisolone to 80mg. This afternoon rapid response was called due to sensation of drowning. She was accepted to MICU and recommend starting PLEX.    Exam  General:  Ill appearing.    Mental Status:  A&Ox3.    Cranial Nerves: VF intact, PERRL, severe weakness in eye movement in all direction, bilateral ptosis, normal sensation bilaterally, bilateral facial weakness, hearing intact, unable to swallow secretions, tongue protrudes to midline.    Motor:  4/5 bilateral shoulder abduction, 2-3/5 throughout the rest.    Sensation: Intact to soft    Reflexes: 2+ bilateral upper and lower extremities.    Coordination: No dysmetria.    Gait: Deferred    Imp: This is a 33F who was admitted with MG exacerbation. Has worsened despite IVIG and steroids, now transferred to MICU for observation and PLEX.    - Solumedrol 32mg daily (equivalent dose of prednisone 40mg daily)  - PLEX  - Hold home pyridostigmine     Rest as above

## 2024-08-12 NOTE — CHART NOTE - NSCHARTNOTEFT_GEN_A_CORE
MICU Accept Note    CHIEF COMPLAINT:     HPI / INTERVAL HISTORY:    PAST MEDICAL & SURGICAL HISTORY:  Myasthenia gravis      Childhood asthma      Sickle cell trait      H/O umbilical hernia repair      H/O  section          FAMILY HISTORY:  No pertinent family history in first degree relatives        SOCIAL HISTORY:  Smoking:   Substance Use:   EtOH Use:   Marital Status:   Sexual History:   Occupation:  Recent Travel:  Country of Birth:   Advance Directives:     HOME MEDICATIONS:      Allergies    shellfish (Anaphylaxis)  No Known Drug Allergies    Intolerances          REVIEW OF SYSTEMS:  Constitutional: No fevers, chills, weight loss, weight gain  HEENT: No vision problems, eye pain, nasal congestion, rhinorrhea, sore throat, dysphagia  CV: No chest pain, orthopnea, palpitations  Resp: No cough, dyspnea, wheezing, hemoptysis  GI: No nausea, vomiting, diarrhea, constipation, abdominal pain  : [ ] dysuria [ ] nocturia [ ] hematuria [ ] increased urinary frequency  Musculoskeletal: [ ] back pain [ ] myalgias [ ] arthralgias [ ] fracture  Skin: [ ] rash [ ] itch  Neurological: [ ] headache [ ] dizziness [ ] syncope [ ] weakness [ ] numbness  Psychiatric: [ ] anxiety [ ] depression  Endocrine: [ ] diabetes [ ] thyroid problem  Hematologic/Lymphatic: [ ] anemia [ ] bleeding problem  Allergic/Immunologic: [ ] itchy eyes [ ] nasal discharge [ ] hives [ ] angioedema  [ ] All other systems negative  [ ] Unable to assess ROS because ________    OBJECTIVE:  ICU Vital Signs Last 24 Hrs  T(C): 36.8 (12 Aug 2024 05:00), Max: 36.9 (12 Aug 2024 00:45)  T(F): 98.2 (12 Aug 2024 05:00), Max: 98.4 (12 Aug 2024 00:45)  HR: 66 (12 Aug 2024 05:00) (43 - 88)  BP: 119/77 (12 Aug 2024 05:00) (94/70 - 129/66)  BP(mean): --  ABP: --  ABP(mean): --  RR: 18 (12 Aug 2024 05:00) (18 - 18)  SpO2: 99% (12 Aug 2024 05:00) (97% - 100%)    O2 Parameters below as of 12 Aug 2024 05:00  Patient On (Oxygen Delivery Method): nasal cannula  O2 Flow (L/min): 2            08-11 @ 07:01  -   @ 07:00  --------------------------------------------------------  IN: 1120 mL / OUT: 0 mL / NET: 1120 mL      CAPILLARY BLOOD GLUCOSE      POCT Blood Glucose.: 93 mg/dL (12 Aug 2024 13:26)      PHYSICAL EXAM:  General:   HEENT:   Neck:   Chest/Lungs:  Heart:  Abdomen:   Extremities:   Skin:   Neuro:   Psych:     LINES:     HOSPITAL MEDICATIONS:  MEDICATIONS  (STANDING):  acetaminophen   IVPB .. 1000 milliGRAM(s) IV Intermittent once  enoxaparin Injectable 40 milliGRAM(s) SubCutaneous every 24 hours  immune   globulin 10% (GAMMAGARD) IVPB 20 Gram(s) IV Intermittent daily  lactated ringers. 1000 milliLiter(s) (75 mL/Hr) IV Continuous <Continuous>  methylPREDNISolone sodium succinate Injectable 80 milliGRAM(s) IV Push daily  pantoprazole    Tablet 40 milliGRAM(s) Oral before breakfast    MEDICATIONS  (PRN):  glycopyrrolate 1 milliGRAM(s) Oral every 6 hours PRN increased secretions      LABS:                        11.5   4.76  )-----------( 270      ( 12 Aug 2024 07:24 )             35.2     Hgb Trend: 11.5<--, 10.6<--, 10.4<--, 10.5<--, 10.3<--  08    141  |  103  |  11  ----------------------------<  67<L>  3.7   |  21<L>  |  0.54    Ca    10.4      12 Aug 2024 07:26  Phos  4.1       Mg     2.0         TPro  10.1<H>  /  Alb  4.2  /  TBili  1.4<H>  /  DBili  x   /  AST  15  /  ALT  10  /  AlkPhos  100  08    Creatinine Trend: 0.54<--, 0.60<--, 0.65<--, 0.68<--, 0.61<--, 0.56<--    Urinalysis Basic - ( 12 Aug 2024 07:26 )    Color: x / Appearance: x / SG: x / pH: x  Gluc: 67 mg/dL / Ketone: x  / Bili: x / Urobili: x   Blood: x / Protein: x / Nitrite: x   Leuk Esterase: x / RBC: x / WBC x   Sq Epi: x / Non Sq Epi: x / Bacteria: x      Arterial Blood Gas:   @ 01:06  7.37/43/173/25/100.0/-0.6  ABG lactate: --    Venous Blood Gas:   @ 13:44  7.43/35/123/23/100.0  VBG Lactate: 1.0      MICROBIOLOGY:     RADIOLOGY & ADDITIONAL TESTS:      ASSESSMENT AND PLAN:  Mr./Mrs./Ms. is a ___    #Neuro    #Cardiovascular    #Respiratory    #GI/Nutrition    #/Renal    #Skin    #ID    #Endocrine    #Hematologic/DVT ppx    #Ethics MICU Accept Note    CHIEF COMPLAINT:   Shortness of breath    HPI / INTERVAL HISTORY:  Ms. Blackburn is a 32 YO woman with PMH of myasthenia gravis (AChR antibody positive, diagnosed in 2017) on pyridostigmine 60mg QID; last crisis on 2024; weaned off steroids more than 3 years ago and has not been on Solaris for the past 2 years due to insurance issues; was intubated 3 times in past, no thymectomy; myasthenia gravis initially presented as ocular symptoms and then generalized, asthma, sickle cell trait, present to [facility] ED for tongue and extremity heaviness, drooping eyelids, double vision, as well as neck weakness. Was given IVIG x5 with no improvement, outside neurologist had recommended continuing with 2 more sessions. However, RRT called for lethargy, increased sputum production, weakness. Per neuro, noted to previously respond to PLEX so given worsening NIF and VC testing as well as patient subjectively complaining of worsening "drowning feeling," decision was made to pursue urgent PLEX. MICU consulted for closer monitoring of respiratory status and for access for PLEX.     PAST MEDICAL & SURGICAL HISTORY:  Myasthenia gravis  Childhood asthma  Sickle cell trait  H/O umbilical hernia repair  H/O  section    FAMILY HISTORY:  No pertinent family history in first degree relatives      SOCIAL HISTORY:  Smoking:   Substance Use:   EtOH Use:   Marital Status:   Sexual History:   Occupation:  Recent Travel:  Country of Birth:   Advance Directives:     HOME MEDICATIONS:      Allergies    shellfish (Anaphylaxis)  No Known Drug Allergies    Intolerances    REVIEW OF SYSTEMS:  Constitutional: No fevers, chills, weight loss, weight gain  HEENT: +Increased secretions, feeling of tongue thickness  CV: No chest pain, orthopnea, palpitations  Resp: +Dyspnea  GI: No nausea, vomiting, diarrhea, constipation, abdominal pain  : [ ] dysuria [ ] nocturia [ ] hematuria [ ] increased urinary frequency  Musculoskeletal: [ ] back pain [ ] myalgias [ ] arthralgias [ ] fracture  Skin: [ ] rash [ ] itch  Neurological: [ ] headache [ ] dizziness [ ] syncope [X] weakness [ ] numbness  Psychiatric: [ ] anxiety [ ] depression  Endocrine: [ ] diabetes [ ] thyroid problem  Hematologic/Lymphatic: [ ] anemia [ ] bleeding problem  Allergic/Immunologic: [ ] itchy eyes [ ] nasal discharge [ ] hives [ ] angioedema  [ ] All other systems negative  [ ] Unable to assess ROS because ________    OBJECTIVE:  ICU Vital Signs Last 24 Hrs  T(C): 36.8 (12 Aug 2024 05:00), Max: 36.9 (12 Aug 2024 00:45)  T(F): 98.2 (12 Aug 2024 05:00), Max: 98.4 (12 Aug 2024 00:45)  HR: 66 (12 Aug 2024 05:00) (43 - 88)  BP: 119/77 (12 Aug 2024 05:00) (94/70 - 129/66)  BP(mean): --  ABP: --  ABP(mean): --  RR: 18 (12 Aug 2024 05:00) (18 - 18)  SpO2: 99% (12 Aug 2024 05:00) (97% - 100%)    O2 Parameters below as of 12 Aug 2024 05:00  Patient On (Oxygen Delivery Method): nasal cannula  O2 Flow (L/min): 2            08- @ 07:01  -  08-12 @ 07:00  --------------------------------------------------------  IN: 1120 mL / OUT: 0 mL / NET: 1120 mL      CAPILLARY BLOOD GLUCOSE      POCT Blood Glucose.: 93 mg/dL (12 Aug 2024 13:26)      PHYSICAL EXAM:  GENERAL: Appears weak, unable to hold head up consistently  EYES: Thick eyelids, ptosis  HENT: NC/AT, moist mucous membranes  NECK: Weak  LUNG: Mild tachypnea, frequently suctioning secretions  CV: RRR, Pulses- Radial: 2+ b/l  ABDOMEN: Nondistended, nontender  MSK: No visible deformities, nontender extremities  SKIN: No rashes, bruises  NEURO: AAOx4 (to person, place, time, event), no tremor  PSYCH: Normal mood and affect      HOSPITAL MEDICATIONS:  MEDICATIONS  (STANDING):  acetaminophen   IVPB .. 1000 milliGRAM(s) IV Intermittent once  enoxaparin Injectable 40 milliGRAM(s) SubCutaneous every 24 hours  immune   globulin 10% (GAMMAGARD) IVPB 20 Gram(s) IV Intermittent daily  lactated ringers. 1000 milliLiter(s) (75 mL/Hr) IV Continuous <Continuous>  methylPREDNISolone sodium succinate Injectable 80 milliGRAM(s) IV Push daily  pantoprazole    Tablet 40 milliGRAM(s) Oral before breakfast    MEDICATIONS  (PRN):  glycopyrrolate 1 milliGRAM(s) Oral every 6 hours PRN increased secretions      LABS:                        11.5   4.76  )-----------( 270      ( 12 Aug 2024 07:24 )             35.2     Hgb Trend: 11.5<--, 10.6<--, 10.4<--, 10.5<--, 10.3<--  08-12    141  |  103  |  11  ----------------------------<  67<L>  3.7   |  21<L>  |  0.54    Ca    10.4      12 Aug 2024 07:26  Phos  4.1       Mg     2.0         TPro  10.1<H>  /  Alb  4.2  /  TBili  1.4<H>  /  DBili  x   /  AST  15  /  ALT  10  /  AlkPhos  100  08-12    Creatinine Trend: 0.54<--, 0.60<--, 0.65<--, 0.68<--, 0.61<--, 0.56<--    Urinalysis Basic - ( 12 Aug 2024 07:26 )    Color: x / Appearance: x / SG: x / pH: x  Gluc: 67 mg/dL / Ketone: x  / Bili: x / Urobili: x   Blood: x / Protein: x / Nitrite: x   Leuk Esterase: x / RBC: x / WBC x   Sq Epi: x / Non Sq Epi: x / Bacteria: x      Arterial Blood Gas:   @ 01:06  7.37/43/173/25/100.0/-0.6  ABG lactate: --    Venous Blood Gas:   @ 13:44  7.43/35/123/23/100.0  VBG Lactate: 1.0      MICROBIOLOGY:     RADIOLOGY & ADDITIONAL TESTS:      ASSESSMENT AND PLAN:  Ms. Blackburn is a 32 YO woman with PMH of myasthenia gravis (AChR antibody positive, diagnosed in 2017), previously requiring multiple intubations, asthma, sickle cell trait, p/w weakness, not improving s/p IVIG, requiring MICU for urgent PLEX, monitoring of respiratory status.    #Neuro  Myasthenia crisis  - S/p IVIG x 5, methylpred  - Continue methylpred  - Will need access for urgent PLEX per neuro  - Monitor NIF, VC q4    #Cardiovascular  Normotensive without pressor support  - Monitor CV status during PLEX    #Respiratory  Dyspnea, copious secretions iso myasthenia crisis  - Management of MG as above  - Glycopyrrolate    #GI/Nutrition  - NPO pending improvement in respiratory status/bulbar symptoms  - Repeat S&S eval  - Continue PPI while on steroids    #/Renal  - No active issues    #Skin  - No active issues    #ID  - No active issues    #Endocrine  - Monitor FSS while on steroids    #Hematologic/DVT ppx  - Low risk: SCDs    #Ethics  FULL CODE MICU Accept Note    CHIEF COMPLAINT:   Shortness of breath    HPI / INTERVAL HISTORY:  Ms. Blackburn is a 32 YO woman with PMH of myasthenia gravis (AChR antibody positive, diagnosed in 2017) on pyridostigmine 60mg QID; last crisis on 2024; weaned off steroids more than 3 years ago and has not been on Solaris for the past 2 years due to insurance issues; was intubated 3 times in past, no thymectomy; myasthenia gravis initially presented as ocular symptoms and then generalized, asthma, sickle cell trait, present to [facility] ED for tongue and extremity heaviness, drooping eyelids, double vision, as well as neck weakness. Was given IVIG x5 with no improvement, outside neurologist had recommended continuing with 2 more sessions. However, RRT called for lethargy, increased sputum production, weakness. Per neuro, noted to previously respond to PLEX so given worsening NIF and VC testing as well as patient subjectively complaining of worsening "drowning feeling," decision was made to pursue urgent PLEX. MICU consulted for closer monitoring of respiratory status and for access for PLEX.     PAST MEDICAL & SURGICAL HISTORY:  Myasthenia gravis  Childhood asthma  Sickle cell trait  H/O umbilical hernia repair  H/O  section    FAMILY HISTORY:  No pertinent family history in first degree relatives      SOCIAL HISTORY:  Smoking:   Substance Use:   EtOH Use:   Marital Status:   Sexual History:   Occupation:  Recent Travel:  Country of Birth:   Advance Directives:     HOME MEDICATIONS:      Allergies    shellfish (Anaphylaxis)  No Known Drug Allergies    Intolerances    REVIEW OF SYSTEMS:  Constitutional: No fevers, chills, weight loss, weight gain  HEENT: +Increased secretions, feeling of tongue thickness  CV: No chest pain, orthopnea, palpitations  Resp: +Dyspnea  GI: No nausea, vomiting, diarrhea, constipation, abdominal pain  : [ ] dysuria [ ] nocturia [ ] hematuria [ ] increased urinary frequency  Musculoskeletal: [ ] back pain [ ] myalgias [ ] arthralgias [ ] fracture  Skin: [ ] rash [ ] itch  Neurological: [ ] headache [ ] dizziness [ ] syncope [X] weakness [ ] numbness  Psychiatric: [ ] anxiety [ ] depression  Endocrine: [ ] diabetes [ ] thyroid problem  Hematologic/Lymphatic: [ ] anemia [ ] bleeding problem  Allergic/Immunologic: [ ] itchy eyes [ ] nasal discharge [ ] hives [ ] angioedema  [ ] All other systems negative  [ ] Unable to assess ROS because ________    OBJECTIVE:  ICU Vital Signs Last 24 Hrs  T(C): 36.8 (12 Aug 2024 05:00), Max: 36.9 (12 Aug 2024 00:45)  T(F): 98.2 (12 Aug 2024 05:00), Max: 98.4 (12 Aug 2024 00:45)  HR: 66 (12 Aug 2024 05:00) (43 - 88)  BP: 119/77 (12 Aug 2024 05:00) (94/70 - 129/66)  BP(mean): --  ABP: --  ABP(mean): --  RR: 18 (12 Aug 2024 05:00) (18 - 18)  SpO2: 99% (12 Aug 2024 05:00) (97% - 100%)    O2 Parameters below as of 12 Aug 2024 05:00  Patient On (Oxygen Delivery Method): nasal cannula  O2 Flow (L/min): 2            08- @ 07:01  -  08-12 @ 07:00  --------------------------------------------------------  IN: 1120 mL / OUT: 0 mL / NET: 1120 mL      CAPILLARY BLOOD GLUCOSE      POCT Blood Glucose.: 93 mg/dL (12 Aug 2024 13:26)      PHYSICAL EXAM:  GENERAL: Appears weak, unable to hold head up consistently  EYES: Thick eyelids, ptosis  HENT: NC/AT, moist mucous membranes  NECK: Weak  LUNG: Mild tachypnea, frequently suctioning secretions  CV: RRR, Pulses- Radial: 2+ b/l  ABDOMEN: Nondistended, nontender  MSK: No visible deformities, nontender extremities  SKIN: No rashes, bruises  NEURO: AAOx4 (to person, place, time, event), no tremor  PSYCH: Normal mood and affect      HOSPITAL MEDICATIONS:  MEDICATIONS  (STANDING):  acetaminophen   IVPB .. 1000 milliGRAM(s) IV Intermittent once  enoxaparin Injectable 40 milliGRAM(s) SubCutaneous every 24 hours  immune   globulin 10% (GAMMAGARD) IVPB 20 Gram(s) IV Intermittent daily  lactated ringers. 1000 milliLiter(s) (75 mL/Hr) IV Continuous <Continuous>  methylPREDNISolone sodium succinate Injectable 80 milliGRAM(s) IV Push daily  pantoprazole    Tablet 40 milliGRAM(s) Oral before breakfast    MEDICATIONS  (PRN):  glycopyrrolate 1 milliGRAM(s) Oral every 6 hours PRN increased secretions      LABS:                        11.5   4.76  )-----------( 270      ( 12 Aug 2024 07:24 )             35.2     Hgb Trend: 11.5<--, 10.6<--, 10.4<--, 10.5<--, 10.3<--  08-12    141  |  103  |  11  ----------------------------<  67<L>  3.7   |  21<L>  |  0.54    Ca    10.4      12 Aug 2024 07:26  Phos  4.1       Mg     2.0         TPro  10.1<H>  /  Alb  4.2  /  TBili  1.4<H>  /  DBili  x   /  AST  15  /  ALT  10  /  AlkPhos  100  08-12    Creatinine Trend: 0.54<--, 0.60<--, 0.65<--, 0.68<--, 0.61<--, 0.56<--    Urinalysis Basic - ( 12 Aug 2024 07:26 )    Color: x / Appearance: x / SG: x / pH: x  Gluc: 67 mg/dL / Ketone: x  / Bili: x / Urobili: x   Blood: x / Protein: x / Nitrite: x   Leuk Esterase: x / RBC: x / WBC x   Sq Epi: x / Non Sq Epi: x / Bacteria: x      Arterial Blood Gas:   @ 01:06  7.37/43/173/25/100.0/-0.6  ABG lactate: --    Venous Blood Gas:   @ 13:44  7.43/35/123/23/100.0  VBG Lactate: 1.0      MICROBIOLOGY:     RADIOLOGY & ADDITIONAL TESTS:      ASSESSMENT AND PLAN:  Ms. Blackburn is a 32 YO woman with PMH of myasthenia gravis (AChR antibody positive, diagnosed in 2017), previously requiring multiple intubations, asthma, sickle cell trait, p/w weakness, not improving s/p IVIG, requiring MICU for urgent PLEX, monitoring of respiratory status.    #Neuro  Myasthenia crisis  - S/p IVIG x 5, methylpred  - Continue methylpred  - Will need access for urgent PLEX per neuro  - Monitor NIF, VC q4    #Cardiovascular  Normotensive without pressor support  - Monitor CV status during PLEX    #Respiratory  Dyspnea, copious secretions iso myasthenia crisis  - Management of MG as above  - Glycopyrrolate    #GI/Nutrition  - NPO pending improvement in respiratory status/bulbar symptoms  - Repeat S&S eval  - Continue PPI while on steroids    #/Renal  - No active issues    #Skin  - No active issues    #ID  - No active issues    #Endocrine  - Monitor FSS while on steroids    #Hematologic/DVT ppx  - Low risk: SCDs    #Ethics  FULL CODE    ________________________________________  MICU ATTENDING NOTE. Total CC time >40 mins    33 RHF with asthma, Sickle cell trait, Myasthenia gravis (AChR antibody positive, diagnosed in 2017) on pyridostigmine 60mg QID admitted with MG exacerbation. S/p IVIG x 5. Transferred to MICU due to worsening weakness and increased secretions.   -- start 5 sessions of PLEX   -- methylprednisolone 32 mg IV daily  -- NPO, monitor NIFs and MIPs q 6 hrs  Full code. Prognosis guarded.

## 2024-08-12 NOTE — RAPID RESPONSE TEAM SUMMARY - NSSITUATIONBACKGROUNDRRT_GEN_ALL_CORE
33F with myasthenia gravis with previous intubation 5 years ago, sickle cell trait, asthma presenting for MG exacerbation, being treated with IVIG. RRT called for worsening lethargy, weakness, increased sputum production, and sensation of "drowning in my lungs".

## 2024-08-12 NOTE — CHART NOTE - NSCHARTNOTEFT_GEN_A_CORE
Patient transferred to MICU for worsening weakness and increased secretions.    Transfusion Medicine consulted for PLEX for MG crisis. MICU to place central line for PLEX (appreciate).     Activatable orders placed for PLEX labs.

## 2024-08-12 NOTE — CHART NOTE - NSCHARTNOTEFT_GEN_A_CORE
Rapid response was called by RN at bedside for "increased lethargy and sensation of drowning"  MICU accepting patient. As previously planned, will discontinue IVIG and start 5 sessions of PLEX this PM. Steroids adjusted to methylprednisolone 32 mg IV daily.  Is currently NPO, will need speech/swallow evaluation (already ordered).

## 2024-08-12 NOTE — CHART NOTE - NSCHARTNOTEFT_GEN_A_CORE
: Marah    INDICATION: hypoxemia    PROCEDURE:  [x] LIMITED ECHO  [ ] LIMITED CHEST  [ ] LIMITED RETROPERITONEAL  [ ] LIMITED ABDOMINAL  [ ] LIMITED DVT  [ ] NEEDLE GUIDANCE VASCULAR  [ ] NEEDLE GUIDANCE THORACENTESIS  [ ] NEEDLE GUIDANCE PARACENTESIS  [ ] NEEDLE GUIDANCE PERICARDIOCENTESIS  [ ] OTHER    FINDINGS: normal GDE.      INTERPRETATION: no cardiac limitation.    Imagers in [employer]

## 2024-08-12 NOTE — PROGRESS NOTE ADULT - ASSESSMENT
[] switch prednisone to IV  [] revaccinate for meningococcal  [] contact Dr Eason  [] maintenance IV fluids  [] speech and swallow evaluation 33 RHF with PMHx Asthma, Sickle cell trait, Myasthenia gravis (AChR antibody positive, diagnosed in 2017) on pyridostigmine 60mg QID presenting due to tongue and extremity heaviness, b/l ptosis, diplopia, as well as neck weakness. Admitted for management of MG exacerbation. Pt receiving course of IVIG. Pt was reporting improving generalized weakness but on 5th day of IVIG pt with clinical worsening. Admitted to MICU on 8/12/2024 due to increased lethargy and sensation of drowning.    Impression: Bilateral ptosis, proximal>distal muscle weakness, severely reduced EOM with history of myasthenia gravis crises. Likely myasthenia gravis exacerbation for now but concern for rapid change into myasthenic crisis.     Plan:  Myasthenia Gravis Exacerbation  [x] Completed IVIG course today  [x] Discussed with patient's outpatient neurologist Dr. Eason in AM who recommended methylprednisolone IV 80 mg x1 today, assess for response for a few days and to continue IVIG  [x] Rapid response called in PM; admit to MICU  [x] NPO for now pending speech and swallow evaluation  [x] Mestinon discontinued  [x] Glycopyrrolate 1 mg q6h PRN  [x] Start methylprednisolone 32 mg IV qd  [] Start PLEX 5 sessions on 8/12/2024 PM  [x] q4h NIF and FVC. Consider Elective intubation if respiratory distress or VC below 15-20ml/kg IBW, decline in NIF to -25 to -30 cm h2o    Leukopenia  [x] Medicine consulted, appreciate recs  [x] Will continue to trend    Hyponatremia  [x] LR IV 1L @75cc/h    Case seen with neuro attending on AM rounds. Recommendations not finalized until after attending attestation.

## 2024-08-12 NOTE — CONSULT NOTE ADULT - ASSESSMENT
Ms. Blackburn is a 32 YO woman with PMH of myasthenia gravis (AChR antibody positive, diagnosed in 2017), previously requiring multiple intubations, asthma, sickle cell trait, p/w weakness, not improving s/p IVIG, requiring MICU transfer for monitoring of respiratory status and for possible need for TPE.    The patient is having respiratory symptoms despite steroid and IVIG therapy. Pt has responded well to TPE in the past.    Will initiate TPE today, with plan to perform 5 procedures with one plasma volume exchanged per TPE.    Informed consent obtained from the patient after the risks, benefits and alternatives were discussed with the patient.    
Assessment: 32yo patient with pmhx MG (AChR antibody positive, diagnosed in 2017)  intubated 3times, on pyridostigmine 60mg QID; last crisis on 6/26/2024 presenting with distal arms and neck weakness found with MG exacerbation, started on IVIG, NSICU consulted for desaturation.     imp: patient with episode of desaturation unlikely to be related to MG, ABG normal (hypercapnia would be first to worsen in MG crisis), patient with exam similar as in previous notes (single breath count, NF/NE) with distal>proximal weakness, pocus with good diaphragm strength w/ NIF relatively strong -50 but w/ downtrending VC, now <15-20ml/kg this indicates that the patient ability to generate adequate ventilation may be compromised and would benefit NIV    Recommendation  -continue monitor NIF/VC q4h   -please initiate NIV like bipap   -could consider plasmapheresis   -rest of care per neurology team   -please reconsult if questions/new clinical changes

## 2024-08-13 LAB
ALBUMIN SERPL ELPH-MCNC: 4.9 G/DL — SIGNIFICANT CHANGE UP (ref 3.3–5)
ALP SERPL-CCNC: 33 U/L — LOW (ref 40–120)
ALT FLD-CCNC: <5 U/L — LOW (ref 10–45)
ANION GAP SERPL CALC-SCNC: 15 MMOL/L — SIGNIFICANT CHANGE UP (ref 5–17)
APTT BLD: 44.3 SEC — HIGH (ref 24.5–35.6)
AST SERPL-CCNC: 7 U/L — LOW (ref 10–40)
BASE EXCESS BLDV CALC-SCNC: -4.9 MMOL/L — LOW (ref -2–3)
BASOPHILS # BLD AUTO: 0 K/UL — SIGNIFICANT CHANGE UP (ref 0–0.2)
BASOPHILS NFR BLD AUTO: 0 % — SIGNIFICANT CHANGE UP (ref 0–2)
BILIRUB SERPL-MCNC: 0.9 MG/DL — SIGNIFICANT CHANGE UP (ref 0.2–1.2)
BLD GP AB SCN SERPL QL: NEGATIVE — SIGNIFICANT CHANGE UP
BUN SERPL-MCNC: 14 MG/DL — SIGNIFICANT CHANGE UP (ref 7–23)
CA-I SERPL-SCNC: 1.28 MMOL/L — SIGNIFICANT CHANGE UP (ref 1.15–1.33)
CALCIUM SERPL-MCNC: 9 MG/DL — SIGNIFICANT CHANGE UP (ref 8.4–10.5)
CHLORIDE BLDV-SCNC: 111 MMOL/L — HIGH (ref 96–108)
CHLORIDE SERPL-SCNC: 112 MMOL/L — HIGH (ref 96–108)
CO2 BLDV-SCNC: 24 MMOL/L — SIGNIFICANT CHANGE UP (ref 22–26)
CO2 SERPL-SCNC: 18 MMOL/L — LOW (ref 22–31)
CREAT SERPL-MCNC: 0.52 MG/DL — SIGNIFICANT CHANGE UP (ref 0.5–1.3)
EGFR: 127 ML/MIN/1.73M2 — SIGNIFICANT CHANGE UP
EOSINOPHIL # BLD AUTO: 0 K/UL — SIGNIFICANT CHANGE UP (ref 0–0.5)
EOSINOPHIL NFR BLD AUTO: 0 % — SIGNIFICANT CHANGE UP (ref 0–6)
FIBRINOGEN PPP-MCNC: 100 MG/DL — CRITICAL LOW (ref 200–445)
GAS PNL BLDA: SIGNIFICANT CHANGE UP
GAS PNL BLDA: SIGNIFICANT CHANGE UP
GAS PNL BLDV: 141 MMOL/L — SIGNIFICANT CHANGE UP (ref 136–145)
GAS PNL BLDV: SIGNIFICANT CHANGE UP
GAS PNL BLDV: SIGNIFICANT CHANGE UP
GLUCOSE BLDV-MCNC: 107 MG/DL — HIGH (ref 70–99)
GLUCOSE SERPL-MCNC: 101 MG/DL — HIGH (ref 70–99)
HCO3 BLDV-SCNC: 22 MMOL/L — SIGNIFICANT CHANGE UP (ref 22–29)
HCT VFR BLD CALC: 36.1 % — SIGNIFICANT CHANGE UP (ref 34.5–45)
HCT VFR BLDA CALC: 36 % — SIGNIFICANT CHANGE UP (ref 34.5–46.5)
HGB BLD CALC-MCNC: 11.9 G/DL — SIGNIFICANT CHANGE UP (ref 11.7–16.1)
HGB BLD-MCNC: 11.6 G/DL — SIGNIFICANT CHANGE UP (ref 11.5–15.5)
IMM GRANULOCYTES NFR BLD AUTO: 0.2 % — SIGNIFICANT CHANGE UP (ref 0–0.9)
INR BLD: 1.67 RATIO — HIGH (ref 0.85–1.18)
LACTATE BLDV-MCNC: 1 MMOL/L — SIGNIFICANT CHANGE UP (ref 0.5–2)
LYMPHOCYTES # BLD AUTO: 0.68 K/UL — LOW (ref 1–3.3)
LYMPHOCYTES # BLD AUTO: 12.8 % — LOW (ref 13–44)
MAGNESIUM SERPL-MCNC: 1.7 MG/DL — SIGNIFICANT CHANGE UP (ref 1.6–2.6)
MCHC RBC-ENTMCNC: 24.7 PG — LOW (ref 27–34)
MCHC RBC-ENTMCNC: 32.1 GM/DL — SIGNIFICANT CHANGE UP (ref 32–36)
MCV RBC AUTO: 76.8 FL — LOW (ref 80–100)
MONOCYTES # BLD AUTO: 0.16 K/UL — SIGNIFICANT CHANGE UP (ref 0–0.9)
MONOCYTES NFR BLD AUTO: 3 % — SIGNIFICANT CHANGE UP (ref 2–14)
NEUTROPHILS # BLD AUTO: 4.45 K/UL — SIGNIFICANT CHANGE UP (ref 1.8–7.4)
NEUTROPHILS NFR BLD AUTO: 84 % — HIGH (ref 43–77)
NRBC # BLD: 0 /100 WBCS — SIGNIFICANT CHANGE UP (ref 0–0)
PCO2 BLDV: 50 MMHG — HIGH (ref 39–42)
PH BLDV: 7.26 — LOW (ref 7.32–7.43)
PHOSPHATE SERPL-MCNC: 4.9 MG/DL — HIGH (ref 2.5–4.5)
PLATELET # BLD AUTO: 238 K/UL — SIGNIFICANT CHANGE UP (ref 150–400)
PO2 BLDV: 71 MMHG — HIGH (ref 25–45)
POTASSIUM BLDV-SCNC: 4.2 MMOL/L — SIGNIFICANT CHANGE UP (ref 3.5–5.1)
POTASSIUM SERPL-MCNC: 4.3 MMOL/L — SIGNIFICANT CHANGE UP (ref 3.5–5.3)
POTASSIUM SERPL-SCNC: 4.3 MMOL/L — SIGNIFICANT CHANGE UP (ref 3.5–5.3)
PROT SERPL-MCNC: 6.8 G/DL — SIGNIFICANT CHANGE UP (ref 6–8.3)
PROTHROM AB SERPL-ACNC: 17.3 SEC — HIGH (ref 9.5–13)
RBC # BLD: 4.7 M/UL — SIGNIFICANT CHANGE UP (ref 3.8–5.2)
RBC # FLD: 16.9 % — HIGH (ref 10.3–14.5)
RH IG SCN BLD-IMP: POSITIVE — SIGNIFICANT CHANGE UP
SAO2 % BLDV: 93.9 % — HIGH (ref 67–88)
SODIUM SERPL-SCNC: 145 MMOL/L — SIGNIFICANT CHANGE UP (ref 135–145)
WBC # BLD: 5.3 K/UL — SIGNIFICANT CHANGE UP (ref 3.8–10.5)
WBC # FLD AUTO: 5.3 K/UL — SIGNIFICANT CHANGE UP (ref 3.8–10.5)

## 2024-08-13 PROCEDURE — 99233 SBSQ HOSP IP/OBS HIGH 50: CPT | Mod: GC

## 2024-08-13 PROCEDURE — 31624 DX BRONCHOSCOPE/LAVAGE: CPT

## 2024-08-13 PROCEDURE — 31500 INSERT EMERGENCY AIRWAY: CPT

## 2024-08-13 PROCEDURE — 71045 X-RAY EXAM CHEST 1 VIEW: CPT | Mod: 26

## 2024-08-13 PROCEDURE — 99252 IP/OBS CONSLTJ NEW/EST SF 35: CPT | Mod: 25

## 2024-08-13 PROCEDURE — 99291 CRITICAL CARE FIRST HOUR: CPT

## 2024-08-13 PROCEDURE — 36514 APHERESIS PLASMA: CPT

## 2024-08-13 RX ORDER — KETAMINE HYDROCHLORIDE 10 MG/ML
40 INJECTION INTRAMUSCULAR; INTRAVENOUS ONCE
Refills: 0 | Status: DISCONTINUED | OUTPATIENT
Start: 2024-08-13 | End: 2024-08-13

## 2024-08-13 RX ORDER — FENTANYL CITRATE 50 UG/ML
50 INJECTION INTRAMUSCULAR; INTRAVENOUS ONCE
Refills: 0 | Status: DISCONTINUED | OUTPATIENT
Start: 2024-08-13 | End: 2024-08-13

## 2024-08-13 RX ORDER — FENTANYL CITRATE 50 UG/ML
25 INJECTION INTRAMUSCULAR; INTRAVENOUS ONCE
Refills: 0 | Status: DISCONTINUED | OUTPATIENT
Start: 2024-08-13 | End: 2024-08-13

## 2024-08-13 RX ORDER — FENTANYL CITRATE 50 UG/ML
0.5 INJECTION INTRAMUSCULAR; INTRAVENOUS
Qty: 5000 | Refills: 0 | Status: DISCONTINUED | OUTPATIENT
Start: 2024-08-13 | End: 2024-08-15

## 2024-08-13 RX ORDER — CHLORHEXIDINE GLUCONATE 40 MG/ML
1 SOLUTION TOPICAL
Refills: 0 | Status: DISCONTINUED | OUTPATIENT
Start: 2024-08-13 | End: 2024-08-21

## 2024-08-13 RX ORDER — ROCURONIUM BROMIDE 50 MG/5 ML
40 SYRINGE (ML) INTRAVENOUS ONCE
Refills: 0 | Status: COMPLETED | OUTPATIENT
Start: 2024-08-13 | End: 2024-08-13

## 2024-08-13 RX ORDER — PROPOFOL 10 MG/ML
30 INJECTION, EMULSION INTRAVENOUS
Qty: 1000 | Refills: 0 | Status: DISCONTINUED | OUTPATIENT
Start: 2024-08-13 | End: 2024-08-15

## 2024-08-13 RX ADMIN — FENTANYL CITRATE 25 MICROGRAM(S): 50 INJECTION INTRAMUSCULAR; INTRAVENOUS at 07:20

## 2024-08-13 RX ADMIN — POVIDONE, PROPYLENE GLYCOL 1 DROP(S): 6.8; 3 LIQUID OPHTHALMIC at 06:28

## 2024-08-13 RX ADMIN — Medication 40 MILLIGRAM(S): at 06:15

## 2024-08-13 RX ADMIN — PROPOFOL 11 MICROGRAM(S)/KG/MIN: 10 INJECTION, EMULSION INTRAVENOUS at 11:43

## 2024-08-13 RX ADMIN — FENTANYL CITRATE 50 MICROGRAM(S): 50 INJECTION INTRAMUSCULAR; INTRAVENOUS at 06:30

## 2024-08-13 RX ADMIN — FENTANYL CITRATE 50 MICROGRAM(S): 50 INJECTION INTRAMUSCULAR; INTRAVENOUS at 14:26

## 2024-08-13 RX ADMIN — FENTANYL CITRATE 25 MICROGRAM(S): 50 INJECTION INTRAMUSCULAR; INTRAVENOUS at 06:15

## 2024-08-13 RX ADMIN — POVIDONE, PROPYLENE GLYCOL 1 DROP(S): 6.8; 3 LIQUID OPHTHALMIC at 17:22

## 2024-08-13 RX ADMIN — FENTANYL CITRATE 50 MICROGRAM(S): 50 INJECTION INTRAMUSCULAR; INTRAVENOUS at 07:20

## 2024-08-13 RX ADMIN — Medication 500 MILLILITER(S): at 11:13

## 2024-08-13 RX ADMIN — Medication 32 MILLIGRAM(S): at 06:28

## 2024-08-13 RX ADMIN — FENTANYL CITRATE 50 MICROGRAM(S): 50 INJECTION INTRAMUSCULAR; INTRAVENOUS at 13:21

## 2024-08-13 RX ADMIN — FENTANYL CITRATE 1.53 MICROGRAM(S)/KG/HR: 50 INJECTION INTRAMUSCULAR; INTRAVENOUS at 15:30

## 2024-08-13 RX ADMIN — FENTANYL CITRATE 50 MICROGRAM(S): 50 INJECTION INTRAMUSCULAR; INTRAVENOUS at 09:24

## 2024-08-13 RX ADMIN — KETAMINE HYDROCHLORIDE 40 MILLIGRAM(S): 10 INJECTION INTRAMUSCULAR; INTRAVENOUS at 06:15

## 2024-08-13 NOTE — AIRWAY REMOVAL NOTE  ADULT & PEDS - REMOVAL OF AN ARTIFICAL AIRWAY DATE AND TIME
09-Oct-2018 11:28 SEE BELOW FOR OUR NEW PHONE NUMBER!!!      Thanks for visiting us today!    Remember these important phone numbers:    [phone] for phone nurses during the day and our nurse answering service at night    [phone]   for scheduling or changing future appointments    [phone] for the Poison Control Center    When leaving a message for our staff, please include:   the spelling of your child’s full name and date of birth  your full name and relationship to child  best phone number and time to reach you   reason for the call      We strongly recommend that all children age 6 months and older receive the COVID-19 vaccine. Call our office at [phone]  to schedule.      Where's the best place on the internet to look up health information about kids?  [email or web address]  From the American Academy of Pediatrics        Is your child signed up for [employer]? If you do this, you can message us rather than playing phone tag! You can also look at labs, pay your bills, and do some scheduling. Go to your own account first. (If you don't have one yet, you can set one up at the website below or at your doctor's office).  Using a web browser (not the phone sophia), on the right hand side of your page, click the button marked \"Request Access to my Child's Records.\" Fill out the information. In a few days your child's information will be linked to your account. It's that simple!! Here is the website for more information:    [email or web address]    --------------------------------------------------------------------------------------------------------------------    Healthy Habits:    Use sunscreen, avoid tanning  Brush teeth twice daily, dental visit every 6 months  60 minutes of physical activity each day  Shower/bathing regularly  Body changes with puberty  Testicular self-exam    Diet:    Eat a variety of healthy foods including fruits, vegetables, whole grains, and protein  3 servings of milk/dairy every day  Avoid  sugary foods with empty calories such as candy, pop/soda    Injury/Illness Prevention:    Seat belt  Helmet for bike, skateboard, scooter, other motorized vehicles - no exceptions!  Cholesterol: reviewed risk and is not indicated.  Gonorrhea and Chlamydia: reviewed risk and is not indicated    Family Interaction:    Limit screen time to less than 2 hours total per day  Family meals and one-on-one activities with parent    Other:   Social Media/Online responsibility - do not give out name, personal information, sensitive materials  Peer Pressure - have a plan!  Tobacco/alcohol/drug awareness    Immunizations:    * Menveo, Bexsero given today    * Recommend yearly influenza vaccine this fall

## 2024-08-13 NOTE — PROGRESS NOTE ADULT - ATTENDING COMMENTS
33 RHF with asthma, Sickle cell trait, Myasthenia gravis (AChR antibody positive, diagnosed in 2017) on pyridostigmine 60mg QID admitted with MG exacerbation. S/p IVIG x 5. Transferred to MICU due to worsening weakness and increased secretions.   Intubated on 8/13 due to worsening respiratory status and increased secretions  --s/p IVIG, s/p first Plex on 8/12. Plan for 5 total  -- methylprednisolone 32 mg IV daily  Full code. Prognosis guarded.

## 2024-08-13 NOTE — PROGRESS NOTE ADULT - ATTENDING COMMENTS
Patient seen and examined at bedside.   I agree with the findings and plans as documented in the resident's note except below.   ROS: Unable to obtain due to the patient is currently orally intubated.  Please note, neuro exam is very limited due to the patient on mechanical ventilation via orally intubated and on the propofol.  General: Patient is comfortably lying on bed without any acute distress.  Mental status: On verbal command, patient able to follows simple and complex commands.  Cranial exams: Brainstem reflexes are intact cornea, conjunctiva and gag reflexes. Face looks symmetric. Pupils are symmetric, reactive to light bilaterally.  Power: Antigravity at bilateral four extremities despite on sedation.  Sensation: Intact to light touch at all four extremities.  Coordination and gait: Patient did not participate in the setting of sedation.     Impression and Plan:  Ms. Blackburn is 33 year old right handed woman with PMHx Myasthenia Gravis ( MG) on Mestinon, asthma sickle cell trait who is admitted due to the MG exacerbation. Yesterday she required mechanical ventilation and she completed first session for plasma pheresis and goal is 5 sessions over 10 days.  Continue steroids  Continue to hold the Mestinon  Continue NIF and VC Q6    Continue medical management, neuro- check and fall precaution.  GI and DVT prophylaxis.    My cumulative time taking care of this patient is 60 minutes  If you have any further questions, please do not hesitate to contact our consult service.  Thank you for allowing us to participate in this patient care.

## 2024-08-13 NOTE — PROGRESS NOTE ADULT - SUBJECTIVE AND OBJECTIVE BOX
Neurology Progress Note    Interval History:    Patient transferred to MICU for worsening weakness and increased secretions. Patient was intubated in MICU. Day 1/5 of PLEX initiated on . On Methylprednisone    HPI:  33 RHF with PMHx Asthma, Sickle cell trait, Myasthenia gravis (AChR antibody positive, diagnosed in 2017) on pyridostigmine 60mg QID presenting due to tongue and extremity heaviness, drooping eyelids, double vision, as well as neck weakness. Patient called her outpatient neurologist, Dr. Mason Eason who advised her to visit [facility] ED to initiate IVIG treatment. Last myasthenic crisis was on 2024. Patient denies any recent infections, increased secretions or difficulty swallowing. Patient was weaned off steroids more than 3 years ago and has not been on Solaris for the past 2 years due to insurance issues. Patient was intubated 3 times in past. Denies history of thymectomy. Patient's myasthenia gravis initially presented as ocular symptoms and then generalized. Prior symptoms included bulbar weakness, dysphagia, hypophonia, dyspnea, diplopia, ptosis, and limb weakness.     (07 Aug 2024 15:52)      PAST MEDICAL & SURGICAL HISTORY:  Myasthenia gravis    Childhood asthma    Sickle cell trait    H/O umbilical hernia repair    H/O  section            Medications:  acetaminophen   IVPB .. 1000 milliGRAM(s) IV Intermittent once  artificial  tears Solution 1 Drop(s) Both EYES every 12 hours  enoxaparin Injectable 40 milliGRAM(s) SubCutaneous every 24 hours  fentaNYL   Infusion... 0.5 MICROgram(s)/kG/Hr IV Continuous <Continuous>  methylPREDNISolone sodium succinate Injectable 32 milliGRAM(s) IV Push daily  pantoprazole    Tablet 40 milliGRAM(s) Oral before breakfast  propofol Infusion 30 MICROgram(s)/kG/Min IV Continuous <Continuous>      Vital Signs Last 24 Hrs  T(C): 36.7 (13 Aug 2024 08:00), Max: 37.1 (12 Aug 2024 20:00)  T(F): 98 (13 Aug 2024 08:00), Max: 98.8 (12 Aug 2024 20:00)  HR: 139 (13 Aug 2024 07:00) (50 - 156)  BP: 147/61 (13 Aug 2024 07:00) (85/51 - 159/103)  BP(mean): 88 (13 Aug 2024 07:00) (65 - 126)  RR: 14 (13 Aug 2024 07:00) (10 - 35)  SpO2: 96% (13 Aug 2024 07:00) (93% - 100%)    Parameters below as of 13 Aug 2024 06:00  Patient On (Oxygen Delivery Method): ventilator    O2 Concentration (%): 60    General:  Constitutional: Female, appears stated age, nontoxic, not in distress,    Head: Normocephalic;   Eyes: clear sclera;   Extremities: No cyanosis;   Resp: breathing comfortably     Neurological (>12):  MS: Awake, alert.  Oriented person place situation. Follows commands. Attends to examiner  Language: Speech is hypophonic, clear, fluent, good repetition,  comprehension, registration of words.  CNs: PERRL (R 3mm, L 3mm). VFF. EOMI. No disconjugate gaze, skew. V1-3 intact LT, No facial asymmetry b/l. Hearing grossly normal b/l. Tongue midline.     Motor - Normal bulk and tone throughout. No pronator drift.    L/R (out of 5)       Deltoid  5/5    Biceps   5/5      Triceps  5/5         Wrist Extension 5/5   Wrist Flexion  5/5   Interossei 5/5     5/5   L/R (out of 5)       Hip Flexion  5/5    Hip Extension  5/5  Knee Extension  5/5  Dorsiflexion  5/5      Plantar Flexion 5/5     Sensation: Intact to LT b/l. Cortical: Extinction on DSS (neglect): none  Reflexes L/R:  Biceps(C5) 2/2  BR(C6) 2/2   Triceps(C7)  2/2 Patellar(L4)   2/2   Toes: mute  Coordination: No dysmetria to FTN b/l UE  Gait: Normal Romberg. No postural instability. Normal stance.    Labs:  CBC Full  -  ( 13 Aug 2024 00:38 )  WBC Count : 5.30 K/uL  RBC Count : 4.70 M/uL  Hemoglobin : 11.6 g/dL  Hematocrit : 36.1 %  Platelet Count - Automated : 238 K/uL  Mean Cell Volume : 76.8 fl  Mean Cell Hemoglobin : 24.7 pg  Mean Cell Hemoglobin Concentration : 32.1 gm/dL  Auto Neutrophil # : 4.45 K/uL  Auto Lymphocyte # : 0.68 K/uL  Auto Monocyte # : 0.16 K/uL  Auto Eosinophil # : 0.00 K/uL  Auto Basophil # : 0.00 K/uL  Auto Neutrophil % : 84.0 %  Auto Lymphocyte % : 12.8 %  Auto Monocyte % : 3.0 %  Auto Eosinophil % : 0.0 %  Auto Basophil % : 0.0 %        145  |  112<H>  |  14  ----------------------------<  101<H>  4.3   |  18<L>  |  0.52    Ca    9.0      13 Aug 2024 00:38  Phos  4.9       Mg     1.7         TPro  6.8  /  Alb  4.9  /  TBili  0.9  /  DBili  x   /  AST  7<L>  /  ALT  <5<L>  /  AlkPhos  33<L>      LIVER FUNCTIONS - ( 13 Aug 2024 00:38 )  Alb: 4.9 g/dL / Pro: 6.8 g/dL / ALK PHOS: 33 U/L / ALT: <5 U/L / AST: 7 U/L / GGT: x           PT/INR - ( 13 Aug 2024 00:38 )   PT: 17.3 sec;   INR: 1.67 ratio         PTT - ( 13 Aug 2024 00:38 )  PTT:44.3 sec  Urinalysis Basic - ( 13 Aug 2024 00:38 )    Color: x / Appearance: x / SG: x / pH: x  Gluc: 101 mg/dL / Ketone: x  / Bili: x / Urobili: x   Blood: x / Protein: x / Nitrite: x   Leuk Esterase: x / RBC: x / WBC x   Sq Epi: x / Non Sq Epi: x / Bacteria: x       Neurology Progress Note    Interval History:    Patient transferred to MICU for worsening weakness and increased secretions. Patient was intubated in MICU. Day 1/5 of PLEX initiated on . On     HPI:  33 RHF with PMHx Asthma, Sickle cell trait, Myasthenia gravis (AChR antibody positive, diagnosed in 2017) on pyridostigmine 60mg QID presenting due to tongue and extremity heaviness, drooping eyelids, double vision, as well as neck weakness. Patient called her outpatient neurologist, Dr. Mason Eason who advised her to visit [facility] ED to initiate IVIG treatment. Last myasthenic crisis was on 2024. Patient denies any recent infections, increased secretions or difficulty swallowing. Patient was weaned off steroids more than 3 years ago and has not been on Solaris for the past 2 years due to insurance issues. Patient was intubated 3 times in past. Denies history of thymectomy. Patient's myasthenia gravis initially presented as ocular symptoms and then generalized. Prior symptoms included bulbar weakness, dysphagia, hypophonia, dyspnea, diplopia, ptosis, and limb weakness.     (07 Aug 2024 15:52)      PAST MEDICAL & SURGICAL HISTORY:  Myasthenia gravis    Childhood asthma    Sickle cell trait    H/O umbilical hernia repair    H/O  section            Medications:  acetaminophen   IVPB .. 1000 milliGRAM(s) IV Intermittent once  artificial  tears Solution 1 Drop(s) Both EYES every 12 hours  enoxaparin Injectable 40 milliGRAM(s) SubCutaneous every 24 hours  fentaNYL   Infusion... 0.5 MICROgram(s)/kG/Hr IV Continuous <Continuous>  methylPREDNISolone sodium succinate Injectable 32 milliGRAM(s) IV Push daily  pantoprazole    Tablet 40 milliGRAM(s) Oral before breakfast  propofol Infusion 30 MICROgram(s)/kG/Min IV Continuous <Continuous>      Vital Signs Last 24 Hrs  T(C): 36.7 (13 Aug 2024 08:00), Max: 37.1 (12 Aug 2024 20:00)  T(F): 98 (13 Aug 2024 08:00), Max: 98.8 (12 Aug 2024 20:00)  HR: 139 (13 Aug 2024 07:00) (50 - 156)  BP: 147/61 (13 Aug 2024 07:00) (85/51 - 159/103)  BP(mean): 88 (13 Aug 2024 07:00) (65 - 126)  RR: 14 (13 Aug 2024 07:00) (10 - 35)  SpO2: 96% (13 Aug 2024 07:00) (93% - 100%)    Parameters below as of 13 Aug 2024 06:00  Patient On (Oxygen Delivery Method): ventilator    O2 Concentration (%): 60    General:  Constitutional: Female, appears stated age, nontoxic, not in distress,    Head: Normocephalic;   Eyes: clear sclera;   Extremities: No cyanosis;   Resp: breathing comfortably     Neurological (>12):  MS: Awake, alert.  Oriented person place situation. Follows commands. Attends to examiner  Language: Speech is hypophonic, clear, fluent, good repetition,  comprehension, registration of words.  CNs: PERRL (R 3mm, L 3mm). VFF. EOMI. No disconjugate gaze, skew. V1-3 intact LT, No facial asymmetry b/l. Hearing grossly normal b/l. Tongue midline.     Motor - Normal bulk and tone throughout. No pronator drift.    L/R (out of 5)       Deltoid  5/5    Biceps   5/5      Triceps  5/5         Wrist Extension 5/5   Wrist Flexion  5/5   Interossei 5/5     5/5   L/R (out of 5)       Hip Flexion  5/5    Hip Extension  5/5  Knee Extension  5/5  Dorsiflexion  5/5      Plantar Flexion 5/5     Sensation: Intact to LT b/l. Cortical: Extinction on DSS (neglect): none  Reflexes L/R:  Biceps(C5) 2/2  BR(C6) 2/2   Triceps(C7)  2/2 Patellar(L4)   2/2   Toes: mute  Coordination: No dysmetria to FTN b/l UE  Gait: Normal Romberg. No postural instability. Normal stance.    Labs:  CBC Full  -  ( 13 Aug 2024 00:38 )  WBC Count : 5.30 K/uL  RBC Count : 4.70 M/uL  Hemoglobin : 11.6 g/dL  Hematocrit : 36.1 %  Platelet Count - Automated : 238 K/uL  Mean Cell Volume : 76.8 fl  Mean Cell Hemoglobin : 24.7 pg  Mean Cell Hemoglobin Concentration : 32.1 gm/dL  Auto Neutrophil # : 4.45 K/uL  Auto Lymphocyte # : 0.68 K/uL  Auto Monocyte # : 0.16 K/uL  Auto Eosinophil # : 0.00 K/uL  Auto Basophil # : 0.00 K/uL  Auto Neutrophil % : 84.0 %  Auto Lymphocyte % : 12.8 %  Auto Monocyte % : 3.0 %  Auto Eosinophil % : 0.0 %  Auto Basophil % : 0.0 %        145  |  112<H>  |  14  ----------------------------<  101<H>  4.3   |  18<L>  |  0.52    Ca    9.0      13 Aug 2024 00:38  Phos  4.9       Mg     1.7         TPro  6.8  /  Alb  4.9  /  TBili  0.9  /  DBili  x   /  AST  7<L>  /  ALT  <5<L>  /  AlkPhos  33<L>      LIVER FUNCTIONS - ( 13 Aug 2024 00:38 )  Alb: 4.9 g/dL / Pro: 6.8 g/dL / ALK PHOS: 33 U/L / ALT: <5 U/L / AST: 7 U/L / GGT: x           PT/INR - ( 13 Aug 2024 00:38 )   PT: 17.3 sec;   INR: 1.67 ratio         PTT - ( 13 Aug 2024 00:38 )  PTT:44.3 sec  Urinalysis Basic - ( 13 Aug 2024 00:38 )    Color: x / Appearance: x / SG: x / pH: x  Gluc: 101 mg/dL / Ketone: x  / Bili: x / Urobili: x   Blood: x / Protein: x / Nitrite: x   Leuk Esterase: x / RBC: x / WBC x   Sq Epi: x / Non Sq Epi: x / Bacteria: x       Neurology Progress Note    Interval History:    Patient transferred to MICU for worsening weakness and increased secretions. Patient was intubated in MICU. Day 1/5 of PLEX initiated on . Patient is responsive and able to follow simple and complex commands. Currently intubated & sedated on propofol.    HPI:  33 RHF with PMHx Asthma, Sickle cell trait, Myasthenia gravis (AChR antibody positive, diagnosed in 2017) on pyridostigmine 60mg QID presenting due to tongue and extremity heaviness, drooping eyelids, double vision, as well as neck weakness. Patient called her outpatient neurologist, Dr. Mason Eason who advised her to visit [facility] ED to initiate IVIG treatment. Last myasthenic crisis was on 2024. Patient denies any recent infections, increased secretions or difficulty swallowing. Patient was weaned off steroids more than 3 years ago and has not been on Solaris for the past 2 years due to insurance issues. Patient was intubated 3 times in past. Denies history of thymectomy. Patient's myasthenia gravis initially presented as ocular symptoms and then generalized. Prior symptoms included bulbar weakness, dysphagia, hypophonia, dyspnea, diplopia, ptosis, and limb weakness.     (07 Aug 2024 15:52)      PAST MEDICAL & SURGICAL HISTORY:  Myasthenia gravis    Childhood asthma    Sickle cell trait    H/O umbilical hernia repair    H/O  section            Medications:  acetaminophen   IVPB .. 1000 milliGRAM(s) IV Intermittent once  artificial  tears Solution 1 Drop(s) Both EYES every 12 hours  enoxaparin Injectable 40 milliGRAM(s) SubCutaneous every 24 hours  fentaNYL   Infusion... 0.5 MICROgram(s)/kG/Hr IV Continuous <Continuous>  methylPREDNISolone sodium succinate Injectable 32 milliGRAM(s) IV Push daily  pantoprazole    Tablet 40 milliGRAM(s) Oral before breakfast  propofol Infusion 30 MICROgram(s)/kG/Min IV Continuous <Continuous>      Vital Signs Last 24 Hrs  T(C): 36.7 (13 Aug 2024 08:00), Max: 37.1 (12 Aug 2024 20:00)  T(F): 98 (13 Aug 2024 08:00), Max: 98.8 (12 Aug 2024 20:00)  HR: 139 (13 Aug 2024 07:00) (50 - 156)  BP: 147/61 (13 Aug 2024 07:00) (85/51 - 159/103)  BP(mean): 88 (13 Aug 2024 07:00) (65 - 126)  RR: 14 (13 Aug 2024 07:00) (10 - 35)  SpO2: 96% (13 Aug 2024 07:00) (93% - 100%)    Parameters below as of 13 Aug 2024 06:00  Patient On (Oxygen Delivery Method): ventilator    O2 Concentration (%): 60    General:  Constitutional: Intubated & sedated    Neurological (>12):  MS: Awake, alert.  Oriented person place situation. Follows simple and crossed commands. Attends to examiner  Language: Intubated & sedated, indicates understanding by following simple and crossed commands  CNs: PERRL (R 3mm, L 3mm). VFF. EOMI. No disconjugate gaze, skew. V1-3 intact LT, No facial asymmetry b/l. Hearing grossly normal b/l. Tongue midline.     Motor - Normal bulk and tone throughout    RUE - 3/5  RLE - 3/5  LUE - 3/5  LLE - 3/5    Sensation: Intact to LT b/l.  Reflexes L/R:  Biceps(C5) 2/2  BR(C6) 2/2   Triceps(C7)  2/2 Patellar(L4)   2/2   Gait: Gait not tested due to patient safety concerns    Labs:  CBC Full  -  ( 13 Aug 2024 00:38 )  WBC Count : 5.30 K/uL  RBC Count : 4.70 M/uL  Hemoglobin : 11.6 g/dL  Hematocrit : 36.1 %  Platelet Count - Automated : 238 K/uL  Mean Cell Volume : 76.8 fl  Mean Cell Hemoglobin : 24.7 pg  Mean Cell Hemoglobin Concentration : 32.1 gm/dL  Auto Neutrophil # : 4.45 K/uL  Auto Lymphocyte # : 0.68 K/uL  Auto Monocyte # : 0.16 K/uL  Auto Eosinophil # : 0.00 K/uL  Auto Basophil # : 0.00 K/uL  Auto Neutrophil % : 84.0 %  Auto Lymphocyte % : 12.8 %  Auto Monocyte % : 3.0 %  Auto Eosinophil % : 0.0 %  Auto Basophil % : 0.0 %    08-13    145  |  112<H>  |  14  ----------------------------<  101<H>  4.3   |  18<L>  |  0.52    Ca    9.0      13 Aug 2024 00:38  Phos  4.9       Mg     1.7         TPro  6.8  /  Alb  4.9  /  TBili  0.9  /  DBili  x   /  AST  7<L>  /  ALT  <5<L>  /  AlkPhos  33<L>      LIVER FUNCTIONS - ( 13 Aug 2024 00:38 )  Alb: 4.9 g/dL / Pro: 6.8 g/dL / ALK PHOS: 33 U/L / ALT: <5 U/L / AST: 7 U/L / GGT: x           PT/INR - ( 13 Aug 2024 00:38 )   PT: 17.3 sec;   INR: 1.67 ratio         PTT - ( 13 Aug 2024 00:38 )  PTT:44.3 sec  Urinalysis Basic - ( 13 Aug 2024 00:38 )    Color: x / Appearance: x / SG: x / pH: x  Gluc: 101 mg/dL / Ketone: x  / Bili: x / Urobili: x   Blood: x / Protein: x / Nitrite: x   Leuk Esterase: x / RBC: x / WBC x   Sq Epi: x / Non Sq Epi: x / Bacteria: x

## 2024-08-13 NOTE — PROGRESS NOTE ADULT - ASSESSMENT
33F with PMH of myasthenia gravis (AChR antibody positive, diagnosed in 2017), previously requiring multiple intubations, asthma, sickle cell trait, p/w weakness, not improving s/p IVIG, requiring MICU for urgent PLEX, monitoring of respiratory status. Intubated 8/12 for respiratory distress and oral secretions.     #Neuro  Sedation  - baseline AAO x 4   - on propofol and fentanyl for sedation while intubated     Myasthenia crisis  - s/p IVIG x 5, methylprednisone   - Continue methylpred  - s/p PLEX x 1 8/12  - acute change in respiratory mechanics overnight requiring intubation 8/13  - Monitor NIF, VC q4    #Cardiovascular  Normotensive without pressor support  - Monitor CV status during PLEX    #Respiratory  #Acute Hypoxemic Respiratory Failure   #Respiratory Distress  - initially presented with dyspnea, copious secretions iso myasthenia crisis with worsening respiratory status overnight requiring intubation 8/13  - bronchoscopy revealing copious mucous secretions   - /18/60%/5  - c/w solumedrol 32 qd  - management of MG as above    #GI/Nutrition  - NPO pending improvement in respiratory status/bulbar symptoms  - will need repeat S&S eval when extubated  - Continue PPI while on steroids    #/Renal  - No active issues    #Skin  - No active issues    #ID  - No active issues    #Endocrine  - Monitor FSS while on steroids    #Hematologic/DVT ppx  - Low risk: SCDs    #Ethics  FULL CODE 33F with PMH of myasthenia gravis (AChR antibody positive, diagnosed in 2017), previously requiring multiple intubations, asthma, sickle cell trait, p/w weakness, not improving s/p IVIG, requiring MICU for urgent PLEX, monitoring of respiratory status. Intubated 8/12 for respiratory distress and oral secretions.     #Neuro  Sedation  - baseline AAO x 4   - on propofol and fentanyl for sedation while intubated     Myasthenia crisis  - s/p IVIG x 5, methylprednisone   - Continue methylpred  - s/p PLEX x 1 8/12  - acute change in respiratory mechanics overnight requiring intubation 8/13  - Monitor NIF, VC q4    #Cardiovascular  Tachycardia  - likely multifactorial from pain, anxiety or low volume state  - trial fent 50 and  cc bolus  - EKG with sinus tachycardia    #Respiratory  #Acute Hypoxemic Respiratory Failure   #Respiratory Distress  - initially presented with dyspnea, copious secretions iso myasthenia crisis with worsening respiratory status overnight requiring intubation 8/13  - bronchoscopy revealing copious mucous secretions   - /18/60%/5  - c/w solumedrol 32 qd  - management of MG as above    #GI/Nutrition  - NPO with tube feeds   - will need repeat S&S eval when extubated  - Continue PPI while on steroids    #/Renal  - No active issues    #Skin  - No active issues    #ID  - No active issues    #Endocrine  - Monitor FSS while on steroids    #Hematologic/DVT ppx  - replete fibrinogen PRN while on PLEX therapy     #Ethics  FULL CODE

## 2024-08-13 NOTE — PROCEDURE NOTE - NSINDICATIONS_GEN_A_CORE
airway protection/respiratory distress/respiratory failure
for Plasmapheresis/critical illness/emergency venous access

## 2024-08-13 NOTE — PROGRESS NOTE ADULT - SUBJECTIVE AND OBJECTIVE BOX
INTERVAL HPI/OVERNIGHT EVENTS: overnight, with significant respiratory distress requiring emergent intubation    SUBJECTIVE: Patient seen and examined at bedside.       VITAL SIGNS:  ICU Vital Signs Last 24 Hrs  T(C): 36.7 (13 Aug 2024 08:00), Max: 37.1 (12 Aug 2024 20:00)  T(F): 98 (13 Aug 2024 08:00), Max: 98.8 (12 Aug 2024 20:00)  HR: 139 (13 Aug 2024 07:00) (50 - 156)  BP: 147/61 (13 Aug 2024 07:00) (85/51 - 159/103)  BP(mean): 88 (13 Aug 2024 07:00) (65 - 126)  ABP: --  ABP(mean): --  RR: 14 (13 Aug 2024 07:00) (10 - 35)  SpO2: 96% (13 Aug 2024 07:00) (93% - 100%)    O2 Parameters below as of 13 Aug 2024 06:00  Patient On (Oxygen Delivery Method): ventilator    O2 Concentration (%): 60      Mode: AC/ CMV (Assist Control/ Continuous Mandatory Ventilation), RR (machine): 14, TV (machine): 400, FiO2: 100, PEEP: 5, ITime: 1, MAP: 11, PIP: 37  Plateau pressure:   P/F ratio:     08-12 @ 07:01  -  08-13 @ 07:00  --------------------------------------------------------  IN: 22 mL / OUT: 600 mL / NET: -578 mL      CAPILLARY BLOOD GLUCOSE      POCT Blood Glucose.: 93 mg/dL (12 Aug 2024 13:26)    ECG:    PHYSICAL EXAM:  General: NAD, resting in bed, on RA  HEENT: AT/NC, EOMI, PERRLA, clear conjunctiva and sclera  Lungs: clear to auscultation in anterior lung fields, no wheezes/rhonchi/rales  Heart: +s1/s2, RRR, no murmurs/gallops/rubs  Abdomen: soft, non-distended, non-tender to palpation, no rebound/guarding/rigidity, bowel sounds present  Extremities: +DP pulse bilaterally, no cyanosis/clubbing/edema  Neuro: AAOx3, no focal deficits    MEDICATIONS:  MEDICATIONS  (STANDING):  acetaminophen   IVPB .. 1000 milliGRAM(s) IV Intermittent once  artificial  tears Solution 1 Drop(s) Both EYES every 12 hours  enoxaparin Injectable 40 milliGRAM(s) SubCutaneous every 24 hours  fentaNYL   Infusion... 0.5 MICROgram(s)/kG/Hr (1.53 mL/Hr) IV Continuous <Continuous>  methylPREDNISolone sodium succinate Injectable 32 milliGRAM(s) IV Push daily  pantoprazole    Tablet 40 milliGRAM(s) Oral before breakfast  propofol Infusion 30 MICROgram(s)/kG/Min (11 mL/Hr) IV Continuous <Continuous>    MEDICATIONS  (PRN):      ALLERGIES:  Allergies    shellfish (Anaphylaxis)  No Known Drug Allergies    Intolerances        LABS:                        11.6   5.30  )-----------( 238      ( 13 Aug 2024 00:38 )             36.1     08-13    145  |  112<H>  |  14  ----------------------------<  101<H>  4.3   |  18<L>  |  0.52    Ca    9.0      13 Aug 2024 00:38  Phos  4.9     08-13  Mg     1.7     08-13    TPro  6.8  /  Alb  4.9  /  TBili  0.9  /  DBili  x   /  AST  7<L>  /  ALT  <5<L>  /  AlkPhos  33<L>  08-13    PT/INR - ( 13 Aug 2024 00:38 )   PT: 17.3 sec;   INR: 1.67 ratio         PTT - ( 13 Aug 2024 00:38 )  PTT:44.3 sec  Urinalysis Basic - ( 13 Aug 2024 00:38 )    Color: x / Appearance: x / SG: x / pH: x  Gluc: 101 mg/dL / Ketone: x  / Bili: x / Urobili: x   Blood: x / Protein: x / Nitrite: x   Leuk Esterase: x / RBC: x / WBC x   Sq Epi: x / Non Sq Epi: x / Bacteria: x        RADIOLOGY & ADDITIONAL TESTS: Reviewed. INTERVAL HPI/OVERNIGHT EVENTS: overnight, with significant respiratory distress requiring emergent intubation    SUBJECTIVE: Patient seen and examined at bedside.   Sedated on propofol. Unable to get exam.     VITAL SIGNS:  ICU Vital Signs Last 24 Hrs  T(C): 36.7 (13 Aug 2024 08:00), Max: 37.1 (12 Aug 2024 20:00)  T(F): 98 (13 Aug 2024 08:00), Max: 98.8 (12 Aug 2024 20:00)  HR: 139 (13 Aug 2024 07:00) (50 - 156)  BP: 147/61 (13 Aug 2024 07:00) (85/51 - 159/103)  BP(mean): 88 (13 Aug 2024 07:00) (65 - 126)  ABP: --  ABP(mean): --  RR: 14 (13 Aug 2024 07:00) (10 - 35)  SpO2: 96% (13 Aug 2024 07:00) (93% - 100%)    O2 Parameters below as of 13 Aug 2024 06:00  Patient On (Oxygen Delivery Method): ventilator    O2 Concentration (%): 60      Mode: AC/ CMV (Assist Control/ Continuous Mandatory Ventilation), RR (machine): 14, TV (machine): 400, FiO2: 100, PEEP: 5, ITime: 1, MAP: 11, PIP: 37  Plateau pressure:   P/F ratio:     08-12 @ 07:01  -  08-13 @ 07:00  --------------------------------------------------------  IN: 22 mL / OUT: 600 mL / NET: -578 mL      CAPILLARY BLOOD GLUCOSE      POCT Blood Glucose.: 93 mg/dL (12 Aug 2024 13:26)    ECG:    PHYSICAL EXAM:  General: NAD, resting in bed  HEENT: PERRLA, clear conjunctiva and sclera  Lungs: clear to auscultation in anterior lung fields; intubated and mechanically ventilated  Heart: +s1/s2, rapid rate   Abdomen: soft, non-distended, non-tender to palpation, no rebound/guarding/rigidity, bowel sounds present  Extremities: +DP pulse bilaterally, no cyanosis/clubbing/edema  Neuro: AAOx0 on propofol 30    MEDICATIONS:  MEDICATIONS  (STANDING):  acetaminophen   IVPB .. 1000 milliGRAM(s) IV Intermittent once  artificial  tears Solution 1 Drop(s) Both EYES every 12 hours  enoxaparin Injectable 40 milliGRAM(s) SubCutaneous every 24 hours  fentaNYL   Infusion... 0.5 MICROgram(s)/kG/Hr (1.53 mL/Hr) IV Continuous <Continuous>  methylPREDNISolone sodium succinate Injectable 32 milliGRAM(s) IV Push daily  pantoprazole    Tablet 40 milliGRAM(s) Oral before breakfast  propofol Infusion 30 MICROgram(s)/kG/Min (11 mL/Hr) IV Continuous <Continuous>    MEDICATIONS  (PRN):      ALLERGIES:  Allergies    shellfish (Anaphylaxis)  No Known Drug Allergies    Intolerances        LABS:                        11.6   5.30  )-----------( 238      ( 13 Aug 2024 00:38 )             36.1     08-13    145  |  112<H>  |  14  ----------------------------<  101<H>  4.3   |  18<L>  |  0.52    Ca    9.0      13 Aug 2024 00:38  Phos  4.9     08-13  Mg     1.7     08-13    TPro  6.8  /  Alb  4.9  /  TBili  0.9  /  DBili  x   /  AST  7<L>  /  ALT  <5<L>  /  AlkPhos  33<L>  08-13    PT/INR - ( 13 Aug 2024 00:38 )   PT: 17.3 sec;   INR: 1.67 ratio         PTT - ( 13 Aug 2024 00:38 )  PTT:44.3 sec  Urinalysis Basic - ( 13 Aug 2024 00:38 )    Color: x / Appearance: x / SG: x / pH: x  Gluc: 101 mg/dL / Ketone: x  / Bili: x / Urobili: x   Blood: x / Protein: x / Nitrite: x   Leuk Esterase: x / RBC: x / WBC x   Sq Epi: x / Non Sq Epi: x / Bacteria: x        RADIOLOGY & ADDITIONAL TESTS: Reviewed.

## 2024-08-13 NOTE — PROCEDURE NOTE - NSBRONCHPROCDETAILS_GEN_A_CORE_FT
Pt intubated prior to bronch due to respiratory distress.    Bronchoscope inserted through ETT. ETT noted to be in good position. Airway evaluation revealed normal anatomy. Clear thin secretions in all airways Secretions suctioned. Bronchoscope then withdrawn from ETT.

## 2024-08-13 NOTE — PROCEDURE NOTE - NSPROCDETAILS_GEN_ALL_CORE
patient pre-oxygenated, tube inserted, placement confirmed
guidewire recovered/lumen(s) aspirated and flushed/sterile dressing applied/sterile technique, catheter placed

## 2024-08-13 NOTE — PROGRESS NOTE ADULT - ASSESSMENT
33 RHF with PMHx Asthma, Sickle cell trait, Myasthenia gravis (AChR antibody positive, diagnosed in 2017) on pyridostigmine 60mg QID presenting due to tongue and extremity heaviness, b/l ptosis, diplopia, as well as neck weakness. Admitted for management of MG exacerbation. Pt receiving course of IVIG. Pt was reporting improving generalized weakness but on 5th day of IVIG pt with clinical worsening. Admitted to MICU on 8/12/2024 due to increased lethargy and sensation of drowning.    Impression: Bilateral ptosis, proximal>distal muscle weakness, severely reduced EOM with history of myasthenia gravis crises. Likely myasthenia gravis exacerbation for now but concern for rapid change into myasthenic crisis.     Plan:  Myasthenia Gravis Exacerbation  [x] S/P IVIG course  [x] S/P methylprednisolone IV 80 mg x1 today  [x] Rapid response called in PM; admit to MICU  [x] Mestinon discontinued  [x] On Methylprednisolone 32 mg IV qd  [] Start PLEX 5 sessions on 8/12/2024 PM    Leukopenia  [x] Medicine consulted, appreciate recs  [x] Will continue to trend    Hyponatremia  [x] LR IV 1L @75cc/h 33 RHF with PMHx Asthma, Sickle cell trait, Myasthenia gravis (AChR antibody positive, diagnosed in 2017) on pyridostigmine 60mg QID presenting due to tongue and extremity heaviness, b/l ptosis, diplopia, as well as neck weakness. Admitted for management of MG exacerbation. Pt receiving course of IVIG. Pt was reporting improving generalized weakness but on 5th day of IVIG pt with clinical worsening. Admitted to MICU on 8/12/2024 due to increased lethargy and sensation of drowning.    Impression:   Bilateral ptosis, proximal>distal muscle weakness, severely reduced EOM with history of myasthenia gravis crises. Likely myasthenia gravis exacerbation for now but concern for rapid change into myasthenic crisis.     Recommendations:    [] Medical evaluation of leukocytosis  [] Monitor and correct electrolytes per primary team  [x] S/P IVIG course  [x] S/P methylprednisolone IV 80 mg x1 today  [x] On Methylprednisolone 32 mg IV qd  [x] Rapid response called in PM; admit to MICU  [x] Mestinon discontinued  [] Start PLEX 5 sessions on 8/12/2024 PM  [x] Will continue to trend

## 2024-08-14 LAB
ALBUMIN SERPL ELPH-MCNC: 4.4 G/DL — SIGNIFICANT CHANGE UP (ref 3.3–5)
ALP SERPL-CCNC: 46 U/L — SIGNIFICANT CHANGE UP (ref 40–120)
ALT FLD-CCNC: 6 U/L — LOW (ref 10–45)
ANION GAP SERPL CALC-SCNC: 12 MMOL/L — SIGNIFICANT CHANGE UP (ref 5–17)
APTT BLD: 27.4 SEC — SIGNIFICANT CHANGE UP (ref 24.5–35.6)
AST SERPL-CCNC: 12 U/L — SIGNIFICANT CHANGE UP (ref 10–40)
BASE EXCESS BLDV CALC-SCNC: 0.8 MMOL/L — SIGNIFICANT CHANGE UP (ref -2–3)
BASOPHILS # BLD AUTO: 0.02 K/UL — SIGNIFICANT CHANGE UP (ref 0–0.2)
BASOPHILS NFR BLD AUTO: 0.2 % — SIGNIFICANT CHANGE UP (ref 0–2)
BILIRUB SERPL-MCNC: 1.4 MG/DL — HIGH (ref 0.2–1.2)
BUN SERPL-MCNC: 17 MG/DL — SIGNIFICANT CHANGE UP (ref 7–23)
CA-I SERPL-SCNC: 1.32 MMOL/L — SIGNIFICANT CHANGE UP (ref 1.15–1.33)
CALCIUM SERPL-MCNC: 9.6 MG/DL — SIGNIFICANT CHANGE UP (ref 8.4–10.5)
CHLORIDE BLDV-SCNC: 111 MMOL/L — HIGH (ref 96–108)
CHLORIDE SERPL-SCNC: 109 MMOL/L — HIGH (ref 96–108)
CO2 BLDV-SCNC: 26 MMOL/L — SIGNIFICANT CHANGE UP (ref 22–26)
CO2 SERPL-SCNC: 22 MMOL/L — SIGNIFICANT CHANGE UP (ref 22–31)
CREAT SERPL-MCNC: 0.57 MG/DL — SIGNIFICANT CHANGE UP (ref 0.5–1.3)
EGFR: 125 ML/MIN/1.73M2 — SIGNIFICANT CHANGE UP
EOSINOPHIL # BLD AUTO: 0.01 K/UL — SIGNIFICANT CHANGE UP (ref 0–0.5)
EOSINOPHIL NFR BLD AUTO: 0.1 % — SIGNIFICANT CHANGE UP (ref 0–6)
FIBRINOGEN PPP-MCNC: 167 MG/DL — LOW (ref 200–445)
GAS PNL BLDV: 141 MMOL/L — SIGNIFICANT CHANGE UP (ref 136–145)
GAS PNL BLDV: SIGNIFICANT CHANGE UP
GAS PNL BLDV: SIGNIFICANT CHANGE UP
GLUCOSE BLDV-MCNC: 100 MG/DL — HIGH (ref 70–99)
GLUCOSE SERPL-MCNC: 105 MG/DL — HIGH (ref 70–99)
HCO3 BLDV-SCNC: 25 MMOL/L — SIGNIFICANT CHANGE UP (ref 22–29)
HCT VFR BLD CALC: 30.7 % — LOW (ref 34.5–45)
HCT VFR BLDA CALC: 32 % — LOW (ref 34.5–46.5)
HGB BLD CALC-MCNC: 10.5 G/DL — LOW (ref 11.7–16.1)
HGB BLD-MCNC: 9.8 G/DL — LOW (ref 11.5–15.5)
HOROWITZ INDEX BLDV+IHG-RTO: 30 — SIGNIFICANT CHANGE UP
IMM GRANULOCYTES NFR BLD AUTO: 0.3 % — SIGNIFICANT CHANGE UP (ref 0–0.9)
INR BLD: 1.17 RATIO — SIGNIFICANT CHANGE UP (ref 0.85–1.18)
LACTATE BLDV-MCNC: 1.1 MMOL/L — SIGNIFICANT CHANGE UP (ref 0.5–2)
LYMPHOCYTES # BLD AUTO: 1.68 K/UL — SIGNIFICANT CHANGE UP (ref 1–3.3)
LYMPHOCYTES # BLD AUTO: 18.4 % — SIGNIFICANT CHANGE UP (ref 13–44)
MAGNESIUM SERPL-MCNC: 1.8 MG/DL — SIGNIFICANT CHANGE UP (ref 1.6–2.6)
MCHC RBC-ENTMCNC: 24.3 PG — LOW (ref 27–34)
MCHC RBC-ENTMCNC: 31.9 GM/DL — LOW (ref 32–36)
MCV RBC AUTO: 76 FL — LOW (ref 80–100)
MONOCYTES # BLD AUTO: 1.04 K/UL — HIGH (ref 0–0.9)
MONOCYTES NFR BLD AUTO: 11.4 % — SIGNIFICANT CHANGE UP (ref 2–14)
MRSA PCR RESULT.: SIGNIFICANT CHANGE UP
NEUTROPHILS # BLD AUTO: 6.33 K/UL — SIGNIFICANT CHANGE UP (ref 1.8–7.4)
NEUTROPHILS NFR BLD AUTO: 69.6 % — SIGNIFICANT CHANGE UP (ref 43–77)
NRBC # BLD: 0 /100 WBCS — SIGNIFICANT CHANGE UP (ref 0–0)
PCO2 BLDV: 39 MMHG — SIGNIFICANT CHANGE UP (ref 39–42)
PH BLDV: 7.42 — SIGNIFICANT CHANGE UP (ref 7.32–7.43)
PHOSPHATE SERPL-MCNC: 2.4 MG/DL — LOW (ref 2.5–4.5)
PLATELET # BLD AUTO: 195 K/UL — SIGNIFICANT CHANGE UP (ref 150–400)
PO2 BLDV: 109 MMHG — HIGH (ref 25–45)
POTASSIUM BLDV-SCNC: 3.2 MMOL/L — LOW (ref 3.5–5.1)
POTASSIUM SERPL-MCNC: 3.2 MMOL/L — LOW (ref 3.5–5.3)
POTASSIUM SERPL-SCNC: 3.2 MMOL/L — LOW (ref 3.5–5.3)
PROT SERPL-MCNC: 6.9 G/DL — SIGNIFICANT CHANGE UP (ref 6–8.3)
PROTHROM AB SERPL-ACNC: 12.8 SEC — SIGNIFICANT CHANGE UP (ref 9.5–13)
RBC # BLD: 4.04 M/UL — SIGNIFICANT CHANGE UP (ref 3.8–5.2)
RBC # FLD: 16.6 % — HIGH (ref 10.3–14.5)
S AUREUS DNA NOSE QL NAA+PROBE: DETECTED
SAO2 % BLDV: 99.1 % — HIGH (ref 67–88)
SODIUM SERPL-SCNC: 143 MMOL/L — SIGNIFICANT CHANGE UP (ref 135–145)
WBC # BLD: 9.11 K/UL — SIGNIFICANT CHANGE UP (ref 3.8–10.5)
WBC # FLD AUTO: 9.11 K/UL — SIGNIFICANT CHANGE UP (ref 3.8–10.5)

## 2024-08-14 PROCEDURE — 99291 CRITICAL CARE FIRST HOUR: CPT

## 2024-08-14 PROCEDURE — 36514 APHERESIS PLASMA: CPT

## 2024-08-14 PROCEDURE — 99233 SBSQ HOSP IP/OBS HIGH 50: CPT | Mod: GC

## 2024-08-14 RX ORDER — POTASSIUM CHLORIDE 10 MEQ
40 TABLET, EXT RELEASE, PARTICLES/CRYSTALS ORAL ONCE
Refills: 0 | Status: DISCONTINUED | OUTPATIENT
Start: 2024-08-14 | End: 2024-08-14

## 2024-08-14 RX ORDER — POTASSIUM PHOSPHATE 236; 224 MG/ML; MG/ML
15 INJECTION, SOLUTION INTRAVENOUS ONCE
Refills: 0 | Status: COMPLETED | OUTPATIENT
Start: 2024-08-14 | End: 2024-08-14

## 2024-08-14 RX ORDER — SENNA 187 MG
2 TABLET ORAL AT BEDTIME
Refills: 0 | Status: DISCONTINUED | OUTPATIENT
Start: 2024-08-14 | End: 2024-08-15

## 2024-08-14 RX ORDER — POTASSIUM CHLORIDE 10 MEQ
40 TABLET, EXT RELEASE, PARTICLES/CRYSTALS ORAL ONCE
Refills: 0 | Status: COMPLETED | OUTPATIENT
Start: 2024-08-14 | End: 2024-08-14

## 2024-08-14 RX ORDER — MUPIROCIN 2 %
1 OINTMENT (GRAM) TOPICAL
Refills: 0 | Status: COMPLETED | OUTPATIENT
Start: 2024-08-14 | End: 2024-08-19

## 2024-08-14 RX ORDER — ROPIVACAINE IN 0.9% SOD CHL/PF 0.1 %
0.05 PLASTIC BAG, INJECTION (ML) EPIDURAL
Qty: 8 | Refills: 0 | Status: DISCONTINUED | OUTPATIENT
Start: 2024-08-14 | End: 2024-08-15

## 2024-08-14 RX ORDER — POLYETHYLENE GLYCOL 3350 17 G/17G
17 POWDER, FOR SOLUTION ORAL ONCE
Refills: 0 | Status: COMPLETED | OUTPATIENT
Start: 2024-08-14 | End: 2024-08-14

## 2024-08-14 RX ADMIN — Medication 5.74 MICROGRAM(S)/KG/MIN: at 10:19

## 2024-08-14 RX ADMIN — PROPOFOL 11 MICROGRAM(S)/KG/MIN: 10 INJECTION, EMULSION INTRAVENOUS at 05:13

## 2024-08-14 RX ADMIN — FENTANYL CITRATE 1.53 MICROGRAM(S)/KG/HR: 50 INJECTION INTRAMUSCULAR; INTRAVENOUS at 21:30

## 2024-08-14 RX ADMIN — POVIDONE, PROPYLENE GLYCOL 1 DROP(S): 6.8; 3 LIQUID OPHTHALMIC at 05:12

## 2024-08-14 RX ADMIN — PROPOFOL 11 MICROGRAM(S)/KG/MIN: 10 INJECTION, EMULSION INTRAVENOUS at 11:45

## 2024-08-14 RX ADMIN — PROPOFOL 11 MICROGRAM(S)/KG/MIN: 10 INJECTION, EMULSION INTRAVENOUS at 21:30

## 2024-08-14 RX ADMIN — ENOXAPARIN SODIUM 40 MILLIGRAM(S): 100 INJECTION SUBCUTANEOUS at 12:57

## 2024-08-14 RX ADMIN — Medication 100 MILLILITER(S): at 07:16

## 2024-08-14 RX ADMIN — Medication 5 MILLIGRAM(S): at 21:18

## 2024-08-14 RX ADMIN — PROPOFOL 11 MICROGRAM(S)/KG/MIN: 10 INJECTION, EMULSION INTRAVENOUS at 16:33

## 2024-08-14 RX ADMIN — Medication 40 MILLIEQUIVALENT(S): at 02:29

## 2024-08-14 RX ADMIN — Medication 40 MILLIGRAM(S): at 06:03

## 2024-08-14 RX ADMIN — Medication 1 APPLICATION(S): at 18:00

## 2024-08-14 RX ADMIN — POTASSIUM PHOSPHATE 62.5 MILLIMOLE(S): 236; 224 INJECTION, SOLUTION INTRAVENOUS at 02:29

## 2024-08-14 RX ADMIN — Medication 32 MILLIGRAM(S): at 05:10

## 2024-08-14 RX ADMIN — CHLORHEXIDINE GLUCONATE 1 APPLICATION(S): 40 SOLUTION TOPICAL at 05:15

## 2024-08-14 RX ADMIN — Medication 2 TABLET(S): at 21:18

## 2024-08-14 RX ADMIN — POVIDONE, PROPYLENE GLYCOL 1 DROP(S): 6.8; 3 LIQUID OPHTHALMIC at 17:03

## 2024-08-14 RX ADMIN — Medication 100 MILLILITER(S): at 05:14

## 2024-08-14 NOTE — DIETITIAN INITIAL EVALUATION ADULT - NSFNSGIIOFT_GEN_A_CORE
08-13-24 @ 07:01  -  08-14-24 @ 07:00  --------------------------------------------------------  OUT:  Total OUT: 0 mL    Total NET: 170 mL

## 2024-08-14 NOTE — DIETITIAN INITIAL EVALUATION ADULT - REASON INDICATOR FOR ASSESSMENT
Consult for tube feeding  Source: Medical record, RN, and Interdisciplinary Rounds (pt intubated and sedated)

## 2024-08-14 NOTE — PROGRESS NOTE ADULT - ASSESSMENT
33 RHF with PMHx Asthma, Sickle cell trait, Myasthenia gravis (AChR antibody positive, diagnosed in 2017) on pyridostigmine 60mg QID presenting due to tongue and extremity heaviness, b/l ptosis, diplopia, as well as neck weakness. Admitted for management of MG exacerbation. Pt receiving course of IVIG. Pt was reporting improving generalized weakness but on 5th day of IVIG pt with clinical worsening. Admitted to MICU on 8/12/2024 due to increased lethargy and sensation of drowning.    Impression:   Bilateral ptosis, proximal>distal muscle weakness, severely reduced EOM with history of myasthenia gravis crises. Likely myasthenia gravis exacerbation for now but concern for rapid change into myasthenic crisis.     Recommendations:    [] Medical evaluation of leukocytosis  [] Monitor and correct electrolytes per primary team  [x] S/P IVIG course  [x] S/P methylprednisolone IV 80 mg x1 today  [x] On Methylprednisolone 32 mg IV qd  [x] Rapid response called in PM; admit to MICU  [x] Mestinon discontinued  [] Start PLEX 5 sessions on 8/12/2024 PM, session 2 due 8/16    Case discussed with Dr. Doshi. Note finalized on attending attestation

## 2024-08-14 NOTE — DIETITIAN INITIAL EVALUATION ADULT - OTHER INFO
Wt Hx:   Dosing wt 61.2 kG/134.9 lbs.  UBW unknown.    Ht: 62 inches   IBW: 110 lbs    IBW%: 123%  Wt Hx per HIE (lbs): 150 (7/25/23), 149 (6/25/24), 142 (7/9/24), 140 (8/7/24)  Wt loss noted however, RD unable to confirm if intentional vs unintentional     Nutrition-Related Concerns:   - Intubated 8/13. On Fentanyl and propofol at 18.4 mL/hr (provides 486 kcals/day)  - Pressor. norepinephrine at 0.05 micrograms/kG/min   - Hx of myasthenia gravis; now receiving PLEX  - On lactated ringers  - On steroid which can elevate BG  - Low K+ and low phos - repleted

## 2024-08-14 NOTE — PROGRESS NOTE ADULT - ATTENDING COMMENTS
Patient seen and examined at bedside.     I agree with the findings and plans as documented in the resident's note except below.   ROS: Unable to obtain due to the patient is currently orally intubated.  Please note, neuro exam is very limited due to the patient on mechanical ventilation via orally intubated and on the propofol and Precedex.  General: Patient is comfortably lying on bed without any acute distress.  Mental status: On verbal command, patient unable to follows simple and complex commands.  Cranial exams: Brainstem reflexes are intact cornea, conjunctiva and gag reflexes. Face looks symmetric. Pupils are symmetric, reactive to light bilaterally.  Power: At [place] stimulea briskly withdrew at bilateral four extremities despite on sedation.  Sensation: Intact to noxious stimulea at all four extremities.  Coordination and gait: Patient did not participate in the setting of sedation.     Impression and Plan:  Ms. Blackburn is 33 year old right handed woman with PMHx Myasthenia Gravis ( MG) on Mestinon, asthma sickle cell trait who is admitted due to the MG exacerbation. On 08/12/2024 she required mechanical ventilation and she completed first session for plasma pheresis and goal is 5 sessions over 10 days. Clinically unable to assess due to the sedation.   Continue Plasmapheresis  Continue steroids  Continue to hold the Mestinon  Continue medical management, neuro- check and fall precaution.  GI and DVT prophylaxis.      Patient is at high risk for complications and morbidity or mortality. There is high probability of imminent or life threatening deterioration in the patient's condition.  Patient is unable or incompetent to participate in giving a history and/or making decisions and discussion is necessary for determining treatment decisions.  My cumulative time taking care of this patient is 60 minutes  If you have any further questions, please do not hesitate to contact our consult service.  Thank you for allowing us to participate in this patient care.

## 2024-08-14 NOTE — PROGRESS NOTE ADULT - ASSESSMENT
33F with PMH of myasthenia gravis (AChR antibody positive, diagnosed in 2017), previously requiring multiple intubations, asthma, sickle cell trait, p/w weakness, not improving s/p IVIG, requiring MICU for urgent PLEX, monitoring of respiratory status. Intubated 8/12 for respiratory distress and oral secretions.     #Neuro  Sedation  - baseline AAO x 4   - on propofol and fentanyl for sedation while intubated     Myasthenia crisis  - s/p IVIG x 5, methylprednisone   - Continue methylpred  - s/p PLEX x 1 8/12; pending session of PLEX 8/14  - acute change in respiratory mechanics requiring intubation 8/13  - Monitor NIF, VC q4    #Cardiovascular  Tachycardia  - likely multifactorial from pain, anxiety or low volume state  - s/p 1.5 LR bolus and started on LR maintenance  - fent gtt for pain   - EKG with sinus tachycardia    #Respiratory  #Acute Hypoxemic Respiratory Failure   #Respiratory Distress  - initially presented with dyspnea, copious secretions iso myasthenia crisis with worsening respiratory status overnight requiring intubation 8/13  - bronchoscopy revealing copious mucous secretions   - /18/60%/5  - c/w solumedrol 32 qd  - management of MG as above    #GI/Nutrition  - NPO with tube feeds   - will need repeat S&S eval when extubated  - Continue PPI while on steroids    #/Renal  - No active issues    #Skin  - No active issues    #ID  - No active issues    #Endocrine  - Monitor FSS while on steroids    #Hematologic/DVT ppx  - replete fibrinogen PRN while on PLEX therapy     #Ethics  FULL CODE 33F with PMH of myasthenia gravis (AChR antibody positive, diagnosed in 2017), previously requiring multiple intubations, asthma, sickle cell trait, p/w weakness, not improving s/p IVIG, requiring MICU for urgent PLEX, monitoring of respiratory status. Intubated 8/12 for respiratory distress and oral secretions.     #Neuro  Sedation  - baseline AAO x 4   - on propofol and fentanyl for sedation while intubated     Myasthenia crisis  - s/p IVIG x 5, methylprednisone   - Continue methylpred  - s/p PLEX x 1 8/12; pending session of PLEX 8/14  - acute change in respiratory mechanics requiring intubation 8/13  - Monitor NIF, VC q4    #Cardiovascular  Tachycardia  - likely multifactorial from pain, anxiety or low volume state  - s/p 1.5 LR bolus and started on LR maintenance  - fent gtt for pain   - EKG with sinus tachycardia    #Respiratory  #Acute Hypoxemic Respiratory Failure   #Respiratory Distress  - initially presented with dyspnea, copious secretions iso myasthenia crisis with worsening respiratory status overnight requiring intubation 8/13  - bronchoscopy revealing copious mucous secretions   - /18/60%/5  - c/w solumedrol 32 qd  - management of MG as above    #GI/Nutrition  - NPO with tube feeds   - will need repeat S&S eval when extubated  - Continue PPI while on steroids    #/Renal  Decreased UOP  - Cr stable however with decrease in UOP  - bladder scan q6 and straight cath PRN  - fluid resuscitation as appropriate     #Skin  - No active issues    #ID  - No active issues    #Endocrine  - Monitor FSS while on steroids    #Hematologic/DVT ppx  - replete fibrinogen PRN while on PLEX therapy     #Ethics  FULL CODE

## 2024-08-14 NOTE — ED ADULT TRIAGE NOTE - HEIGHT IN INCHES
----- Message from Dino Cordon MD sent at 8/14/2024  8:24 AM CDT -----  Please notify the patient of normal results.  Follow-up as planned.  
3

## 2024-08-14 NOTE — PROGRESS NOTE ADULT - SUBJECTIVE AND OBJECTIVE BOX
INTERVAL HPI/OVERNIGHT EVENTS: NAEON    SUBJECTIVE: Patient seen and examined at bedside.   Required IV fluid boluses yesterday for tachycardiac presumed to be volume down.  Started on fent gtt for pain.    VITAL SIGNS:  ICU Vital Signs Last 24 Hrs  T(C): 37.6 (14 Aug 2024 04:00), Max: 37.6 (14 Aug 2024 04:00)  T(F): 99.7 (14 Aug 2024 04:00), Max: 99.7 (14 Aug 2024 04:00)  HR: 132 (14 Aug 2024 06:00) (73 - 150)  BP: 114/59 (14 Aug 2024 06:00) (87/55 - 126/84)  BP(mean): 84 (14 Aug 2024 06:00) (65 - 98)  ABP: --  ABP(mean): --  RR: 15 (14 Aug 2024 06:00) (14 - 21)  SpO2: 97% (14 Aug 2024 06:00) (96% - 100%)    O2 Parameters below as of 13 Aug 2024 20:00  Patient On (Oxygen Delivery Method): ventilator    O2 Concentration (%): 30      Mode: AC/ CMV (Assist Control/ Continuous Mandatory Ventilation), RR (machine): 14, TV (machine): 400, FiO2: 30, PEEP: 5, ITime: 1, MAP: 8, PIP: 21  Plateau pressure:   P/F ratio:     08-13 @ 07:01  -  08-14 @ 07:00  --------------------------------------------------------  IN: 2934.9 mL / OUT: 700 mL / NET: 2234.9 mL      CAPILLARY BLOOD GLUCOSE      POCT Blood Glucose.: 93 mg/dL (12 Aug 2024 13:26)    ECG:    PHYSICAL EXAM:  General: NAD, resting in bed  HEENT: PERRLA, clear conjunctiva and sclera, persistent secretions orally   Lungs: clear to auscultation in anterior lung fields; intubated and mechanically ventilated  Heart: +s1/s2, rapid rate   Abdomen: soft, non-distended, non-tender to palpation, no rebound/guarding/rigidity, bowel sounds present  Extremities: +DP pulse bilaterally, no cyanosis/clubbing/edema  Neuro: AAOx0 on propofol and fentanyl    MEDICATIONS:  MEDICATIONS  (STANDING):  artificial  tears Solution 1 Drop(s) Both EYES every 12 hours  bisacodyl 5 milliGRAM(s) Oral at bedtime  chlorhexidine 2% Cloths 1 Application(s) Topical <User Schedule>  enoxaparin Injectable 40 milliGRAM(s) SubCutaneous every 24 hours  fentaNYL   Infusion... 0.5 MICROgram(s)/kG/Hr (1.53 mL/Hr) IV Continuous <Continuous>  lactated ringers Bolus 1000 milliLiter(s) IV Bolus once  lactated ringers. 1000 milliLiter(s) (100 mL/Hr) IV Continuous <Continuous>  methylPREDNISolone sodium succinate Injectable 32 milliGRAM(s) IV Push daily  pantoprazole    Tablet 40 milliGRAM(s) Oral before breakfast  propofol Infusion 30 MICROgram(s)/kG/Min (11 mL/Hr) IV Continuous <Continuous>  senna 2 Tablet(s) Oral at bedtime    MEDICATIONS  (PRN):      ALLERGIES:  Allergies    shellfish (Anaphylaxis)  No Known Drug Allergies    Intolerances        LABS:                        9.8    9.11  )-----------( 195      ( 14 Aug 2024 00:23 )             30.7     08-14    143  |  109<H>  |  17  ----------------------------<  105<H>  3.2<L>   |  22  |  0.57    Ca    9.6      14 Aug 2024 00:21  Phos  2.4     08-14  Mg     1.8     08-14    TPro  6.9  /  Alb  4.4  /  TBili  1.4<H>  /  DBili  x   /  AST  12  /  ALT  6<L>  /  AlkPhos  46  08-14    PT/INR - ( 14 Aug 2024 00:23 )   PT: 12.8 sec;   INR: 1.17 ratio         PTT - ( 14 Aug 2024 00:23 )  PTT:27.4 sec  Urinalysis Basic - ( 14 Aug 2024 00:21 )    Color: x / Appearance: x / SG: x / pH: x  Gluc: 105 mg/dL / Ketone: x  / Bili: x / Urobili: x   Blood: x / Protein: x / Nitrite: x   Leuk Esterase: x / RBC: x / WBC x   Sq Epi: x / Non Sq Epi: x / Bacteria: x        RADIOLOGY & ADDITIONAL TESTS: Reviewed. INTERVAL HPI/OVERNIGHT EVENTS: NAEON    SUBJECTIVE: Patient seen and examined at bedside.   Required IV fluid boluses yesterday for tachycardiac presumed to be volume down.  Started on fent gtt for pain.    VITAL SIGNS:  ICU Vital Signs Last 24 Hrs  T(C): 37.6 (14 Aug 2024 04:00), Max: 37.6 (14 Aug 2024 04:00)  T(F): 99.7 (14 Aug 2024 04:00), Max: 99.7 (14 Aug 2024 04:00)  HR: 132 (14 Aug 2024 06:00) (73 - 150)  BP: 114/59 (14 Aug 2024 06:00) (87/55 - 126/84)  BP(mean): 84 (14 Aug 2024 06:00) (65 - 98)  ABP: --  ABP(mean): --  RR: 15 (14 Aug 2024 06:00) (14 - 21)  SpO2: 97% (14 Aug 2024 06:00) (96% - 100%)    O2 Parameters below as of 13 Aug 2024 20:00  Patient On (Oxygen Delivery Method): ventilator    O2 Concentration (%): 30      Mode: AC/ CMV (Assist Control/ Continuous Mandatory Ventilation), RR (machine): 14, TV (machine): 400, FiO2: 30, PEEP: 5, ITime: 1, MAP: 8, PIP: 21  Plateau pressure:   P/F ratio:     08-13 @ 07:01  -  08-14 @ 07:00  --------------------------------------------------------  IN: 2934.9 mL / OUT: 700 mL / NET: 2234.9 mL      CAPILLARY BLOOD GLUCOSE      POCT Blood Glucose.: 93 mg/dL (12 Aug 2024 13:26)    ECG:    PHYSICAL EXAM:  General: NAD, resting in bed  HEENT: PERRLA, clear conjunctiva and sclera, persistent secretions orally   Lungs: clear to auscultation in anterior lung fields; intubated and mechanically ventilated  Heart: +s1/s2, rapid rate   Abdomen: soft, non-distended, non-tender to palpation, no rebound/guarding/rigidity, bowel sounds present  Extremities: +DP pulse bilaterally, no cyanosis/clubbing/edema  Neuro: awake and engaging with conversation on propofol and fentanyl    MEDICATIONS:  MEDICATIONS  (STANDING):  artificial  tears Solution 1 Drop(s) Both EYES every 12 hours  bisacodyl 5 milliGRAM(s) Oral at bedtime  chlorhexidine 2% Cloths 1 Application(s) Topical <User Schedule>  enoxaparin Injectable 40 milliGRAM(s) SubCutaneous every 24 hours  fentaNYL   Infusion... 0.5 MICROgram(s)/kG/Hr (1.53 mL/Hr) IV Continuous <Continuous>  lactated ringers Bolus 1000 milliLiter(s) IV Bolus once  lactated ringers. 1000 milliLiter(s) (100 mL/Hr) IV Continuous <Continuous>  methylPREDNISolone sodium succinate Injectable 32 milliGRAM(s) IV Push daily  pantoprazole    Tablet 40 milliGRAM(s) Oral before breakfast  propofol Infusion 30 MICROgram(s)/kG/Min (11 mL/Hr) IV Continuous <Continuous>  senna 2 Tablet(s) Oral at bedtime    MEDICATIONS  (PRN):      ALLERGIES:  Allergies    shellfish (Anaphylaxis)  No Known Drug Allergies    Intolerances        LABS:                        9.8    9.11  )-----------( 195      ( 14 Aug 2024 00:23 )             30.7     08-14    143  |  109<H>  |  17  ----------------------------<  105<H>  3.2<L>   |  22  |  0.57    Ca    9.6      14 Aug 2024 00:21  Phos  2.4     08-14  Mg     1.8     08-14    TPro  6.9  /  Alb  4.4  /  TBili  1.4<H>  /  DBili  x   /  AST  12  /  ALT  6<L>  /  AlkPhos  46  08-14    PT/INR - ( 14 Aug 2024 00:23 )   PT: 12.8 sec;   INR: 1.17 ratio         PTT - ( 14 Aug 2024 00:23 )  PTT:27.4 sec  Urinalysis Basic - ( 14 Aug 2024 00:21 )    Color: x / Appearance: x / SG: x / pH: x  Gluc: 105 mg/dL / Ketone: x  / Bili: x / Urobili: x   Blood: x / Protein: x / Nitrite: x   Leuk Esterase: x / RBC: x / WBC x   Sq Epi: x / Non Sq Epi: x / Bacteria: x        RADIOLOGY & ADDITIONAL TESTS: Reviewed.

## 2024-08-14 NOTE — DIETITIAN INITIAL EVALUATION ADULT - ENTERAL
Vital AF at 10 mL/hr increasing only as tolerated and electrolytes WNL to goal rate 50 mL/hr x18 hours with ProSource TF Free x1 daily to provide total volume 900 mL, 1656 kcals (with 486 kcals/day from propofol), 83 gm protein, and 730 mL free water. Meets 27 kcals/kG with propofol and ~1.4 gm protein/kG based on dosing wt 61.2 kg. Trend provision of propofol

## 2024-08-14 NOTE — CHART NOTE - NSCHARTNOTEFT_GEN_A_CORE
Ms. Blackburn is a 34 YO woman with PMH of myasthenia gravis (AChR antibody positive, diagnosed in 2017), previously requiring multiple intubations, asthma, sickle cell trait. She p/w weakness, not improving s/p IVIG, requiring MICU transfer for monitoring of respiratory status and for possible need for TPE. Pt has responded well to TPE in the past. Will initiate TPE with plan to perform 5 procedures with one plasma volume exchanged per TPE.    First TPE performed on 8/12/24. One plasma volume exchanged using albumin as replacement fluid. The patient tolerated the procedure well.    Second TPE performed on 8/14/24. One plasma volume exchanged using albumin as replacement fluid. The exam was limited because the pt is sedated.     Third TPE is planned to be performed on 8/16/24. My Migraine Action Plan      Date: 8/20/2019     My Name: Toño Gutierrez   YOB: 1975  My Pharmacy: [id number] - [place], [place] - [address]  AT [facility] [address]       My (Preventative) Control Medicine: None        My Rescue Medicine: Imitrex   My Doctor: Yelitza Roche     My Clinic: [facility]  [address]  [facility] [address]  [phone]        GREEN ZONE = Good Control    My headache plan is working.   I can do what I need to do.           I WILL:     ? Keep managing my triggers.  ? Write in my migraine diary each time I have a headache.  ? Keep taking my preventive (controller) medicine daily.  ? Take my relief and rescue medicine as needed.             YELLOW ZONE = Not Enough Control    My headache plan isn t always working.   My headaches keep me from doing   some of the things I need to do.       I WILL:     ? Set goals to control my triggers and act on them.  ? Write in my migraine diary each time I have a headache and review it for                      patterns or new triggers.  ? Keep taking my preventive (controller) medicine daily.  ? Take my relief and rescue medicine as needed.  ? Call my doctor or clinic at if I stay in the Yellow Zone.             RED ZONE = Poor or No Control    My headache plan has  failed. I can t do anything  when I have one. My  medicines aren t working.           I WILL:   ? Set goals to control my triggers and act on them.  ? Write in my migraine diary each time I have a headache and review it for                      patterns or new triggers.  ? Keep taking my preventive (controller) medicine daily.  ? Take my relief and rescue medicine as needed.  ? Call my doctor or clinic or go to urgent care or an ER if I m having the worst                  headache of my life.  ? Call my doctor or clinic or go to urgent care or an ER if my medicine doesn t work.  ? Let my doctor or clinic know within  2 weeks if I have gone to an urgent care or             emergency department.          Provider specific instructions:  No      Ms. Blackburn is a 34 YO woman with PMH of myasthenia gravis (AChR antibody positive, diagnosed in 2017), previously requiring multiple intubations, asthma, sickle cell trait. She p/w weakness, not improving s/p IVIG, requiring MICU transfer for monitoring of respiratory status and for possible need for therapeutic plasma exchange (TPE). Pt has responded well to TPE in the past. Plan is to perform 5 procedures, every other day.    TPE #1 performed on 8/12/24. One plasma volume exchanged using 5% albumin as replacement fluid. The patient tolerated the procedure well.    Today I reviewed the patient's medical record notes and lab results. No acute events overnight. The physical exam was limited because the patient is sedated. Fibrinogen 167 mg/dL, ionized calcium 1.32 mmol/L. We completed TPE #2, one plasma volume using 5% albumin for replacement fluid. Patient tolerated well the procedure.    I fully participated in the care of this patient.  I have made amendments to the documentation where necessary, and agree with the history, physical exam, and plan as documented by the resident    TPE #3 is scheduled on Friday, 08/16/24. Please order a CBC, fibrinogen Clauss and ionized calcium the morning of the procedure.

## 2024-08-14 NOTE — DIETITIAN INITIAL EVALUATION ADULT - NS FNS DIET ORDER
Diet, NPO with Tube Feed:   Tube Feeding Modality: Orogastric  Vital AF (VITALAFRTH)  Total Volume for 24 Hours (mL): 720  Continuous  Starting Tube Feed Rate {mL per Hour}: 10  Increase Tube Feed Rate by (mL): 10     Every 6 hours  Until Goal Tube Feed Rate (mL per Hour): 40  Tube Feed Duration (in Hours): 18  Tube Feed Start Time: 11:00  Tube Feed Stop Time: 05:00 (08-13-24 @ 09:23)

## 2024-08-14 NOTE — PROGRESS NOTE ADULT - SUBJECTIVE AND OBJECTIVE BOX
Neurology Progress Note    Interval History:  Patient examined with neurology consult team. Sedation has increased pharmacologically and as a result, clinically. Day 1 of 5 for PLEX was  with day 2 planned for . ROS unobtainable at this time due to sedation/ intubation.       HPI:  33 RHF with PMHx Asthma, Sickle cell trait, Myasthenia gravis (AChR antibody positive, diagnosed in 2017) on pyridostigmine 60mg QID presenting due to tongue and extremity heaviness, drooping eyelids, double vision, as well as neck weakness. Patient called her outpatient neurologist, Dr. Mason Eason who advised her to visit [facility] ED to initiate IVIG treatment. Last myasthenic crisis was on 2024. Patient denies any recent infections, increased secretions or difficulty swallowing. Patient was weaned off steroids more than 3 years ago and has not been on Solaris for the past 2 years due to insurance issues. Patient was intubated 3 times in past. Denies history of thymectomy. Patient's myasthenia gravis initially presented as ocular symptoms and then generalized. Prior symptoms included bulbar weakness, dysphagia, hypophonia, dyspnea, diplopia, ptosis, and limb weakness.     (07 Aug 2024 15:52)      PAST MEDICAL & SURGICAL HISTORY:  Myasthenia gravis    Childhood asthma    Sickle cell trait    H/O umbilical hernia repair    H/O  section            Medications:  artificial  tears Solution 1 Drop(s) Both EYES every 12 hours  bisacodyl 5 milliGRAM(s) Oral at bedtime  chlorhexidine 2% Cloths 1 Application(s) Topical <User Schedule>  enoxaparin Injectable 40 milliGRAM(s) SubCutaneous every 24 hours  fentaNYL   Infusion... 0.5 MICROgram(s)/kG/Hr IV Continuous <Continuous>  lactated ringers. 1000 milliLiter(s) IV Continuous <Continuous>  methylPREDNISolone sodium succinate Injectable 32 milliGRAM(s) IV Push daily  mupirocin 2% Ointment 1 Application(s) Both Nostrils two times a day  norepinephrine Infusion 0.05 MICROgram(s)/kG/Min IV Continuous <Continuous>  pantoprazole    Tablet 40 milliGRAM(s) Oral before breakfast  propofol Infusion 30 MICROgram(s)/kG/Min IV Continuous <Continuous>  senna 2 Tablet(s) Oral at bedtime      Vital Signs Last 24 Hrs  T(C): 37.2 (14 Aug 2024 12:00), Max: 37.6 (14 Aug 2024 04:00)  T(F): 99 (14 Aug 2024 12:00), Max: 99.7 (14 Aug 2024 04:00)  HR: 116 (14 Aug 2024 12:15) (52 - 150)  BP: 107/56 (14 Aug 2024 12:00) (85/51 - 126/84)  BP(mean): 77 (14 Aug 2024 12:00) (63 - 98)  RR: 16 (14 Aug 2024 12:15) (14 - 21)  SpO2: 100% (14 Aug 2024 12:15) (96% - 100%)    Parameters below as of 13 Aug 2024 20:00  Patient On (Oxygen Delivery Method): ventilator    O2 Concentration (%): 30    General:  Constitutional: Intubated & sedated    Neurological (>12):  MS: Intubated & sedated on propofol & fentanyl  Language: Intubated & sedated, lethargic, unable to speak due to intubation & sedation  CNs: PERRL (R 3mm, L 3mm). EOMI. No disconjugate gaze, skew. V1-3 intact LT, No facial asymmetry b/l. Hearing grossly normal b/l. Tongue midline.     Motor - Normal bulk and tone throughout    RUE - 2/5 to pain, otherwise limited as patient sedated during exam  RLE - 2/5 to pain, otherwise limited as patient sedated during exam  LUE - 2/5 to pain, otherwise limited as patient sedated during exam  LLE - 2/5 to pain, otherwise limited as patient sedated during exam    Sensation: Withdrawal & grimace to pain in all extremities  Reflexes L/R:  Biceps(C5) 2/2  BR(C6) 2/2   Triceps(C7)  2/2 Patellar(L4)   2/2   Gait: Gait not tested due to patient safety concerns    Labs:  CBC Full  -  ( 14 Aug 2024 00:23 )  WBC Count : 9.11 K/uL  RBC Count : 4.04 M/uL  Hemoglobin : 9.8 g/dL  Hematocrit : 30.7 %  Platelet Count - Automated : 195 K/uL  Mean Cell Volume : 76.0 fl  Mean Cell Hemoglobin : 24.3 pg  Mean Cell Hemoglobin Concentration : 31.9 gm/dL  Auto Neutrophil # : 6.33 K/uL  Auto Lymphocyte # : 1.68 K/uL  Auto Monocyte # : 1.04 K/uL  Auto Eosinophil # : 0.01 K/uL  Auto Basophil # : 0.02 K/uL  Auto Neutrophil % : 69.6 %  Auto Lymphocyte % : 18.4 %  Auto Monocyte % : 11.4 %  Auto Eosinophil % : 0.1 %  Auto Basophil % : 0.2 %    -    143  |  109<H>  |  17  ----------------------------<  105<H>  3.2<L>   |  22  |  0.57    Ca    9.6      14 Aug 2024 00:21  Phos  2.4     08-  Mg     1.8         TPro  6.9  /  Alb  4.4  /  TBili  1.4<H>  /  DBili  x   /  AST  12  /  ALT  6<L>  /  AlkPhos  46  -14    LIVER FUNCTIONS - ( 14 Aug 2024 00:21 )  Alb: 4.4 g/dL / Pro: 6.9 g/dL / ALK PHOS: 46 U/L / ALT: 6 U/L / AST: 12 U/L / GGT: x           PT/INR - ( 14 Aug 2024 00:23 )   PT: 12.8 sec;   INR: 1.17 ratio         PTT - ( 14 Aug 2024 00:23 )  PTT:27.4 sec  Urinalysis Basic - ( 14 Aug 2024 00:21 )    Color: x / Appearance: x / SG: x / pH: x  Gluc: 105 mg/dL / Ketone: x  / Bili: x / Urobili: x   Blood: x / Protein: x / Nitrite: x   Leuk Esterase: x / RBC: x / WBC x   Sq Epi: x / Non Sq Epi: x / Bacteria: x

## 2024-08-14 NOTE — DIETITIAN INITIAL EVALUATION ADULT - ORAL INTAKE PTA/DIET HISTORY
Unknown how pt was eating PTA. Unknown if pt followed therapeutic diet. Per H&P, pt denied swallowing issues PTA. Unknown if pt took oral nutrition supplements or micronutrients. Allergy to shellfish resulting in anaphylaxis.

## 2024-08-14 NOTE — DIETITIAN INITIAL EVALUATION ADULT - OTHER CALCULATIONS
Fluid needs deferred to provider. The En State Equation (PSU) 2003b was used to calculate resting energy expenditure: 1454 kcals

## 2024-08-14 NOTE — DIETITIAN INITIAL EVALUATION ADULT - PERTINENT MEDS FT
MEDICATIONS  (STANDING):  artificial  tears Solution 1 Drop(s) Both EYES every 12 hours  bisacodyl 5 milliGRAM(s) Oral at bedtime  chlorhexidine 2% Cloths 1 Application(s) Topical <User Schedule>  enoxaparin Injectable 40 milliGRAM(s) SubCutaneous every 24 hours  fentaNYL   Infusion... 0.5 MICROgram(s)/kG/Hr (1.53 mL/Hr) IV Continuous <Continuous>  lactated ringers. 1000 milliLiter(s) (100 mL/Hr) IV Continuous <Continuous>  methylPREDNISolone sodium succinate Injectable 32 milliGRAM(s) IV Push daily  mupirocin 2% Ointment 1 Application(s) Both Nostrils two times a day  norepinephrine Infusion 0.05 MICROgram(s)/kG/Min (5.74 mL/Hr) IV Continuous <Continuous>  pantoprazole    Tablet 40 milliGRAM(s) Oral before breakfast  propofol Infusion 30 MICROgram(s)/kG/Min (11 mL/Hr) IV Continuous <Continuous>  senna 2 Tablet(s) Oral at bedtime    MEDICATIONS  (PRN):

## 2024-08-14 NOTE — DIETITIAN INITIAL EVALUATION ADULT - PERTINENT LABORATORY DATA
08-14    143  |  109<H>  |  17  ----------------------------<  105<H>  3.2<L>   |  22  |  0.57    Ca    9.6      14 Aug 2024 00:21  Phos  2.4     08-14  Mg     1.8     08-14    TPro  6.9  /  Alb  4.4  /  TBili  1.4<H>  /  DBili  x   /  AST  12  /  ALT  6<L>  /  AlkPhos  46  08-14

## 2024-08-14 NOTE — PROGRESS NOTE ADULT - ATTENDING COMMENTS
33 RHF with asthma, Sickle cell trait, Myasthenia gravis (AChR antibody positive, diagnosed in 2017) on pyridostigmine 60mg QID admitted with MG exacerbation. S/p IVIG x 5. Transferred to MICU due to worsening weakness and increased secretions.   Intubated on 8/13 due to worsening respiratory status and increased secretions  --s/p IVIG, s/p first Plex on 8/12. Plan for 5 total (next on 8/16)  -- methylprednisolone 32 mg IV daily  Full code. Prognosis guarded.

## 2024-08-15 LAB
ADD ON TEST-SPECIMEN IN LAB: SIGNIFICANT CHANGE UP
ALBUMIN SERPL ELPH-MCNC: 4.4 G/DL — SIGNIFICANT CHANGE UP (ref 3.3–5)
ALBUMIN SERPL ELPH-MCNC: 4.4 G/DL — SIGNIFICANT CHANGE UP (ref 3.3–5)
ALP SERPL-CCNC: 27 U/L — LOW (ref 40–120)
ALP SERPL-CCNC: 37 U/L — LOW (ref 40–120)
ALT FLD-CCNC: <5 U/L — LOW (ref 10–45)
ALT FLD-CCNC: <5 U/L — LOW (ref 10–45)
ANION GAP SERPL CALC-SCNC: 12 MMOL/L — SIGNIFICANT CHANGE UP (ref 5–17)
ANION GAP SERPL CALC-SCNC: 14 MMOL/L — SIGNIFICANT CHANGE UP (ref 5–17)
APTT BLD: 28.1 SEC — SIGNIFICANT CHANGE UP (ref 24.5–35.6)
APTT BLD: 50.3 SEC — HIGH (ref 24.5–35.6)
AST SERPL-CCNC: 10 U/L — SIGNIFICANT CHANGE UP (ref 10–40)
AST SERPL-CCNC: 7 U/L — LOW (ref 10–40)
BASOPHILS # BLD AUTO: 0.03 K/UL — SIGNIFICANT CHANGE UP (ref 0–0.2)
BASOPHILS NFR BLD AUTO: 0.3 % — SIGNIFICANT CHANGE UP (ref 0–2)
BILIRUB SERPL-MCNC: 0.9 MG/DL — SIGNIFICANT CHANGE UP (ref 0.2–1.2)
BILIRUB SERPL-MCNC: 0.9 MG/DL — SIGNIFICANT CHANGE UP (ref 0.2–1.2)
BUN SERPL-MCNC: 12 MG/DL — SIGNIFICANT CHANGE UP (ref 7–23)
BUN SERPL-MCNC: 8 MG/DL — SIGNIFICANT CHANGE UP (ref 7–23)
CALCIUM SERPL-MCNC: 9 MG/DL — SIGNIFICANT CHANGE UP (ref 8.4–10.5)
CALCIUM SERPL-MCNC: 9.8 MG/DL — SIGNIFICANT CHANGE UP (ref 8.4–10.5)
CHLORIDE SERPL-SCNC: 103 MMOL/L — SIGNIFICANT CHANGE UP (ref 96–108)
CHLORIDE SERPL-SCNC: 110 MMOL/L — HIGH (ref 96–108)
CO2 SERPL-SCNC: 22 MMOL/L — SIGNIFICANT CHANGE UP (ref 22–31)
CO2 SERPL-SCNC: 24 MMOL/L — SIGNIFICANT CHANGE UP (ref 22–31)
CREAT SERPL-MCNC: 0.44 MG/DL — LOW (ref 0.5–1.3)
CREAT SERPL-MCNC: 0.48 MG/DL — LOW (ref 0.5–1.3)
EGFR: 130 ML/MIN/1.73M2 — SIGNIFICANT CHANGE UP
EGFR: 133 ML/MIN/1.73M2 — SIGNIFICANT CHANGE UP
EOSINOPHIL # BLD AUTO: 0.06 K/UL — SIGNIFICANT CHANGE UP (ref 0–0.5)
EOSINOPHIL NFR BLD AUTO: 0.6 % — SIGNIFICANT CHANGE UP (ref 0–6)
FIBRINOGEN PPP-MCNC: 316 MG/DL — SIGNIFICANT CHANGE UP (ref 200–445)
GAS PNL BLDV: SIGNIFICANT CHANGE UP
GAS PNL BLDV: SIGNIFICANT CHANGE UP
GLUCOSE SERPL-MCNC: 100 MG/DL — HIGH (ref 70–99)
GLUCOSE SERPL-MCNC: 118 MG/DL — HIGH (ref 70–99)
HCT VFR BLD CALC: 29.7 % — LOW (ref 34.5–45)
HGB BLD-MCNC: 9.6 G/DL — LOW (ref 11.5–15.5)
IMM GRANULOCYTES NFR BLD AUTO: 0.3 % — SIGNIFICANT CHANGE UP (ref 0–0.9)
INR BLD: 1.05 RATIO — SIGNIFICANT CHANGE UP (ref 0.85–1.18)
INR BLD: 1.18 RATIO — SIGNIFICANT CHANGE UP (ref 0.85–1.18)
LYMPHOCYTES # BLD AUTO: 1.95 K/UL — SIGNIFICANT CHANGE UP (ref 1–3.3)
LYMPHOCYTES # BLD AUTO: 20.2 % — SIGNIFICANT CHANGE UP (ref 13–44)
MAGNESIUM SERPL-MCNC: 1.9 MG/DL — SIGNIFICANT CHANGE UP (ref 1.6–2.6)
MAGNESIUM SERPL-MCNC: 2 MG/DL — SIGNIFICANT CHANGE UP (ref 1.6–2.6)
MCHC RBC-ENTMCNC: 24.7 PG — LOW (ref 27–34)
MCHC RBC-ENTMCNC: 32.3 GM/DL — SIGNIFICANT CHANGE UP (ref 32–36)
MCV RBC AUTO: 76.3 FL — LOW (ref 80–100)
MONOCYTES # BLD AUTO: 0.93 K/UL — HIGH (ref 0–0.9)
MONOCYTES NFR BLD AUTO: 9.7 % — SIGNIFICANT CHANGE UP (ref 2–14)
NEUTROPHILS # BLD AUTO: 6.63 K/UL — SIGNIFICANT CHANGE UP (ref 1.8–7.4)
NEUTROPHILS NFR BLD AUTO: 68.9 % — SIGNIFICANT CHANGE UP (ref 43–77)
NRBC # BLD: 0 /100 WBCS — SIGNIFICANT CHANGE UP (ref 0–0)
PHOSPHATE SERPL-MCNC: 2.8 MG/DL — SIGNIFICANT CHANGE UP (ref 2.5–4.5)
PHOSPHATE SERPL-MCNC: 3.8 MG/DL — SIGNIFICANT CHANGE UP (ref 2.5–4.5)
PLATELET # BLD AUTO: 177 K/UL — SIGNIFICANT CHANGE UP (ref 150–400)
POTASSIUM SERPL-MCNC: 3.1 MMOL/L — LOW (ref 3.5–5.3)
POTASSIUM SERPL-MCNC: 3.8 MMOL/L — SIGNIFICANT CHANGE UP (ref 3.5–5.3)
POTASSIUM SERPL-SCNC: 3.1 MMOL/L — LOW (ref 3.5–5.3)
POTASSIUM SERPL-SCNC: 3.8 MMOL/L — SIGNIFICANT CHANGE UP (ref 3.5–5.3)
PROT SERPL-MCNC: 6 G/DL — SIGNIFICANT CHANGE UP (ref 6–8.3)
PROT SERPL-MCNC: 6.6 G/DL — SIGNIFICANT CHANGE UP (ref 6–8.3)
PROTHROM AB SERPL-ACNC: 11.5 SEC — SIGNIFICANT CHANGE UP (ref 9.5–13)
PROTHROM AB SERPL-ACNC: 12.3 SEC — SIGNIFICANT CHANGE UP (ref 9.5–13)
RBC # BLD: 3.89 M/UL — SIGNIFICANT CHANGE UP (ref 3.8–5.2)
RBC # FLD: 16.8 % — HIGH (ref 10.3–14.5)
SODIUM SERPL-SCNC: 141 MMOL/L — SIGNIFICANT CHANGE UP (ref 135–145)
SODIUM SERPL-SCNC: 144 MMOL/L — SIGNIFICANT CHANGE UP (ref 135–145)
WBC # BLD: 9.63 K/UL — SIGNIFICANT CHANGE UP (ref 3.8–10.5)
WBC # FLD AUTO: 9.63 K/UL — SIGNIFICANT CHANGE UP (ref 3.8–10.5)

## 2024-08-15 PROCEDURE — 99232 SBSQ HOSP IP/OBS MODERATE 35: CPT | Mod: GC

## 2024-08-15 PROCEDURE — 99291 CRITICAL CARE FIRST HOUR: CPT

## 2024-08-15 RX ORDER — POLYETHYLENE GLYCOL 3350 17 G/17G
17 POWDER, FOR SOLUTION ORAL ONCE
Refills: 0 | Status: COMPLETED | OUTPATIENT
Start: 2024-08-15 | End: 2024-08-15

## 2024-08-15 RX ORDER — IPRATROPIUM BROMIDE AND ALBUTEROL SULFATE .5; 3 MG/3ML; MG/3ML
3 SOLUTION RESPIRATORY (INHALATION) EVERY 6 HOURS
Refills: 0 | Status: DISCONTINUED | OUTPATIENT
Start: 2024-08-15 | End: 2024-08-16

## 2024-08-15 RX ORDER — POTASSIUM CHLORIDE 10 MEQ
10 TABLET, EXT RELEASE, PARTICLES/CRYSTALS ORAL
Refills: 0 | Status: COMPLETED | OUTPATIENT
Start: 2024-08-15 | End: 2024-08-15

## 2024-08-15 RX ORDER — POTASSIUM CHLORIDE 10 MEQ
40 TABLET, EXT RELEASE, PARTICLES/CRYSTALS ORAL ONCE
Refills: 0 | Status: COMPLETED | OUTPATIENT
Start: 2024-08-15 | End: 2024-08-15

## 2024-08-15 RX ORDER — SENNA 187 MG
2 TABLET ORAL AT BEDTIME
Refills: 0 | Status: DISCONTINUED | OUTPATIENT
Start: 2024-08-15 | End: 2024-08-21

## 2024-08-15 RX ORDER — GLYCOPYRROLATE 0.2 MG/ML
0.4 INJECTION INTRAMUSCULAR; INTRAVENOUS EVERY 6 HOURS
Refills: 0 | Status: COMPLETED | OUTPATIENT
Start: 2024-08-15 | End: 2024-08-16

## 2024-08-15 RX ORDER — POLYETHYLENE GLYCOL 3350 17 G/17G
17 POWDER, FOR SOLUTION ORAL AT BEDTIME
Refills: 0 | Status: DISCONTINUED | OUTPATIENT
Start: 2024-08-15 | End: 2024-08-21

## 2024-08-15 RX ORDER — POLYETHYLENE GLYCOL 3350 17 G/17G
17 POWDER, FOR SOLUTION ORAL AT BEDTIME
Refills: 0 | Status: DISCONTINUED | OUTPATIENT
Start: 2024-08-15 | End: 2024-08-15

## 2024-08-15 RX ADMIN — IPRATROPIUM BROMIDE AND ALBUTEROL SULFATE 3 MILLILITER(S): .5; 3 SOLUTION RESPIRATORY (INHALATION) at 23:12

## 2024-08-15 RX ADMIN — Medication 80 MILLIGRAM(S): at 01:37

## 2024-08-15 RX ADMIN — Medication 100 MILLIEQUIVALENT(S): at 04:11

## 2024-08-15 RX ADMIN — Medication 32 MILLIGRAM(S): at 05:17

## 2024-08-15 RX ADMIN — Medication 1 APPLICATION(S): at 05:18

## 2024-08-15 RX ADMIN — POVIDONE, PROPYLENE GLYCOL 1 DROP(S): 6.8; 3 LIQUID OPHTHALMIC at 05:18

## 2024-08-15 RX ADMIN — Medication 100 MILLIEQUIVALENT(S): at 05:17

## 2024-08-15 RX ADMIN — IPRATROPIUM BROMIDE AND ALBUTEROL SULFATE 3 MILLILITER(S): .5; 3 SOLUTION RESPIRATORY (INHALATION) at 17:39

## 2024-08-15 RX ADMIN — CHLORHEXIDINE GLUCONATE 1 APPLICATION(S): 40 SOLUTION TOPICAL at 05:17

## 2024-08-15 RX ADMIN — Medication 5 MILLIGRAM(S): at 21:24

## 2024-08-15 RX ADMIN — Medication 2 TABLET(S): at 21:24

## 2024-08-15 RX ADMIN — Medication 40 MILLIEQUIVALENT(S): at 04:11

## 2024-08-15 RX ADMIN — POLYETHYLENE GLYCOL 3350 17 GRAM(S): 17 POWDER, FOR SOLUTION ORAL at 01:37

## 2024-08-15 RX ADMIN — IPRATROPIUM BROMIDE AND ALBUTEROL SULFATE 3 MILLILITER(S): .5; 3 SOLUTION RESPIRATORY (INHALATION) at 11:22

## 2024-08-15 RX ADMIN — Medication 1 APPLICATION(S): at 18:47

## 2024-08-15 RX ADMIN — ENOXAPARIN SODIUM 40 MILLIGRAM(S): 100 INJECTION SUBCUTANEOUS at 12:20

## 2024-08-15 RX ADMIN — GLYCOPYRROLATE 0.4 MILLIGRAM(S): 0.2 INJECTION INTRAMUSCULAR; INTRAVENOUS at 12:20

## 2024-08-15 RX ADMIN — Medication 40 MILLIGRAM(S): at 07:00

## 2024-08-15 NOTE — PHYSICAL THERAPY INITIAL EVALUATION ADULT - NSPTDISCHREC_GEN_A_CORE
TBD pending further functional assessment DC plan changed to acute rehab at this time; if pt to go home, home PT services assist for all mobility/ADLs, recommend rolling walker & shower chair at this time

## 2024-08-15 NOTE — AIRWAY REMOVAL NOTE  ADULT & PEDS - ARTIFICAL AIRWAY REMOVAL COMMENTS
Written order for extubation verified. The patient was identified by full name and birth date compared to the identification band.  Present during the procedure was Henny Durant RN.

## 2024-08-15 NOTE — PHYSICAL THERAPY INITIAL EVALUATION ADULT - PLANNED THERAPY INTERVENTIONS, PT EVAL
stair training: GOAL: (to be met in 4 wks) negotiate 1 flight of stairs with 1 rail and appropriate assistive device with step to step pattern independently/balance training/gait training/strengthening
balance training/bed mobility training/gait training/strengthening/transfer training

## 2024-08-15 NOTE — PROGRESS NOTE ADULT - ATTENDING COMMENTS
33 RHF with asthma, Sickle cell trait, Myasthenia gravis (AChR antibody positive, diagnosed in 2017) on pyridostigmine 60mg QID admitted with MG exacerbation. S/p IVIG x 5. Transferred to MICU due to worsening weakness and increased secretions.   Intubated on 8/13 due to worsening respiratory status and increased secretions. Extubated on 8/15.   --s/p IVIG, s/p first Plex on 8/12. Plan for 5 total (next on 8/16)  -- methylprednisolone 32 mg IV daily  Full code. Transfer to floor when stable

## 2024-08-15 NOTE — PROGRESS NOTE ADULT - ASSESSMENT
33F with PMH of myasthenia gravis (AChR antibody positive, diagnosed in 2017), previously requiring multiple intubations, asthma, sickle cell trait, p/w weakness, not improving s/p IVIG, requiring MICU for urgent PLEX, monitoring of respiratory status. Intubated 8/12 for respiratory distress and oral secretions.     #Neuro  Sedation  - baseline AAO x 4   - on propofol and fentanyl for sedation while intubated     Myasthenia crisis  - s/p IVIG x 5, methylprednisone   - Continue methylpred 32  - s/p PLEX x 1 8/12, 8/14  - acute change in respiratory mechanics requiring intubation 8/13  - Monitor NIF, VC q4    #Cardiovascular  Tachycardia  - likely multifactorial from pain, anxiety or low volume state  - s/p 1.5 LR bolus and s/p LR maintenance  - fent gtt for pain   - EKG with sinus tachycardia    #Respiratory  #Acute Hypoxemic Respiratory Failure   #Respiratory Distress  - initially presented with dyspnea, copious secretions iso myasthenia crisis with worsening respiratory status overnight requiring intubation 8/13  - bronchoscopy revealing copious mucous secretions   - /18/60%/5  - c/w solumedrol 32 qd  - management of MG as above    #GI/Nutrition  - NPO with tube feeds   - will need repeat S&S eval when extubated  - Continue PPI while on steroids    #/Renal  Decreased UOP  - Cr stable however with decrease in UOP  - bladder scan q6 and straight cath PRN  - fluid resuscitation as appropriate     #Skin  - No active issues    #ID  - No active issues    #Endocrine  - Monitor FSS while on steroids    #Hematologic/DVT ppx  - replete fibrinogen PRN while on PLEX therapy     #Ethics  FULL CODE

## 2024-08-15 NOTE — PATIENT PROFILE ADULT - FALL HARM RISK - RISK INTERVENTIONS

## 2024-08-15 NOTE — PHYSICAL THERAPY INITIAL EVALUATION ADULT - GAIT TRAINING, PT EVAL
GOAL:Pt will ambulate 250ft with least restrictive device independently in 2wks.
GOAL: (to be met in 4wks) ambulate 300ft independently with appropriate assistive device

## 2024-08-15 NOTE — PATIENT PROFILE ADULT - FUNCTIONAL ASSESSMENT - BASIC MOBILITY 6.
2-calculated by average/Not able to assess (calculate score using [facility] averaging method)

## 2024-08-15 NOTE — PHYSICAL THERAPY INITIAL EVALUATION ADULT - ADDITIONAL COMMENTS
lives in private house with mother and children, ~4 steps to enter with 1 rail, right hand dominant, does not wear glasses
lives in private house with mother and children, ~4 steps to enter with 1 rail, right hand dominant, does not wear glasses

## 2024-08-15 NOTE — PHYSICAL THERAPY INITIAL EVALUATION ADULT - IMPAIRMENTS CONTRIBUTING TO GAIT DEVIATIONS, PT EVAL
decreased strength
decr endurance, guarded gait/impaired balance/impaired postural control/decreased strength

## 2024-08-15 NOTE — PHYSICAL THERAPY INITIAL EVALUATION ADULT - MANUAL MUSCLE TESTING RESULTS, REHAB EVAL
RUE at least 3-/5 throughout; LUE 2+/5 throughout; R hip 2-/5, knee 2/5, ankle 2+/5 L hip 1/5, knee 2-/5, ankle 2/5 RUE at least 3-/5 throughout; LUE 2+/5 throughout; R hip 2-/5, knee 2/5, ankle 2+/5 L hip 1/5, knee 2-/5, ankle 2/5; 8/16 grossly 3-/5 to 3/5 BUE and BLE

## 2024-08-15 NOTE — PROGRESS NOTE ADULT - SUBJECTIVE AND OBJECTIVE BOX
INTERVAL HPI/OVERNIGHT EVENTS: NAEON    SUBJECTIVE: Patient seen and examined at bedside.   Received PLEX x 2 8/14. Attempted to wean sedated yesterday for SBT trials.     VITAL SIGNS:  ICU Vital Signs Last 24 Hrs  T(C): 37 (15 Aug 2024 04:00), Max: 37.3 (14 Aug 2024 08:00)  T(F): 98.6 (15 Aug 2024 04:00), Max: 99.1 (14 Aug 2024 08:00)  HR: 105 (15 Aug 2024 07:00) (52 - 120)  BP: 110/75 (15 Aug 2024 07:00) (85/51 - 135/85)  BP(mean): 88 (15 Aug 2024 07:00) (63 - 104)  ABP: --  ABP(mean): --  RR: 19 (15 Aug 2024 07:00) (14 - 19)  SpO2: 100% (15 Aug 2024 07:00) (96% - 100%)    O2 Parameters below as of 14 Aug 2024 20:00  Patient On (Oxygen Delivery Method): ventilator    O2 Concentration (%): 30      Mode: AC/ CMV (Assist Control/ Continuous Mandatory Ventilation), RR (machine): 14, TV (machine): 400, FiO2: 30, PEEP: 5, ITime: 1, MAP: 9, PIP: 25  Plateau pressure:   P/F ratio:     08-14 @ 07:01  -  08-15 @ 07:00  --------------------------------------------------------  IN: 1920.3 mL / OUT: 950 mL / NET: 970.3 mL      CAPILLARY BLOOD GLUCOSE        ECG:    PHYSICAL EXAM:  General: NAD, resting in bed  HEENT: PERRLA, clear conjunctiva and sclera, persistent secretions orally   Lungs: clear to auscultation in anterior lung fields; intubated and mechanically ventilated  Heart: +s1/s2, rapid rate   Abdomen: soft, non-distended, non-tender to palpation, no rebound/guarding/rigidity, bowel sounds present  Extremities: +DP pulse bilaterally, no cyanosis/clubbing/edema  Neuro: awake and engaging with conversation on propofol and fentanyl    MEDICATIONS:  MEDICATIONS  (STANDING):  artificial  tears Solution 1 Drop(s) Both EYES every 12 hours  bisacodyl 5 milliGRAM(s) Oral at bedtime  chlorhexidine 2% Cloths 1 Application(s) Topical <User Schedule>  enoxaparin Injectable 40 milliGRAM(s) SubCutaneous every 24 hours  fentaNYL   Infusion... 0.5 MICROgram(s)/kG/Hr (1.53 mL/Hr) IV Continuous <Continuous>  melatonin 5 milliGRAM(s) Oral at bedtime  methylPREDNISolone sodium succinate Injectable 32 milliGRAM(s) IV Push daily  mupirocin 2% Ointment 1 Application(s) Both Nostrils two times a day  norepinephrine Infusion 0.05 MICROgram(s)/kG/Min (5.74 mL/Hr) IV Continuous <Continuous>  pantoprazole    Tablet 40 milliGRAM(s) Oral before breakfast  polyethylene glycol 3350 17 Gram(s) Oral at bedtime  propofol Infusion 30 MICROgram(s)/kG/Min (11 mL/Hr) IV Continuous <Continuous>  senna 2 Tablet(s) Oral at bedtime    MEDICATIONS  (PRN):  simethicone 80 milliGRAM(s) Chew every 4 hours PRN Gas      ALLERGIES:  Allergies    shellfish (Anaphylaxis)  No Known Drug Allergies    Intolerances        LABS:                        9.6    9.63  )-----------( 177      ( 15 Aug 2024 00:44 )             29.7     08-15    144  |  110<H>  |  12  ----------------------------<  118<H>  3.1<L>   |  22  |  0.48<L>    Ca    9.0      15 Aug 2024 00:44  Phos  2.8     08-15  Mg     1.9     08-15    TPro  6.0  /  Alb  4.4  /  TBili  0.9  /  DBili  x   /  AST  7<L>  /  ALT  <5<L>  /  AlkPhos  27<L>  08-15    PT/INR - ( 15 Aug 2024 00:44 )   PT: 12.3 sec;   INR: 1.18 ratio         PTT - ( 15 Aug 2024 00:44 )  PTT:50.3 sec  Urinalysis Basic - ( 15 Aug 2024 00:44 )    Color: x / Appearance: x / SG: x / pH: x  Gluc: 118 mg/dL / Ketone: x  / Bili: x / Urobili: x   Blood: x / Protein: x / Nitrite: x   Leuk Esterase: x / RBC: x / WBC x   Sq Epi: x / Non Sq Epi: x / Bacteria: x        RADIOLOGY & ADDITIONAL TESTS: Reviewed.

## 2024-08-15 NOTE — PHYSICAL THERAPY INITIAL EVALUATION ADULT - PERTINENT HX OF CURRENT PROBLEM, REHAB EVAL
PMHx: Asthma, Sickle cell trait, Myasthenia gravis (AChR antibody positive, diagnosed in 2017) on pyridostigmine 60mg QID presenting due to tongue and extremity heaviness, drooping eyelids, double vision, as well as neck weakness. Patient called her outpatient neurologist, Dr. Mason Eason who advised her to visit [facility] ED to initiate IVIG treatment. Last myasthenic crisis was on 6/26/2024. Pt was weaned off steroids more than 3yrs ago and has not been on Solaris for the past 2yrs due to insurance issues. Pt's myasthenia gravis initially presented as ocular symptoms and then generalized. Prior symptoms included bulbar weakness, dysphagia, hypophonia, dyspnea, diplopia, ptosis, and limb weakness. Pt reported that at baseline her double vision and lid lad comes and goes but it is worse currently. When asked about inciting events that could be causing her weakness to be worse she reported that over the past 2-3days she has noticed yellow fluid when wiping her vaginal region. She had a daughter in June and has noticed that the waxing and waning of her symptoms have increased since birth of her daughter. She does report that she has more difficulty swallowing during the night because of secretions.  Likely myasthenia gravis exacerbation for now but concern for rapid change into myasthenic crisis.  Intubated on 8/12 and transferred to MICU; extubated 81/5
PMHx: Asthma, Sickle cell trait, Myasthenia gravis (AChR antibody positive, diagnosed in 2017) on pyridostigmine 60mg QID presenting due to tongue and extremity heaviness, drooping eyelids, double vision, as well as neck weakness. Patient called her outpatient neurologist, Dr. Mason Eason who advised her to visit [facility] ED to initiate IVIG treatment. Last myasthenic crisis was on 6/26/2024. Pt was weaned off steroids more than 3yrs ago and has not been on Solaris for the past 2yrs due to insurance issues. Pt's myasthenia gravis initially presented as ocular symptoms and then generalized. Prior symptoms included bulbar weakness, dysphagia, hypophonia, dyspnea, diplopia, ptosis, and limb weakness. Pt reported that at baseline her double vision and lid lad comes and goes but it is worse currently. When asked about inciting events that could be causing her weakness to be worse she reported that over the past 2-3days she has noticed yellow fluid when wiping her vaginal region. She had a daughter in June and has noticed that the waxing and waning of her symptoms have increased since birth of her daughter. She does report that she has more difficulty swallowing during the night because of secretions.  Likely myasthenia gravis exacerbation for now but concern for rapid change into myasthenic crisis.

## 2024-08-15 NOTE — PHYSICAL THERAPY INITIAL EVALUATION ADULT - STRENGTHENING, PT EVAL
GOAL: (to be met in 4 wks) increased BUE/BLE strength to 5/5 to decrease assistance with functional activities
GOAL: Pt will improve BUE/BLE strength by 1/2 grade in 2wks to improve overall functional mobility

## 2024-08-15 NOTE — PROGRESS NOTE ADULT - SUBJECTIVE AND OBJECTIVE BOX
Neurology Progress Note    Interval History:  Patient seen and examined bedside with neurology consult team. Patient is improved, she has been extubated and is seen self-suctioning oral secretion. No new acute complaints reported.      HPI:  33 RHF with PMHx Asthma, Sickle cell trait, Myasthenia gravis (AChR antibody positive, diagnosed in 2017) on pyridostigmine 60mg QID presenting due to tongue and extremity heaviness, drooping eyelids, double vision, as well as neck weakness. Patient called her outpatient neurologist, Dr. Mason Eason who advised her to visit [facility] ED to initiate IVIG treatment. Last myasthenic crisis was on 2024. Patient denies any recent infections, increased secretions or difficulty swallowing. Patient was weaned off steroids more than 3 years ago and has not been on Solaris for the past 2 years due to insurance issues. Patient was intubated 3 times in past. Denies history of thymectomy. Patient's myasthenia gravis initially presented as ocular symptoms and then generalized. Prior symptoms included bulbar weakness, dysphagia, hypophonia, dyspnea, diplopia, ptosis, and limb weakness.     (07 Aug 2024 15:52)      PAST MEDICAL & SURGICAL HISTORY:  Myasthenia gravis    Childhood asthma    Sickle cell trait    H/O umbilical hernia repair    H/O  section            Medications:  albuterol/ipratropium for Nebulization 3 milliLiter(s) Nebulizer every 6 hours  artificial  tears Solution 1 Drop(s) Both EYES every 12 hours  bisacodyl 5 milliGRAM(s) Oral at bedtime  chlorhexidine 2% Cloths 1 Application(s) Topical <User Schedule>  enoxaparin Injectable 40 milliGRAM(s) SubCutaneous every 24 hours  fentaNYL   Infusion... 0.5 MICROgram(s)/kG/Hr IV Continuous <Continuous>  glycopyrrolate Injectable 0.4 milliGRAM(s) IV Push every 6 hours  melatonin 5 milliGRAM(s) Oral at bedtime  methylPREDNISolone sodium succinate Injectable 32 milliGRAM(s) IV Push daily  mupirocin 2% Ointment 1 Application(s) Both Nostrils two times a day  norepinephrine Infusion 0.05 MICROgram(s)/kG/Min IV Continuous <Continuous>  pantoprazole    Tablet 40 milliGRAM(s) Oral before breakfast  polyethylene glycol 3350 17 Gram(s) Oral at bedtime  propofol Infusion 30 MICROgram(s)/kG/Min IV Continuous <Continuous>  senna 2 Tablet(s) Oral at bedtime  simethicone 80 milliGRAM(s) Chew every 4 hours PRN      Vital Signs Last 24 Hrs  T(C): 37.5 (15 Aug 2024 08:00), Max: 37.5 (15 Aug 2024 08:00)  T(F): 99.5 (15 Aug 2024 08:00), Max: 99.5 (15 Aug 2024 08:00)  HR: 81 (15 Aug 2024 11:37) (57 - 145)  BP: 107/75 (15 Aug 2024 11:00) (88/53 - 135/85)  BP(mean): 87 (15 Aug 2024 11:00) (64 - 104)  RR: 34 (15 Aug 2024 11:00) (14 - 34)  SpO2: 100% (15 Aug 2024 11:37) (96% - 100%)    Parameters below as of 15 Aug 2024 11:37  Patient On (Oxygen Delivery Method): nasal cannula        General:  Constitutional: Female, appears stated age, nontoxic  Head: Normocephalic;   Eyes: clear sclera;   Extremities: No cyanosis;     Neurological (>12):  MS: Awake, alert.  Oriented person place situation. Follows simple & crossed commands. Attends to examiner  Language: Limited hypovolemic speech produced as patient is self-suctioning.  CNs: PERRL (R 3mm, L 3mm). VFF. EOMI. No disconjugate gaze, skew. V1-3 intact LT, No facial asymmetry b/l. Hearing grossly normal b/l. Tongue midline.     Motor - Normal bulk and tone throughout. No pronator drift.    RUE - 4/5  RLE - 4+ to 5/5  LUE - 4/5  LLE - 4+ to 5 / 5    Sensation: Intact to LT b/l.  Reflexes L/R:  Biceps(C5) 2/2  BR(C6) 2/2   Triceps(C7)  2/2 Patellar(L4)   2/2   Coordination: No dysmetria to FTN b/l UE  Gait: Gait not tested due to patient safety concerns    Labs:  CBC Full  -  ( 15 Aug 2024 00:44 )  WBC Count : 9.63 K/uL  RBC Count : 3.89 M/uL  Hemoglobin : 9.6 g/dL  Hematocrit : 29.7 %  Platelet Count - Automated : 177 K/uL  Mean Cell Volume : 76.3 fl  Mean Cell Hemoglobin : 24.7 pg  Mean Cell Hemoglobin Concentration : 32.3 gm/dL  Auto Neutrophil # : 6.63 K/uL  Auto Lymphocyte # : 1.95 K/uL  Auto Monocyte # : 0.93 K/uL  Auto Eosinophil # : 0.06 K/uL  Auto Basophil # : 0.03 K/uL  Auto Neutrophil % : 68.9 %  Auto Lymphocyte % : 20.2 %  Auto Monocyte % : 9.7 %  Auto Eosinophil % : 0.6 %  Auto Basophil % : 0.3 %    08-15    144  |  110<H>  |  12  ----------------------------<  118<H>  3.1<L>   |  22  |  0.48<L>    Ca    9.0      15 Aug 2024 00:44  Phos  2.8     08-15  Mg     1.9     08-15    TPro  6.0  /  Alb  4.4  /  TBili  0.9  /  DBili  x   /  AST  7<L>  /  ALT  <5<L>  /  AlkPhos  27<L>  08-15    LIVER FUNCTIONS - ( 15 Aug 2024 00:44 )  Alb: 4.4 g/dL / Pro: 6.0 g/dL / ALK PHOS: 27 U/L / ALT: <5 U/L / AST: 7 U/L / GGT: x           PT/INR - ( 15 Aug 2024 00:44 )   PT: 12.3 sec;   INR: 1.18 ratio         PTT - ( 15 Aug 2024 00:44 )  PTT:50.3 sec  Urinalysis Basic - ( 15 Aug 2024 00:44 )    Color: x / Appearance: x / SG: x / pH: x  Gluc: 118 mg/dL / Ketone: x  / Bili: x / Urobili: x   Blood: x / Protein: x / Nitrite: x   Leuk Esterase: x / RBC: x / WBC x   Sq Epi: x / Non Sq Epi: x / Bacteria: x

## 2024-08-15 NOTE — PHYSICAL THERAPY INITIAL EVALUATION ADULT - GAIT DEVIATIONS NOTED, PT EVAL
during later portion of distance pt noted to have decreased L heel strike and L toe clearance/decreased juan/decreased step length
decreased juan/decreased step length/decreased weight-shifting ability

## 2024-08-15 NOTE — PHYSICAL THERAPY INITIAL EVALUATION ADULT - GENERAL OBSERVATIONS, REHAB EVAL
pt received semi-supine in bed, extubated this AM to RA, ICU monitoring, prima-fit pt received semi-supine in bed, extubated this AM to RA, ICU monitoring, prima-fit; 8/16/24: Pt is a 33F with PMH of myasthenia gravis (AChR antibody positive, diagnosed in 2017), previously requiring multiple intubations, asthma, sickle cell trait, p/w weakness, not improving s/p IVIG, requiring MICU for urgent PLEX, monitoring of respiratory status. Intubated 8/12 for respiratory distress and oral secretions. Extubated 8/15, plan for PLEX #3 & bedboard today. Pt received supine in bed, +ICU monitoring, +IVL, +purewick. Pt is A&Ox4, follows all commands, improved voice tone today, moving all extremities, willing to participate in PT session.

## 2024-08-15 NOTE — PHYSICAL THERAPY INITIAL EVALUATION ADULT - BALANCE TRAINING, PT EVAL
GOAL: Pt will increase static/dynamic standing balance by 1/2 grade in 2wks to decrease fall risk and increase independence with ADLs
GOAL: (to be met in 4wks) increased sitting and standing balance to good to decreased risk of falls
Discharged

## 2024-08-15 NOTE — PROGRESS NOTE ADULT - ATTENDING COMMENTS
Patient seen and examined at bedside. Patient reported feeling better and currently extubated.     I agree with the findings and plan as documented in the Resident's note except below.    Impression and Plan:  Ms. Blackburn is 33 year old right handed woman with PMHx Myasthenia Gravis ( MG) on Mestinon, asthma sickle cell trait who is admitted due to the MG exacerbation. On 08/12/2024 she required mechanical ventilation and she completed first session for plasma pheresis and goal is 5 sessions over 10 days. Currently extubated and clinically she is making recovery.    Continue Plasmapheresis  Continue steroids  Continue to hold the Mestinon  NIF and VC Q6    Continue medical management, neuro- check and fall precaution.  GI and DVT prophylaxis.    Patient is at high risk for complications and morbidity or mortality. There is high probability of imminent or life threatening deterioration in the patient's condition.  Patient is unable or incompetent to participate in giving a history and/or making decisions and discussion is necessary for determining treatment decisions.  My cumulative time taking care of this patient is 45 minutes.  If you have any further questions, please do not hesitate to contact our consult service.  Thank you for allowing us to participate in this patient care.

## 2024-08-15 NOTE — PROGRESS NOTE ADULT - ASSESSMENT
33 RHF with PMHx Asthma, Sickle cell trait, Myasthenia gravis (AChR antibody positive, diagnosed in 2017) on pyridostigmine 60mg QID presenting due to tongue and extremity heaviness, b/l ptosis, diplopia, as well as neck weakness. Admitted for management of MG exacerbation. Pt receiving course of IVIG. Pt was reporting improving generalized weakness but on 5th day of IVIG pt with clinical worsening. Admitted to MICU on 8/12/2024 due to increased lethargy and sensation of drowning.    Impression:   Bilateral ptosis, proximal>distal muscle weakness, severely reduced EOM with history of myasthenia gravis crises. Likely myasthenia gravis exacerbation for now but concern for rapid change into myasthenic crisis.     Recommendations:    [] Medical evaluation of leukocytosis  [] Monitor and correct electrolytes per primary team  [x] S/P IVIG course  [x] S/P methylprednisolone IV 80 mg x1 today  [x] On Methylprednisolone 32 mg IV qd  [x] Mestinon discontinued  [] Continue PLEX, 5 sessions. Session 2 due 8/16    Case discussed with Dr. Doshi. Note finalized on attending attestation

## 2024-08-16 LAB
ADD ON TEST-SPECIMEN IN LAB: SIGNIFICANT CHANGE UP
BASOPHILS # BLD AUTO: 0 K/UL — SIGNIFICANT CHANGE UP (ref 0–0.2)
BASOPHILS NFR BLD AUTO: 0 % — SIGNIFICANT CHANGE UP (ref 0–2)
EOSINOPHIL # BLD AUTO: 0.09 K/UL — SIGNIFICANT CHANGE UP (ref 0–0.5)
EOSINOPHIL NFR BLD AUTO: 0.9 % — SIGNIFICANT CHANGE UP (ref 0–6)
HCT VFR BLD CALC: 28.4 % — LOW (ref 34.5–45)
HGB BLD-MCNC: 9.4 G/DL — LOW (ref 11.5–15.5)
LYMPHOCYTES # BLD AUTO: 2.18 K/UL — SIGNIFICANT CHANGE UP (ref 1–3.3)
LYMPHOCYTES # BLD AUTO: 21.9 % — SIGNIFICANT CHANGE UP (ref 13–44)
MCHC RBC-ENTMCNC: 24.7 PG — LOW (ref 27–34)
MCHC RBC-ENTMCNC: 33.1 GM/DL — SIGNIFICANT CHANGE UP (ref 32–36)
MCV RBC AUTO: 74.7 FL — LOW (ref 80–100)
MONOCYTES # BLD AUTO: 0.79 K/UL — SIGNIFICANT CHANGE UP (ref 0–0.9)
MONOCYTES NFR BLD AUTO: 7.9 % — SIGNIFICANT CHANGE UP (ref 2–14)
NEUTROPHILS # BLD AUTO: 6.9 K/UL — SIGNIFICANT CHANGE UP (ref 1.8–7.4)
NEUTROPHILS NFR BLD AUTO: 69.3 % — SIGNIFICANT CHANGE UP (ref 43–77)
PLAT MORPH BLD: NORMAL — SIGNIFICANT CHANGE UP
PLATELET # BLD AUTO: 173 K/UL — SIGNIFICANT CHANGE UP (ref 150–400)
PROCALCITONIN SERPL-MCNC: <0.02 NG/ML — SIGNIFICANT CHANGE UP (ref 0.02–0.1)
RBC # BLD: 3.8 M/UL — SIGNIFICANT CHANGE UP (ref 3.8–5.2)
RBC # FLD: 17.1 % — HIGH (ref 10.3–14.5)
RBC BLD AUTO: SIGNIFICANT CHANGE UP
WBC # BLD: 9.95 K/UL — SIGNIFICANT CHANGE UP (ref 3.8–10.5)
WBC # FLD AUTO: 9.95 K/UL — SIGNIFICANT CHANGE UP (ref 3.8–10.5)

## 2024-08-16 PROCEDURE — 99231 SBSQ HOSP IP/OBS SF/LOW 25: CPT | Mod: GC

## 2024-08-16 PROCEDURE — 99291 CRITICAL CARE FIRST HOUR: CPT

## 2024-08-16 PROCEDURE — 36514 APHERESIS PLASMA: CPT

## 2024-08-16 RX ORDER — SULFAMETHOXAZOLE AND TRIMETHOPRIM 800; 160 MG/1; MG/1
1 TABLET ORAL DAILY
Refills: 0 | Status: DISCONTINUED | OUTPATIENT
Start: 2024-08-16 | End: 2024-08-21

## 2024-08-16 RX ORDER — POLYETHYLENE GLYCOL 3350 17 G/17G
17 POWDER, FOR SOLUTION ORAL ONCE
Refills: 0 | Status: COMPLETED | OUTPATIENT
Start: 2024-08-16 | End: 2024-08-16

## 2024-08-16 RX ADMIN — CHLORHEXIDINE GLUCONATE 1 APPLICATION(S): 40 SOLUTION TOPICAL at 05:43

## 2024-08-16 RX ADMIN — Medication 1 APPLICATION(S): at 18:11

## 2024-08-16 RX ADMIN — Medication 5 MILLIGRAM(S): at 21:53

## 2024-08-16 RX ADMIN — IPRATROPIUM BROMIDE AND ALBUTEROL SULFATE 3 MILLILITER(S): .5; 3 SOLUTION RESPIRATORY (INHALATION) at 05:52

## 2024-08-16 RX ADMIN — Medication 40 MILLIGRAM(S): at 07:45

## 2024-08-16 RX ADMIN — Medication 1 APPLICATION(S): at 05:50

## 2024-08-16 RX ADMIN — Medication 32 MILLIGRAM(S): at 05:43

## 2024-08-16 RX ADMIN — SULFAMETHOXAZOLE AND TRIMETHOPRIM 1 TABLET(S): 800; 160 TABLET ORAL at 12:56

## 2024-08-16 RX ADMIN — ENOXAPARIN SODIUM 40 MILLIGRAM(S): 100 INJECTION SUBCUTANEOUS at 12:55

## 2024-08-16 RX ADMIN — POLYETHYLENE GLYCOL 3350 17 GRAM(S): 17 POWDER, FOR SOLUTION ORAL at 12:55

## 2024-08-16 RX ADMIN — POLYETHYLENE GLYCOL 3350 17 GRAM(S): 17 POWDER, FOR SOLUTION ORAL at 21:54

## 2024-08-16 RX ADMIN — Medication 2 TABLET(S): at 21:54

## 2024-08-16 RX ADMIN — Medication 5 MILLIGRAM(S): at 21:54

## 2024-08-16 NOTE — SWALLOW BEDSIDE ASSESSMENT ADULT - H & P REVIEW
33 RHF with PMHx Asthma, Sickle cell trait, Myasthenia gravis (AChR antibody positive, diagnosed in 2017) on pyridostigmine 60mg QID presenting due to tongue and extremity heaviness, drooping eyelids, double vision, as well as neck weakness. Patient called her outpatient neurologist, Dr. Mason Eason who advised her to visit [facility] ED to initiate IVIG treatment. Last myasthenic crisis was on 6/26/2024. Patient denies any recent infections, increased secretions or difficulty swallowing. Patient was weaned off steroids more than 3 years ago and has not been on Solaris for the past 2 years due to insurance issues. Patient was intubated 3 times in past. Denies history of thymectomy. Patient's myasthenia gravis initially presented as ocular symptoms and then generalized. Prior symptoms included bulbar weakness, dysphagia, hypophonia, dyspnea, diplopia, ptosis, and limb weakness. Pt admitted for myasthenia gravis exacerbation/yes
33 RHF with PMHx Asthma, Sickle cell trait, Myasthenia gravis (AChR antibody positive, diagnosed in 2017) on pyridostigmine 60mg QID presenting due to tongue and extremity heaviness, drooping eyelids, double vision, as well as neck weakness. Patient called her outpatient neurologist, Dr. Mason Eason who advised her to visit [facility] ED to initiate IVIG treatment. Last myasthenic crisis was on 6/26/2024. Patient denies any recent infections, increased secretions or difficulty swallowing. Patient was weaned off steroids more than 3 years ago and has not been on Solaris for the past 2 years due to insurance issues. Patient was intubated 3 times in past. Denies history of thymectomy. Patient's myasthenia gravis initially presented as ocular symptoms and then generalized. Prior symptoms included bulbar weakness, dysphagia, hypophonia, dyspnea, diplopia, ptosis, and limb weakness. Pt admitted for myasthenia gravis exacerbation/yes

## 2024-08-16 NOTE — PROGRESS NOTE ADULT - SUBJECTIVE AND OBJECTIVE BOX
INTERVAL HPI/OVERNIGHT EVENTS: NAEON    SUBJECTIVE: Patient seen and examined at bedside.   Extubated yesterday and doing well. Still with secretions but patient able to self suction. Good strength.     VITAL SIGNS:  ICU Vital Signs Last 24 Hrs  T(C): 36.8 (16 Aug 2024 04:00), Max: 37.5 (15 Aug 2024 08:00)  T(F): 98.3 (16 Aug 2024 04:00), Max: 99.5 (15 Aug 2024 08:00)  HR: 115 (16 Aug 2024 06:23) (55 - 145)  BP: 103/66 (16 Aug 2024 06:00) (98/56 - 134/80)  BP(mean): 80 (16 Aug 2024 06:00) (71 - 98)  ABP: --  ABP(mean): --  RR: 21 (16 Aug 2024 06:00) (14 - 47)  SpO2: 99% (16 Aug 2024 06:23) (95% - 100%)    O2 Parameters below as of 16 Aug 2024 06:23  Patient On (Oxygen Delivery Method): room air          Mode: CPAP with PS, FiO2: 30, PEEP: 5, PS: 5, MAP: 8, PIP: 11  Plateau pressure:   P/F ratio:     08-15 @ 07:01  -  08-16 @ 07:00  --------------------------------------------------------  IN: 600 mL / OUT: 950 mL / NET: -350 mL      CAPILLARY BLOOD GLUCOSE        ECG:    PHYSICAL EXAM:  General: NAD, resting in bed, on RA  HEENT: AT/NC, EOMI, PERRLA, clear conjunctiva and sclera. no eyelid drooping  Lungs: clear to auscultation in anterior lung fields, no wheezes/rhonchi/rales. good cough  Heart: +s1/s2, RRR, no murmurs/gallops/rubs  Abdomen: soft, non-distended, non-tender to palpation, no rebound/guarding/rigidity, bowel sounds present  Extremities: +DP pulse bilaterally, no cyanosis/clubbing/edema. 4/5 strenght in both upper and lower extremities   Neuro: AAOx3, no focal deficits    MEDICATIONS:  MEDICATIONS  (STANDING):  albuterol/ipratropium for Nebulization 3 milliLiter(s) Nebulizer every 6 hours  artificial  tears Solution 1 Drop(s) Both EYES every 12 hours  bisacodyl 5 milliGRAM(s) Oral at bedtime  chlorhexidine 2% Cloths 1 Application(s) Topical <User Schedule>  enoxaparin Injectable 40 milliGRAM(s) SubCutaneous every 24 hours  melatonin 5 milliGRAM(s) Oral at bedtime  methylPREDNISolone sodium succinate Injectable 32 milliGRAM(s) IV Push daily  mupirocin 2% Ointment 1 Application(s) Both Nostrils two times a day  pantoprazole    Tablet 40 milliGRAM(s) Oral before breakfast  polyethylene glycol 3350 17 Gram(s) Oral at bedtime  senna 2 Tablet(s) Oral at bedtime    MEDICATIONS  (PRN):  simethicone 80 milliGRAM(s) Chew every 4 hours PRN Gas      ALLERGIES:  Allergies    shellfish (Anaphylaxis)  No Known Drug Allergies    Intolerances        LABS:                        9.4    9.95  )-----------( 173      ( 15 Aug 2024 23:09 )             28.4     08-15    141  |  103  |  8   ----------------------------<  100<H>  3.8   |  24  |  0.44<L>    Ca    9.8      15 Aug 2024 23:09  Phos  3.8     08-15  Mg     2.0     08-15    TPro  6.6  /  Alb  4.4  /  TBili  0.9  /  DBili  x   /  AST  10  /  ALT  <5<L>  /  AlkPhos  37<L>  08-15    PT/INR - ( 15 Aug 2024 23:09 )   PT: 11.5 sec;   INR: 1.05 ratio         PTT - ( 15 Aug 2024 23:09 )  PTT:28.1 sec  Urinalysis Basic - ( 15 Aug 2024 23:09 )    Color: x / Appearance: x / SG: x / pH: x  Gluc: 100 mg/dL / Ketone: x  / Bili: x / Urobili: x   Blood: x / Protein: x / Nitrite: x   Leuk Esterase: x / RBC: x / WBC x   Sq Epi: x / Non Sq Epi: x / Bacteria: x        RADIOLOGY & ADDITIONAL TESTS: Reviewed.

## 2024-08-16 NOTE — PROGRESS NOTE ADULT - ASSESSMENT
33F with PMH of myasthenia gravis (AChR antibody positive, diagnosed in 2017), previously requiring multiple intubations, asthma, sickle cell trait, p/w weakness, not improving s/p IVIG, requiring MICU for urgent PLEX, monitoring of respiratory status. Intubated 8/12 for respiratory distress and oral secretions.     #Neuro  Sedation  - baseline AAO x 4     Myasthenia crisis  - s/p IVIG x 5, methylprednisone   - Continue methylpred 32  - s/p PLEX x 1 8/12, 8/14, next 8/16 (total 5 sessions over 10 days)  - acute change in respiratory mechanics requiring intubation 8/13 and subsequent extubation 8/15  - Monitor NIF, VC q4    #Cardiovascular  Tachycardia  - likely multifactorial from pain, anxiety or low volume state  - s/p fluid resuscitation and fentanyl PRN  - EKG with sinus tachycardia  - improved    #Respiratory  #Acute Hypoxemic Respiratory Failure   #Respiratory Distress  - initially presented with dyspnea, copious secretions iso myasthenia crisis with worsening respiratory status overnight requiring intubation 8/13 and subsequent extubation 8/15 to RA   - bronchoscopy revealing copious mucous secretions   - c/w solumedrol 32 qd  - management of MG as above  - resolved    #GI/Nutrition  - clear liquid diet  - will need repeat S&S eval  - Continue PPI while on steroids    #/Renal  Decreased UOP  - Cr stable and improvement in UOP overall  - fluid resuscitation as appropriate     #Skin  - No active issues    #ID  - No active issues    #Endocrine  - Monitor FSS while on steroids    #Hematologic/DVT ppx  - replete fibrinogen PRN while on PLEX therapy     #Ethics  FULL CODE

## 2024-08-16 NOTE — PROGRESS NOTE ADULT - ATTENDING COMMENTS
Patient seen and examined at bedside. Patient reported feeling better. Currently getting plasmapheresis 3rd session.      I agree with the findings and plan as documented in the Resident's note except below.    Impression and Plan:  Ms. Blackburn is 33 year old right handed woman with PMHx Myasthenia Gravis ( MG) on Mestinon, asthma sickle cell trait who is admitted due to the MG exacerbation. On 08/12/2024 she required mechanical ventilation and she completed first session for plasma pheresis and goal is 5 sessions over 10 days. Currently extubated and clinically she is making recovery. Today she is getting 3rd session of plasmapheresis.      Continue Plasmapheresis  Continue steroids  Continue to hold the Mestinon  NIF and VC Q6    Continue medical management, neuro- check and fall precaution.  GI and DVT prophylaxis.    I discussed the diagnosis and treatment plan with the patient.  All questions and concerns were addressed. The patient demonstrated good understanding of the treatment plan.  My cumulative time taking care of this patient is 35 minutes.  If you have any further questions, please do not hesitate to contact our consult service.  Thank you for allowing us to participate in this patient care.

## 2024-08-16 NOTE — SWALLOW BEDSIDE ASSESSMENT ADULT - ASR SWALLOW ASPIRATION MONITOR
Monitor for s/s aspiration/laryngeal penetration. If noted:  D/C p.o. intake, provide non-oral nutrition/hydration/meds, and contact this service @ [phone]/change of breathing pattern/cough/gurgly voice/fever/pneumonia/throat clearing/upper respiratory infection

## 2024-08-16 NOTE — SWALLOW BEDSIDE ASSESSMENT ADULT - SWALLOW EVAL: DIAGNOSIS
Chart reviewed, pt intubated for worsening respiratory status and increased secretions. Given above, dysphagia w/u is not appropriate at present time. Please reconsult when clinically indicated.
Pt is 32 y/o F admitted for MG exacerbation course c/b RRT and intubation 8/13-8/15. Pt now presenting with a grossly functional oropharyngeal swallow sequence. No overt signs of laryngeal penetration/aspiration observed on exam. Pt reported occasional c/o jaw fatigue which is likely related to MG. Recommend small more frequent meals given known myasthenia history.

## 2024-08-16 NOTE — SWALLOW BEDSIDE ASSESSMENT ADULT - SLP PERTINENT HISTORY OF CURRENT PROBLEM
On admit: awake, Alert, Oriented to person, place, and time. Speech fluent, repetition and naming intact. Follows simple commands. Fund of knowledge intact. Impression: MG exacerbation with bulbar symptoms and prox>distal weakness. Respiratory is stable at this time 8/12: Given progressive weakness and tenuous respiratory status, neurology and MICU called during RRT. Neuro planning on starting PLEX tonight and adjusting steroid dose. MICU to accept patient and will place [facility]aro on arrival. Intubated on 8/13 due to worsening respiratory status and increased secretions
On admit: awake, Alert, Oriented to person, place, and time. Speech fluent, repetition and naming intact. Follows simple commands. Fund of knowledge intact. Impression: MG exacerbation with bulbar symptoms and prox>distal weakness. Respiratory is stable at this time 8/12: Given progressive weakness and tenuous respiratory status, neurology and MICU called during RRT. Neuro planning on starting PLEX tonight and adjusting steroid dose. MICU to accept patient and will place Genaro on arrival. Intubated on 8/13 due to worsening respiratory status and increased secretions

## 2024-08-16 NOTE — CHART NOTE - NSCHARTNOTEFT_GEN_A_CORE
Ms. Blackburn is a 34 YO woman with PMH of myasthenia gravis (AChR antibody positive, diagnosed in 2017), previously requiring multiple intubations, asthma, sickle cell trait. She p/w weakness, not improving s/p IVIG, requiring MICU transfer for monitoring of respiratory status and for possible need for therapeutic plasma exchange (TPE). Pt has responded well to TPE in the past. Plan is to perform 5 procedures, every other day.    TPE #1 performed on 8/12/24. One plasma volume exchanged using 5% albumin as replacement fluid. The patient tolerated the procedure well.    TPE #2 performed on 8/14/24. One plasma volume exchanged using 5% albumin as replacement fluid. The patient tolerated the procedure well.    Today I reviewed the patient's medical record notes and lab results. No acute events overnight. The patient is extubated now.. Fibrinogen 316 mg/dL, ionized calcium 1.25 mmol/L. TPE #3 is performed on 08/16/24.     TPE #4 is scheduled to be performed on 8/18/24. One plasma volume exchanged using 5% albumin as replacement fluid.   Please order a CBC, fibrinogen Clauss and ionized calcium the morning of the procedure.

## 2024-08-16 NOTE — PROGRESS NOTE ADULT - ATTENDING COMMENTS
33 RHF with asthma, Sickle cell trait, Myasthenia gravis (AChR antibody positive, diagnosed in 2017) on pyridostigmine 60mg QID admitted with MG exacerbation. S/p IVIG x 5. Transferred to MICU due to worsening weakness and increased secretions.   Intubated on 8/13 due to worsening respiratory status and increased secretions. Extubated on 8/15.   --s/p IVIG, Plex #3 on 8/16.   -- methylprednisolone 32 mg IV daily. Bactrim ppx if plan for long term steroids.   Full code. Stable for transfer to floor

## 2024-08-16 NOTE — PROGRESS NOTE ADULT - ASSESSMENT
33 RHF with PMHx Asthma, Sickle cell trait, Myasthenia gravis (AChR antibody positive, diagnosed in 2017) on pyridostigmine 60mg QID presenting due to tongue and extremity heaviness, b/l ptosis, diplopia, as well as neck weakness. Admitted for management of MG exacerbation. Pt receiving course of IVIG. Pt was reporting improving generalized weakness but on 5th day of IVIG pt with clinical worsening. Admitted to MICU on 8/12/2024 due to increased lethargy and sensation of drowning.    Impression:   Bilateral ptosis, proximal>distal muscle weakness, severely reduced EOM with history of myasthenia gravis crises. Likely myasthenia gravis exacerbation for now but concern for rapid change into myasthenic crisis.     Recommendations:    [] Medical evaluation of leukocytosis  [] Monitor and correct electrolytes per primary team  [x] S/P IVIG course  [x] S/P methylprednisolone IV 80 mg x1 today  [x] On Methylprednisolone 32 mg IV qd  [x] Mestinon discontinued  [] Continue PLEX, 5 sessions. Session 2 due 8/16  [] Downgrade to neuro floors    Case discussed with Dr. Doshi. Note finalized on attending attestation

## 2024-08-16 NOTE — PROGRESS NOTE ADULT - SUBJECTIVE AND OBJECTIVE BOX
Neurology Progress Note    Interval History:    Patient seen and examined at bedside with neurology consult team. Patient is able to speak, has decreased oral secretions, strength improved. She is planned for downgrade to Neuro floors. No new acute complaints reported.     HPI:  33 RHF with PMHx Asthma, Sickle cell trait, Myasthenia gravis (AChR antibody positive, diagnosed in 2017) on pyridostigmine 60mg QID presenting due to tongue and extremity heaviness, drooping eyelids, double vision, as well as neck weakness. Patient called her outpatient neurologist, Dr. Mason Eason who advised her to visit [facility] ED to initiate IVIG treatment. Last myasthenic crisis was on 2024. Patient denies any recent infections, increased secretions or difficulty swallowing. Patient was weaned off steroids more than 3 years ago and has not been on Solaris for the past 2 years due to insurance issues. Patient was intubated 3 times in past. Denies history of thymectomy. Patient's myasthenia gravis initially presented as ocular symptoms and then generalized. Prior symptoms included bulbar weakness, dysphagia, hypophonia, dyspnea, diplopia, ptosis, and limb weakness.     (07 Aug 2024 15:52)      PAST MEDICAL & SURGICAL HISTORY:  Myasthenia gravis    Childhood asthma    Sickle cell trait    H/O umbilical hernia repair    H/O  section            Medications:  artificial  tears Solution 1 Drop(s) Both EYES every 12 hours  bisacodyl 5 milliGRAM(s) Oral at bedtime  chlorhexidine 2% Cloths 1 Application(s) Topical <User Schedule>  enoxaparin Injectable 40 milliGRAM(s) SubCutaneous every 24 hours  melatonin 5 milliGRAM(s) Oral at bedtime  methylPREDNISolone sodium succinate Injectable 32 milliGRAM(s) IV Push daily  mupirocin 2% Ointment 1 Application(s) Both Nostrils two times a day  pantoprazole    Tablet 40 milliGRAM(s) Oral before breakfast  polyethylene glycol 3350 17 Gram(s) Oral at bedtime  polyethylene glycol 3350 17 Gram(s) Oral once  senna 2 Tablet(s) Oral at bedtime  simethicone 80 milliGRAM(s) Chew every 4 hours PRN  trimethoprim  160 mG/sulfamethoxazole 800 mG 1 Tablet(s) Oral daily      Vital Signs Last 24 Hrs  T(C): 36.9 (16 Aug 2024 08:00), Max: 37.5 (15 Aug 2024 12:00)  T(F): 98.4 (16 Aug 2024 08:00), Max: 99.5 (15 Aug 2024 12:00)  HR: 110 (16 Aug 2024 11:00) (55 - 136)  BP: 119/88 (16 Aug 2024 11:00) (98/56 - 134/80)  BP(mean): 96 (16 Aug 2024 11:00) (71 - 102)  RR: 30 (16 Aug 2024 11:00) (16 - 47)  SpO2: 98% (16 Aug 2024 11:00) (95% - 100%)    Parameters below as of 16 Aug 2024 10:03  Patient On (Oxygen Delivery Method): room air        Neurological (>12):  MS: Awake, alert.  Oriented person place situation. Follows simple & crossed commands. Attends to examiner  Language: Speech hypophonic, increased in quantity, close to baseline.  CNs: PERRL (R 3mm, L 3mm). VFF. EOMI. No disconjugate gaze, skew. V1-3 intact LT, No facial asymmetry b/l. Hearing grossly normal b/l. Tongue midline.     Motor - Normal bulk and tone throughout. No pronator drift.    RUE - 5/5  RLE - 5/5  LUE - 5/5  LLE - 5/5    Sensation: Intact to LT b/l.  Reflexes L/R:  Biceps(C5) 2/2  BR(C6) 2/2   Triceps(C7)  2/2 Patellar(L4)   2/2   Coordination: No dysmetria to FTN b/l UE  Gait: Gait not tested due to patient safety concerns    Labs:  CBC Full  -  ( 15 Aug 2024 23:09 )  WBC Count : 9.95 K/uL  RBC Count : 3.80 M/uL  Hemoglobin : 9.4 g/dL  Hematocrit : 28.4 %  Platelet Count - Automated : 173 K/uL  Mean Cell Volume : 74.7 fl  Mean Cell Hemoglobin : 24.7 pg  Mean Cell Hemoglobin Concentration : 33.1 gm/dL  Auto Neutrophil # : 6.90 K/uL  Auto Lymphocyte # : 2.18 K/uL  Auto Monocyte # : 0.79 K/uL  Auto Eosinophil # : 0.09 K/uL  Auto Basophil # : 0.00 K/uL  Auto Neutrophil % : 69.3 %  Auto Lymphocyte % : 21.9 %  Auto Monocyte % : 7.9 %  Auto Eosinophil % : 0.9 %  Auto Basophil % : 0.0 %    08-15    141  |  103  |  8   ----------------------------<  100<H>  3.8   |  24  |  0.44<L>    Ca    9.8      15 Aug 2024 23:09  Phos  3.8     08-15  Mg     2.0     08-15    TPro  6.6  /  Alb  4.4  /  TBili  0.9  /  DBili  x   /  AST  10  /  ALT  <5<L>  /  AlkPhos  37<L>  08-15    LIVER FUNCTIONS - ( 15 Aug 2024 23:09 )  Alb: 4.4 g/dL / Pro: 6.6 g/dL / ALK PHOS: 37 U/L / ALT: <5 U/L / AST: 10 U/L / GGT: x           PT/INR - ( 15 Aug 2024 23:09 )   PT: 11.5 sec;   INR: 1.05 ratio         PTT - ( 15 Aug 2024 23:09 )  PTT:28.1 sec  Urinalysis Basic - ( 15 Aug 2024 23:09 )    Color: x / Appearance: x / SG: x / pH: x  Gluc: 100 mg/dL / Ketone: x  / Bili: x / Urobili: x   Blood: x / Protein: x / Nitrite: x   Leuk Esterase: x / RBC: x / WBC x   Sq Epi: x / Non Sq Epi: x / Bacteria: x       Neurology Progress Note    Interval History:    Patient seen and examined at bedside with neurology consult team. Patient is able to speak, has decreased oral secretions, strength improved. She is planned for downgrade to Neuro floors. No new acute complaints reported.     HPI:  33 RHF with PMHx Asthma, Sickle cell trait, Myasthenia gravis (AChR antibody positive, diagnosed in 2017) on pyridostigmine 60mg QID presenting due to tongue and extremity heaviness, drooping eyelids, double vision, as well as neck weakness. Patient called her outpatient neurologist, Dr. Mason Eason who advised her to visit [facility] ED to initiate IVIG treatment. Last myasthenic crisis was on 2024. Patient denies any recent infections, increased secretions or difficulty swallowing. Patient was weaned off steroids more than 3 years ago and has not been on Solaris for the past 2 years due to insurance issues. Patient was intubated 3 times in past. Denies history of thymectomy. Patient's myasthenia gravis initially presented as ocular symptoms and then generalized. Prior symptoms included bulbar weakness, dysphagia, hypophonia, dyspnea, diplopia, ptosis, and limb weakness.     (07 Aug 2024 15:52)      PAST MEDICAL & SURGICAL HISTORY:  Myasthenia gravis    Childhood asthma    Sickle cell trait    H/O umbilical hernia repair    H/O  section            Medications:  artificial  tears Solution 1 Drop(s) Both EYES every 12 hours  bisacodyl 5 milliGRAM(s) Oral at bedtime  chlorhexidine 2% Cloths 1 Application(s) Topical <User Schedule>  enoxaparin Injectable 40 milliGRAM(s) SubCutaneous every 24 hours  melatonin 5 milliGRAM(s) Oral at bedtime  methylPREDNISolone sodium succinate Injectable 32 milliGRAM(s) IV Push daily  mupirocin 2% Ointment 1 Application(s) Both Nostrils two times a day  pantoprazole    Tablet 40 milliGRAM(s) Oral before breakfast  polyethylene glycol 3350 17 Gram(s) Oral at bedtime  polyethylene glycol 3350 17 Gram(s) Oral once  senna 2 Tablet(s) Oral at bedtime  simethicone 80 milliGRAM(s) Chew every 4 hours PRN  trimethoprim  160 mG/sulfamethoxazole 800 mG 1 Tablet(s) Oral daily      Vital Signs Last 24 Hrs  T(C): 36.9 (16 Aug 2024 08:00), Max: 37.5 (15 Aug 2024 12:00)  T(F): 98.4 (16 Aug 2024 08:00), Max: 99.5 (15 Aug 2024 12:00)  HR: 110 (16 Aug 2024 11:00) (55 - 136)  BP: 119/88 (16 Aug 2024 11:00) (98/56 - 134/80)  BP(mean): 96 (16 Aug 2024 11:00) (71 - 102)  RR: 30 (16 Aug 2024 11:00) (16 - 47)  SpO2: 98% (16 Aug 2024 11:00) (95% - 100%)    Parameters below as of 16 Aug 2024 10:03  Patient On (Oxygen Delivery Method): room air        Neurological (>12):  MS: Awake, alert.  Oriented person place situation. Follows simple & crossed commands. Attends to examiner  Language: Speech hypophonic, increased in quantity, close to baseline.  CNs: PERRL (R 3mm, L 3mm). VFF. B/l horizontal gaze palsy. No disconjugate gaze, skew. V1-3 intact LT, No facial asymmetry b/l. Hearing grossly normal b/l. Tongue midline.     Motor - Normal bulk and tone throughout. No pronator drift.    RUE - 5/5  RLE - 5/5  LUE - 5/5  LLE - 5/5    Sensation: Intact to LT b/l.  Reflexes L/R:  Biceps(C5) 2/2  BR(C6) 2/2   Triceps(C7)  2/2 Patellar(L4)   2/2   Coordination: No dysmetria to FTN b/l UE  Gait: Gait not tested due to patient safety concerns    Labs:  CBC Full  -  ( 15 Aug 2024 23:09 )  WBC Count : 9.95 K/uL  RBC Count : 3.80 M/uL  Hemoglobin : 9.4 g/dL  Hematocrit : 28.4 %  Platelet Count - Automated : 173 K/uL  Mean Cell Volume : 74.7 fl  Mean Cell Hemoglobin : 24.7 pg  Mean Cell Hemoglobin Concentration : 33.1 gm/dL  Auto Neutrophil # : 6.90 K/uL  Auto Lymphocyte # : 2.18 K/uL  Auto Monocyte # : 0.79 K/uL  Auto Eosinophil # : 0.09 K/uL  Auto Basophil # : 0.00 K/uL  Auto Neutrophil % : 69.3 %  Auto Lymphocyte % : 21.9 %  Auto Monocyte % : 7.9 %  Auto Eosinophil % : 0.9 %  Auto Basophil % : 0.0 %    08-15    141  |  103  |  8   ----------------------------<  100<H>  3.8   |  24  |  0.44<L>    Ca    9.8      15 Aug 2024 23:09  Phos  3.8     08-15  Mg     2.0     08-15    TPro  6.6  /  Alb  4.4  /  TBili  0.9  /  DBili  x   /  AST  10  /  ALT  <5<L>  /  AlkPhos  37<L>  08-15    LIVER FUNCTIONS - ( 15 Aug 2024 23:09 )  Alb: 4.4 g/dL / Pro: 6.6 g/dL / ALK PHOS: 37 U/L / ALT: <5 U/L / AST: 10 U/L / GGT: x           PT/INR - ( 15 Aug 2024 23:09 )   PT: 11.5 sec;   INR: 1.05 ratio         PTT - ( 15 Aug 2024 23:09 )  PTT:28.1 sec  Urinalysis Basic - ( 15 Aug 2024 23:09 )    Color: x / Appearance: x / SG: x / pH: x  Gluc: 100 mg/dL / Ketone: x  / Bili: x / Urobili: x   Blood: x / Protein: x / Nitrite: x   Leuk Esterase: x / RBC: x / WBC x   Sq Epi: x / Non Sq Epi: x / Bacteria: x

## 2024-08-16 NOTE — CHART NOTE - NSCHARTNOTEFT_GEN_A_CORE
MICU Transfer Note    Transfer from: MICU  Transfer to:  (  ) Medicine    (  ) Telemetry    (  ) RCU    (  ) Palliative    (  ) Stroke Unit    (  ) _______________  Accepting Physician:      MICU COURSE:    33F PMH of myasthenia gravis (AChR antibody positive, diagnosed in 2017) on pyridostigmine 60mg QID; last crisis on 6/26/2024; weaned off steroids more than 3 years ago and has not been on Solaris for the past 2 years due to insurance issues; was intubated 3 times in past, no thymectomy; myasthenia gravis initially presented as ocular symptoms and then generalized, asthma, sickle cell trait, present to [facility] ED for tongue and extremity heaviness, drooping eyelids, double vision, as well as neck weakness. Was given IVIG x5 with no improvement, outside neurologist had recommended continuing with 2 more sessions. However, RRT called for lethargy, increased sputum production, weakness. Per neuro, noted to previously respond to PLEX so given worsening NIF and VC testing as well as patient subjectively complaining of worsening "drowning feeling," decision was made to pursue urgent PLEX. MICU consulted for closer monitoring of respiratory status and for access for PLEX. During MICU admission, RIJ central line was placed and patient received one session of PLEX on 8/12. ON 8/13 AM< patient had episode of significant respiratory distress with tachypnea, tachycardia, and hypoxemia requiring quick transient from NC to HFNC. Ultimately, patient was intubated for respiratory distress. Received another session of PLEX on 8/14 and showed improved respiratory mechanics on the ventilator. Able to SBT and ultimately extubated on 8/15. Has had persistent secretions however patient with significantly improved strength and able to self suction. Course c/b transient tachycardia thought to be low volume state vs anxiety vs pain driven and controlled with fluid resuscitation and pain medications. Resolved after extubation. Pending additional PLEX sessions for total 5 sessions over 10 days.     For Follow-Up:    [ ] PLEX 8/16 and additional 2 sessions thereafter per BB  [ ] monitor fibrinogen and iCa prior to PLEX therapy and replete as appropriate  [ ] neurology for resuming mestinon  [ ] f/u S&S evaluation      Vital Signs Last 24 Hrs  T(C): 36.8 (16 Aug 2024 04:00), Max: 37.5 (15 Aug 2024 08:00)  T(F): 98.3 (16 Aug 2024 04:00), Max: 99.5 (15 Aug 2024 08:00)  HR: 115 (16 Aug 2024 06:23) (55 - 145)  BP: 103/66 (16 Aug 2024 06:00) (98/56 - 134/80)  BP(mean): 80 (16 Aug 2024 06:00) (71 - 98)  RR: 21 (16 Aug 2024 06:00) (14 - 47)  SpO2: 99% (16 Aug 2024 06:23) (95% - 100%)    Parameters below as of 16 Aug 2024 06:23  Patient On (Oxygen Delivery Method): room air      I&O's Summary    15 Aug 2024 07:01  -  16 Aug 2024 07:00  --------------------------------------------------------  IN: 600 mL / OUT: 950 mL / NET: -350 mL          MEDICATIONS  (STANDING):  albuterol/ipratropium for Nebulization 3 milliLiter(s) Nebulizer every 6 hours  artificial  tears Solution 1 Drop(s) Both EYES every 12 hours  bisacodyl 5 milliGRAM(s) Oral at bedtime  chlorhexidine 2% Cloths 1 Application(s) Topical <User Schedule>  enoxaparin Injectable 40 milliGRAM(s) SubCutaneous every 24 hours  melatonin 5 milliGRAM(s) Oral at bedtime  methylPREDNISolone sodium succinate Injectable 32 milliGRAM(s) IV Push daily  mupirocin 2% Ointment 1 Application(s) Both Nostrils two times a day  pantoprazole    Tablet 40 milliGRAM(s) Oral before breakfast  polyethylene glycol 3350 17 Gram(s) Oral at bedtime  senna 2 Tablet(s) Oral at bedtime    MEDICATIONS  (PRN):  simethicone 80 milliGRAM(s) Chew every 4 hours PRN Gas        LABS                                            9.4                   Neurophils% (auto):   69.3   (08-15 @ 23:09):    9.95 )-----------(173          Lymphocytes% (auto):  21.9                                          28.4                   Eosinphils% (auto):   0.9      Manual%: Neutrophils x    ; Lymphocytes x    ; Eosinophils x    ; Bands%: x    ; Blasts x                                    141    |  103    |  8                   Calcium: 9.8   / iCa: x      (08-15 @ 23:09)    ----------------------------<  100       Magnesium: 2.0                              3.8     |  24     |  0.44             Phosphorous: 3.8      TPro  6.6    /  Alb  4.4    /  TBili  0.9    /  DBili  x      /  AST  10     /  ALT  <5     /  AlkPhos  37     15 Aug 2024 23:09    ( 08-15 @ 23:09 )   PT: 11.5 sec;   INR: 1.05 ratio  aPTT: 28.1 sec MICU Transfer Note    Transfer from: MICU  Transfer to:  (  ) Medicine    (  ) Telemetry    (  ) RCU    (  ) Palliative    (  ) Stroke Unit    ( x ) neurology  Accepting Physician: Dr. Doshi      MICU COURSE:    33F PMH of myasthenia gravis (AChR antibody positive, diagnosed in 2017) on pyridostigmine 60mg QID; last crisis on 6/26/2024; weaned off steroids more than 3 years ago and has not been on Solaris for the past 2 years due to insurance issues; was intubated 3 times in past, no thymectomy; myasthenia gravis initially presented as ocular symptoms and then generalized, asthma, sickle cell trait, present to [facility] ED for tongue and extremity heaviness, drooping eyelids, double vision, as well as neck weakness. Was given IVIG x5 with no improvement, outside neurologist had recommended continuing with 2 more sessions. However, RRT called for lethargy, increased sputum production, weakness. Per neuro, noted to previously respond to PLEX so given worsening NIF and VC testing as well as patient subjectively complaining of worsening "drowning feeling," decision was made to pursue urgent PLEX. MICU consulted for closer monitoring of respiratory status and for access for PLEX. During MICU admission, RIJ central line was placed and patient received one session of PLEX on 8/12. ON 8/13 AM< patient had episode of significant respiratory distress with tachypnea, tachycardia, and hypoxemia requiring quick transient from NC to HFNC. Ultimately, patient was intubated for respiratory distress. Received another session of PLEX on 8/14 and showed improved respiratory mechanics on the ventilator. Able to SBT and ultimately extubated on 8/15. Has had persistent secretions however patient with significantly improved strength and able to self suction. Course c/b transient tachycardia thought to be low volume state vs anxiety vs pain driven and controlled with fluid resuscitation and pain medications. Resolved after extubation. Pending additional PLEX sessions for total 5 sessions over 10 days.     For Follow-Up:    [ ] PLEX 8/16 and additional 2 sessions thereafter per BB  [ ] taper solumedrol 32 and taper as appropriate  [ ] c/w bactrim and PPI for PCP prophylaxis  [ ] monitor fibrinogen and iCa prior to PLEX therapy and replete as appropriate  [ ] neurology for resuming mestinon  [ ] f/u S&S evaluation      Vital Signs Last 24 Hrs  T(C): 36.8 (16 Aug 2024 04:00), Max: 37.5 (15 Aug 2024 08:00)  T(F): 98.3 (16 Aug 2024 04:00), Max: 99.5 (15 Aug 2024 08:00)  HR: 115 (16 Aug 2024 06:23) (55 - 145)  BP: 103/66 (16 Aug 2024 06:00) (98/56 - 134/80)  BP(mean): 80 (16 Aug 2024 06:00) (71 - 98)  RR: 21 (16 Aug 2024 06:00) (14 - 47)  SpO2: 99% (16 Aug 2024 06:23) (95% - 100%)    Parameters below as of 16 Aug 2024 06:23  Patient On (Oxygen Delivery Method): room air      I&O's Summary    15 Aug 2024 07:01  -  16 Aug 2024 07:00  --------------------------------------------------------  IN: 600 mL / OUT: 950 mL / NET: -350 mL          MEDICATIONS  (STANDING):  albuterol/ipratropium for Nebulization 3 milliLiter(s) Nebulizer every 6 hours  artificial  tears Solution 1 Drop(s) Both EYES every 12 hours  bisacodyl 5 milliGRAM(s) Oral at bedtime  chlorhexidine 2% Cloths 1 Application(s) Topical <User Schedule>  enoxaparin Injectable 40 milliGRAM(s) SubCutaneous every 24 hours  melatonin 5 milliGRAM(s) Oral at bedtime  methylPREDNISolone sodium succinate Injectable 32 milliGRAM(s) IV Push daily  mupirocin 2% Ointment 1 Application(s) Both Nostrils two times a day  pantoprazole    Tablet 40 milliGRAM(s) Oral before breakfast  polyethylene glycol 3350 17 Gram(s) Oral at bedtime  senna 2 Tablet(s) Oral at bedtime    MEDICATIONS  (PRN):  simethicone 80 milliGRAM(s) Chew every 4 hours PRN Gas        LABS                                            9.4                   Neurophils% (auto):   69.3   (08-15 @ 23:09):    9.95 )-----------(173          Lymphocytes% (auto):  21.9                                          28.4                   Eosinphils% (auto):   0.9      Manual%: Neutrophils x    ; Lymphocytes x    ; Eosinophils x    ; Bands%: x    ; Blasts x                                    141    |  103    |  8                   Calcium: 9.8   / iCa: x      (08-15 @ 23:09)    ----------------------------<  100       Magnesium: 2.0                              3.8     |  24     |  0.44             Phosphorous: 3.8      TPro  6.6    /  Alb  4.4    /  TBili  0.9    /  DBili  x      /  AST  10     /  ALT  <5     /  AlkPhos  37     15 Aug 2024 23:09    ( 08-15 @ 23:09 )   PT: 11.5 sec;   INR: 1.05 ratio  aPTT: 28.1 sec

## 2024-08-16 NOTE — SWALLOW BEDSIDE ASSESSMENT ADULT - COMMENTS
On admit: awake, Alert, Oriented to person, place, and time. Speech fluent, repetition and naming intact. Follows simple commands. Fund of knowledge intact. Impression: MG exacerbation with bulbar symptoms and prox>distal weakness. Respiratory is stable at this time 8/12: Given progressive weakness and tenuous respiratory status, neurology and MICU called during RRT. Neuro planning on starting PLEX tonight and adjusting steroid dose. MICU to accept patient and will place [facility] on arrival. Intubated on 8/13 due to worsening respiratory status and increased secretions. Extubated on 8/16.

## 2024-08-16 NOTE — SWALLOW BEDSIDE ASSESSMENT ADULT - SLP GENERAL OBSERVATIONS
Pt awake in bed, able to communicate needs and follow directions. Oriented x3, on room air. Clear dry vocal quality. Pt reported hoarse voice which has been improving since extubation. Pt receiving PLEX (cleared by RN and PLEX tech in room for exam).

## 2024-08-17 LAB
ANION GAP SERPL CALC-SCNC: 12 MMOL/L — SIGNIFICANT CHANGE UP (ref 5–17)
BUN SERPL-MCNC: 13 MG/DL — SIGNIFICANT CHANGE UP (ref 7–23)
CALCIUM SERPL-MCNC: 9.3 MG/DL — SIGNIFICANT CHANGE UP (ref 8.4–10.5)
CHLORIDE SERPL-SCNC: 106 MMOL/L — SIGNIFICANT CHANGE UP (ref 96–108)
CO2 SERPL-SCNC: 22 MMOL/L — SIGNIFICANT CHANGE UP (ref 22–31)
CREAT SERPL-MCNC: 0.62 MG/DL — SIGNIFICANT CHANGE UP (ref 0.5–1.3)
EGFR: 122 ML/MIN/1.73M2 — SIGNIFICANT CHANGE UP
FLUAV AG NPH QL: SIGNIFICANT CHANGE UP
FLUBV AG NPH QL: SIGNIFICANT CHANGE UP
GLUCOSE SERPL-MCNC: 94 MG/DL — SIGNIFICANT CHANGE UP (ref 70–99)
HCT VFR BLD CALC: 30.5 % — LOW (ref 34.5–45)
HGB BLD-MCNC: 10.1 G/DL — LOW (ref 11.5–15.5)
MAGNESIUM SERPL-MCNC: 1.9 MG/DL — SIGNIFICANT CHANGE UP (ref 1.6–2.6)
MCHC RBC-ENTMCNC: 24.8 PG — LOW (ref 27–34)
MCHC RBC-ENTMCNC: 33.1 GM/DL — SIGNIFICANT CHANGE UP (ref 32–36)
MCV RBC AUTO: 74.8 FL — LOW (ref 80–100)
NRBC # BLD: 0 /100 WBCS — SIGNIFICANT CHANGE UP (ref 0–0)
PHOSPHATE SERPL-MCNC: 3.2 MG/DL — SIGNIFICANT CHANGE UP (ref 2.5–4.5)
PLATELET # BLD AUTO: 209 K/UL — SIGNIFICANT CHANGE UP (ref 150–400)
POTASSIUM SERPL-MCNC: 3.3 MMOL/L — LOW (ref 3.5–5.3)
POTASSIUM SERPL-SCNC: 3.3 MMOL/L — LOW (ref 3.5–5.3)
RBC # BLD: 4.08 M/UL — SIGNIFICANT CHANGE UP (ref 3.8–5.2)
RBC # FLD: 17 % — HIGH (ref 10.3–14.5)
RSV RNA NPH QL NAA+NON-PROBE: SIGNIFICANT CHANGE UP
SARS-COV-2 RNA SPEC QL NAA+PROBE: SIGNIFICANT CHANGE UP
SODIUM SERPL-SCNC: 140 MMOL/L — SIGNIFICANT CHANGE UP (ref 135–145)
WBC # BLD: 9.1 K/UL — SIGNIFICANT CHANGE UP (ref 3.8–10.5)
WBC # FLD AUTO: 9.1 K/UL — SIGNIFICANT CHANGE UP (ref 3.8–10.5)

## 2024-08-17 PROCEDURE — 99231 SBSQ HOSP IP/OBS SF/LOW 25: CPT | Mod: GC

## 2024-08-17 PROCEDURE — 71045 X-RAY EXAM CHEST 1 VIEW: CPT | Mod: 26

## 2024-08-17 RX ORDER — GUAIFENESIN 100 MG/5ML
600 LIQUID ORAL EVERY 12 HOURS
Refills: 0 | Status: DISCONTINUED | OUTPATIENT
Start: 2024-08-17 | End: 2024-08-21

## 2024-08-17 RX ADMIN — Medication 1 APPLICATION(S): at 05:35

## 2024-08-17 RX ADMIN — Medication 1 APPLICATION(S): at 21:49

## 2024-08-17 RX ADMIN — Medication 5 MILLIGRAM(S): at 21:48

## 2024-08-17 RX ADMIN — SULFAMETHOXAZOLE AND TRIMETHOPRIM 1 TABLET(S): 800; 160 TABLET ORAL at 11:49

## 2024-08-17 RX ADMIN — Medication 40 MILLIGRAM(S): at 05:43

## 2024-08-17 RX ADMIN — GUAIFENESIN 600 MILLIGRAM(S): 100 LIQUID ORAL at 21:48

## 2024-08-17 RX ADMIN — ENOXAPARIN SODIUM 40 MILLIGRAM(S): 100 INJECTION SUBCUTANEOUS at 11:49

## 2024-08-17 RX ADMIN — Medication 32 MILLIGRAM(S): at 05:35

## 2024-08-17 NOTE — PROGRESS NOTE ADULT - ATTENDING COMMENTS
Patient seen and examined at bedside. Currently, patient is transferred back to [address] [place] [address] neurology floor.       I agree with the findings and plan as documented in the Resident's note except below.    Impression and Plan:  Ms. Blackburn is 33 year old right handed woman with PMHx Myasthenia Gravis ( MG) on Mestinon, asthma sickle cell trait who is admitted due to the MG exacerbation. On 08/12/2024 she required mechanical ventilation and she completed first session for plasma pheresis and goal is 5 sessions over 10 days. Yesterday she competed 3rd session of plasmapheresis.  Clinically she is feeling better and remains stable.     Continue Plasmapheresis  Continue steroids  Continue to hold the Mestinon  NIF and VC Q6    Continue medical management, neuro- check and fall precaution.  GI and DVT prophylaxis.    I discussed the diagnosis and treatment plan with the patient.  All questions and concerns were addressed. The patient demonstrated good understanding of the treatment plan.  My cumulative time taking care of this patient is 30 minutes.  If you have any further questions, please do not hesitate to contact our consult service.  Thank you for allowing us to participate in this patient care.

## 2024-08-17 NOTE — PROGRESS NOTE ADULT - SUBJECTIVE AND OBJECTIVE BOX
*************************************  NEUROLOGY PROGRESS NOTE  **************************************    ANTONIO POON  Female  MRN-[id number]    Subjective: Patient seen and examined at bedside with Neurology attending. Reports improvement in strength and denies any shortness of breath. No significant change in diplopia or eye movements    Interval History:    - Downgraded from MICU to Neuro floor on 8/17   - NIF -50 and vital capacity 1770. Patient satting well on room air  - Overnight noted to have some productive cough with yellowish sputum     VITAL SIGNS:  Vital Signs Last 24 Hrs  T(C): 37 (17 Aug 2024 13:03), Max: 37.1 (16 Aug 2024 21:00)  T(F): 98.6 (17 Aug 2024 13:03), Max: 98.8 (16 Aug 2024 21:00)  HR: 92 (17 Aug 2024 13:03) (62 - 92)  BP: 100/67 (17 Aug 2024 13:03) (97/64 - 112/77)  BP(mean): --  RR: 18 (17 Aug 2024 13:03) (18 - 18)  SpO2: 98% (17 Aug 2024 13:03) (97% - 100%)    Parameters below as of 17 Aug 2024 13:03  Patient On (Oxygen Delivery Method): room air        PHYSICAL EXAMINATION:  INCOMPLETE  Neurological (>12):  MS: Awake, alert.  Oriented person, place, time, and situation. Follows simple & crossed commands. Attends to examiner  Language: Speech hypophonic, increased in quantity, close to baseline.  CNs: PERRL (R 3mm, L 3mm). VFF. B/l horizontal gaze palsy. No disconjugate gaze at primary gaze.  V1-3 intact LT, No facial asymmetry b/l. Hearing grossly normal b/l. Tongue midline.     Next extension: 4+/5  Neck flexion: 4/5  Able to count to 30 in 1 breath     Motor - Normal bulk and tone throughout. No pronator drift.     Grossly 4/5 throughout, fatigable. B/L handgrip 4+/5    Sensation: Intact to LT b/l.  Reflexes L/R:  Biceps(C5) 2/2  BR(C6) 2/2   Triceps(C7)  2/2 Patellar(L4)   2/2   Coordination: No dysmetria to FTN b/l UE  Gait: Gait not tested due to patient safety concerns      LABS:    CBC                       10.1   9.10  )-----------( 209      ( 17 Aug 2024 06:04 )             30.5     Chem 08-17    140  |  106  |  13  ----------------------------<  94  3.3<L>   |  22  |  0.62    Ca    9.3      17 Aug 2024 06:04  Phos  3.2     08-17  Mg     1.9     08-17    TPro  6.6  /  Alb  4.4  /  TBili  0.9  /  DBili  x   /  AST  10  /  ALT  <5<L>  /  AlkPhos  37<L>  08-15    LFTs LIVER FUNCTIONS - ( 15 Aug 2024 23:09 )  Alb: 4.4 g/dL / Pro: 6.6 g/dL / ALK PHOS: 37 U/L / ALT: <5 U/L / AST: 10 U/L / GGT: x           Coagulopathy PT/INR - ( 15 Aug 2024 23:09 )   PT: 11.5 sec;   INR: 1.05 ratio         PTT - ( 15 Aug 2024 23:09 )  PTT:28.1 sec  Lipid Panel   A1c   Cardiac enzymes     U/A Urinalysis Basic - ( 17 Aug 2024 06:04 )    Color: x / Appearance: x / SG: x / pH: x  Gluc: 94 mg/dL / Ketone: x  / Bili: x / Urobili: x   Blood: x / Protein: x / Nitrite: x   Leuk Esterase: x / RBC: x / WBC x   Sq Epi: x / Non Sq Epi: x / Bacteria: x      RADIOLOGY & ADDITIONAL STUDIES:

## 2024-08-17 NOTE — PROGRESS NOTE ADULT - ASSESSMENT
Scarlet Blackburn is a 33 RH woman with PMH of Asthma, Sickle cell trait, Myasthenia gravis (AChR antibody positive, diagnosed in 2017) on pyridostigmine 60mg QID who presented w weakness, b/l ptosis, diplopia, as well as neck weakness admitted for management of MG exacerbation. Pt was reporting improving generalized weakness but on 5th day of IVIG pt with clinical worsening. Admitted to MICU on 8/12/2024 due to increased lethargy and sensation of drowning. Patient started on PLEX, then extubated. She was stable on room air, and transferred to Neuro floor on 8/16 for remainder of care.     Impression:   Bilateral ptosis, proximal>distal muscle weakness, severely reduced EOM, initially admitted for myasthenia gravis exacerbation s/o course of IVIG without significant improvement. Course c/b respiratory distress requiring intubation, care in MICU and course of PLEX. Patient is now s/p three sessions of PLEX, extubated and stable on room air.     Plan:    [x] S/P IVIG 5 day course course  [ ] Continue PLEX for total of 5 sessions every other day. s/p PLEX#3 on 8/16. Next likely 8/19  [x] S/P methylprednisolone IV 80mg   [x] Continue Methylprednisolone 32 mg IV qd > will discuss steroid course and dose with Dr Eason  [x] Continue to hold mestinon  [x] NIF/VC and vitals q6   [x] Neurochecks and vitals q4   [ ] Not vaccinated in 4 years for meningococcal disease. ID will need to be c/s for penicillin IV and meningococcal vaccine -> (if decide after PLEX to start Eculizumab)  [ ] PT/OT as tolerated     Other  [x] Medical evaluation of leukocytopenia, now resolved   [x] For cough with productive sputum: COVID/flu/RSV neg and CXR normal on 8/17. No leukocytosis or fever, will continue supportive care     DVT ppx: Lovenox  Diet: Regular  Dispo: Pending PLEX completion and clinical course    Patient seen and discussed with Neurology attending Dr. Doshi. Note finalized on attending attestation

## 2024-08-18 LAB
ANION GAP SERPL CALC-SCNC: 11 MMOL/L — SIGNIFICANT CHANGE UP (ref 5–17)
APTT BLD: 26.9 SEC — SIGNIFICANT CHANGE UP (ref 24.5–35.6)
BUN SERPL-MCNC: 13 MG/DL — SIGNIFICANT CHANGE UP (ref 7–23)
CA-I BLD-SCNC: 1.2 MMOL/L — SIGNIFICANT CHANGE UP (ref 1.15–1.33)
CALCIUM SERPL-MCNC: 9.1 MG/DL — SIGNIFICANT CHANGE UP (ref 8.4–10.5)
CHLORIDE SERPL-SCNC: 107 MMOL/L — SIGNIFICANT CHANGE UP (ref 96–108)
CO2 SERPL-SCNC: 24 MMOL/L — SIGNIFICANT CHANGE UP (ref 22–31)
CREAT SERPL-MCNC: 0.57 MG/DL — SIGNIFICANT CHANGE UP (ref 0.5–1.3)
EGFR: 125 ML/MIN/1.73M2 — SIGNIFICANT CHANGE UP
FIBRINOGEN PPP-MCNC: 174 MG/DL — LOW (ref 200–445)
GLUCOSE SERPL-MCNC: 104 MG/DL — HIGH (ref 70–99)
HCT VFR BLD CALC: 30.5 % — LOW (ref 34.5–45)
HGB BLD-MCNC: 10 G/DL — LOW (ref 11.5–15.5)
INR BLD: 0.89 RATIO — SIGNIFICANT CHANGE UP (ref 0.85–1.18)
MAGNESIUM SERPL-MCNC: 1.9 MG/DL — SIGNIFICANT CHANGE UP (ref 1.6–2.6)
MCHC RBC-ENTMCNC: 24.9 PG — LOW (ref 27–34)
MCHC RBC-ENTMCNC: 32.8 GM/DL — SIGNIFICANT CHANGE UP (ref 32–36)
MCV RBC AUTO: 76.1 FL — LOW (ref 80–100)
NRBC # BLD: 0 /100 WBCS — SIGNIFICANT CHANGE UP (ref 0–0)
PHOSPHATE SERPL-MCNC: 3.5 MG/DL — SIGNIFICANT CHANGE UP (ref 2.5–4.5)
PLATELET # BLD AUTO: 205 K/UL — SIGNIFICANT CHANGE UP (ref 150–400)
POTASSIUM SERPL-MCNC: 3.3 MMOL/L — LOW (ref 3.5–5.3)
POTASSIUM SERPL-SCNC: 3.3 MMOL/L — LOW (ref 3.5–5.3)
PROTHROM AB SERPL-ACNC: 9.8 SEC — SIGNIFICANT CHANGE UP (ref 9.5–13)
RBC # BLD: 4.01 M/UL — SIGNIFICANT CHANGE UP (ref 3.8–5.2)
RBC # FLD: 17.2 % — HIGH (ref 10.3–14.5)
SODIUM SERPL-SCNC: 142 MMOL/L — SIGNIFICANT CHANGE UP (ref 135–145)
WBC # BLD: 8.98 K/UL — SIGNIFICANT CHANGE UP (ref 3.8–10.5)
WBC # FLD AUTO: 8.98 K/UL — SIGNIFICANT CHANGE UP (ref 3.8–10.5)

## 2024-08-18 PROCEDURE — 99231 SBSQ HOSP IP/OBS SF/LOW 25: CPT | Mod: GC

## 2024-08-18 RX ADMIN — ENOXAPARIN SODIUM 40 MILLIGRAM(S): 100 INJECTION SUBCUTANEOUS at 13:21

## 2024-08-18 RX ADMIN — Medication 5 MILLIGRAM(S): at 21:23

## 2024-08-18 RX ADMIN — Medication 40 MILLIGRAM(S): at 05:20

## 2024-08-18 RX ADMIN — SULFAMETHOXAZOLE AND TRIMETHOPRIM 1 TABLET(S): 800; 160 TABLET ORAL at 13:21

## 2024-08-18 RX ADMIN — Medication 32 MILLIGRAM(S): at 05:21

## 2024-08-18 RX ADMIN — Medication 1 APPLICATION(S): at 17:38

## 2024-08-18 RX ADMIN — Medication 1 APPLICATION(S): at 05:26

## 2024-08-18 RX ADMIN — CHLORHEXIDINE GLUCONATE 1 APPLICATION(S): 40 SOLUTION TOPICAL at 05:20

## 2024-08-18 NOTE — PROGRESS NOTE ADULT - ASSESSMENT
Assessment  Scarlet Blackburn is a 33 RH woman with PMH of Asthma, Sickle cell trait, Myasthenia gravis (AChR antibody positive, diagnosed in 2017) on pyridostigmine 60mg QID who presented w weakness, b/l ptosis, diplopia, as well as neck weakness admitted for management of MG exacerbation. Pt was reporting improving generalized weakness but on 5th day of IVIG pt with clinical worsening. Admitted to MICU on 8/12/2024 due to increased lethargy and sensation of drowning. Patient started on PLEX, then extubated. She was stable on room air, and transferred to Neuro floor on 8/16 for remainder of care.     Impression:   Bilateral ptosis, proximal>distal muscle weakness, severely reduced EOM, initially admitted for myasthenia gravis exacerbation s/o course of IVIG without significant improvement. Course c/b respiratory distress requiring intubation, care in MICU and course of PLEX. Patient is now s/p three sessions of PLEX, extubated and stable on room air.     Plan:  [x] S/P IVIG 5 day course course  [ ] Continue PLEX for total of 5 sessions every other day. s/p PLEX#3 on 8/16. Next likely 8/19  [x] S/P methylprednisolone IV 80mg   [x] Continue Methylprednisolone 32 mg IV qd > will discuss steroid course and dose with Dr Eason  [x] Continue to hold mestinon  [x] NIF/VC and vitals q6   [x] Neurochecks and vitals q4   [ ] Not vaccinated in 4 years for meningococcal disease. ID will need to be c/s for penicillin IV and meningococcal vaccine -> (if decide after PLEX to start Eculizumab)  [ ] PT/OT as tolerated     Other  [x] Medical evaluation of leukocytopenia, now resolved   [x] For cough with productive sputum: COVID/flu/RSV neg and CXR normal on 8/17. No leukocytosis or fever, will continue supportive care     DVT ppx: Lovenox  Diet: Regular  Dispo: Pending PLEX completion and clinical course Assessment  Scarlet Blackburn is a 33 RH woman with PMH of Asthma, Sickle cell trait, Myasthenia gravis (AChR antibody positive, diagnosed in 2017) on pyridostigmine 60mg QID who presented w weakness, b/l ptosis, diplopia, as well as neck weakness admitted for management of MG exacerbation. Pt was reporting improving generalized weakness but on 5th day of IVIG pt with clinical worsening. Admitted to MICU on 8/12/2024 due to increased lethargy and sensation of drowning. Patient started on PLEX, then extubated. She was stable on room air, and transferred to Neuro floor on 8/16 for remainder of care.     Impression:   Bilateral ptosis, proximal>distal muscle weakness, severely reduced EOM, initially admitted for myasthenia gravis exacerbation s/o course of IVIG without significant improvement. Course c/b respiratory distress requiring intubation, care in MICU and course of PLEX. Patient is now s/p three sessions of PLEX, extubated and stable on room air.     Plan: INCOMPLETE  [x] S/P IVIG 5 day course course  [ ] Continue PLEX for total of 5 sessions every other day. s/p PLEX#3 on 8/16. Next on 8/19  [x] S/P methylprednisolone IV 80mg   [x] Continue Methylprednisolone 32 mg IV qd > will discuss steroid course and dose with Dr Eason  [x] Continue to hold mestinon  [x] NIF/VC and vitals q6   [x] Neurochecks and vitals q4   [ ] Not vaccinated in 4 years for meningococcal disease. ID will need to be c/s for penicillin IV and meningococcal vaccine -> (if decide after PLEX to start Eculizumab)  [ ] PT/OT as tolerated     Other  [x] Medical evaluation of leukocytopenia, now resolved   [x] For cough with productive sputum: COVID/flu/RSV neg and CXR normal on 8/17. No leukocytosis or fever, will continue supportive care     DVT ppx: Lovenox  Diet: Regular  Dispo: Pending PLEX completion and clinical course Assessment  Scarlet Blackburn is a 33 RH woman with PMH of Asthma, Sickle cell trait, Myasthenia gravis (AChR antibody positive, diagnosed in 2017) on pyridostigmine 60mg QID who presented w weakness, b/l ptosis, diplopia, as well as neck weakness admitted for management of MG exacerbation. Pt was reporting improving generalized weakness but on 5th day of IVIG pt with clinical worsening. Admitted to MICU on 8/12/2024 due to increased lethargy and sensation of drowning. Patient started on PLEX, then extubated. She was stable on room air, and transferred to Neuro floor on 8/16 for remainder of care.     Impression:   Bilateral ptosis, proximal>distal muscle weakness, severely reduced EOM, initially admitted for myasthenia gravis exacerbation s/o course of IVIG without significant improvement. Course c/b respiratory distress requiring intubation, care in MICU and course of PLEX. Patient is now s/p three sessions of PLEX, extubated and stable on room air.     Plan:   [x] S/P IVIG 5 day course course  [ ] Continue PLEX for total of 5 sessions every other day. s/p PLEX#3 on 8/16. Next on 8/19  [x] S/P methylprednisolone IV 80mg   [x] Continue Methylprednisolone 32 mg IV qd > will discuss steroid course and dose with Dr Eason  [x] Continue to hold mestinon  [x] NIF/VC and vitals q6 -> change to q12 on 8/18  [x] Neurochecks and vitals q4   [ ] Not vaccinated in 4 years for meningococcal disease. ID will need to be c/s for penicillin IV and meningococcal vaccine -> (if decide after PLEX to start Eculizumab)  [ ] PT/OT as tolerated     Other  [x] Medical evaluation of leukocytopenia, now resolved   [x] For cough with productive sputum: COVID/flu/RSV neg and CXR normal on 8/17. No leukocytosis or fever, will continue supportive care     DVT ppx: Lovenox  Diet: Regular  Dispo: Pending PLEX completion and clinical course

## 2024-08-18 NOTE — PROGRESS NOTE ADULT - SUBJECTIVE AND OBJECTIVE BOX
Neurology Progress Note    SUBJECTIVE/OBJECTIVE/INTERVAL EVENTS: Patient seen and examined at bedside w/ neuro attending and team.     INTERVAL HISTORY: No acute events.BP 99/64, O2 95.    REVIEW OF SYSTEMS: Few questions of a 10-system ROS was performed and is negative except for those items noted above and/or in the HPI.    VITALS & EXAMINATION:  Vital Signs Last 24 Hrs  T(C): 36.7 (18 Aug 2024 04:45), Max: 37 (17 Aug 2024 08:24)  T(F): 98.1 (18 Aug 2024 04:45), Max: 98.6 (17 Aug 2024 08:24)  HR: 89 (18 Aug 2024 04:45) (86 - 92)  BP: 99/64 (18 Aug 2024 04:45) (94/56 - 113/68)  BP(mean): --  RR: 18 (18 Aug 2024 04:45) (18 - 18)  SpO2: 95% (18 Aug 2024 04:45) (95% - 98%)    Parameters below as of 18 Aug 2024 04:45  Patient On (Oxygen Delivery Method): room air    PHYSICAL EXAMINATION:  INCOMPLETE  Neurological (>12):  MS: Awake, alert.  Oriented person, place, time, and situation. Follows simple & crossed commands. Attends to examiner  Language: Speech hypophonic, increased in quantity, close to baseline.  CNs: PERRL (R 3mm, L 3mm). VFF. B/l horizontal gaze palsy. No disconjugate gaze at primary gaze.  V1-3 intact LT, No facial asymmetry b/l. Hearing grossly normal b/l. Tongue midline.   Next extension: 4+/5  Neck flexion: 4/5  Able to count to 30 in 1 breath   Motor - Normal bulk and tone throughout. No pronator drift.   Grossly 4/5 throughout, fatigable. B/L handgrip 4+/5  Sensation: Intact to LT b/l.  Reflexes L/R:  Biceps(C5) 2/2  BR(C6) 2/2   Triceps(C7)  2/2 Patellar(L4)   2/2   Coordination: No dysmetria to FTN b/l UE  Gait: Gait not tested due to patient safety concerns      LABORATORY:  CBC                       10.0   8.98  )-----------( 205      ( 18 Aug 2024 05:40 )             30.5     Chem 08-18    142  |  107  |  13  ----------------------------<  104<H>  3.3<L>   |  24  |  0.57    Ca    9.1      18 Aug 2024 05:40  Phos  3.5     08-18  Mg     1.9     08-18      LFTs   Coagulopathy PT/INR - ( 18 Aug 2024 05:40 )   PT: 9.8 sec;   INR: 0.89 ratio         PTT - ( 18 Aug 2024 05:40 )  PTT:26.9 sec  Lipid Panel   A1c   Cardiac enzymes     U/A Urinalysis Basic - ( 18 Aug 2024 05:40 )    Color: x / Appearance: x / SG: x / pH: x  Gluc: 104 mg/dL / Ketone: x  / Bili: x / Urobili: x   Blood: x / Protein: x / Nitrite: x   Leuk Esterase: x / RBC: x / WBC x   Sq Epi: x / Non Sq Epi: x / Bacteria: x      CSF  Immunological  Other    STUDIES & IMAGING: (EEG, CT, MR, U/S, TTE/MARIA E): Neurology Progress Note    SUBJECTIVE/OBJECTIVE/INTERVAL EVENTS: Patient seen and examined at bedside w/ neuro attending and team. States feels unchanged from day prior.    INTERVAL HISTORY: No acute events. BP 99/64, O2 95. Last NIF -50, VC 1850 at 2200.    REVIEW OF SYSTEMS: Few questions of a 10-system ROS was performed and is negative except for those items noted above and/or in the HPI.    VITALS & EXAMINATION:  Vital Signs Last 24 Hrs  T(C): 36.7 (18 Aug 2024 04:45), Max: 37 (17 Aug 2024 08:24)  T(F): 98.1 (18 Aug 2024 04:45), Max: 98.6 (17 Aug 2024 08:24)  HR: 89 (18 Aug 2024 04:45) (86 - 92)  BP: 99/64 (18 Aug 2024 04:45) (94/56 - 113/68)  BP(mean): --  RR: 18 (18 Aug 2024 04:45) (18 - 18)  SpO2: 95% (18 Aug 2024 04:45) (95% - 98%)    Parameters below as of 18 Aug 2024 04:45  Patient On (Oxygen Delivery Method): room air    PHYSICAL EXAMINATION:    Neurological (>12):  MS: Awake, alert.  Oriented person, place, time, and situation. Follows simple & crossed commands. Attends to examiner  Language: Speech hypophonic, able to repeat sentences.  CNs: PERRL (R 3mm, L 3mm). VFF. B/l horizontal gaze palsy. No disconjugate gaze at primary gaze.  V1-3 intact LT, No facial asymmetry b/l. Hearing grossly normal b/l. Tongue midline. B/l ptosis.  Next extension: 5/5  Neck flexion: 4/5  Able to count to 38 in 1 breath   Motor - Normal bulk and tone throughout. No pronator drift.   Grossly 4/5 throughout, fatigable. B/L handgrip 4+/5  Sensation: Intact to LT b/l.  Reflexes L/R:  Biceps(C5) 2/2  BR(C6) 2/2   Triceps(C7)  2/2 Patellar(L4)   2/2   Coordination: No dysmetria to FTN b/l UE  Gait: Gait not tested due to patient safety concerns      LABORATORY:  CBC                       10.0   8.98  )-----------( 205      ( 18 Aug 2024 05:40 )             30.5     Chem 08-18    142  |  107  |  13  ----------------------------<  104<H>  3.3<L>   |  24  |  0.57    Ca    9.1      18 Aug 2024 05:40  Phos  3.5     08-18  Mg     1.9     08-18      LFTs   Coagulopathy PT/INR - ( 18 Aug 2024 05:40 )   PT: 9.8 sec;   INR: 0.89 ratio         PTT - ( 18 Aug 2024 05:40 )  PTT:26.9 sec  Lipid Panel   A1c   Cardiac enzymes     U/A Urinalysis Basic - ( 18 Aug 2024 05:40 )    Color: x / Appearance: x / SG: x / pH: x  Gluc: 104 mg/dL / Ketone: x  / Bili: x / Urobili: x   Blood: x / Protein: x / Nitrite: x   Leuk Esterase: x / RBC: x / WBC x   Sq Epi: x / Non Sq Epi: x / Bacteria: x    STUDIES & IMAGING: (EEG, CT, MR, U/S, TTE/MARIA E): Neurology Progress Note    SUBJECTIVE/OBJECTIVE/INTERVAL EVENTS: Patient seen and examined at bedside w/ neuro attending and team. States feels unchanged from day prior.    INTERVAL HISTORY: No acute events. BP 99/64, O2 95. NIF -50, VC 1850 at 2200 on 8/17, NIF -50, VC 1910 at 1200 on 8/18    REVIEW OF SYSTEMS: Few questions of a 10-system ROS was performed and is negative except for those items noted above and/or in the HPI.    VITALS & EXAMINATION:  Vital Signs Last 24 Hrs  T(C): 36.7 (18 Aug 2024 04:45), Max: 37 (17 Aug 2024 08:24)  T(F): 98.1 (18 Aug 2024 04:45), Max: 98.6 (17 Aug 2024 08:24)  HR: 89 (18 Aug 2024 04:45) (86 - 92)  BP: 99/64 (18 Aug 2024 04:45) (94/56 - 113/68)  BP(mean): --  RR: 18 (18 Aug 2024 04:45) (18 - 18)  SpO2: 95% (18 Aug 2024 04:45) (95% - 98%)    Parameters below as of 18 Aug 2024 04:45  Patient On (Oxygen Delivery Method): room air    PHYSICAL EXAMINATION:    Neurological (>12):  MS: Awake, alert.  Oriented person, place, time, and situation. Follows simple & crossed commands. Attends to examiner  Language: Speech hypophonic, able to repeat sentences.  CNs: PERRL (R 3mm, L 3mm). VFF. B/l horizontal gaze palsy. No disconjugate gaze at primary gaze.  V1-3 intact LT, No facial asymmetry b/l. Hearing grossly normal b/l. Tongue midline. B/l ptosis.  Next extension: 5/5  Neck flexion: 4/5  Able to count to 38 in 1 breath   Motor - Normal bulk and tone throughout. No pronator drift.   Grossly 4/5 throughout, fatigable. B/L handgrip 4+/5  Sensation: Intact to LT b/l.  Reflexes L/R:  Biceps(C5) 2/2  BR(C6) 2/2   Triceps(C7)  2/2 Patellar(L4)   2/2   Coordination: No dysmetria to FTN b/l UE  Gait: Gait not tested due to patient safety concerns      LABORATORY:  CBC                       10.0   8.98  )-----------( 205      ( 18 Aug 2024 05:40 )             30.5     Chem 08-18    142  |  107  |  13  ----------------------------<  104<H>  3.3<L>   |  24  |  0.57    Ca    9.1      18 Aug 2024 05:40  Phos  3.5     08-18  Mg     1.9     08-18      LFTs   Coagulopathy PT/INR - ( 18 Aug 2024 05:40 )   PT: 9.8 sec;   INR: 0.89 ratio         PTT - ( 18 Aug 2024 05:40 )  PTT:26.9 sec  Lipid Panel   A1c   Cardiac enzymes     U/A Urinalysis Basic - ( 18 Aug 2024 05:40 )    Color: x / Appearance: x / SG: x / pH: x  Gluc: 104 mg/dL / Ketone: x  / Bili: x / Urobili: x   Blood: x / Protein: x / Nitrite: x   Leuk Esterase: x / RBC: x / WBC x   Sq Epi: x / Non Sq Epi: x / Bacteria: x    STUDIES & IMAGING: (EEG, CT, MR, U/S, TTE/MARIA E):

## 2024-08-19 LAB
ANION GAP SERPL CALC-SCNC: 14 MMOL/L — SIGNIFICANT CHANGE UP (ref 5–17)
APTT BLD: 26.6 SEC — SIGNIFICANT CHANGE UP (ref 24.5–35.6)
BUN SERPL-MCNC: 14 MG/DL — SIGNIFICANT CHANGE UP (ref 7–23)
CA-I BLD-SCNC: 1.18 MMOL/L — SIGNIFICANT CHANGE UP (ref 1.15–1.33)
CALCIUM SERPL-MCNC: 9.3 MG/DL — SIGNIFICANT CHANGE UP (ref 8.4–10.5)
CHLORIDE SERPL-SCNC: 103 MMOL/L — SIGNIFICANT CHANGE UP (ref 96–108)
CO2 SERPL-SCNC: 22 MMOL/L — SIGNIFICANT CHANGE UP (ref 22–31)
CREAT SERPL-MCNC: 0.64 MG/DL — SIGNIFICANT CHANGE UP (ref 0.5–1.3)
EGFR: 121 ML/MIN/1.73M2 — SIGNIFICANT CHANGE UP
FIBRINOGEN PPP-MCNC: 170 MG/DL — LOW (ref 200–445)
GLUCOSE SERPL-MCNC: 85 MG/DL — SIGNIFICANT CHANGE UP (ref 70–99)
HCT VFR BLD CALC: 31.3 % — LOW (ref 34.5–45)
HGB BLD-MCNC: 10.1 G/DL — LOW (ref 11.5–15.5)
INR BLD: 1 RATIO — SIGNIFICANT CHANGE UP (ref 0.85–1.18)
MCHC RBC-ENTMCNC: 24.7 PG — LOW (ref 27–34)
MCHC RBC-ENTMCNC: 32.3 GM/DL — SIGNIFICANT CHANGE UP (ref 32–36)
MCV RBC AUTO: 76.5 FL — LOW (ref 80–100)
NRBC # BLD: 0 /100 WBCS — SIGNIFICANT CHANGE UP (ref 0–0)
PLATELET # BLD AUTO: 260 K/UL — SIGNIFICANT CHANGE UP (ref 150–400)
POTASSIUM SERPL-MCNC: 3.3 MMOL/L — LOW (ref 3.5–5.3)
POTASSIUM SERPL-SCNC: 3.3 MMOL/L — LOW (ref 3.5–5.3)
PROTHROM AB SERPL-ACNC: 10.5 SEC — SIGNIFICANT CHANGE UP (ref 9.5–13)
RBC # BLD: 4.09 M/UL — SIGNIFICANT CHANGE UP (ref 3.8–5.2)
RBC # FLD: 17.2 % — HIGH (ref 10.3–14.5)
SODIUM SERPL-SCNC: 139 MMOL/L — SIGNIFICANT CHANGE UP (ref 135–145)
WBC # BLD: 10.55 K/UL — HIGH (ref 3.8–10.5)
WBC # FLD AUTO: 10.55 K/UL — HIGH (ref 3.8–10.5)

## 2024-08-19 PROCEDURE — 99232 SBSQ HOSP IP/OBS MODERATE 35: CPT | Mod: GC

## 2024-08-19 PROCEDURE — 36514 APHERESIS PLASMA: CPT

## 2024-08-19 RX ORDER — POTASSIUM CHLORIDE 10 MEQ
40 TABLET, EXT RELEASE, PARTICLES/CRYSTALS ORAL ONCE
Refills: 0 | Status: COMPLETED | OUTPATIENT
Start: 2024-08-19 | End: 2024-08-19

## 2024-08-19 RX ORDER — PREDNISONE 10 MG
40 TABLET, DOSE PACK ORAL DAILY
Refills: 0 | Status: DISCONTINUED | OUTPATIENT
Start: 2024-08-20 | End: 2024-08-21

## 2024-08-19 RX ORDER — PYRIDOSTIGMINE BROMIDE 60 MG/5ML
30 SOLUTION ORAL
Refills: 0 | Status: DISCONTINUED | OUTPATIENT
Start: 2024-08-19 | End: 2024-08-20

## 2024-08-19 RX ORDER — PYRIDOSTIGMINE BROMIDE 60 MG/5ML
30 SOLUTION ORAL THREE TIMES A DAY
Refills: 0 | Status: DISCONTINUED | OUTPATIENT
Start: 2024-08-19 | End: 2024-08-19

## 2024-08-19 RX ADMIN — ENOXAPARIN SODIUM 40 MILLIGRAM(S): 100 INJECTION SUBCUTANEOUS at 12:47

## 2024-08-19 RX ADMIN — Medication 40 MILLIGRAM(S): at 05:59

## 2024-08-19 RX ADMIN — Medication 32 MILLIGRAM(S): at 05:59

## 2024-08-19 RX ADMIN — CHLORHEXIDINE GLUCONATE 1 APPLICATION(S): 40 SOLUTION TOPICAL at 05:58

## 2024-08-19 RX ADMIN — Medication 1 APPLICATION(S): at 06:00

## 2024-08-19 RX ADMIN — Medication 40 MILLIEQUIVALENT(S): at 08:11

## 2024-08-19 RX ADMIN — PYRIDOSTIGMINE BROMIDE 30 MILLIGRAM(S): 60 SOLUTION ORAL at 12:47

## 2024-08-19 RX ADMIN — SULFAMETHOXAZOLE AND TRIMETHOPRIM 1 TABLET(S): 800; 160 TABLET ORAL at 12:47

## 2024-08-19 RX ADMIN — Medication 2 TABLET(S): at 22:32

## 2024-08-19 RX ADMIN — PYRIDOSTIGMINE BROMIDE 30 MILLIGRAM(S): 60 SOLUTION ORAL at 16:40

## 2024-08-19 NOTE — PROGRESS NOTE ADULT - ASSESSMENT
Plan:  [] NIF-50, VC 6670.  [] PLEX#4 today.  [] Start Mestinon 30mg 6AM /12 PM/4 PM. Switch Pred to 40mg daily.  [] PT re eval today after PLEX  [] Ask about home situation   Assessment  Scarlet Blackburn is a 33 RH woman with PMH of Asthma, Sickle cell trait, Myasthenia gravis (AChR antibody positive, diagnosed in 2017) on pyridostigmine 60mg QID who presented w weakness, b/l ptosis, diplopia, as well as neck weakness admitted for management of MG exacerbation. Pt was reporting improving generalized weakness but on 5th day of IVIG pt with clinical worsening. Admitted to MICU on 8/12/2024 due to increased lethargy and sensation of drowning. Patient started on PLEX, then extubated. She was stable on room air, and transferred to Neuro floor on 8/16 for remainder of care, most recent dose of PLEX received 8/19.     Impression  Bilateral ptosis, proximal>distal muscle weakness, severely reduced EOM, initially admitted for myasthenia gravis exacerbation s/o course of IVIG without significant improvement. Course c/b respiratory distress requiring intubation, care in MICU and course of PLEX. Patient is now s/p four sessions of PLEX, extubated and stable on room air.     Plan:   [x] S/P IVIG 5 day course course  [ ] Continue PLEX for total of 5 sessions every other day. s/p PLEX #4 on 8/19  [ ] PT re eval today after PLEX  [ ] Ask about home situation  [X] Started Mestinon 30mg 6AM /12 PM/4 PM on 8/19.  [x] S/P methylprednisolone IV 80mg. Methylprednisolone 32 mg IV qd > Switched Pred to 40mg daily on 8/19.  [x] NIF/VC and vitals q12, NIF-50 VC 1770 AM of 8/19  [x] Neurochecks and vitals q4   [ ] Not vaccinated in 4 years for meningococcal disease. ID will need to be c/s for penicillin IV and meningococcal vaccine -> (if decide after PLEX to start Eculizumab)  [ ] PT/OT as tolerated     Other  [x] Medical evaluation of leukocytopenia, now resolved   [x] For cough with productive sputum: COVID/flu/RSV neg and CXR normal on 8/17. No leukocytosis or fever, will continue supportive care     DVT ppx: Lovenox  Diet: Regular  Dispo: Pending PLEX completion and clinical course   Scarlet Blackburn is a 33 RH woman with PMH of Asthma, Sickle cell trait, Myasthenia gravis (AChR antibody positive, diagnosed in 2017) on pyridostigmine 60mg QID who presented w weakness, b/l ptosis, diplopia, as well as neck weakness admitted for management of MG exacerbation. Pt was reporting improving generalized weakness but on 5th day of IVIG pt with clinical worsening. Admitted to MICU on 8/12/2024 due to increased lethargy and sensation of drowning. Patient started on PLEX, then extubated. She was stable on room air, and transferred to Neuro floor on 8/16 for remainder of care, most recent dose of PLEX received 8/19.     Impression  Bilateral ptosis, proximal>distal muscle weakness, severely reduced EOM, initially admitted for myasthenia gravis exacerbation s/o course of IVIG without significant improvement. Course c/b respiratory distress requiring intubation, care in MICU and course of PLEX. Patient is now s/p four sessions of PLEX, extubated and stable on room air.     Plan:   [x] S/P IVIG 5 day course   [/] Continue PLEX for total of 5 sessions every other day. s/p PLEX #4 on 8/19. Next on 8/21  [x] Started Mestinon 30mg 6AM /12 PM/4 PM on 8/19.  [x] Discontinue methylprednisolone > switched to PO Prednisone to 40mg daily on 8/19.  [x] NIF/VC and vitals q12, NIF-50 VC 1770 AM of 8/19  [x] Neurochecks and vitals q4   [ ] Not vaccinated in 4 years for meningococcal disease. ID will need to be c/s for penicillin IV and meningococcal vaccine -> (if decide after PLEX to start Eculizumab)  [ ] PT/OT: rec acute rehab, will re-evaluate today after PLEX     Other  [x] Medical evaluation of leukocytopenia, now resolved   [x] For cough with productive sputum: COVID/flu/RSV neg and CXR normal on 8/17. No leukocytosis or fever, will continue supportive care     DVT ppx: Lovenox  Diet: Regular  Dispo: Pending PLEX completion and clinical course. Will ask about support at home     Patient seen and discussed with Neurology attending Dr. Nice, note finalized upon attending attestation.

## 2024-08-19 NOTE — CHART NOTE - NSCHARTNOTEFT_GEN_A_CORE
Ms. Blackburn is a 32 YO woman with PMH of myasthenia gravis (AChR antibody positive, diagnosed in 2017), previously requiring multiple intubations, asthma, sickle cell trait. She presented with weakness, not improving s/p IVIG, requiring MICU transfer for monitoring of respiratory status and for possible need for therapeutic plasma exchange (TPE). Pt has responded well to TPE in the past. Plan is to perform 5 procedures, every other day.    TPE #1 performed on 8/12/24. One plasma volume exchanged using 5% albumin as replacement fluid. The patient tolerated the procedure well.    TPE #2 performed on 8/14/24. One plasma volume exchanged using 5% albumin as replacement fluid. The patient tolerated the procedure well.    TPE #3 is performed on 08/16/24. One plasma volume exchanged using 5% albumin as replacement fluid. The patient tolerated the procedure well.    TPE #4 is performed today on 8/19/24. One plasma volume exchanged using 5% albumin as replacement fluid. The patient tolerated the procedure well.    She had some cough, and ptosis has been improving. Ms. Blackburn is a 32 YO woman with PMH of myasthenia gravis (AChR antibody positive, diagnosed in 2017), previously requiring multiple intubations, asthma, sickle cell trait. She presented with weakness, not improving s/p IVIG, requiring MICU transfer for monitoring of respiratory status and for possible need for therapeutic plasma exchange (TPE). Pt has responded well to TPE in the past. Plan is to perform 5 procedures, every other day.    TPE #1 performed on 8/12/24. One plasma volume exchanged using 5% albumin as replacement fluid. The patient tolerated the procedure well.    TPE #2 performed on 8/14/24. One plasma volume exchanged using 5% albumin as replacement fluid. The patient tolerated the procedure well.    TPE #3 is performed on 08/16/24. One plasma volume exchanged using 5% albumin as replacement fluid. The patient tolerated the procedure well.    TPE #4 is performed today on 8/19/24. One plasma volume exchanged using 5% albumin as replacement fluid. The patient tolerated the procedure well.    The patient states that she feels much improved and denies complaints today.    The next TPE is scheduled for 8/21/24.      Attending Comment: I personally saw and evaluated the patient.  I fully participated in the care of this patient.  I have made amendments to the documentation where necessary, and agree with the history and plan as documented by the resident.

## 2024-08-19 NOTE — PROGRESS NOTE ADULT - SUBJECTIVE AND OBJECTIVE BOX
*************************************  NEUROLOGY PROGRESS NOTE  **************************************    ANTONIO POON  Female  [id number]    Subjective: Patient seen and examined at bedside with Neurology team and attending     Interval History:          VITAL SIGNS:  Vital Signs Last 24 Hrs  T(C): 37.1 (19 Aug 2024 05:00), Max: 37.1 (19 Aug 2024 05:00)  T(F): 98.7 (19 Aug 2024 05:00), Max: 98.7 (19 Aug 2024 05:00)  HR: 74 (19 Aug 2024 05:00) (73 - 84)  BP: 101/68 (19 Aug 2024 05:00) (95/62 - 116/71)  BP(mean): --  RR: 18 (19 Aug 2024 05:00) (18 - 18)  SpO2: 99% (19 Aug 2024 05:00) (96% - 99%)    Parameters below as of 19 Aug 2024 06:47  Patient On (Oxygen Delivery Method): room air                PHYSICAL EXAMINATION:    General - NAD  Eyes - Fundoscopy not performed due to safety precautions in the setting of the COVID-19 pandemic    Neurologic Exam:  MS: Awake, alert.  Oriented person, place, time, and situation. Follows simple & crossed commands. Attends to examiner  Language: Speech hypophonic, able to repeat sentences.  CNs: PERRL (R 3mm, L 3mm). VFF. B/l horizontal gaze palsy. No disconjugate gaze at primary gaze.  V1-3 intact LT, No facial asymmetry b/l. Hearing grossly normal b/l. Tongue midline. B/l ptosis.  Next extension: 5/5  Neck flexion: 4/5  Able to count to 38 in 1 breath   Motor - Normal bulk and tone throughout. No pronator drift.   Grossly 4/5 throughout, fatigable. B/L handgrip 4+/5  Sensation: Intact to LT b/l.  Reflexes L/R:  Biceps(C5) 2/2  BR(C6) 2/2   Triceps(C7)  2/2 Patellar(L4)   2/2   Coordination: No dysmetria to FTN b/l UE  Gait: Gait not tested due to patient safety concerns    LABS:    CBC                       10.1   10.55 )-----------( 260      ( 19 Aug 2024 05:19 )             31.3     Chem 08-19    139  |  103  |  14  ----------------------------<  85  3.3<L>   |  22  |  0.64    Ca    9.3      19 Aug 2024 05:19  Phos  3.5     08-18  Mg     1.9     08-18      LFTs   Coagulopathy PT/INR - ( 19 Aug 2024 06:12 )   PT: 10.5 sec;   INR: 1.00 ratio         PTT - ( 19 Aug 2024 06:12 )  PTT:26.6 sec  Lipid Panel   A1c   Cardiac enzymes     U/A Urinalysis Basic - ( 19 Aug 2024 05:19 )    Color: x / Appearance: x / SG: x / pH: x  Gluc: 85 mg/dL / Ketone: x  / Bili: x / Urobili: x   Blood: x / Protein: x / Nitrite: x   Leuk Esterase: x / RBC: x / WBC x   Sq Epi: x / Non Sq Epi: x / Bacteria: x          RADIOLOGY & ADDITIONAL STUDIES:             *************************************  NEUROLOGY PROGRESS NOTE  **************************************    ANTONIO POON  Female  [id number]    Subjective: Patient seen and examined at bedside with Neurology team and attending     Interval History:  No events overnight    VITAL SIGNS:  Vital Signs Last 24 Hrs  T(C): 37.1 (19 Aug 2024 05:00), Max: 37.1 (19 Aug 2024 05:00)  T(F): 98.7 (19 Aug 2024 05:00), Max: 98.7 (19 Aug 2024 05:00)  HR: 74 (19 Aug 2024 05:00) (73 - 84)  BP: 101/68 (19 Aug 2024 05:00) (95/62 - 116/71)  BP(mean): --  RR: 18 (19 Aug 2024 05:00) (18 - 18)  SpO2: 99% (19 Aug 2024 05:00) (96% - 99%)    Parameters below as of 19 Aug 2024 06:47  Patient On (Oxygen Delivery Method): room air    PHYSICAL EXAMINATION:    General - NAD  Eyes - Fundoscopy not performed due to safety precautions in the setting of the COVID-19 pandemic    Neurologic Exam:  MS: Awake, alert.  Oriented person, place, time, and situation. Follows simple & crossed commands. Attends to examiner  Language: Speech hypophonic, able to repeat sentences.  CNs: PERRL (R 3mm, L 3mm). VFF. B/l horizontal gaze palsy. No disconjugate gaze at primary gaze.  V1-3 intact LT, No facial asymmetry b/l. Hearing grossly normal b/l. Tongue midline. B/l ptosis.  Next extension: 5/5  Neck flexion: 4/5  Able to count to 38 in 1 breath   Motor - Normal bulk and tone throughout. No pronator drift.   Grossly 4/5 throughout, fatigable. B/L handgrip 4+/5  Sensation: Intact to LT b/l.  Reflexes L/R:  Biceps(C5) 2/2  BR(C6) 2/2   Triceps(C7)  2/2 Patellar(L4)   2/2   Coordination: No dysmetria to FTN b/l UE  Gait: Gait not tested due to patient safety concerns    LABS:    CBC                       10.1   10.55 )-----------( 260      ( 19 Aug 2024 05:19 )             31.3     Chem 08-19    139  |  103  |  14  ----------------------------<  85  3.3<L>   |  22  |  0.64    Ca    9.3      19 Aug 2024 05:19  Phos  3.5     08-18  Mg     1.9     08-18    LFTs   Coagulopathy PT/INR - ( 19 Aug 2024 06:12 )   PT: 10.5 sec;   INR: 1.00 ratio    PTT - ( 19 Aug 2024 06:12 )  PTT:26.6 sec  Lipid Panel   A1c   Cardiac enzymes     U/A Urinalysis Basic - ( 19 Aug 2024 05:19 )    Color: x / Appearance: x / SG: x / pH: x  Gluc: 85 mg/dL / Ketone: x  / Bili: x / Urobili: x   Blood: x / Protein: x / Nitrite: x   Leuk Esterase: x / RBC: x / WBC x   Sq Epi: x / Non Sq Epi: x / Bacteria: x   *************************************  NEUROLOGY PROGRESS NOTE  **************************************    ANTONIO POON  Female  MRN-[id number]    Subjective: Patient seen and examined at bedside with Neurology team and attending. Reports double vision is the same, denies any SOB; endorse mild improvement in weakness compared to yesterday    Interval History:  - PLEX #4 planned for today     VITAL SIGNS:  Vital Signs Last 24 Hrs  T(C): 37.1 (19 Aug 2024 05:00), Max: 37.1 (19 Aug 2024 05:00)  T(F): 98.7 (19 Aug 2024 05:00), Max: 98.7 (19 Aug 2024 05:00)  HR: 74 (19 Aug 2024 05:00) (73 - 84)  BP: 101/68 (19 Aug 2024 05:00) (95/62 - 116/71)  BP(mean): --  RR: 18 (19 Aug 2024 05:00) (18 - 18)  SpO2: 99% (19 Aug 2024 05:00) (96% - 99%)    Parameters below as of 19 Aug 2024 06:47  Patient On (Oxygen Delivery Method): room air    PHYSICAL EXAMINATION:    General - NAD  Eyes - Fundoscopy not performed due to safety precautions in the setting of the COVID-19 pandemic    Neurologic Exam:  MS: Awake, alert.  Oriented person, place, time, and situation. Follows simple & crossed commands. Attends to examiner  Language: Speech hypophonic, able to repeat sentences.  CNs: PERRL (R 3mm, L 3mm). VFF. B/l horizontal gaze palsy (better on L) and vertical gaze palsy (better on R). No disconjugate gaze at primary gaze.  V1-3 intact LT, No facial asymmetry b/l. Hearing grossly normal b/l. Tongue midline. B/l ptosis.  Next extension: 5/5  Neck flexion: 4/5  Motor - Normal bulk and tone throughout. No pronator drift.   Grossly 4/5 throughout, fatigable. B/L handgrip 4+/5  Sensation: Intact to LT b/l.  Reflexes L/R:  Biceps(C5) 2/2  BR(C6) 2/2   Triceps(C7)  2/2 Patellar(L4)   2/2   Coordination: No dysmetria to FTN b/l UE  Gait: Gait not tested due to patient safety concerns    LABS:    CBC                       10.1   10.55 )-----------( 260      ( 19 Aug 2024 05:19 )             31.3     Chem 08-19    139  |  103  |  14  ----------------------------<  85  3.3<L>   |  22  |  0.64    Ca    9.3      19 Aug 2024 05:19  Phos  3.5     08-18  Mg     1.9     08-18    LFTs   Coagulopathy PT/INR - ( 19 Aug 2024 06:12 )   PT: 10.5 sec;   INR: 1.00 ratio    PTT - ( 19 Aug 2024 06:12 )  PTT:26.6 sec  U/A Urinalysis Basic - ( 19 Aug 2024 05:19 )    Color: x / Appearance: x / SG: x / pH: x  Gluc: 85 mg/dL / Ketone: x  / Bili: x / Urobili: x   Blood: x / Protein: x / Nitrite: x   Leuk Esterase: x / RBC: x / WBC x   Sq Epi: x / Non Sq Epi: x / Bacteria: x    Radiology:

## 2024-08-19 NOTE — PROGRESS NOTE ADULT - ATTENDING COMMENTS
Has been responding well to PLEX. Day 4 scheduled for today. Will challenge with mestinon today.    Exam  General:  Healthy appearing.    Mental Status:  A&Ox3.    Cranial Nerves: VF intact, PERRL, limited eye movements in all directions though now able to cross midline L>R, normal sensation bilaterally, bilateral facial weakness, hearing intact, tongue protrudes to midline.    Motor:  UE with right shoulder abduction 4/5 and bilateral finger abduction 4/5. Lower extremities 4/5 throughout aside from bilateral knee extension 5/5.    Sensation: Normal in arms and legs to soft    Reflexes: 2+ bilateral upper and lower extremities.    Coordination: No dysmetria.    Gait: Walks in her room without assistive walking device but seen using walker in the halls    Imp: This is a 33F who was admitted for MG exacerbation requiring intubation now s/p extubation, doesn't respond to IVIG, now responding well to PLEX.    - PLEX day 4 today  - Convert methylpred back to Prednisone 40mg daily  - Start mestinon 30mg TID today (6AM/12PM/4PM; how she takes it at home)  - Rest as above

## 2024-08-20 LAB
ANION GAP SERPL CALC-SCNC: 11 MMOL/L — SIGNIFICANT CHANGE UP (ref 5–17)
BASOPHILS # BLD AUTO: 0.03 K/UL — SIGNIFICANT CHANGE UP (ref 0–0.2)
BASOPHILS NFR BLD AUTO: 0.3 % — SIGNIFICANT CHANGE UP (ref 0–2)
BUN SERPL-MCNC: 16 MG/DL — SIGNIFICANT CHANGE UP (ref 7–23)
CALCIUM SERPL-MCNC: 9.4 MG/DL — SIGNIFICANT CHANGE UP (ref 8.4–10.5)
CHLORIDE SERPL-SCNC: 106 MMOL/L — SIGNIFICANT CHANGE UP (ref 96–108)
CO2 SERPL-SCNC: 22 MMOL/L — SIGNIFICANT CHANGE UP (ref 22–31)
CREAT SERPL-MCNC: 0.65 MG/DL — SIGNIFICANT CHANGE UP (ref 0.5–1.3)
EGFR: 121 ML/MIN/1.73M2 — SIGNIFICANT CHANGE UP
EOSINOPHIL # BLD AUTO: 0.18 K/UL — SIGNIFICANT CHANGE UP (ref 0–0.5)
EOSINOPHIL NFR BLD AUTO: 1.5 % — SIGNIFICANT CHANGE UP (ref 0–6)
GLUCOSE SERPL-MCNC: 89 MG/DL — SIGNIFICANT CHANGE UP (ref 70–99)
HCT VFR BLD CALC: 31.9 % — LOW (ref 34.5–45)
HGB BLD-MCNC: 10.3 G/DL — LOW (ref 11.5–15.5)
IMM GRANULOCYTES NFR BLD AUTO: 1.1 % — HIGH (ref 0–0.9)
LYMPHOCYTES # BLD AUTO: 2.98 K/UL — SIGNIFICANT CHANGE UP (ref 1–3.3)
LYMPHOCYTES # BLD AUTO: 24.9 % — SIGNIFICANT CHANGE UP (ref 13–44)
MCHC RBC-ENTMCNC: 24.8 PG — LOW (ref 27–34)
MCHC RBC-ENTMCNC: 32.3 GM/DL — SIGNIFICANT CHANGE UP (ref 32–36)
MCV RBC AUTO: 76.9 FL — LOW (ref 80–100)
MONOCYTES # BLD AUTO: 0.94 K/UL — HIGH (ref 0–0.9)
MONOCYTES NFR BLD AUTO: 7.8 % — SIGNIFICANT CHANGE UP (ref 2–14)
NEUTROPHILS # BLD AUTO: 7.72 K/UL — HIGH (ref 1.8–7.4)
NEUTROPHILS NFR BLD AUTO: 64.4 % — SIGNIFICANT CHANGE UP (ref 43–77)
NRBC # BLD: 0 /100 WBCS — SIGNIFICANT CHANGE UP (ref 0–0)
PLATELET # BLD AUTO: 278 K/UL — SIGNIFICANT CHANGE UP (ref 150–400)
POTASSIUM SERPL-MCNC: 3.6 MMOL/L — SIGNIFICANT CHANGE UP (ref 3.5–5.3)
POTASSIUM SERPL-SCNC: 3.6 MMOL/L — SIGNIFICANT CHANGE UP (ref 3.5–5.3)
RBC # BLD: 4.15 M/UL — SIGNIFICANT CHANGE UP (ref 3.8–5.2)
RBC # FLD: 17.5 % — HIGH (ref 10.3–14.5)
SODIUM SERPL-SCNC: 139 MMOL/L — SIGNIFICANT CHANGE UP (ref 135–145)
WBC # BLD: 11.98 K/UL — HIGH (ref 3.8–10.5)
WBC # FLD AUTO: 11.98 K/UL — HIGH (ref 3.8–10.5)

## 2024-08-20 PROCEDURE — 99232 SBSQ HOSP IP/OBS MODERATE 35: CPT | Mod: GC

## 2024-08-20 RX ORDER — PYRIDOSTIGMINE BROMIDE 60 MG/5ML
60 SOLUTION ORAL
Refills: 0 | Status: DISCONTINUED | OUTPATIENT
Start: 2024-08-20 | End: 2024-08-21

## 2024-08-20 RX ADMIN — Medication 40 MILLIGRAM(S): at 05:34

## 2024-08-20 RX ADMIN — PYRIDOSTIGMINE BROMIDE 60 MILLIGRAM(S): 60 SOLUTION ORAL at 12:57

## 2024-08-20 RX ADMIN — Medication 5 MILLIGRAM(S): at 22:12

## 2024-08-20 RX ADMIN — PYRIDOSTIGMINE BROMIDE 60 MILLIGRAM(S): 60 SOLUTION ORAL at 18:27

## 2024-08-20 RX ADMIN — Medication 40 MILLIGRAM(S): at 05:35

## 2024-08-20 RX ADMIN — PYRIDOSTIGMINE BROMIDE 30 MILLIGRAM(S): 60 SOLUTION ORAL at 05:35

## 2024-08-20 RX ADMIN — ENOXAPARIN SODIUM 40 MILLIGRAM(S): 100 INJECTION SUBCUTANEOUS at 12:57

## 2024-08-20 RX ADMIN — CHLORHEXIDINE GLUCONATE 1 APPLICATION(S): 40 SOLUTION TOPICAL at 05:35

## 2024-08-20 RX ADMIN — SULFAMETHOXAZOLE AND TRIMETHOPRIM 1 TABLET(S): 800; 160 TABLET ORAL at 12:56

## 2024-08-20 NOTE — PROGRESS NOTE ADULT - ATTENDING COMMENTS
Tolerated day 4 PLEX yesterday. Continues to improve. Denies issues with managing secretion since introducing pyridostigmine yesterday.    Exam  General:  Healthy appearing.    Mental Status:  A&Ox3.    Cranial Nerves: VF intact, PERRL, significantly improved eye movemets as now able to look to the left but continues to be limited looking to the right, normal sensation bilaterally, bilateral facial weakness, hearing intact, tongue protrudes to midline.    Motor:  UE with right shoulder abduction 5-/5 and bilateral finger abduction 4+/5. Lower extremities 4+/5 bilateral hip flexion, 5-/5 knee flexion, 5/5 knee extension, 5-/5 ankle dorsi/plantarflexion    Sensation: Normal in arms and legs to soft    Reflexes: 2+ bilateral upper and lower extremities.    Coordination: No dysmetria.    Gait: Walks unassisted    Imp: This is a 33F who was admitted for MG exacerbation requiring intubation now s/p extubation, doesn't respond to IVIG, now responding well to PLEX.    - PLEX day 5 tomorrow  - C/w prednisone 40mg daily  - Increase mestinon to 60mg TID today (6AM/12PM/4PM; how she takes it at home)  - Rest as above .

## 2024-08-20 NOTE — PROGRESS NOTE ADULT - SUBJECTIVE AND OBJECTIVE BOX
*************************************  NEUROLOGY PROGRESS NOTE  **************************************    ANTONIO POON  Female  MRN-[id number]    Subjective: Patient seen and examined at bedside with Neurology team and attending     Interval History:          VITAL SIGNS:  Vital Signs Last 24 Hrs  T(C): 36.7 (20 Aug 2024 05:40), Max: 37.2 (19 Aug 2024 17:09)  T(F): 98 (20 Aug 2024 05:40), Max: 98.9 (19 Aug 2024 17:09)  HR: 88 (20 Aug 2024 05:40) (77 - 107)  BP: 97/62 (20 Aug 2024 05:40) (90/60 - 119/85)  BP(mean): --  RR: 18 (20 Aug 2024 05:40) (16 - 18)  SpO2: 97% (20 Aug 2024 05:40) (95% - 99%)    Parameters below as of 20 Aug 2024 06:00  Patient On (Oxygen Delivery Method): room air                PHYSICAL EXAMINATION:  INCOMPLETE  General - NAD  Eyes - Fundoscopy with flat, sharp optic discs and no hemorrhage or exudates; Fundoscopy not well visualized; Fundoscopy not performed due to safety precautions in the setting of the COVID-19 pandemic    Neurologic Exam:  Mental status - Awake, Alert, Oriented to person, place, and time. Speech fluent, repetition and naming intact. Follows simple and complex commands. Attention/concentration, recent and remote memory (including registration and recall), and fund of knowledge intact    Cranial nerves - PERRLA, VFF, EOMI, face sensation (V1-V3) intact b/l, facial strength intact without asymmetry b/l, hearing intact b/l, palate with symmetric elevation, trapezius OR sternocleidomastiod 5/5 strength b/l, tongue midline on protrusion with full lateral movement    Motor - Normal bulk and tone throughout. No pronator drift.  Strength testing            Deltoid      Biceps      Triceps     Wrist Extension    Wrist Flexion     Interossei         R            5                 5               5                     5                              5                        5                 5  L             5                 5               5                     5                              5                        5                 5              Hip Flexion    Hip Extension    Knee Flexion    Knee Extension    Dorsiflexion    Plantar Flexion  R              5                           5                       5                           5                            5                          5  L              5                           5                        5                           5                            5                          5    Sensation - Light touch/temperature OR pain/vibration intact throughout    DTR's -             Biceps      Triceps     Brachioradialis      Patellar    Ankle    Toes/plantar response  R             2+             2+                  2+                       2+            2+                 Down  L              2+             2+                 2+                        2+           2+                 Down    Coordination - Finger to Nose intact b/l. No tremors appreciated    Gait and station - Normal casual gait. Romberg (-)      LABS:    CBC                       10.1   10.55 )-----------( 260      ( 19 Aug 2024 05:19 )             31.3     Chem 08-19    139  |  103  |  14  ----------------------------<  85  3.3<L>   |  22  |  0.64    Ca    9.3      19 Aug 2024 05:19      LFTs   Coagulopathy PT/INR - ( 19 Aug 2024 06:12 )   PT: 10.5 sec;   INR: 1.00 ratio         PTT - ( 19 Aug 2024 06:12 )  PTT:26.6 sec  Lipid Panel   A1c   Cardiac enzymes     U/A Urinalysis Basic - ( 19 Aug 2024 05:19 )    Color: x / Appearance: x / SG: x / pH: x  Gluc: 85 mg/dL / Ketone: x  / Bili: x / Urobili: x   Blood: x / Protein: x / Nitrite: x   Leuk Esterase: x / RBC: x / WBC x   Sq Epi: x / Non Sq Epi: x / Bacteria: x      CSF  Immunological  Other      RADIOLOGY & ADDITIONAL STUDIES:             *************************************  NEUROLOGY PROGRESS NOTE  **************************************    ANTONIO POON  Female  MRN-[id number]    Subjective: Patient seen and examined at bedside with Neurology team and attending. Patient reports she is feeling more improved than yesterday, able to walk around unit. Tolerated PLEX session#4 well yesterday. States she did not notice much difference with addition of mestinon 30mg TID yesterday    Interval History:    - Underwent PLEX #4 on 8/19, next scheduled for 8/21  - Tolerating mestinon at current dose. Denies any secretions       VITAL SIGNS:  Vital Signs Last 24 Hrs  T(C): 36.7 (20 Aug 2024 05:40), Max: 37.2 (19 Aug 2024 17:09)  T(F): 98 (20 Aug 2024 05:40), Max: 98.9 (19 Aug 2024 17:09)  HR: 88 (20 Aug 2024 05:40) (77 - 107)  BP: 97/62 (20 Aug 2024 05:40) (90/60 - 119/85)  BP(mean): --  RR: 18 (20 Aug 2024 05:40) (16 - 18)  SpO2: 97% (20 Aug 2024 05:40) (95% - 99%)    Parameters below as of 20 Aug 2024 06:00  Patient On (Oxygen Delivery Method): room air      PHYSICAL EXAMINATION:   General - NAD  Eyes - Fundoscopy not performed due to safety precautions in the setting of the COVID-19 pandemic    Neurologic Exam:  MS: Awake, alert.  Oriented person, place, time, and situation. Follows simple & crossed commands. Attends to examiner  Language: Speech hypophonic but fluent  CNs: PERRL (R 3mm, L 3mm). B/l horizontal gaze palsy (better on left gaze). R eye still with abduction deficit. B/L vertical gaze palsy (better on R). No disconjugate gaze at primary gaze.  Mild ptosis, significantly improved from admission. V1-3 intact LT, No facial asymmetry b/l. Hearing grossly normal b/l. Tongue midline.   Next extension: 5/5  Neck flexion: 4/5  Motor - Normal bulk and tone throughout. No pronator drift.   Grossly 4/5 throughout, fatigable. B/L handgrip 4+/5  Sensation: Intact to LT b/l.  Reflexes L/R:  Biceps(C5) 2/2  BR(C6) 2/2   Triceps(C7)  2/2 Patellar(L4)   2/2   Coordination: No dysmetria to FTN b/l UE  Gait: Gait assessed      LABS:    CBC                       10.1   10.55 )-----------( 260      ( 19 Aug 2024 05:19 )             31.3     Chem 08-19    139  |  103  |  14  ----------------------------<  85  3.3<L>   |  22  |  0.64    Ca    9.3      19 Aug 2024 05:19      LFTs   Coagulopathy PT/INR - ( 19 Aug 2024 06:12 )   PT: 10.5 sec;   INR: 1.00 ratio         PTT - ( 19 Aug 2024 06:12 )  PTT:26.6 sec  Lipid Panel   A1c   Cardiac enzymes     U/A Urinalysis Basic - ( 19 Aug 2024 05:19 )    Color: x / Appearance: x / SG: x / pH: x  Gluc: 85 mg/dL / Ketone: x  / Bili: x / Urobili: x   Blood: x / Protein: x / Nitrite: x   Leuk Esterase: x / RBC: x / WBC x   Sq Epi: x / Non Sq Epi: x / Bacteria: x        RADIOLOGY & ADDITIONAL STUDIES:

## 2024-08-20 NOTE — PROGRESS NOTE ADULT - ASSESSMENT
Scarlet Blackburn is a 33 RH woman with PMH of Asthma, Sickle cell trait, Myasthenia gravis (AChR antibody positive, diagnosed in 2017) on pyridostigmine 60mg QID who presented w weakness, b/l ptosis, diplopia, as well as neck weakness admitted for management of MG exacerbation. Pt was reporting improving generalized weakness but on 5th day of IVIG pt with clinical worsening. Admitted to MICU on 8/12/2024 due to increased lethargy and sensation of drowning. Patient started on PLEX, then extubated. She was stable on room air, and transferred to Neuro floor on 8/16 for remainder of care, most recent dose of PLEX received 8/19.     Impression  Bilateral ptosis, proximal>distal muscle weakness, severely reduced EOM, initially admitted for myasthenia gravis exacerbation s/o course of IVIG without significant improvement. Course c/b respiratory distress requiring intubation, care in MICU and course of PLEX. Patient is now s/p four sessions of PLEX, extubated and stable on room air.     Plan:   [x] S/P IVIG 5 day course   [/] Continue PLEX for total of 5 sessions every other day. s/p PLEX #4 on 8/19. Next on 8/21  [x] Started Mestinon 30mg 6AM /12 PM/4 PM on 8/19.  [x] Discontinue methylprednisolone > switched to PO Prednisone to 40mg daily on 8/19.  [x] NIF/VC and vitals q12, NIF-50 VC 1770 AM of 8/19  [x] Neurochecks and vitals q4   [ ] Not vaccinated in 4 years for meningococcal disease. ID will need to be c/s for penicillin IV and meningococcal vaccine -> (if decide after PLEX to start Eculizumab)  [ ] PT/OT: rec acute rehab, will re-evaluate today after PLEX     Other  [x] Medical evaluation of leukocytopenia, now resolved   [x] For cough with productive sputum: COVID/flu/RSV neg and CXR normal on 8/17. No leukocytosis or fever, will continue supportive care     DVT ppx: Lovenox  Diet: Regular  Dispo: Pending PLEX completion and clinical course. Will ask about support at home     Patient seen and discussed with Neurology attending Dr. Nice, note finalized upon attending attestation.  Scarlet Blackburn is a 33 RH woman with PMH of Asthma, Sickle cell trait, Myasthenia gravis (AChR antibody positive, diagnosed in 2017) on pyridostigmine 60mg QID who presented w weakness, b/l ptosis, diplopia, as well as neck weakness admitted for management of MG exacerbation. Pt was reporting improving generalized weakness but on 5th day of IVIG pt with clinical worsening. Admitted to MICU on 8/12/2024 due to increased lethargy and sensation of drowning. Patient started on PLEX, then extubated. She was stable on room air, and transferred to Neuro floor on 8/16 for remainder of care, most recent dose of PLEX received 8/19.     Impression  Bilateral ptosis, proximal>distal muscle weakness, severely reduced EOM, initially admitted for myasthenia gravis exacerbation s/o course of IVIG without significant improvement. Course c/b respiratory distress requiring intubation, care in MICU and course of PLEX. Patient is now s/p four sessions of PLEX, extubated and stable on room air.     Plan:   [x] S/P IVIG 5 day course   [/] Continue PLEX for total of 5 sessions every other day. PLEX #5 on  8/21  [x] Increase Mestinon 60mg 6AM /12 PM/4 PM   [x] Continue PO Prednisone to 40mg daily  [x] NIF/VC and vitals q12  [x] Neurochecks and vitals q4   [ ] Not vaccinated in 4 years for meningococcal disease. ID will need to be c/s for penicillin IV and meningococcal vaccine -> (if decide after PLEX to start Eculizumab)  [x] PT/OT: Home with outpatient PT    Other  [x] Medical evaluation of leukocytopenia, now resolved   [x] For cough with productive sputum: COVID/flu/RSV neg and CXR normal on 8/17. No leukocytosis or fever, will continue supportive care     DVT ppx: Lovenox  Diet: Regular  Dispo: Pending PLEX completion on 8/21, may discharge on 8/22    Patient seen and discussed with Neurology attending Dr. Nice, note finalized upon attending attestation.

## 2024-08-21 ENCOUNTER — TRANSCRIPTION ENCOUNTER (OUTPATIENT)
Age: 34
End: 2024-08-21

## 2024-08-21 ENCOUNTER — NON-APPOINTMENT (OUTPATIENT)
Age: 34
End: 2024-08-21

## 2024-08-21 VITALS
HEART RATE: 96 BPM | OXYGEN SATURATION: 97 % | RESPIRATION RATE: 16 BRPM | SYSTOLIC BLOOD PRESSURE: 97 MMHG | TEMPERATURE: 99 F | DIASTOLIC BLOOD PRESSURE: 63 MMHG

## 2024-08-21 LAB
BASOPHILS # BLD AUTO: 0.06 K/UL — SIGNIFICANT CHANGE UP (ref 0–0.2)
BASOPHILS NFR BLD AUTO: 0.4 % — SIGNIFICANT CHANGE UP (ref 0–2)
CA-I BLD-SCNC: 1.24 MMOL/L — SIGNIFICANT CHANGE UP (ref 1.15–1.33)
EOSINOPHIL # BLD AUTO: 0.17 K/UL — SIGNIFICANT CHANGE UP (ref 0–0.5)
EOSINOPHIL NFR BLD AUTO: 1.2 % — SIGNIFICANT CHANGE UP (ref 0–6)
FIBRINOGEN PPP-MCNC: 141 MG/DL — LOW (ref 200–445)
HCT VFR BLD CALC: 31 % — LOW (ref 34.5–45)
HGB BLD-MCNC: 10.1 G/DL — LOW (ref 11.5–15.5)
IMM GRANULOCYTES NFR BLD AUTO: 1.4 % — HIGH (ref 0–0.9)
INR BLD: 1.02 RATIO — SIGNIFICANT CHANGE UP (ref 0.85–1.18)
LYMPHOCYTES # BLD AUTO: 31.6 % — SIGNIFICANT CHANGE UP (ref 13–44)
LYMPHOCYTES # BLD AUTO: 4.42 K/UL — HIGH (ref 1–3.3)
MAGNESIUM SERPL-MCNC: 2 MG/DL — SIGNIFICANT CHANGE UP (ref 1.6–2.6)
MCHC RBC-ENTMCNC: 24.6 PG — LOW (ref 27–34)
MCHC RBC-ENTMCNC: 32.6 GM/DL — SIGNIFICANT CHANGE UP (ref 32–36)
MCV RBC AUTO: 75.4 FL — LOW (ref 80–100)
MONOCYTES # BLD AUTO: 1.1 K/UL — HIGH (ref 0–0.9)
MONOCYTES NFR BLD AUTO: 7.9 % — SIGNIFICANT CHANGE UP (ref 2–14)
NEUTROPHILS # BLD AUTO: 8.03 K/UL — HIGH (ref 1.8–7.4)
NEUTROPHILS NFR BLD AUTO: 57.5 % — SIGNIFICANT CHANGE UP (ref 43–77)
NRBC # BLD: 0 /100 WBCS — SIGNIFICANT CHANGE UP (ref 0–0)
PHOSPHATE SERPL-MCNC: 4.4 MG/DL — SIGNIFICANT CHANGE UP (ref 2.5–4.5)
PLATELET # BLD AUTO: 314 K/UL — SIGNIFICANT CHANGE UP (ref 150–400)
PROTHROM AB SERPL-ACNC: 10.7 SEC — SIGNIFICANT CHANGE UP (ref 9.5–13)
RBC # BLD: 4.11 M/UL — SIGNIFICANT CHANGE UP (ref 3.8–5.2)
RBC # FLD: 17.3 % — HIGH (ref 10.3–14.5)
WBC # BLD: 13.97 K/UL — HIGH (ref 3.8–10.5)
WBC # FLD AUTO: 13.97 K/UL — HIGH (ref 3.8–10.5)

## 2024-08-21 PROCEDURE — 86850 RBC ANTIBODY SCREEN: CPT

## 2024-08-21 PROCEDURE — 85014 HEMATOCRIT: CPT

## 2024-08-21 PROCEDURE — 97535 SELF CARE MNGMENT TRAINING: CPT

## 2024-08-21 PROCEDURE — 0241U: CPT

## 2024-08-21 PROCEDURE — 99285 EMERGENCY DEPT VISIT HI MDM: CPT

## 2024-08-21 PROCEDURE — 82550 ASSAY OF CK (CPK): CPT

## 2024-08-21 PROCEDURE — 97161 PT EVAL LOW COMPLEX 20 MIN: CPT

## 2024-08-21 PROCEDURE — 82962 GLUCOSE BLOOD TEST: CPT

## 2024-08-21 PROCEDURE — 85025 COMPLETE CBC W/AUTO DIFF WBC: CPT

## 2024-08-21 PROCEDURE — 92610 EVALUATE SWALLOWING FUNCTION: CPT

## 2024-08-21 PROCEDURE — 71045 X-RAY EXAM CHEST 1 VIEW: CPT

## 2024-08-21 PROCEDURE — 85730 THROMBOPLASTIN TIME PARTIAL: CPT

## 2024-08-21 PROCEDURE — 82947 ASSAY GLUCOSE BLOOD QUANT: CPT

## 2024-08-21 PROCEDURE — 97164 PT RE-EVAL EST PLAN CARE: CPT

## 2024-08-21 PROCEDURE — 84100 ASSAY OF PHOSPHORUS: CPT

## 2024-08-21 PROCEDURE — 94002 VENT MGMT INPAT INIT DAY: CPT

## 2024-08-21 PROCEDURE — 81003 URINALYSIS AUTO W/O SCOPE: CPT

## 2024-08-21 PROCEDURE — 82330 ASSAY OF CALCIUM: CPT

## 2024-08-21 PROCEDURE — 92526 ORAL FUNCTION THERAPY: CPT

## 2024-08-21 PROCEDURE — 94003 VENT MGMT INPAT SUBQ DAY: CPT

## 2024-08-21 PROCEDURE — 85610 PROTHROMBIN TIME: CPT

## 2024-08-21 PROCEDURE — 87640 STAPH A DNA AMP PROBE: CPT

## 2024-08-21 PROCEDURE — 87641 MR-STAPH DNA AMP PROBE: CPT

## 2024-08-21 PROCEDURE — 97116 GAIT TRAINING THERAPY: CPT

## 2024-08-21 PROCEDURE — 84295 ASSAY OF SERUM SODIUM: CPT

## 2024-08-21 PROCEDURE — 36514 APHERESIS PLASMA: CPT

## 2024-08-21 PROCEDURE — 87637 SARSCOV2&INF A&B&RSV AMP PRB: CPT

## 2024-08-21 PROCEDURE — 36415 COLL VENOUS BLD VENIPUNCTURE: CPT

## 2024-08-21 PROCEDURE — 86901 BLOOD TYPING SEROLOGIC RH(D): CPT

## 2024-08-21 PROCEDURE — 85027 COMPLETE CBC AUTOMATED: CPT

## 2024-08-21 PROCEDURE — 80053 COMPREHEN METABOLIC PANEL: CPT

## 2024-08-21 PROCEDURE — 83735 ASSAY OF MAGNESIUM: CPT

## 2024-08-21 PROCEDURE — 86922 COMPATIBILITY TEST ANTIGLOB: CPT

## 2024-08-21 PROCEDURE — P9041: CPT

## 2024-08-21 PROCEDURE — 86900 BLOOD TYPING SEROLOGIC ABO: CPT

## 2024-08-21 PROCEDURE — 83605 ASSAY OF LACTIC ACID: CPT

## 2024-08-21 PROCEDURE — 84443 ASSAY THYROID STIM HORMONE: CPT

## 2024-08-21 PROCEDURE — 94150 VITAL CAPACITY TEST: CPT

## 2024-08-21 PROCEDURE — 84145 PROCALCITONIN (PCT): CPT

## 2024-08-21 PROCEDURE — 80048 BASIC METABOLIC PNL TOTAL CA: CPT

## 2024-08-21 PROCEDURE — P9059: CPT

## 2024-08-21 PROCEDURE — 85384 FIBRINOGEN ACTIVITY: CPT

## 2024-08-21 PROCEDURE — 82435 ASSAY OF BLOOD CHLORIDE: CPT

## 2024-08-21 PROCEDURE — 84439 ASSAY OF FREE THYROXINE: CPT

## 2024-08-21 PROCEDURE — 99239 HOSP IP/OBS DSCHRG MGMT >30: CPT

## 2024-08-21 PROCEDURE — 36430 TRANSFUSION BLD/BLD COMPNT: CPT

## 2024-08-21 PROCEDURE — 97530 THERAPEUTIC ACTIVITIES: CPT

## 2024-08-21 PROCEDURE — 97166 OT EVAL MOD COMPLEX 45 MIN: CPT

## 2024-08-21 PROCEDURE — 94640 AIRWAY INHALATION TREATMENT: CPT

## 2024-08-21 PROCEDURE — 82803 BLOOD GASES ANY COMBINATION: CPT

## 2024-08-21 PROCEDURE — 85018 HEMOGLOBIN: CPT

## 2024-08-21 PROCEDURE — 84132 ASSAY OF SERUM POTASSIUM: CPT

## 2024-08-21 PROCEDURE — 97110 THERAPEUTIC EXERCISES: CPT

## 2024-08-21 PROCEDURE — 81025 URINE PREGNANCY TEST: CPT

## 2024-08-21 RX ORDER — SULFAMETHOXAZOLE AND TRIMETHOPRIM 800; 160 MG/1; MG/1
1 TABLET ORAL
Qty: 7 | Refills: 0
Start: 2024-08-21 | End: 2024-08-27

## 2024-08-21 RX ORDER — PANTOPRAZOLE SODIUM 40 MG
1 TABLET, DELAYED RELEASE (ENTERIC COATED) ORAL
Qty: 30 | Refills: 0
Start: 2024-08-21 | End: 2024-09-19

## 2024-08-21 RX ORDER — PREDNISONE 10 MG
2 TABLET, DOSE PACK ORAL
Qty: 60 | Refills: 2
Start: 2024-08-21 | End: 2024-11-18

## 2024-08-21 RX ADMIN — PYRIDOSTIGMINE BROMIDE 60 MILLIGRAM(S): 60 SOLUTION ORAL at 11:49

## 2024-08-21 RX ADMIN — Medication 40 MILLIGRAM(S): at 06:05

## 2024-08-21 RX ADMIN — Medication 40 MILLIGRAM(S): at 06:04

## 2024-08-21 RX ADMIN — SULFAMETHOXAZOLE AND TRIMETHOPRIM 1 TABLET(S): 800; 160 TABLET ORAL at 11:48

## 2024-08-21 RX ADMIN — PYRIDOSTIGMINE BROMIDE 60 MILLIGRAM(S): 60 SOLUTION ORAL at 16:50

## 2024-08-21 RX ADMIN — CHLORHEXIDINE GLUCONATE 1 APPLICATION(S): 40 SOLUTION TOPICAL at 06:07

## 2024-08-21 RX ADMIN — PYRIDOSTIGMINE BROMIDE 60 MILLIGRAM(S): 60 SOLUTION ORAL at 06:04

## 2024-08-21 NOTE — PROGRESS NOTE ADULT - ASSESSMENT
Scarlet Blackburn is a 33 RH woman with PMH of Asthma, Sickle cell trait, Myasthenia gravis (AChR antibody positive, diagnosed in 2017) on pyridostigmine 60mg QID who presented w weakness, b/l ptosis, diplopia, as well as neck weakness admitted for management of MG exacerbation. Pt was reporting improving generalized weakness but on 5th day of IVIG pt with clinical worsening. Admitted to MICU on 8/12/2024 due to increased lethargy and sensation of drowning. Patient started on PLEX, then extubated. She was stable on room air, and transferred to Neuro floor on 8/16 for remainder of care.    Impression  Bilateral ptosis, proximal>distal muscle weakness, severely reduced EOM, initially admitted for myasthenia gravis exacerbation s/o course of IVIG without significant improvement. Course c/b respiratory distress requiring intubation, care in MICU and initiation of PLEX. Patient extubated and stable on room air. Patient is scheduled to complete 5 sessions of PLEX today, notes significant improvement in strength and ptosis, however still w diplopia     Plan:   [x] S/P IVIG 5 day course   [/] Continue PLEX for total of 5 sessions every other day. PLEX #5 scheduled for today 8/21.  [ ] IR consult for Shiley removal after last session of PLEX today   [x] Continue Mestinon 60mg 6AM /12 PM/4 PM while inpatient. Upon discharge will resume to QID dosing   [x] Continue PO Prednisone to 40mg daily - will continue upon discharge   [x] NIF/VC and vitals q12  [x] Neurochecks and vitals q4   [x] PT/OT: Home with outpatient PT  [ ] Provide eye patch to help with diplopia- patient advised to minimize use to prevent ocular compensation   [ ] Follow outpatient with Neurologist Dr. Eason for further management       DVT ppx: Lovenox  Diet: Regular  Dispo: Pending PLEX completion on today and monitoring after Shiley removal, possible discharge tonight vs tomorrow       Patient seen and discussed with Neurology attending Dr. Nice, note finalized upon attending attestation.

## 2024-08-21 NOTE — PROGRESS NOTE ADULT - SUBJECTIVE AND OBJECTIVE BOX
*************************************  NEUROLOGY PROGRESS NOTE  **************************************    ANTONIO POON  Female  MRN-[id number]    Subjective: Patient seen and examined at bedside with Neurology team and attending. Patient reports feeling tired this morning as she's been up since 4:30, but otherwise doing well.     Interval History:    - No overnight events   - PLEX #5 scheduled for today       VITAL SIGNS:  Vital Signs Last 24 Hrs  T(C): 36.7 (21 Aug 2024 04:43), Max: 37.3 (20 Aug 2024 16:58)  T(F): 98.1 (21 Aug 2024 04:43), Max: 99.1 (20 Aug 2024 16:58)  HR: 75 (21 Aug 2024 04:43) (70 - 92)  BP: 111/73 (21 Aug 2024 04:43) (94/61 - 111/73)  BP(mean): --  RR: 18 (21 Aug 2024 04:43) (16 - 18)  SpO2: 98% (21 Aug 2024 04:43) (96% - 98%)  Parameters below as of 21 Aug 2024 04:43  Patient On (Oxygen Delivery Method): room air        PHYSICAL EXAMINATION:    General - NAD  Eyes - Fundoscopy not performed due to safety precautions in the setting of the COVID-19 pandemic    Neurologic Exam:  MS: Awake, alert.  Oriented person, place, time, and situation. Follows simple & crossed commands. Attends to examiner  Language: Speech hypophonic but fluent  CNs: PERRL (R 3mm, L 3mm). B/l horizontal gaze palsy (better on left gaze). R eye still with abduction deficit. B/L vertical gaze palsy (better on R). No disconjugate gaze at primary gaze.  Mild ptosis (L>R) significantly improved from admission. V1-3 intact LT, No facial asymmetry b/l. Hearing grossly normal b/l. Tongue midline.   Next extension: 5/5  Neck flexion: 4/5  Motor - Normal bulk and tone throughout. No pronator drift.   Grossly 4/5 throughout, fatigable. Left leg is about 4+/5. B/L handgrip 4+/5  Sensation: Intact to LT b/l.  Reflexes L/R:  Biceps(C5) 2/2  BR(C6) 2/2   Triceps(C7)  2/2 Patellar(L4)   2/2   Coordination: No dysmetria to FTN b/l UE  Gait: Gait not assessed      LABS:    CBC                       10.1   13.97 )-----------( 314      ( 21 Aug 2024 06:21 )             31.0     Chem 08-20    139  |  106  |  16  ----------------------------<  89  3.6   |  22  |  0.65    Ca    9.4      20 Aug 2024 07:45  Phos  4.4     08-21  Mg     2.0     08-21    LFTs   Coagulopathy PT/INR - ( 21 Aug 2024 06:21 )   PT: 10.7 sec;   INR: 1.02 ratio      U/A Urinalysis Basic - ( 20 Aug 2024 07:45 )    Color: x / Appearance: x / SG: x / pH: x  Gluc: 89 mg/dL / Ketone: x  / Bili: x / Urobili: x   Blood: x / Protein: x / Nitrite: x   Leuk Esterase: x / RBC: x / WBC x   Sq Epi: x / Non Sq Epi: x / Bacteria: x      RADIOLOGY & ADDITIONAL STUDIES:

## 2024-08-21 NOTE — CHART NOTE - NSCHARTNOTEFT_GEN_A_CORE
I attest to the pharmacy technician's note.      Lizz Loyola, PharmD  [facility] Clinical Pharmacist  [phone]   Genaro removed at bedside by MICU resident at 3:25PM. Patient tolerated removal well, will monitor patient's vitals per protocol.

## 2024-08-21 NOTE — DISCHARGE NOTE NURSING/CASE MANAGEMENT/SOCIAL WORK - NSDCVIVACCINE_GEN_ALL_CORE_FT
Tdap; 23-Oct-2017 20:30; Kj Gil (DEYSI); [employer]; [id number]; IntraMuscular; Deltoid Left.; 0.5 milliLiter(s); VIS (VIS Published: 09-May-2013, VIS Presented: 23-Oct-2017);

## 2024-08-21 NOTE — PROGRESS NOTE ADULT - REASON FOR ADMISSION
Myasthenia gravis exacerbation

## 2024-08-21 NOTE — PROGRESS NOTE ADULT - ATTENDING COMMENTS
Patient continues to improve. Tolerating mestinon 60mg TID without issues. Planned for last session of PLEX today. Discussed keeping her around until tomorrow but the patient expressed strong desire to be discharged tonight after observation post-Shiley removal even if late.    Exam  General:  Healthy appearing.    Mental Status:  A&Ox3.    Cranial Nerves: VF intact, PERRL, significantly improved eye (left eye almost complete abduction, right eye complete adduction; left eye able to cross midline on adduction, right eye cannot abduct), normal sensation bilaterally, mild bilateral facial weakness, hearing intact, tongue protrudes to midline.    Motor:  UE 5/5 throughout. Lower extremities 5-/5 bilateral, R>L, hip flexion; rest of LE full strength    Sensation: Normal in arms and legs to soft    Reflexes: 2+ bilateral upper and lower extremities.    Coordination: No dysmetria.    Gait: Walks unassisted    Imp: This is a 33F who was admitted for MG exacerbation requiring intubation now s/p extubation, doesn't respond to IVIG, now responding well to PLEX.    - PLEX day 5 today  - C/w prednisone 40mg daily  - C/w mestinon to 60mg TID today (6AM/12PM/4PM; how she takes it at home)  - If patient stable without complication 4 hours after Shiley removal, can be discharged home  - Home meds: pred 40, Mestinon 60 qid (takes nighttime dose at home as she wakes up with )  - F/u Dr. Eason  - Rest as above .

## 2024-08-21 NOTE — CHART NOTE - NSCHARTNOTEFT_GEN_A_CORE
Ms. Blackburn is a 34 YO woman with PMH of myasthenia gravis (AChR antibody positive, diagnosed in 2017), previously requiring multiple intubations, asthma, sickle cell trait. She presented with weakness, not improving s/p IVIG, requiring MICU transfer for monitoring of respiratory status and for possible need for therapeutic plasma exchange (TPE). Pt has responded well to TPE in the past. Plan is to perform 5 procedures, every other day.    TPE #1 performed on 8/12/24. One plasma volume exchanged using 5% albumin as replacement fluid. The patient tolerated the procedure well.    TPE #2 performed on 8/14/24. One plasma volume exchanged using 5% albumin as replacement fluid. The patient tolerated the procedure well.    TPE #3 is performed on 08/16/24. One plasma volume exchanged using 5% albumin as replacement fluid. The patient tolerated the procedure well.    TPE #4 is performed today on 8/19/24. One plasma volume exchanged using 5% albumin as replacement fluid. The patient tolerated the procedure well.    TPE #5 is performed today on 8/21/24. One plasma volume exchanged using 5% albumin as replacement fluid. The patient tolerated the procedure well.    The patient states that she feels much improved and denies any complaints today. She still has ptosis and vision issues but its getting better. Ms. Blackburn is a 32 YO woman with PMH of myasthenia gravis (AChR antibody positive, diagnosed in 2017), previously requiring multiple intubations, asthma, sickle cell trait. She presented with weakness, not improving s/p IVIG, requiring MICU transfer for monitoring of respiratory status and therapeutic plasma exchange (TPE) was initated as the pt has responded well to TPE in the past. The patient's symptoms improved and the patient was transferred back to the floor. Plan is to perform 5 procedures, every other day.    TPE #1 performed on 8/12/24. One plasma volume exchanged using 5% albumin as replacement fluid. The patient tolerated the procedure well.    TPE #2 performed on 8/14/24. One plasma volume exchanged using 5% albumin as replacement fluid. The patient tolerated the procedure well.    TPE #3 is performed on 08/16/24. One plasma volume exchanged using 5% albumin as replacement fluid. The patient tolerated the procedure well.    TPE #4 performed on 8/19/24. One plasma volume exchanged using 5% albumin as replacement fluid. The patient tolerated the procedure well.    TPE #5 is performed today on 8/21/24. One plasma volume exchanged using 5% albumin as replacement fluid. The patient tolerated the procedure well. The patient states that she feels much improved and denies any complaints today and has noted improvement after each TPE.  This was the last TPE scheduled/anticipated. The apheresis catheter may be removed as no additional TPE will be performed.      Attending Comment: I personally saw and evaluated the patient.  I fully participated in the care of this patient.  I have made amendments to the documentation where necessary, and agree with the history and plan as documented by the resident.

## 2024-08-21 NOTE — PROGRESS NOTE ADULT - TIME BILLING
Chart review, exam, coordination of care, treatment
Diagnosis and treatment
Coordination of care, chart review, treatment
Coordination of care, chart review, treatment

## 2024-08-21 NOTE — DISCHARGE NOTE NURSING/CASE MANAGEMENT/SOCIAL WORK - PATIENT PORTAL LINK FT
You can access the FollowMyHealth Patient Portal offered by [facility] by registering at the following website: [email or web address]. By joining [employer]’s FollowMyHealth portal, you will also be able to view your health information using other applications (apps) compatible with our system.

## 2024-08-22 ENCOUNTER — NON-APPOINTMENT (OUTPATIENT)
Age: 34
End: 2024-08-22

## 2024-09-24 ENCOUNTER — APPOINTMENT (OUTPATIENT)
Dept: NEUROLOGY | Facility: CLINIC | Age: 34
End: 2024-09-24
Payer: MEDICAID

## 2024-09-24 VITALS
BODY MASS INDEX: 26.13 KG/M2 | DIASTOLIC BLOOD PRESSURE: 77 MMHG | WEIGHT: 142 LBS | HEART RATE: 73 BPM | HEIGHT: 62 IN | SYSTOLIC BLOOD PRESSURE: 113 MMHG

## 2024-09-24 DIAGNOSIS — G70.00 MYASTHENIA GRAVIS W/OUT (ACUTE) EXACERBATION: ICD-10-CM

## 2024-09-24 DIAGNOSIS — D84.821 IMMUNODEFICIENCY DUE TO DRUGS: ICD-10-CM

## 2024-09-24 DIAGNOSIS — T38.0X5A IMMUNODEFICIENCY DUE TO DRUGS: ICD-10-CM

## 2024-09-24 DIAGNOSIS — Z79.52 IMMUNODEFICIENCY DUE TO DRUGS: ICD-10-CM

## 2024-09-24 PROCEDURE — 99214 OFFICE O/P EST MOD 30 MIN: CPT

## 2024-09-24 RX ORDER — PREDNISONE 10 MG/1
10 TABLET ORAL 4 TIMES DAILY
Qty: 120 | Refills: 4 | Status: ACTIVE | COMMUNITY
Start: 2024-09-24 | End: 1900-01-01

## 2024-09-24 RX ORDER — PANTOPRAZOLE 40 MG/1
40 TABLET, DELAYED RELEASE ORAL DAILY
Qty: 90 | Refills: 1 | Status: ACTIVE | COMMUNITY
Start: 2024-09-24 | End: 1900-01-01

## 2024-09-24 RX ORDER — PYRIDOSTIGMINE BROMIDE 60 MG/1
60 TABLET ORAL 4 TIMES DAILY
Qty: 360 | Refills: 1 | Status: ACTIVE | COMMUNITY
Start: 2024-09-24 | End: 1900-01-01

## 2024-09-25 LAB
ALBUMIN SERPL ELPH-MCNC: 4.6 G/DL
ALP BLD-CCNC: 84 U/L
ALT SERPL-CCNC: 13 U/L
ANION GAP SERPL CALC-SCNC: 16 MMOL/L
AST SERPL-CCNC: 12 U/L
BILIRUB SERPL-MCNC: 0.8 MG/DL
BUN SERPL-MCNC: 13 MG/DL
CALCIUM SERPL-MCNC: 9.5 MG/DL
CHLORIDE SERPL-SCNC: 99 MMOL/L
CO2 SERPL-SCNC: 24 MMOL/L
CREAT SERPL-MCNC: 0.72 MG/DL
EGFR: 114 ML/MIN/1.73M2
HCT VFR BLD CALC: 33.3 %
HGB BLD-MCNC: 10.1 G/DL
MCHC RBC-ENTMCNC: 24.5 PG
MCHC RBC-ENTMCNC: 30.3 GM/DL
MCV RBC AUTO: 80.6 FL
PLATELET # BLD AUTO: 399 K/UL
POTASSIUM SERPL-SCNC: 4.8 MMOL/L
PROT SERPL-MCNC: 7.3 G/DL
RBC # BLD: 4.13 M/UL
RBC # FLD: 18.8 %
SODIUM SERPL-SCNC: 139 MMOL/L
WBC # FLD AUTO: 10.75 K/UL

## 2024-09-25 NOTE — DISCUSSION/SUMMARY
[FreeTextEntry1] : Opinion-Ms. Blackburn had  complicated admission to [facility] where she was advised in my office to go for myasthenic crisis treatment and after IVIG and plasma exchangeShe recovered uneventfully without any complications of intubation and today returns back for follow-up evaluation and is surprisingly normal with the exception of 1 mm narrow palpebral fissure on the left.  Unfortunately she is running out of her prednisone and therefore I gave her a prescription to start 10 mg prednisone 4 tablets a day for a week and gradually tapering by 1 tablet every week for next 3 weeks and when she reaches 10 mg daily at the fourth week she will continue it for 4 weeks and overall treatment will continue for 7 weeks but will be reevaluated in 6 weeks and an appointment has been given.  I advised her to seek a family physician for general medical care and she has none at the present time particularly in view of anemia.  I educated her that she must respond to her phone calls and emails and I reiterated that she can directly call me and gave her my direct phone number and her email is [email or web address] and her cell phone number is [phone] and that should call me periodically and keep in touch with me if there is any change as crisis is likely considering the brittle nature of her myasthenia gravis and discussed the side effects and long-term effects of her steroid therapy and must have an internist to check her bone health and cautioned her regarding GI bleed osteonecrosis metabolic dysfunction and has to be periodically checked.  She expressed understanding and will proceed with my advice.

## 2024-09-25 NOTE — HISTORY OF PRESENT ILLNESS
[FreeTextEntry1] : Ms. Blackburn is a 34-year-old lady with a very complicated case of myasthenia gravis acetylcholine receptor positive status who was originally evaluated by my previous partner and he tried [facility]ir[facility] IVIG therapies and they have persistently failed and subsequently denied by his insurance company at least 4 times.  She has been on Mestinon and was recently evaluated in my office with myasthenic crisis requiring emergency admission at [facility] and was treated with IVIG which failed and subsequently required intubation and plasma exchange and recovered gradually and was discharged on 40 mg of prednisone with appropriate precautions.  Today she returns back and stated that she ran out of steroids and therefore took 40 mg only for 2 weeks and now takes 20 mg daily since last 1 week and only 2 pills of been left and she did not contact me.  She was advised that I had sent a letter to her and emailed it to her regarding her employment but she denied receiving it and I checked with my  who had evidence in her records that the letter was in fact sent to her with email confirmation.  Nevertheless she does not want to return back to her previous job and is now unemployed and is applying for [employer].  She lives in an apartment at home with 3 children and help by her mother and today's medications include pantoprazole prednisone Mestinon and all other immunosuppressant drugs failed.  She is essentially asymptomatic and denied any memory language cognitive or behavioral dysfunction is very pleasant and cooperative and walked into my office normally.  There is no ptosis diplopia dysarthria dysphagia or dyspnea but she stated that she has a minor difference in her palpebral fissure left being slightly narrower and has no proximal or distal muscle weakness focal weakness ataxia shortness of breath or excessive fatigue.  There is no interim past medical history other than admission to [facility] for myasthenia crisis.  I reviewed all her medications and allergies.

## 2024-09-25 NOTE — DATA REVIEWED
[de-identified] : I reviewed her admission to [facility] and was constantly in touch with the attending and guided them with the appropriate treatment and follow-ups.  I reviewed the records and it was appropriate treatment on an emergency basis.  I do not have any lab tests and today I advised her to get CBC and comprehensive metabolic panel and at the time of this dictation I noted that she is slightly anemic and will call her to go to an internist or any practitioner in her neighborhood and she has none at the present time and to have investigations for anemia and appropriate treatment.  Her comprehensive metabolic panel was unremarkable.  I have recorded in the electronic medical records.

## 2024-09-25 NOTE — PHYSICAL EXAM
[FreeTextEntry1] : Vital signs were recorded and unremarkable.  Head neck, ear nose and throat is unremarkable.  There is no carotid bruit thyromegaly or lymphadenopathy.  Neck is supple and there are no meningeal signs.  Chest is clear heart sounds are normal abdomen is soft pedal pulsations are present.  Her neurological examination today is completely normal with the exception of 1 mm narrow palpebral fissure. [General Appearance - Alert] : alert [General Appearance - In No Acute Distress] : in no acute distress [Oriented To Time, Place, And Person] : oriented to person, place, and time [Affect] : the affect was normal [Impaired Insight] : insight and judgment were intact [Person] : oriented to person [Place] : oriented to place [Time] : oriented to time [Concentration Intact] : normal concentrating ability [Visual Intact] : visual attention was ~T not ~L decreased [Naming Objects] : no difficulty naming common objects [Repeating Phrases] : no difficulty repeating a phrase [Writing A Sentence] : no difficulty writing a sentence [Fluency] : fluency intact [Comprehension] : comprehension intact [Reading] : reading intact [Past History] : adequate knowledge of personal past history [Cranial Nerves Optic (II)] : visual acuity intact bilaterally,  visual fields full to confrontation, pupils equal round and reactive to light [Cranial Nerves Trigeminal (V)] : facial sensation intact symmetrically [Cranial Nerves Oculomotor (III)] : extraocular motion intact [Cranial Nerves Facial (VII)] : face symmetrical [Cranial Nerves Vestibulocochlear (VIII)] : hearing was intact bilaterally [Cranial Nerves Glossopharyngeal (IX)] : tongue and palate midline [Cranial Nerves Accessory (XI - Cranial And Spinal)] : head turning and shoulder shrug symmetric [Cranial Nerves Hypoglossal (XII)] : there was no tongue deviation with protrusion [Motor Tone] : muscle tone was normal in all four extremities [Motor Strength] : muscle strength was normal in all four extremities [No Muscle Atrophy] : normal bulk in all four extremities [Sensation Tactile Decrease] : light touch was intact [Balance] : balance was intact [Abnormal Walk] : normal gait [Past-pointing] : there was no past-pointing [Tremor] : no tremor present [2+] : Ankle jerk left 2+ [Plantar Reflex Right Only] : normal on the right [Plantar Reflex Left Only] : normal on the left

## 2024-11-04 ENCOUNTER — APPOINTMENT (OUTPATIENT)
Dept: NEUROLOGY | Facility: CLINIC | Age: 34
End: 2024-11-04

## 2024-11-19 NOTE — ED ADULT NURSE NOTE - PRO INTERPRETER NEED 2
English Size Of Lesion In Cm (Optional): 0 Introduction Text (Please End With A Colon): Pt defers treatment options provided: Detail Level: Zone Other Procedure: Bx by shave method or bx by punch method Instructions (Optional): Pt prefers to treat w/ topicals and pursue patch testing before option for bx

## 2024-11-22 NOTE — OB RN DELIVERY SUMMARY - NS_PROPHYLABX_OBGYN_ALL_OB
Refill Decision Note   Bryson Beckham  is requesting a refill authorization.  Brief Assessment and Rationale for Refill:  Approve     Medication Therapy Plan:         Comments:     Note composed:10:55 AM 11/22/2024             Yes

## 2024-12-12 NOTE — OB RN PATIENT PROFILE - NS_PRENATALLABSOURCEHEPATITISCDT_OBGYN_ALL_OB
[Screening Provided] : Screening provided [Duration of Psychotherapy Visit (minutes spent in synchronous communication): ____] : Duration of Psychotherapy Visit (minutes spent in synchronous communication): [unfilled] [Individual Psychotherapy for 38-52 minutes] : Individual Psychotherapy for 38 - 52 minutes [Licensed Clinician] : Licensed Clinician [FreeTextEntry1] : PHQ9 04-May-2024

## 2025-02-10 RX ORDER — PYRIDOSTIGMINE BROMIDE 60 MG/1
60 TABLET ORAL 4 TIMES DAILY
Qty: 240 | Refills: 4 | Status: ACTIVE | COMMUNITY
Start: 2025-02-10 | End: 1900-01-01

## 2025-02-22 ENCOUNTER — EMERGENCY (EMERGENCY)
Facility: HOSPITAL | Age: 35
LOS: 1 days | End: 2025-02-22
Attending: STUDENT IN AN ORGANIZED HEALTH CARE EDUCATION/TRAINING PROGRAM | Admitting: HOSPITALIST
Payer: MEDICAID

## 2025-02-22 VITALS
OXYGEN SATURATION: 100 % | SYSTOLIC BLOOD PRESSURE: 130 MMHG | DIASTOLIC BLOOD PRESSURE: 84 MMHG | HEART RATE: 71 BPM | WEIGHT: 160.94 LBS | RESPIRATION RATE: 18 BRPM | TEMPERATURE: 98 F | HEIGHT: 62 IN

## 2025-02-22 DIAGNOSIS — Z98.891 HISTORY OF UTERINE SCAR FROM PREVIOUS SURGERY: Chronic | ICD-10-CM

## 2025-02-22 DIAGNOSIS — Z98.890 OTHER SPECIFIED POSTPROCEDURAL STATES: Chronic | ICD-10-CM

## 2025-02-22 PROCEDURE — 99285 EMERGENCY DEPT VISIT HI MDM: CPT

## 2025-02-23 DIAGNOSIS — G70.01 MYASTHENIA GRAVIS WITH (ACUTE) EXACERBATION: ICD-10-CM

## 2025-02-23 DIAGNOSIS — R06.09 OTHER FORMS OF DYSPNEA: ICD-10-CM

## 2025-02-23 DIAGNOSIS — Z29.9 ENCOUNTER FOR PROPHYLACTIC MEASURES, UNSPECIFIED: ICD-10-CM

## 2025-02-23 LAB
ADD ON TEST-SPECIMEN IN LAB: SIGNIFICANT CHANGE UP
ADD ON TEST-SPECIMEN IN LAB: SIGNIFICANT CHANGE UP
ALBUMIN SERPL ELPH-MCNC: 3.9 G/DL — SIGNIFICANT CHANGE UP (ref 3.3–5)
ALBUMIN SERPL ELPH-MCNC: 4 G/DL — SIGNIFICANT CHANGE UP (ref 3.3–5)
ALP SERPL-CCNC: 85 U/L — SIGNIFICANT CHANGE UP (ref 40–120)
ALP SERPL-CCNC: 91 U/L — SIGNIFICANT CHANGE UP (ref 40–120)
ALT FLD-CCNC: 11 U/L — SIGNIFICANT CHANGE UP (ref 10–45)
ALT FLD-CCNC: 12 U/L — SIGNIFICANT CHANGE UP (ref 10–45)
ANION GAP SERPL CALC-SCNC: 13 MMOL/L — SIGNIFICANT CHANGE UP (ref 5–17)
ANION GAP SERPL CALC-SCNC: 15 MMOL/L — SIGNIFICANT CHANGE UP (ref 5–17)
APPEARANCE UR: CLEAR — SIGNIFICANT CHANGE UP
APTT BLD: 28.2 SEC — SIGNIFICANT CHANGE UP (ref 24.5–35.6)
APTT BLD: 29.1 SEC — SIGNIFICANT CHANGE UP (ref 24.5–35.6)
AST SERPL-CCNC: 15 U/L — SIGNIFICANT CHANGE UP (ref 10–40)
AST SERPL-CCNC: 27 U/L — SIGNIFICANT CHANGE UP (ref 10–40)
BACTERIA # UR AUTO: NEGATIVE /HPF — SIGNIFICANT CHANGE UP
BASOPHILS # BLD AUTO: 0 K/UL — SIGNIFICANT CHANGE UP (ref 0–0.2)
BASOPHILS NFR BLD AUTO: 0 % — SIGNIFICANT CHANGE UP (ref 0–2)
BILIRUB SERPL-MCNC: 0.9 MG/DL — SIGNIFICANT CHANGE UP (ref 0.2–1.2)
BILIRUB SERPL-MCNC: 1 MG/DL — SIGNIFICANT CHANGE UP (ref 0.2–1.2)
BILIRUB UR-MCNC: NEGATIVE — SIGNIFICANT CHANGE UP
BUN SERPL-MCNC: 11 MG/DL — SIGNIFICANT CHANGE UP (ref 7–23)
BUN SERPL-MCNC: 13 MG/DL — SIGNIFICANT CHANGE UP (ref 7–23)
CALCIUM SERPL-MCNC: 9.1 MG/DL — SIGNIFICANT CHANGE UP (ref 8.4–10.5)
CALCIUM SERPL-MCNC: 9.5 MG/DL — SIGNIFICANT CHANGE UP (ref 8.4–10.5)
CAST: 0 /LPF — SIGNIFICANT CHANGE UP (ref 0–4)
CHLORIDE SERPL-SCNC: 105 MMOL/L — SIGNIFICANT CHANGE UP (ref 96–108)
CHLORIDE SERPL-SCNC: 105 MMOL/L — SIGNIFICANT CHANGE UP (ref 96–108)
CO2 SERPL-SCNC: 17 MMOL/L — LOW (ref 22–31)
CO2 SERPL-SCNC: 19 MMOL/L — LOW (ref 22–31)
COLOR SPEC: YELLOW — SIGNIFICANT CHANGE UP
CREAT SERPL-MCNC: 0.63 MG/DL — SIGNIFICANT CHANGE UP (ref 0.5–1.3)
CREAT SERPL-MCNC: 0.66 MG/DL — SIGNIFICANT CHANGE UP (ref 0.5–1.3)
DIFF PNL FLD: NEGATIVE — SIGNIFICANT CHANGE UP
EGFR: 119 ML/MIN/1.73M2 — SIGNIFICANT CHANGE UP
EGFR: 121 ML/MIN/1.73M2 — SIGNIFICANT CHANGE UP
EOSINOPHIL # BLD AUTO: 0.24 K/UL — SIGNIFICANT CHANGE UP (ref 0–0.5)
EOSINOPHIL NFR BLD AUTO: 5.3 % — SIGNIFICANT CHANGE UP (ref 0–6)
FLUAV AG NPH QL: SIGNIFICANT CHANGE UP
FLUBV AG NPH QL: SIGNIFICANT CHANGE UP
GAS PNL BLDV: SIGNIFICANT CHANGE UP
GIANT PLATELETS BLD QL SMEAR: PRESENT — SIGNIFICANT CHANGE UP
GLUCOSE SERPL-MCNC: 77 MG/DL — SIGNIFICANT CHANGE UP (ref 70–99)
GLUCOSE SERPL-MCNC: 79 MG/DL — SIGNIFICANT CHANGE UP (ref 70–99)
GLUCOSE UR QL: NEGATIVE MG/DL — SIGNIFICANT CHANGE UP
HCG SERPL-ACNC: <2 MIU/ML — SIGNIFICANT CHANGE UP
HCT VFR BLD CALC: 31.1 % — LOW (ref 34.5–45)
HCT VFR BLD CALC: 33 % — LOW (ref 34.5–45)
HGB BLD-MCNC: 10 G/DL — LOW (ref 11.5–15.5)
HGB BLD-MCNC: 10 G/DL — LOW (ref 11.5–15.5)
HYPOCHROMIA BLD QL: SLIGHT — SIGNIFICANT CHANGE UP
INR BLD: 1.08 RATIO — SIGNIFICANT CHANGE UP (ref 0.85–1.16)
INR BLD: 1.09 RATIO — SIGNIFICANT CHANGE UP (ref 0.85–1.16)
KETONES UR-MCNC: NEGATIVE MG/DL — SIGNIFICANT CHANGE UP
LEUKOCYTE ESTERASE UR-ACNC: NEGATIVE — SIGNIFICANT CHANGE UP
LYMPHOCYTES # BLD AUTO: 1.83 K/UL — SIGNIFICANT CHANGE UP (ref 1–3.3)
LYMPHOCYTES # BLD AUTO: 39.8 % — SIGNIFICANT CHANGE UP (ref 13–44)
MAGNESIUM SERPL-MCNC: 2 MG/DL — SIGNIFICANT CHANGE UP (ref 1.6–2.6)
MANUAL SMEAR VERIFICATION: SIGNIFICANT CHANGE UP
MCHC RBC-ENTMCNC: 22.1 PG — LOW (ref 27–34)
MCHC RBC-ENTMCNC: 22.8 PG — LOW (ref 27–34)
MCHC RBC-ENTMCNC: 30.3 G/DL — LOW (ref 32–36)
MCHC RBC-ENTMCNC: 32.2 G/DL — SIGNIFICANT CHANGE UP (ref 32–36)
MCV RBC AUTO: 70.8 FL — LOW (ref 80–100)
MCV RBC AUTO: 72.8 FL — LOW (ref 80–100)
MICROCYTES BLD QL: SLIGHT — SIGNIFICANT CHANGE UP
MONOCYTES # BLD AUTO: 0.69 K/UL — SIGNIFICANT CHANGE UP (ref 0–0.9)
MONOCYTES NFR BLD AUTO: 15 % — HIGH (ref 2–14)
NEUTROPHILS # BLD AUTO: 1.75 K/UL — LOW (ref 1.8–7.4)
NEUTROPHILS NFR BLD AUTO: 38.1 % — LOW (ref 43–77)
NITRITE UR-MCNC: NEGATIVE — SIGNIFICANT CHANGE UP
NRBC BLD AUTO-RTO: 0 /100 WBCS — SIGNIFICANT CHANGE UP (ref 0–0)
OVALOCYTES BLD QL SMEAR: SLIGHT — SIGNIFICANT CHANGE UP
PH UR: 6.5 — SIGNIFICANT CHANGE UP (ref 5–8)
PHOSPHATE SERPL-MCNC: 3.4 MG/DL — SIGNIFICANT CHANGE UP (ref 2.5–4.5)
PLAT MORPH BLD: NORMAL — SIGNIFICANT CHANGE UP
PLATELET # BLD AUTO: 356 K/UL — SIGNIFICANT CHANGE UP (ref 150–400)
PLATELET # BLD AUTO: 371 K/UL — SIGNIFICANT CHANGE UP (ref 150–400)
POTASSIUM SERPL-MCNC: 3.7 MMOL/L — SIGNIFICANT CHANGE UP (ref 3.5–5.3)
POTASSIUM SERPL-MCNC: 4.5 MMOL/L — SIGNIFICANT CHANGE UP (ref 3.5–5.3)
POTASSIUM SERPL-SCNC: 3.7 MMOL/L — SIGNIFICANT CHANGE UP (ref 3.5–5.3)
POTASSIUM SERPL-SCNC: 4.5 MMOL/L — SIGNIFICANT CHANGE UP (ref 3.5–5.3)
PROT SERPL-MCNC: 7 G/DL — SIGNIFICANT CHANGE UP (ref 6–8.3)
PROT SERPL-MCNC: 7.5 G/DL — SIGNIFICANT CHANGE UP (ref 6–8.3)
PROT UR-MCNC: NEGATIVE MG/DL — SIGNIFICANT CHANGE UP
PROTHROM AB SERPL-ACNC: 12.4 SEC — SIGNIFICANT CHANGE UP (ref 9.9–13.4)
PROTHROM AB SERPL-ACNC: 12.4 SEC — SIGNIFICANT CHANGE UP (ref 9.9–13.4)
RBC # BLD: 4.39 M/UL — SIGNIFICANT CHANGE UP (ref 3.8–5.2)
RBC # BLD: 4.53 M/UL — SIGNIFICANT CHANGE UP (ref 3.8–5.2)
RBC # FLD: 15.8 % — HIGH (ref 10.3–14.5)
RBC # FLD: 16.2 % — HIGH (ref 10.3–14.5)
RBC BLD AUTO: ABNORMAL
RBC CASTS # UR COMP ASSIST: 2 /HPF — SIGNIFICANT CHANGE UP (ref 0–4)
RSV RNA NPH QL NAA+NON-PROBE: SIGNIFICANT CHANGE UP
SARS-COV-2 RNA SPEC QL NAA+PROBE: SIGNIFICANT CHANGE UP
SCHISTOCYTES BLD QL AUTO: SLIGHT — SIGNIFICANT CHANGE UP
SODIUM SERPL-SCNC: 137 MMOL/L — SIGNIFICANT CHANGE UP (ref 135–145)
SODIUM SERPL-SCNC: 137 MMOL/L — SIGNIFICANT CHANGE UP (ref 135–145)
SP GR SPEC: 1.02 — SIGNIFICANT CHANGE UP (ref 1–1.03)
SQUAMOUS # UR AUTO: 2 /HPF — SIGNIFICANT CHANGE UP (ref 0–5)
TROPONIN T, HIGH SENSITIVITY RESULT: <6 NG/L — SIGNIFICANT CHANGE UP (ref 0–51)
UROBILINOGEN FLD QL: 1 MG/DL — SIGNIFICANT CHANGE UP (ref 0.2–1)
VARIANT LYMPHS # BLD: 1.8 % — SIGNIFICANT CHANGE UP (ref 0–6)
VARIANT LYMPHS NFR BLD MANUAL: 1.8 % — SIGNIFICANT CHANGE UP (ref 0–6)
WBC # BLD: 3.72 K/UL — LOW (ref 3.8–10.5)
WBC # BLD: 4.6 K/UL — SIGNIFICANT CHANGE UP (ref 3.8–10.5)
WBC # FLD AUTO: 3.72 K/UL — LOW (ref 3.8–10.5)
WBC # FLD AUTO: 4.6 K/UL — SIGNIFICANT CHANGE UP (ref 3.8–10.5)
WBC UR QL: 0 /HPF — SIGNIFICANT CHANGE UP (ref 0–5)

## 2025-02-23 RX ORDER — ENOXAPARIN SODIUM 100 MG/ML
40 INJECTION SUBCUTANEOUS EVERY 24 HOURS
Refills: 0 | Status: DISCONTINUED | OUTPATIENT
Start: 2025-02-23 | End: 2025-02-24

## 2025-02-23 RX ORDER — IPRATROPIUM BROMIDE AND ALBUTEROL SULFATE .5; 2.5 MG/3ML; MG/3ML
3 SOLUTION RESPIRATORY (INHALATION) EVERY 6 HOURS
Refills: 0 | Status: DISCONTINUED | OUTPATIENT
Start: 2025-02-23 | End: 2025-02-24

## 2025-02-23 RX ORDER — PREDNISONE 5 MG/1
10 TABLET ORAL DAILY
Refills: 0 | Status: DISCONTINUED | OUTPATIENT
Start: 2025-02-23 | End: 2025-02-24

## 2025-02-23 RX ORDER — BACTERIOSTATIC SODIUM CHLORIDE 0.9 %
500 VIAL (ML) INJECTION ONCE
Refills: 0 | Status: COMPLETED | OUTPATIENT
Start: 2025-02-23 | End: 2025-02-23

## 2025-02-23 RX ORDER — PYRIDOSTIGMINE BROMIDE 60 MG/5ML
60 SOLUTION ORAL EVERY 6 HOURS
Refills: 0 | Status: DISCONTINUED | OUTPATIENT
Start: 2025-02-23 | End: 2025-02-24

## 2025-02-23 RX ADMIN — IPRATROPIUM BROMIDE AND ALBUTEROL SULFATE 3 MILLILITER(S): .5; 2.5 SOLUTION RESPIRATORY (INHALATION) at 06:39

## 2025-02-23 RX ADMIN — PYRIDOSTIGMINE BROMIDE 60 MILLIGRAM(S): 60 SOLUTION ORAL at 06:39

## 2025-02-23 RX ADMIN — IPRATROPIUM BROMIDE AND ALBUTEROL SULFATE 3 MILLILITER(S): .5; 2.5 SOLUTION RESPIRATORY (INHALATION) at 18:00

## 2025-02-23 RX ADMIN — PYRIDOSTIGMINE BROMIDE 60 MILLIGRAM(S): 60 SOLUTION ORAL at 21:01

## 2025-02-23 RX ADMIN — PYRIDOSTIGMINE BROMIDE 60 MILLIGRAM(S): 60 SOLUTION ORAL at 11:28

## 2025-02-23 RX ADMIN — IPRATROPIUM BROMIDE AND ALBUTEROL SULFATE 3 MILLILITER(S): .5; 2.5 SOLUTION RESPIRATORY (INHALATION) at 11:28

## 2025-02-23 NOTE — ED PROVIDER NOTE - PHYSICAL EXAMINATION
Const: Awake, alert, no acute distress.  Well appearing.  Moving comfortably on stretcher.  HEENT: NC/AT.  Moist mucous membranes.  No pharyngeal erythema, no exudates.  Eyes: Extraocular movements intact b/l.  left eye ptosis. Conjunctiva pink.  No scleral icterus.   Neck: Neck supple, full ROM without pain.  Cardiac: Regular rate and regular rhythm. S1 S2 present.  Peripheral pulses 2+ and symmetric. No LE edema.  Resp: Speaking in full sentences. No evidence of respiratory distress.  Breath sounds clear to auscultation b/l. Normal chest excursion.   Abd: Non-distended, no overlying skin changes.  Soft, non-tender, no guarding, no rigidity, no rebound tenderness.  No palpable masses.  Normal bowel sounds in all 4 quadrants.  Back: Spine midline and non-tender. No CVAT.  Skin: Normal coloration.  No rashes, abrasions or lacerations.  Neuro: Awake, alert & oriented x 3.  Moves all extremities spontaneously.  No focal deficits. 5/5 strength and symmetric sensation b/l UE and LE, CNII- XII intact, 2+ biceps, triceps, patellar reflexes.

## 2025-02-23 NOTE — H&P ADULT - NSHPLABSRESULTS_GEN_ALL_CORE
10.0   4.60  )-----------( 371      ( 23 Feb 2025 01:05 )             33.0       02-23    137  |  105  |  13  ----------------------------<  79  4.5   |  17[L]  |  0.66    Ca    9.5      23 Feb 2025 01:05  Phos  4.1     02-23  Mg     2.0     02-23    TPro  7.5  /  Alb  4.0  /  TBili  0.9  /  DBili  x   /  AST  27  /  ALT  12  /  AlkPhos  91  02-23              PT/INR - ( 23 Feb 2025 02:25 )   PT: 12.4 sec;   INR: 1.09 ratio         PTT - ( 23 Feb 2025 02:25 )  PTT:28.2 sec      < from: Xray Chest 1 View- PORTABLE-Urgent (02.23.25 @ 01:31) >    FINDINGS:  The lungs are clear.  There is no pleural effusion or pneumothorax.  The heart is normal in size  The visualized osseous structures demonstrate no acute pathology.    IMPRESSION:  Clear lungs.    < end of copied text >

## 2025-02-23 NOTE — PHYSICAL THERAPY INITIAL EVALUATION ADULT - ADDITIONAL COMMENTS
lives in private house with mother and children, ~4 steps to enter with 1 rail, right hand dominant, does not wear glasses lives in private house with mother and children, ~4 steps to enter with 1 rail, right hand dominant, does not wear glasses, PTA, pt amb (I), (I) With ADLs.

## 2025-02-23 NOTE — H&P ADULT - NSHPREVIEWOFSYSTEMS_GEN_ALL_CORE
REVIEW OF SYSTEMS:    Constitutional: No fever, chills, night sweats, or fatigue  	Eyes:  No eye pain, visual disturbances, or discharge  	ENMT:  No neck pain  	Cardiac:  No chest pain, palpitations, no leg swelling  	Respiratory:  No cough, SOB  	GI:  No nausea, vomiting, diarrhea, abdominal pain.  	:  no dysuria, hematuria, or incontinence  	MS:  No back pain.  	Neuro:  No headache or lightheadedness, dizziness   	Skin:  No skin rash REVIEW OF SYSTEMS:    Constitutional: No fever, chills, night sweats, or fatigue  	Eyes:  No eye pain, visual disturbances, or discharge  	ENMT:  No neck pain  	Cardiac:  No chest pain, palpitations, no leg swelling  	Respiratory:  +increased SOB on exertion, increased secretions  	GI:  No nausea, vomiting, diarrhea, abdominal pain.  	:  no dysuria, hematuria, or incontinence  	MS:  +weakness in winsome UE, LE, back  	Neuro:  +L eye droop; No headache or lightheadedness, dizziness

## 2025-02-23 NOTE — PHYSICAL THERAPY INITIAL EVALUATION ADULT - PERTINENT HX OF CURRENT PROBLEM, REHAB EVAL
33 yo F w/ PMH myasthenia gravis (AchR ab pos, dx 2017) on pyridostigmine 60 mg QID, weaned off of steroids 2-3 weeks ago after most recent MG crisis in August 2024, s/p 3 intubations in the past w/ most recent admission 8/2024 during which pt received 5 sessions IVIG followed by PLEX d/t failure of IVIG, asthma, sickle cell trait, presents for generalized weakness and mild shortness of breath.  Pt reports generalized weakness in winsome UE, winsome LE, back that has been present for the past 1-2 weeks w/ worsening. Pt reports SOB w/ exertion and increased oral secretions w/ onset yesterday. Pt reports improvement in SOB when she lays on her L side. Pt follows w/ Dr. Jenaro kim for Neurology last seen after prior discharge from hospital but has not been able to follow-up since due to changes in insurance, so patient was not tapered on steroid dose and pyridostigmine dose. Pt reports L eye ptosis at baseline, but reports it has worsened since onset of weakness. Pt denies chest pain, N/V, diarrhea, abdominal pain, fevers, chills, any sick contacts.    CXR (-)

## 2025-02-23 NOTE — H&P ADULT - ATTENDING COMMENTS
34F w/ hx of myasthenia gravis (AchR ab pos, dx 2017) c/b MG crises with 3 prior intubations (most recently 8/2024, failed IVIG and required PLEX), asthma, sickle cell trait, now presenting with worsening generalized weakness and SOB x1-2 wks. Reported adherence to her pyridostigmine and was on prednisone as well until ~3 wks ago when she ran out. Could not get more due to switching of her insurance (stated that issue has been cleared up now). Of note, pt reported having recent URI symptoms (rhinorrhea/cough) and stated that her daughter has been sick with URI symptoms as well.    Initial VSS, satting 100% on RA. Initial NIF was -50 and VC 1600mL. On my exam, notable L eye ptosis (worsen than baseline per pt) and ~4/5 strength in b/l UEs, ~5/5 strength in b/l LEs. Labs similar to prior. UA unremarkable. COVID-19/RSV/Flu neg. CXR clear.    Overall, concern for MG exacerbation, possibly 2/2 running out of her prednisone +/- viral URI. Appreciate Neurology prelim recs. Monitor NIF and VC q12h. C/w home pyridostigmine 60mg q6h. F/u further Neuro recs in AM regardng decision of restarting prednisone or other immunomodulating therapy. C/w symptomatic management PRN for suspected viral URI. Rest as per resident plan above.

## 2025-02-23 NOTE — CONSULT NOTE ADULT - ASSESSMENT
ASSESSMENT   34-year-old female with h/o asthma, Sickle cell trait, Myasthenia gravis (AChR antibody positive, diagnosed in 2017) on pyridostigmine 60mg Q6H, Came to the ER for generalized weakness for 2 weeks and episodes of shortness of breath at the end of the day and on exertion for 1 to 2 days.  Uneven vital capacity was found -50 and 1600 respectively in the ER.  On examination there was generalized muscle weakness more marked in the neck flexors and arm extensors.  Patient is currently saturating 100% in room air without any respiratory distress.  She finished prednisone taper about 3 to 4 weeks ago lost to follow-up with her neurologist outpatient due to changes in her insurance policy.Will need respiratory monitoring with need for an vital capacity assessment.    IMPRESSION   Generalized weakness with shortness of breath on exertion likely due to exacerbation of myasthenia gravis possibly from discontinuing prednisone, needs to rule out any upper respiratory infection or other systemic process that can triggers the exacerbation.  Currently not in crisis (NIF -50, vital capacity 1600 mL).    RECOMMENDATION   -Monitor NIF and vital capacity Q12H  -check respiratory viral panel, and CXR  -Check CBC, CMP, Mg, Phosphorus, UA  -Will decide about restarting Prednisone or other immunomodulating therapy after the morning round with the neurology attending  -Rest of the management as per the primary team  -neurology will follow       Patient to be seen by team and attending. Note finalized upon attending attestation.  ASSESSMENT   34-year-old female with h/o asthma, Sickle cell trait, Myasthenia gravis (AChR antibody positive, diagnosed in 2017) on pyridostigmine 60mg Q6H, Came to the ER for generalized weakness for 2 weeks and episodes of shortness of breath at the end of the day and on exertion for 1 to 2 days.  Uneven vital capacity was found -50 and 1600 respectively in the ER.  On examination there was generalized muscle weakness more marked in the neck flexors and arm extensors.  Patient is currently saturating 100% in room air without any respiratory distress.  She finished prednisone taper about 3 to 4 weeks ago lost to follow-up with her neurologist outpatient due to changes in her insurance policy.Will need respiratory monitoring with need for an vital capacity assessment.    IMPRESSION   Generalized weakness with shortness of breath on exertion likely due to exacerbation of myasthenia gravis possibly from discontinuing prednisone, needs to rule out any upper respiratory infection or other systemic process that can triggers the exacerbation.  Currently not in crisis (NIF -50, vital capacity 1600 mL).    RECOMMENDATION   -Monitor NIF and vital capacity Q12H  -check respiratory viral panel, and CXR  -Check CBC, CMP, Mg, Phosphorus, UA  -C/W Pyridostigmine 60mg Q6H  -Will decide about restarting Prednisone or other immunomodulating therapy after the morning round with the neurology attending  -Rest of the management as per the primary team  -neurology will follow       Patient to be seen by team and attending. Note finalized upon attending attestation.

## 2025-02-23 NOTE — H&P ADULT - HISTORY OF PRESENT ILLNESS
35 yo F w/ PMH myasthenia gravis (AchR ab pos, dx 2017) on pyridostigmine 60 mg QID, weaned off of steroids 2-3 weeks ago after most recent MG crisis, s/p 3 intubations in the past w/ most recent admission 8/2024 during which pt received IVIG followed by PLEX d/t failure of IVIG, asthma, sickle cell trait, presents for generalized weakness and mild shortness of breath.  Pt reports__. Pt follows w/ Dr. Eason outpt for Neurology.       In ED: 500 cc bolus NS  NIF and vital capacity:   CXR: clear lungs    Of note, pt was last hospitalized from 8/7/24 to 8/21/24 for Myasthenia gravis exacerbation. Pt developed respiratory distress and required intubation and received PLEX and steroids while in the MICU after poor response to IVIG. Pt received 5 sessions of PLEX (last 8/21) and discharged on Mestinon 60 mg QID and prednisone 40 mg QD that finished 3-4 weeks ago. 35 yo F w/ PMH myasthenia gravis (AchR ab pos, dx 2017) on pyridostigmine 60 mg QID, weaned off of steroids 2-3 weeks ago after most recent MG crisis in August 2024, s/p 3 intubations in the past w/ most recent admission 8/2024 during which pt received 5 sessions IVIG followed by PLEX d/t failure of IVIG, asthma, sickle cell trait, presents for generalized weakness and mild shortness of breath.  Pt reports__. Pt follows w/ Dr. Eason outpt for Neurology.       In ED: 500 cc bolus NS  NIF and vital capacity:  NIF was -50, and vital capacity was 1600 in the ED  CXR: clear lungs    Of note, pt was last hospitalized from 8/7/24 to 8/21/24 for Myasthenia gravis exacerbation. Pt developed respiratory distress and required intubation and received PLEX and steroids while in the MICU after poor response to IVIG. Pt received 5 sessions of PLEX (last 8/21) and discharged on Mestinon 60 mg QID and prednisone 40 mg QD that finished 3-4 weeks ago. 33 yo F w/ PMH myasthenia gravis (AchR ab pos, dx 2017) on pyridostigmine 60 mg QID, weaned off of steroids 2-3 weeks ago after most recent MG crisis in August 2024, s/p 3 intubations in the past w/ most recent admission 8/2024 during which pt received 5 sessions IVIG followed by PLEX d/t failure of IVIG, asthma, sickle cell trait, presents for generalized weakness and mild shortness of breath.  Pt reports generalized weakness in winsome UE, winsome LE, back that has been present for the past 1-2 weeks w/ worsening. Pt reports SOB w/ exertion and increased oral secretions w/ onset yesterday. Pt reports improvement in SOB when she lays on her L side. Pt follows w/ Dr. Jenaro kim for Neurology last seen after prior discharge from hospital but has not been able to follow-up since due to changes in insurance, so patient was not tapered on steroid dose and pyridostigmine dose. Pt reports L eye ptosis at baseline, but reports it has worsened since onset of weakness. Pt denies chest pain, N/V, diarrhea, abdominal pain, fevers, chills, any sick contacts.       In ED: 500 cc bolus NS  NIF and vital capacity:  NIF was -50, and vital capacity was 1600 in the ED  CXR: clear lungs    Of note, pt was last hospitalized from 8/7/24 to 8/21/24 for Myasthenia gravis exacerbation. Pt developed respiratory distress and required intubation and received PLEX and steroids while in the MICU after poor response to IVIG. Pt received 5 sessions of PLEX (last 8/21) and discharged on Mestinon 60 mg QID and prednisone 40 mg QD that finished 3-4 weeks ago.

## 2025-02-23 NOTE — H&P ADULT - NSHPPHYSICALEXAM_GEN_ALL_CORE
VITALS:   T(C): 36.8 (02-23-25 @ 00:00), Max: 36.9 (02-22-25 @ 23:16)  HR: 66 (02-23-25 @ 00:00) (66 - 71)  BP: 121/68 (02-23-25 @ 00:00) (121/68 - 130/84)  RR: 17 (02-23-25 @ 00:00) (17 - 18)  SpO2: 100% (02-23-25 @ 00:00) (100% - 100%)    PHYSICAL EXAM:     GENERAL: NAD, lying in bed comfortably  HEAD:  Atraumatic, Normocephalic  EYES: EOMI, PERRLA, conjunctiva and sclera clear  ENT: Moist mucous membranes  NECK: Supple, No JVD  CHEST/LUNG: Clear to auscultation bilaterally; No rales, rhonchi, wheezing, or rubs. Unlabored respirations  HEART: Regular rate and rhythm; No murmurs, rubs, or gallops  ABDOMEN: normal bowel sounds; Soft, nontender, nondistended  EXTREMITIES:  2+ Peripheral Pulses, brisk capillary refill. No clubbing, cyanosis, or edema  Neurological:  A&Ox3, no focal deficits   SKIN: No rashes or lesions  PSYCH: normal affect and mood VITALS:   T(C): 36.8 (02-23-25 @ 00:00), Max: 36.9 (02-22-25 @ 23:16)  HR: 66 (02-23-25 @ 00:00) (66 - 71)  BP: 121/68 (02-23-25 @ 00:00) (121/68 - 130/84)  RR: 17 (02-23-25 @ 00:00) (17 - 18)  SpO2: 100% (02-23-25 @ 00:00) (100% - 100%)    PHYSICAL EXAM:     GENERAL: NAD, lying in bed comfortably  HEAD:  Atraumatic, Normocephalic  EYES: +L eye ptosis; EOMI, PERRLA, conjunctiva and sclera clear  ENT: Moist mucous membranes  NECK: Supple, No JVD  CHEST/LUNG: Clear to auscultation bilaterally; No rales, rhonchi, wheezing, or rubs. Unlabored respirations  HEART: Regular rate and rhythm; No murmurs, rubs, or gallops  ABDOMEN: normal bowel sounds; Soft, nontender, nondistended  EXTREMITIES:  2+ Peripheral Pulses, brisk capillary refill. No clubbing, cyanosis, or edema  Neurological:  A&Ox3, +Hip extension and flexion 4/5 bilaterally, +4/5 strength in winsome UE,  strength 4/5 bilaterally. No sensory deficits.

## 2025-02-23 NOTE — ED ADULT NURSE NOTE - CAS TRG GENERAL NORM CIRC DET
Strong peripheral pulses Perilesional Excision Additional Text (Leave Blank If You Do Not Want): The margin was drawn around the clinically apparent lesion. Incisions were then made along these lines to the appropriate tissue plane and the lesion was extirpated.

## 2025-02-23 NOTE — CONSULT NOTE ADULT - TIME BILLING
review of chart and relevant imaging, examination, and discussion of impression and plan with patient and team

## 2025-02-23 NOTE — H&P ADULT - PROBLEM SELECTOR PLAN 1
- - Pt sat O2>94% on RA w/o increased WOB on PE  - Afebrile on admission and without leukocytosis so less c/f infectious etiology for exacerbation at this time  - Most likely iso being off of prednisone   - RVP neg and CXR clear.  - Neurology consulted. F/u recs.   - Monitor NIF and vital capacity Q12H.   - C/w pyridostigmine 60 mg Q6H  - F/u neuro recs re whether prednisone will be restarted vs other immunomodulating therapy

## 2025-02-23 NOTE — CONSULT NOTE ADULT - ATTENDING COMMENTS
ANTONIO POON is a 34y (1990) woman with a PMHx significant for asthma, Sickle cell trait, Myasthenia gravis (AChR antibody positive, diagnosed in 2017) on pyridostigmine 60mg Q6H, came Came to the ER for generalized weakness and mild shortness of breath.  As per the patient she was feeling weak for last 2 weeks and for the last 1 to 2 days she has been having some shortness of breath especially before taking the next dose of pyridostigmine.  She also mentioned occasional double vision. She also mention she has been feeling flulike symptoms for last couple of days, there was no fever but feeling of malaise and some cough with respiratory secretion early in the morning.  Today she was feeling short of breath even after taking her myasthenia medication. Patient has history of multiple hospital admission due to myasthenia crisis, last admission was in August 2024, at the time she was intubated and treated with IVIG followed by Plavix due to failure of the IVIG.  After that she was in a steroid taper dose, and she finished her steroid taper about 3 to 4 weeks ago.  She was supposed to visit outpatient neurologist to adjust her steroid dose and pyridostigmine dose but due to changes in her insurance she was unable to follow-up with her neurologist (Dr Eason) and also ran out of prednisone.    Exam with mild neck flexion>extension weakness and mild proximal weakness. Better distally today.    Impression: MG with no clear crisis, but perhaps worsening in setting of running out of steroids    Recommendations:   - For now, in my judgment, it's ok to hold off on PLEX  -Monitor NIF and vital capacity Q12H  - C/W Pyridostigmine 60mg Q6H  - restart prednisone at 10 mg  - if stable, follow-up with outpatient neurologist (insurance permitting)

## 2025-02-23 NOTE — H&P ADULT - TIME BILLING
reviewing prior documentation, independently obtaining a history, performing a physical examination, reviewing and independently interpreting tests/imaging, discussing the plan with the patient, ordering medications/tests, documenting clinical information in the electronic health record, and coordinating care with staff. The time spent on encounter excludes teaching and separately reported services.

## 2025-02-23 NOTE — H&P ADULT - PROBLEM SELECTOR PLAN 2
- Most likely iso MG exacerbation  - - Most likely iso MG exacerbation vs less likely asthma exacerbation  - Pt O2 sat >94% on RA w/o respiratory distress at this time  - Continue to monitor for now

## 2025-02-23 NOTE — CONSULT NOTE ADULT - SUBJECTIVE AND OBJECTIVE BOX
Neurology - Consult Note    -  Spectra: [phone] ([facility]), [phone] ([facility])  -    HPI: Patient ANTONIO POON is a 34y (1990) woman with a PMHx significant for Asthma, Sickle cell trait, Myasthenia gravis (AChR antibody positive, diagnosed in 2017) on pyridostigmine 60mg Q6H, came Came to the ER for generalized weakness and mild shortness of breath.  As per the patient she was feeling weak for last 2 weeks and for the last 1 to 2 days she has been having some shortness of breath especially at the end of the day and on exacerbation.  She also mention she has been feeling flulike symptoms for last couple of days, there was no fever but feeling of malaise and some cough with respiratory secretion early in the morning.  Patient has history of multiple hospital admission due to myasthenia crisis, last admission was in August 24, at the time she was intubated and treated with IVIG followed by Plavix due to failure of the IVIG.  After that she was in a steroid taper dose, and she finished her steroid taper about 3 to 4 weeks ago.  She was supposed to visit outpatient neurologist to adjust her steroid dose and pyridostigmine dose but due to changes in her insurance she was unable to follow-up with her neurologist.  During the time of the encounter she was saturating 100% in room air without any respiratory distress.  Her NIF was -50, and vital capacity was 1600 in the ER.   Patient was feeling she would go into a myasthenic crisis again, and she was asking for Plex as IVIG does not work, when the writer tried to make her understand that there is no indication for Plex or IVIG right now as she is not in myasthenic crisis she got very upset about it and got very emotional.      Review of Systems:    CONSTITUTIONAL: No fevers or chills  EYES AND ENT: No visual changes or no throat pain   NECK: No pain or stiffness  RESPIRATORY: SOB on exertion and at the end of the day  CARDIOVASCULAR: No chest pain or palpitations  GASTROINTESTINAL: No melena or hematochezia  GENITOURINARY: No dysuria or hematuria  NEUROLOGICAL: +As stated in HPI above  SKIN: No itching, burning, rashes, or lesions   All other review of systems is negative unless indicated above.    Allergies:  shellfish (Anaphylaxis)  No Known Drug Allergies      PMHx/PSHx/Family Hx: As above, otherwise see below   Asthma    Myasthenia gravis    Childhood asthma    Sickle cell trait        Social Hx:  No current use of tobacco, alcohol, or illicit drugs  Lives with ***    Medications:  MEDICATIONS  (STANDING):    MEDICATIONS  (PRN):      Vitals:  T(C): 36.8 (02-23-25 @ 00:00), Max: 36.9 (02-22-25 @ 23:16)  HR: 66 (02-23-25 @ 00:00) (66 - 71)  BP: 121/68 (02-23-25 @ 00:00) (121/68 - 130/84)  RR: 17 (02-23-25 @ 00:00) (17 - 18)  SpO2: 100% (02-23-25 @ 00:00) (100% - 100%)    Physical Examination:  General - NAD, pleasant, cooperative   Cardiovascular - Peripheral pulses palpable, no edema  Respiratory- breathing comfortably in room air, saturating 100%  Neurologic Exam:  Mental status - Awake, Alert, Oriented to person, place, and time. Speech fluent but hypophonic, repetition and naming intact. Follows simple and complex commands. Attention/concentration, recent and remote memory , and fund of knowledge intact    Cranial nerves:  CN II: Visual fields are full to confrontation. Fundoscopic exam not done. Pupils are 4 mm and briskly reactive to light.   CN III, IV, VI: Partial ptosis in the left side, B/L weakness in abduction and adduction, but no nystagmus  CN V: Facial sensation is intact to light touch in all 3 divisions bilaterally.  CN VII: Face is symmetric with normal eye closure and smile.  CN VII: Hearing is normal to rubbing fingers  CN IX, X: Palate elevates symmetrically. Phonation is normal.  CN XI: Head turning and shoulder shrug are intact  CN XII: Tongue is midline with normal movements and no atrophy.    Neck flexore 4-/5, neck extensor 4+/5    Motor - Normal bulk and tone throughout. No pronator drift of out-stretched arms.  Strength testing          R/L:   Deltoid(C5) 4+/4+  Biceps(C6) 4+/4+   Triceps(C7) 4/4    Wrist Extension 4/4   Wrist Flexion (C8) 4+/4+    Interossei  (T1) 4+/4+     4+/4+                      R/L: Hip Flexion(L2/3) 4+/4+ Hip Extension (L4/5) 4+/4+ Knee Flexion (L4/5/S1) 4+/4+ Knee Extension (L3/4) 4+/4+ Dorsiflexion (L4/5) 4+/4+ Plantar Flexion (S1) 4+/4+  Sensation - Light touch,  pain, vibration and position sense intact throughout    DTR's -             Biceps      Triceps     Brachioradialis      Patellar    Ankle    Toes/plantar response  R             3+             2+             3+                    3+          2+             Withdrawal  L              3+             2+            3+                     3+         2+              Withdrawal    Coordination - Rapid alternating movements and fine finger movements are intact. There is no dysmetria on finger-to-nose and heel-knee-shin. There are no abnormal or extraneous movements.   Romberg- not tested due to safety concern    Gait and station - not tested due to safety concern        Labs:                        10.0   4.60  )-----------( 371      ( 23 Feb 2025 01:05 )             33.0           CAPILLARY BLOOD GLUCOSE              CSF:                  Radiology:     Neurology - Consult Note    -  Spectra: [phone] ([facility]), [phone] ([facility])  -    HPI: Patient ANTONIO POON is a 34y (1990) woman with a PMHx significant for asthma, Sickle cell trait, Myasthenia gravis (AChR antibody positive, diagnosed in 2017) on pyridostigmine 60mg Q6H, came Came to the ER for generalized weakness and mild shortness of breath.  As per the patient she was feeling weak for last 2 weeks and for the last 1 to 2 days she has been having some shortness of breath especially before taking the next dose of pyridostigmine.  She also mention she has been feeling flulike symptoms for last couple of days, there was no fever but feeling of malaise and some cough with respiratory secretion early in the morning.  Today she was feeling short of breath even after taking her myasthenia medication. Patient has history of multiple hospital admission due to myasthenia crisis, last admission was in August 2024, at the time she was intubated and treated with IVIG followed by Plavix due to failure of the IVIG.  After that she was in a steroid taper dose, and she finished her steroid taper about 3 to 4 weeks ago.  She was supposed to visit outpatient neurologist to adjust her steroid dose and pyridostigmine dose but due to changes in her insurance she was unable to follow-up with her neurologist.  During the time of the encounter she was saturating 100% in room air without any respiratory distress.  Her NIF was -50, and vital capacity was 1600 in the ER.   Patient was feeling she would go into a myasthenic crisis again, and she was asking for Plex as IVIG does not work, when the writer tried to make her understand that there is no indication for Plex or IVIG right now as she is not in myasthenic crisis she got very upset about it and got very emotional. Patient also mentioned that IVIG never works for her, only PLEX worked in the past.      Review of Systems:    CONSTITUTIONAL: No fevers or chills  EYES AND ENT: No visual changes or no throat pain   NECK: No pain or stiffness  RESPIRATORY: SOB on exertion and at the end of the day  CARDIOVASCULAR: No chest pain or palpitations  GASTROINTESTINAL: No melena or hematochezia  GENITOURINARY: No dysuria or hematuria  NEUROLOGICAL: +As stated in HPI above  SKIN: No itching, burning, rashes, or lesions   All other review of systems is negative unless indicated above.    Allergies:  shellfish (Anaphylaxis)  No Known Drug Allergies      PMHx/PSHx/Family Hx: As above, otherwise see below   Asthma    Myasthenia gravis    Childhood asthma    Sickle cell trait        Social Hx:  No current use of tobacco, alcohol, or illicit drugs  Lives with ***    Medications:  MEDICATIONS  (STANDING):    MEDICATIONS  (PRN):      Vitals:  T(C): 36.8 (02-23-25 @ 00:00), Max: 36.9 (02-22-25 @ 23:16)  HR: 66 (02-23-25 @ 00:00) (66 - 71)  BP: 121/68 (02-23-25 @ 00:00) (121/68 - 130/84)  RR: 17 (02-23-25 @ 00:00) (17 - 18)  SpO2: 100% (02-23-25 @ 00:00) (100% - 100%)    Physical Examination:  General - NAD, pleasant, cooperative   Cardiovascular - Peripheral pulses palpable, no edema  Respiratory- breathing comfortably in room air, saturating 100%  Neurologic Exam:  Mental status - Awake, Alert, Oriented to person, place, and time. Speech fluent but hypophonic, repetition and naming intact. Follows simple and complex commands. Attention/concentration, recent and remote memory , and fund of knowledge intact    Cranial nerves:  CN II: Visual fields are full to confrontation. Fundoscopic exam not done. Pupils are 4 mm and briskly reactive to light.   CN III, IV, VI: Partial ptosis in the left side, B/L weakness in abduction and adduction, but no nystagmus  CN V: Facial sensation is intact to light touch in all 3 divisions bilaterally.  CN VII: Face is symmetric with normal eye closure and smile.  CN VII: Hearing is normal to rubbing fingers  CN IX, X: Palate elevates symmetrically. Phonation is normal.  CN XI: Head turning and shoulder shrug are intact  CN XII: Tongue is midline with normal movements and no atrophy.    Neck flexore 4-/5, neck extensor 4+/5    Motor - Normal bulk and tone throughout. No pronator drift of out-stretched arms.  Strength testing          R/L:   Deltoid(C5) 4+/4+  Biceps(C6) 4+/4+   Triceps(C7) 4/4    Wrist Extension 4/4   Wrist Flexion (C8) 4+/4+    Interossei  (T1) 4+/4+     4+/4+                      R/L: Hip Flexion(L2/3) 4+/4+ Hip Extension (L4/5) 4+/4+ Knee Flexion (L4/5/S1) 4+/4+ Knee Extension (L3/4) 4+/4+ Dorsiflexion (L4/5) 4+/4+ Plantar Flexion (S1) 4+/4+  Sensation - Light touch,  pain, vibration and position sense intact throughout    DTR's -             Biceps      Triceps     Brachioradialis      Patellar    Ankle    Toes/plantar response  R             3+             2+             3+                    3+          2+             Withdrawal  L              3+             2+            3+                     3+         2+              Withdrawal    Coordination - Rapid alternating movements and fine finger movements are intact. There is no dysmetria on finger-to-nose and heel-knee-shin. There are no abnormal or extraneous movements.   Romberg- not tested due to safety concern    Gait and station - not tested due to safety concern        Labs:                        10.0   4.60  )-----------( 371      ( 23 Feb 2025 01:05 )             33.0           CAPILLARY BLOOD GLUCOSE              CSF:                  Radiology:     Neurology - Consult Note    -  Spectra: [phone] ([facility]), [phone] ([facility])  -    HPI: Patient ANTONIO POON is a 34y (1990) woman with a PMHx significant for asthma, Sickle cell trait, Myasthenia gravis (AChR antibody positive, diagnosed in 2017) on pyridostigmine 60mg Q6H, came Came to the ER for generalized weakness and mild shortness of breath.  As per the patient she was feeling weak for last 2 weeks and for the last 1 to 2 days she has been having some shortness of breath especially before taking the next dose of pyridostigmine.  She also mentioned occasional double vision. She also mention she has been feeling flulike symptoms for last couple of days, there was no fever but feeling of malaise and some cough with respiratory secretion early in the morning.  Today she was feeling short of breath even after taking her myasthenia medication. Patient has history of multiple hospital admission due to myasthenia crisis, last admission was in August 2024, at the time she was intubated and treated with IVIG followed by Plavix due to failure of the IVIG.  After that she was in a steroid taper dose, and she finished her steroid taper about 3 to 4 weeks ago.  She was supposed to visit outpatient neurologist to adjust her steroid dose and pyridostigmine dose but due to changes in her insurance she was unable to follow-up with her neurologist.  During the time of the encounter she was saturating 100% in room air without any respiratory distress.  Her NIF was -50, and vital capacity was 1600 in the ER.   Patient was feeling she would go into a myasthenic crisis again, and she was asking for Plex as IVIG does not work, when the writer tried to make her understand that there is no indication for Plex or IVIG right now as she is not in myasthenic crisis she got very upset about it and got very emotional. Patient also mentioned that IVIG never works for her, only PLEX worked in the past.      Review of Systems:    CONSTITUTIONAL: No fevers or chills  EYES AND ENT: No visual changes or no throat pain   NECK: No pain or stiffness  RESPIRATORY: SOB on exertion and at the end of the day  CARDIOVASCULAR: No chest pain or palpitations  GASTROINTESTINAL: No melena or hematochezia  GENITOURINARY: No dysuria or hematuria  NEUROLOGICAL: +As stated in HPI above  SKIN: No itching, burning, rashes, or lesions   All other review of systems is negative unless indicated above.    Allergies:  shellfish (Anaphylaxis)  No Known Drug Allergies      PMHx/PSHx/Family Hx: As above, otherwise see below   Asthma    Myasthenia gravis    Childhood asthma    Sickle cell trait        Social Hx:  No current use of tobacco, alcohol, or illicit drugs  Lives with ***    Medications:  MEDICATIONS  (STANDING):    MEDICATIONS  (PRN):      Vitals:  T(C): 36.8 (02-23-25 @ 00:00), Max: 36.9 (02-22-25 @ 23:16)  HR: 66 (02-23-25 @ 00:00) (66 - 71)  BP: 121/68 (02-23-25 @ 00:00) (121/68 - 130/84)  RR: 17 (02-23-25 @ 00:00) (17 - 18)  SpO2: 100% (02-23-25 @ 00:00) (100% - 100%)    Physical Examination:  General - NAD, pleasant, cooperative   Cardiovascular - Peripheral pulses palpable, no edema  Respiratory- breathing comfortably in room air, saturating 100%  Neurologic Exam:  Mental status - Awake, Alert, Oriented to person, place, and time. Speech fluent but hypophonic, repetition and naming intact. Follows simple and complex commands. Attention/concentration, recent and remote memory , and fund of knowledge intact    Cranial nerves:  CN II: Visual fields are full to confrontation. Fundoscopic exam not done. Pupils are 4 mm and briskly reactive to light.   CN III, IV, VI: Partial ptosis in the left side, B/L weakness in abduction and adduction, but no nystagmus  CN V: Facial sensation is intact to light touch in all 3 divisions bilaterally.  CN VII: Face is symmetric with normal eye closure and smile.  CN VII: Hearing is normal to rubbing fingers  CN IX, X: Palate elevates symmetrically. Phonation is normal.  CN XI: Head turning and shoulder shrug are intact  CN XII: Tongue is midline with normal movements and no atrophy.    Neck flexore 4-/5, neck extensor 4+/5    Motor - Normal bulk and tone throughout. No pronator drift of out-stretched arms.  Strength testing          R/L:   Deltoid(C5) 4+/4+  Biceps(C6) 4+/4+   Triceps(C7) 4/4    Wrist Extension 4/4   Wrist Flexion (C8) 4+/4+    Interossei  (T1) 4+/4+     4+/4+                      R/L: Hip Flexion(L2/3) 4+/4+ Hip Extension (L4/5) 4+/4+ Knee Flexion (L4/5/S1) 4+/4+ Knee Extension (L3/4) 4+/4+ Dorsiflexion (L4/5) 4+/4+ Plantar Flexion (S1) 4+/4+  Sensation - Light touch,  pain, vibration and position sense intact throughout    DTR's -             Biceps      Triceps     Brachioradialis      Patellar    Ankle    Toes/plantar response  R             3+             2+             3+                    3+          2+             Withdrawal  L              3+             2+            3+                     3+         2+              Withdrawal    Coordination - Rapid alternating movements and fine finger movements are intact. There is no dysmetria on finger-to-nose and heel-knee-shin. There are no abnormal or extraneous movements.   Romberg- not tested due to safety concern    Gait and station - not tested due to safety concern        Labs:                        10.0   4.60  )-----------( 371      ( 23 Feb 2025 01:05 )             33.0           CAPILLARY BLOOD GLUCOSE              CSF:                  Radiology:

## 2025-02-23 NOTE — ED ADULT NURSE NOTE - OBJECTIVE STATEMENT
Pt is a 34y female who presents to [facility]  ED from triage c/o SOB. Pt endorses x1 wk SOB, weakness, fatigue, states tonight before she was going to take her medications she suddenly felt difficulty breathing and her "tongue felt very heavy", pt states the difficulty breathing went away but she continues to have FREEMAN, weakness/fatigue, believes she is in a myasthenia gravis crisis. PMH sickle cell trait and Myasthenia Gravis. Pt is A&Ox4 speaking in full coherent sentences, breathing is spontaneous and unlabored, no tongue swelling noted, maintaining airway with no drooling and maintains secretions, currently denying any CP, palpitations, abd pain, urinary symptoms, n/v/d/c, fevers. Pt ambulates independently at baseline. Denies AC use. Pt has baseline L eye droop. Denies recent travel/sick contacts. Pt placed on CM and continuous pulse ox.

## 2025-02-23 NOTE — H&P ADULT - ASSESSMENT
33 yo F w/ PMH myasthenia gravis (AchR ab pos, dx 2017) on pyridostigmine 60 mg QID, weaned off of steroids 2-3 weeks ago after most recent MG crisis in August 2024, s/p 3 intubations in the past w/ most recent admission 8/2024 during which pt received 5 sessions IVIG followed by PLEX d/t failure of IVIG, asthma, sickle cell trait, presents for generalized weakness and mild dyspnea on exertion c/f MG exacerbation iso discontinuation of prednisone. 33 yo F w/ PMH myasthenia gravis (AchR ab pos, dx 2017) on pyridostigmine 60 mg QID, weaned off of steroids 2-3 weeks ago after most recent MG crisis in August 2024, s/p 3 intubations in the past w/ most recent admission 8/2024 during which pt received 5 sessions IVIG followed by PLEX d/t failure of IVIG, asthma, sickle cell trait, presents for generalized weakness and mild dyspnea on exertion c/f MG exacerbation suspected to be iso discontinuation of prednisone.

## 2025-02-23 NOTE — ED PROVIDER NOTE - CLINICAL SUMMARY MEDICAL DECISION MAKING FREE TEXT BOX
Tarun Herring MD, PGY1: 34-year-old female with past medical history of  Myasthenia gravis (AChR antibody positive, diagnosed in 2017) on pyridostigmine 60mg QID, sickle cell trait, childhood asthma presents with dyspnea gravis crisis x 5 hours ago. Patient states earlier tonight she felt her usual myasthenia symptoms occuring, which typically occurs everyday and resolves when she takes her pyridostigmine. Patient took her pyridostigmine with no resolution of symptoms.  Patient states symptoms today are similar to crisis sxs including generalized weakness, heaviness of her tongue, dyspnea on exertion.  Patient also has baseline left ptosis.  Patient has been admitted multiple times in the past for myasthenia crisis.  Most recent admission in August for patient received IVIG and PLEX.  Patient has had multiple intubations secondary to myasthenia crisis.  And patient denies fevers, chills, abdominal pain, nausea, vomiting, diarrhea or urinary changes.  On exam /68, respiratory 17, heart rate 66, temperature 98.2, SpO2 99% on room air, well-appearing no acute distress, airway intact, verbalizes full sentences without any difficulty, no accessory muscle use,  left eye ptosis, deficits, cranial annual nerves II to XII intact, 5 out of 5 strength upper lower extremities bilaterally, sensation equal upper extremities bilaterally,  2+ biceps, triceps, patellar reflexes.  Will evaluate for underlying viral/bacterial infections, ACS pathology with sepsis workup, EKG, troponin.  Likely myasthenia gravis crisis, will order if study and consult neurology.  Likely admission.

## 2025-02-23 NOTE — ED PROVIDER NOTE - ATTENDING CONTRIBUTION TO CARE
Dany Laboy MD (Attending Physician):    I performed a history and physical exam of the patient and discussed their management with the resident/fellow/ACP/student. I have reviewed the resident/fellow/ACP/student note and agree with the documented findings and plan of care, except as noted. I have personally performed a substantive portion of the visit including all aspects of the medical decision making. My medical decision making and observations are found below. Please refer to any progress notes for updates on clinical course.    HPI:  34-year-old female with past medical history of  Myasthenia gravis (AChR antibody positive, diagnosed in 2017) on pyridostigmine 60mg QID, sickle cell trait, childhood asthma presents with dyspnea that started 5 hours ago. Patient states earlier tonight she felt her usual myasthenia symptoms occurring, which typically occurs everyday and resolves when she takes her pyridostigmine. Patient took her pyridostigmine with no resolution of symptoms.  Patient states symptoms today are similar to crisis sxs including generalized weakness, heaviness of her tongue, dyspnea on exertion.  Patient also has baseline left ptosis.  Patient has been admitted multiple times in the past for myasthenia crisis.  Most recent admission in August 2024, at that time, pt received IVIG and PLEX.  Patient has had multiple intubations secondary to myasthenia crisis. Currently denies fevers, chills, abdominal pain, nausea, vomiting, diarrhea, or urinary changes.    PE:  GEN - NAD, well appearing, A&Ox3  HEAD - NC/AT  EYES - PERRL, EOMI. +Left eye ptosis.  ENT - Airway patent, mucous membranes moist, oropharynx wnl (no erythema, swelling, exudates, or lesions)  NECK - Supple, non-tender without lymphadenopathy, no masses  PULMONARY - Normal wob, CTA b/l, symmetric breath sounds, no W/R/R, satting 98% on RA  CARDIAC - +S1S2, RRR, no M/G/R, no JVD  ABDOMEN - +BS, ND, NT, soft, no guarding, no rebound, no masses, no rigidity   - No CVA TTP b/l  EXTREMITIES - FROM, symmetric pulses, no edema. B/l calves NT.  SKIN - No rash or bruising  NEUROLOGIC - Alert, speech clear, moving all extremities spontaneously, +left eye ptosis (other CN II-XII grossly intact), no pronator drift, normal gait, strength and sensation grossly intact, FTN negative b/l  PSYCH - Normal mood/affect, normal insight    MDM:  DDx includes, but not limited to: myasthenia crisis, PNA, viral syndrome, ACS, anemia, metabolic derangement, UTI. ekg, cxr, labs, IVF, neurology consult, respiratory therapist consult for NIF and VC. Pt currently hemodynamically sable, no signs of airway compromise at this time. Will most likely require admission.

## 2025-02-24 ENCOUNTER — TRANSCRIPTION ENCOUNTER (OUTPATIENT)
Age: 35
End: 2025-02-24

## 2025-02-24 VITALS
SYSTOLIC BLOOD PRESSURE: 109 MMHG | HEART RATE: 100 BPM | DIASTOLIC BLOOD PRESSURE: 74 MMHG | OXYGEN SATURATION: 96 % | RESPIRATION RATE: 18 BRPM | TEMPERATURE: 99 F

## 2025-02-24 LAB
ALBUMIN SERPL ELPH-MCNC: 3.8 G/DL — SIGNIFICANT CHANGE UP (ref 3.3–5)
ALP SERPL-CCNC: 86 U/L — SIGNIFICANT CHANGE UP (ref 40–120)
ALT FLD-CCNC: 10 U/L — SIGNIFICANT CHANGE UP (ref 10–45)
ANION GAP SERPL CALC-SCNC: 12 MMOL/L — SIGNIFICANT CHANGE UP (ref 5–17)
AST SERPL-CCNC: 14 U/L — SIGNIFICANT CHANGE UP (ref 10–40)
BILIRUB SERPL-MCNC: 0.8 MG/DL — SIGNIFICANT CHANGE UP (ref 0.2–1.2)
BUN SERPL-MCNC: 12 MG/DL — SIGNIFICANT CHANGE UP (ref 7–23)
CALCIUM SERPL-MCNC: 9.2 MG/DL — SIGNIFICANT CHANGE UP (ref 8.4–10.5)
CHLORIDE SERPL-SCNC: 106 MMOL/L — SIGNIFICANT CHANGE UP (ref 96–108)
CO2 SERPL-SCNC: 19 MMOL/L — LOW (ref 22–31)
CREAT SERPL-MCNC: 0.72 MG/DL — SIGNIFICANT CHANGE UP (ref 0.5–1.3)
EGFR: 114 ML/MIN/1.73M2 — SIGNIFICANT CHANGE UP
GLUCOSE SERPL-MCNC: 82 MG/DL — SIGNIFICANT CHANGE UP (ref 70–99)
HCT VFR BLD CALC: 30.7 % — LOW (ref 34.5–45)
HGB BLD-MCNC: 9.7 G/DL — LOW (ref 11.5–15.5)
MCHC RBC-ENTMCNC: 22.6 PG — LOW (ref 27–34)
MCHC RBC-ENTMCNC: 31.6 G/DL — LOW (ref 32–36)
MCV RBC AUTO: 71.6 FL — LOW (ref 80–100)
NRBC BLD AUTO-RTO: 0 /100 WBCS — SIGNIFICANT CHANGE UP (ref 0–0)
PLATELET # BLD AUTO: 350 K/UL — SIGNIFICANT CHANGE UP (ref 150–400)
POTASSIUM SERPL-MCNC: 4.1 MMOL/L — SIGNIFICANT CHANGE UP (ref 3.5–5.3)
POTASSIUM SERPL-SCNC: 4.1 MMOL/L — SIGNIFICANT CHANGE UP (ref 3.5–5.3)
PROT SERPL-MCNC: 6.9 G/DL — SIGNIFICANT CHANGE UP (ref 6–8.3)
RBC # BLD: 4.29 M/UL — SIGNIFICANT CHANGE UP (ref 3.8–5.2)
RBC # FLD: 15.9 % — HIGH (ref 10.3–14.5)
SODIUM SERPL-SCNC: 137 MMOL/L — SIGNIFICANT CHANGE UP (ref 135–145)
WBC # BLD: 3.67 K/UL — LOW (ref 3.8–10.5)
WBC # FLD AUTO: 3.67 K/UL — LOW (ref 3.8–10.5)

## 2025-02-24 PROCEDURE — 71045 X-RAY EXAM CHEST 1 VIEW: CPT

## 2025-02-24 PROCEDURE — 94640 AIRWAY INHALATION TREATMENT: CPT

## 2025-02-24 PROCEDURE — 85014 HEMATOCRIT: CPT

## 2025-02-24 PROCEDURE — 83605 ASSAY OF LACTIC ACID: CPT

## 2025-02-24 PROCEDURE — 85027 COMPLETE CBC AUTOMATED: CPT

## 2025-02-24 PROCEDURE — 83880 ASSAY OF NATRIURETIC PEPTIDE: CPT

## 2025-02-24 PROCEDURE — 82435 ASSAY OF BLOOD CHLORIDE: CPT

## 2025-02-24 PROCEDURE — 84132 ASSAY OF SERUM POTASSIUM: CPT

## 2025-02-24 PROCEDURE — 82947 ASSAY GLUCOSE BLOOD QUANT: CPT

## 2025-02-24 PROCEDURE — 84100 ASSAY OF PHOSPHORUS: CPT

## 2025-02-24 PROCEDURE — 85610 PROTHROMBIN TIME: CPT

## 2025-02-24 PROCEDURE — 81001 URINALYSIS AUTO W/SCOPE: CPT

## 2025-02-24 PROCEDURE — 80053 COMPREHEN METABOLIC PANEL: CPT

## 2025-02-24 PROCEDURE — 82803 BLOOD GASES ANY COMBINATION: CPT

## 2025-02-24 PROCEDURE — 97162 PT EVAL MOD COMPLEX 30 MIN: CPT

## 2025-02-24 PROCEDURE — 84484 ASSAY OF TROPONIN QUANT: CPT

## 2025-02-24 PROCEDURE — 85025 COMPLETE CBC W/AUTO DIFF WBC: CPT

## 2025-02-24 PROCEDURE — 85730 THROMBOPLASTIN TIME PARTIAL: CPT

## 2025-02-24 PROCEDURE — 82330 ASSAY OF CALCIUM: CPT

## 2025-02-24 PROCEDURE — 36415 COLL VENOUS BLD VENIPUNCTURE: CPT

## 2025-02-24 PROCEDURE — 84295 ASSAY OF SERUM SODIUM: CPT

## 2025-02-24 PROCEDURE — 99285 EMERGENCY DEPT VISIT HI MDM: CPT | Mod: 25

## 2025-02-24 PROCEDURE — 84702 CHORIONIC GONADOTROPIN TEST: CPT

## 2025-02-24 PROCEDURE — 83735 ASSAY OF MAGNESIUM: CPT

## 2025-02-24 PROCEDURE — 87637 SARSCOV2&INF A&B&RSV AMP PRB: CPT

## 2025-02-24 PROCEDURE — 0241U: CPT

## 2025-02-24 PROCEDURE — 94150 VITAL CAPACITY TEST: CPT

## 2025-02-24 PROCEDURE — 85018 HEMOGLOBIN: CPT

## 2025-02-24 RX ORDER — PREDNISONE 5 MG/1
1 TABLET ORAL
Qty: 0 | Refills: 0 | DISCHARGE
Start: 2025-02-24

## 2025-02-24 RX ORDER — PREDNISONE 5 MG/1
1 TABLET ORAL
Qty: 30 | Refills: 0
Start: 2025-02-24 | End: 2025-03-25

## 2025-02-24 RX ADMIN — IPRATROPIUM BROMIDE AND ALBUTEROL SULFATE 3 MILLILITER(S): .5; 2.5 SOLUTION RESPIRATORY (INHALATION) at 11:49

## 2025-02-24 RX ADMIN — PYRIDOSTIGMINE BROMIDE 60 MILLIGRAM(S): 60 SOLUTION ORAL at 06:21

## 2025-02-24 RX ADMIN — PYRIDOSTIGMINE BROMIDE 60 MILLIGRAM(S): 60 SOLUTION ORAL at 11:49

## 2025-02-24 RX ADMIN — PREDNISONE 10 MILLIGRAM(S): 5 TABLET ORAL at 06:21

## 2025-02-24 RX ADMIN — IPRATROPIUM BROMIDE AND ALBUTEROL SULFATE 3 MILLILITER(S): .5; 2.5 SOLUTION RESPIRATORY (INHALATION) at 06:21

## 2025-02-24 RX ADMIN — IPRATROPIUM BROMIDE AND ALBUTEROL SULFATE 3 MILLILITER(S): .5; 2.5 SOLUTION RESPIRATORY (INHALATION) at 16:44

## 2025-02-24 NOTE — PROGRESS NOTE ADULT - PROBLEM SELECTOR PLAN 3
- DVT ppx: lovenox SC  - Diet: Regular  - GOC: FULL CODE  - Dispo: Pending clinical status
- DVT ppx: lovenox SC  - Diet: Regular  - GOC: FULL CODE  - Dispo: Pending clinical status.

## 2025-02-24 NOTE — DISCHARGE NOTE NURSING/CASE MANAGEMENT/SOCIAL WORK - PATIENT PORTAL LINK FT
You can access the FollowMyHealth Patient Portal offered by [facility] by registering at the following website: [email or web address]. By joining [employer]’s FollowMyHealth portal, you will also be able to view your health information using other applications (apps) compatible with our system.

## 2025-02-24 NOTE — PROGRESS NOTE ADULT - ATTENDING COMMENTS
Patient seen and examined at bedside.  No new overnights events or neurologic symptoms were reported.    I agree with the findings and plan as documented in the Resident's note except below.    Impression and Plan:  Ms. Blackburn is 33 year old right handed woman with PMHx Myasthenia Gravis ( MG) on Mestinon, asthma sickle cell trait who is admitted due to the weakness and mild shortness of breath. Clinically she is better after resuming steroids. There is no further inpatient neurology work up needed.     Continue steroids  Continue to home dose Mestinon  Continue NIF and VC Q12  Calcium and Vit D supplements if no contraindication.   Continue medical management, neuro- check and fall precaution.  GI and DVT prophylaxis.  Outpatient follow up with Neurologist Dr. Eason     I discussed the diagnosis and treatment plan with the patient.  All questions and concerns were addressed. The patient demonstrated good understanding of the treatment plan.  My cumulative time taking care of this patient is 55 minutes.  If you have any further questions, please do not hesitate to contact our consult service.  Thank you for allowing us to participate in this patient care.

## 2025-02-24 NOTE — DISCHARGE NOTE NURSING/CASE MANAGEMENT/SOCIAL WORK - NSDCVIVACCINE_GEN_ALL_CORE_FT
Tdap; 23-Oct-2017 20:30; Kj Gil (DEYSI); [employer]; [id number]; IntraMuscular; Deltoid Left.; 0.5 milliLiter(s); VIS (VIS Published: 09-May-2013, VIS Presented: 23-Oct-2017);

## 2025-02-24 NOTE — DISCHARGE NOTE NURSING/CASE MANAGEMENT/SOCIAL WORK - FINANCIAL ASSISTANCE
[facility] provides services at a reduced cost to those who are determined to be eligible through [facility]’s financial assistance program. Information regarding [facility]’s financial assistance program can be found by going to [email or web address] or by calling [phone].

## 2025-02-24 NOTE — DISCHARGE NOTE PROVIDER - NSDCADMDATE_GEN_ALL_CORE_FT
2019 [facility] Clinical Data Registry (for Quality Improvement)     Postoperative nausea/vomiting risk protocol (Adult = 18 yrs and Pediatric 3-17 yrs)- (430 and 463)  General inhalation anesthetic (NOT TIVA) with PONV risk factors: No  Provision of anti-emetic therapy with at least 2 different classes of agents: N/A  Patient DID NOT receive anti-emetic therapy and reason is documented in Medical Record: N/A    Multimodal Pain Management- (477)  Non-emergent surgery AND patient age >= 18: No  Use of Multimodal Pain Management, two or more drugs and/or interventions, NOT including systemic opioids:   Exception: Documented allergy to multiple classes of analgesics:     Smoking Abstinence (404)  Patient is current smoker (cigarette, pipe, e-cig, marijuanna): No  Elective Surgery:   Abstinence instructions provided prior to day of surgery:   Patient abstained from smoking on day of surgery:     Pre-Op Beta-Blocker in Isolated CABG (44)  Isolated CABG AND patient age >= 18: No  Beta-blocker admin within 24 hours of surgical incision:   Exception:of medical reason(s) for not administering beta blocker within 24 hours prior to surgical incision (e.g., not  indicated,other medical reason):     PACU assessment of acute postoperative pain prior to Anesthesia Care End- Applies to Patients Age = 18- (ABG7)  Initial PACU pain score is which of the following: < 7/10  Patient unable to report pain score: N/A    Post-anesthetic transfer of care checklist/protocol to PACU/ICU- (426 and 427)  Upon conclusion of case, patient transferred to which of the following locations: PACU/Non-ICU  Use of transfer checklist/protocol: Yes  Exclusion: Service Performed in Patient Hospital Room (and thus did not require transfer): N/A  Unplanned admission to ICU related to anesthesia service up through end of PACU care- (MD51)  Unplanned admission to ICU (not initially anticipated at anesthesia start time): No          23-Feb-2025 03:18

## 2025-02-24 NOTE — DISCHARGE NOTE NURSING/CASE MANAGEMENT/SOCIAL WORK - NSDCPEFALRISK_GEN_ALL_CORE
For information on Fall & Injury Prevention, visit: [email or web address] OR  [email or web address] OR  https://www.cdc.gov/steadi/patient.html

## 2025-02-24 NOTE — DISCHARGE NOTE PROVIDER - NSDCFUADDAPPT_GEN_ALL_CORE_FT
APPTS ARE READY TO BE MADE: [x] YES    Best Family or Patient Contact (if needed):    Additional Information about above appointments (if needed):    1: Neurologist  2: PCP

## 2025-02-24 NOTE — PROGRESS NOTE ADULT - PROBLEM SELECTOR PLAN 2
- Most likely iso MG exacerbation vs less likely asthma exacerbation  - Pt O2 sat >94% on RA w/o respiratory distress at this time  - Continue to monitor for now
- Most likely iso MG exacerbation vs less likely asthma exacerbation  - Pt O2 sat >94% on RA w/o respiratory distress at this time  - Continue to monitor for now.

## 2025-02-24 NOTE — DISCHARGE NOTE PROVIDER - HOSPITAL COURSE
HPI:  35 yo F w/ PMH myasthenia gravis (AchR ab pos, dx 2017) on pyridostigmine 60 mg QID, weaned off of steroids 2-3 weeks ago after most recent MG crisis in August 2024, s/p 3 intubations in the past w/ most recent admission 8/2024 during which pt received 5 sessions IVIG followed by PLEX d/t failure of IVIG, asthma, sickle cell trait, presents for generalized weakness and mild shortness of breath.  Pt reports generalized weakness in winsome UE, winsome LE, back that has been present for the past 1-2 weeks w/ worsening. Pt reports SOB w/ exertion and increased oral secretions w/ onset yesterday. Pt reports improvement in SOB when she lays on her L side. Pt follows w/ Dr. Jenaro kim for Neurology last seen after prior discharge from hospital but has not been able to follow-up since due to changes in insurance, so patient was not tapered on steroid dose and pyridostigmine dose. Pt reports L eye ptosis at baseline, but reports it has worsened since onset of weakness. Pt denies chest pain, N/V, diarrhea, abdominal pain, fevers, chills, any sick contacts.       In ED: 500 cc bolus NS  NIF and vital capacity:  NIF was -50, and vital capacity was 1600 in the ED  CXR: clear lungs    Of note, pt was last hospitalized from 8/7/24 to 8/21/24 for Myasthenia gravis exacerbation. Pt developed respiratory distress and required intubation and received PLEX and steroids while in the MICU after poor response to IVIG. Pt received 5 sessions of PLEX (last 8/21) and discharged on Mestinon 60 mg QID and prednisone 40 mg QD that finished 3-4 weeks ago. (23 Feb 2025 04:55)    Hospital Course:  Patient admitted for further medical management. Patient's oxygen saturation remained >94% on room air without increased work of breathing. She was afebrile and without leukocytosis, making an infectious etiology less likely. Respiratory viral panel was negative and chest x-ray was clear. Negative inspiratory force (NIF) and vital capacity were monitored every 12 hours. Pyridostigmine was continued and increased to 60 mg every 6 hours. Prednisone was restarted at 10 mg daily. Neurology was consulted and provided recommendations for follow-up. Her dyspnea on exertion was attributed to the myasthenia gravis exacerbation and improved with treatment.    Dispo/Discharge/Med Rec discussed with Dr. Varghese. Patient medically stable for discharge home with outpatient follow up.     Important Medication Changes and Reason:  Pyridostigmine 60 mg by mouth every 6 hours  Prednisone 10 mg by mouth daily    Active or Pending Issues Requiring Follow-up:  Follow up with Neurology and PCP within 1 week     Advanced Directives:   [x] Full code  [ ] DNR  [ ] Hospice    Discharge Diagnoses:  Myasthenia Gravis Exacerbation  Asthma  Sickle Cell Trait       HPI:  33 yo F w/ PMH myasthenia gravis (AchR ab pos, dx 2017) on pyridostigmine 60 mg QID, weaned off of steroids 2-3 weeks ago after most recent MG crisis in August 2024, s/p 3 intubations in the past w/ most recent admission 8/2024 during which pt received 5 sessions IVIG followed by PLEX d/t failure of IVIG, asthma, sickle cell trait, presents for generalized weakness and mild shortness of breath.  Pt reports generalized weakness in winsome UE, winsome LE, back that has been present for the past 1-2 weeks w/ worsening. Pt reports SOB w/ exertion and increased oral secretions w/ onset yesterday. Pt reports improvement in SOB when she lays on her L side. Pt follows w/ Dr. Jenaro kim for Neurology last seen after prior discharge from hospital but has not been able to follow-up since due to changes in insurance, so patient was not tapered on steroid dose and pyridostigmine dose. Pt reports L eye ptosis at baseline, but reports it has worsened since onset of weakness. Pt denies chest pain, N/V, diarrhea, abdominal pain, fevers, chills, any sick contacts.       In ED: 500 cc bolus NS  NIF and vital capacity:  NIF was -50, and vital capacity was 1600 in the ED  CXR: clear lungs    Of note, pt was last hospitalized from 8/7/24 to 8/21/24 for Myasthenia gravis exacerbation. Pt developed respiratory distress and required intubation and received PLEX and steroids while in the MICU after poor response to IVIG. Pt received 5 sessions of PLEX (last 8/21) and discharged on Mestinon 60 mg QID and prednisone 40 mg QD that finished 3-4 weeks ago. (23 Feb 2025 04:55)    Hospital Course:  Patient admitted for further medical management. Patient's oxygen saturation remained >94% on room air without increased work of breathing. She was afebrile and without leukocytosis, making an infectious etiology less likely. Respiratory viral panel was negative and chest x-ray was clear. Negative inspiratory force (NIF) and vital capacity were monitored every 12 hours. Pyridostigmine 60 mg every 6 hours was continued. Prednisone was restarted at 10 mg daily. Neurology was consulted and provided recommendations for follow-up. Her dyspnea on exertion was attributed to the myasthenia gravis exacerbation and improved with treatment.    Dispo/Discharge/Med Rec discussed with Dr. Varghese. Patient medically stable for discharge home with outpatient follow up.     Important Medication Changes and Reason:  Pyridostigmine 60 mg by mouth every 6 hours  Prednisone 10 mg by mouth daily    Active or Pending Issues Requiring Follow-up:  Follow up with Neurology and PCP within 1 week     Advanced Directives:   [x] Full code  [ ] DNR  [ ] Hospice    Discharge Diagnoses:  Myasthenia Gravis Exacerbation  Asthma  Sickle Cell Trait

## 2025-02-24 NOTE — DISCHARGE NOTE PROVIDER - NSDCMRMEDTOKEN_GEN_ALL_CORE_FT
predniSONE 10 mg oral tablet: 1 tab(s) orally once a day  pyridostigmine 60 mg oral tablet: 1 tab(s) orally 4 times a day

## 2025-02-24 NOTE — PROGRESS NOTE ADULT - PROBLEM SELECTOR PLAN 1
- Pt sat O2>94% on RA w/o increased WOB on PE  - Afebrile on admission and without leukocytosis so less c/f infectious etiology for exacerbation at this time  - Most likely iso being off of prednisone   - RVP neg and CXR clear.  - Monitor NIF and vital capacity Q12H.   - C/w pyridostigmine 60 mg Q6H  - restart prednisone at 10 mg  - F/u neuro recs
- Pt sat O2>94% on RA w/o increased WOB on PE  - Afebrile on admission and without leukocytosis so less c/f infectious etiology for exacerbation at this time  - Most likely iso being off of prednisone   - RVP neg and CXR clear.  - Monitor NIF and vital capacity Q12H.   - Continue pyridostigmine 60 mg Q6H  - restarted prednisone at 10 mg  - Followup  neuro recs

## 2025-02-24 NOTE — PROGRESS NOTE ADULT - ASSESSMENT
35 yo F w/ PMH myasthenia gravis (AchR ab pos, dx 2017) on pyridostigmine 60 mg QID, weaned off of steroids 2-3 weeks ago after most recent MG crisis in August 2024, s/p 3 intubations in the past w/ most recent admission 8/2024 during which pt received 5 sessions IVIG followed by PLEX d/t failure of IVIG, asthma, sickle cell trait, presents for generalized weakness and mild dyspnea on exertion c/f MG exacerbation suspected to be iso discontinuation of prednisone.
34-year-old female with h/o asthma, Sickle cell trait, Myasthenia gravis (AChR antibody positive, diagnosed in 2017) on pyridostigmine 60mg Q6H, Came to the ER for generalized weakness for 2 weeks and episodes of shortness of breath at the end of the day and on exertion for 1 to 2 days.  Uneven vital capacity was found -50 and 1600 respectively in the ER.  On examination there was generalized muscle weakness more marked in the neck flexors and arm extensors.  Patient is currently saturating 100% in room air without any respiratory distress.  She finished prednisone taper about 3 to 4 weeks ago lost to follow-up with her neurologist outpatient due to changes in her insurance policy.    IMPRESSION   Generalized weakness with shortness of breath on exertion likely due to exacerbation of myasthenia gravis possibly from discontinuing prednisone, needs to rule out any upper respiratory infection or other systemic process that can triggers the exacerbation.  Currently not in crisis (NIF -50, vital capacity 2190 mL). stable at this point, motor power improved    RECOMMENDATION   -Monitor NIF and vital capacity Q12H  -C/W Pyridostigmine 60mg Q6H  - restart prednisone 10 mg daily  -Rest of the management as per the primary team  -neurology to sign off  - follow up with Dr. Eason    case seen with Dr. Doshi  
33 yo F w/ PMH myasthenia gravis (AchR ab pos, dx 2017) on pyridostigmine 60 mg QID, weaned off of steroids 2-3 weeks ago after most recent MG crisis in August 2024, s/p 3 intubations in the past w/ most recent admission 8/2024 during which pt received 5 sessions IVIG followed by PLEX d/t failure of IVIG, asthma, sickle cell trait, presents for generalized weakness and mild dyspnea on exertion c/f MG exacerbation suspected to be iso discontinuation of prednisone.

## 2025-02-24 NOTE — DISCHARGE NOTE PROVIDER - CARE PROVIDERS DIRECT ADDRESSES
[email or web address]

## 2025-02-24 NOTE — DISCHARGE NOTE PROVIDER - NSDCFUSCHEDAPPT_GEN_ALL_CORE_FT
THEODORA Ly  [facility]  NEUROLOGY [facility]  Scheduled Appointment: 03/31/2025

## 2025-02-24 NOTE — DISCHARGE NOTE PROVIDER - NSDCCPCAREPLAN_GEN_ALL_CORE_FT
PRINCIPAL DISCHARGE DIAGNOSIS  Diagnosis: Myasthenia gravis with exacerbation  Assessment and Plan of Treatment: Generalized weakness with shortness of breath on exertion likely due to exacerbation of myasthenia gravis possibly from discontinuing prednisone. Respiratory viral panel was negative and chest x-ray was clear. Negative inspiratory force (NIF) and vital capacity were monitored every 12 hours.  Currently not in crisis (NIF -50, vital capacity 1600 mL).   Pyridostigmine was continued and increased to 60 mg every 6 hours. Prednisone was restarted at 10 mg daily. Neurology was consulted and provided recommendations for follow-up. Your dyspnea on exertion was attributed to the myasthenia gravis exacerbation and improved with treatment. Please follow up with your neurologist and primary care doctor within 1 week of discharge.     PRINCIPAL DISCHARGE DIAGNOSIS  Diagnosis: Myasthenia gravis with exacerbation  Assessment and Plan of Treatment: Generalized weakness with shortness of breath on exertion likely due to exacerbation of myasthenia gravis possibly from discontinuing prednisone. Respiratory viral panel was negative and chest x-ray was clear. Negative inspiratory force (NIF) and vital capacity were monitored every 12 hours. Currently not in crisis (NIF -50, vital capacity 1600 mL).   Pyridostigmine 60 mg every 6 hours was continued. Prednisone was restarted at 10 mg daily. Neurology was consulted and provided recommendations for follow-up. Your dyspnea on exertion was attributed to the myasthenia gravis exacerbation and improved with treatment. Continue monitoring for signs of myasthenia gravis worsening, such as increasing weakness, difficulty breathing, or difficulty swallowing.  Report any worsening symptoms to your neurologist immediately or return to ED.   Please follow up with your neurologist and primary care doctor within 1 week of discharge.

## 2025-02-24 NOTE — PROGRESS NOTE ADULT - TIME BILLING
Time-based billing (NON-critical care).     The necessity of the time spent during the encounter on this date of service was due to:     - Ordering, reviewing, and interpreting labs, testing, and imaging.  - Independently obtaining a review of systems and performing a physical exam  - Reviewing prior hospitalization and where necessary, outpatient records.  - Counselling and educating patient and family regarding interpretation of aforementioned items and plan of care.
review of labs, imaging, notes, discussion of plan with patient, family, and consultant

## 2025-02-24 NOTE — DISCHARGE NOTE PROVIDER - PROVIDER TOKENS
PROVIDER:[TOKEN:[[id number]],FOLLOWUP:[1 week],ESTABLISHEDPATIENT:[T]],PROVIDER:[TOKEN:[[id number]],FOLLOWUP:[1 week],ESTABLISHEDPATIENT:[T]]

## 2025-02-24 NOTE — PROGRESS NOTE ADULT - SUBJECTIVE AND OBJECTIVE BOX
*************************************  NEUROLOGY PROGRESS NOTE  **************************************    ANTONIO POON  Female  MRN-[id number]    Subjective: Patient seen and examined at bedside with Neurology team and attending     Interval History:    patient stable. No new symptoms.     VITAL SIGNS:  Vital Signs Last 24 Hrs  T(C): 37 (24 Feb 2025 12:14), Max: 37 (24 Feb 2025 12:14)  T(F): 98.6 (24 Feb 2025 12:14), Max: 98.6 (24 Feb 2025 12:14)  HR: 85 (24 Feb 2025 12:14) (67 - 87)  BP: 93/60 (24 Feb 2025 12:14) (93/60 - 100/65)  BP(mean): --  RR: 18 (24 Feb 2025 12:14) (17 - 18)  SpO2: 97% (24 Feb 2025 12:14) (97% - 98%)    Parameters below as of 24 Feb 2025 12:14  Patient On (Oxygen Delivery Method): room air    PHYSICAL EXAMINATION:    General - NAD  Eyes - Fundoscopy with flat, sharp optic discs and no hemorrhage or exudates; Fundoscopy not well visualized; Fundoscopy not performed due to safety precautions in the setting of the COVID-19 pandemic    Neurologic Exam:  Mental status - Awake, Alert, Oriented to person, place, and time. Speech fluent, repetition and naming intact. Follows simple and complex commands. Attention/concentration, recent and remote memory (including registration and recall), and fund of knowledge intact    Cranial nerves - VFF, there is left ptosis and bilateral impaired abduction face sensation (V1-V3) intact b/l, facial strength intact without asymmetry b/l, neck flexion extension 5/5    Motor - Normal bulk and tone throughout. No pronator drift.  Strength testing            Deltoid      Biceps      Triceps                R            5                 5               5                     5                            L             5                 5               5                     5                                       Hip Flexion    Hip Extension    Knee Flexion    Knee Extension    Dorsiflexion    Plantar Flexion  R              5                           5                       5                           5                            5                          5  L              5                           5                        5                           5                            5                          5    Sensation - Light touch intact throughout    DTR's -             Biceps      Triceps     Brachioradialis      Patellar    Ankle    Toes/plantar response  R             2+             2+                  2+                       2+            2+                 withdrawing  L              2+             2+                 2+                        2+           2+                 withdrawing    Coordination - Finger to Nose intact b/l. No tremors appreciated    Gait and station - Normal casual gait. Romberg (-)      LABS:    CBC                       9.7    3.67  )-----------( 350      ( 24 Feb 2025 07:11 )             30.7     Chem 02-24    137  |  106  |  12  ----------------------------<  82  4.1   |  19[L]  |  0.72    Ca    9.2      24 Feb 2025 07:09  Phos  3.4     02-23  Mg     2.0     02-23    TPro  6.9  /  Alb  3.8  /  TBili  0.8  /  DBili  x   /  AST  14  /  ALT  10  /  AlkPhos  86  02-24    LFTs LIVER FUNCTIONS - ( 24 Feb 2025 07:09 )  Alb: 3.8 g/dL / Pro: 6.9 g/dL / ALK PHOS: 86 U/L / ALT: 10 U/L / AST: 14 U/L / GGT: x           Coagulopathy PT/INR - ( 23 Feb 2025 07:18 )   PT: 12.4 sec;   INR: 1.08 ratio         PTT - ( 23 Feb 2025 07:18 )  PTT:29.1 sec  Lipid Panel   A1c   Cardiac enzymes     U/A Urinalysis Basic - ( 24 Feb 2025 07:09 )    Color: x / Appearance: x / SG: x / pH: x  Gluc: 82 mg/dL / Ketone: x  / Bili: x / Urobili: x   Blood: x / Protein: x / Nitrite: x   Leuk Esterase: x / RBC: x / WBC x   Sq Epi: x / Non Sq Epi: x / Bacteria: x      CSF  Immunological  Other      RADIOLOGY & ADDITIONAL STUDIES:        
[facility] Division of Hospital Medicine  eKhinde Varghese DO  Pager (M-F, 8A-5P):  MS Teams PREFERRED        SUBJECTIVE / OVERNIGHT EVENTS:  Patient seen at bedside in no acute distress  States she feels weakest 1 hr before due to take her next dose of pyridostigmine  States does not note much improvement after restarting Prednisone 10mg  Aware of Neurology consult and followup  Denies any dizziness, nausea, or vomiting.     MEDICATIONS  (STANDING):  albuterol/ipratropium for Nebulization 3 milliLiter(s) Nebulizer every 6 hours  enoxaparin Injectable 40 milliGRAM(s) SubCutaneous every 24 hours  predniSONE   Tablet 10 milliGRAM(s) Oral daily  pyridostigmine 60 milliGRAM(s) Oral every 6 hours    MEDICATIONS  (PRN):      I&O's Summary    23 Feb 2025 07:01  -  24 Feb 2025 07:00  --------------------------------------------------------  IN: 240 mL / OUT: 0 mL / NET: 240 mL        PHYSICAL EXAM:  Vital Signs Last 24 Hrs  T(C): 37 (24 Feb 2025 12:14), Max: 37.4 (23 Feb 2025 13:15)  T(F): 98.6 (24 Feb 2025 12:14), Max: 99.4 (23 Feb 2025 13:15)  HR: 85 (24 Feb 2025 12:14) (67 - 87)  BP: 93/60 (24 Feb 2025 12:14) (93/60 - 100/65)  BP(mean): --  RR: 18 (24 Feb 2025 12:14) (16 - 18)  SpO2: 97% (24 Feb 2025 12:14) (97% - 98%)    Parameters below as of 24 Feb 2025 12:14  Patient On (Oxygen Delivery Method): room air      GENERAL: NAD  HEENT:  anicteric, moist mucous membranes  CHEST/LUNG:  CTA b/l, no rales, wheezes, or rhonchi  HEART:  RRR, S1, S2  ABDOMEN:  BS+, soft, nontender, nondistended  EXTREMITIES: no edema, cyanosis, or calf tenderness  NERVOUS SYSTEM: answers questions and follows commands appropriately    LABS:                        9.7    3.67  )-----------( 350      ( 24 Feb 2025 07:11 )             30.7     02-24    137  |  106  |  12  ----------------------------<  82  4.1   |  19[L]  |  0.72    Ca    9.2      24 Feb 2025 07:09  Phos  3.4     02-23  Mg     2.0     02-23    TPro  6.9  /  Alb  3.8  /  TBili  0.8  /  DBili  x   /  AST  14  /  ALT  10  /  AlkPhos  86  02-24    PT/INR - ( 23 Feb 2025 07:18 )   PT: 12.4 sec;   INR: 1.08 ratio         PTT - ( 23 Feb 2025 07:18 )  PTT:29.1 sec      Urinalysis Basic - ( 24 Feb 2025 07:09 )    Color: x / Appearance: x / SG: x / pH: x  Gluc: 82 mg/dL / Ketone: x  / Bili: x / Urobili: x   Blood: x / Protein: x / Nitrite: x   Leuk Esterase: x / RBC: x / WBC x   Sq Epi: x / Non Sq Epi: x / Bacteria: x          RADIOLOGY & ADDITIONAL TESTS:  Results Reviewed:   Imaging Personally Reviewed:  Electrocardiogram Personally Reviewed:    COORDINATION OF CARE:  Care Discussed with Consultants/Other Providers [Y/N]:  Prior or Outpatient Records Reviewed [Y/N]:  
Breanna Mcneil DO  Division of Hospital Medicine  Available on [employer] TEAMS    Patient is a 34y old  Female who presents with a chief complaint of shortness of  breath (23 Feb 2025 04:55)      INTERVAL HPI/OVERNIGHT EVENTS: Patient seen and examined at bedside. No overnight events. Tolerating diet. Denies fever, chills, chest pain, shortness of breath, abdominal pain, nausea/vomiting, headache.    MEDICATIONS  (STANDING):  albuterol/ipratropium for Nebulization 3 milliLiter(s) Nebulizer every 6 hours  enoxaparin Injectable 40 milliGRAM(s) SubCutaneous every 24 hours  predniSONE   Tablet 10 milliGRAM(s) Oral daily  pyridostigmine 60 milliGRAM(s) Oral every 6 hours    MEDICATIONS  (PRN):      Allergies    shellfish (Anaphylaxis)  No Known Drug Allergies    Intolerances        REVIEW OF SYSTEMS:  All other review of systems is negative unless indicated above    Vital Signs Last 24 Hrs  T(C): 36.9 (23 Feb 2025 20:21), Max: 37.4 (23 Feb 2025 13:15)  T(F): 98.5 (23 Feb 2025 20:21), Max: 99.4 (23 Feb 2025 13:15)  HR: 87 (23 Feb 2025 20:21) (66 - 87)  BP: 95/63 (23 Feb 2025 20:21) (93/60 - 130/84)  BP(mean): 89 (23 Feb 2025 00:00) (89 - 89)  RR: 17 (23 Feb 2025 20:21) (16 - 18)  SpO2: 98% (23 Feb 2025 20:21) (98% - 100%)    Parameters below as of 23 Feb 2025 20:21  Patient On (Oxygen Delivery Method): room air        PHYSICAL EXAM:  GENERAL: NAD  HEENT:  anicteric, moist mucous membranes  CHEST/LUNG:  CTA b/l, no rales, wheezes, or rhonchi  HEART:  RRR, S1, S2  ABDOMEN:  BS+, soft, nontender, nondistended  EXTREMITIES: no edema, cyanosis, or calf tenderness  NERVOUS SYSTEM: answers questions and follows commands appropriately    LABS:                        10.0   3.72  )-----------( 356      ( 23 Feb 2025 07:12 )             31.1     CBC Full  -  ( 23 Feb 2025 07:12 )  WBC Count : 3.72 K/uL  Hemoglobin : 10.0 g/dL  Hematocrit : 31.1 %  Platelet Count - Automated : 356 K/uL  Mean Cell Volume : 70.8 fl  Mean Cell Hemoglobin : 22.8 pg  Mean Cell Hemoglobin Concentration : 32.2 g/dL  Auto Neutrophil # : x  Auto Lymphocyte # : x  Auto Monocyte # : x  Auto Eosinophil # : x  Auto Basophil # : x  Auto Neutrophil % : x  Auto Lymphocyte % : x  Auto Monocyte % : x  Auto Eosinophil % : x  Auto Basophil % : x    23 Feb 2025 07:15    137    |  105    |  11     ----------------------------<  77     3.7     |  19     |  0.63     Ca    9.1        23 Feb 2025 07:15  Phos  3.4       23 Feb 2025 07:15  Mg     2.0       23 Feb 2025 07:15    TPro  7.0    /  Alb  3.9    /  TBili  1.0    /  DBili  x      /  AST  15     /  ALT  11     /  AlkPhos  85     23 Feb 2025 07:15    PT/INR - ( 23 Feb 2025 07:18 )   PT: 12.4 sec;   INR: 1.08 ratio         PTT - ( 23 Feb 2025 07:18 )  PTT:29.1 sec  Urinalysis Basic - ( 23 Feb 2025 07:15 )    Color: x / Appearance: x / SG: x / pH: x  Gluc: 77 mg/dL / Ketone: x  / Bili: x / Urobili: x   Blood: x / Protein: x / Nitrite: x   Leuk Esterase: x / RBC: x / WBC x   Sq Epi: x / Non Sq Epi: x / Bacteria: x      CAPILLARY BLOOD GLUCOSE              RADIOLOGY & ADDITIONAL TESTS:    Personally reviewed.     Consultant(s) Notes Reviewed:  [x] YES  [ ] NO

## 2025-02-24 NOTE — DISCHARGE NOTE PROVIDER - CARE PROVIDER_API CALL
Raquel Fish  [address] [address]  Phone: [phone]  Fax: [phone]  Established Patient  Follow Up Time: 1 week    Mason Ly  Neurology  [address]  [place] [address]  Phone: [phone]  Fax: [phone]  Established Patient  Follow Up Time: 1 week

## 2025-03-31 ENCOUNTER — APPOINTMENT (OUTPATIENT)
Dept: NEUROLOGY | Facility: CLINIC | Age: 35
End: 2025-03-31

## 2025-04-02 NOTE — HISTORY OF PRESENT ILLNESS
[FreeTextEntry1] : Ms. Blackburn is a 31-year-old -American lady being followed for transition of care and is antibody positive myasthenia gravis patient now being treated with Soliris and Mestinon and is essentially asymptomatic with the exception of left eye ptosis and occasional diplopia.  She denied any headache dysarthria dysphagia or dyspnea neck muscle weakness, focal or lateralized weakness is able to take care of her activities of daily living including her profession as working with challenged people.  She denied any interim past medical history and today I reviewed her medical records and do not find any thyroid function tests.\par \par Review of systems is unremarkable.  I reviewed her medications and allergies.  She has ptosis
No

## 2025-07-16 NOTE — ED ADULT NURSE NOTE - CAS DISCH TRANSFER METHOD
Noise-Induced Hearing Loss  What it is, and what you can do to prevent it      Exposure to loud sounds, in an occupational setting or recreational, can cause permanent hearing loss.  Sound is measured in decibels (dB).  Noise-induced hearing loss is the ONLY type of preventable hearing loss.  Hearing loss related to noise exposure can occur at any age.      There are small sensory cells, called inner and outer hair cells, within the inner ear (cochlea).  These cells process the loudness (intensity) and pitch (frequency) of sound and send the signal to the brain via our auditory nerve (vestibulocochlear nerve, cranial nerve VIII).  When these cells are damaged, they can result in permanent hearing loss and/or tinnitus.  The hair cells responsible for high frequency sounds, like birds chirping, are most likely to be damaged due to loud sounds.  The high frequency sounds are also very important for our clarity and understanding of speech.      OCCUPATIONAL NOISE EXPOSURE RECREATIONAL NOISE EXPOSURE   Some jobs may have exposure to loud sounds in the workplace.  These jobs may include but are not limited to:     Factory settings  Manufacturing  Construction  Welding  Landscaping  Hairdressing/hairstyling  Musicians  Air Traffic   ... And more! Many activities outside of work may cause permanent hearing loss.  These activities may include but are not limited to:  Lawnmowers, leaf blowers  Farming equipment and animals (such as pigs squealing)  Chainsaws and other power tools  Playing musical instruments and/or singing  Listening to music too loudly - at concerts, through stereo, through ear buds or headphones  Attending sporting events  Attending fireworks shows or using fireworks at home  Use of firearms  ... And more!       REDUCE OR PROTECT YOUR EARS FROM NOISE EXPOSURE    To do your best to avoid noise-induced hearing loss, here are some tips:  Limit exposure to loud sounds.  85 dB (decibels) is safe for 8  Private car

## 2025-07-29 NOTE — DISCHARGE NOTE ADULT - FUNCTIONAL SCREEN CURRENT LEVEL: AMBULATION, MLM
Mild, able to extend tongue past lower lip. Speaking OK so far. Reassured        3 = assistive equipment and person